# Patient Record
Sex: MALE | Race: WHITE | Employment: OTHER | URBAN - NONMETROPOLITAN AREA
[De-identification: names, ages, dates, MRNs, and addresses within clinical notes are randomized per-mention and may not be internally consistent; named-entity substitution may affect disease eponyms.]

---

## 2017-03-07 ENCOUNTER — HOSPITAL ENCOUNTER (OUTPATIENT)
Dept: GENERAL RADIOLOGY | Age: 62
Discharge: HOME | End: 2017-03-07
Payer: MEDICARE

## 2017-03-07 DIAGNOSIS — S83.207S ACUTE TORN MENISCUS OF KNEE, LEFT, SEQUELA: ICD-10-CM

## 2017-03-07 PROCEDURE — 73562 X-RAY EXAM OF KNEE 3: CPT

## 2017-04-19 DIAGNOSIS — R97.20 ELEVATED PROSTATE SPECIFIC ANTIGEN (PSA): Primary | ICD-10-CM

## 2017-04-19 LAB — PSA SERPL-MCNC: 0.66 NG/ML (ref 0–4)

## 2017-05-01 ENCOUNTER — OFFICE VISIT (OUTPATIENT)
Dept: UROLOGY | Facility: CLINIC | Age: 62
End: 2017-05-01

## 2017-05-01 VITALS
TEMPERATURE: 97.5 F | BODY MASS INDEX: 41.19 KG/M2 | SYSTOLIC BLOOD PRESSURE: 150 MMHG | HEIGHT: 73 IN | DIASTOLIC BLOOD PRESSURE: 100 MMHG | WEIGHT: 310.8 LBS

## 2017-05-01 DIAGNOSIS — N40.1 BPH (BENIGN PROSTATIC HYPERTROPHY) WITH URINARY OBSTRUCTION: ICD-10-CM

## 2017-05-01 DIAGNOSIS — C61 CANCER OF PROSTATE (HCC): Primary | ICD-10-CM

## 2017-05-01 DIAGNOSIS — N13.8 BPH (BENIGN PROSTATIC HYPERTROPHY) WITH URINARY OBSTRUCTION: ICD-10-CM

## 2017-05-01 LAB
BILIRUB BLD-MCNC: NEGATIVE MG/DL
CLARITY, POC: CLEAR
COLOR UR: YELLOW
GLUCOSE UR STRIP-MCNC: NEGATIVE MG/DL
KETONES UR QL: NEGATIVE
LEUKOCYTE EST, POC: NEGATIVE
NITRITE UR-MCNC: NEGATIVE MG/ML
PH UR: 5 [PH] (ref 5–8)
PROT UR STRIP-MCNC: NEGATIVE MG/DL
RBC # UR STRIP: NEGATIVE /UL
SP GR UR: 1.03 (ref 1–1.03)
UROBILINOGEN UR QL: NORMAL

## 2017-05-01 PROCEDURE — 99213 OFFICE O/P EST LOW 20 MIN: CPT | Performed by: UROLOGY

## 2017-05-01 PROCEDURE — 81003 URINALYSIS AUTO W/O SCOPE: CPT | Performed by: UROLOGY

## 2017-05-06 ENCOUNTER — RESULTS ENCOUNTER (OUTPATIENT)
Dept: UROLOGY | Facility: CLINIC | Age: 62
End: 2017-05-06

## 2017-05-06 DIAGNOSIS — C61 CANCER OF PROSTATE (HCC): ICD-10-CM

## 2017-09-06 RX ORDER — UBIDECARENONE 50 MG
2 CAPSULE ORAL 2 TIMES DAILY
Status: ON HOLD | COMMUNITY
End: 2022-10-13 | Stop reason: HOSPADM

## 2017-09-06 RX ORDER — FENOFIBRATE 145 MG/1
145 TABLET, COATED ORAL DAILY
COMMUNITY
End: 2017-11-06 | Stop reason: SDUPTHER

## 2017-09-08 DIAGNOSIS — E78.2 MIXED HYPERLIPIDEMIA: Primary | ICD-10-CM

## 2017-09-08 DIAGNOSIS — Z12.5 PROSTATE CANCER SCREENING: ICD-10-CM

## 2017-09-08 DIAGNOSIS — I10 ESSENTIAL HYPERTENSION: ICD-10-CM

## 2017-09-11 ENCOUNTER — OFFICE VISIT (OUTPATIENT)
Dept: INTERNAL MEDICINE | Age: 62
End: 2017-09-11
Payer: MEDICARE

## 2017-09-11 VITALS
SYSTOLIC BLOOD PRESSURE: 144 MMHG | HEART RATE: 91 BPM | DIASTOLIC BLOOD PRESSURE: 88 MMHG | HEIGHT: 73 IN | BODY MASS INDEX: 41.75 KG/M2 | OXYGEN SATURATION: 96 % | WEIGHT: 315 LBS

## 2017-09-11 DIAGNOSIS — R03.0 ELEVATED BLOOD PRESSURE READING WITHOUT DIAGNOSIS OF HYPERTENSION: ICD-10-CM

## 2017-09-11 DIAGNOSIS — E66.01 MORBID OBESITY DUE TO EXCESS CALORIES (HCC): ICD-10-CM

## 2017-09-11 DIAGNOSIS — E78.2 MIXED HYPERLIPIDEMIA: Primary | ICD-10-CM

## 2017-09-11 DIAGNOSIS — I10 ESSENTIAL HYPERTENSION: ICD-10-CM

## 2017-09-11 DIAGNOSIS — Z12.5 PROSTATE CANCER SCREENING: ICD-10-CM

## 2017-09-11 DIAGNOSIS — Z85.46 HISTORY OF PROSTATE CANCER: ICD-10-CM

## 2017-09-11 DIAGNOSIS — E78.2 MIXED HYPERLIPIDEMIA: ICD-10-CM

## 2017-09-11 LAB
ALBUMIN SERPL-MCNC: 4.1 G/DL (ref 3.5–5.2)
ALP BLD-CCNC: 73 U/L (ref 40–130)
ALT SERPL-CCNC: 42 U/L (ref 5–41)
ANION GAP SERPL CALCULATED.3IONS-SCNC: 14 MMOL/L (ref 7–19)
AST SERPL-CCNC: 25 U/L (ref 5–40)
BILIRUB SERPL-MCNC: 0.5 MG/DL (ref 0.2–1.2)
BUN BLDV-MCNC: 11 MG/DL (ref 8–23)
CALCIUM SERPL-MCNC: 9.1 MG/DL (ref 8.8–10.2)
CHLORIDE BLD-SCNC: 103 MMOL/L (ref 98–111)
CO2: 24 MMOL/L (ref 22–29)
CREAT SERPL-MCNC: 1 MG/DL (ref 0.5–1.2)
GFR NON-AFRICAN AMERICAN: >60
GLUCOSE BLD-MCNC: 94 MG/DL (ref 74–109)
LDL CHOLESTEROL DIRECT: 128 MG/DL
POTASSIUM SERPL-SCNC: 4.2 MMOL/L (ref 3.5–5)
PROSTATE SPECIFIC ANTIGEN: 0.53 NG/ML (ref 0–4)
SODIUM BLD-SCNC: 141 MMOL/L (ref 136–145)
TOTAL PROTEIN: 7.3 G/DL (ref 6.6–8.7)
TRIGL SERPL-MCNC: 243 MG/DL (ref 150–199)

## 2017-09-11 PROCEDURE — G8427 DOCREV CUR MEDS BY ELIG CLIN: HCPCS | Performed by: INTERNAL MEDICINE

## 2017-09-11 PROCEDURE — 3017F COLORECTAL CA SCREEN DOC REV: CPT | Performed by: INTERNAL MEDICINE

## 2017-09-11 PROCEDURE — 1036F TOBACCO NON-USER: CPT | Performed by: INTERNAL MEDICINE

## 2017-09-11 PROCEDURE — G8419 CALC BMI OUT NRM PARAM NOF/U: HCPCS | Performed by: INTERNAL MEDICINE

## 2017-09-11 PROCEDURE — 99214 OFFICE O/P EST MOD 30 MIN: CPT | Performed by: INTERNAL MEDICINE

## 2017-09-11 ASSESSMENT — PATIENT HEALTH QUESTIONNAIRE - PHQ9
SUM OF ALL RESPONSES TO PHQ QUESTIONS 1-9: 0
1. LITTLE INTEREST OR PLEASURE IN DOING THINGS: 0
2. FEELING DOWN, DEPRESSED OR HOPELESS: 0
SUM OF ALL RESPONSES TO PHQ9 QUESTIONS 1 & 2: 0

## 2017-09-11 ASSESSMENT — ENCOUNTER SYMPTOMS
COUGH: 0
SORE THROAT: 0
NAUSEA: 0
RHINORRHEA: 0
TROUBLE SWALLOWING: 0
ABDOMINAL PAIN: 0
SHORTNESS OF BREATH: 0

## 2018-06-12 DIAGNOSIS — C61 CANCER OF PROSTATE (HCC): Primary | ICD-10-CM

## 2018-06-12 DIAGNOSIS — C61 CANCER OF PROSTATE (HCC): ICD-10-CM

## 2018-06-13 LAB — PSA SERPL-MCNC: 0.6 NG/ML (ref 0–4)

## 2018-06-19 ENCOUNTER — OFFICE VISIT (OUTPATIENT)
Dept: UROLOGY | Facility: CLINIC | Age: 63
End: 2018-06-19

## 2018-06-19 VITALS — HEIGHT: 73 IN | BODY MASS INDEX: 41.75 KG/M2 | TEMPERATURE: 98.1 F | WEIGHT: 315 LBS

## 2018-06-19 DIAGNOSIS — C61 CANCER OF PROSTATE (HCC): Primary | ICD-10-CM

## 2018-06-19 LAB
BILIRUB BLD-MCNC: NEGATIVE MG/DL
CLARITY, POC: CLEAR
COLOR UR: YELLOW
GLUCOSE UR STRIP-MCNC: NEGATIVE MG/DL
KETONES UR QL: NEGATIVE
LEUKOCYTE EST, POC: NEGATIVE
NITRITE UR-MCNC: NEGATIVE MG/ML
PH UR: 5 [PH] (ref 5–8)
PROT UR STRIP-MCNC: NEGATIVE MG/DL
RBC # UR STRIP: NEGATIVE /UL
SP GR UR: 1.02 (ref 1–1.03)
UROBILINOGEN UR QL: NORMAL

## 2018-06-19 PROCEDURE — 99213 OFFICE O/P EST LOW 20 MIN: CPT | Performed by: UROLOGY

## 2018-06-19 PROCEDURE — 81001 URINALYSIS AUTO W/SCOPE: CPT | Performed by: UROLOGY

## 2018-06-19 NOTE — PATIENT INSTRUCTIONS

## 2018-06-19 NOTE — PROGRESS NOTES
"Subjective    Mr. Chance is 63 y.o. male    CHIEF COMPLAINT: Prostate cancer    The patient is now 6 years status post radiation for prostate cancer clinically he is doing well as a way score today is 0 he denies any gross hematuria there is no flank pain there is no symptoms metastatic disease such as bone pain back pain weight loss or anorexia.    Most recent PSA is 0.6 and stable.    He denies any new medical problems since we have last seen him      History of Present Illness      The following portions of the patient's history were reviewed and updated as appropriate: allergies, current medications, past family history, past medical history, past social history, past surgical history and problem list.    Review of Systems   Constitutional: Negative.  Negative for chills and fever.   Gastrointestinal: Negative for abdominal distention, abdominal pain, anal bleeding, blood in stool and nausea.   Genitourinary: Negative for difficulty urinating, dysuria, flank pain, frequency, hematuria and urgency.   Psychiatric/Behavioral: Negative.  Negative for agitation and confusion.         Current Outpatient Prescriptions:   •  Multiple Vitamins-Minerals (MULTIVITAMIN ADULT PO), Take  by mouth., Disp: , Rfl:     Past Medical History:   Diagnosis Date   • BPH (benign prostatic hypertrophy)    • Elevated PSA    • Malignant neoplasm of prostate        Past Surgical History:   Procedure Laterality Date   • TONSILLECTOMY AND ADENOIDECTOMY     • VASECTOMY         Social History     Social History   • Marital status: Unknown     Social History Main Topics   • Smoking status: Never Smoker   • Alcohol use No   • Drug use: Unknown     Other Topics Concern   • Not on file       Family History   Problem Relation Age of Onset   • Prostate cancer Brother        Objective    Temp 98.1 °F (36.7 °C)   Ht 185.4 cm (73\")   Wt (!) 143 kg (315 lb 9.6 oz)   BMI 41.64 kg/m²     Physical Exam      Orders Only on 06/12/2018   Component Date " Value Ref Range Status   • PSA 06/12/2018 0.6  0.0 - 4.0 ng/mL Final    Comment: Roche ECLIA methodology.  According to the American U[employer] Association, Serum PSA should  decrease and remain at undetectable levels after radical  prostatectomy. The AUA defines biochemical recurrence as an initial  PSA value 0.2 ng/mL or greater followed by a subsequent confirmatory  PSA value 0.2 ng/mL or greater.  Values obtained with different assay methods or kits cannot be used  interchangeably. Results cannot be interpreted as absolute evidence  of the presence or absence of malignant disease.         Results for orders placed or performed in visit on 06/19/18   POC Urinalysis Dipstick, Multipro   Result Value Ref Range    Color Yellow Yellow, Straw, Dark Yellow, [place]    Clarity, UA Clear Clear    Glucose, UA Negative Negative, 1000 mg/dL (3+) mg/dL    Bilirubin Negative Negative    Ketones, UA Negative Negative    Specific Gravity  1.025 1.005 - 1.030    Blood, UA Negative Negative    pH, Urine 5.0 5.0 - 8.0    Protein, POC Negative Negative mg/dL    Urobilinogen, UA Normal Normal    Nitrite, UA Negative Negative    Leukocytes Negative Negative       Assessment and Plan    Paul was seen today for prostate cancer.    Diagnoses and all orders for this visit:    Cancer of prostate  -     POC Urinalysis Dipstick, Multipro  -     PSA DIAGNOSTIC; Future    Plan--I recommended a follow-up again in 1 year's time with a PSA if he starts having difficulties etc. they will call prior to that

## 2018-06-24 ENCOUNTER — RESULTS ENCOUNTER (OUTPATIENT)
Dept: UROLOGY | Facility: CLINIC | Age: 63
End: 2018-06-24

## 2018-06-24 DIAGNOSIS — C61 CANCER OF PROSTATE (HCC): ICD-10-CM

## 2018-11-06 DIAGNOSIS — C61 CANCER OF PROSTATE (HCC): Primary | ICD-10-CM

## 2019-02-21 ENCOUNTER — NURSE ONLY (OUTPATIENT)
Dept: INTERNAL MEDICINE | Age: 64
End: 2019-02-21

## 2019-06-18 DIAGNOSIS — C61 CANCER OF PROSTATE (HCC): ICD-10-CM

## 2019-06-19 LAB — PSA SERPL-MCNC: 0.52 NG/ML (ref 0–4)

## 2019-07-02 ENCOUNTER — OFFICE VISIT (OUTPATIENT)
Dept: UROLOGY | Facility: CLINIC | Age: 64
End: 2019-07-02

## 2019-07-02 VITALS — WEIGHT: 315 LBS | HEIGHT: 74 IN | TEMPERATURE: 98 F | BODY MASS INDEX: 40.43 KG/M2

## 2019-07-02 DIAGNOSIS — C61 CANCER OF PROSTATE (HCC): Primary | ICD-10-CM

## 2019-07-02 DIAGNOSIS — R31.29 MICROSCOPIC HEMATURIA: ICD-10-CM

## 2019-07-02 LAB
BACTERIA UR QL AUTO: ABNORMAL /HPF
HYALINE CASTS UR QL AUTO: ABNORMAL /LPF
RBC # UR: ABNORMAL /HPF
REF LAB TEST METHOD: ABNORMAL
SQUAMOUS #/AREA URNS HPF: ABNORMAL /HPF
WBC UR QL AUTO: ABNORMAL /HPF

## 2019-07-02 PROCEDURE — 81015 MICROSCOPIC EXAM OF URINE: CPT | Performed by: UROLOGY

## 2019-07-02 PROCEDURE — 99213 OFFICE O/P EST LOW 20 MIN: CPT | Performed by: UROLOGY

## 2019-07-02 NOTE — PROGRESS NOTES
Mr. Chance is 64 y.o. male    Chief Complaint   Patient presents with   • Prostate Cancer       History of Present Illness   He was initially diagnosed with prostate cancer about  7 year(s) ago. This was identified in the context of elevated psa.  Severity of the disease is best described as probable organ confined disease. Previous or current management includes external beam radiotherapy. Associated symptoms include no evidence of irritative or obstructive voiding symptoms, gross hematuria, lower extremity swelling or weight loss. . Currently his PSA is  0.5.     The following portions of the patient's history were reviewed and updated as appropriate: allergies, current medications, past family history, past medical history, past social history, past surgical history and problem list.    Review of Systems   Constitutional: Negative for chills and fever.   Gastrointestinal: Negative for abdominal pain, anal bleeding and blood in stool.   Genitourinary: Negative for decreased urine volume, difficulty urinating, discharge, dysuria, enuresis, flank pain, frequency, genital sores, hematuria, penile pain, penile swelling, scrotal swelling, testicular pain and urgency.       I have reviewed the review of systems      Current Outpatient Medications:   •  Multiple Vitamins-Minerals (MULTIVITAMIN ADULT PO), Take  by mouth., Disp: , Rfl:     Past Medical History:   Diagnosis Date   • BPH (benign prostatic hypertrophy)    • Elevated PSA    • Malignant neoplasm of prostate (CMS/HCC)        Past Surgical History:   Procedure Laterality Date   • TONSILLECTOMY AND ADENOIDECTOMY     • VASECTOMY         Social History     Socioeconomic History   • Marital status: Unknown     Spouse name: Not on file   • Number of children: Not on file   • Years of education: Not on file   • Highest education level: Not on file   Tobacco Use   • Smoking status: Never Smoker   • Smokeless tobacco: Never Used   Substance and Sexual Activity   • Alcohol  "use: No   • Drug use: No   • Sexual activity: Defer       Family History   Problem Relation Age of Onset   • Prostate cancer Brother        Objective    Temp 98 °F (36.7 °C)   Ht 188 cm (74\")   Wt (!) 146 kg (321 lb 9.6 oz)   BMI 41.29 kg/m²     Physical Exam    Appointment on 06/25/2019   Component Date Value Ref Range Status   • Color 07/02/2019 Yellow  Yellow, Straw, Dark Yellow, [place] Final   • Clarity, UA 07/02/2019 Clear  Clear Final   • Glucose, UA 07/02/2019 Negative  Negative, 1000 mg/dL (3+) mg/dL Final   • Bilirubin 07/02/2019 Negative  Negative Final   • Ketones, UA 07/02/2019 Negative  Negative Final   • Specific Gravity  07/02/2019 1.025  1.005 - 1.030 Final   • Blood, UA 07/02/2019 Trace* Negative Final   • pH, Urine 07/02/2019 5.5  5.0 - 8.0 Final   • Protein, POC 07/02/2019 Trace* Negative mg/dL Final   • Urobilinogen, UA 07/02/2019 Normal  Normal Final   • Nitrite, UA 07/02/2019 Negative  Negative Final   • Leukocytes 07/02/2019 Negative  Negative Final       Results for orders placed or performed in visit on 06/25/19   POC Urinalysis Dipstick, Multipro   Result Value Ref Range    Color Yellow Yellow, Straw, Dark Yellow, [place]    Clarity, UA Clear Clear    Glucose, UA Negative Negative, 1000 mg/dL (3+) mg/dL    Bilirubin Negative Negative    Ketones, UA Negative Negative    Specific Gravity  1.025 1.005 - 1.030    Blood, UA Trace (A) Negative    pH, Urine 5.5 5.0 - 8.0    Protein, POC Trace (A) Negative mg/dL    Urobilinogen, UA Normal Normal    Nitrite, UA Negative Negative    Leukocytes Negative Negative     Patient's Body mass index is 41.29 kg/m². BMI is above normal parameters. Recommendations include: educational material.    Assessment and Plan    Paul was seen today for prostate cancer.    Diagnoses and all orders for this visit:    Cancer of prostate (CMS/HCC)  -     Urinalysis, Microscopic Only - Urine, Clean Catch    Microscopic hematuria  -     PSA DIAGNOSTIC; Future    Patient with " a history of prostate cancer previously seen by Dr. Culp.  He had radiation in 2012.  He is unsure exactly what his joey was, but his PSA last year was 0.6.  PSA  this year is 0.5.  This is stable.  No signs of recurrence.  Continue with yearly PSA screenings.    There is trace blood in his urine today.  This is a new finding.  On my examination, I do not see any red blood cells, but I will send this for formal microscopic analysis and call him with results.  If there is any concern for red blood cells in the urine he may need a full hematuria work-up.

## 2019-07-02 NOTE — PATIENT INSTRUCTIONS

## 2019-07-03 DIAGNOSIS — R31.29 MICROSCOPIC HEMATURIA: Primary | ICD-10-CM

## 2019-07-23 ENCOUNTER — PROCEDURE VISIT (OUTPATIENT)
Dept: UROLOGY | Facility: CLINIC | Age: 64
End: 2019-07-23

## 2019-07-23 ENCOUNTER — HOSPITAL ENCOUNTER (OUTPATIENT)
Dept: CT IMAGING | Facility: HOSPITAL | Age: 64
Discharge: HOME | End: 2019-07-23
Admitting: UROLOGY

## 2019-07-23 DIAGNOSIS — C61 CANCER OF PROSTATE (HCC): ICD-10-CM

## 2019-07-23 DIAGNOSIS — K65.4 MESENTERIC PANNICULITIS (HCC): ICD-10-CM

## 2019-07-23 DIAGNOSIS — R31.29 MICROSCOPIC HEMATURIA: ICD-10-CM

## 2019-07-23 DIAGNOSIS — R31.29 MICROSCOPIC HEMATURIA: Primary | ICD-10-CM

## 2019-07-23 LAB — CREAT BLDA-MCNC: 1.1 MG/DL (ref 0.6–1.3)

## 2019-07-23 PROCEDURE — 52000 CYSTOURETHROSCOPY: CPT | Performed by: UROLOGY

## 2019-07-23 PROCEDURE — 82565 ASSAY OF CREATININE: CPT

## 2019-07-23 PROCEDURE — 25010000002 IOPAMIDOL 61 % SOLUTION: Performed by: UROLOGY

## 2019-07-23 PROCEDURE — 74178 CT ABD&PLV WO CNTR FLWD CNTR: CPT

## 2019-07-23 RX ADMIN — IOPAMIDOL 100 ML: 612 INJECTION, SOLUTION INTRAVENOUS at 14:00

## 2019-07-23 NOTE — PROGRESS NOTES
Pre- operative diagnosis:  Hematuria    Post operative diagnosis:  Normal bladder    Procedure:  The patient was prepped and draped in a normal sterile fashion.  The urethra was anesthetized with 2% lidocaine jelly.  A flexible cystoscope was introduced per urethra.  The anterior urethra is normal in its caliber without evidence of a mass.  There appears to be no gross obstruction at the bladder neck.  The prostatic urethra is without any evidence of obstruction.  Neither is there excessive lateral nor median lobe enlargement.  The bladder shows a normal urothelium without evidence of a mass, erythema, nor diverticulum.  There is no evidence of a bladder stone nor foreign body.  The detrusor is minimally trabeculated.  The ureteral orifces are essentially normal in locationn and there is clear efflux of urine.    Patient tolerated the procedure well    Complications: none    Blood loss: minimal    Follow up:    Cystoscopy negative for any abnormal findings.  CT scan within normal limits from a urinary tract standpoint.  He does have a fatty liver as well as mesenteric panniculitis.  I made a GI referral for evaluation of this.  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  Continue to follow with a PSA in 1 year as part of his prostate cancer  follow-up.

## 2019-08-21 DIAGNOSIS — K65.4 MESENTERIC PANNICULITIS (HCC): Primary | ICD-10-CM

## 2019-09-25 ENCOUNTER — OFFICE VISIT (OUTPATIENT)
Dept: INTERNAL MEDICINE | Age: 64
End: 2019-09-25
Payer: MEDICARE

## 2019-09-25 VITALS
BODY MASS INDEX: 41.75 KG/M2 | HEIGHT: 73 IN | DIASTOLIC BLOOD PRESSURE: 102 MMHG | SYSTOLIC BLOOD PRESSURE: 162 MMHG | HEART RATE: 91 BPM | RESPIRATION RATE: 18 BRPM | OXYGEN SATURATION: 95 % | WEIGHT: 315 LBS

## 2019-09-25 DIAGNOSIS — E66.09 OBESITY DUE TO EXCESS CALORIES WITHOUT SERIOUS COMORBIDITY, UNSPECIFIED CLASSIFICATION: ICD-10-CM

## 2019-09-25 DIAGNOSIS — Z23 NEED FOR INFLUENZA VACCINATION: ICD-10-CM

## 2019-09-25 DIAGNOSIS — K76.0 FATTY LIVER: ICD-10-CM

## 2019-09-25 DIAGNOSIS — M89.9 DISORDER OF BONE: ICD-10-CM

## 2019-09-25 DIAGNOSIS — Z12.5 ENCOUNTER FOR SCREENING FOR MALIGNANT NEOPLASM OF PROSTATE: ICD-10-CM

## 2019-09-25 DIAGNOSIS — I10 ESSENTIAL HYPERTENSION: ICD-10-CM

## 2019-09-25 DIAGNOSIS — Z13.1 SCREENING FOR DIABETES MELLITUS (DM): ICD-10-CM

## 2019-09-25 DIAGNOSIS — E78.2 MIXED HYPERLIPIDEMIA: ICD-10-CM

## 2019-09-25 DIAGNOSIS — E66.01 MORBID OBESITY (HCC): ICD-10-CM

## 2019-09-25 DIAGNOSIS — Z00.00 HEALTHCARE MAINTENANCE: ICD-10-CM

## 2019-09-25 DIAGNOSIS — M79.3 PANNICULITIS: Primary | ICD-10-CM

## 2019-09-25 DIAGNOSIS — Z85.46 HISTORY OF PROSTATE CANCER: ICD-10-CM

## 2019-09-25 PROCEDURE — 1036F TOBACCO NON-USER: CPT | Performed by: NURSE PRACTITIONER

## 2019-09-25 PROCEDURE — 90686 IIV4 VACC NO PRSV 0.5 ML IM: CPT | Performed by: NURSE PRACTITIONER

## 2019-09-25 PROCEDURE — 99204 OFFICE O/P NEW MOD 45 MIN: CPT | Performed by: NURSE PRACTITIONER

## 2019-09-25 PROCEDURE — 3017F COLORECTAL CA SCREEN DOC REV: CPT | Performed by: NURSE PRACTITIONER

## 2019-09-25 PROCEDURE — G0008 ADMIN INFLUENZA VIRUS VAC: HCPCS | Performed by: NURSE PRACTITIONER

## 2019-09-25 PROCEDURE — G8417 CALC BMI ABV UP PARAM F/U: HCPCS | Performed by: NURSE PRACTITIONER

## 2019-09-25 PROCEDURE — G8427 DOCREV CUR MEDS BY ELIG CLIN: HCPCS | Performed by: NURSE PRACTITIONER

## 2019-09-25 RX ORDER — LOSARTAN POTASSIUM 100 MG/1
100 TABLET ORAL DAILY
Qty: 90 TABLET | Refills: 1 | Status: SHIPPED | OUTPATIENT
Start: 2019-09-25 | End: 2020-01-14 | Stop reason: SDUPTHER

## 2019-09-25 ASSESSMENT — PATIENT HEALTH QUESTIONNAIRE - PHQ9
SUM OF ALL RESPONSES TO PHQ9 QUESTIONS 1 & 2: 0
SUM OF ALL RESPONSES TO PHQ QUESTIONS 1-9: 0
SUM OF ALL RESPONSES TO PHQ QUESTIONS 1-9: 0
1. LITTLE INTEREST OR PLEASURE IN DOING THINGS: 0
2. FEELING DOWN, DEPRESSED OR HOPELESS: 0

## 2019-09-25 ASSESSMENT — ENCOUNTER SYMPTOMS
BLOOD IN STOOL: 0
SHORTNESS OF BREATH: 0
SORE THROAT: 0
STRIDOR: 0
DIARRHEA: 0
EYE ITCHING: 0
WHEEZING: 0
CONSTIPATION: 0
TROUBLE SWALLOWING: 0
EYE DISCHARGE: 0
ABDOMINAL DISTENTION: 0
VOMITING: 0
COUGH: 0
NAUSEA: 0
CHOKING: 0
COLOR CHANGE: 0
ABDOMINAL PAIN: 0

## 2019-09-25 NOTE — PROGRESS NOTES
cancer screen colonoscopy  05/30/2022    Lipid screen  09/11/2022    Flu vaccine  Completed    Pneumococcal 0-64 years Vaccine  Aged Out       No results found for: LABA1C  Lab Results   Component Value Date    PSA 0.53 09/11/2017     No results found for: TSH]  Lab Results   Component Value Date     09/11/2017    K 4.2 09/11/2017     09/11/2017    CO2 24 09/11/2017    BUN 11 09/11/2017    CREATININE 1.0 09/11/2017    GLUCOSE 94 09/11/2017    CALCIUM 9.1 09/11/2017    PROT 7.3 09/11/2017    LABALBU 4.1 09/11/2017    BILITOT 0.5 09/11/2017    ALKPHOS 73 09/11/2017    AST 25 09/11/2017    ALT 42 (H) 09/11/2017    LABGLOM >60 09/11/2017     No results found for: CHOL  Lab Results   Component Value Date    TRIG 243 (H) 09/11/2017     No results found for: HDL  No results found for: [facility]  Lab Results   Component Value Date     09/11/2017    K 4.2 09/11/2017     09/11/2017    CO2 24 09/11/2017    BUN 11 09/11/2017    CREATININE 1.0 09/11/2017    GLUCOSE 94 09/11/2017    CALCIUM 9.1 09/11/2017      No results found for: WBC, HGB, HCT, MCV, PLT, LABLYMP, MID, GRAN, LYMPHOPCT, MIDPERCENT, GRANULOCYTES, RBC, MCH, MCHC, RDW  No results found for: VITD25    Subjective:      Review of Systems   Constitutional: Negative for fatigue, fever and unexpected weight change. HENT: Negative for ear discharge, ear pain, mouth sores, sore throat and trouble swallowing. Eyes: Negative for discharge, itching and visual disturbance. Respiratory: Negative for cough, choking, shortness of breath, wheezing and stridor. Cardiovascular: Negative for chest pain, palpitations and leg swelling. Gastrointestinal: Negative for abdominal distention, abdominal pain, blood in stool, constipation, diarrhea, nausea and vomiting. Endocrine: Negative for cold intolerance, polydipsia and polyuria. Genitourinary: Negative for difficulty urinating, dysuria, frequency and urgency.    Musculoskeletal: Negative for arthralgias and gait problem. Skin: Negative for color change and rash. KNot behind left ear   Allergic/Immunologic: Negative for food allergies and immunocompromised state. Neurological: Negative for dizziness, tremors, syncope, speech difficulty, weakness and headaches. Hematological: Negative for adenopathy. Does not bruise/bleed easily. Psychiatric/Behavioral: Negative for confusion and hallucinations. Objective:     Physical Exam   Constitutional: He is oriented to person, place, and time. He appears well-developed and well-nourished. No distress. HENT:   Head: Normocephalic and atraumatic. He has what seems to be a fatty tumor behind his left ear at the base of the hairline. Eyes: Pupils are equal, round, and reactive to light. Right eye exhibits no discharge. Left eye exhibits no discharge. No scleral icterus. Neck: Normal range of motion. Neck supple. No JVD present. No thyromegaly present. Cardiovascular: Normal rate, regular rhythm and normal heart sounds. No murmur heard. Pulmonary/Chest: Effort normal and breath sounds normal. No respiratory distress. He has no wheezes. He has no rales. Abdominal: Soft. Bowel sounds are normal. He exhibits no distension and no mass. There is no tenderness. There is no rebound and no guarding. Musculoskeletal: Normal range of motion. He exhibits no edema or tenderness. Neurological: He is alert and oriented to person, place, and time. He has normal reflexes. He displays normal reflexes. No cranial nerve deficit. Coordination normal.   Skin: Skin is warm and dry. No rash noted. No erythema. Psychiatric: His behavior is normal. Judgment and thought content normal. He does not exhibit a depressed mood. He does appear to be a little mentally slow there is no diagnosis in his chart of anything to affect that.   He lives with his mother     BP (!) 162/102   Pulse 91   Resp 18   Ht 6' 1\" (1.854 m)   Wt (!) 332 lb (150.6

## 2019-09-30 DIAGNOSIS — M89.9 DISORDER OF BONE: ICD-10-CM

## 2019-09-30 DIAGNOSIS — Z00.00 HEALTHCARE MAINTENANCE: ICD-10-CM

## 2019-09-30 LAB
ALBUMIN SERPL-MCNC: 4.3 G/DL (ref 3.5–5.2)
ALP BLD-CCNC: 79 U/L (ref 40–130)
ALT SERPL-CCNC: 22 U/L (ref 5–41)
ANION GAP SERPL CALCULATED.3IONS-SCNC: 9 MMOL/L (ref 7–19)
AST SERPL-CCNC: 17 U/L (ref 5–40)
BASOPHILS ABSOLUTE: 0 K/UL (ref 0–0.2)
BASOPHILS RELATIVE PERCENT: 0.7 % (ref 0–1)
BILIRUB SERPL-MCNC: 0.5 MG/DL (ref 0.2–1.2)
BILIRUBIN URINE: NEGATIVE
BLOOD, URINE: NEGATIVE
BUN BLDV-MCNC: 22 MG/DL (ref 8–23)
CALCIUM SERPL-MCNC: 9.2 MG/DL (ref 8.8–10.2)
CHLORIDE BLD-SCNC: 104 MMOL/L (ref 98–111)
CHOLESTEROL, TOTAL: 194 MG/DL (ref 160–199)
CLARITY: ABNORMAL
CO2: 22 MMOL/L (ref 22–29)
COLOR: YELLOW
CREAT SERPL-MCNC: 1.3 MG/DL (ref 0.5–1.2)
EOSINOPHILS ABSOLUTE: 0.2 K/UL (ref 0–0.6)
EOSINOPHILS RELATIVE PERCENT: 2.6 % (ref 0–5)
GFR NON-AFRICAN AMERICAN: 56
GLUCOSE BLD-MCNC: 98 MG/DL (ref 74–109)
GLUCOSE URINE: NEGATIVE MG/DL
HBA1C MFR BLD: 5.6 % (ref 4–6)
HCT VFR BLD CALC: 42.6 % (ref 42–52)
HDLC SERPL-MCNC: 32 MG/DL (ref 55–121)
HEMOGLOBIN: 14.6 G/DL (ref 14–18)
IMMATURE GRANULOCYTES #: 0 K/UL
KETONES, URINE: NEGATIVE MG/DL
LDL CHOLESTEROL CALCULATED: 111 MG/DL
LEUKOCYTE ESTERASE, URINE: NEGATIVE
LYMPHOCYTES ABSOLUTE: 1.9 K/UL (ref 1.1–4.5)
LYMPHOCYTES RELATIVE PERCENT: 32.9 % (ref 20–40)
MCH RBC QN AUTO: 30.4 PG (ref 27–31)
MCHC RBC AUTO-ENTMCNC: 34.3 G/DL (ref 33–37)
MCV RBC AUTO: 88.6 FL (ref 80–94)
MONOCYTES ABSOLUTE: 0.5 K/UL (ref 0–0.9)
MONOCYTES RELATIVE PERCENT: 8.3 % (ref 0–10)
NEUTROPHILS ABSOLUTE: 3.2 K/UL (ref 1.5–7.5)
NEUTROPHILS RELATIVE PERCENT: 55 % (ref 50–65)
NITRITE, URINE: NEGATIVE
PDW BLD-RTO: 12.5 % (ref 11.5–14.5)
PH UA: 5.5 (ref 5–8)
PLATELET # BLD: 278 K/UL (ref 130–400)
PMV BLD AUTO: 10 FL (ref 9.4–12.4)
POTASSIUM SERPL-SCNC: 4.3 MMOL/L (ref 3.5–5)
PROTEIN UA: NEGATIVE MG/DL
RBC # BLD: 4.81 M/UL (ref 4.7–6.1)
SODIUM BLD-SCNC: 135 MMOL/L (ref 136–145)
SPECIFIC GRAVITY UA: 1.02 (ref 1–1.03)
TOTAL PROTEIN: 7.5 G/DL (ref 6.6–8.7)
TRIGL SERPL-MCNC: 257 MG/DL (ref 0–149)
TSH SERPL DL<=0.05 MIU/L-ACNC: 2.47 UIU/ML (ref 0.27–4.2)
URINE REFLEX TO CULTURE: ABNORMAL
UROBILINOGEN, URINE: 0.2 E.U./DL
VITAMIN D 25-HYDROXY: 26.9 NG/ML
WBC # BLD: 5.8 K/UL (ref 4.8–10.8)

## 2019-10-08 ENCOUNTER — APPOINTMENT (OUTPATIENT)
Dept: GENERAL RADIOLOGY | Age: 64
End: 2019-10-08
Payer: MEDICARE

## 2019-10-08 ENCOUNTER — HOSPITAL ENCOUNTER (EMERGENCY)
Age: 64
Discharge: HOME | End: 2019-10-08
Attending: EMERGENCY MEDICINE
Payer: MEDICARE

## 2019-10-08 VITALS
TEMPERATURE: 98.5 F | RESPIRATION RATE: 20 BRPM | OXYGEN SATURATION: 95 % | HEART RATE: 102 BPM | DIASTOLIC BLOOD PRESSURE: 125 MMHG | BODY MASS INDEX: 41.35 KG/M2 | WEIGHT: 312 LBS | SYSTOLIC BLOOD PRESSURE: 167 MMHG | HEIGHT: 73 IN

## 2019-10-08 DIAGNOSIS — S82.002A CLOSED NONDISPLACED FRACTURE OF LEFT PATELLA, UNSPECIFIED FRACTURE MORPHOLOGY, INITIAL ENCOUNTER: Primary | ICD-10-CM

## 2019-10-08 PROCEDURE — 99283 EMERGENCY DEPT VISIT LOW MDM: CPT

## 2019-10-08 PROCEDURE — 73562 X-RAY EXAM OF KNEE 3: CPT

## 2019-10-08 PROCEDURE — 6370000000 HC RX 637 (ALT 250 FOR IP): Performed by: EMERGENCY MEDICINE

## 2019-10-08 RX ORDER — HYDROCODONE BITARTRATE AND ACETAMINOPHEN 5; 325 MG/1; MG/1
1 TABLET ORAL EVERY 6 HOURS PRN
Qty: 12 TABLET | Refills: 0 | Status: SHIPPED | OUTPATIENT
Start: 2019-10-08 | End: 2019-10-11

## 2019-10-08 RX ORDER — HYDROCODONE BITARTRATE AND ACETAMINOPHEN 5; 325 MG/1; MG/1
1 TABLET ORAL ONCE
Status: COMPLETED | OUTPATIENT
Start: 2019-10-08 | End: 2019-10-08

## 2019-10-08 RX ADMIN — HYDROCODONE BITARTRATE AND ACETAMINOPHEN 1 TABLET: 5; 325 TABLET ORAL at 20:19

## 2019-10-08 ASSESSMENT — ENCOUNTER SYMPTOMS
ABDOMINAL PAIN: 0
NAUSEA: 0
DIARRHEA: 0
BACK PAIN: 0
SHORTNESS OF BREATH: 0
SORE THROAT: 0
VOMITING: 0
RHINORRHEA: 0

## 2019-10-08 ASSESSMENT — PAIN SCALES - GENERAL
PAINLEVEL_OUTOF10: 10
PAINLEVEL_OUTOF10: 10

## 2019-11-25 ENCOUNTER — TRANSCRIBE ORDERS (OUTPATIENT)
Dept: ADMINISTRATIVE | Facility: HOSPITAL | Age: 64
End: 2019-11-25

## 2019-11-25 ENCOUNTER — OFFICE VISIT (OUTPATIENT)
Dept: INTERNAL MEDICINE | Age: 64
End: 2019-11-25
Payer: MEDICARE

## 2019-11-25 ENCOUNTER — HOSPITAL ENCOUNTER (OUTPATIENT)
Dept: ULTRASOUND IMAGING | Facility: HOSPITAL | Age: 64
Discharge: HOME | End: 2019-11-25
Admitting: ORTHOPAEDIC SURGERY

## 2019-11-25 ENCOUNTER — TELEPHONE (OUTPATIENT)
Dept: INTERNAL MEDICINE | Age: 64
End: 2019-11-25

## 2019-11-25 VITALS
RESPIRATION RATE: 18 BRPM | OXYGEN SATURATION: 96 % | DIASTOLIC BLOOD PRESSURE: 86 MMHG | SYSTOLIC BLOOD PRESSURE: 138 MMHG | HEART RATE: 100 BPM

## 2019-11-25 DIAGNOSIS — M79.605 LEFT LEG PAIN: ICD-10-CM

## 2019-11-25 DIAGNOSIS — R60.9 EDEMA, UNSPECIFIED TYPE: ICD-10-CM

## 2019-11-25 DIAGNOSIS — I82.452 ACUTE DEEP VEIN THROMBOSIS (DVT) OF LEFT PERONEAL VEIN (HCC): ICD-10-CM

## 2019-11-25 DIAGNOSIS — I80.02 THROMBOPHLEBITIS OF SUPERFICIAL VEINS OF LEFT LOWER EXTREMITY: ICD-10-CM

## 2019-11-25 DIAGNOSIS — M79.605 LEFT LEG PAIN: Primary | ICD-10-CM

## 2019-11-25 PROCEDURE — 93971 EXTREMITY STUDY: CPT

## 2019-11-25 PROCEDURE — 3017F COLORECTAL CA SCREEN DOC REV: CPT | Performed by: NURSE PRACTITIONER

## 2019-11-25 PROCEDURE — 1036F TOBACCO NON-USER: CPT | Performed by: NURSE PRACTITIONER

## 2019-11-25 PROCEDURE — G8417 CALC BMI ABV UP PARAM F/U: HCPCS | Performed by: NURSE PRACTITIONER

## 2019-11-25 PROCEDURE — 99214 OFFICE O/P EST MOD 30 MIN: CPT | Performed by: NURSE PRACTITIONER

## 2019-11-25 PROCEDURE — 99401 PREV MED CNSL INDIV APPRX 15: CPT | Performed by: NURSE PRACTITIONER

## 2019-11-25 PROCEDURE — G8482 FLU IMMUNIZE ORDER/ADMIN: HCPCS | Performed by: NURSE PRACTITIONER

## 2019-11-25 PROCEDURE — G8427 DOCREV CUR MEDS BY ELIG CLIN: HCPCS | Performed by: NURSE PRACTITIONER

## 2019-11-25 RX ORDER — OXYCODONE HYDROCHLORIDE 5 MG/1
TABLET ORAL
Refills: 0 | COMMUNITY
Start: 2019-10-09 | End: 2020-01-02

## 2019-11-25 ASSESSMENT — ENCOUNTER SYMPTOMS
VOMITING: 0
CHOKING: 0
TROUBLE SWALLOWING: 0
EYE DISCHARGE: 0
NAUSEA: 0
ABDOMINAL DISTENTION: 0
SHORTNESS OF BREATH: 0
BLOOD IN STOOL: 0
COLOR CHANGE: 0
DIARRHEA: 0
COUGH: 0
EYE ITCHING: 0
STRIDOR: 0
WHEEZING: 0
SORE THROAT: 0
ABDOMINAL PAIN: 0
CONSTIPATION: 0

## 2019-12-12 ENCOUNTER — HOSPITAL ENCOUNTER (OUTPATIENT)
Dept: NON INVASIVE DIAGNOSTICS | Age: 64
Discharge: HOME | End: 2019-12-12
Payer: MEDICARE

## 2019-12-12 DIAGNOSIS — I82.452 ACUTE DEEP VEIN THROMBOSIS (DVT) OF LEFT PERONEAL VEIN (HCC): ICD-10-CM

## 2019-12-12 DIAGNOSIS — I80.02 THROMBOPHLEBITIS OF SUPERFICIAL VEINS OF LEFT LOWER EXTREMITY: ICD-10-CM

## 2019-12-12 PROCEDURE — 93971 EXTREMITY STUDY: CPT

## 2019-12-16 ENCOUNTER — OFFICE VISIT (OUTPATIENT)
Dept: INTERNAL MEDICINE | Age: 64
End: 2019-12-16
Payer: MEDICARE

## 2019-12-16 VITALS
SYSTOLIC BLOOD PRESSURE: 138 MMHG | OXYGEN SATURATION: 95 % | RESPIRATION RATE: 20 BRPM | DIASTOLIC BLOOD PRESSURE: 86 MMHG | HEIGHT: 73 IN | WEIGHT: 311 LBS | BODY MASS INDEX: 41.22 KG/M2 | HEART RATE: 100 BPM

## 2019-12-16 DIAGNOSIS — I82.452 ACUTE DEEP VEIN THROMBOSIS (DVT) OF LEFT PERONEAL VEIN (HCC): Primary | ICD-10-CM

## 2019-12-16 DIAGNOSIS — I10 ESSENTIAL HYPERTENSION: ICD-10-CM

## 2019-12-16 DIAGNOSIS — E78.2 MIXED HYPERLIPIDEMIA: ICD-10-CM

## 2019-12-16 DIAGNOSIS — Z12.5 ENCOUNTER FOR SCREENING FOR MALIGNANT NEOPLASM OF PROSTATE: ICD-10-CM

## 2019-12-16 DIAGNOSIS — M89.9 DISORDER OF BONE, UNSPECIFIED: ICD-10-CM

## 2019-12-16 DIAGNOSIS — Z85.46 HISTORY OF PROSTATE CANCER: ICD-10-CM

## 2019-12-16 DIAGNOSIS — Z00.00 HEALTHCARE MAINTENANCE: ICD-10-CM

## 2019-12-16 PROCEDURE — G8482 FLU IMMUNIZE ORDER/ADMIN: HCPCS | Performed by: NURSE PRACTITIONER

## 2019-12-16 PROCEDURE — G8417 CALC BMI ABV UP PARAM F/U: HCPCS | Performed by: NURSE PRACTITIONER

## 2019-12-16 PROCEDURE — 99213 OFFICE O/P EST LOW 20 MIN: CPT | Performed by: NURSE PRACTITIONER

## 2019-12-16 PROCEDURE — 3017F COLORECTAL CA SCREEN DOC REV: CPT | Performed by: NURSE PRACTITIONER

## 2019-12-16 PROCEDURE — 1036F TOBACCO NON-USER: CPT | Performed by: NURSE PRACTITIONER

## 2019-12-16 PROCEDURE — G8427 DOCREV CUR MEDS BY ELIG CLIN: HCPCS | Performed by: NURSE PRACTITIONER

## 2019-12-16 ASSESSMENT — ENCOUNTER SYMPTOMS
WHEEZING: 0
COUGH: 0
ABDOMINAL DISTENTION: 0
VOMITING: 0
EYE DISCHARGE: 0
BLOOD IN STOOL: 0
EYE ITCHING: 0
COLOR CHANGE: 0
TROUBLE SWALLOWING: 0
NAUSEA: 0
ABDOMINAL PAIN: 0
SORE THROAT: 0
DIARRHEA: 0
CONSTIPATION: 0
STRIDOR: 0
CHOKING: 0
SHORTNESS OF BREATH: 0

## 2020-01-02 ENCOUNTER — OFFICE VISIT (OUTPATIENT)
Dept: INTERNAL MEDICINE | Age: 65
End: 2020-01-02
Payer: MEDICARE

## 2020-01-02 VITALS
SYSTOLIC BLOOD PRESSURE: 131 MMHG | BODY MASS INDEX: 41.85 KG/M2 | HEIGHT: 72 IN | OXYGEN SATURATION: 96 % | DIASTOLIC BLOOD PRESSURE: 82 MMHG | WEIGHT: 309 LBS | HEART RATE: 90 BPM

## 2020-01-02 DIAGNOSIS — Z12.5 ENCOUNTER FOR SCREENING FOR MALIGNANT NEOPLASM OF PROSTATE: ICD-10-CM

## 2020-01-02 DIAGNOSIS — Z00.00 HEALTHCARE MAINTENANCE: ICD-10-CM

## 2020-01-02 DIAGNOSIS — M89.9 DISORDER OF BONE, UNSPECIFIED: ICD-10-CM

## 2020-01-02 DIAGNOSIS — E78.2 MIXED HYPERLIPIDEMIA: ICD-10-CM

## 2020-01-02 DIAGNOSIS — I10 ESSENTIAL HYPERTENSION: ICD-10-CM

## 2020-01-02 DIAGNOSIS — Z85.46 HISTORY OF PROSTATE CANCER: ICD-10-CM

## 2020-01-02 PROBLEM — S82.025A CLOSED NONDISPLACED LONGITUDINAL FRACTURE OF LEFT PATELLA: Status: ACTIVE | Noted: 2020-01-02

## 2020-01-02 PROBLEM — E55.9 VITAMIN D DEFICIENCY: Status: ACTIVE | Noted: 2020-01-02

## 2020-01-02 PROBLEM — I80.02 THROMBOPHLEBITIS OF SUPERFICIAL VEINS OF LEFT LOWER EXTREMITY: Status: RESOLVED | Noted: 2019-11-25 | Resolved: 2020-01-02

## 2020-01-02 LAB
ALBUMIN SERPL-MCNC: 4.3 G/DL (ref 3.5–5.2)
ALP BLD-CCNC: 80 U/L (ref 40–130)
ALT SERPL-CCNC: 20 U/L (ref 5–41)
ANION GAP SERPL CALCULATED.3IONS-SCNC: 15 MMOL/L (ref 7–19)
AST SERPL-CCNC: 16 U/L (ref 5–40)
BASOPHILS ABSOLUTE: 0 K/UL (ref 0–0.2)
BASOPHILS RELATIVE PERCENT: 0.5 % (ref 0–1)
BILIRUB SERPL-MCNC: 0.5 MG/DL (ref 0.2–1.2)
BILIRUBIN URINE: ABNORMAL
BLOOD, URINE: NEGATIVE
BUN BLDV-MCNC: 17 MG/DL (ref 8–23)
CALCIUM SERPL-MCNC: 9.3 MG/DL (ref 8.8–10.2)
CHLORIDE BLD-SCNC: 99 MMOL/L (ref 98–111)
CHOLESTEROL, TOTAL: 177 MG/DL (ref 160–199)
CLARITY: ABNORMAL
CO2: 22 MMOL/L (ref 22–29)
COLOR: YELLOW
CREAT SERPL-MCNC: 1.1 MG/DL (ref 0.5–1.2)
EOSINOPHILS ABSOLUTE: 0.2 K/UL (ref 0–0.6)
EOSINOPHILS RELATIVE PERCENT: 3 % (ref 0–5)
GFR NON-AFRICAN AMERICAN: >60
GLUCOSE BLD-MCNC: 102 MG/DL (ref 74–109)
GLUCOSE URINE: NEGATIVE MG/DL
HCT VFR BLD CALC: 41.1 % (ref 42–52)
HDLC SERPL-MCNC: 34 MG/DL (ref 55–121)
HEMOGLOBIN: 14.3 G/DL (ref 14–18)
IMMATURE GRANULOCYTES #: 0.1 K/UL
KETONES, URINE: NEGATIVE MG/DL
LDL CHOLESTEROL CALCULATED: 94 MG/DL
LEUKOCYTE ESTERASE, URINE: NEGATIVE
LYMPHOCYTES ABSOLUTE: 1.8 K/UL (ref 1.1–4.5)
LYMPHOCYTES RELATIVE PERCENT: 23.5 % (ref 20–40)
MCH RBC QN AUTO: 30.6 PG (ref 27–31)
MCHC RBC AUTO-ENTMCNC: 34.8 G/DL (ref 33–37)
MCV RBC AUTO: 87.8 FL (ref 80–94)
MONOCYTES ABSOLUTE: 0.5 K/UL (ref 0–0.9)
MONOCYTES RELATIVE PERCENT: 6.8 % (ref 0–10)
NEUTROPHILS ABSOLUTE: 5.1 K/UL (ref 1.5–7.5)
NEUTROPHILS RELATIVE PERCENT: 65.6 % (ref 50–65)
NITRITE, URINE: NEGATIVE
PDW BLD-RTO: 12.7 % (ref 11.5–14.5)
PH UA: 5.5 (ref 5–8)
PLATELET # BLD: 315 K/UL (ref 130–400)
PMV BLD AUTO: 9.7 FL (ref 9.4–12.4)
POTASSIUM SERPL-SCNC: 4 MMOL/L (ref 3.5–5)
PROSTATE SPECIFIC ANTIGEN: 0.52 NG/ML (ref 0–4)
PROTEIN UA: NEGATIVE MG/DL
RBC # BLD: 4.68 M/UL (ref 4.7–6.1)
SODIUM BLD-SCNC: 136 MMOL/L (ref 136–145)
SPECIFIC GRAVITY UA: 1.03 (ref 1–1.03)
TOTAL PROTEIN: 7.8 G/DL (ref 6.6–8.7)
TRIGL SERPL-MCNC: 244 MG/DL (ref 0–149)
TSH SERPL DL<=0.05 MIU/L-ACNC: 1.79 UIU/ML (ref 0.27–4.2)
URINE REFLEX TO CULTURE: ABNORMAL
UROBILINOGEN, URINE: 0.2 E.U./DL
VITAMIN D 25-HYDROXY: 26.1 NG/ML
WBC # BLD: 7.7 K/UL (ref 4.8–10.8)

## 2020-01-02 PROCEDURE — 3017F COLORECTAL CA SCREEN DOC REV: CPT | Performed by: NURSE PRACTITIONER

## 2020-01-02 PROCEDURE — G8482 FLU IMMUNIZE ORDER/ADMIN: HCPCS | Performed by: NURSE PRACTITIONER

## 2020-01-02 PROCEDURE — 1036F TOBACCO NON-USER: CPT | Performed by: NURSE PRACTITIONER

## 2020-01-02 PROCEDURE — 99214 OFFICE O/P EST MOD 30 MIN: CPT | Performed by: NURSE PRACTITIONER

## 2020-01-02 PROCEDURE — G8427 DOCREV CUR MEDS BY ELIG CLIN: HCPCS | Performed by: NURSE PRACTITIONER

## 2020-01-02 PROCEDURE — G8417 CALC BMI ABV UP PARAM F/U: HCPCS | Performed by: NURSE PRACTITIONER

## 2020-01-02 RX ORDER — FENOFIBRATE 160 MG/1
160 TABLET ORAL DAILY
Qty: 90 TABLET | Refills: 1 | Status: SHIPPED | OUTPATIENT
Start: 2020-01-02 | End: 2020-01-14 | Stop reason: SDUPTHER

## 2020-01-02 ASSESSMENT — ENCOUNTER SYMPTOMS
WHEEZING: 0
CHOKING: 0
BLOOD IN STOOL: 0
DIARRHEA: 0
SHORTNESS OF BREATH: 0
SORE THROAT: 0
TROUBLE SWALLOWING: 0
COLOR CHANGE: 0
STRIDOR: 0
EYE DISCHARGE: 0
COUGH: 0
EYE ITCHING: 0
VOMITING: 0
ABDOMINAL DISTENTION: 0
CONSTIPATION: 0
ABDOMINAL PAIN: 0
NAUSEA: 0

## 2020-01-02 ASSESSMENT — PATIENT HEALTH QUESTIONNAIRE - PHQ9
SUM OF ALL RESPONSES TO PHQ QUESTIONS 1-9: 0
1. LITTLE INTEREST OR PLEASURE IN DOING THINGS: 0
SUM OF ALL RESPONSES TO PHQ9 QUESTIONS 1 & 2: 0
2. FEELING DOWN, DEPRESSED OR HOPELESS: 0
SUM OF ALL RESPONSES TO PHQ QUESTIONS 1-9: 0

## 2020-01-02 NOTE — PROGRESS NOTES
[facility] INTERNAL MEDICINE  [address] [address]  [address] [address]  Dept: [phone]  Dept Fax: [phone]  Loc: [phone]    Rachell Booth is a 62 y.o. male who presents today for his medical conditions/complaints as noted below. Rachell Booth is c/bryon Hypertension (Patient is here for routine follow up visit. Patient had labs done prior to appointment today.) and Edema (Patient is having swelling left foot and redness. Patient has history of DVT)        HPI:     HPI   1. HTN:  Stable on current meds; No side effects of the meds; Takes as directed; takes blood pressure 3-4 times a week   2. DVT of the left leg; He is on xarelto   Dx 11/25/19 with Dr Marquis Bob post fx left patella   3. Hyperlipidemia-he has not been taking any cholesterol meds at all. 4.  Vit d def  ; labs are pending he is currently not taking any supplements at all  Chief Complaint   Patient presents with    Hypertension     Patient is here for routine follow up visit. Patient had labs done prior to appointment today.  Edema     Patient is having swelling left foot and redness.  Patient has history of DVT       Past Medical History:   Diagnosis Date    Cellulitis of left leg     Colon polyp     Elevated blood pressure reading without diagnosis of hypertension     Esophageal reflux     History of prostate cancer     Mixed hyperlipidemia     TG>>LDL    Obesity     Prostate cancer [facility])     2010 Dr Michael Alvarez;  implants, XRT      Past Surgical History:   Procedure Laterality Date    COLONOSCOPY  6/2/2009        COLONOSCOPY  2012        UPPER GASTROINTESTINAL ENDOSCOPY  3/22/2000        VASECTOMY         Vitals 1/2/2020 12/16/2019 11/25/2019 10/8/2019 10/8/2019 33/4/2014   SYSTOLIC 541 765 625 - - 521   DIASTOLIC 82 86 86 - - 644   Pulse 90 100 100 - - 102   Temp - - - - - 98.5   Resp - 20 18 - - 20   SpO2 96 95 96 - - 95   Weight 309 lb 311 lb - Final   ]  Lab Results   Component Value Date     01/02/2020    K 4.0 01/02/2020    CL 99 01/02/2020    CO2 22 01/02/2020    BUN 17 01/02/2020    CREATININE 1.1 01/02/2020    GLUCOSE 102 01/02/2020    CALCIUM 9.3 01/02/2020    PROT 7.8 01/02/2020    LABALBU 4.3 01/02/2020    BILITOT 0.5 01/02/2020    ALKPHOS 80 01/02/2020    AST 16 01/02/2020    ALT 20 01/02/2020    LABGLOM >60 01/02/2020     Lab Results   Component Value Date    CHOL 177 01/02/2020    CHOL 194 09/30/2019     Lab Results   Component Value Date    TRIG 244 (H) 01/02/2020    TRIG 257 (H) 09/30/2019    TRIG 243 (H) 09/11/2017     Lab Results   Component Value Date    HDL 34 (L) 01/02/2020    HDL 32 (L) 09/30/2019     Lab Results   Component Value Date    LDLCALC 94 01/02/2020    LDLCALC 111 09/30/2019     Lab Results   Component Value Date     01/02/2020    K 4.0 01/02/2020    CL 99 01/02/2020    CO2 22 01/02/2020    BUN 17 01/02/2020    CREATININE 1.1 01/02/2020    GLUCOSE 102 01/02/2020    CALCIUM 9.3 01/02/2020      Lab Results   Component Value Date    WBC 7.7 01/02/2020    HGB 14.3 01/02/2020    HCT 41.1 (L) 01/02/2020    MCV 87.8 01/02/2020     01/02/2020    LYMPHOPCT 23.5 01/02/2020    RBC 4.68 (L) 01/02/2020    MCH 30.6 01/02/2020    MCHC 34.8 01/02/2020    RDW 12.7 01/02/2020     Lab Results   Component Value Date    VITD25 26.1 (L) 01/02/2020       Subjective:      Review of Systems   Constitutional: Negative for fatigue, fever and unexpected weight change. HENT: Negative for ear discharge, ear pain, mouth sores, sore throat and trouble swallowing. Eyes: Negative for discharge, itching and visual disturbance. Respiratory: Negative for cough, choking, shortness of breath, wheezing and stridor. Cardiovascular: Positive for leg swelling. Negative for chest pain and palpitations. Gastrointestinal: Negative for abdominal distention, abdominal pain, blood in stool, constipation, diarrhea, nausea and vomiting.

## 2020-01-02 NOTE — PATIENT INSTRUCTIONS
1.  htn  Stable on current; The current medical regimen is effective;  continue present plan and medications. 2.  DVT of the left leg; We will do ultrasound in 2 weeks; Someone will call you with the appt time   Continue with the xarelto and elevation of the extremity ; we need to try the best we can to get the swelling out of your leg and make it stay out so that you do not have long-term lifetime edema of your left leg. 3.  Hyperlipidemia;  Start taking 160 mg fenofibrate daily  ( I called new script to [facility] )  ; also take fish oil 1000 mg twice daily and red yeast rice 2 at bedtime;   4. Vit d def;  Labs are pending from today   #5 recent patellar fracture with the resulting then of the DVT of his left leg.

## 2020-01-14 RX ORDER — FENOFIBRATE 160 MG/1
160 TABLET ORAL DAILY
Qty: 90 TABLET | Refills: 1 | Status: SHIPPED | OUTPATIENT
Start: 2020-01-14 | End: 2022-06-29

## 2020-01-14 RX ORDER — LOSARTAN POTASSIUM 100 MG/1
100 TABLET ORAL DAILY
Qty: 90 TABLET | Refills: 1 | Status: SHIPPED | OUTPATIENT
Start: 2020-01-14 | End: 2020-04-07 | Stop reason: SDUPTHER

## 2020-01-17 ENCOUNTER — TELEPHONE (OUTPATIENT)
Dept: INTERNAL MEDICINE | Age: 65
End: 2020-01-17

## 2020-01-17 NOTE — TELEPHONE ENCOUNTER
Spoke with [employer], they were going to schedule an US for pt but the order says upper extremity instead of lower extremity. All of the notes and dx associated are for lower extremity. Can this be changed or reordered?

## 2020-01-24 ENCOUNTER — HOSPITAL ENCOUNTER (OUTPATIENT)
Dept: VASCULAR LAB | Age: 65
Discharge: HOME | End: 2020-01-24
Payer: MEDICARE

## 2020-01-24 PROCEDURE — 93971 EXTREMITY STUDY: CPT

## 2020-02-10 ENCOUNTER — TELEPHONE (OUTPATIENT)
Dept: INTERNAL MEDICINE | Age: 65
End: 2020-02-10

## 2020-02-10 NOTE — TELEPHONE ENCOUNTER
The clot has to be absorbed by his body. There is nothing we can do to speed that process. Is going to remain swollen until that. He does not have to stay in the bedroom he can do really what ever he wants as long as he just elevates his legs a couple times a day.   We can certainly get her an appointment with vascular if she would like to do that

## 2020-02-10 NOTE — TELEPHONE ENCOUNTER
Patient's mom calling about blood clot in patient's left leg. She states it is still swollen. She said he can't continue to just stay in that bedroom, in that house any longer. She said there has got to be something that can be done.       Please advise

## 2020-04-07 ENCOUNTER — VIRTUAL VISIT (OUTPATIENT)
Dept: INTERNAL MEDICINE | Age: 65
End: 2020-04-07
Payer: MEDICARE

## 2020-04-07 PROBLEM — I82.552 CHRONIC DEEP VEIN THROMBOSIS (DVT) OF LEFT PERONEAL VEIN (HCC): Status: ACTIVE | Noted: 2019-11-25

## 2020-04-07 PROCEDURE — 99443 PR PHYS/QHP TELEPHONE EVALUATION 21-30 MIN: CPT | Performed by: NURSE PRACTITIONER

## 2020-04-07 RX ORDER — LOSARTAN POTASSIUM 100 MG/1
100 TABLET ORAL DAILY
Qty: 90 TABLET | Refills: 1 | Status: SHIPPED | OUTPATIENT
Start: 2020-04-07 | End: 2021-04-13 | Stop reason: SDUPTHER

## 2020-04-07 ASSESSMENT — ENCOUNTER SYMPTOMS
VOMITING: 0
STRIDOR: 0
ABDOMINAL DISTENTION: 0
SHORTNESS OF BREATH: 0
BLOOD IN STOOL: 0
TROUBLE SWALLOWING: 0
NAUSEA: 0
COUGH: 0
SORE THROAT: 0
DIARRHEA: 0
EYE DISCHARGE: 0
CHOKING: 0
ABDOMINAL PAIN: 0
CONSTIPATION: 0
EYE ITCHING: 0
WHEEZING: 0
COLOR CHANGE: 0

## 2020-04-07 NOTE — PATIENT INSTRUCTIONS
1. Hypertension The current medical regimen is effective;  continue present plan and medications. 2.  Chronic DVT left;  stabe on xarelto we will set up a future appointment for a venous Doppler of his left leg to evaluate the status of his DVT; someone will call you with this appointment I put the order in today  3. Vit d def;  Stable for now recheck labs in 3 months   4. Hyperlipidemia; The current medical regimen is effective;  continue present plan and medications.

## 2020-04-07 NOTE — PROGRESS NOTES
[facility]  [address] [address] [place] [address]  Dept: [phone]  Dept Fax: [phone]  Loc: [phone]    Justina Arenas is a 62 y.o. male who were are performing tele health communication  for his medical conditions/complaints as noted below. Currently, our state is under umbrella of national state of emergency and we are using alternative methods of taking care of the needs of our patients so they are not exposed in our office; The patient has consented to this form of communication  Justina Arenas is c/bryon   Hyperlipidemia        HPI:     HPI   1. Hyperlipidemia-  Stable on current meds; Takes as directed; No side effects of the meds;  2. [place] HTN:  Stable on current meds; No side effects of the meds; Takes as directed; takes blood pressure 3-4 times a week ;    3.  Morbid obesity ; He is not willing to try weight loss at this point;    4. Vit d def  ;  Stable on current dose;  Weekly; No side effects of the meds   5. Chronic DVT of the left leg; He is on xarelto; Has no side effects of the meds;   This happened after he had a fracture patella      Chief Complaint   Patient presents with    Hyperlipidemia       Past Medical History:   Diagnosis Date    Cellulitis of left leg     Colon polyp     Elevated blood pressure reading without diagnosis of hypertension     Esophageal reflux     History of prostate cancer     Mixed hyperlipidemia     TG>>LDL    Obesity     Prostate cancer [facility])     2010 Dr Tali Benitez;  implants, XRT      Past Surgical History:   Procedure Laterality Date    COLONOSCOPY  6/2/2009        COLONOSCOPY  2012        UPPER GASTROINTESTINAL ENDOSCOPY  3/22/2000        VASECTOMY         Vitals 1/2/2020 12/16/2019 11/25/2019 10/8/2019 10/8/2019 96/5/4356   SYSTOLIC 855 897 743 - - 458   DIASTOLIC 82 86 86 - - 852   Pulse 90 100 100 - - 102   Temp - - - - - 98.5   Resp - 20 18 - Value Date    LABA1C 5.6 09/30/2019     Lab Results   Component Value Date    PSA 0.52 01/02/2020    PSA 0.53 09/11/2017     TSH   Date Value Ref Range Status   01/02/2020 1.790 0.270 - 4.200 uIU/mL Final   ]  Lab Results   Component Value Date     01/02/2020    K 4.0 01/02/2020    CL 99 01/02/2020    CO2 22 01/02/2020    BUN 17 01/02/2020    CREATININE 1.1 01/02/2020    GLUCOSE 102 01/02/2020    CALCIUM 9.3 01/02/2020    PROT 7.8 01/02/2020    LABALBU 4.3 01/02/2020    BILITOT 0.5 01/02/2020    ALKPHOS 80 01/02/2020    AST 16 01/02/2020    ALT 20 01/02/2020    LABGLOM >60 01/02/2020     Lab Results   Component Value Date    CHOL 177 01/02/2020    CHOL 194 09/30/2019     Lab Results   Component Value Date    TRIG 244 (H) 01/02/2020    TRIG 257 (H) 09/30/2019    TRIG 243 (H) 09/11/2017     Lab Results   Component Value Date    HDL 34 (L) 01/02/2020    HDL 32 (L) 09/30/2019     Lab Results   Component Value Date    LDLCALC 94 01/02/2020    LDLCALC 111 09/30/2019     Lab Results   Component Value Date     01/02/2020    K 4.0 01/02/2020    CL 99 01/02/2020    CO2 22 01/02/2020    BUN 17 01/02/2020    CREATININE 1.1 01/02/2020    GLUCOSE 102 01/02/2020    CALCIUM 9.3 01/02/2020      Lab Results   Component Value Date    WBC 7.7 01/02/2020    HGB 14.3 01/02/2020    HCT 41.1 (L) 01/02/2020    MCV 87.8 01/02/2020     01/02/2020    LYMPHOPCT 23.5 01/02/2020    RBC 4.68 (L) 01/02/2020    MCH 30.6 01/02/2020    MCHC 34.8 01/02/2020    RDW 12.7 01/02/2020     Lab Results   Component Value Date    VITD25 26.1 (L) 01/02/2020       Subjective:      Review of Systems   Constitutional: Negative for fatigue, fever and unexpected weight change. HENT: Negative for ear discharge, ear pain, mouth sores, sore throat and trouble swallowing. Eyes: Negative for discharge, itching and visual disturbance. Respiratory: Negative for cough, choking, shortness of breath, wheezing and stridor.     Cardiovascular: Positive for leg swelling. Negative for chest pain and palpitations. Gastrointestinal: Negative for abdominal distention, abdominal pain, blood in stool, constipation, diarrhea, nausea and vomiting. Endocrine: Negative for cold intolerance, polydipsia and polyuria. Genitourinary: Negative for difficulty urinating, dysuria, frequency and urgency. Musculoskeletal: Positive for arthralgias. Negative for gait problem. Skin: Negative for color change and rash. Allergic/Immunologic: Negative for food allergies and immunocompromised state. Neurological: Negative for dizziness, tremors, syncope, speech difficulty, weakness and headaches. Hematological: Negative for adenopathy. Does not bruise/bleed easily. Psychiatric/Behavioral: Negative for confusion and hallucinations. Objective:     Physical Exam   DGEVISITPE      GENERAL:   Afebrile alert no acute distress  Respiratory no use of accessory muscles no acute distress no audible wheezing  Mental status alert oriented adequate thought processes  He reports his left leg is still somewhat swollen;       Vital signs were not taken at this visit as no equipment was available; There were no vitals taken for this visit. Assessment:       Diagnosis Orders   1. Essential hypertension  CBC Auto Differential    Comprehensive Metabolic Panel    Urinalysis Reflex to Culture    TSH without Reflex   2. Mixed hyperlipidemia  Lipid Panel   3. Vitamin D deficiency  Vitamin D 25 Hydroxy   4. Chronic deep vein thrombosis (DVT) of left peroneal vein  VL EXTREMITY VENOUS LEFT   5. Morbid obesity ([address]         Plan:        Patient given educational materials - see patient instructions. Discussed use, benefit, and side effects of prescribed medications. Allpatient questions answered. Pt voiced understanding. Reviewed health maintenance. Instructed to continue current medications, diet and exercise. Patient agreed with treatment plan. Follow up as directed.

## 2020-04-21 ENCOUNTER — HOSPITAL ENCOUNTER (OUTPATIENT)
Dept: VASCULAR LAB | Age: 65
Discharge: HOME | End: 2020-04-21
Payer: MEDICARE

## 2020-04-21 PROCEDURE — 93971 EXTREMITY STUDY: CPT

## 2020-04-27 ENCOUNTER — TELEPHONE (OUTPATIENT)
Dept: FAMILY MEDICINE CLINIC | Age: 65
End: 2020-04-27

## 2020-04-27 NOTE — TELEPHONE ENCOUNTER
Pt mother called leg is still red and swollen, I let her know results as written by Aurelio Barreto told her to have him stay off the leg as much as he can right now for the swelling and to cont. To take the blood thinner if there is any changes she can call us.   She voiced understanding of these instructions

## 2020-06-12 RX ORDER — RIVAROXABAN 20 MG/1
TABLET, FILM COATED ORAL
Qty: 30 TABLET | Refills: 1 | Status: SHIPPED | OUTPATIENT
Start: 2020-06-12 | End: 2020-08-06

## 2020-07-02 ENCOUNTER — RESULTS ENCOUNTER (OUTPATIENT)
Dept: UROLOGY | Facility: CLINIC | Age: 65
End: 2020-07-02

## 2020-07-02 DIAGNOSIS — R31.29 MICROSCOPIC HEMATURIA: ICD-10-CM

## 2020-07-07 DIAGNOSIS — E78.2 MIXED HYPERLIPIDEMIA: ICD-10-CM

## 2020-07-07 DIAGNOSIS — I10 ESSENTIAL HYPERTENSION: ICD-10-CM

## 2020-07-07 DIAGNOSIS — E55.9 VITAMIN D DEFICIENCY: ICD-10-CM

## 2020-07-07 LAB
ALBUMIN SERPL-MCNC: 4.4 G/DL (ref 3.5–5.2)
ALP BLD-CCNC: 73 U/L (ref 40–130)
ALT SERPL-CCNC: 31 U/L (ref 5–41)
ANION GAP SERPL CALCULATED.3IONS-SCNC: 15 MMOL/L (ref 7–19)
AST SERPL-CCNC: 20 U/L (ref 5–40)
BASOPHILS ABSOLUTE: 0.1 K/UL (ref 0–0.2)
BASOPHILS RELATIVE PERCENT: 0.7 % (ref 0–1)
BILIRUB SERPL-MCNC: 0.3 MG/DL (ref 0.2–1.2)
BILIRUBIN URINE: NEGATIVE
BLOOD, URINE: NEGATIVE
BUN BLDV-MCNC: 14 MG/DL (ref 8–23)
CALCIUM SERPL-MCNC: 9.3 MG/DL (ref 8.8–10.2)
CHLORIDE BLD-SCNC: 102 MMOL/L (ref 98–111)
CHOLESTEROL, TOTAL: 197 MG/DL (ref 160–199)
CLARITY: CLEAR
CO2: 22 MMOL/L (ref 22–29)
COLOR: YELLOW
CREAT SERPL-MCNC: 1.1 MG/DL (ref 0.5–1.2)
EOSINOPHILS ABSOLUTE: 0.2 K/UL (ref 0–0.6)
EOSINOPHILS RELATIVE PERCENT: 2.7 % (ref 0–5)
GFR NON-AFRICAN AMERICAN: >60
GLUCOSE BLD-MCNC: 88 MG/DL (ref 74–109)
GLUCOSE URINE: NEGATIVE MG/DL
HCT VFR BLD CALC: 41.8 % (ref 42–52)
HDLC SERPL-MCNC: 32 MG/DL (ref 55–121)
HEMOGLOBIN: 14.2 G/DL (ref 14–18)
IMMATURE GRANULOCYTES #: 0 K/UL
KETONES, URINE: NEGATIVE MG/DL
LDL CHOLESTEROL CALCULATED: 92 MG/DL
LEUKOCYTE ESTERASE, URINE: NEGATIVE
LYMPHOCYTES ABSOLUTE: 2.3 K/UL (ref 1.1–4.5)
LYMPHOCYTES RELATIVE PERCENT: 33.8 % (ref 20–40)
MCH RBC QN AUTO: 30.1 PG (ref 27–31)
MCHC RBC AUTO-ENTMCNC: 34 G/DL (ref 33–37)
MCV RBC AUTO: 88.7 FL (ref 80–94)
MONOCYTES ABSOLUTE: 0.6 K/UL (ref 0–0.9)
MONOCYTES RELATIVE PERCENT: 8.3 % (ref 0–10)
NEUTROPHILS ABSOLUTE: 3.7 K/UL (ref 1.5–7.5)
NEUTROPHILS RELATIVE PERCENT: 54.1 % (ref 50–65)
NITRITE, URINE: NEGATIVE
PDW BLD-RTO: 12.2 % (ref 11.5–14.5)
PH UA: 5.5 (ref 5–8)
PLATELET # BLD: 292 K/UL (ref 130–400)
PMV BLD AUTO: 10 FL (ref 9.4–12.4)
POTASSIUM SERPL-SCNC: 4.1 MMOL/L (ref 3.5–5)
PROTEIN UA: NEGATIVE MG/DL
RBC # BLD: 4.71 M/UL (ref 4.7–6.1)
SODIUM BLD-SCNC: 139 MMOL/L (ref 136–145)
SPECIFIC GRAVITY UA: 1.02 (ref 1–1.03)
TOTAL PROTEIN: 7.1 G/DL (ref 6.6–8.7)
TRIGL SERPL-MCNC: 365 MG/DL (ref 0–149)
TSH SERPL DL<=0.05 MIU/L-ACNC: 2.2 UIU/ML (ref 0.27–4.2)
UROBILINOGEN, URINE: 0.2 E.U./DL
VITAMIN D 25-HYDROXY: 31 NG/ML
WBC # BLD: 6.8 K/UL (ref 4.8–10.8)

## 2020-07-15 RX ORDER — ROSUVASTATIN CALCIUM 10 MG/1
10 TABLET, COATED ORAL NIGHTLY
Qty: 30 TABLET | Refills: 3 | Status: SHIPPED | OUTPATIENT
Start: 2020-07-15 | End: 2022-04-19 | Stop reason: SDUPTHER

## 2020-08-06 RX ORDER — RIVAROXABAN 20 MG/1
TABLET, FILM COATED ORAL
Qty: 30 TABLET | Refills: 0 | Status: SHIPPED | OUTPATIENT
Start: 2020-08-06 | End: 2020-09-08

## 2020-08-10 ENCOUNTER — RESULTS ENCOUNTER (OUTPATIENT)
Dept: UROLOGY | Facility: CLINIC | Age: 65
End: 2020-08-10

## 2020-08-10 DIAGNOSIS — C61 CANCER OF PROSTATE (HCC): ICD-10-CM

## 2020-08-12 ENCOUNTER — TELEPHONE (OUTPATIENT)
Dept: UROLOGY | Facility: CLINIC | Age: 65
End: 2020-08-12

## 2020-08-12 NOTE — TELEPHONE ENCOUNTER
Patient's brother called and wanted to speak to someone about the labs his brother was suppose to have. Please call him back, brothers name is Lucas his number is [phone] thanks

## 2020-08-13 ENCOUNTER — LAB (OUTPATIENT)
Dept: LAB | Facility: HOSPITAL | Age: 65
End: 2020-08-13

## 2020-08-13 PROCEDURE — 84153 ASSAY OF PSA TOTAL: CPT | Performed by: UROLOGY

## 2020-09-08 RX ORDER — RIVAROXABAN 20 MG/1
TABLET, FILM COATED ORAL
Qty: 30 TABLET | Refills: 5 | Status: SHIPPED | OUTPATIENT
Start: 2020-09-08 | End: 2021-03-25 | Stop reason: SDUPTHER

## 2020-09-08 NOTE — TELEPHONE ENCOUNTER
Jason Zuniga called requesting a refill of the below medication which has been pended for you:     Requested Prescriptions     Pending Prescriptions Disp Refills    XARELTO 20 MG TABS tablet [Pharmacy Med Name: Aramis Alfaros 20 MG TAB] 30 tablet 5     Sig: TAKE 1 TABLET BY MOUTH EVERY [place]       Last Appointment Date: 4/7/2020  Next Appointment Date: 9/14/2020    Allergies   Allergen Reactions    Pcn [Penicillins]

## 2020-09-14 ENCOUNTER — OFFICE VISIT (OUTPATIENT)
Dept: INTERNAL MEDICINE | Age: 65
End: 2020-09-14
Payer: MEDICARE

## 2020-09-14 VITALS
BODY MASS INDEX: 42.66 KG/M2 | HEIGHT: 72 IN | WEIGHT: 315 LBS | SYSTOLIC BLOOD PRESSURE: 156 MMHG | HEART RATE: 87 BPM | DIASTOLIC BLOOD PRESSURE: 97 MMHG

## 2020-09-14 PROCEDURE — G8427 DOCREV CUR MEDS BY ELIG CLIN: HCPCS | Performed by: NURSE PRACTITIONER

## 2020-09-14 PROCEDURE — 90686 IIV4 VACC NO PRSV 0.5 ML IM: CPT | Performed by: NURSE PRACTITIONER

## 2020-09-14 PROCEDURE — G8417 CALC BMI ABV UP PARAM F/U: HCPCS | Performed by: NURSE PRACTITIONER

## 2020-09-14 PROCEDURE — 99214 OFFICE O/P EST MOD 30 MIN: CPT | Performed by: NURSE PRACTITIONER

## 2020-09-14 PROCEDURE — G0008 ADMIN INFLUENZA VIRUS VAC: HCPCS | Performed by: NURSE PRACTITIONER

## 2020-09-14 PROCEDURE — 3017F COLORECTAL CA SCREEN DOC REV: CPT | Performed by: NURSE PRACTITIONER

## 2020-09-14 PROCEDURE — G0438 PPPS, INITIAL VISIT: HCPCS | Performed by: NURSE PRACTITIONER

## 2020-09-14 PROCEDURE — 1036F TOBACCO NON-USER: CPT | Performed by: NURSE PRACTITIONER

## 2020-09-14 RX ORDER — AMLODIPINE BESYLATE 5 MG/1
5 TABLET ORAL DAILY
Qty: 30 TABLET | Refills: 5 | Status: SHIPPED | OUTPATIENT
Start: 2020-09-14 | End: 2021-03-17

## 2020-09-14 ASSESSMENT — ENCOUNTER SYMPTOMS
NAUSEA: 0
CONSTIPATION: 0
BLOOD IN STOOL: 0
VOMITING: 0
TROUBLE SWALLOWING: 0
EYE DISCHARGE: 0
ABDOMINAL PAIN: 0
EYE ITCHING: 0
CHOKING: 0
COUGH: 0
STRIDOR: 0
SORE THROAT: 0
DIARRHEA: 0
WHEEZING: 0
ABDOMINAL DISTENTION: 0
SHORTNESS OF BREATH: 0
COLOR CHANGE: 0

## 2020-09-14 ASSESSMENT — LIFESTYLE VARIABLES: HOW OFTEN DO YOU HAVE A DRINK CONTAINING ALCOHOL: 0

## 2020-09-14 NOTE — PATIENT INSTRUCTIONS
1.  Hypertension; Add amlodipine 5 mg daily   2. Chronic DVT of left leg; We will repeat the scan as a fu  3. Mixed hyperlipidemia;  Continue same meds   4. GERD:  Stable no chagnes  5. History of prostate cancer; He had seed implants;    Come one morning this week to get labs fasting;  I will call you with the results    Personalized Preventive Plan for Fatimah Fisher - 9/14/2020  Medicare offers a range of preventive health benefits. Some of the tests and screenings are paid in full while other may be subject to a deductible, co-insurance, and/or copay. Some of these benefits include a comprehensive review of your medical history including lifestyle, illnesses that may run in your family, and various assessments and screenings as appropriate. After reviewing your medical record and screening and assessments performed today your provider may have ordered immunizations, labs, imaging, and/or referrals for you. A list of these orders (if applicable) as well as your Preventive Care list are included within your After Visit Summary for your review. Other Preventive Recommendations:    · A preventive eye exam performed by an eye specialist is recommended every 1-2 years to screen for glaucoma; cataracts, macular degeneration, and other eye disorders. · A preventive dental visit is recommended every 6 months. · Try to get at least 150 minutes of exercise per week or 10,000 steps per day on a pedometer . · Order or download the FREE \"Exercise & Physical Activity: Your Everyday Guide\" from [employer]. Call 9-898.459.8783 or search [employer] online. · You need 3887-8637 mg of calcium and 4934-3046 IU of vitamin D per day.  It is possible to meet your calcium requirement with diet alone, but a vitamin D supplement is usually necessary to meet this goal.  · When exposed to the sun, use a sunscreen that protects against both UVA and UVB radiation with an SPF of 30 or greater. Reapply every 2 to 3 hours or after sweating, drying off with a towel, or swimming. · Always wear a seat belt when traveling in a car. Always wear a helmet when riding a bicycle or motorcycle.

## 2020-09-14 NOTE — PROGRESS NOTES
[address] [facility]  [address] [address] [place] [address]  Dept: [phone]  Dept Fax: [phone]  Loc: [phone]    Bimal Solano is a 60 y.o. male who presents today for his medical conditions/complaints as noted below. Bimal Solano is c/bryon Medicare AWV (Patient is here for Medicare Wellness Visit.); Hypertension (Patient is here fro routine follow up visit and review of labs done 7/7/2020.); and Edema (Patient mother states patient is still having swelling left lower leg same as when they found DVT. Patient denies pain.)        HPI:     HPI   1. Hypertension  Up today  156/97 he is taking losartan 100 mg daily   2.  CHronic DVT left leg he is on xarelto 20 mg daily   3  Mixed hyperlipidemia he is taking crestor 10  Nightly;    4. GERD  Stable on current diet    Chief Complaint   Patient presents with    Medicare AWV     Patient is here for Medicare Wellness Visit.  Hypertension     Patient is here fro routine follow up visit and review of labs done 7/7/2020.  Edema     Patient mother states patient is still having swelling left lower leg same as when they found DVT. Patient denies pain.        Past Medical History:   Diagnosis Date    Cellulitis of left leg     Colon polyp     Elevated blood pressure reading without diagnosis of hypertension     Esophageal reflux     History of prostate cancer     Mixed hyperlipidemia     TG>>LDL    Obesity     Prostate cancer [facility])     2010 Dr Wilfredo Hwang;  implants, XRT      Past Surgical History:   Procedure Laterality Date    COLONOSCOPY  6/2/2009        COLONOSCOPY  2012        UPPER GASTROINTESTINAL ENDOSCOPY  3/22/2000        VASECTOMY         Vitals 9/14/2020 9/14/2020 1/2/2020 12/16/2019 11/25/2019 80/6/1145   SYSTOLIC 545 554 069 768 141 -   DIASTOLIC 97 98 82 86 86 -   Pulse 87 88 90 100 100 -   Temp - - - - - -   Resp - - - 20 18 -   SpO2 - - 96 95 96 - Weight - 331 lb 309 lb 311 lb - -   Height - 6' 0\" 6' 0\" 6' 1\" - -   BMI (wt*703/ht~2) - 44.89 kg/m2 41.9 kg/m2 41.03 kg/m2 - -   Pain Level - - - - - 10       Family History   Problem Relation Age of Onset    Colon Polyps Mother     Colon Polyps Brother     Heart Disease Father     High Blood Pressure Other        Social History     Tobacco Use    Smoking status: Never Smoker    Smokeless tobacco: Never Used   Substance Use Topics    Alcohol use: No      Current Outpatient Medications   Medication Sig Dispense Refill    amLODIPine (NORVASC) 5 MG tablet Take 1 tablet by mouth daily 30 tablet 5    XARELTO 20 MG TABS tablet TAKE 1 TABLET BY MOUTH EVERY DAY WITH BREAKFAST 30 tablet 5    rosuvastatin (CRESTOR) 10 MG tablet Take 1 tablet by mouth nightly 30 tablet 3    losartan (COZAAR) 100 MG tablet Take 1 tablet by mouth daily 90 tablet 1    fenofibrate 160 MG tablet Take 1 tablet by mouth daily 90 tablet 1    vitamin D (CHOLECALCIFEROL) 1000 UNIT TABS tablet Take 1,000 Units by mouth daily      Red Yeast Rice 600 MG TABS Take 2 tablets by mouth 2 times daily      fish oil-omega-3 fatty acids 1000 MG capsule Take 2 g by mouth daily.  Multiple Vitamin (MULTI-VITAMIN PO) Take  by mouth. No current facility-administered medications for this visit.       Allergies   Allergen Reactions    Pcn [Penicillins]        Health Maintenance   Topic Date Due    Hepatitis C screen  1955    HIV screen  06/07/1971    Annual Wellness Visit (AWV)  06/23/2019    DTaP/Tdap/Td vaccine (1 - Tdap) 10/05/2020 (Originally 6/7/1975)    Shingles Vaccine (1 of 2) 01/02/2021 (Originally 6/7/2006)    PSA counseling  01/02/2021    Lipid screen  07/07/2021    Potassium monitoring  07/07/2021    Creatinine monitoring  07/07/2021    Colon cancer screen colonoscopy  05/30/2022    Flu vaccine  Completed    Hepatitis A vaccine  Aged Out    Hepatitis B vaccine  Aged Out    Hib vaccine  Aged C/ Aniket Isela 19 Meningococcal (ACWY) vaccine  Aged Out    Pneumococcal 0-64 years Vaccine  Aged Out       Lab Results   Component Value Date    LABA1C 5.6 09/30/2019     Lab Results   Component Value Date    PSA 0.52 01/02/2020    PSA 0.53 09/11/2017     TSH   Date Value Ref Range Status   07/07/2020 2.200 0.270 - 4.200 uIU/mL Final   ]  Lab Results   Component Value Date     07/07/2020    K 4.1 07/07/2020     07/07/2020    CO2 22 07/07/2020    BUN 14 07/07/2020    CREATININE 1.1 07/07/2020    GLUCOSE 88 07/07/2020    CALCIUM 9.3 07/07/2020    PROT 7.1 07/07/2020    LABALBU 4.4 07/07/2020    BILITOT 0.3 07/07/2020    ALKPHOS 73 07/07/2020    AST 20 07/07/2020    ALT 31 07/07/2020    LABGLOM >60 07/07/2020     Lab Results   Component Value Date    CHOL 197 07/07/2020    CHOL 177 01/02/2020    CHOL 194 09/30/2019     Lab Results   Component Value Date    TRIG 365 (H) 07/07/2020    TRIG 244 (H) 01/02/2020    TRIG 257 (H) 09/30/2019     Lab Results   Component Value Date    HDL 32 (L) 07/07/2020    HDL 34 (L) 01/02/2020    HDL 32 (L) 09/30/2019     Lab Results   Component Value Date    LDLCALC 92 07/07/2020    LDLCALC 94 01/02/2020    LDLCALC 111 09/30/2019     Lab Results   Component Value Date     07/07/2020    K 4.1 07/07/2020     07/07/2020    CO2 22 07/07/2020    BUN 14 07/07/2020    CREATININE 1.1 07/07/2020    GLUCOSE 88 07/07/2020    CALCIUM 9.3 07/07/2020      Lab Results   Component Value Date    WBC 6.8 07/07/2020    HGB 14.2 07/07/2020    HCT 41.8 (L) 07/07/2020    MCV 88.7 07/07/2020     07/07/2020    LYMPHOPCT 33.8 07/07/2020    RBC 4.71 07/07/2020    MCH 30.1 07/07/2020    MCHC 34.0 07/07/2020    RDW 12.2 07/07/2020     Lab Results   Component Value Date    VITD25 31.0 07/07/2020       Subjective:      Review of Systems   Constitutional: Negative for fatigue, fever and unexpected weight change. HENT: Negative for ear discharge, ear pain, mouth sores, sore throat and trouble swallowing. Eyes: Negative for discharge, itching and visual disturbance. Respiratory: Negative for cough, choking, shortness of breath, wheezing and stridor. Cardiovascular: Negative for chest pain, palpitations and leg swelling. Gastrointestinal: Negative for abdominal distention, abdominal pain, blood in stool, constipation, diarrhea, nausea and vomiting. GERD   Endocrine: Negative for cold intolerance, polydipsia and polyuria. Genitourinary: Negative for difficulty urinating, dysuria, frequency and urgency. Musculoskeletal: Negative for arthralgias and gait problem. Skin: Negative for color change and rash. Allergic/Immunologic: Negative for food allergies and immunocompromised state. Neurological: Negative for dizziness, tremors, syncope, speech difficulty, weakness and headaches. Hematological: Negative for adenopathy. Does not bruise/bleed easily. Psychiatric/Behavioral: Negative for confusion and hallucinations. Objective:     Physical Exam  Constitutional:       General: He is not in acute distress. Appearance: He is well-developed. HENT:      Head: Normocephalic and atraumatic. Eyes:      General: No scleral icterus. Right eye: No discharge. Left eye: No discharge. Pupils: Pupils are equal, round, and reactive to light. Neck:      Musculoskeletal: Normal range of motion and neck supple. Thyroid: No thyromegaly. Vascular: No JVD. Cardiovascular:      Rate and Rhythm: Normal rate and regular rhythm. Heart sounds: Normal heart sounds. No murmur. Pulmonary:      Effort: Pulmonary effort is normal. No respiratory distress. Breath sounds: Normal breath sounds. No wheezing or rales. Abdominal:      General: Bowel sounds are normal. There is no distension. Palpations: Abdomen is soft. There is no mass. Tenderness: There is no abdominal tenderness. There is no guarding or rebound. Musculoskeletal: Normal range of motion. General: No tenderness. Left lower leg: Edema present. Skin:     General: Skin is warm and dry. Findings: No erythema or rash. Neurological:      Mental Status: He is alert and oriented to person, place, and time. Cranial Nerves: No cranial nerve deficit. Coordination: Coordination normal.      Deep Tendon Reflexes: Reflexes are normal and symmetric. Reflexes normal.   Psychiatric:         Mood and Affect: Mood is not depressed. Behavior: Behavior normal.         Thought Content: Thought content normal.         Judgment: Judgment normal.       BP (!) 156/97   Pulse 87   Ht 6' (1.829 m)   Wt (!) 331 lb (150.1 kg)   BMI 44.89 kg/m²     Assessment:       Diagnosis Orders   1. Essential hypertension  amLODIPine (NORVASC) 5 MG tablet   2. Need for influenza vaccination  INFLUENZA, QUADV, 3 YRS AND OLDER, IM PF, PREFILL SYR OR SDV, 0.5ML (AFLURIA QUADV, PF)   3. Encounter for prostate cancer screening  Psa screening   4. History of prostate cancer  Psa screening   5. Chronic deep vein thrombosis (DVT) of left peroneal vein  VL EXTREMITY VENOUS LEFT     Labs reviewedfrom July   Plan:        Patient given educational materials - see patient instructions. Discussed use, benefit, and side effects of prescribed medications. Allpatient questions answered. Pt voiced understanding. Reviewed health maintenance. Instructed to continue current medications, diet and exercise. Patient agreed with treatment plan. Follow up as directed. MEDICATIONS:  Orders Placed This Encounter   Medications    amLODIPine (NORVASC) 5 MG tablet     Sig: Take 1 tablet by mouth daily     Dispense:  30 tablet     Refill:  5         ORDERS:  Orders Placed This Encounter   Procedures    VL EXTREMITY VENOUS LEFT    INFLUENZA, QUADV, 3 YRS AND OLDER, IM PF, PREFILL SYR OR SDV, 0.5ML (AFLURIA QUADV, PF)    Psa screening       Follow-up:  Return in about 3 months (around 12/14/2020) for no labs.     PATIENT INSTRUCTIONS:  Patient Instructions   1. Hypertension; Add amlodipine 5 mg daily   2. Chronic DVT of left leg; We will repeat the scan as a fu  3. Mixed hyperlipidemia;  Continue same meds   4. GERD:  Stable no chagnes  5. History of prostate cancer; He had seed implants;    Come one morning this week to get labs fasting;  I will call you with the results      Electronically signed by JODY Thompson on 2020 at 4:47 PM    EMRDragon/transcription disclaimer:  Much of this encounter note is electronic transcription/translation of spoken language to printed texts. The electronic translation of spoken language may be erroneous, or at times,nonsensical words or phrases may be inadvertently transcribed. Although I have reviewed the note for such errors, some may still exist.  Medicare Annual Wellness Visit  Name: Liz Chavira Date: 2020   MRN: [id number] Sex: Male   Age: 60 y.o. Ethnicity: Non-/Non    : 1955 Race: Lazaro Rojas is here for Medicare AWV (Patient is here for Medicare Wellness Visit.); Hypertension (Patient is here fro routine follow up visit and review of labs done 2020.); and Edema (Patient mother states patient is still having swelling left lower leg same as when they found DVT. Patient denies pain.)    Screenings for behavioral, psychosocial and functional/safety risks, and cognitive dysfunction are all negative except as indicated below. These results, as well as other patient data from the [address] form, are documented in Flowsheets linked to this Encounter. Allergies   Allergen Reactions    Pcn [Penicillins]          Prior to Visit Medications    Medication Sig Taking?  Authorizing Provider   amLODIPine (NORVASC) 5 MG tablet Take 1 tablet by mouth daily Yes JODY Thompson   XARELTO 20 MG TABS tablet TAKE 1 TABLET BY MOUTH EVERY DAY WITH BREAKFAST Yes JODY Thompson   rosuvastatin (CRESTOR) 10 MG tablet Take cognition reveals global memory impairment noted. Review of Systems -   General no fever chills no malaise fatigue  Diet no dietary restrictions  Skin no rash eruption pigment changes  HEENT no headache dizziness difficulty swallowing foods or medications   Neck no complaint of masses or pain  Chest  no cough shortness of breath exertional dyspnea  Cardiovascular no chest pain palpitations  Hematology no history of anemia or bruising    no urgency frequency nocturia hematuria  GI no indigestion, constipation or diarrhea    Musculoskeletal no joint pain swelling left leg   Neuro no numbness tingling coordination problems   Mental status no problems with  anxiety depression; mentally challenged     General; alert, oriented, no acute distress. Skin no rashes or worrisome lesions. HEENT head is atraumatic. Pupils equal, reactive light and accommodation. Neck is supple without masses. Chest is clear without rales, rhonchi. Cardiovascular S1, S2 without murmur. Blood vessels no cyanosis, clubbing has trace peripheral edema on the left   Abdomen is soft. Bowel sounds  present   Musculoskeletal adequate tone range of motion and strength   lymph there is no tenderness enlargement   Neuro cranial nerves II through XII are grossly intact with facial reflexes are intact psych   Blood vessels distal cyanosis clubbing or peripheral edema  Psych alert and oriented x3 appropriate mood and affect      Patient's complete Health Risk Assessment and screening values have been reviewed and are found in Flowsheets. The following problems were reviewed today and where indicated follow up appointments were made and/or referrals ordered. Positive Risk Factor Screenings with Interventions:     Fall Risk:  2 or more falls in past year?: (!) yes  Fall with injury in past year?: (!) yes  Fall Risk Interventions:    · Patient declines any further evaluation/treatment for this issue    Cognitive:   Words recalled: 2 Words Fluarix, and 3 yrs and older Afluria) 09/25/2019, 09/14/2020        Health Maintenance   Topic Date Due    Hepatitis C screen  1955    HIV screen  06/07/1971    Annual Wellness Visit (AWV)  06/23/2019    DTaP/Tdap/Td vaccine (1 - Tdap) 10/05/2020 (Originally 6/7/1975)    Shingles Vaccine (1 of 2) 01/02/2021 (Originally 6/7/2006)    PSA counseling  01/02/2021    Lipid screen  07/07/2021    Potassium monitoring  07/07/2021    Creatinine monitoring  07/07/2021    Colon cancer screen colonoscopy  05/30/2022    Flu vaccine  Completed    Hepatitis A vaccine  Aged Out    Hepatitis B vaccine  Aged Out    Hib vaccine  Aged Out    Meningococcal (ACWY) vaccine  Aged Out    Pneumococcal 0-64 years Vaccine  Aged Out     Recommendations for [employer] Due: see orders and patient instructions/AVS.  . Recommended screening schedule for the next 5-10 years is provided to the patient in written form: see Patient Instructions/AVS.    Aidan Montenegro was seen today for medicare awv, hypertension and edema. Diagnoses and all orders for this visit:    Essential hypertension  -     amLODIPine (NORVASC) 5 MG tablet; Take 1 tablet by mouth daily    Need for influenza vaccination  -     INFLUENZA, QUADV, 3 YRS AND OLDER, IM PF, PREFILL SYR OR SDV, 0.5ML (Paul Simon, VIJAY)    Encounter for prostate cancer screening  -     Psa screening; Future    History of prostate cancer  -     Psa screening; Future    Chronic deep vein thrombosis (DVT) of left peroneal vein  -     VL EXTREMITY VENOUS LEFT;  Future

## 2020-09-15 DIAGNOSIS — Z85.46 HISTORY OF PROSTATE CANCER: ICD-10-CM

## 2020-09-15 DIAGNOSIS — E78.2 MIXED HYPERLIPIDEMIA: ICD-10-CM

## 2020-09-15 DIAGNOSIS — M89.9 DISORDER OF BONE, UNSPECIFIED: ICD-10-CM

## 2020-09-15 DIAGNOSIS — I10 ESSENTIAL HYPERTENSION: ICD-10-CM

## 2020-09-15 DIAGNOSIS — Z12.5 ENCOUNTER FOR PROSTATE CANCER SCREENING: ICD-10-CM

## 2020-09-15 LAB
ALBUMIN SERPL-MCNC: 4.2 G/DL (ref 3.5–5.2)
ALP BLD-CCNC: 75 U/L (ref 40–130)
ALT SERPL-CCNC: 32 U/L (ref 5–41)
ANION GAP SERPL CALCULATED.3IONS-SCNC: 16 MMOL/L (ref 7–19)
AST SERPL-CCNC: 22 U/L (ref 5–40)
BASOPHILS ABSOLUTE: 0.1 K/UL (ref 0–0.2)
BASOPHILS RELATIVE PERCENT: 0.7 % (ref 0–1)
BILIRUB SERPL-MCNC: 0.4 MG/DL (ref 0.2–1.2)
BUN BLDV-MCNC: 18 MG/DL (ref 8–23)
CALCIUM SERPL-MCNC: 9.2 MG/DL (ref 8.8–10.2)
CHLORIDE BLD-SCNC: 103 MMOL/L (ref 98–111)
CO2: 22 MMOL/L (ref 22–29)
CREAT SERPL-MCNC: 1.1 MG/DL (ref 0.5–1.2)
CREATININE URINE: 245.2 MG/DL (ref 4.2–622)
EOSINOPHILS ABSOLUTE: 0.2 K/UL (ref 0–0.6)
EOSINOPHILS RELATIVE PERCENT: 2.6 % (ref 0–5)
GFR AFRICAN AMERICAN: >59
GFR NON-AFRICAN AMERICAN: >60
GLUCOSE BLD-MCNC: 93 MG/DL (ref 74–109)
HCT VFR BLD CALC: 41.8 % (ref 42–52)
HEMOGLOBIN: 14.5 G/DL (ref 14–18)
IMMATURE GRANULOCYTES #: 0 K/UL
LYMPHOCYTES ABSOLUTE: 2.1 K/UL (ref 1.1–4.5)
LYMPHOCYTES RELATIVE PERCENT: 29.8 % (ref 20–40)
MCH RBC QN AUTO: 30 PG (ref 27–31)
MCHC RBC AUTO-ENTMCNC: 34.7 G/DL (ref 33–37)
MCV RBC AUTO: 86.4 FL (ref 80–94)
MICROALBUMIN UR-MCNC: <1.2 MG/DL (ref 0–19)
MICROALBUMIN/CREAT UR-RTO: NORMAL MG/G
MONOCYTES ABSOLUTE: 0.5 K/UL (ref 0–0.9)
MONOCYTES RELATIVE PERCENT: 7 % (ref 0–10)
NEUTROPHILS ABSOLUTE: 4.1 K/UL (ref 1.5–7.5)
NEUTROPHILS RELATIVE PERCENT: 59.5 % (ref 50–65)
PDW BLD-RTO: 12.4 % (ref 11.5–14.5)
PLATELET # BLD: 302 K/UL (ref 130–400)
PMV BLD AUTO: 10.4 FL (ref 9.4–12.4)
POTASSIUM SERPL-SCNC: 4 MMOL/L (ref 3.5–5)
PROSTATE SPECIFIC ANTIGEN: 0.79 NG/ML (ref 0–4)
RBC # BLD: 4.84 M/UL (ref 4.7–6.1)
SODIUM BLD-SCNC: 141 MMOL/L (ref 136–145)
TOTAL PROTEIN: 7.4 G/DL (ref 6.6–8.7)
TRIGL SERPL-MCNC: 244 MG/DL (ref 0–149)
TSH SERPL DL<=0.05 MIU/L-ACNC: 1.88 UIU/ML (ref 0.27–4.2)
VITAMIN D 25-HYDROXY: 34.2 NG/ML
WBC # BLD: 6.9 K/UL (ref 4.8–10.8)

## 2020-12-11 DIAGNOSIS — E66.01 MORBID OBESITY (HCC): ICD-10-CM

## 2020-12-11 DIAGNOSIS — I10 ESSENTIAL HYPERTENSION: ICD-10-CM

## 2020-12-11 DIAGNOSIS — E78.2 MIXED HYPERLIPIDEMIA: ICD-10-CM

## 2020-12-11 LAB
ALBUMIN SERPL-MCNC: 4.2 G/DL (ref 3.5–5.2)
ALP BLD-CCNC: 74 U/L (ref 40–130)
ALT SERPL-CCNC: 23 U/L (ref 5–41)
ANION GAP SERPL CALCULATED.3IONS-SCNC: 13 MMOL/L (ref 7–19)
AST SERPL-CCNC: 17 U/L (ref 5–40)
BASOPHILS ABSOLUTE: 0 K/UL (ref 0–0.2)
BASOPHILS RELATIVE PERCENT: 0.6 % (ref 0–1)
BILIRUB SERPL-MCNC: 0.3 MG/DL (ref 0.2–1.2)
BUN BLDV-MCNC: 19 MG/DL (ref 8–23)
CALCIUM SERPL-MCNC: 9.1 MG/DL (ref 8.8–10.2)
CHLORIDE BLD-SCNC: 102 MMOL/L (ref 98–111)
CHOLESTEROL, TOTAL: 175 MG/DL (ref 160–199)
CO2: 23 MMOL/L (ref 22–29)
CREAT SERPL-MCNC: 1.1 MG/DL (ref 0.5–1.2)
EOSINOPHILS ABSOLUTE: 0.2 K/UL (ref 0–0.6)
EOSINOPHILS RELATIVE PERCENT: 3 % (ref 0–5)
GFR AFRICAN AMERICAN: >59
GFR NON-AFRICAN AMERICAN: >60
GLUCOSE BLD-MCNC: 92 MG/DL (ref 74–109)
HCT VFR BLD CALC: 41.7 % (ref 42–52)
HDLC SERPL-MCNC: 33 MG/DL (ref 55–121)
HEMOGLOBIN: 14.1 G/DL (ref 14–18)
IMMATURE GRANULOCYTES #: 0 K/UL
LDL CHOLESTEROL CALCULATED: 102 MG/DL
LYMPHOCYTES ABSOLUTE: 2.3 K/UL (ref 1.1–4.5)
LYMPHOCYTES RELATIVE PERCENT: 35.5 % (ref 20–40)
MCH RBC QN AUTO: 29.8 PG (ref 27–31)
MCHC RBC AUTO-ENTMCNC: 33.8 G/DL (ref 33–37)
MCV RBC AUTO: 88.2 FL (ref 80–94)
MONOCYTES ABSOLUTE: 0.4 K/UL (ref 0–0.9)
MONOCYTES RELATIVE PERCENT: 6.5 % (ref 0–10)
NEUTROPHILS ABSOLUTE: 3.5 K/UL (ref 1.5–7.5)
NEUTROPHILS RELATIVE PERCENT: 53.9 % (ref 50–65)
PDW BLD-RTO: 12.2 % (ref 11.5–14.5)
PLATELET # BLD: 318 K/UL (ref 130–400)
PMV BLD AUTO: 10.2 FL (ref 9.4–12.4)
POTASSIUM SERPL-SCNC: 4.2 MMOL/L (ref 3.5–5)
RBC # BLD: 4.73 M/UL (ref 4.7–6.1)
SODIUM BLD-SCNC: 138 MMOL/L (ref 136–145)
TOTAL PROTEIN: 7.4 G/DL (ref 6.6–8.7)
TRIGL SERPL-MCNC: 199 MG/DL (ref 0–149)
WBC # BLD: 6.4 K/UL (ref 4.8–10.8)

## 2020-12-14 ENCOUNTER — OFFICE VISIT (OUTPATIENT)
Dept: INTERNAL MEDICINE | Age: 65
End: 2020-12-14
Payer: MEDICARE

## 2020-12-14 VITALS
WEIGHT: 315 LBS | BODY MASS INDEX: 42.66 KG/M2 | SYSTOLIC BLOOD PRESSURE: 137 MMHG | DIASTOLIC BLOOD PRESSURE: 86 MMHG | HEART RATE: 100 BPM | HEIGHT: 72 IN

## 2020-12-14 PROCEDURE — G8427 DOCREV CUR MEDS BY ELIG CLIN: HCPCS | Performed by: NURSE PRACTITIONER

## 2020-12-14 PROCEDURE — 3017F COLORECTAL CA SCREEN DOC REV: CPT | Performed by: NURSE PRACTITIONER

## 2020-12-14 PROCEDURE — G8482 FLU IMMUNIZE ORDER/ADMIN: HCPCS | Performed by: NURSE PRACTITIONER

## 2020-12-14 PROCEDURE — 99214 OFFICE O/P EST MOD 30 MIN: CPT | Performed by: NURSE PRACTITIONER

## 2020-12-14 PROCEDURE — G8417 CALC BMI ABV UP PARAM F/U: HCPCS | Performed by: NURSE PRACTITIONER

## 2020-12-14 PROCEDURE — 1036F TOBACCO NON-USER: CPT | Performed by: NURSE PRACTITIONER

## 2020-12-14 ASSESSMENT — ENCOUNTER SYMPTOMS
BLOOD IN STOOL: 0
CHOKING: 0
ABDOMINAL PAIN: 0
ABDOMINAL DISTENTION: 0
EYE ITCHING: 0
STRIDOR: 0
VOMITING: 0
SHORTNESS OF BREATH: 0
TROUBLE SWALLOWING: 0
CONSTIPATION: 0
COUGH: 0
DIARRHEA: 0
WHEEZING: 0
COLOR CHANGE: 0
NAUSEA: 0
SORE THROAT: 0
EYE DISCHARGE: 0

## 2020-12-14 ASSESSMENT — PATIENT HEALTH QUESTIONNAIRE - PHQ9
2. FEELING DOWN, DEPRESSED OR HOPELESS: 0
SUM OF ALL RESPONSES TO PHQ QUESTIONS 1-9: 0
SUM OF ALL RESPONSES TO PHQ9 QUESTIONS 1 & 2: 0
SUM OF ALL RESPONSES TO PHQ QUESTIONS 1-9: 0
SUM OF ALL RESPONSES TO PHQ QUESTIONS 1-9: 0
1. LITTLE INTEREST OR PLEASURE IN DOING THINGS: 0

## 2020-12-14 NOTE — PROGRESS NOTES
kg/m2 41.9 kg/m2 41.03 kg/m2 -   Pain Level - - - - - -       Family History   Problem Relation Age of Onset    Colon Polyps Mother     Colon Polyps Brother     Heart Disease Father     High Blood Pressure Other        Social History     Tobacco Use    Smoking status: Never Smoker    Smokeless tobacco: Never Used   Substance Use Topics    Alcohol use: No      Current Outpatient Medications   Medication Sig Dispense Refill    amLODIPine (NORVASC) 5 MG tablet Take 1 tablet by mouth daily 30 tablet 5    XARELTO 20 MG TABS tablet TAKE 1 TABLET BY MOUTH EVERY DAY WITH BREAKFAST 30 tablet 5    rosuvastatin (CRESTOR) 10 MG tablet Take 1 tablet by mouth nightly 30 tablet 3    losartan (COZAAR) 100 MG tablet Take 1 tablet by mouth daily 90 tablet 1    fenofibrate 160 MG tablet Take 1 tablet by mouth daily 90 tablet 1    vitamin D (CHOLECALCIFEROL) 1000 UNIT TABS tablet Take 1,000 Units by mouth daily      Red Yeast Rice 600 MG TABS Take 2 tablets by mouth 2 times daily      fish oil-omega-3 fatty acids 1000 MG capsule Take 2 g by mouth daily.  Multiple Vitamin (MULTI-VITAMIN PO) Take  by mouth. No current facility-administered medications for this visit.       Allergies   Allergen Reactions    Pcn [Penicillins]        Health Maintenance   Topic Date Due    Hepatitis C screen  1955    HIV screen  06/07/1971    Shingles Vaccine (1 of 2) 01/02/2021 (Originally 6/7/2006)    DTaP/Tdap/Td vaccine (1 - Tdap) 01/04/2021 (Originally 6/7/1975)    Annual Wellness Visit (AWV)  09/15/2021    PSA counseling  09/15/2021    Lipid screen  12/11/2021    Potassium monitoring  12/11/2021    Creatinine monitoring  12/11/2021    Colon cancer screen colonoscopy  05/30/2022    Flu vaccine  Completed    Hepatitis A vaccine  Aged Out    Hepatitis B vaccine  Aged Out    Hib vaccine  Aged Out    Meningococcal (ACWY) vaccine  Aged Out    Pneumococcal 0-64 years Vaccine  Aged [employer] Component Value Date    LABA1C 5.6 09/30/2019     Lab Results   Component Value Date    PSA 0.79 09/15/2020    PSA 0.52 01/02/2020    PSA 0.53 09/11/2017     TSH   Date Value Ref Range Status   09/15/2020 1.880 0.270 - 4.200 uIU/mL Final   ]  Lab Results   Component Value Date     12/11/2020    K 4.2 12/11/2020     12/11/2020    CO2 23 12/11/2020    BUN 19 12/11/2020    CREATININE 1.1 12/11/2020    GLUCOSE 92 12/11/2020    CALCIUM 9.1 12/11/2020    PROT 7.4 12/11/2020    LABALBU 4.2 12/11/2020    BILITOT 0.3 12/11/2020    ALKPHOS 74 12/11/2020    AST 17 12/11/2020    ALT 23 12/11/2020    LABGLOM >60 12/11/2020    GFRAA >59 12/11/2020     Lab Results   Component Value Date    CHOL 175 12/11/2020    CHOL 197 07/07/2020    CHOL 177 01/02/2020     Lab Results   Component Value Date    TRIG 199 (H) 12/11/2020    TRIG 244 (H) 09/15/2020    TRIG 365 (H) 07/07/2020     Lab Results   Component Value Date    HDL 33 (L) 12/11/2020    HDL 32 (L) 07/07/2020    HDL 34 (L) 01/02/2020     Lab Results   Component Value Date    LDLCALC 102 12/11/2020    LDLCALC 92 07/07/2020    LDLCALC 94 01/02/2020     Lab Results   Component Value Date     12/11/2020    K 4.2 12/11/2020     12/11/2020    CO2 23 12/11/2020    BUN 19 12/11/2020    CREATININE 1.1 12/11/2020    GLUCOSE 92 12/11/2020    CALCIUM 9.1 12/11/2020      Lab Results   Component Value Date    WBC 6.4 12/11/2020    HGB 14.1 12/11/2020    HCT 41.7 (L) 12/11/2020    MCV 88.2 12/11/2020     12/11/2020    LYMPHOPCT 35.5 12/11/2020    RBC 4.73 12/11/2020    MCH 29.8 12/11/2020    MCHC 33.8 12/11/2020    RDW 12.2 12/11/2020     Lab Results   Component Value Date    VITD25 34.2 09/15/2020       Subjective:      Review of Systems   Constitutional: Negative for fatigue, fever and unexpected weight change. HENT: Negative for ear discharge, ear pain, mouth sores, sore throat and trouble swallowing.     Eyes: Negative for discharge, itching and visual disturbance. Respiratory: Negative for cough, choking, shortness of breath, wheezing and stridor. Cardiovascular: Negative for chest pain, palpitations and leg swelling. Gastrointestinal: Negative for abdominal distention, abdominal pain, blood in stool, constipation, diarrhea, nausea and vomiting. Endocrine: Negative for cold intolerance, polydipsia and polyuria. Genitourinary: Negative for difficulty urinating, dysuria, frequency, penile swelling and urgency. Musculoskeletal: Negative for arthralgias and gait problem. Skin: Negative for color change and rash. Allergic/Immunologic: Negative for food allergies and immunocompromised state. Neurological: Negative for dizziness, tremors, syncope, speech difficulty, weakness and headaches. Mentally slow   Hematological: Negative for adenopathy. Does not bruise/bleed easily. Psychiatric/Behavioral: Negative for confusion and hallucinations. Objective:     Physical Exam  Constitutional:       General: He is not in acute distress. Appearance: He is well-developed. HENT:      Head: Normocephalic and atraumatic. Eyes:      General: No scleral icterus. Right eye: No discharge. Left eye: No discharge. Pupils: Pupils are equal, round, and reactive to light. Neck:      Musculoskeletal: Normal range of motion and neck supple. Thyroid: No thyromegaly. Vascular: No JVD. Cardiovascular:      Rate and Rhythm: Normal rate and regular rhythm. Heart sounds: Normal heart sounds. No murmur. Pulmonary:      Effort: Pulmonary effort is normal. No respiratory distress. Breath sounds: Normal breath sounds. No wheezing or rales. Abdominal:      General: Bowel sounds are normal. There is no distension. Palpations: Abdomen is soft. There is no mass. Tenderness: There is no abdominal tenderness. There is no guarding or rebound. Musculoskeletal: Normal range of motion.          General: No tenderness. Skin:     General: Skin is warm and dry. Findings: No erythema or rash. Neurological:      Mental Status: He is alert and oriented to person, place, and time. Cranial Nerves: No cranial nerve deficit. Coordination: Coordination normal.      Deep Tendon Reflexes: Reflexes are normal and symmetric. Reflexes normal.   Psychiatric:         Mood and Affect: Mood is not depressed. Behavior: Behavior normal.         Thought Content: Thought content normal.         Judgment: Judgment normal.       /86   Pulse 100   Ht 6' (1.829 m)   Wt (!) 334 lb (151.5 kg)   BMI 45.30 kg/m²     Assessment:       Diagnosis Orders   1. Essential hypertension     2. Chronic deep vein thrombosis (DVT) of left peroneal vein (HCC)     3. Mixed hyperlipidemia     4. Vitamin D deficiency       Labs reviewedfrom 12/11/2020    Plan:        Patient given educational materials - see patient instructions. Discussed use, benefit, and side effects of prescribed medications. Allpatient questions answered. Pt voiced understanding. Reviewed health maintenance. Instructed to continue current medications, diet and exercise. Patient agreed with treatment plan. Follow up as directed. MEDICATIONS:  No orders of the defined types were placed in this encounter. ORDERS:  No orders of the defined types were placed in this encounter. Follow-up:  Return in about 3 months (around 3/14/2021) for have labs done prior to appt. PATIENT INSTRUCTIONS:  Patient Instructions   1. Hypertension; amlodipine 5 daily   2. Chronic DVT xarelto  We will get venous scan and try to get your off the blood thinner;    3. Mixed hyperlipidemia; crestor fenofibrate   4. Vit d def; OTV 1000 daily       Electronically signed by JODY Bailey on 12/14/2020 at 3:35 PM    EMRDragon/transcription disclaimer:  Much of this encounter note is electronic transcription/translation of spoken language to printed texts.   The electronic translation of spoken language may be erroneous, or at times,nonsensical words or phrases may be inadvertently transcribed.   Although I have reviewed the note for such errors, some may still exist.

## 2020-12-14 NOTE — PATIENT INSTRUCTIONS
1.  Hypertension; amlodipine 5 daily   2. Chronic DVT xarelto  We will get venous scan and try to get your off the blood thinner;    3. Mixed hyperlipidemia; crestor fenofibrate   4.   Vit d def; OTV 1000 daily

## 2021-01-04 ENCOUNTER — HOSPITAL ENCOUNTER (OUTPATIENT)
Dept: NON INVASIVE DIAGNOSTICS | Age: 66
Discharge: HOME | End: 2021-01-04
Payer: MEDICARE

## 2021-01-04 DIAGNOSIS — I82.552 CHRONIC DEEP VEIN THROMBOSIS (DVT) OF LEFT PERONEAL VEIN (HCC): ICD-10-CM

## 2021-01-04 PROCEDURE — 93971 EXTREMITY STUDY: CPT

## 2021-03-12 DIAGNOSIS — I10 ESSENTIAL HYPERTENSION: ICD-10-CM

## 2021-03-12 DIAGNOSIS — E78.2 MIXED HYPERLIPIDEMIA: ICD-10-CM

## 2021-03-12 DIAGNOSIS — E78.2 MIXED HYPERLIPIDEMIA: Primary | ICD-10-CM

## 2021-03-12 LAB
ALBUMIN SERPL-MCNC: 4 G/DL (ref 3.5–5.2)
ALP BLD-CCNC: 80 U/L (ref 40–130)
ALT SERPL-CCNC: 25 U/L (ref 5–41)
ANION GAP SERPL CALCULATED.3IONS-SCNC: 10 MMOL/L (ref 7–19)
AST SERPL-CCNC: 17 U/L (ref 5–40)
BASOPHILS ABSOLUTE: 0.1 K/UL (ref 0–0.2)
BASOPHILS RELATIVE PERCENT: 0.8 % (ref 0–1)
BILIRUB SERPL-MCNC: 0.3 MG/DL (ref 0.2–1.2)
BUN BLDV-MCNC: 16 MG/DL (ref 8–23)
CALCIUM SERPL-MCNC: 9.1 MG/DL (ref 8.8–10.2)
CHLORIDE BLD-SCNC: 103 MMOL/L (ref 98–111)
CHOLESTEROL, TOTAL: 188 MG/DL (ref 160–199)
CO2: 25 MMOL/L (ref 22–29)
CREAT SERPL-MCNC: 1.2 MG/DL (ref 0.5–1.2)
EOSINOPHILS ABSOLUTE: 0.2 K/UL (ref 0–0.6)
EOSINOPHILS RELATIVE PERCENT: 2.7 % (ref 0–5)
GFR AFRICAN AMERICAN: >59
GFR NON-AFRICAN AMERICAN: >60
GLUCOSE BLD-MCNC: 93 MG/DL (ref 74–109)
HCT VFR BLD CALC: 44.1 % (ref 42–52)
HDLC SERPL-MCNC: 34 MG/DL (ref 55–121)
HEMOGLOBIN: 14.2 G/DL (ref 14–18)
IMMATURE GRANULOCYTES #: 0 K/UL
LDL CHOLESTEROL CALCULATED: 111 MG/DL
LYMPHOCYTES ABSOLUTE: 2.2 K/UL (ref 1.1–4.5)
LYMPHOCYTES RELATIVE PERCENT: 29.2 % (ref 20–40)
MCH RBC QN AUTO: 29.9 PG (ref 27–31)
MCHC RBC AUTO-ENTMCNC: 32.2 G/DL (ref 33–37)
MCV RBC AUTO: 92.8 FL (ref 80–94)
MONOCYTES ABSOLUTE: 0.5 K/UL (ref 0–0.9)
MONOCYTES RELATIVE PERCENT: 6.7 % (ref 0–10)
NEUTROPHILS ABSOLUTE: 4.5 K/UL (ref 1.5–7.5)
NEUTROPHILS RELATIVE PERCENT: 60.2 % (ref 50–65)
PDW BLD-RTO: 12.3 % (ref 11.5–14.5)
PLATELET # BLD: 308 K/UL (ref 130–400)
PMV BLD AUTO: 10.1 FL (ref 9.4–12.4)
POTASSIUM SERPL-SCNC: 4 MMOL/L (ref 3.5–5)
RBC # BLD: 4.75 M/UL (ref 4.7–6.1)
SODIUM BLD-SCNC: 138 MMOL/L (ref 136–145)
TOTAL PROTEIN: 7 G/DL (ref 6.6–8.7)
TRIGL SERPL-MCNC: 213 MG/DL (ref 0–149)
WBC # BLD: 7.5 K/UL (ref 4.8–10.8)

## 2021-03-15 ENCOUNTER — OFFICE VISIT (OUTPATIENT)
Dept: INTERNAL MEDICINE | Age: 66
End: 2021-03-15
Payer: MEDICARE

## 2021-03-15 VITALS
HEIGHT: 73 IN | DIASTOLIC BLOOD PRESSURE: 76 MMHG | HEART RATE: 96 BPM | SYSTOLIC BLOOD PRESSURE: 139 MMHG | OXYGEN SATURATION: 97 % | WEIGHT: 315 LBS | BODY MASS INDEX: 41.75 KG/M2 | RESPIRATION RATE: 20 BRPM

## 2021-03-15 DIAGNOSIS — E66.01 MORBID OBESITY (HCC): ICD-10-CM

## 2021-03-15 DIAGNOSIS — I82.552 CHRONIC DEEP VEIN THROMBOSIS (DVT) OF LEFT PERONEAL VEIN (HCC): ICD-10-CM

## 2021-03-15 DIAGNOSIS — E55.9 VITAMIN D DEFICIENCY: ICD-10-CM

## 2021-03-15 DIAGNOSIS — C61 CANCER OF PROSTATE (HCC): ICD-10-CM

## 2021-03-15 DIAGNOSIS — E78.2 MIXED HYPERLIPIDEMIA: ICD-10-CM

## 2021-03-15 DIAGNOSIS — I10 ESSENTIAL HYPERTENSION: Primary | ICD-10-CM

## 2021-03-15 DIAGNOSIS — Z12.5 ENCOUNTER FOR SCREENING FOR MALIGNANT NEOPLASM OF PROSTATE: ICD-10-CM

## 2021-03-15 PROCEDURE — G8417 CALC BMI ABV UP PARAM F/U: HCPCS | Performed by: NURSE PRACTITIONER

## 2021-03-15 PROCEDURE — G8427 DOCREV CUR MEDS BY ELIG CLIN: HCPCS | Performed by: NURSE PRACTITIONER

## 2021-03-15 PROCEDURE — 99214 OFFICE O/P EST MOD 30 MIN: CPT | Performed by: NURSE PRACTITIONER

## 2021-03-15 PROCEDURE — 3017F COLORECTAL CA SCREEN DOC REV: CPT | Performed by: NURSE PRACTITIONER

## 2021-03-15 PROCEDURE — 1036F TOBACCO NON-USER: CPT | Performed by: NURSE PRACTITIONER

## 2021-03-15 PROCEDURE — G8482 FLU IMMUNIZE ORDER/ADMIN: HCPCS | Performed by: NURSE PRACTITIONER

## 2021-03-15 ASSESSMENT — ENCOUNTER SYMPTOMS
BLOOD IN STOOL: 0
SHORTNESS OF BREATH: 0
SORE THROAT: 0
CHOKING: 0
WHEEZING: 0
EYE DISCHARGE: 0
ABDOMINAL DISTENTION: 0
VOMITING: 0
COUGH: 0
STRIDOR: 0
ABDOMINAL PAIN: 0
ROS SKIN COMMENTS: SKIN MASS
DIARRHEA: 0
NAUSEA: 0
COLOR CHANGE: 0
TROUBLE SWALLOWING: 0
EYE ITCHING: 0
CONSTIPATION: 0

## 2021-03-15 ASSESSMENT — PATIENT HEALTH QUESTIONNAIRE - PHQ9
SUM OF ALL RESPONSES TO PHQ QUESTIONS 1-9: 0
SUM OF ALL RESPONSES TO PHQ9 QUESTIONS 1 & 2: 0
SUM OF ALL RESPONSES TO PHQ QUESTIONS 1-9: 0
1. LITTLE INTEREST OR PLEASURE IN DOING THINGS: 0

## 2021-03-15 NOTE — PATIENT INSTRUCTIONS
1.  History or prostate cancer;  Yearly PSA  Was normal last September;  Repeat 9/21  2. DVT  Repeat in 6 months ultrasound ; We just did this in Jan   3.   Neck pain he has a lipoma   We dont have to do anything about this unless it is bothering you   #4 hypertension stable no changes are necessary continue same meds
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
## 2021-03-17 DIAGNOSIS — I10 ESSENTIAL HYPERTENSION: ICD-10-CM

## 2021-03-17 RX ORDER — AMLODIPINE BESYLATE 5 MG/1
TABLET ORAL
Qty: 30 TABLET | Refills: 5 | Status: SHIPPED | OUTPATIENT
Start: 2021-03-17 | End: 2021-03-25 | Stop reason: SDUPTHER

## 2021-03-17 NOTE — TELEPHONE ENCOUNTER
Ann Emery called requesting a refill of the below medication which has been pended for you:     Requested Prescriptions     Pending Prescriptions Disp Refills    amLODIPine (NORVASC) 5 MG tablet [Pharmacy Med Name: AMLODIPINE BESYLATE 5 MG *MAKAYLA*] 30 tablet 5     Sig: TAKE 1 TABLET BY MOUTH EVERY DAY       Last Appointment Date: 3/15/2021  Next Appointment Date: Visit date not found    Allergies   Allergen Reactions    Pcn [Penicillins]

## 2021-03-25 DIAGNOSIS — I10 ESSENTIAL HYPERTENSION: ICD-10-CM

## 2021-03-25 RX ORDER — AMLODIPINE BESYLATE 5 MG/1
TABLET ORAL
Qty: 90 TABLET | Refills: 1 | Status: SHIPPED | OUTPATIENT
Start: 2021-03-25 | End: 2021-11-10

## 2021-03-25 NOTE — TELEPHONE ENCOUNTER
Laura Cheney called requesting a refill of the below medication which has been pended for you:     Requested Prescriptions     Pending Prescriptions Disp Refills    rivaroxaban (XARELTO) 20 MG TABS tablet 90 tablet 1     Sig: TAKE 1 TABLET BY MOUTH EVERY DAY WITH BREAKFAST    amLODIPine (NORVASC) 5 MG tablet 90 tablet 1     Sig: TAKE 1 TABLET BY MOUTH EVERY DAY       Last Appointment Date: 3/15/2021  Next Appointment Date: 9/15/2021    Allergies   Allergen Reactions    Pcn [Penicillins]

## 2021-04-13 RX ORDER — LOSARTAN POTASSIUM 100 MG/1
100 TABLET ORAL DAILY
Qty: 90 TABLET | Refills: 1 | Status: SHIPPED | OUTPATIENT
Start: 2021-04-13 | End: 2021-04-21 | Stop reason: SDUPTHER

## 2021-04-13 NOTE — TELEPHONE ENCOUNTER
Cheryl Hernandez called to request a refill on his medication.       Last office visit : 3/15/2021   Next office visit : 9/15/2021     Requested Prescriptions     Pending Prescriptions Disp Refills    losartan (COZAAR) 100 MG tablet 90 tablet 1     Sig: Take 1 tablet by mouth daily            Ivon Hancock MA

## 2021-04-21 RX ORDER — LOSARTAN POTASSIUM 100 MG/1
100 TABLET ORAL DAILY
Qty: 90 TABLET | Refills: 1 | Status: SHIPPED | OUTPATIENT
Start: 2021-04-21 | End: 2021-11-10

## 2021-07-09 NOTE — TELEPHONE ENCOUNTER
Nita Lombardo called requesting a refill of the below medication which has been pended for you:     Requested Prescriptions     Pending Prescriptions Disp Refills    rivaroxaban (XARELTO) 20 MG TABS tablet [Pharmacy Med Name: Peng Avendano 20 MG TAB] 30 tablet 1     Sig: TAKE 1 TABLET BY MOUTH EVERY DAY WITH BREAKFAST       Last Appointment Date: 3/15/2021  Next Appointment Date: 9/15/2021    Allergies   Allergen Reactions    Pcn [Penicillins]

## 2021-09-29 ENCOUNTER — HOSPITAL ENCOUNTER (OUTPATIENT)
Dept: NON INVASIVE DIAGNOSTICS | Age: 66
Discharge: HOME | End: 2021-09-29
Payer: MEDICARE

## 2021-09-29 ENCOUNTER — OFFICE VISIT (OUTPATIENT)
Dept: INTERNAL MEDICINE | Age: 66
End: 2021-09-29
Payer: MEDICARE

## 2021-09-29 VITALS
SYSTOLIC BLOOD PRESSURE: 124 MMHG | WEIGHT: 315 LBS | RESPIRATION RATE: 18 BRPM | OXYGEN SATURATION: 95 % | BODY MASS INDEX: 41.75 KG/M2 | HEIGHT: 73 IN | DIASTOLIC BLOOD PRESSURE: 82 MMHG | HEART RATE: 100 BPM

## 2021-09-29 DIAGNOSIS — I10 ESSENTIAL HYPERTENSION: ICD-10-CM

## 2021-09-29 DIAGNOSIS — R22.42 LOCALIZED SWELLING OF LEFT LOWER LEG: Primary | ICD-10-CM

## 2021-09-29 DIAGNOSIS — R22.42 LOCALIZED SWELLING OF LEFT LOWER LEG: ICD-10-CM

## 2021-09-29 DIAGNOSIS — I82.552 CHRONIC DEEP VEIN THROMBOSIS (DVT) OF LEFT PERONEAL VEIN (HCC): ICD-10-CM

## 2021-09-29 DIAGNOSIS — E78.2 MIXED HYPERLIPIDEMIA: ICD-10-CM

## 2021-09-29 DIAGNOSIS — Z85.46 HISTORY OF PROSTATE CANCER: ICD-10-CM

## 2021-09-29 DIAGNOSIS — C61 CANCER OF PROSTATE (HCC): ICD-10-CM

## 2021-09-29 PROCEDURE — G8427 DOCREV CUR MEDS BY ELIG CLIN: HCPCS | Performed by: NURSE PRACTITIONER

## 2021-09-29 PROCEDURE — 3017F COLORECTAL CA SCREEN DOC REV: CPT | Performed by: NURSE PRACTITIONER

## 2021-09-29 PROCEDURE — 1036F TOBACCO NON-USER: CPT | Performed by: NURSE PRACTITIONER

## 2021-09-29 PROCEDURE — 99214 OFFICE O/P EST MOD 30 MIN: CPT | Performed by: NURSE PRACTITIONER

## 2021-09-29 PROCEDURE — G8417 CALC BMI ABV UP PARAM F/U: HCPCS | Performed by: NURSE PRACTITIONER

## 2021-09-29 PROCEDURE — 1123F ACP DISCUSS/DSCN MKR DOCD: CPT | Performed by: NURSE PRACTITIONER

## 2021-09-29 PROCEDURE — 93971 EXTREMITY STUDY: CPT

## 2021-09-29 PROCEDURE — 4040F PNEUMOC VAC/ADMIN/RCVD: CPT | Performed by: NURSE PRACTITIONER

## 2021-09-29 ASSESSMENT — ENCOUNTER SYMPTOMS
ABDOMINAL DISTENTION: 0
SHORTNESS OF BREATH: 0
STRIDOR: 0
COUGH: 0
CONSTIPATION: 0
EYE DISCHARGE: 0
COLOR CHANGE: 0
DIARRHEA: 0
CHOKING: 0
VOMITING: 0
EYE ITCHING: 0
WHEEZING: 0
BLOOD IN STOOL: 0
ABDOMINAL PAIN: 0
TROUBLE SWALLOWING: 0
NAUSEA: 0
SORE THROAT: 0

## 2021-09-29 ASSESSMENT — PATIENT HEALTH QUESTIONNAIRE - PHQ9
SUM OF ALL RESPONSES TO PHQ QUESTIONS 1-9: 0
SUM OF ALL RESPONSES TO PHQ9 QUESTIONS 1 & 2: 0
2. FEELING DOWN, DEPRESSED OR HOPELESS: 0
SUM OF ALL RESPONSES TO PHQ QUESTIONS 1-9: 0
1. LITTLE INTEREST OR PLEASURE IN DOING THINGS: 0
SUM OF ALL RESPONSES TO PHQ QUESTIONS 1-9: 0

## 2021-09-29 ASSESSMENT — LIFESTYLE VARIABLES: HOW OFTEN DO YOU HAVE A DRINK CONTAINING ALCOHOL: 0

## 2021-09-29 NOTE — PROGRESS NOTES
[address] [facility]  [address] [place] [id number] [address] [address]   Dept: [phone]  Dept Fax: [phone]: [phone]    Cecil Santana (:  1955) is a 73 y.o. male,Established patient, here for evaluation of the following chief complaint(s): Medicare AWV      Cecil Santana is a 73 y.o. male who presents today for his medical conditions/complaints as noted below. Cecil Santana is c/bryon Medicare AWV        HPI:     Chief Complaint   Patient presents with    Medicare AWV     HPI   1. History of DVT of the left leg has been since January for us to scan his leg today he is having swelling is persistent in his foot and leg. He says he has no pain in that area he still is on Xarelto 20 daily  2. History of prostate he had seeds and radiation he does not follow-up with urology  3. Hypertension blood pressure been stable 124/82 he is on losartan 100 mg daily  4.   Hyperlipidemia; he did not get his labs for his office visit he is on Crestor 10 and fenofibrate 160      Past Medical History:   Diagnosis Date    Cellulitis of left leg     Colon polyp     Elevated blood pressure reading without diagnosis of hypertension     Esophageal reflux     History of prostate cancer     Mixed hyperlipidemia     TG>>LDL    Obesity     Prostate cancer [facility])     2010 Dr Marquis Ozuna;  implants, XRT      Past Surgical History:   Procedure Laterality Date    COLONOSCOPY  2009        COLONOSCOPY          UPPER GASTROINTESTINAL ENDOSCOPY  3/22/2000        VASECTOMY         Vitals 2021 3/15/2021 2020 2020 2020 8577   SYSTOLIC [phone]   DIASTOLIC 82 76 86 97 98 82   Pulse 100 96 100 87 88 90   Temp - - - - - -   Resp 18 20 - - - -   SpO2 95 97 - - - 96   Weight 340 lb 12.8 oz 333 lb 9.6 oz 334 lb - 331 lb 309 lb   Height 6' 1\" 6' 1\" 6' 0\" - 6' 0\" 6' 0\"   Body mass index 44.96 kg/m2 44.01 kg/m2 45.29 kg/m2 - 44.89 kg/m2 41.9 kg/m2   Pain Level - - - - - -       Family History   Problem Relation Age of Onset    Colon Polyps Mother     Colon Polyps Brother     Heart Disease Father     High Blood Pressure Other        Social History     Tobacco Use    Smoking status: Never Smoker    Smokeless tobacco: Never Used   Substance Use Topics    Alcohol use: No      Current Outpatient Medications   Medication Sig Dispense Refill    rivaroxaban (XARELTO) 20 MG TABS tablet TAKE 1 TABLET BY MOUTH EVERY DAY WITH BREAKFAST 30 tablet 1    losartan (COZAAR) 100 MG tablet Take 1 tablet by mouth daily 90 tablet 1    amLODIPine (NORVASC) 5 MG tablet TAKE 1 TABLET BY MOUTH EVERY DAY 90 tablet 1    rosuvastatin (CRESTOR) 10 MG tablet Take 1 tablet by mouth nightly 30 tablet 3    fenofibrate 160 MG tablet Take 1 tablet by mouth daily 90 tablet 1    vitamin D (CHOLECALCIFEROL) 1000 UNIT TABS tablet Take 1,000 Units by mouth daily      Red Yeast Rice 600 MG TABS Take 2 tablets by mouth 2 times daily      Multiple Vitamin (MULTI-VITAMIN PO) Take  by mouth.  fish oil-omega-3 fatty acids 1000 MG capsule Take 2 g by mouth daily. (Patient not taking: Reported on 9/29/2021)       No current facility-administered medications for this visit.      Allergies   Allergen Reactions    Pcn [Penicillins]        Health Maintenance   Topic Date Due    Hepatitis C screen  Never done    HIV screen  Never done    Annual Wellness Visit (AWV)  Never done    Pneumococcal 65+ years Vaccine (1 of 1 - PPSV23) Never done    Flu vaccine (1) 09/01/2021    PSA counseling  09/15/2021    DTaP/Tdap/Td vaccine (1 - Tdap) 10/06/2021 (Originally 6/7/1975)    Shingles Vaccine (1 of 2) 03/11/2022 (Originally 6/7/2006)    Lipid screen  03/12/2022    Potassium monitoring  03/12/2022    Creatinine monitoring  03/12/2022    Colon cancer screen colonoscopy  05/30/2022    COVID-19 Vaccine  Completed    Hepatitis A vaccine  Aged Out  Hepatitis B vaccine  Aged Out    Hib vaccine  Aged Out    Meningococcal (ACWY) vaccine  Aged Out       Lab Results   Component Value Date    LABA1C 5.6 09/30/2019     Lab Results   Component Value Date    PSA 0.79 09/15/2020    PSA 0.52 01/02/2020    PSA 0.53 09/11/2017     TSH   Date Value Ref Range Status   09/15/2020 1.880 0.270 - 4.200 uIU/mL Final   ]  Lab Results   Component Value Date     03/12/2021    K 4.0 03/12/2021     03/12/2021    CO2 25 03/12/2021    BUN 16 03/12/2021    CREATININE 1.2 03/12/2021    GLUCOSE 93 03/12/2021    CALCIUM 9.1 03/12/2021    PROT 7.0 03/12/2021    LABALBU 4.0 03/12/2021    BILITOT 0.3 03/12/2021    ALKPHOS 80 03/12/2021    AST 17 03/12/2021    ALT 25 03/12/2021    LABGLOM >60 03/12/2021    GFRAA >59 03/12/2021     Lab Results   Component Value Date    CHOL 188 03/12/2021    CHOL 175 12/11/2020    CHOL 197 07/07/2020     Lab Results   Component Value Date    TRIG 213 (H) 03/12/2021    TRIG 199 (H) 12/11/2020    TRIG 244 (H) 09/15/2020     Lab Results   Component Value Date    HDL 34 (L) 03/12/2021    HDL 33 (L) 12/11/2020    HDL 32 (L) 07/07/2020     Lab Results   Component Value Date    LDLCALC 111 03/12/2021    LDLCALC 102 12/11/2020    LDLCALC 92 07/07/2020     Lab Results   Component Value Date     03/12/2021    K 4.0 03/12/2021     03/12/2021    CO2 25 03/12/2021    BUN 16 03/12/2021    CREATININE 1.2 03/12/2021    GLUCOSE 93 03/12/2021    CALCIUM 9.1 03/12/2021      Lab Results   Component Value Date    WBC 6.4 09/29/2021    HGB 13.9 (L) 09/29/2021    HCT 41.2 (L) 09/29/2021    MCV 88.6 09/29/2021     09/29/2021    LYMPHOPCT 32.8 09/29/2021    RBC 4.65 (L) 09/29/2021    MCH 29.9 09/29/2021    MCHC 33.7 09/29/2021    RDW 12.2 09/29/2021     Lab Results   Component Value Date    VITD25 34.2 09/15/2020     Labs reviewed from today   Diagnostics reviewed venous scan for today   Subjective:      Review of Systems   Constitutional: Negative for fatigue, fever and unexpected weight change. HENT: Negative for ear discharge, ear pain, mouth sores, sore throat and trouble swallowing. Eyes: Negative for discharge, itching and visual disturbance. Respiratory: Negative for cough, choking, shortness of breath, wheezing and stridor. Cardiovascular: Positive for leg swelling. Negative for chest pain and palpitations. Gastrointestinal: Negative for abdominal distention, abdominal pain, blood in stool, constipation, diarrhea, nausea and vomiting. Endocrine: Negative for cold intolerance, polydipsia and polyuria. Genitourinary: Negative for difficulty urinating, dysuria, frequency and urgency. Musculoskeletal: Negative for arthralgias and gait problem. Skin: Negative for color change and rash. Allergic/Immunologic: Negative for food allergies and immunocompromised state. Neurological: Negative for dizziness, tremors, syncope, speech difficulty, weakness and headaches. Hematological: Negative for adenopathy. Does not bruise/bleed easily. Psychiatric/Behavioral: Negative for confusion and hallucinations. Objective:     Physical Exam  Constitutional:       General: He is not in acute distress. Appearance: He is well-developed. HENT:      Head: Normocephalic and atraumatic. Eyes:      General: No scleral icterus. Right eye: No discharge. Left eye: No discharge. Pupils: Pupils are equal, round, and reactive to light. Neck:      Thyroid: No thyromegaly. Vascular: No JVD. Cardiovascular:      Rate and Rhythm: Normal rate and regular rhythm. Heart sounds: Normal heart sounds. No murmur heard. Pulmonary:      Effort: Pulmonary effort is normal. No respiratory distress. Breath sounds: Normal breath sounds. No wheezing or rales. Abdominal:      General: Bowel sounds are normal. There is no distension. Palpations: Abdomen is soft. There is no mass. Tenderness:  There is no abdominal current medications, diet and exercise. Patient agreed with treatment plan. Follow up as directed. MEDICATIONS:  No orders of the defined types were placed in this encounter. ORDERS:  Orders Placed This Encounter   Procedures    VL EXTREMITY VENOUS LEFT       Follow-up:  Return in about 3 months (around 12/29/2021) for have labs done prior to appt. PATIENT INSTRUCTIONS:  Patient Instructions   1. History of prostate cancer stable with checking PSA today  2. Chronic DVT will get venous Doppler continue with Xarelto 20  3. History of hypertension stable with amlodipine and losartan  4. Mixed hyperlipidemia his labs are pending as he did not get them before his office visit today    Electronically signed by JODY Martin on 9/29/2021 at 4:37 PM    @    EMRDrtara/transcription disclaimer:  Much of this encounter note is electronic transcription/translation of spoken language to printed texts. The electronic translation of spoken language may be erroneous, or at times,nonsensical words or phrases may be inadvertently transcribed.   Although I have reviewed the note for such errors, some may still exist.

## 2021-09-29 NOTE — ASSESSMENT & PLAN NOTE
We will get a venous Doppler today to further evaluate since his been 9 months.   He remains on Xarelto 20 daily

## 2021-09-29 NOTE — PATIENT INSTRUCTIONS
1.  History of prostate cancer stable with checking PSA today  2. Chronic DVT will get venous Doppler continue with Xarelto 20  3. History of hypertension stable with amlodipine and losartan  4.   Mixed hyperlipidemia his labs are pending as he did not get them before his office visit today

## 2021-11-10 DIAGNOSIS — I10 ESSENTIAL HYPERTENSION: ICD-10-CM

## 2021-11-10 RX ORDER — AMLODIPINE BESYLATE 5 MG/1
TABLET ORAL
Qty: 90 TABLET | Refills: 1 | Status: SHIPPED | OUTPATIENT
Start: 2021-11-10 | End: 2022-03-29

## 2021-11-10 RX ORDER — LOSARTAN POTASSIUM 100 MG/1
TABLET ORAL
Qty: 90 TABLET | Refills: 1 | Status: SHIPPED | OUTPATIENT
Start: 2021-11-10

## 2021-11-10 NOTE — TELEPHONE ENCOUNTER
Apolinar Anderson called requesting a refill of the below medication which has been pended for you:     Requested Prescriptions     Pending Prescriptions Disp Refills    amLODIPine (NORVASC) 5 MG tablet [Pharmacy Med Name: AMLODIPINE BESYLATE 5 MG Tablet] 90 tablet 1     Sig: TAKE 1 TABLET EVERY DAY    rivaroxaban (XARELTO) 20 MG TABS tablet [Pharmacy Med Name: Mora Calliham 20 MG Tablet] 90 tablet 1     Sig: TAKE 1 TABLET BY MOUTH EVERY DAY WITH BREAKFAST       Last Appointment Date: 9/29/2021  Next Appointment Date: 11/10/2021    Allergies   Allergen Reactions    Pcn [Penicillins]

## 2021-11-10 NOTE — TELEPHONE ENCOUNTER
Elvia Corona called requesting a refill of the below medication which has been pended for you:     Requested Prescriptions     Pending Prescriptions Disp Refills    losartan (COZAAR) 100 MG tablet [Pharmacy Med Name: LOSARTAN POTASSIUM 100 MG Tablet] 90 tablet 1     Sig: TAKE 1 TABLET EVERY DAY       Last Appointment Date: 9/29/2021  Next Appointment Date: 12/29/2021    Allergies   Allergen Reactions    Pcn [Penicillins]

## 2021-12-29 ENCOUNTER — OFFICE VISIT (OUTPATIENT)
Dept: INTERNAL MEDICINE | Age: 66
End: 2021-12-29
Payer: MEDICARE

## 2021-12-29 VITALS
BODY MASS INDEX: 41.75 KG/M2 | HEART RATE: 94 BPM | DIASTOLIC BLOOD PRESSURE: 78 MMHG | SYSTOLIC BLOOD PRESSURE: 138 MMHG | OXYGEN SATURATION: 97 % | WEIGHT: 315 LBS | HEIGHT: 73 IN

## 2021-12-29 DIAGNOSIS — I82.552 CHRONIC DEEP VEIN THROMBOSIS (DVT) OF LEFT PERONEAL VEIN (HCC): Primary | ICD-10-CM

## 2021-12-29 DIAGNOSIS — E78.2 MIXED HYPERLIPIDEMIA: ICD-10-CM

## 2021-12-29 DIAGNOSIS — I10 ESSENTIAL HYPERTENSION: ICD-10-CM

## 2021-12-29 DIAGNOSIS — E55.9 VITAMIN D DEFICIENCY: ICD-10-CM

## 2021-12-29 PROCEDURE — 4040F PNEUMOC VAC/ADMIN/RCVD: CPT | Performed by: NURSE PRACTITIONER

## 2021-12-29 PROCEDURE — G8417 CALC BMI ABV UP PARAM F/U: HCPCS | Performed by: NURSE PRACTITIONER

## 2021-12-29 PROCEDURE — 1123F ACP DISCUSS/DSCN MKR DOCD: CPT | Performed by: NURSE PRACTITIONER

## 2021-12-29 PROCEDURE — G0439 PPPS, SUBSEQ VISIT: HCPCS | Performed by: NURSE PRACTITIONER

## 2021-12-29 PROCEDURE — 3017F COLORECTAL CA SCREEN DOC REV: CPT | Performed by: NURSE PRACTITIONER

## 2021-12-29 PROCEDURE — 99214 OFFICE O/P EST MOD 30 MIN: CPT | Performed by: NURSE PRACTITIONER

## 2021-12-29 PROCEDURE — G8484 FLU IMMUNIZE NO ADMIN: HCPCS | Performed by: NURSE PRACTITIONER

## 2021-12-29 PROCEDURE — 1036F TOBACCO NON-USER: CPT | Performed by: NURSE PRACTITIONER

## 2021-12-29 PROCEDURE — G8427 DOCREV CUR MEDS BY ELIG CLIN: HCPCS | Performed by: NURSE PRACTITIONER

## 2021-12-29 SDOH — ECONOMIC STABILITY: FOOD INSECURITY: WITHIN THE PAST 12 MONTHS, THE FOOD YOU BOUGHT JUST DIDN'T LAST AND YOU DIDN'T HAVE MONEY TO GET MORE.: PATIENT DECLINED

## 2021-12-29 SDOH — ECONOMIC STABILITY: FOOD INSECURITY: WITHIN THE PAST 12 MONTHS, YOU WORRIED THAT YOUR FOOD WOULD RUN OUT BEFORE YOU GOT MONEY TO BUY MORE.: PATIENT DECLINED

## 2021-12-29 ASSESSMENT — ENCOUNTER SYMPTOMS
TROUBLE SWALLOWING: 0
BLOOD IN STOOL: 0
COLOR CHANGE: 0
EYE ITCHING: 0
STRIDOR: 0
EYE DISCHARGE: 0
NAUSEA: 0
CHOKING: 0
WHEEZING: 0
VOMITING: 0
ABDOMINAL DISTENTION: 0
SORE THROAT: 0
SHORTNESS OF BREATH: 0
CONSTIPATION: 0
ABDOMINAL PAIN: 0
DIARRHEA: 0
COUGH: 0

## 2021-12-29 ASSESSMENT — PATIENT HEALTH QUESTIONNAIRE - PHQ9
1. LITTLE INTEREST OR PLEASURE IN DOING THINGS: 0
2. FEELING DOWN, DEPRESSED OR HOPELESS: 0
SUM OF ALL RESPONSES TO PHQ QUESTIONS 1-9: 0
SUM OF ALL RESPONSES TO PHQ9 QUESTIONS 1 & 2: 0

## 2021-12-29 ASSESSMENT — LIFESTYLE VARIABLES: HOW OFTEN DO YOU HAVE A DRINK CONTAINING ALCOHOL: 0

## 2021-12-29 ASSESSMENT — SOCIAL DETERMINANTS OF HEALTH (SDOH): HOW HARD IS IT FOR YOU TO PAY FOR THE VERY BASICS LIKE FOOD, HOUSING, MEDICAL CARE, AND HEATING?: NOT HARD AT ALL

## 2021-12-29 NOTE — PATIENT INSTRUCTIONS
1.  Hypertension  Stable with current meds   2. Chronic DVT  Stable with xarelto   3. Hyperlipidemia  Stable with fenofibrate and crestor   4.   Vitamin D deficiency  Stable or now

## 2021-12-29 NOTE — PROGRESS NOTES
[address] [facility]  [address] [address] [address] [address] [address]  Dept: [phone]  Dept Fax: [phone]: [phone]    Nia Munoz (:  1955) is a 73 y.o. male,Established patient green, here for evaluation of the following chief complaint(s): Medicare Enos Reiter is a 73 y.o. male who presents today for his medical conditions/complaints as noted below. Nia Munoz is c/bryon Medicare AWV        HPI:     Chief Complaint   Patient presents with    Medicare AWV     HPI   1.   Hypertension stable on current dose of amlodipine 5 mg Cozaar 100 mg  Takes as directed no side effects of the meds  #2 chronic DVT of the left leg we recently repeat his scan he still has the chronic area he remains on Xarelto  #3 history of prostate cancer  #4 hyperlipidemia he is on fenofibrate 160 and Crestor ten  #5 vitamin D deficiency stable on current dose of 1000 mg daily takes as directed no side effects of the meds  Past Medical History:   Diagnosis Date    Cellulitis of left leg     Colon polyp     Elevated blood pressure reading without diagnosis of hypertension     Esophageal reflux     History of prostate cancer     Mixed hyperlipidemia     TG>>LDL    Obesity     Prostate cancer [facility])     2010 Dr Chey Gutierres;  implants, XRT      Past Surgical History:   Procedure Laterality Date    COLONOSCOPY  2009        COLONOSCOPY          UPPER GASTROINTESTINAL ENDOSCOPY  3/22/2000        VASECTOMY         Vitals 2021 2021 3/15/2021 2020 2020    SYSTOLIC 460 [phone]   DIASTOLIC 78 82 76 86 97 98   Pulse 94 100 96 100 87 88   Temp - - - - - -   Resp - 18 20 - - -   SpO2 97 95 97 - - -   Weight 336 lb 340 lb 12.8 oz 333 lb 9.6 oz 334 lb - 331 lb   Height 6' 1\" 6' 1\" 6' 1\" 6' 0\" - 6' 0\"   Body mass index 44.33 kg/m2 44.96 kg/m2 44.01 kg/m2 45.29 kg/m2 - 44.89 kg/m2   Pain Level - - - - - -       Family History   Problem Relation Age of Onset    Colon Polyps Mother     Colon Polyps Brother     Heart Disease Father     High Blood Pressure Other        Social History     Tobacco Use    Smoking status: Never Smoker    Smokeless tobacco: Never Used   Substance Use Topics    Alcohol use: No      Current Outpatient Medications   Medication Sig Dispense Refill    amLODIPine (NORVASC) 5 MG tablet TAKE 1 TABLET EVERY DAY 90 tablet 1    rivaroxaban (XARELTO) 20 MG TABS tablet TAKE 1 TABLET BY MOUTH EVERY DAY WITH BREAKFAST 90 tablet 1    losartan (COZAAR) 100 MG tablet TAKE 1 TABLET EVERY DAY 90 tablet 1    rosuvastatin (CRESTOR) 10 MG tablet Take 1 tablet by mouth nightly 30 tablet 3    fenofibrate 160 MG tablet Take 1 tablet by mouth daily 90 tablet 1    vitamin D (CHOLECALCIFEROL) 1000 UNIT TABS tablet Take 1,000 Units by mouth daily      Red Yeast Rice 600 MG TABS Take 2 tablets by mouth 2 times daily      fish oil-omega-3 fatty acids 1000 MG capsule Take 2 g by mouth daily. (Patient not taking: Reported on 9/29/2021)      Multiple Vitamin (MULTI-VITAMIN PO) Take  by mouth. No current facility-administered medications for this visit.      Allergies   Allergen Reactions    Pcn [Penicillins]        Health Maintenance   Topic Date Due    Hepatitis C screen  Never done    HIV screen  Never done    DTaP/Tdap/Td vaccine (1 - Tdap) Never done    Pneumococcal 65+ years Vaccine (1 of 1 - PPSV23) Never done    Flu vaccine (1) 09/01/2021    Annual Wellness Visit (AWV)  09/15/2021    Shingles Vaccine (1 of 2) 03/11/2022 (Originally 6/7/2006)    COVID-19 Vaccine (3 - Booster for Moderna series) 03/26/2022    Colon cancer screen colonoscopy  05/30/2022    Lipid screen  09/29/2022    Potassium monitoring  09/29/2022    Creatinine monitoring  09/29/2022    PSA counseling  09/29/2022    Hepatitis A vaccine  Aged Out    Hepatitis B vaccine  Aged Out    Hib vaccine  Aged Out    Meningococcal (ACWY) vaccine  Aged Out       Lab Results   Component Value Date    LABA1C 5.6 09/30/2019     Lab Results   Component Value Date    PSA 0.51 09/29/2021    PSA 0.79 09/15/2020    PSA 0.52 01/02/2020     TSH   Date Value Ref Range Status   09/29/2021 2.380 0.270 - 4.200 uIU/mL Final   ]  Lab Results   Component Value Date     09/29/2021    K 4.2 09/29/2021     09/29/2021    CO2 24 09/29/2021    BUN 18 09/29/2021    CREATININE 1.1 09/29/2021    GLUCOSE 88 09/29/2021    CALCIUM 9.5 09/29/2021    PROT 7.0 09/29/2021    LABALBU 4.3 09/29/2021    BILITOT 0.3 09/29/2021    ALKPHOS 82 09/29/2021    AST 20 09/29/2021    ALT 28 09/29/2021    LABGLOM >60 09/29/2021    GFRAA >59 09/29/2021     Lab Results   Component Value Date    CHOL 187 09/29/2021    CHOL 188 03/12/2021    CHOL 175 12/11/2020     Lab Results   Component Value Date    TRIG 278 (H) 09/29/2021    TRIG 213 (H) 03/12/2021    TRIG 199 (H) 12/11/2020     Lab Results   Component Value Date    HDL 32 (L) 09/29/2021    HDL 34 (L) 03/12/2021    HDL 33 (L) 12/11/2020     Lab Results   Component Value Date    LDLCALC 99 09/29/2021    LDLCALC 111 03/12/2021    LDLCALC 102 12/11/2020     Lab Results   Component Value Date     09/29/2021    K 4.2 09/29/2021     09/29/2021    CO2 24 09/29/2021    BUN 18 09/29/2021    CREATININE 1.1 09/29/2021    GLUCOSE 88 09/29/2021    CALCIUM 9.5 09/29/2021      Lab Results   Component Value Date    WBC 6.4 09/29/2021    HGB 13.9 (L) 09/29/2021    HCT 41.2 (L) 09/29/2021    MCV 88.6 09/29/2021     09/29/2021    LYMPHOPCT 32.8 09/29/2021    RBC 4.65 (L) 09/29/2021    MCH 29.9 09/29/2021    MCHC 33.7 09/29/2021    RDW 12.2 09/29/2021     Lab Results   Component Value Date    VITD25 34.1 09/29/2021       Subjective:      Review of Systems   Constitutional: Negative for fatigue, fever and unexpected weight change.    HENT: Negative for ear discharge, ear pain, mouth sores, sore throat and trouble swallowing. Eyes: Negative for discharge, itching and visual disturbance. Respiratory: Negative for cough, choking, shortness of breath, wheezing and stridor. Cardiovascular: Negative for chest pain, palpitations and leg swelling. Gastrointestinal: Negative for abdominal distention, abdominal pain, blood in stool, constipation, diarrhea, nausea and vomiting. Endocrine: Negative for cold intolerance, polydipsia and polyuria. Genitourinary: Negative for difficulty urinating, dysuria, frequency and urgency. Musculoskeletal: Positive for arthralgias. Negative for gait problem. Skin: Negative for color change and rash. Allergic/Immunologic: Negative for food allergies and immunocompromised state. Neurological: Negative for dizziness, tremors, syncope, speech difficulty, weakness and headaches. Hematological: Negative for adenopathy. Does not bruise/bleed easily. Psychiatric/Behavioral: Negative for confusion and hallucinations. Objective:     Physical Exam  Constitutional:       General: He is not in acute distress. Appearance: He is well-developed. HENT:      Head: Normocephalic and atraumatic. Eyes:      General: No scleral icterus. Right eye: No discharge. Left eye: No discharge. Pupils: Pupils are equal, round, and reactive to light. Neck:      Thyroid: No thyromegaly. Vascular: No JVD. Cardiovascular:      Rate and Rhythm: Normal rate and regular rhythm. Heart sounds: Normal heart sounds. No murmur heard. Pulmonary:      Effort: Pulmonary effort is normal. No respiratory distress. Breath sounds: Normal breath sounds. No wheezing or rales. Abdominal:      General: Bowel sounds are normal. There is no distension. Palpations: Abdomen is soft. There is no mass. Tenderness: There is no abdominal tenderness. There is no guarding or rebound. Musculoskeletal:         General: No tenderness. Normal range of motion. Cervical back: Normal range of motion and neck supple. Skin:     General: Skin is warm and dry. Findings: No erythema or rash. Neurological:      Mental Status: He is alert and oriented to person, place, and time. Cranial Nerves: No cranial nerve deficit. Coordination: Coordination normal.      Deep Tendon Reflexes: Reflexes are normal and symmetric. Reflexes normal.   Psychiatric:         Mood and Affect: Mood is not depressed. Behavior: Behavior normal.         Thought Content: Thought content normal.         Judgment: Judgment normal.       /78   Pulse 94   Ht 6' 1\" (1.854 m)   Wt (!) 336 lb (152.4 kg)   SpO2 97%   BMI 44.33 kg/m²           Assessment:      Problem List     Chronic deep vein thrombosis (DVT) of left peroneal vein (HCC) - Primary    Essential hypertension     He is stable with amlodipine five Cozaar 100          Relevant Medications    amLODIPine (NORVASC) 5 MG tablet    Other Relevant Orders    CBC Auto Differential    Comprehensive Metabolic Panel    TSH without Reflex    Urinalysis Reflex to Culture    Mixed hyperlipidemia     Crestor 10 mg fenofibrate 160 mg          Relevant Medications    rosuvastatin (CRESTOR) 10 MG tablet    amLODIPine (NORVASC) 5 MG tablet    rivaroxaban (XARELTO) 20 MG TABS tablet    losartan (COZAAR) 100 MG tablet    Other Relevant Orders    Lipid Panel    Vitamin D deficiency     Labs are good today he is on 1000 units daily          Relevant Orders    Vitamin D 25 Hydroxy          Plan:        Patient given educational materials - see patient instructions. Discussed use, benefit, and side effects of prescribed medications. Allpatient questions answered. Pt voiced understanding. Reviewed health maintenance. Instructed to continue current medications, diet and exercise. Patient agreed with treatment plan. Follow up as directed. MEDICATIONS:  No orders of the defined types were placed in this encounter.         ORDERS:  Orders Placed This Encounter   Procedures    CBC Auto Differential    Comprehensive Metabolic Panel    TSH without Reflex    Urinalysis Reflex to Culture    Vitamin D 25 Hydroxy    Lipid Panel       Follow-up:  Return in about 3 months (around 3/29/2022) for have labs done prior to appt. PATIENT INSTRUCTIONS:  Patient Instructions   1. Hypertension  Stable with current meds   2. Chronic DVT  Stable with xarelto   3. Hyperlipidemia  Stable with fenofibrate and crestor   4. Vitamin D deficiency  Stable or now     Electronically signed by JODY Nuñez on 12/29/2021 at 2:13 PM    @On this date 12/29/2021 I have spent 35   minutes reviewing previous notes, test results and face to face with the patient discussing the diagnosis and importance of compliance with the treatment plan as well as documenting on the day of the visit. EMRDragon/transcription disclaimer:  Much of this encounter note is electronic transcription/translation of spoken language to printed texts. The electronic translation of spoken language may be erroneous, or at times,nonsensical words or phrases may be inadvertently transcribed.   Although I have reviewed the note for such errors, some may still exist.

## 2022-01-21 DIAGNOSIS — E55.9 VITAMIN D DEFICIENCY: ICD-10-CM

## 2022-01-21 DIAGNOSIS — I10 ESSENTIAL HYPERTENSION: ICD-10-CM

## 2022-01-21 DIAGNOSIS — E78.2 MIXED HYPERLIPIDEMIA: ICD-10-CM

## 2022-01-21 LAB
ALBUMIN SERPL-MCNC: 4.4 G/DL (ref 3.5–5.2)
ALP BLD-CCNC: 86 U/L (ref 40–130)
ALT SERPL-CCNC: 27 U/L (ref 5–41)
ANION GAP SERPL CALCULATED.3IONS-SCNC: 12 MMOL/L (ref 7–19)
AST SERPL-CCNC: 18 U/L (ref 5–40)
BASOPHILS ABSOLUTE: 0 K/UL (ref 0–0.2)
BASOPHILS RELATIVE PERCENT: 0.6 % (ref 0–1)
BILIRUB SERPL-MCNC: 0.3 MG/DL (ref 0.2–1.2)
BILIRUBIN URINE: NEGATIVE
BLOOD, URINE: NEGATIVE
BUN BLDV-MCNC: 20 MG/DL (ref 8–23)
CALCIUM SERPL-MCNC: 9.6 MG/DL (ref 8.8–10.2)
CHLORIDE BLD-SCNC: 104 MMOL/L (ref 98–111)
CHOLESTEROL, TOTAL: 158 MG/DL (ref 160–199)
CLARITY: ABNORMAL
CO2: 26 MMOL/L (ref 22–29)
COLOR: YELLOW
CREAT SERPL-MCNC: 1.3 MG/DL (ref 0.5–1.2)
EOSINOPHILS ABSOLUTE: 0.2 K/UL (ref 0–0.6)
EOSINOPHILS RELATIVE PERCENT: 3 % (ref 0–5)
GFR AFRICAN AMERICAN: >59
GFR NON-AFRICAN AMERICAN: 55
GLUCOSE BLD-MCNC: 97 MG/DL (ref 74–109)
GLUCOSE URINE: NEGATIVE MG/DL
HCT VFR BLD CALC: 44.5 % (ref 42–52)
HDLC SERPL-MCNC: 29 MG/DL (ref 55–121)
HEMOGLOBIN: 14.5 G/DL (ref 14–18)
IMMATURE GRANULOCYTES #: 0 K/UL
KETONES, URINE: ABNORMAL MG/DL
LDL CHOLESTEROL CALCULATED: 92 MG/DL
LEUKOCYTE ESTERASE, URINE: NEGATIVE
LYMPHOCYTES ABSOLUTE: 1.7 K/UL (ref 1.1–4.5)
LYMPHOCYTES RELATIVE PERCENT: 26.8 % (ref 20–40)
MCH RBC QN AUTO: 29.2 PG (ref 27–31)
MCHC RBC AUTO-ENTMCNC: 32.6 G/DL (ref 33–37)
MCV RBC AUTO: 89.7 FL (ref 80–94)
MONOCYTES ABSOLUTE: 0.4 K/UL (ref 0–0.9)
MONOCYTES RELATIVE PERCENT: 6.9 % (ref 0–10)
NEUTROPHILS ABSOLUTE: 4 K/UL (ref 1.5–7.5)
NEUTROPHILS RELATIVE PERCENT: 62.2 % (ref 50–65)
NITRITE, URINE: NEGATIVE
PDW BLD-RTO: 12.3 % (ref 11.5–14.5)
PH UA: 5 (ref 5–8)
PLATELET # BLD: 295 K/UL (ref 130–400)
PMV BLD AUTO: 9.8 FL (ref 9.4–12.4)
POTASSIUM SERPL-SCNC: 4.3 MMOL/L (ref 3.5–5)
PROTEIN UA: NEGATIVE MG/DL
RBC # BLD: 4.96 M/UL (ref 4.7–6.1)
SODIUM BLD-SCNC: 142 MMOL/L (ref 136–145)
SPECIFIC GRAVITY UA: 1.03 (ref 1–1.03)
TOTAL PROTEIN: 7.3 G/DL (ref 6.6–8.7)
TRIGL SERPL-MCNC: 183 MG/DL (ref 0–149)
TSH SERPL DL<=0.05 MIU/L-ACNC: 2.43 UIU/ML (ref 0.27–4.2)
UROBILINOGEN, URINE: 0.2 E.U./DL
VITAMIN D 25-HYDROXY: 29.1 NG/ML
WBC # BLD: 6.4 K/UL (ref 4.8–10.8)

## 2022-01-24 ENCOUNTER — OFFICE VISIT (OUTPATIENT)
Dept: INTERNAL MEDICINE | Age: 67
End: 2022-01-24
Payer: MEDICARE

## 2022-01-24 VITALS
SYSTOLIC BLOOD PRESSURE: 128 MMHG | WEIGHT: 315 LBS | BODY MASS INDEX: 44.86 KG/M2 | DIASTOLIC BLOOD PRESSURE: 64 MMHG | HEART RATE: 100 BPM | TEMPERATURE: 98.8 F | OXYGEN SATURATION: 96 %

## 2022-01-24 DIAGNOSIS — E78.2 MIXED HYPERLIPIDEMIA: ICD-10-CM

## 2022-01-24 DIAGNOSIS — E66.01 OBESITY, CLASS III, BMI 40-49.9 (MORBID OBESITY) (HCC): ICD-10-CM

## 2022-01-24 DIAGNOSIS — I10 ESSENTIAL HYPERTENSION: ICD-10-CM

## 2022-01-24 DIAGNOSIS — E55.9 VITAMIN D DEFICIENCY: ICD-10-CM

## 2022-01-24 DIAGNOSIS — I82.552 CHRONIC DEEP VEIN THROMBOSIS (DVT) OF LEFT PERONEAL VEIN (HCC): ICD-10-CM

## 2022-01-24 PROCEDURE — 1036F TOBACCO NON-USER: CPT | Performed by: NURSE PRACTITIONER

## 2022-01-24 PROCEDURE — 3017F COLORECTAL CA SCREEN DOC REV: CPT | Performed by: NURSE PRACTITIONER

## 2022-01-24 PROCEDURE — G8417 CALC BMI ABV UP PARAM F/U: HCPCS | Performed by: NURSE PRACTITIONER

## 2022-01-24 PROCEDURE — G8427 DOCREV CUR MEDS BY ELIG CLIN: HCPCS | Performed by: NURSE PRACTITIONER

## 2022-01-24 PROCEDURE — 4040F PNEUMOC VAC/ADMIN/RCVD: CPT | Performed by: NURSE PRACTITIONER

## 2022-01-24 PROCEDURE — 1123F ACP DISCUSS/DSCN MKR DOCD: CPT | Performed by: NURSE PRACTITIONER

## 2022-01-24 PROCEDURE — 99214 OFFICE O/P EST MOD 30 MIN: CPT | Performed by: NURSE PRACTITIONER

## 2022-01-24 PROCEDURE — G8484 FLU IMMUNIZE NO ADMIN: HCPCS | Performed by: NURSE PRACTITIONER

## 2022-01-24 RX ORDER — ERGOCALCIFEROL 1.25 MG/1
50000 CAPSULE ORAL WEEKLY
Qty: 12 CAPSULE | Refills: 1 | Status: SHIPPED | OUTPATIENT
Start: 2022-01-24 | End: 2022-03-01

## 2022-01-24 ASSESSMENT — ENCOUNTER SYMPTOMS
ABDOMINAL DISTENTION: 0
COLOR CHANGE: 0
NAUSEA: 0
TROUBLE SWALLOWING: 0
EYE ITCHING: 0
CHOKING: 0
CONSTIPATION: 0
STRIDOR: 0
SHORTNESS OF BREATH: 0
BLOOD IN STOOL: 0
DIARRHEA: 0
VOMITING: 0
SORE THROAT: 0
WHEEZING: 0
EYE DISCHARGE: 0
ABDOMINAL PAIN: 0
COUGH: 0

## 2022-01-24 NOTE — PROGRESS NOTES
[facility] INTERNAL MEDICINE  [address] [address] [place] [address]  Dept: [phone]  Dept Fax: [phone]: [phone]    Corina Mejía (:  1955) is a 73 y.o. male,Established patient  with green  , here for evaluation of the following chief complaint(s): 3 Month Follow-Up      Corina Mejía is a 73 y.o. male who presents today for his medical conditions/complaints as noted below. Corina Mejía is c/bryon 3 Month Follow-Up        HPI:     Chief Complaint   Patient presents with    3 Month Follow-Up     HPI   1. Chronic DVT he remains on Xarelto no side effects his leg looks fine he does have a trivial amount of swelling  2. Hypertension amlodipine 5 mg daily Cozaar 100 mg daily blood pressure is good today  3. Hyperlipidemia he thinks he still taking the Crestor. Not sure about the fish oil  4.   Vit d def; he is uncertain if he is taking vitamin D    When his brother gets home he is to call psych to ensure his correct med list  Past Medical History:   Diagnosis Date    Cellulitis of left leg     Colon polyp     Elevated blood pressure reading without diagnosis of hypertension     Esophageal reflux     History of prostate cancer     Mixed hyperlipidemia     TG>>LDL    Obesity     Prostate cancer [facility])     2010 Dr Adan Galindo;  implants, XRT      Past Surgical History:   Procedure Laterality Date    COLONOSCOPY  2009        COLONOSCOPY          UPPER GASTROINTESTINAL ENDOSCOPY  3/22/2000        VASECTOMY         Vitals 2022 2021 2021 3/15/2021 2020    SYSTOLIC 614 [phone]   DIASTOLIC 64 78 82 76 86 97   Pulse 100 94 100 96 100 87   Temp 98.8 - - - - -   Resp - - 18 20 - -   SpO2 96 97 95 97 - -   Weight 340 lb 336 lb 340 lb 12.8 oz 333 lb 9.6 oz 334 lb -   Height - 6' 1\" 6' 1\" 6' 1\" 6' 0\" -   Body mass index - 44.33 kg/m2 44.96 kg/m2 44.01 kg/m2 45.29 kg/m2 - Pain Level - - - - - -       Family History   Problem Relation Age of Onset    Colon Polyps Mother     Colon Polyps Brother     Heart Disease Father     High Blood Pressure Other        Social History     Tobacco Use    Smoking status: Never Smoker    Smokeless tobacco: Never Used   Substance Use Topics    Alcohol use: No      Current Outpatient Medications   Medication Sig Dispense Refill    vitamin D (ERGOCALCIFEROL) 1.25 MG (63060 UT) CAPS capsule Take 1 capsule by mouth once a week 12 capsule 1    amLODIPine (NORVASC) 5 MG tablet TAKE 1 TABLET EVERY DAY 90 tablet 1    rivaroxaban (XARELTO) 20 MG TABS tablet TAKE 1 TABLET BY MOUTH EVERY DAY WITH BREAKFAST 90 tablet 1    losartan (COZAAR) 100 MG tablet TAKE 1 TABLET EVERY DAY 90 tablet 1    rosuvastatin (CRESTOR) 10 MG tablet Take 1 tablet by mouth nightly 30 tablet 3    fenofibrate 160 MG tablet Take 1 tablet by mouth daily 90 tablet 1    vitamin D (CHOLECALCIFEROL) 1000 UNIT TABS tablet Take 1,000 Units by mouth daily      Red Yeast Rice 600 MG TABS Take 2 tablets by mouth 2 times daily      fish oil-omega-3 fatty acids 1000 MG capsule Take 2 g by mouth daily. (Patient not taking: Reported on 9/29/2021)      Multiple Vitamin (MULTI-VITAMIN PO) Take  by mouth. No current facility-administered medications for this visit.      Allergies   Allergen Reactions    Pcn [Penicillins]        Health Maintenance   Topic Date Due    Hepatitis C screen  Never done    HIV screen  Never done    DTaP/Tdap/Td vaccine (1 - Tdap) Never done    Pneumococcal 65+ years Vaccine (1 of 1 - PPSV23) Never done    Shingles Vaccine (1 of 2) 03/11/2022 (Originally 6/7/2006)    COVID-19 Vaccine (3 - Booster for Moderna series) 02/26/2022    Colon cancer screen colonoscopy  05/30/2022    PSA counseling  09/29/2022    Depression Screen  12/29/2022    Annual Wellness Visit (AWV)  12/30/2022    Lipid screen  01/21/2023    Potassium monitoring  01/21/2023 1/21/2022    Subjective:      Review of Systems   Constitutional: Negative for fatigue, fever and unexpected weight change. HENT: Negative for ear discharge, ear pain, mouth sores, sore throat and trouble swallowing. Eyes: Negative for discharge, itching and visual disturbance. Respiratory: Negative for cough, choking, shortness of breath, wheezing and stridor. Cardiovascular: Negative for chest pain, palpitations and leg swelling. Gastrointestinal: Negative for abdominal distention, abdominal pain, blood in stool, constipation, diarrhea, nausea and vomiting. Endocrine: Negative for cold intolerance, polydipsia and polyuria. Genitourinary: Negative for difficulty urinating, dysuria, frequency and urgency. Musculoskeletal: Negative for arthralgias and gait problem. Skin: Negative for color change and rash. Allergic/Immunologic: Negative for food allergies and immunocompromised state. Neurological: Negative for dizziness, tremors, syncope, speech difficulty, weakness and headaches. Hematological: Negative for adenopathy. Does not bruise/bleed easily. Psychiatric/Behavioral: Negative for confusion and hallucinations. Objective:     Physical Exam  Constitutional:       General: He is not in acute distress. Appearance: He is well-developed. HENT:      Head: Normocephalic and atraumatic. Eyes:      General: No scleral icterus. Right eye: No discharge. Left eye: No discharge. Pupils: Pupils are equal, round, and reactive to light. Neck:      Thyroid: No thyromegaly. Vascular: No JVD. Cardiovascular:      Rate and Rhythm: Normal rate and regular rhythm. Heart sounds: Normal heart sounds. No murmur heard. Pulmonary:      Effort: Pulmonary effort is normal. No respiratory distress. Breath sounds: Normal breath sounds. No wheezing or rales. Abdominal:      General: Bowel sounds are normal. There is no distension.       Palpations: Abdomen is soft. There is no mass. Tenderness: There is no abdominal tenderness. There is no guarding or rebound. Musculoskeletal:         General: No tenderness. Normal range of motion. Cervical back: Normal range of motion and neck supple. Skin:     General: Skin is warm and dry. Findings: No erythema or rash. Neurological:      Mental Status: He is alert and oriented to person, place, and time. Cranial Nerves: No cranial nerve deficit. Coordination: Coordination normal.      Deep Tendon Reflexes: Reflexes are normal and symmetric. Reflexes normal.   Psychiatric:         Mood and Affect: Mood is not depressed. Behavior: Behavior normal.         Thought Content: Thought content normal.         Judgment: Judgment normal.       /64   Pulse 100   Temp 98.8 °F (37.1 °C)   Wt (!) 340 lb (154.2 kg)   SpO2 96%   BMI 44.86 kg/m²           Assessment:      Problem List     Chronic deep vein thrombosis (DVT) of left peroneal vein (HCC)     Stable undetermined chronic no further treatment necessary          Essential hypertension     Stable on current dose of amlodipine 5 and Cozaar 100          Relevant Medications    amLODIPine (NORVASC) 5 MG tablet    Other Relevant Orders    CBC Auto Differential    Comprehensive Metabolic Panel    Urinalysis Reflex to Culture    TSH without Reflex    Mixed hyperlipidemia     Stable with Crestor he is going to call us back about the fish oil          Relevant Medications    rosuvastatin (CRESTOR) 10 MG tablet    amLODIPine (NORVASC) 5 MG tablet    rivaroxaban (XARELTO) 20 MG TABS tablet    losartan (COZAAR) 100 MG tablet    Other Relevant Orders    Lipid Panel    Vitamin D deficiency     Level is low today at 29 no go to call us back and let us know if they have vitamin D at home          Relevant Orders    Vitamin D 25 Hydroxy          Plan:        Patient given educational materials - see patient instructions.   Discussed use, benefit, and side effects of prescribed medications. Allpatient questions answered. Pt voiced understanding. Reviewed health maintenance. Instructed to continue current medications, diet and exercise. Patient agreed with treatment plan. Follow up as directed. MEDICATIONS:  Orders Placed This Encounter   Medications    vitamin D (ERGOCALCIFEROL) 1.25 MG (74812 UT) CAPS capsule     Sig: Take 1 capsule by mouth once a week     Dispense:  12 capsule     Refill:  1         ORDERS:  Orders Placed This Encounter   Procedures    CBC Auto Differential    Comprehensive Metabolic Panel    Lipid Panel    Vitamin D 25 Hydroxy    Urinalysis Reflex to Culture    TSH without Reflex       Follow-up:  Return in about 6 months (around 7/24/2022) for have labs done prior to appt. PATIENT INSTRUCTIONS:  Patient Instructions   1  CHronic DVT; Stable   2,  Hypertension The current medical regimen is effective;  continue present plan and medications. 3.  Hyperlipidemia  The current medical regimen is effective;  continue present plan and medications. 4.  Vit d def;  Call me with your dose     Electronically signed by JODY Bernard on 1/24/2022 at 5:46 PM    @On this date 1/24/2022 I have spent 25  minutes reviewing previous notes, test results and face to face with the patient discussing the diagnosis and importance of compliance with the treatment plan as well as documenting on the day of the visit. EMRDragon/transcription disclaimer:  Much of this encounter note is electronic transcription/translation of spoken language to printed texts. The electronic translation of spoken language may be erroneous, or at times,nonsensical words or phrases may be inadvertently transcribed.   Although I have reviewed the note for such errors, some may still exist.

## 2022-01-24 NOTE — PATIENT INSTRUCTIONS
1  CHronic DVT; Stable   2,  Hypertension The current medical regimen is effective;  continue present plan and medications. 3.  Hyperlipidemia  The current medical regimen is effective;  continue present plan and medications.   4.  Vit d def;  Call me with your dose

## 2022-03-01 RX ORDER — ACETAMINOPHEN 160 MG
2000 TABLET,DISINTEGRATING ORAL DAILY
Qty: 90 CAPSULE | Refills: 1 | Status: SHIPPED | OUTPATIENT
Start: 2022-03-01 | End: 2022-03-03 | Stop reason: SDUPTHER

## 2022-03-01 NOTE — TELEPHONE ENCOUNTER
Heraclio Cho called requesting a refill of the below medication which has been pended for you:     Requested Prescriptions     Pending Prescriptions Disp Refills    Cholecalciferol (VITAMIN D3) 50 MCG (2000 UT) CAPS 90 capsule 1     Sig: Take 1 capsule by mouth daily       Last Appointment Date: 1/24/2022  Next Appointment Date: 3/29/2022    Allergies   Allergen Reactions    Pcn [Penicillins]

## 2022-03-03 DIAGNOSIS — E55.9 VITAMIN D DEFICIENCY: Primary | ICD-10-CM

## 2022-03-03 RX ORDER — ACETAMINOPHEN 160 MG
2000 TABLET,DISINTEGRATING ORAL DAILY
Qty: 90 CAPSULE | Refills: 1 | Status: SHIPPED | OUTPATIENT
Start: 2022-03-03 | End: 2022-07-18

## 2022-03-28 DIAGNOSIS — E78.2 MIXED HYPERLIPIDEMIA: ICD-10-CM

## 2022-03-28 DIAGNOSIS — I10 ESSENTIAL HYPERTENSION: ICD-10-CM

## 2022-03-28 DIAGNOSIS — E55.9 VITAMIN D DEFICIENCY: ICD-10-CM

## 2022-03-28 LAB
ALBUMIN SERPL-MCNC: 4.5 G/DL (ref 3.5–5.2)
ALP BLD-CCNC: 78 U/L (ref 40–130)
ALT SERPL-CCNC: 21 U/L (ref 5–41)
ANION GAP SERPL CALCULATED.3IONS-SCNC: 14 MMOL/L (ref 7–19)
AST SERPL-CCNC: 17 U/L (ref 5–40)
BASOPHILS ABSOLUTE: 0 K/UL (ref 0–0.2)
BASOPHILS RELATIVE PERCENT: 0.5 % (ref 0–1)
BILIRUB SERPL-MCNC: 0.4 MG/DL (ref 0.2–1.2)
BILIRUBIN URINE: NEGATIVE
BLOOD, URINE: NEGATIVE
BUN BLDV-MCNC: 19 MG/DL (ref 8–23)
CALCIUM SERPL-MCNC: 9.6 MG/DL (ref 8.8–10.2)
CHLORIDE BLD-SCNC: 101 MMOL/L (ref 98–111)
CHOLESTEROL, TOTAL: 191 MG/DL (ref 160–199)
CLARITY: CLEAR
CO2: 24 MMOL/L (ref 22–29)
COLOR: YELLOW
CREAT SERPL-MCNC: 1.6 MG/DL (ref 0.5–1.2)
EOSINOPHILS ABSOLUTE: 0.2 K/UL (ref 0–0.6)
EOSINOPHILS RELATIVE PERCENT: 3.6 % (ref 0–5)
GFR AFRICAN AMERICAN: 53
GFR NON-AFRICAN AMERICAN: 43
GLUCOSE BLD-MCNC: 99 MG/DL (ref 74–109)
GLUCOSE URINE: NEGATIVE MG/DL
HCT VFR BLD CALC: 40.5 % (ref 42–52)
HDLC SERPL-MCNC: 33 MG/DL (ref 55–121)
HEMOGLOBIN: 13.4 G/DL (ref 14–18)
IMMATURE GRANULOCYTES #: 0 K/UL
KETONES, URINE: NEGATIVE MG/DL
LDL CHOLESTEROL CALCULATED: 111 MG/DL
LEUKOCYTE ESTERASE, URINE: NEGATIVE
LYMPHOCYTES ABSOLUTE: 1.5 K/UL (ref 1.1–4.5)
LYMPHOCYTES RELATIVE PERCENT: 26 % (ref 20–40)
MCH RBC QN AUTO: 29.2 PG (ref 27–31)
MCHC RBC AUTO-ENTMCNC: 33.1 G/DL (ref 33–37)
MCV RBC AUTO: 88.2 FL (ref 80–94)
MONOCYTES ABSOLUTE: 0.5 K/UL (ref 0–0.9)
MONOCYTES RELATIVE PERCENT: 9.3 % (ref 0–10)
NEUTROPHILS ABSOLUTE: 3.4 K/UL (ref 1.5–7.5)
NEUTROPHILS RELATIVE PERCENT: 60.2 % (ref 50–65)
NITRITE, URINE: NEGATIVE
PDW BLD-RTO: 11.9 % (ref 11.5–14.5)
PH UA: 5 (ref 5–8)
PLATELET # BLD: 277 K/UL (ref 130–400)
PMV BLD AUTO: 9.7 FL (ref 9.4–12.4)
POTASSIUM SERPL-SCNC: 4.7 MMOL/L (ref 3.5–5)
PROTEIN UA: NEGATIVE MG/DL
RBC # BLD: 4.59 M/UL (ref 4.7–6.1)
SODIUM BLD-SCNC: 139 MMOL/L (ref 136–145)
SPECIFIC GRAVITY UA: 1.01 (ref 1–1.03)
TOTAL PROTEIN: 6.9 G/DL (ref 6.6–8.7)
TRIGL SERPL-MCNC: 236 MG/DL (ref 0–149)
TSH SERPL DL<=0.05 MIU/L-ACNC: 2.53 UIU/ML (ref 0.27–4.2)
UROBILINOGEN, URINE: 0.2 E.U./DL
VITAMIN D 25-HYDROXY: 41.9 NG/ML
WBC # BLD: 5.6 K/UL (ref 4.8–10.8)

## 2022-03-29 ENCOUNTER — OFFICE VISIT (OUTPATIENT)
Dept: INTERNAL MEDICINE | Age: 67
End: 2022-03-29
Payer: MEDICARE

## 2022-03-29 VITALS
HEIGHT: 73 IN | SYSTOLIC BLOOD PRESSURE: 118 MMHG | WEIGHT: 315 LBS | OXYGEN SATURATION: 98 % | BODY MASS INDEX: 41.75 KG/M2 | HEART RATE: 91 BPM | DIASTOLIC BLOOD PRESSURE: 78 MMHG

## 2022-03-29 DIAGNOSIS — E55.9 VITAMIN D DEFICIENCY: ICD-10-CM

## 2022-03-29 DIAGNOSIS — C61 CANCER OF PROSTATE (HCC): ICD-10-CM

## 2022-03-29 DIAGNOSIS — E78.2 MIXED HYPERLIPIDEMIA: Primary | ICD-10-CM

## 2022-03-29 DIAGNOSIS — Z00.00 WELL ADULT EXAM: ICD-10-CM

## 2022-03-29 DIAGNOSIS — K21.9 GASTROESOPHAGEAL REFLUX DISEASE WITHOUT ESOPHAGITIS: ICD-10-CM

## 2022-03-29 DIAGNOSIS — I10 ESSENTIAL HYPERTENSION: ICD-10-CM

## 2022-03-29 PROCEDURE — G8427 DOCREV CUR MEDS BY ELIG CLIN: HCPCS | Performed by: NURSE PRACTITIONER

## 2022-03-29 PROCEDURE — G8417 CALC BMI ABV UP PARAM F/U: HCPCS | Performed by: NURSE PRACTITIONER

## 2022-03-29 PROCEDURE — 4040F PNEUMOC VAC/ADMIN/RCVD: CPT | Performed by: NURSE PRACTITIONER

## 2022-03-29 PROCEDURE — 1123F ACP DISCUSS/DSCN MKR DOCD: CPT | Performed by: NURSE PRACTITIONER

## 2022-03-29 PROCEDURE — G8484 FLU IMMUNIZE NO ADMIN: HCPCS | Performed by: NURSE PRACTITIONER

## 2022-03-29 PROCEDURE — 3017F COLORECTAL CA SCREEN DOC REV: CPT | Performed by: NURSE PRACTITIONER

## 2022-03-29 PROCEDURE — 99214 OFFICE O/P EST MOD 30 MIN: CPT | Performed by: NURSE PRACTITIONER

## 2022-03-29 PROCEDURE — 1036F TOBACCO NON-USER: CPT | Performed by: NURSE PRACTITIONER

## 2022-03-29 RX ORDER — OMEGA-3/DHA/EPA/FISH OIL 300-1000MG
2 CAPSULE ORAL NIGHTLY
Qty: 90 CAPSULE | Refills: 1 | Status: ON HOLD | OUTPATIENT
Start: 2022-03-29 | End: 2022-10-13 | Stop reason: HOSPADM

## 2022-03-29 ASSESSMENT — ENCOUNTER SYMPTOMS
SORE THROAT: 0
TROUBLE SWALLOWING: 0
NAUSEA: 0
WHEEZING: 0
CONSTIPATION: 0
COUGH: 0
DIARRHEA: 0
CHOKING: 0
ABDOMINAL PAIN: 0
COLOR CHANGE: 0
BLOOD IN STOOL: 0
ABDOMINAL DISTENTION: 0
SHORTNESS OF BREATH: 0
EYE DISCHARGE: 0
VOMITING: 0
STRIDOR: 0
EYE ITCHING: 0

## 2022-03-29 NOTE — PATIENT INSTRUCTIONS
Edema; Lets stop the amlodipine for now   Continue losartan 100 mg daily   2. Hyperlipideima; Stable with crestor add fish oil  Take both at bedtime   3. GERd; stable with OTC meds  4. Hypertension   Continue losartan   5. Chronic DVt; Stay on xarelto   6. Vit d ef; The current medical regimen is effective;  continue present plan and medications.     BRING ALL YOUR MED BOTTLES WITH YOU     Our number  [phone]

## 2022-03-29 NOTE — PROGRESS NOTES
[address] INTERNAL MEDICINE  [address] [facility] [address] [place] [address]  Dept: [phone]  Dept Fax: [phone]: [phone]    Lornea Foley (:  1955) is a 73 y.o. male,Established patient  with green, here for evaluation of the following chief complaint(s): 3 Month Follow-Up and Edema (both feet )      Lorena Foley is a 73 y.o. male who presents today for his medical conditions/complaints as noted below. Lorena Foley is c/bryon 3 Month Follow-Up and Edema (both feet )        HPI:     Chief Complaint   Patient presents with    3 Month Follow-Up    Edema     both feet      HPI   1. Edema; Of left foot; This is the leg of the chronic DVT; Is on xarelto 20 mg daily we discussed his every time that he still have swelling in that leg and compression stocking will be the best thing for use but is not able to  2. Hyperlipidemia stable with crestor 10mg   3. GERD  Stable with OTC    4. Hypertension b/p is good; Has stopped the   5.   Vit d def;  Stable on weekly dosing    #4 history of cancer prostate is not follow-up with a neurologist to PSAs on him  Past Medical History:   Diagnosis Date    Cellulitis of left leg     Colon polyp     Elevated blood pressure reading without diagnosis of hypertension     Esophageal reflux     History of prostate cancer     Mixed hyperlipidemia     TG>>LDL    Obesity     Prostate cancer [facility])      Dr Asher Cross;  implants, XRT      Past Surgical History:   Procedure Laterality Date    COLONOSCOPY  2009        COLONOSCOPY          UPPER GASTROINTESTINAL ENDOSCOPY  3/22/2000        VASECTOMY         Vitals 3/29/2022 2022 2021 2021 3/15/2021    SYSTOLIC 527 061 [phone]   DIASTOLIC 78 64 78 82 76 86   Pulse 91 100 94 100 96 100   Temp - 98.8 - - - -   Resp - - - 18 20 -   SpO2 98 96 97 95 97 -   Weight 336 lb 3.2 oz 340 lb 336 lb 340 lb 12.8 oz 333 lb 9.6 oz 334 lb   Height 6' 1\" - 6' 1\" 6' 1\" 6' 1\" 6' 0\"   Body mass index 44.35 kg/m2 - 44.33 kg/m2 44.96 kg/m2 44.01 kg/m2 45.29 kg/m2   Pain Level - - - - - -       Family History   Problem Relation Age of Onset    Colon Polyps Mother     Colon Polyps Brother     Heart Disease Father     High Blood Pressure Other        Social History     Tobacco Use    Smoking status: Never Smoker    Smokeless tobacco: Never Used   Substance Use Topics    Alcohol use: No      Current Outpatient Medications   Medication Sig Dispense Refill    fish oil-omega-3 fatty acids 1000 MG capsule Take 2 capsules by mouth nightly 90 capsule 1    Cholecalciferol (VITAMIN D3) 50 MCG (2000 UT) CAPS Take 1 capsule by mouth daily 90 capsule 1    rivaroxaban (XARELTO) 20 MG TABS tablet TAKE 1 TABLET BY MOUTH EVERY DAY WITH BREAKFAST 90 tablet 1    losartan (COZAAR) 100 MG tablet TAKE 1 TABLET EVERY DAY 90 tablet 1    rosuvastatin (CRESTOR) 10 MG tablet Take 1 tablet by mouth nightly 30 tablet 3    fenofibrate 160 MG tablet Take 1 tablet by mouth daily 90 tablet 1    vitamin D (CHOLECALCIFEROL) 1000 UNIT TABS tablet Take 1,000 Units by mouth daily       Red Yeast Rice 600 MG TABS Take 2 tablets by mouth 2 times daily      Multiple Vitamin (MULTI-VITAMIN PO) Take  by mouth. No current facility-administered medications for this visit.      Allergies   Allergen Reactions    Pcn [Penicillins]        Health Maintenance   Topic Date Due    Hepatitis C screen  Never done    HIV screen  Never done    COVID-19 Vaccine (3 - Booster for Moderna series) 02/26/2022    DTaP/Tdap/Td vaccine (1 - Tdap) 04/15/2022 (Originally 6/7/1975)    Shingles Vaccine (1 of 2) 03/29/2023 (Originally 6/7/2006)    Pneumococcal 65+ years Vaccine (1 of 1 - PPSV23) 08/21/2023 (Originally 6/7/2021)    Colorectal Cancer Screen  05/30/2022    PSA counseling  09/29/2022    Depression Screen  12/29/2022    Annual Wellness Visit (AWV)  12/30/2022  Lipid screen  03/28/2023    Potassium monitoring  03/28/2023    Creatinine monitoring  03/28/2023    Flu vaccine  Completed    Hepatitis A vaccine  Aged Out    Hepatitis B vaccine  Aged Out    Hib vaccine  Aged Out    Meningococcal (ACWY) vaccine  Aged Out       Lab Results   Component Value Date    LABA1C 5.6 09/30/2019     Lab Results   Component Value Date    PSA 0.51 09/29/2021    PSA 0.79 09/15/2020    PSA 0.52 01/02/2020     TSH   Date Value Ref Range Status   03/28/2022 2.530 0.270 - 4.200 uIU/mL Final   ]  Lab Results   Component Value Date     03/28/2022    K 4.7 03/28/2022     03/28/2022    CO2 24 03/28/2022    BUN 19 03/28/2022    CREATININE 1.6 (H) 03/28/2022    GLUCOSE 99 03/28/2022    CALCIUM 9.6 03/28/2022    PROT 6.9 03/28/2022    LABALBU 4.5 03/28/2022    BILITOT 0.4 03/28/2022    ALKPHOS 78 03/28/2022    AST 17 03/28/2022    ALT 21 03/28/2022    LABGLOM 43 (A) 03/28/2022    GFRAA 53 (L) 03/28/2022     Lab Results   Component Value Date    CHOL 191 03/28/2022    CHOL 158 (L) 01/21/2022    CHOL 187 09/29/2021     Lab Results   Component Value Date    TRIG 236 (H) 03/28/2022    TRIG 183 (H) 01/21/2022    TRIG 278 (H) 09/29/2021     Lab Results   Component Value Date    HDL 33 (L) 03/28/2022    HDL 29 (L) 01/21/2022    HDL 32 (L) 09/29/2021     Lab Results   Component Value Date    LDLCALC 111 03/28/2022    LDLCALC 92 01/21/2022    LDLCALC 99 09/29/2021     Lab Results   Component Value Date     03/28/2022    K 4.7 03/28/2022     03/28/2022    CO2 24 03/28/2022    BUN 19 03/28/2022    CREATININE 1.6 03/28/2022    GLUCOSE 99 03/28/2022    CALCIUM 9.6 03/28/2022      Lab Results   Component Value Date    WBC 5.6 03/28/2022    HGB 13.4 (L) 03/28/2022    HCT 40.5 (L) 03/28/2022    MCV 88.2 03/28/2022     03/28/2022    LYMPHOPCT 26.0 03/28/2022    RBC 4.59 (L) 03/28/2022    MCH 29.2 03/28/2022    MCHC 33.1 03/28/2022    RDW 11.9 03/28/2022     Lab Results Component Value Date    VITD25 41.9 03/28/2022     Labs reviewed from 3/28/2022    Subjective:      Review of Systems   Constitutional: Negative for fatigue, fever and unexpected weight change. HENT: Negative for ear discharge, ear pain, mouth sores, sore throat and trouble swallowing. Eyes: Negative for discharge, itching and visual disturbance. Respiratory: Negative for cough, choking, shortness of breath, wheezing and stridor. Cardiovascular: Positive for leg swelling. Negative for chest pain and palpitations. Gastrointestinal: Negative for abdominal distention, abdominal pain, blood in stool, constipation, diarrhea, nausea and vomiting. Gerd   Endocrine: Negative for cold intolerance, polydipsia and polyuria. Genitourinary: Negative for difficulty urinating, dysuria, frequency and urgency. Musculoskeletal: Negative for arthralgias and gait problem. Skin: Negative for color change and rash. Allergic/Immunologic: Negative for food allergies and immunocompromised state. Neurological: Negative for dizziness, tremors, syncope, speech difficulty, weakness and headaches. Hematological: Negative for adenopathy. Does not bruise/bleed easily. Psychiatric/Behavioral: Negative for confusion and hallucinations. Objective:     Physical Exam  Constitutional:       General: He is not in acute distress. Appearance: He is well-developed. HENT:      Head: Normocephalic and atraumatic. Eyes:      General: No scleral icterus. Right eye: No discharge. Left eye: No discharge. Pupils: Pupils are equal, round, and reactive to light. Neck:      Thyroid: No thyromegaly. Vascular: No JVD. Cardiovascular:      Rate and Rhythm: Normal rate and regular rhythm. Heart sounds: Normal heart sounds. No murmur heard. Pulmonary:      Effort: Pulmonary effort is normal. No respiratory distress. Breath sounds: Normal breath sounds. No wheezing or rales. Abdominal:      General: Bowel sounds are normal. There is no distension. Palpations: Abdomen is soft. There is no mass. Tenderness: There is no abdominal tenderness. There is no guarding or rebound. Musculoskeletal:         General: No tenderness. Normal range of motion. Cervical back: Normal range of motion and neck supple. Skin:     General: Skin is warm and dry. Findings: No erythema or rash. Neurological:      Mental Status: He is alert and oriented to person, place, and time. Cranial Nerves: No cranial nerve deficit. Coordination: Coordination normal.      Deep Tendon Reflexes: Reflexes are normal and symmetric. Reflexes normal.   Psychiatric:         Mood and Affect: Mood is not depressed. Behavior: Behavior normal.         Thought Content: Thought content normal.         Judgment: Judgment normal.       /78   Pulse 91   Ht 6' 1\" (1.854 m)   Wt (!) 336 lb 3.2 oz (152.5 kg)   SpO2 98%   BMI 44.36 kg/m²           Assessment:      Problem List     Cancer of prostate (HCC)    Essential hypertension    Relevant Orders    CBC with Auto Differential    Comprehensive Metabolic Panel    Gastroesophageal reflux disease without esophagitis               Mixed hyperlipidemia - Primary    Relevant Medications    fenofibrate 160 MG tablet    rosuvastatin (CRESTOR) 10 MG tablet    rivaroxaban (XARELTO) 20 MG TABS tablet    losartan (COZAAR) 100 MG tablet    Other Relevant Orders    Triglyceride    Vitamin D deficiency    Relevant Medications    Cholecalciferol (VITAMIN D3) 50 MCG (2000 UT) CAPS    Other Relevant Orders    Vitamin D 25 Hydroxy          Plan:        Patient given educational materials - see patient instructions. Discussed use, benefit, and side effects of prescribed medications. Allpatient questions answered. Pt voiced understanding. Reviewed health maintenance. Instructed to continue current medications, diet and exercise.   Patient agreed with

## 2022-04-19 ENCOUNTER — TELEPHONE (OUTPATIENT)
Dept: INTERNAL MEDICINE | Age: 67
End: 2022-04-19

## 2022-04-19 RX ORDER — ROSUVASTATIN CALCIUM 10 MG/1
10 TABLET, COATED ORAL NIGHTLY
Qty: 30 TABLET | Refills: 3 | Status: SHIPPED | OUTPATIENT
Start: 2022-04-19 | End: 2022-07-18 | Stop reason: SDUPTHER

## 2022-04-19 NOTE — TELEPHONE ENCOUNTER
Requested Prescriptions     Pending Prescriptions Disp Refills    rosuvastatin (CRESTOR) 10 MG tablet 30 tablet 3     Sig: Take 1 tablet by mouth nightly

## 2022-04-20 RX ORDER — OMEGA-3-ACID ETHYL ESTERS 1 G/1
1 CAPSULE, LIQUID FILLED ORAL DAILY
COMMUNITY
Start: 2022-04-01

## 2022-05-05 DIAGNOSIS — I10 ESSENTIAL HYPERTENSION: ICD-10-CM

## 2022-05-06 NOTE — TELEPHONE ENCOUNTER
So Petersen called requesting a refill of the below medication which has been pended for you:     Requested Prescriptions     Pending Prescriptions Disp Refills    rivaroxaban (XARELTO) 20 MG TABS tablet [Pharmacy Med Name: XARELTO 20 MG Tablet] 90 tablet 1     Sig: TAKE 1 TABLET EVERY DAY WITH BREAKFAST    amLODIPine (NORVASC) 5 MG tablet [Pharmacy Med Name: AMLODIPINE BESYLATE 5 MG Tablet] 90 tablet 1     Sig: TAKE 1 TABLET EVERY DAY       Last Appointment Date: 3/29/2022  Next Appointment Date: 6/29/2022    Allergies   Allergen Reactions    Pcn [Penicillins]

## 2022-05-09 RX ORDER — AMLODIPINE BESYLATE 5 MG/1
TABLET ORAL
Qty: 90 TABLET | Refills: 1 | Status: SHIPPED | OUTPATIENT
Start: 2022-05-09 | End: 2022-06-29 | Stop reason: ALTCHOICE

## 2022-06-29 ENCOUNTER — APPOINTMENT (OUTPATIENT)
Dept: GENERAL RADIOLOGY | Age: 67
End: 2022-06-29
Payer: MEDICARE

## 2022-06-29 ENCOUNTER — HOSPITAL ENCOUNTER (OUTPATIENT)
Age: 67
Setting detail: OBSERVATION
Discharge: HOME | End: 2022-07-01
Attending: EMERGENCY MEDICINE | Admitting: INTERNAL MEDICINE
Payer: MEDICARE

## 2022-06-29 ENCOUNTER — OFFICE VISIT (OUTPATIENT)
Dept: INTERNAL MEDICINE | Age: 67
End: 2022-06-29
Payer: MEDICARE

## 2022-06-29 VITALS
DIASTOLIC BLOOD PRESSURE: 70 MMHG | HEART RATE: 149 BPM | SYSTOLIC BLOOD PRESSURE: 108 MMHG | BODY MASS INDEX: 41.75 KG/M2 | WEIGHT: 315 LBS | HEIGHT: 73 IN | OXYGEN SATURATION: 97 %

## 2022-06-29 DIAGNOSIS — E78.2 MIXED HYPERLIPIDEMIA: ICD-10-CM

## 2022-06-29 DIAGNOSIS — I48.91 ATRIAL FIBRILLATION, UNSPECIFIED TYPE (HCC): Primary | ICD-10-CM

## 2022-06-29 DIAGNOSIS — I49.9 IRREGULAR HEART BEAT: ICD-10-CM

## 2022-06-29 DIAGNOSIS — Z12.11 COLON CANCER SCREENING: Primary | ICD-10-CM

## 2022-06-29 DIAGNOSIS — I10 ESSENTIAL HYPERTENSION: ICD-10-CM

## 2022-06-29 DIAGNOSIS — E55.9 VITAMIN D DEFICIENCY: ICD-10-CM

## 2022-06-29 DIAGNOSIS — Z00.00 WELL ADULT EXAM: ICD-10-CM

## 2022-06-29 LAB
ALBUMIN SERPL-MCNC: 4.2 G/DL (ref 3.5–5.2)
ALBUMIN SERPL-MCNC: 4.4 G/DL (ref 3.5–5.2)
ALP BLD-CCNC: 72 U/L (ref 40–130)
ALP BLD-CCNC: 72 U/L (ref 40–130)
ALT SERPL-CCNC: 21 U/L (ref 5–41)
ALT SERPL-CCNC: 22 U/L (ref 5–41)
ANION GAP SERPL CALCULATED.3IONS-SCNC: 10 MMOL/L (ref 7–19)
ANION GAP SERPL CALCULATED.3IONS-SCNC: 11 MMOL/L (ref 7–19)
APTT: 41.2 SEC (ref 26–36.2)
AST SERPL-CCNC: 19 U/L (ref 5–40)
AST SERPL-CCNC: 28 U/L (ref 5–40)
BASOPHILS ABSOLUTE: 0 K/UL (ref 0–0.2)
BASOPHILS ABSOLUTE: 0 K/UL (ref 0–0.2)
BASOPHILS RELATIVE PERCENT: 0.4 % (ref 0–1)
BASOPHILS RELATIVE PERCENT: 0.4 % (ref 0–1)
BILIRUB SERPL-MCNC: 0.4 MG/DL (ref 0.2–1.2)
BILIRUB SERPL-MCNC: 0.4 MG/DL (ref 0.2–1.2)
BUN BLDV-MCNC: 17 MG/DL (ref 8–23)
BUN BLDV-MCNC: 17 MG/DL (ref 8–23)
CALCIUM SERPL-MCNC: 9.4 MG/DL (ref 8.8–10.2)
CALCIUM SERPL-MCNC: 9.4 MG/DL (ref 8.8–10.2)
CHLORIDE BLD-SCNC: 104 MMOL/L (ref 98–111)
CHLORIDE BLD-SCNC: 104 MMOL/L (ref 98–111)
CO2: 24 MMOL/L (ref 22–29)
CO2: 26 MMOL/L (ref 22–29)
CREAT SERPL-MCNC: 1.3 MG/DL (ref 0.5–1.2)
CREAT SERPL-MCNC: 1.4 MG/DL (ref 0.5–1.2)
EOSINOPHILS ABSOLUTE: 0.1 K/UL (ref 0–0.6)
EOSINOPHILS ABSOLUTE: 0.2 K/UL (ref 0–0.6)
EOSINOPHILS RELATIVE PERCENT: 1.7 % (ref 0–5)
EOSINOPHILS RELATIVE PERCENT: 2.4 % (ref 0–5)
GFR AFRICAN AMERICAN: >59
GFR AFRICAN AMERICAN: >59
GFR NON-AFRICAN AMERICAN: 51
GFR NON-AFRICAN AMERICAN: 55
GLUCOSE BLD-MCNC: 100 MG/DL (ref 74–109)
GLUCOSE BLD-MCNC: 100 MG/DL (ref 74–109)
HCT VFR BLD CALC: 40.2 % (ref 42–52)
HCT VFR BLD CALC: 41.3 % (ref 42–52)
HEMOGLOBIN: 13.5 G/DL (ref 14–18)
HEMOGLOBIN: 13.6 G/DL (ref 14–18)
HEPATITIS C ANTIBODY INTERPRETATION: NORMAL
HIV-1 P24 AG: NORMAL
IMMATURE GRANULOCYTES #: 0 K/UL
IMMATURE GRANULOCYTES #: 0 K/UL
INR BLD: 1.6 (ref 0.88–1.18)
LYMPHOCYTES ABSOLUTE: 1.4 K/UL (ref 1.1–4.5)
LYMPHOCYTES ABSOLUTE: 1.5 K/UL (ref 1.1–4.5)
LYMPHOCYTES RELATIVE PERCENT: 18.5 % (ref 20–40)
LYMPHOCYTES RELATIVE PERCENT: 18.8 % (ref 20–40)
MAGNESIUM: 2.1 MG/DL (ref 1.6–2.4)
MCH RBC QN AUTO: 29.4 PG (ref 27–31)
MCH RBC QN AUTO: 29.7 PG (ref 27–31)
MCHC RBC AUTO-ENTMCNC: 32.9 G/DL (ref 33–37)
MCHC RBC AUTO-ENTMCNC: 33.6 G/DL (ref 33–37)
MCV RBC AUTO: 88.5 FL (ref 80–94)
MCV RBC AUTO: 89.2 FL (ref 80–94)
MONOCYTES ABSOLUTE: 0.6 K/UL (ref 0–0.9)
MONOCYTES ABSOLUTE: 0.7 K/UL (ref 0–0.9)
MONOCYTES RELATIVE PERCENT: 7.6 % (ref 0–10)
MONOCYTES RELATIVE PERCENT: 9.3 % (ref 0–10)
NEUTROPHILS ABSOLUTE: 5.2 K/UL (ref 1.5–7.5)
NEUTROPHILS ABSOLUTE: 5.6 K/UL (ref 1.5–7.5)
NEUTROPHILS RELATIVE PERCENT: 69.5 % (ref 50–65)
NEUTROPHILS RELATIVE PERCENT: 70.8 % (ref 50–65)
PDW BLD-RTO: 12.6 % (ref 11.5–14.5)
PDW BLD-RTO: 12.7 % (ref 11.5–14.5)
PLATELET # BLD: 265 K/UL (ref 130–400)
PLATELET # BLD: 287 K/UL (ref 130–400)
PMV BLD AUTO: 10.1 FL (ref 9.4–12.4)
PMV BLD AUTO: 9.5 FL (ref 9.4–12.4)
POTASSIUM SERPL-SCNC: 4.2 MMOL/L (ref 3.5–5)
POTASSIUM SERPL-SCNC: 4.9 MMOL/L (ref 3.5–5)
PRO-BNP: 141 PG/ML (ref 0–900)
PROTHROMBIN TIME: 19.3 SEC (ref 12–14.6)
RAPID HIV 1&2: NORMAL
RBC # BLD: 4.54 M/UL (ref 4.7–6.1)
RBC # BLD: 4.63 M/UL (ref 4.7–6.1)
SARS-COV-2, NAAT: NOT DETECTED
SODIUM BLD-SCNC: 139 MMOL/L (ref 136–145)
SODIUM BLD-SCNC: 140 MMOL/L (ref 136–145)
TOTAL PROTEIN: 7.3 G/DL (ref 6.6–8.7)
TOTAL PROTEIN: 7.4 G/DL (ref 6.6–8.7)
TRIGL SERPL-MCNC: 191 MG/DL (ref 0–149)
TROPONIN: <0.01 NG/ML (ref 0–0.03)
TROPONIN: <0.01 NG/ML (ref 0–0.03)
VITAMIN D 25-HYDROXY: 52 NG/ML
WBC # BLD: 7.5 K/UL (ref 4.8–10.8)
WBC # BLD: 7.9 K/UL (ref 4.8–10.8)

## 2022-06-29 PROCEDURE — 93000 ELECTROCARDIOGRAM COMPLETE: CPT | Performed by: NURSE PRACTITIONER

## 2022-06-29 PROCEDURE — 1036F TOBACCO NON-USER: CPT | Performed by: NURSE PRACTITIONER

## 2022-06-29 PROCEDURE — 87635 SARS-COV-2 COVID-19 AMP PRB: CPT

## 2022-06-29 PROCEDURE — 96376 TX/PRO/DX INJ SAME DRUG ADON: CPT

## 2022-06-29 PROCEDURE — 3017F COLORECTAL CA SCREEN DOC REV: CPT | Performed by: NURSE PRACTITIONER

## 2022-06-29 PROCEDURE — 96374 THER/PROPH/DIAG INJ IV PUSH: CPT

## 2022-06-29 PROCEDURE — 83735 ASSAY OF MAGNESIUM: CPT

## 2022-06-29 PROCEDURE — 93005 ELECTROCARDIOGRAM TRACING: CPT | Performed by: EMERGENCY MEDICINE

## 2022-06-29 PROCEDURE — G8417 CALC BMI ABV UP PARAM F/U: HCPCS | Performed by: NURSE PRACTITIONER

## 2022-06-29 PROCEDURE — 99285 EMERGENCY DEPT VISIT HI MDM: CPT

## 2022-06-29 PROCEDURE — 6370000000 HC RX 637 (ALT 250 FOR IP): Performed by: INTERNAL MEDICINE

## 2022-06-29 PROCEDURE — 36415 COLL VENOUS BLD VENIPUNCTURE: CPT

## 2022-06-29 PROCEDURE — 83880 ASSAY OF NATRIURETIC PEPTIDE: CPT

## 2022-06-29 PROCEDURE — 2580000003 HC RX 258: Performed by: INTERNAL MEDICINE

## 2022-06-29 PROCEDURE — G8427 DOCREV CUR MEDS BY ELIG CLIN: HCPCS | Performed by: NURSE PRACTITIONER

## 2022-06-29 PROCEDURE — 1123F ACP DISCUSS/DSCN MKR DOCD: CPT | Performed by: NURSE PRACTITIONER

## 2022-06-29 PROCEDURE — 2500000003 HC RX 250 WO HCPCS: Performed by: EMERGENCY MEDICINE

## 2022-06-29 PROCEDURE — 71045 X-RAY EXAM CHEST 1 VIEW: CPT

## 2022-06-29 PROCEDURE — 71045 X-RAY EXAM CHEST 1 VIEW: CPT | Performed by: RADIOLOGY

## 2022-06-29 PROCEDURE — 85730 THROMBOPLASTIN TIME PARTIAL: CPT

## 2022-06-29 PROCEDURE — 99214 OFFICE O/P EST MOD 30 MIN: CPT | Performed by: NURSE PRACTITIONER

## 2022-06-29 PROCEDURE — 84484 ASSAY OF TROPONIN QUANT: CPT

## 2022-06-29 PROCEDURE — 85025 COMPLETE CBC W/AUTO DIFF WBC: CPT

## 2022-06-29 PROCEDURE — 2140000000 HC CCU INTERMEDIATE R&B

## 2022-06-29 PROCEDURE — 80053 COMPREHEN METABOLIC PANEL: CPT

## 2022-06-29 PROCEDURE — 85610 PROTHROMBIN TIME: CPT

## 2022-06-29 RX ORDER — DILTIAZEM HYDROCHLORIDE 5 MG/ML
10 INJECTION INTRAVENOUS ONCE
Status: COMPLETED | OUTPATIENT
Start: 2022-06-29 | End: 2022-06-29

## 2022-06-29 RX ORDER — NITROGLYCERIN 0.4 MG/1
0.4 TABLET SUBLINGUAL EVERY 5 MIN PRN
Status: DISCONTINUED | OUTPATIENT
Start: 2022-06-29 | End: 2022-07-01 | Stop reason: HOSPADM

## 2022-06-29 RX ORDER — ROSUVASTATIN CALCIUM 10 MG/1
10 TABLET, COATED ORAL NIGHTLY
Status: DISCONTINUED | OUTPATIENT
Start: 2022-06-29 | End: 2022-07-01 | Stop reason: HOSPADM

## 2022-06-29 RX ORDER — ACETAMINOPHEN 325 MG/1
650 TABLET ORAL EVERY 6 HOURS PRN
Status: DISCONTINUED | OUTPATIENT
Start: 2022-06-29 | End: 2022-07-01 | Stop reason: HOSPADM

## 2022-06-29 RX ORDER — SODIUM CHLORIDE 0.9 % (FLUSH) 0.9 %
5-40 SYRINGE (ML) INJECTION EVERY 12 HOURS SCHEDULED
Status: DISCONTINUED | OUTPATIENT
Start: 2022-06-29 | End: 2022-07-01 | Stop reason: HOSPADM

## 2022-06-29 RX ORDER — FENOFIBRATE 160 MG/1
160 TABLET ORAL DAILY
Status: DISCONTINUED | OUTPATIENT
Start: 2022-06-30 | End: 2022-07-01 | Stop reason: HOSPADM

## 2022-06-29 RX ORDER — ASPIRIN 81 MG/1
81 TABLET, CHEWABLE ORAL DAILY
Status: DISCONTINUED | OUTPATIENT
Start: 2022-06-30 | End: 2022-07-01 | Stop reason: HOSPADM

## 2022-06-29 RX ORDER — ONDANSETRON 2 MG/ML
4 INJECTION INTRAMUSCULAR; INTRAVENOUS EVERY 6 HOURS PRN
Status: DISCONTINUED | OUTPATIENT
Start: 2022-06-29 | End: 2022-07-01 | Stop reason: HOSPADM

## 2022-06-29 RX ORDER — ACETAMINOPHEN 650 MG/1
650 SUPPOSITORY RECTAL EVERY 6 HOURS PRN
Status: DISCONTINUED | OUTPATIENT
Start: 2022-06-29 | End: 2022-07-01 | Stop reason: HOSPADM

## 2022-06-29 RX ORDER — POLYETHYLENE GLYCOL 3350 17 G/17G
17 POWDER, FOR SOLUTION ORAL DAILY PRN
Status: DISCONTINUED | OUTPATIENT
Start: 2022-06-29 | End: 2022-07-01 | Stop reason: HOSPADM

## 2022-06-29 RX ORDER — LOSARTAN POTASSIUM 100 MG/1
100 TABLET ORAL DAILY
Status: DISCONTINUED | OUTPATIENT
Start: 2022-06-30 | End: 2022-07-01 | Stop reason: HOSPADM

## 2022-06-29 RX ORDER — SODIUM CHLORIDE 0.9 % (FLUSH) 0.9 %
10 SYRINGE (ML) INJECTION PRN
Status: DISCONTINUED | OUTPATIENT
Start: 2022-06-29 | End: 2022-07-01 | Stop reason: HOSPADM

## 2022-06-29 RX ORDER — DILTIAZEM HCL IN NACL,ISO-OSM 125 MG/125
2.5-15 PLASTIC BAG, INJECTION (ML) INTRAVENOUS CONTINUOUS
Status: DISCONTINUED | OUTPATIENT
Start: 2022-06-29 | End: 2022-06-30

## 2022-06-29 RX ORDER — ONDANSETRON 4 MG/1
4 TABLET, ORALLY DISINTEGRATING ORAL EVERY 8 HOURS PRN
Status: DISCONTINUED | OUTPATIENT
Start: 2022-06-29 | End: 2022-07-01 | Stop reason: HOSPADM

## 2022-06-29 RX ORDER — SODIUM CHLORIDE 9 MG/ML
INJECTION, SOLUTION INTRAVENOUS PRN
Status: DISCONTINUED | OUTPATIENT
Start: 2022-06-29 | End: 2022-07-01 | Stop reason: HOSPADM

## 2022-06-29 RX ADMIN — RIVAROXABAN 20 MG: 20 TABLET, FILM COATED ORAL at 22:16

## 2022-06-29 RX ADMIN — SODIUM CHLORIDE, PRESERVATIVE FREE 10 ML: 5 INJECTION INTRAVENOUS at 22:17

## 2022-06-29 RX ADMIN — ROSUVASTATIN CALCIUM 10 MG: 10 TABLET, FILM COATED ORAL at 22:16

## 2022-06-29 RX ADMIN — DILTIAZEM HYDROCHLORIDE 10 MG: 5 INJECTION INTRAVENOUS at 17:10

## 2022-06-29 RX ADMIN — DILTIAZEM HYDROCHLORIDE 10 MG: 5 INJECTION INTRAVENOUS at 18:15

## 2022-06-29 ASSESSMENT — PATIENT HEALTH QUESTIONNAIRE - PHQ9
SUM OF ALL RESPONSES TO PHQ QUESTIONS 1-9: 0
SUM OF ALL RESPONSES TO PHQ QUESTIONS 1-9: 0
2. FEELING DOWN, DEPRESSED OR HOPELESS: 0
SUM OF ALL RESPONSES TO PHQ QUESTIONS 1-9: 0
SUM OF ALL RESPONSES TO PHQ9 QUESTIONS 1 & 2: 0
SUM OF ALL RESPONSES TO PHQ QUESTIONS 1-9: 0
1. LITTLE INTEREST OR PLEASURE IN DOING THINGS: 0

## 2022-06-29 ASSESSMENT — ENCOUNTER SYMPTOMS
COUGH: 0
SORE THROAT: 0
BLOOD IN STOOL: 0
SORE THROAT: 0
RHINORRHEA: 0
CONSTIPATION: 0
WHEEZING: 0
EYE DISCHARGE: 0
ABDOMINAL PAIN: 0
COLOR CHANGE: 0
DIARRHEA: 0
SHORTNESS OF BREATH: 0
VOMITING: 0
ABDOMINAL DISTENTION: 0
TROUBLE SWALLOWING: 0
NAUSEA: 0
COUGH: 0
CHOKING: 0
SHORTNESS OF BREATH: 0
STRIDOR: 0
BACK PAIN: 0
VOMITING: 0
ABDOMINAL PAIN: 0
NAUSEA: 0
DIARRHEA: 0
EYE ITCHING: 0

## 2022-06-29 ASSESSMENT — PAIN - FUNCTIONAL ASSESSMENT: PAIN_FUNCTIONAL_ASSESSMENT: NONE - DENIES PAIN

## 2022-06-29 NOTE — ED PROVIDER NOTES
[facility]  eMERGENCY dEPARTMENT eNCOUnter      Pt Name: Alecia Deleon  MRN: [id number]  [place] 1955  Date of evaluation: 6/29/2022  Provider: Jose Angel Torres MD    [address] [place] [address]       Chief Complaint   Patient presents with    Tachycardia         HISTORY OF PRESENT ILLNESS   (Location/Symptom, Timing/Onset,Context/Setting, Quality, Duration, Modifying Factors, Severity)  Note limiting factors. Alecia Deleon is a 78 y.o. male who presents to the emergency department for tachycardia. The patient gone to his primary care Itz Vilchis and when they are taking vitals they noted him to be tachycardic. He denies any chest pain palpitations or shortness of breath. No prior history of cardiac issues or arrhythmia. He is anticoagulated on Xarelto due to a prior history of DVT in his left lower extremity. HPI    NursingNotes were reviewed. REVIEW OF SYSTEMS    (2-9 systems for level 4, 10 or more for level 5)     Review of Systems   Constitutional: Negative for chills and fever. HENT: Negative for rhinorrhea and sore throat. Respiratory: Negative for cough and shortness of breath. Cardiovascular: Negative for chest pain and leg swelling. Gastrointestinal: Negative for abdominal pain, diarrhea, nausea and vomiting. Genitourinary: Negative for dysuria and frequency. Musculoskeletal: Negative for back pain and neck pain. Neurological: Negative for dizziness and headaches. All other systems reviewed and are negative.            PAST MEDICALHISTORY     Past Medical History:   Diagnosis Date    Cellulitis of left leg     Colon polyp     Elevated blood pressure reading without diagnosis of hypertension     Esophageal reflux     History of prostate cancer     Mixed hyperlipidemia     TG>>LDL    Obesity     Prostate [facility])     2010 Dr Manuela Castellano;  implants, XRT         SURGICAL HISTORY       Past Surgical History:   Procedure Laterality Date    COLONOSCOPY  6/2/2009     COLONOSCOPY  2012        UPPER GASTROINTESTINAL ENDOSCOPY  3/22/2000        VASECTOMY           CURRENT MEDICATIONS     Previous Medications    CHOLECALCIFEROL (VITAMIN D3) 50 MCG (2000 UT) CAPS    Take 1 capsule by mouth daily    FENOFIBRATE 160 MG TABLET    Take 1 tablet by mouth daily    FISH OIL-OMEGA-3 FATTY ACIDS 1000 MG CAPSULE    Take 2 capsules by mouth nightly    LOSARTAN (COZAAR) 100 MG TABLET    TAKE 1 TABLET EVERY DAY    MULTIPLE VITAMIN (MULTI-VITAMIN PO)    Take  by mouth. OMEGA-3 ACID ETHYL ESTERS (LOVAZA) 1 G CAPSULE        RED YEAST RICE 600 MG TABS    Take 2 tablets by mouth 2 times daily    RIVAROXABAN (XARELTO) 20 MG TABS TABLET    TAKE 1 TABLET EVERY DAY WITH BREAKFAST    ROSUVASTATIN (CRESTOR) 10 MG TABLET    Take 1 tablet by mouth nightly       ALLERGIES     Pcn [penicillins]    FAMILY HISTORY       Family History   Problem Relation Age of Onset    Colon Polyps Mother     Colon Polyps Brother     Heart Disease Father     High Blood Pressure Other           SOCIAL HISTORY       Social History     Socioeconomic History    Marital status: Single     Spouse name: None    Number of children: None    Years of education: None    Highest education level: None   Occupational History    None   Tobacco Use    Smoking status: Never Smoker    Smokeless tobacco: Never Used   Substance and Sexual Activity    Alcohol use: No    Drug use: No    Sexual activity: None   Other Topics Concern    None   Social History Narrative    None     Social Determinants of Health     Financial Resource Strain: Low Risk     Difficulty of Paying Living Expenses: Not hard at all   Food Insecurity: Unknown    Worried About Running Out of Food in the Last Year: Patient refused    Ran Out of Food in the Last Year: Patient refused   Transportation Needs:     Lack of Transportation (Medical): Not on file    Lack of Transportation (Non-Medical):  Not on file   Physical Activity:     Days of Exercise per Week: Not on file    Minutes of Exercise per Session: Not on file   Stress:     Feeling of Stress : Not on file   Social Connections:     Frequency of Communication with Friends and Family: Not on file    Frequency of Social Gatherings with Friends and Family: Not on file    Attends Buddhism Services: Not on file    Active Member of [address] [place] [address] or Organizations: Not on file    Attends Club or Organization Meetings: Not on file    Marital Status: Not on file   Intimate Partner Violence:     Fear of Current or Ex-Partner: Not on file    Emotionally Abused: Not on file    Physically Abused: Not on file    Sexually Abused: Not on file   Housing Stability:     Unable to Pay for Housing in the Last Year: Not on file    Number of [place] in the Last Year: Not on file    Unstable Housing in the Last Year: Not on file       SCREENINGS    Saul Coma Scale  Eye Opening: Spontaneous  Best Verbal Response: Oriented  Best Motor Response: Obeys commands  Saul Coma Scale Score: 15        PHYSICAL EXAM    (up to 7 for level 4, 8 or more for level 5)     ED Triage Vitals [06/29/22 1621]   BP Temp Temp src Heart Rate Resp SpO2 Height Weight   (!) 148/93 98.4 °F (36.9 °C) -- (!) 150 18 95 % -- --       Physical Exam  Vitals and nursing note reviewed. Constitutional:       Appearance: He is well-developed. He is obese. He is not ill-appearing or diaphoretic. HENT:      Head: Normocephalic and atraumatic. Eyes:      Conjunctiva/sclera: Conjunctivae normal.   Neck:      Trachea: No tracheal deviation. Cardiovascular:      Rate and Rhythm: Tachycardia present. Rhythm irregular. Heart sounds: Normal heart sounds. No murmur heard. Pulmonary:      Breath sounds: Normal breath sounds. No wheezing or rales. Abdominal:      Palpations: Abdomen is soft. There is no mass. Tenderness: There is no abdominal tenderness.    Musculoskeletal:         General: Normal range of motion. Cervical back: Normal range of motion and neck supple. Right lower leg: No edema. Left lower leg: No edema. Skin:     General: Skin is warm and dry. Neurological:      Mental Status: He is alert and oriented to person, place, and time. DIAGNOSTIC RESULTS     EKG: All EKG's areinterpreted by the Emergency Department Physician who either signs or Co-signs this chart in the absence of a cardiologist.    150 atrial fibrillation no obvious ST changes nondiagnostic ekg    85 NSR no st changes non diagnostic ekg    RADIOLOGY:  Non-plain film images such as CT, Ultrasound and MRI are read by the radiologist. Plain radiographic images are visualized and preliminarily interpreted bythe emergency physician with the below findings:        XR CHEST PORTABLE   Final Result   No focal pneumonic consolidation, no pleural effusion or mechelle CHF. Mild widening of the superior mediastinum probably due to vascular ectasia. Recommendation: Follow up as clinically indicated.     Electronically Signed by Latasha Palomino MD at 29-Jun-2022 06:10:20 PM                       LABS:  Labs Reviewed   CBC WITH AUTO DIFFERENTIAL - Abnormal; Notable for the following components:       Result Value    RBC 4.54 (*)     Hemoglobin 13.5 (*)     Hematocrit 40.2 (*)     Neutrophils % 69.5 (*)     Lymphocytes % 18.8 (*)     All other components within normal limits   COMPREHENSIVE METABOLIC PANEL - Abnormal; Notable for the following components:    CREATININE 1.3 (*)     GFR Non- 55 (*)     All other components within normal limits   PROTIME-INR - Abnormal; Notable for the following components:    Protime 19.3 (*)     INR 1.60 (*)     All other components within normal limits   APTT - Abnormal; Notable for the following components:    aPTT 41.2 (*)     All other components within normal limits   COVID-19, RAPID   BRAIN NATRIURETIC PEPTIDE   MAGNESIUM   TROPONIN       All other labs were within normal

## 2022-06-29 NOTE — PATIENT INSTRUCTIONS
We are committed to providing you with the best care possible. In order to help us achieve these goals please remember to bring all medications, herbal products, and over the counter supplements with you to each visit. If your provider has ordered testing for you, please be sure to follow up with our office if you have not received results within 7 days after the testing took place. *If you receive a survey after visiting one of our offices, please take time to share your experience concerning your physician office visit. These surveys are confidential and no health information about you is shared. We are eager to improve for you and we are counting on your feedback to help make that happen. #1 new onset atrial fibs he is already on Xarelto but his heart rate is 150 he is stable we have sent him to the ER for the rate of 150.

## 2022-06-29 NOTE — H&P
[facility]      History & Physical    03/03  PCP: JODY Edwards    Date of Admission:6/29/2022    Patient:  Fatmata Gambino  MRN: [id number]    Date of Service: Pt seen/examined on 6/29/2022 and Admitted to Inpatient with expected LOS greater than two midnights due to medical therapy. CHIEF COMPLAINT:     Chief Complaint   Patient presents with    Tachycardia       History Obtained From:  patient, family member - , electronic medical record  Primary Care Physician: JODY Edwards    HISTORY OF PRESENT ILLNESS:    Mr. Laura Sandoval, a 78 y.o. male with a history of HTN, HLD, DVT (on rivaroxaban), presenting to [facility] ED (06/29/2022), on account of tachycardia. Patient was noted to have elevated HR at a PCP visit and was subsequently sent to [facility] ED for further work-up and management. No associated symptoms reported. No chest pain, no shortness of breath or palpitations. Patient was noted to be in A. fib with RVR with HR up to 150s, requiring diltiazem bolus and initiation of diltiazem drip. Patient admitted to [facility] (06/29/2022), further work-up and management. PAST MEDICAL & SURGICAL HISTORY    Past Medical History:      Diagnosis Date    Cellulitis of left leg     Colon polyp     Elevated blood pressure reading without diagnosis of hypertension     Esophageal reflux     History of prostate cancer     Mixed hyperlipidemia     TG>>LDL    Obesity     [facility]state [facility])     2010 Dr Kristie Sears;  implants, XRT         Past Surgical History:      Procedure Laterality Date    COLONOSCOPY  6/2/2009        COLONOSCOPY  2012        UPPER GASTROINTESTINAL ENDOSCOPY  3/22/2000        VASECTOMY            SOCIAL & FAMILY HISTORY:    Social History:   TOBACCO:   reports that he has never smoked. He has never used smokeless tobacco.  ETOH:   reports no history of alcohol use.     Family History:       Problem Relation Age of Onset    Colon Polyps Mother  Colon Polyps Brother     Heart Disease Father     High Blood Pressure Other         MEDICATIONS:    Medications Prior to Admission:    Prior to Admission medications    Medication Sig Start Date End Date Taking? Authorizing Provider   rivaroxaban (XARELTO) 20 MG TABS tablet TAKE 1 TABLET EVERY DAY WITH BREAKFAST 5/9/22   JODY Davila   omega-3 acid ethyl esters (LOVAZA) 1 g capsule  4/1/22   Historical Provider, MD   rosuvastatin (CRESTOR) 10 MG tablet Take 1 tablet by mouth nightly 4/19/22   JODY Davila   fish oil-omega-3 fatty acids 1000 MG capsule Take 2 capsules by mouth nightly 3/29/22   JODY Davila   Cholecalciferol (VITAMIN D3) 50 MCG (2000 UT) CAPS Take 1 capsule by mouth daily 3/3/22   JODY Davila   losartan (COZAAR) 100 MG tablet TAKE 1 TABLET EVERY DAY 11/10/21   JODY Davila   fenofibrate 160 MG tablet Take 1 tablet by mouth daily 1/14/20 6/29/22  JODY Davila   Red Yeast Rice 600 MG TABS Take 2 tablets by mouth 2 times daily    Historical Provider, MD   Multiple Vitamin (MULTI-VITAMIN PO) Take  by mouth. Historical Provider, MD        ALLERGIES:    Allergies:  Pcn [penicillins]       REVIEW OF SYSTEMS :    Review of Systems  14 point review of systems is negative except as specifically addressed above. PHYSICAL EXAM:    Physical Exam:    Vitals:   BP (!) 148/93   Pulse (!) 150   Temp 98.4 °F (36.9 °C)   Resp 18   SpO2 95%     Physical Exam  Vitals and nursing note reviewed. Constitutional:       General: He is not in acute distress. Appearance: Normal appearance. He is obese. He is not ill-appearing, toxic-appearing or diaphoretic. HENT:      Head: Normocephalic and atraumatic. Right Ear: External ear normal.      Left Ear: External ear normal.      Nose: Nose normal. No congestion or rhinorrhea. Mouth/Throat:      Mouth: Mucous membranes are moist.      Pharynx: Oropharynx is clear.  No oropharyngeal exudate or posterior oropharyngeal erythema. Eyes:      General: No scleral icterus. Right eye: No discharge. Left eye: No discharge. Extraocular Movements: Extraocular movements intact. Conjunctiva/sclera: Conjunctivae normal.      Pupils: Pupils are equal, round, and reactive to light. Cardiovascular:      Rate and Rhythm: Normal rate. Rhythm irregular. Pulses: Normal pulses. Heart sounds: Normal heart sounds. No murmur heard. No friction rub. No gallop. Pulmonary:      Effort: Pulmonary effort is normal. No respiratory distress. Breath sounds: Normal breath sounds. No stridor. No wheezing, rhonchi or rales. Chest:      Chest wall: No tenderness. Abdominal:      General: Bowel sounds are normal. There is no distension. Palpations: Abdomen is soft. Tenderness: There is no abdominal tenderness. There is no guarding or rebound. Musculoskeletal:         General: No swelling, tenderness, deformity or signs of injury. Normal range of motion. Cervical back: Normal range of motion and neck supple. No rigidity. No muscular tenderness. Right lower leg: No edema. Left lower leg: No edema. Skin:     General: Skin is warm and dry. Capillary Refill: Capillary refill takes less than 2 seconds. Coloration: Skin is not jaundiced or pale. Findings: No bruising, erythema, lesion or rash. Neurological:      General: No focal deficit present. Mental Status: He is alert and oriented to person, place, and time. Mental status is at baseline. Cranial Nerves: No cranial nerve deficit. Sensory: No sensory deficit. Motor: No weakness. Coordination: Coordination normal.   Psychiatric:         Mood and Affect: Mood normal.         Behavior: Behavior normal.                 DIAGNOSTIC STUDIES:    I have reviewedLaboratory and Imaging data report today.     Recent Labs     06/29/22  1304 06/29/22  1644   WBC 7.9 7.5   HGB 13.6* 13.5*  265     Recent Labs     06/29/22  1304 06/29/22  1644    139   K 4.2 4.9    104   CO2 26 24   BUN 17 17   CREATININE 1.4* 1.3*   GLUCOSE 100 100   AST 19 28   ALT 21 22   BILITOT 0.4 0.4   ALKPHOS 72 72     Troponin T:   Recent Labs     06/29/22  1644   TROPONINI <0.01     Pro-BNP: No results found for: BNP  Lactate: No results found for: LACTA      ABGs: No results found for: PHART, PO2ART, [id number]  INR:   Recent Labs     06/29/22  1644   INR 1.60*     TSH:   Lab Results   Component Value Date/Time    TSH 2.530 03/28/2022 09:14 AM     URINALYSIS:No results for input(s): NITRITE, COLORU, PHUR, LABCAST, WBCUA, RBCUA, MUCUS, TRICHOMONAS, YEAST, BACTERIA, CLARITYU, SPECGRAV, LEUKOCYTESUR, UROBILINOGEN, BILIRUBINUR, BLOODU, GLUCOSEU, AMORPHOUS in the last 72 hours. Invalid input(s): Conner Few / IMAGING REPORTS:     XR CHEST PORTABLE    Result Date: 6/29/2022  NO PRIOR REPORT AVAILABLE Exam: X-RAY OF [facility] Clinical data: Tachycardia. Technique:Single view of the chest. Prior studies: No prior studies submitted. Findings: The lungs are grossly clear; noevidence of acute infiltrate or pleural effusion. Mild widening of the superior mediastinum probably due to vascular ectasia. Cardiac silhouette is within normal limits. No acute osseous abnormality is detected. No focal pneumonic consolidation, no pleural effusion or mechelle CHF. Mild widening of the superior mediastinum probably due to vascular ectasia. Recommendation: Follow up as clinically indicated.  Electronically Signed by Bari Nation MD at 29-Jun-2022 06:10:20 PM                   ECHOCARDIOGRAM:    Pending        PATIENT SUMMARY      ASSESSMENT / IMPRESSION & PLAN:          Hospital Problems           Last Modified POA    * (Principal) Atrial fibrillation with RVR ([address] 6/29/2022 Yes    Irregular heart beat 6/29/2022 Yes    Mixed hyperlipidemia 6/29/2022 Yes    Essential hypertension 6/29/2022 Yes    Morbid obesity ([facility] 6/29/2022 Yes    Chronic deep vein thrombosis (DVT) of left peroneal vein ([facility] 6/29/2022 Yes    Gastroesophageal reflux disease without esophagitis 6/29/2022 Yes          Principal Problem:    Atrial fibrillation with RVR (HCC)  Active Problems:    Irregular heart beat    Mixed hyperlipidemia    Essential hypertension    Morbid obesity ([facility]    Chronic deep vein thrombosis (DVT) of left peroneal vein (HCC)    Gastroesophageal reflux disease without esophagitis  Resolved Problems:    * No resolved hospital problems. *      New onset A. fib with RVR  · HR up to 150s  · Diltiazem gtt. keep HR  < 110  · Rivaroxaban  · Cardiology consult      Chronic DVT of left peroneal vein  · Rivaroxaban 20 mg p.o. daily    HTN  · Continue home regimen  · Losartan 100 mg p.o. daily    HLD-mixed  · Rosuvastatin 10 mg p.o. nightly  · Fenofibrate 160 mg p.o. daily      Continue management of other chronic medical conditions - Please see orders above         CONSULTS:    IP CONSULT TO CARDIOLOGY        INPATIENT CHECKLIST:      Nutrition: No diet orders on file    Prophylaxis Orders:   VTE - Rivaroxaban     CODE STATUS: Full Code      DISCHARGE PLAN: tbd     Total face-to-face time spent with this patient, time spent reviewing medical records, and in coordination of care with the emergency department physician, nursing staff, in the examination, evaluation/assessment, counseling, review of medications and plan, was  70 mins . Electronically signed by   Americo Singh MD, MPH, MD  Internal Medicine Hospitalist   6/29/2022 6:38 PM      EMR Dragon/Transcription disclaimer:   Much of this encounter note is an electronic transcription/translation of spoken language to printed text.  The electronic translation of spoken language may permit erroneous, or at times, nonsensical words or phrases to be inadvertently transcribed; although attempts have made to review the note for such errors, some may still exist.

## 2022-06-29 NOTE — PROGRESS NOTES
[address] [facility]  [address] [address] [address] [place] [address]  Dept: [phone]  Dept Fax: [phone] 33: [phone]    Julianna Thakur (:  1955) is a 78 y.o. male,Established patient  with green, here for evaluation of the following chief complaint(s): 3 Month [address] is a 78 y.o. male who presents today for his medical conditions/complaints as noted below. Julianna Thakur is c/bryon 3 Month Follow-Up        HPI:     Chief Complaint   Patient presents with    3 Month Follow-Up     HPI   1. New onset afib; Rate is 150;   He has some weakness and SOB:    He is on xarelto for history of DVT:      We sent him urgently to ER  For evaluation        Past Medical History:   Diagnosis Date    Cellulitis of left leg     Colon polyp     Elevated blood pressure reading without diagnosis of hypertension     Esophageal reflux     History of prostate cancer     Mixed hyperlipidemia     TG>>LDL    Obesity     Prostate cancer [facility])     2010 Dr Lenin Beltran;  implants, XRT      Past Surgical History:   Procedure Laterality Date    COLONOSCOPY  2009        COLONOSCOPY          UPPER GASTROINTESTINAL ENDOSCOPY  3/22/2000        VASECTOMY         Vitals 2022 3/29/2022 2022 2021 2021    SYSTOLIC [phone]   DIASTOLIC 70 78 64 78 82 76   Pulse 149 91 100 94 100 96   Temp - - 98.8 - - -   Resp - - - - 18 20   SpO2 97 98 96 97 95 97   Weight 339 lb 6.4 oz 336 lb 3.2 oz 340 lb 336 lb 340 lb 12.8 oz 333 lb 9.6 oz   Height 6' 1\" 6' 1\" - 6' 1\" 6' 1\" 6' 1\"   Body mass index 44.77 kg/m2 44.35 kg/m2 - 44.33 kg/m2 44.96 kg/m2 44.01 kg/m2   Pain Level - - - - - -       Family History   Problem Relation Age of Onset    Colon Polyps Mother     Colon Polyps Brother     Heart Disease Father     High Blood Pressure Other        Social History     Tobacco Use    Smoking status: Never Smoker    Smokeless tobacco: Never Used   Substance Use Topics    Alcohol use: No      Current Outpatient Medications   Medication Sig Dispense Refill    rivaroxaban (XARELTO) 20 MG TABS tablet TAKE 1 TABLET EVERY DAY WITH BREAKFAST 90 tablet 1    omega-3 acid ethyl esters (LOVAZA) 1 g capsule       rosuvastatin (CRESTOR) 10 MG tablet Take 1 tablet by mouth nightly 30 tablet 3    fish oil-omega-3 fatty acids 1000 MG capsule Take 2 capsules by mouth nightly 90 capsule 1    Cholecalciferol (VITAMIN D3) 50 MCG (2000 UT) CAPS Take 1 capsule by mouth daily 90 capsule 1    losartan (COZAAR) 100 MG tablet TAKE 1 TABLET EVERY DAY 90 tablet 1    fenofibrate 160 MG tablet Take 1 tablet by mouth daily 90 tablet 1    Red Yeast Rice 600 MG TABS Take 2 tablets by mouth 2 times daily      Multiple Vitamin (MULTI-VITAMIN PO) Take  by mouth. No current facility-administered medications for this visit.      Allergies   Allergen Reactions    Pcn [Penicillins]        Health Maintenance   Topic Date Due    DTaP/Tdap/Td vaccine (1 - Tdap) Never done    COVID-19 Vaccine (3 - Booster for Moderna series) 02/26/2022    Colorectal Cancer Screen  05/30/2022    Shingles vaccine (1 of 2) 03/29/2023 (Originally 6/7/2006)    Pneumococcal 65+ years Vaccine (1 - PCV) 08/21/2023 (Originally 6/7/2021)    Prostate Specific Antigen (PSA) Screening or Monitoring  09/29/2022    Depression Screen  12/29/2022    Annual Wellness Visit (AWV)  12/30/2022    Lipids  03/28/2023    Flu vaccine  Completed    Hepatitis C screen  Completed    Hepatitis A vaccine  Aged Out    Hepatitis B vaccine  Aged Out    Hib vaccine  Aged Out    Meningococcal (ACWY) vaccine  Aged Out       Lab Results   Component Value Date    LABA1C 5.6 09/30/2019     Lab Results   Component Value Date    PSA 0.51 09/29/2021    PSA 0.79 09/15/2020    PSA 0.52 01/02/2020     TSH   Date Value Ref Range Status   03/28/2022 2.530 0.270 - 4.200 uIU/mL Respiratory: Negative for cough, choking, shortness of breath, wheezing and stridor. Cardiovascular: Negative for chest pain, palpitations and leg swelling. Gastrointestinal: Negative for abdominal distention, abdominal pain, blood in stool, constipation, diarrhea, nausea and vomiting. Endocrine: Negative for cold intolerance, polydipsia and polyuria. Genitourinary: Negative for difficulty urinating, dysuria, frequency and urgency. Musculoskeletal: Negative for arthralgias and gait problem. Skin: Negative for color change and rash. Allergic/Immunologic: Negative for food allergies and immunocompromised state. Neurological: Negative for dizziness, tremors, syncope, speech difficulty, weakness and headaches. Hematological: Negative for adenopathy. Does not bruise/bleed easily. Psychiatric/Behavioral: Negative for confusion and hallucinations. Objective:     Physical Exam  Constitutional:       General: He is not in acute distress. Appearance: He is well-developed. HENT:      Head: Normocephalic and atraumatic. Eyes:      General: No scleral icterus. Right eye: No discharge. Left eye: No discharge. Pupils: Pupils are equal, round, and reactive to light. Neck:      Thyroid: No thyromegaly. Vascular: No JVD. Cardiovascular:      Rate and Rhythm: Normal rate and regular rhythm. Heart sounds: Normal heart sounds. No murmur heard. Pulmonary:      Effort: Pulmonary effort is normal. No respiratory distress. Breath sounds: Normal breath sounds. No wheezing or rales. Abdominal:      General: Bowel sounds are normal. There is no distension. Palpations: Abdomen is soft. There is no mass. Tenderness: There is no abdominal tenderness. There is no guarding or rebound. Musculoskeletal:         General: No tenderness. Normal range of motion. Cervical back: Normal range of motion and neck supple.    Skin:     General: Skin is warm and dry.      Findings: No erythema or rash. Neurological:      Mental Status: He is alert and oriented to person, place, and time. Cranial Nerves: No cranial nerve deficit. Coordination: Coordination normal.      Deep Tendon Reflexes: Reflexes are normal and symmetric. Reflexes normal.   Psychiatric:         Mood and Affect: Mood is not depressed. Behavior: Behavior normal.         Thought Content: Thought content normal.         Judgment: Judgment normal.       /70   Pulse (!) 149   Ht 6' 1\" (1.854 m)   Wt (!) 339 lb 6.4 oz (154 kg)   SpO2 97%   BMI 44.78 kg/m²           Assessment:      Problem List     Colon cancer screening - Primary    Relevant Orders    Judi Lara MD, Gastroenterology, [place]    Irregular heart beat    Relevant Orders    EKG 12 lead (Completed)          Plan:        Patient given educational materials - see patient instructions. Discussed use, benefit, and side effects of prescribed medications. Allpatient questions answered. Pt voiced understanding. Reviewed health maintenance. Instructed to continue current medications, diet and exercise. Patient agreed with treatment plan. Follow up as directed. MEDICATIONS:  No orders of the defined types were placed in this encounter. ORDERS:  Orders Placed This Encounter   Procedures   Kimo Wilburn MD, Gastroenterology, [facility] EKG 12 lead       Follow-up:  No follow-ups on file. PATIENT INSTRUCTIONS:  Patient Instructions   We are committed to providing you with the best care possible. In order to help us achieve these goals please remember to bring all medications, herbal products, and over the counter supplements with you to each visit. If your provider has ordered testing for you, please be sure to follow up with our office if you have not received results within 7 days after the testing took place.      *If you receive a survey after visiting one of our offices, please take time to share your experience concerning your physician office visit. These surveys are confidential and no health information about you is shared. We are eager to improve for you and we are counting on your feedback to help make that happen. #1 new onset atrial fibs he is already on Xarelto but his heart rate is 150 he is stable we have sent him to the ER for the rate of 150. Electronically signed by JODY Galicia on 6/29/2022 at 4:16 PM        EMRDragon/transcription disclaimer:  Much of this encounter note is electronic transcription/translation of spoken language to printed texts. The electronic translation of spoken language may be erroneous, or at times,nonsensical words or phrases may be inadvertently transcribed.   Although I have reviewed the note for such errors, some may still exist.

## 2022-06-30 LAB
ANION GAP SERPL CALCULATED.3IONS-SCNC: 13 MMOL/L (ref 7–19)
BASOPHILS ABSOLUTE: 0 K/UL (ref 0–0.2)
BASOPHILS RELATIVE PERCENT: 0.5 % (ref 0–1)
BUN BLDV-MCNC: 18 MG/DL (ref 8–23)
CALCIUM SERPL-MCNC: 8.9 MG/DL (ref 8.8–10.2)
CHLORIDE BLD-SCNC: 104 MMOL/L (ref 98–111)
CHOLESTEROL, TOTAL: 164 MG/DL (ref 160–199)
CO2: 24 MMOL/L (ref 22–29)
CREAT SERPL-MCNC: 1.3 MG/DL (ref 0.5–1.2)
EKG P AXIS: -14 DEGREES
EKG P AXIS: 6 DEGREES
EKG P AXIS: NORMAL DEGREES
EKG P-R INTERVAL: 170 MS
EKG P-R INTERVAL: 172 MS
EKG P-R INTERVAL: NORMAL MS
EKG Q-T INTERVAL: 316 MS
EKG Q-T INTERVAL: 356 MS
EKG Q-T INTERVAL: 372 MS
EKG QRS DURATION: 78 MS
EKG QRS DURATION: 80 MS
EKG QRS DURATION: 82 MS
EKG QTC CALCULATION (BAZETT): 399 MS
EKG QTC CALCULATION (BAZETT): 417 MS
EKG QTC CALCULATION (BAZETT): 473 MS
EKG T AXIS: 69 DEGREES
EKG T AXIS: 75 DEGREES
EKG T AXIS: 95 DEGREES
EOSINOPHILS ABSOLUTE: 0.2 K/UL (ref 0–0.6)
EOSINOPHILS RELATIVE PERCENT: 2.2 % (ref 0–5)
GFR AFRICAN AMERICAN: >59
GFR NON-AFRICAN AMERICAN: 55
GLUCOSE BLD-MCNC: 88 MG/DL (ref 74–109)
HCT VFR BLD CALC: 38.7 % (ref 42–52)
HDLC SERPL-MCNC: 29 MG/DL (ref 55–121)
HEMOGLOBIN: 12.8 G/DL (ref 14–18)
IMMATURE GRANULOCYTES #: 0 K/UL
LDL CHOLESTEROL CALCULATED: 100 MG/DL
LV EF: 53 %
LVEF MODALITY: NORMAL
LYMPHOCYTES ABSOLUTE: 1.6 K/UL (ref 1.1–4.5)
LYMPHOCYTES RELATIVE PERCENT: 22 % (ref 20–40)
MCH RBC QN AUTO: 29.4 PG (ref 27–31)
MCHC RBC AUTO-ENTMCNC: 33.1 G/DL (ref 33–37)
MCV RBC AUTO: 89 FL (ref 80–94)
MONOCYTES ABSOLUTE: 0.7 K/UL (ref 0–0.9)
MONOCYTES RELATIVE PERCENT: 9.2 % (ref 0–10)
NEUTROPHILS ABSOLUTE: 4.8 K/UL (ref 1.5–7.5)
NEUTROPHILS RELATIVE PERCENT: 65.8 % (ref 50–65)
PDW BLD-RTO: 12.5 % (ref 11.5–14.5)
PLATELET # BLD: 266 K/UL (ref 130–400)
PMV BLD AUTO: 9.8 FL (ref 9.4–12.4)
POTASSIUM REFLEX MAGNESIUM: 4.4 MMOL/L (ref 3.5–5)
RBC # BLD: 4.35 M/UL (ref 4.7–6.1)
SODIUM BLD-SCNC: 141 MMOL/L (ref 136–145)
TRIGL SERPL-MCNC: 175 MG/DL (ref 0–149)
TROPONIN: <0.01 NG/ML (ref 0–0.03)
WBC # BLD: 7.3 K/UL (ref 4.8–10.8)

## 2022-06-30 PROCEDURE — 6370000000 HC RX 637 (ALT 250 FOR IP): Performed by: INTERNAL MEDICINE

## 2022-06-30 PROCEDURE — C8929 TTE W OR WO FOL WCON,DOPPLER: HCPCS

## 2022-06-30 PROCEDURE — 85025 COMPLETE CBC W/AUTO DIFF WBC: CPT

## 2022-06-30 PROCEDURE — 80048 BASIC METABOLIC PNL TOTAL CA: CPT

## 2022-06-30 PROCEDURE — 80061 LIPID PANEL: CPT

## 2022-06-30 PROCEDURE — 99221 1ST HOSP IP/OBS SF/LOW 40: CPT | Performed by: INTERNAL MEDICINE

## 2022-06-30 PROCEDURE — 6360000004 HC RX CONTRAST MEDICATION: Performed by: INTERNAL MEDICINE

## 2022-06-30 PROCEDURE — 36415 COLL VENOUS BLD VENIPUNCTURE: CPT

## 2022-06-30 PROCEDURE — 2140000000 HC CCU INTERMEDIATE R&B

## 2022-06-30 PROCEDURE — 84484 ASSAY OF TROPONIN QUANT: CPT

## 2022-06-30 PROCEDURE — 93005 ELECTROCARDIOGRAM TRACING: CPT | Performed by: INTERNAL MEDICINE

## 2022-06-30 PROCEDURE — 2580000003 HC RX 258: Performed by: INTERNAL MEDICINE

## 2022-06-30 RX ORDER — METOPROLOL SUCCINATE 50 MG/1
100 TABLET, EXTENDED RELEASE ORAL DAILY
Status: DISCONTINUED | OUTPATIENT
Start: 2022-06-30 | End: 2022-07-01 | Stop reason: HOSPADM

## 2022-06-30 RX ADMIN — ASPIRIN 81 MG 81 MG: 81 TABLET ORAL at 08:18

## 2022-06-30 RX ADMIN — SODIUM CHLORIDE, PRESERVATIVE FREE 10 ML: 5 INJECTION INTRAVENOUS at 08:18

## 2022-06-30 RX ADMIN — METOPROLOL SUCCINATE 100 MG: 50 TABLET, EXTENDED RELEASE ORAL at 11:08

## 2022-06-30 RX ADMIN — ROSUVASTATIN CALCIUM 10 MG: 10 TABLET, FILM COATED ORAL at 21:21

## 2022-06-30 RX ADMIN — LOSARTAN POTASSIUM 100 MG: 100 TABLET, FILM COATED ORAL at 08:17

## 2022-06-30 RX ADMIN — PERFLUTREN 1.5 ML: 6.52 INJECTION, SUSPENSION INTRAVENOUS at 07:53

## 2022-06-30 RX ADMIN — RIVAROXABAN 20 MG: 20 TABLET, FILM COATED ORAL at 17:52

## 2022-06-30 RX ADMIN — FENOFIBRATE 160 MG: 160 TABLET ORAL at 08:18

## 2022-06-30 NOTE — PLAN OF CARE
Problem: Discharge Planning  Goal: Discharge to home or other facility with appropriate resources  Outcome: Progressing  Flowsheets (Taken 6/29/2022 2232 by Eddye Hatchet, RN)  Discharge to home or other facility with appropriate resources:   Identify barriers to discharge with patient and caregiver   Identify discharge learning needs (meds, wound care, etc)   Arrange for needed discharge resources and transportation as appropriate   Refer to discharge planning if patient needs post-hospital services based on physician order or complex needs related to functional status, cognitive ability or social support system     Problem: Safety - Adult  Goal: Free from fall injury  Outcome: Progressing     Problem: ABCDS Injury Assessment  Goal: Absence of physical injury  Outcome: Progressing

## 2022-06-30 NOTE — PROGRESS NOTES
Telemetry called to report what appeared to be atrial flutter or afib and a HR in the 130's. EKG ordered to determine rhythm. However, patient converted back to sinus rhythm with a HR in the 80's before an EKG was obtained. Dr. Tesfaye Singer and Dr. Cole Hammond notified. Will continue to monitor patient. Zio patch at discharge ordered.

## 2022-06-30 NOTE — PLAN OF CARE
Problem: Discharge Planning  Goal: Discharge to home or other facility with appropriate resources  6/30/2022 1204 by Mukund Pride RN  Outcome: Progressing  6/29/2022 2320 by Solange Castillo RN  Outcome: Progressing  Flowsheets (Taken 6/29/2022 2232 by Deandre Ryan RN)  Discharge to home or other facility with appropriate resources:   Identify barriers to discharge with patient and caregiver   Identify discharge learning needs (meds, wound care, etc)   Arrange for needed discharge resources and transportation as appropriate   Refer to discharge planning if patient needs post-hospital services based on physician order or complex needs related to functional status, cognitive ability or social support system     Problem: Safety - Adult  Goal: Free from fall injury  6/30/2022 1204 by Mukund Pride RN  Outcome: Progressing  6/29/2022 2320 by Solange Castillo RN  Outcome: Progressing     Problem: ABCDS Injury Assessment  Goal: Absence of physical injury  6/30/2022 1204 by Mukund Pride RN  Outcome: Progressing  6/29/2022 2320 by Solange Castillo RN  Outcome: Progressing

## 2022-06-30 NOTE — PROGRESS NOTES
4 Eyes Skin Assessment     Clara Pu is being assessed upon: Admission    I agree that I, Annabelle Mensah, RN, along with Jacqueline Berkowitz RN (either 2 RN's or 1 LPN and 1 RN) have performed a thorough Head to Toe Skin Assessment on the patient. ALL assessment sites listed below have been assessed. Areas assessed by both nurses:     [x]   Head, Face, and Ears   [x]   Shoulders, Back, and Chest  [x]   Arms, Elbows, and Hands   [x]   Coccyx, Sacrum, and Ischium  [x]   Legs, Feet, and Heels    Does the Patient have Skin Breakdown?  No    Kali Prevention initiated: No  Wound Care Orders initiated: No    WOC nurse consulted for Pressure Injury (Stage 3,4, Unstageable, DTI, NWPT, and Complex wounds) and New or Established Ostomies: No        Primary Nurse eSignature: Annabelle Mensah RN on 6/29/2022 at 11:34 PM      Co-Signer eSignature: Electronically signed by Jacqueline Berkowitz RN on 6/29/22 at 11:37 PM CDT

## 2022-06-30 NOTE — ED NOTES
Called report to [address] [place] [address] 7th floor.      Christiano Dumont RN  06/29/22 2033

## 2022-06-30 NOTE — PROGRESS NOTES
[facility]    Patient:  Alfreda Murphy  YOB: 1955  Date of Service: 6/30/2022  MRN: [id number]   Acct: [de-identified]   Primary Care Physician: JODY Valera  Advance Directive: Full Code  Admit Date: 6/29/2022       Hospital Day: 1        CHIEF COMPLAINT:     Chief Complaint   Patient presents with    Tachycardia       6/30/2022 7:35 AM  Subjective / Interval History:   06/30/2022  Patient seen and examined this AM.  Doing well. No new complaints. No acute changes or acute overnight event reported. Denies any acute complaints or distress at this time. No chest pain, no shortness of breath, dizziness, lightheadedness or palpitations. Family at bedside. Review of Systems:   Review of Systems  ROS: 14 point review of systems is negative except as specifically addressed above.     Diet NPO    Intake/Output Summary (Last 24 hours) at 6/30/2022 0735  Last data filed at 6/30/2022 6688  Gross per 24 hour   Intake 10 ml   Output 1150 ml   Net -1140 ml       Medications:   dilTIAZem      sodium chloride       Current Facility-Administered Medications   Medication Dose Route Frequency Provider Last Rate Last Admin    dilTIAZem HCl in sodium chloride 125 mg / 125 mL infusion  2.5-15 mg/hr IntraVENous Continuous Lexi Gillespie MD        fenofibrate (TRIGLIDE) tablet 160 mg  160 mg Oral Daily Joan Napoles MD        losartan (COZAAR) tablet 100 mg  100 mg Oral Daily Joan Napoles MD        rivaroxaban (XARELTO) tablet 20 mg  20 mg Oral Daily Joan Napoles MD   20 mg at 06/29/22 2216    rosuvastatin (CRESTOR) tablet 10 mg  10 mg Oral Nightly Joan Napoles MD   10 mg at 06/29/22 2216    sodium chloride flush 0.9 % injection 5-40 mL  5-40 mL IntraVENous 2 times per day Joan Napoles MD   10 mL at 06/29/22 2217    sodium chloride flush 0.9 % injection 10 mL  10 mL IntraVENous PRN Joan Napoles MD        0.9 % sodium chloride infusion IntraVENous PRN Danii Bazzi MD        ondansetron (ZOFRAN-ODT) disintegrating tablet 4 mg  4 mg Oral Q8H PRN Danii Bazzi MD        Or    ondansetron [facility]) injection 4 mg  4 mg IntraVENous Q6H PRN Danii Bazzi MD        acetaminophen (TYLENOL) tablet 650 mg  650 mg Oral Q6H PRN Danii Bazzi MD        Or    acetaminophen (TYLENOL) suppository 650 mg  650 mg Rectal Q6H PRN Danii Bazzi MD        polyethylene glycol (GLYCOLAX) packet 17 g  17 g Oral Daily PRN Danii Bazzi MD        aspirin chewable tablet 81 mg  81 mg Oral Daily Danii Bazzi MD        nitroGLYCERIN (NITROSTAT) SL tablet 0.4 mg  0.4 mg SubLINGual Q5 Min PRN Danii Bazzi MD             dilTIAZem      sodium chloride        fenofibrate  160 mg Oral Daily    losartan  100 mg Oral Daily    rivaroxaban  20 mg Oral Daily    rosuvastatin  10 mg Oral Nightly    sodium chloride flush  5-40 mL IntraVENous 2 times per day    aspirin  81 mg Oral Daily     sodium chloride flush, sodium chloride, ondansetron **OR** ondansetron, acetaminophen **OR** acetaminophen, polyethylene glycol, nitroGLYCERIN  Diet NPO       Labs:   CBC with DIFF:  Recent Labs     06/29/22  1304 06/29/22  1644 06/30/22  0109   WBC 7.9 7.5 7.3   RBC 4.63* 4.54* 4.35*   HGB 13.6* 13.5* 12.8*   HCT 41.3* 40.2* 38.7*   MCV 89.2 88.5 89.0   MCH 29.4 29.7 29.4   MCHC 32.9* 33.6 33.1   RDW 12.7 12.6 12.5    265 266   MPV 10.1 9.5 9.8   NEUTOPHILPCT 70.8* 69.5* 65.8*   LYMPHOPCT 18.5* 18.8* 22.0   MONOPCT 7.6 9.3 9.2   EOSRELPCT 2.4 1.7 2.2   BASOPCT 0.4 0.4 0.5   NEUTROABS 5.6 5.2 4.8   LYMPHSABS 1.5 1.4 1.6   MONOSABS 0.60 0.70 0.70   EOSABS 0.20 0.10 0.20   BASOSABS 0.00 0.00 0.00       CMP/BMP:  Recent Labs     06/29/22  1304 06/29/22  1644 06/30/22  0109    139 141   K 4.2 4.9 4.4    104 104   CO2 26 24 24   ANIONGAP 10 11 13   GLUCOSE 100 100 88   BUN 17 17 18   CREATININE 1.4* 1.3* 1.3*   LABGLOM 51* 55* 55*   CALCIUM 9.4 9.4 8.9   PROT 7.4 7.3  --    LABALBU 4.4 4.2  --    BILITOT 0.4 0.4  --    ALKPHOS 72 72  --    ALT 21 22  --    AST 19 28  --          CRP:  No results for input(s): CRP in the last 72 hours. Sed Rate:  No results for input(s): SEDRATE in the last 72 hours. HgBA1c:  No components found for: HGBA1C  FLP:    Lab Results   Component Value Date/Time    TRIG 175 06/30/2022 01:09 AM    HDL 29 06/30/2022 01:09 AM    LDLCALC 100 06/30/2022 01:09 AM    LDLDIRECT 128 09/11/2017 10:07 AM     TSH:    Lab Results   Component Value Date/Time    TSH 2.530 03/28/2022 09:14 AM     Troponin T:   Recent Labs     06/29/22  1644 06/29/22  2123 06/30/22  0109   TROPONINI <0.01 <0.01 <0.01     Pro-BNP: No results for input(s): BNP in the last 72 hours. INR:   Recent Labs     06/29/22  1644   INR 1.60*     ABGs: No results found for: PHART, PO2ART, XCZ3VYO  UA:No results for input(s): NITRITE, COLORU, PHUR, LABCAST, WBCUA, RBCUA, MUCUS, TRICHOMONAS, YEAST, BACTERIA, CLARITYU, SPECGRAV, LEUKOCYTESUR, UROBILINOGEN, BILIRUBINUR, BLOODU, GLUCOSEU, AMORPHOUS in the last 72 hours. Invalid input(s): Elease Schalyssar      Culture Results:    No results for input(s): CXSURG in the last 720 hours. Blood Culture Recent: No results for input(s): BC in the last 720 hours. No results for input(s): BC, BLOODCULT2, ORG in the last 72 hours. Cultures:   No results for input(s): CULTURE in the last 72 hours. No results for input(s): BC, Lance Grates in the last 72 hours. No results for input(s): CXSURG in the last 72 hours. Recent Labs     06/29/22  1644   MG 2.1     Recent Labs     06/29/22  1304 06/29/22  1644   AST 19 28   ALT 21 22   BILITOT 0.4 0.4   ALKPHOS 72 72         RAD:   XR CHEST PORTABLE    Result Date: 6/29/2022  NO PRIOR REPORT AVAILABLE [facility] Clinical data: Tachycardia. Technique:Single view of the chest. Prior studies: No prior studies submitted. Findings: The lungs are grossly clear; noevidence of acute infiltrate or pleural effusion. Mild widening of the superior mediastinum probably due to vascular ectasia. Cardiac silhouette is within normal limits. No acute osseous abnormality is detected. No focal pneumonic consolidation, no pleural effusion or mechelle CHF. Mild widening of the superior mediastinum probably due to vascular ectasia. Recommendation: Follow up as clinically indicated.  Electronically Signed by Vidya Chauhan MD at 29-Jun-2022 06:10:20 PM               Echo: 06/29/2022  Summary   Technically difficult study with most images being uninterpretable   Normal left ventricular size with low normal systolic function estimated   ejection fraction 50 to 55%   Left ventricular wall thickness appears upper normal   Indeterminate diastolic function   Poor visualization of the aortic valve   Poor visualization of the mitral valve   Poor visualization of the pulmonic valve   Poor visualization cessation of the right-sided chambers with apparent   right ventricular enlargement   Dilatation of the ascending aorta measuring 3.5 cm   Poor visualization of the aortic arch   Definity contrast utilized to better define endocardial borders      Signature      ----------------------------------------------------------------   Electronically signed by Marianna Sifuentes MD(Interpreting physician)   on 06/30/2022 10:02 AM   ----------------------------------------------------------------        Objective:   Vitals:   BP (!) 168/93   Pulse 87   Temp 97.8 °F (36.6 °C) (Temporal)   Resp 20   Ht 6' 1\" (1.854 m)   Wt (!) 338 lb 9.6 oz (153.6 kg)   SpO2 96%   BMI 44.67 kg/m²       Patient Vitals for the past 24 hrs:   BP Temp Temp src Pulse Resp SpO2 Height Weight   06/30/22 0626 (!) 168/93 97.8 °F (36.6 °C) Temporal 87 20 96 % 6' 1\" (1.854 m) (!) 338 lb 9.6 oz (153.6 kg)   06/30/22 0217 (!) 160/66 97.4 °F (36.3 °C) Temporal 91 20 95 % -- --   06/29/22 2054 -- -- -- 92 -- -- -- --   06/29/22 2045 Srinivasan Gains 148/76 97.2 °F (36.2 °C) Temporal 93 -- 96 % 6' 1\" (1.854 m) (!) 338 lb 9.6 oz (153.6 kg)   06/29/22 2000 (!) 162/88 -- -- 89 20 94 % -- --   06/29/22 1945 -- -- -- 87 -- 93 % -- --   06/29/22 1930 (!) 152/91 -- -- 88 20 94 % -- --   06/29/22 1915 -- -- -- 92 -- 93 % -- --   06/29/22 1900 (!) 156/100 -- -- 88 20 94 % -- --   06/29/22 1621 (!) 148/93 98.4 °F (36.9 °C) -- (!) 150 18 95 % -- --       24HR INTAKE/OUTPUT:      Intake/Output Summary (Last 24 hours) at 6/30/2022 0735  Last data filed at 6/30/2022 0347  Gross per 24 hour   Intake 10 ml   Output 1150 ml   Net -1140 ml       Physical Exam  Vitals and nursing note reviewed. Constitutional:       General: He is not in acute distress. Appearance: Normal appearance. He is obese. He is not ill-appearing, toxic-appearing or diaphoretic. HENT:      Head: Normocephalic and atraumatic. Right Ear: External ear normal.      Left Ear: External ear normal.      Nose: Nose normal. No congestion or rhinorrhea. Mouth/Throat:      Mouth: Mucous membranes are moist.      Pharynx: Oropharynx is clear. No oropharyngeal exudate or posterior oropharyngeal erythema. Eyes:      General: No scleral icterus. Right eye: No discharge. Left eye: No discharge. Extraocular Movements: Extraocular movements intact. Conjunctiva/sclera: Conjunctivae normal.      Pupils: Pupils are equal, round, and reactive to light. Cardiovascular:      Rate and Rhythm: Normal rate and regular rhythm. Pulses: Normal pulses. Heart sounds: Normal heart sounds. No murmur heard. No friction rub. No gallop. Pulmonary:      Effort: Pulmonary effort is normal. No respiratory distress. Breath sounds: Normal breath sounds. No stridor. No wheezing, rhonchi or rales. Chest:      Chest wall: No tenderness. Abdominal:      General: Bowel sounds are normal. There is no distension. Palpations: Abdomen is soft. Tenderness:  There is no abdominal tenderness. There is no guarding or rebound. Musculoskeletal:         General: No swelling, tenderness, deformity or signs of injury. Normal range of motion. Cervical back: Normal range of motion and neck supple. No rigidity. No muscular tenderness. Right lower leg: No edema. Left lower leg: No edema. Skin:     General: Skin is warm and dry. Capillary Refill: Capillary refill takes less than 2 seconds. Coloration: Skin is not jaundiced or pale. Findings: No bruising, erythema, lesion or rash. Neurological:      General: No focal deficit present. Mental Status: He is alert. Mental status is at baseline. Cranial Nerves: No cranial nerve deficit. Sensory: No sensory deficit. Motor: No weakness. Coordination: Coordination normal.   Psychiatric:         Mood and Affect: Mood normal.         Behavior: Behavior normal.         Thought Content: Thought content normal.         Judgment: Judgment normal.           Assessment/plan:         Hospital Problems           Last Modified POA    * (Principal) Atrial fibrillation with RVR ([address] 6/29/2022 Yes    Irregular heart beat 6/29/2022 Yes    Mixed hyperlipidemia 6/29/2022 Yes    Essential hypertension 6/29/2022 Yes    Morbid obesity ([address] 6/29/2022 Yes    Chronic deep vein thrombosis (DVT) of left peroneal vein ([address] 6/29/2022 Yes    Gastroesophageal reflux disease without esophagitis 6/29/2022 Yes          Principal Problem:    Atrial fibrillation with RVR (HCC)  Active Problems:    Irregular heart beat    Mixed hyperlipidemia    Essential hypertension    Morbid obesity (HCC)    Chronic deep vein thrombosis (DVT) of left peroneal vein (HCC)    Gastroesophageal reflux disease without esophagitis  Resolved Problems:    * No resolved hospital problems.  *        Brief Summary  Mr. Isauro Mary, a 78 y.o. male with a history of HTN, HLD, DVT (on rivaroxaban), presenting to [facility] ED (06/29/2022), on account of tachycardia.     Patient was noted to have elevated HR at a PCP visit and was subsequently sent to [facility] ED for further work-up and management.     No associated symptoms reported. No chest pain, no shortness of breath or palpitations.     Patient was noted to be in A. fib with RVR with HR up to 150s, requiring diltiazem bolus and initiation of diltiazem drip.     Patient admitted to [facility] (06/29/2022), further work-up and management. New onset A. fib with RVR  · HR up to 150s on presentation  · Diltiazem gtt. keep HR  < 110  · Rivaroxaban 20 mg p.o. daily  · 2D echocardiogram (06/29/2022): Normal LV size with normal LV function, with estimated EF of 50-55%. Indeterminate diastolic dysfunction. Dilatation of the ascending aorta measuring 3.5 cm: Summary report as noted above. · Cardiology on board-appreciate recommendations  · Further work-up and medication adjustment as per cardiology.     Chronic DVT of left peroneal vein  · Rivaroxaban 20 mg p.o. daily     HTN  · Continue home regimen  · Losartan 100 mg p.o. daily     HLD-mixed  · Rosuvastatin 10 mg p.o. nightly  · Fenofibrate 160 mg p.o. daily           Continue management of other chronic medical conditions - see above and orders. Advance Directive: Full Code    Diet NPO         Consults Made:   IP CONSULT TO CARDIOLOGY    DVT prophylaxis: Rivaroxaban      Discharge planning: tbd    Time Spent is 25 mins in the examination, evaluation, counseling and review of medications, assessment and plan.      Electronically signed by   Azael Montero MD, MPH, MD,   Internal Medicine Hospitalist   6/30/2022 7:35 AM

## 2022-06-30 NOTE — CONSULTS
[facility]) Cardiology   Consult Note       Requesting MD:  Kennedy Browning MD   Admit Status:         Patient:    Dariana Bridges  [id number]  1955         Chief Complaint: Asymptomatic paroxysmal supraventricular tachycardia  HPI: Patient is a 78 y.o. male was seen by the family physician yesterday and found to have heart rate of 150. He was admitted for observation. EKG show atrial flutter. It converted to normal sinus rhythm on its own. Patient is known to have hypertension and hyperlipidemia with obesity. In the good day, patient can do anything he wants to do with no difficulty despite its morbid obesity. He has been on NOAC 2 years after left knee surgery complicated by DVT. He is a non-smoker and nondrinker. There has been no history of CVA or chest pain.     Review of Systems:  HENNT: normal  PULMONARY: normal   CVS:see history of present illness  ABD: denies any abdominal pain  PERIPHERL: normal  MSK: no swelling of the lower extremities  CNS: Denies any dizziness, syncopal episode or history of stroke  Renal: Denies any history of dysuria or frequency    Cardiac Specific Data:  Specialty Problems        Cardiology Problems    * (Principal) Atrial fibrillation with RVR (HCC)        Essential hypertension        Mixed hyperlipidemia        Chronic deep vein thrombosis (DVT) of left peroneal vein (HCC)              Past Medical History:  Past Medical History:   Diagnosis Date    Cellulitis of left leg     Colon polyp     Elevated blood pressure reading without diagnosis of hypertension     Esophageal reflux     History of prostate cancer     Mixed hyperlipidemia     TG>>LDL    Obesity     Prostate cancer [facility])     2010 Dr Dani Canseco;  implants, XRT        Past Surgical History:  Past Surgical History:   Procedure Laterality Date    COLONOSCOPY  6/2/2009        COLONOSCOPY  2012        UPPER GASTROINTESTINAL ENDOSCOPY  3/22/2000        VASECTOMY Past Family History:  Family History   Problem Relation Age of Onset    Colon Polyps Mother     Colon Polyps Brother     Heart Disease Father     High Blood Pressure Other        Past Social History:  Social History     Socioeconomic History    Marital status: Single     Spouse name: Not on file    Number of children: Not on file    Years of education: Not on file    Highest education level: Not on file   Occupational History    Not on file   Tobacco Use    Smoking status: Never Smoker    Smokeless tobacco: Never Used   Substance and Sexual Activity    Alcohol use: No    Drug use: No    Sexual activity: Not on file   Other Topics Concern    Not on file   Social History Narrative    Not on file     Social Determinants of Health     Financial Resource Strain: Low Risk     Difficulty of Paying Living Expenses: Not hard at all   Food Insecurity: Unknown    Worried About [address] in the Last Year: Patient refused    Ran Out of Food in the Last Year: Patient refused   Transportation Needs:     Lack of Transportation (Medical): Not on file    Lack of Transportation (Non-Medical):  Not on file   Physical Activity:     Days of Exercise per Week: Not on file    Minutes of Exercise per Session: Not on file   Stress:     Feeling of Stress : Not on file   Social Connections:     Frequency of Communication with Friends and Family: Not on file    Frequency of Social Gatherings with Friends and Family: Not on file    Attends Spiritism Services: Not on file    Active Member of Clubs or Organizations: Not on file    Attends Club or Organization Meetings: Not on file    Marital Status: Not on file   Intimate Partner Violence:     Fear of Current or Ex-Partner: Not on file    Emotionally Abused: Not on file    Physically Abused: Not on file    Sexually Abused: Not on file   Housing Stability:     Unable to Pay for Housing in the Last Year: Not on file    Number of [place] in the Last Year: Not on file    Unstable Housing in the Last Year: Not on file       Allergies: Allergies   Allergen Reactions    Pcn [Penicillins]        Prior to Admission medications    Medication Sig Start Date End Date Taking? Authorizing Provider   apixaban (ELIQUIS) 5 MG TABS tablet Take 5 mg by mouth 2 times daily   Yes Historical Provider, MD   rivaroxaban (XARELTO) 20 MG TABS tablet TAKE 1 TABLET EVERY DAY WITH BREAKFAST 5/9/22   JODY Hidalgo   omega-3 acid ethyl esters (LOVAZA) 1 g capsule  4/1/22   Historical Provider, MD   rosuvastatin (CRESTOR) 10 MG tablet Take 1 tablet by mouth nightly 4/19/22   JODY Hidalgo   fish oil-omega-3 fatty acids 1000 MG capsule Take 2 capsules by mouth nightly 3/29/22   JODY Hidalgo   Cholecalciferol (VITAMIN D3) 50 MCG (2000 UT) CAPS Take 1 capsule by mouth daily 3/3/22   JODY Hidalgo   losartan (COZAAR) 100 MG tablet TAKE 1 TABLET EVERY DAY 11/10/21   JODY Hidalgo   fenofibrate 160 MG tablet Take 1 tablet by mouth daily 1/14/20 6/29/22  JODY Hidalgo   Red Yeast Rice 600 MG TABS Take 2 tablets by mouth 2 times daily    Historical Provider, MD   Multiple Vitamin (MULTI-VITAMIN PO) Take  by mouth.       Historical Provider, MD        Current Facility-Administered Medications   Medication Dose Route Frequency Provider Last Rate Last Admin    perflutren lipid microspheres (DEFINITY) injection 1.65 mg  1.5 mL IntraVENous ONCE PRN Segundo Barrios MD   1.5 mL at 06/30/22 0753    dilTIAZem HCl in sodium chloride 125 mg / 125 mL infusion  2.5-15 mg/hr IntraVENous Continuous Erik Benitez MD        fenofibrate (TRIGLIDE) tablet 160 mg  160 mg Oral Daily Segundo Barrios MD   160 mg at 06/30/22 0818    losartan (COZAAR) tablet 100 mg  100 mg Oral Daily Segundo Barrios MD   100 mg at 06/30/22 9772    rivaroxaban (XARELTO) tablet 20 mg  20 mg Oral Daily Segundo Barrios MD   20 mg at 06/29/22 8678    rosuvastatin (Alma Abler) tablet 10 mg  10 mg Oral Nightly Misty Durnad MD   10 mg at 06/29/22 2216    sodium chloride flush 0.9 % injection 5-40 mL  5-40 mL IntraVENous 2 times per day Misty Durand MD   10 mL at 06/30/22 0818    sodium chloride flush 0.9 % injection 10 mL  10 mL IntraVENous PRN Misty Durand MD        0.9 % sodium chloride infusion   IntraVENous PRN Misty Durand MD        ondansetron (ZOFRAN-ODT) disintegrating tablet 4 mg  4 mg Oral Q8H PRN Misty Durand MD        Or    ondansetron [facility]) injection 4 mg  4 mg IntraVENous Q6H PRN Misty Durand MD        acetaminophen (TYLENOL) tablet 650 mg  650 mg Oral Q6H PRN Misty Durand MD        Or    acetaminophen (TYLENOL) suppository 650 mg  650 mg Rectal Q6H PRN Misty Durand MD        polyethylene glycol (GLYCOLAX) packet 17 g  17 g Oral Daily PRN Misty Durand MD        aspirin chewable tablet 81 mg  81 mg Oral Daily Misty Durand MD   81 mg at 06/30/22 0818    nitroGLYCERIN (NITROSTAT) SL tablet 0.4 mg  0.4 mg SubLINGual Q5 Min PRN Misty Durand MD            Current Infused Meds:   dilTIAZem      sodium chloride         Physical Exam:  Vitals:    06/30/22 0820   BP:    Pulse: 86   Resp:    Temp:    SpO2:        Intake/Output Summary (Last 24 hours) at 6/30/2022 0943  Last data filed at 6/30/2022 9035  Gross per 24 hour   Intake 10 ml   Output 1150 ml   Net -1140 ml     Estimated body mass index is 44.67 kg/m² as calculated from the following:    Height as of this encounter: 6' 1\" (1.854 m). Weight as of this encounter: 338 lb 9.6 oz (153.6 kg).     PHYSICAL EXAM:  HENNT: normal  PULMONARY: normal to inspection, palpation, percussion and ascultation  CVS:Normal neck vein, heart sounds distant, S1 and S2 normal, no murmur  ABD: liver and spleen not palpable, nontender, bowel sound normal, obese  PERIPHERL: all pulses are normal with no bruit  MSK: 1+ swelling of the lower extremities  CNS: Normal CN 2,3,4,6,5,7,9,10,11,and 12. Oriented to time, place and person    Labs:  Recent Labs     06/29/22  1304 06/29/22  1644 06/30/22  0109   WBC 7.9 7.5 7.3   HGB 13.6* 13.5* 12.8*    265 266       Recent Labs     06/29/22  1304 06/29/22  1644 06/30/22  0109    139 141   K 4.2 4.9 4.4    104 104   CO2 26 24 24   BUN 17 17 18   CREATININE 1.4* 1.3* 1.3*   CALCIUM 9.4 9.4 8.9       CK, CKMB, Troponin:   Recent Labs     06/29/22  1644 06/29/22  2123 06/30/22  0109   TROPONINI <0.01 <0.01 <0.01       Last 3 BNP:  Recent Labs     06/29/22  1644   PROBNP 141             XR CHEST PORTABLE    Result Date: 6/29/2022  No focal pneumonic consolidation, no pleural effusion or mechelle CHF. Mild widening of the superior mediastinum probably due to vascular ectasia. Recommendation: Follow up as clinically indicated. Electronically Signed by Shabbir Castellanos MD at 29-Jun-2022 06:10:20 PM                Assessment and Plan:  Paroxysmal supraventricular tachycardia probably atrial flutter with 2to1 AV block, asymptomatic  Hypertension hyperlipidemia and obesity  Traumatic brain injury at age 9  DVT of the left leg 2 years ago after knee surgery, has been on NOAC  Stage III renal insufficiency    Plan: We will reviewed the echocardiogram.  Patient is already on NOAC for DVT happened 2 years ago after the left knee surgery. We should get him on some medication to reduce the recurrence of atrial flutter. He is anxious to lose weight. He should stay on NOAC. Echocardiogram result is pending    I have spent 60 minutes reviewing the chart, taking history, examining the patient, discussion with the patient and the family concerning the finding, diagnoses and treatment plan. This dictation was performed using [address]. Mistake and misspelling may have been created without  realizing them. Please notify me for clarification.   Thank you    Slime Fuentes MD   6/30/2022, 9:43 AM

## 2022-07-01 VITALS
DIASTOLIC BLOOD PRESSURE: 79 MMHG | OXYGEN SATURATION: 99 % | BODY MASS INDEX: 41.75 KG/M2 | WEIGHT: 315 LBS | RESPIRATION RATE: 20 BRPM | HEIGHT: 73 IN | TEMPERATURE: 97.2 F | HEART RATE: 66 BPM | SYSTOLIC BLOOD PRESSURE: 132 MMHG

## 2022-07-01 PROBLEM — I48.91 ATRIAL FIBRILLATION (HCC): Status: ACTIVE | Noted: 2022-01-01

## 2022-07-01 LAB
ANION GAP SERPL CALCULATED.3IONS-SCNC: 11 MMOL/L (ref 7–19)
BASOPHILS ABSOLUTE: 0 K/UL (ref 0–0.2)
BASOPHILS RELATIVE PERCENT: 0.5 % (ref 0–1)
BUN BLDV-MCNC: 23 MG/DL (ref 8–23)
CALCIUM SERPL-MCNC: 8.7 MG/DL (ref 8.8–10.2)
CHLORIDE BLD-SCNC: 102 MMOL/L (ref 98–111)
CO2: 26 MMOL/L (ref 22–29)
CREAT SERPL-MCNC: 1.5 MG/DL (ref 0.5–1.2)
EOSINOPHILS ABSOLUTE: 0.2 K/UL (ref 0–0.6)
EOSINOPHILS RELATIVE PERCENT: 2.8 % (ref 0–5)
GFR AFRICAN AMERICAN: 57
GFR NON-AFRICAN AMERICAN: 47
GLUCOSE BLD-MCNC: 96 MG/DL (ref 74–109)
HCT VFR BLD CALC: 36.6 % (ref 42–52)
HEMOGLOBIN: 12.4 G/DL (ref 14–18)
IMMATURE GRANULOCYTES #: 0 K/UL
LYMPHOCYTES ABSOLUTE: 1.5 K/UL (ref 1.1–4.5)
LYMPHOCYTES RELATIVE PERCENT: 19.8 % (ref 20–40)
MCH RBC QN AUTO: 30 PG (ref 27–31)
MCHC RBC AUTO-ENTMCNC: 33.9 G/DL (ref 33–37)
MCV RBC AUTO: 88.6 FL (ref 80–94)
MONOCYTES ABSOLUTE: 0.7 K/UL (ref 0–0.9)
MONOCYTES RELATIVE PERCENT: 8.8 % (ref 0–10)
NEUTROPHILS ABSOLUTE: 5.1 K/UL (ref 1.5–7.5)
NEUTROPHILS RELATIVE PERCENT: 67.7 % (ref 50–65)
PDW BLD-RTO: 12.4 % (ref 11.5–14.5)
PLATELET # BLD: 254 K/UL (ref 130–400)
PMV BLD AUTO: 9.9 FL (ref 9.4–12.4)
POTASSIUM REFLEX MAGNESIUM: 4.2 MMOL/L (ref 3.5–5)
RBC # BLD: 4.13 M/UL (ref 4.7–6.1)
SODIUM BLD-SCNC: 139 MMOL/L (ref 136–145)
WBC # BLD: 7.5 K/UL (ref 4.8–10.8)

## 2022-07-01 PROCEDURE — 6370000000 HC RX 637 (ALT 250 FOR IP): Performed by: INTERNAL MEDICINE

## 2022-07-01 PROCEDURE — 93246 EXT ECG>7D<15D RECORDING: CPT

## 2022-07-01 PROCEDURE — 2580000003 HC RX 258: Performed by: INTERNAL MEDICINE

## 2022-07-01 PROCEDURE — 80048 BASIC METABOLIC PNL TOTAL CA: CPT

## 2022-07-01 PROCEDURE — 36415 COLL VENOUS BLD VENIPUNCTURE: CPT

## 2022-07-01 PROCEDURE — G0378 HOSPITAL OBSERVATION PER HR: HCPCS

## 2022-07-01 PROCEDURE — 85025 COMPLETE CBC W/AUTO DIFF WBC: CPT

## 2022-07-01 PROCEDURE — 93242 EXT ECG>48HR<7D RECORDING: CPT | Performed by: INTERNAL MEDICINE

## 2022-07-01 RX ORDER — METOPROLOL SUCCINATE 100 MG/1
100 TABLET, EXTENDED RELEASE ORAL DAILY
Qty: 30 TABLET | Refills: 1 | Status: SHIPPED | OUTPATIENT
Start: 2022-07-02 | End: 2022-07-18 | Stop reason: SDUPTHER

## 2022-07-01 RX ADMIN — SODIUM CHLORIDE, PRESERVATIVE FREE 10 ML: 5 INJECTION INTRAVENOUS at 08:18

## 2022-07-01 RX ADMIN — LOSARTAN POTASSIUM 100 MG: 100 TABLET, FILM COATED ORAL at 08:18

## 2022-07-01 RX ADMIN — ASPIRIN 81 MG 81 MG: 81 TABLET ORAL at 08:17

## 2022-07-01 RX ADMIN — FENOFIBRATE 160 MG: 160 TABLET ORAL at 08:17

## 2022-07-01 RX ADMIN — METOPROLOL SUCCINATE 100 MG: 50 TABLET, EXTENDED RELEASE ORAL at 08:18

## 2022-07-01 NOTE — PROGRESS NOTES
Physician Progress Note      PATIENT:               Jing Arredondo  CSN #:                  [id number]  :                       1955  ADMIT DATE:       2022 4:31 PM  [address] [place] DATE:  RESPONDING  PROVIDER #:        Apolonia Gupta MD          QUERY TEXT:    Patient admitted with tachycardia, noted to have atrial fibrillation and is   maintained on Eliquis. If possible, please document in progress notes and   discharge summary if you are evaluating and/or treating any of the following: The medical record reflects the following:  Risk Factors: afib,  Clinical Indicators: CHA2DS VASc score >2  Treatment: Eliquis    Thank you,  Alis Lindsey, [facility]  [phone]  Options provided:  -- Secondary hypercoagulable state in a patient with atrial fibrillation  -- Afib only  -- Other - I will add my own diagnosis  -- Disagree - Not applicable / Not valid  -- Disagree - Clinically unable to determine / Unknown  -- Refer to Clinical Documentation Reviewer    PROVIDER RESPONSE TEXT:    This patient has secondary hypercoagulable state related to atrial   fibrillation.     Query created by: Aracely Ashton on 2022 12:38 PM      Electronically signed by:  Apolonia Gupta MD 2022 3:08 PM

## 2022-07-01 NOTE — DISCHARGE SUMMARY
[place], [place], [address]  DEPARTMENT OF HOSPITALIST MEDICINE    DISCHARGE SUMMARY:        Fela Dickerson  :  1955  MRN:  [id number]    Admit date:  2022  Discharge date: 2022      Admitting Physician:  Светлана Olivarez MD    Advance Directive: Full Code    Consults Made:   IP CONSULT TO CARDIOLOGY      Primary Care Physician:  JODY Cuba    Discharge Diagnoses:  Principal Problem:    Atrial fibrillation with RVR ([facility] [address]  Active Problems:    Irregular heart beat    Atrial fibrillation ([address]    Mixed hyperlipidemia    Essential hypertension    Morbid obesity ([facility] [address]    Chronic deep vein thrombosis (DVT) of left peroneal vein (HCC)    Gastroesophageal reflux disease without esophagitis  Resolved Problems:    * No resolved hospital problems.  *          Pertinent Labs:  CBC with DIFF:  Recent Labs     22  1644 22  0109 22  013   WBC 7.5 7.3 7.5   RBC 4.54* 4.35* 4.13*   HGB 13.5* 12.8* 12.4*   HCT 40.2* 38.7* 36.6*   MCV 88.5 89.0 88.6   MCH 29.7 29.4 30.0   MCHC 33.6 33.1 33.9   RDW 12.6 12.5 12.4    266 254   MPV 9.5 9.8 9.9   NEUTOPHILPCT 69.5* 65.8* 67.7*   LYMPHOPCT 18.8* 22.0 19.8*   MONOPCT 9.3 9.2 8.8   EOSRELPCT 1.7 2.2 2.8   BASOPCT 0.4 0.5 0.5   NEUTROABS 5.2 4.8 5.1   LYMPHSABS 1.4 1.6 1.5   MONOSABS 0.70 0.70 0.70   EOSABS 0.10 0.20 0.20   BASOSABS 0.00 0.00 0.00       CMP/BMP:  Recent Labs     22  1304 22  1304 22  1644 22  0109 22  0137      < > 139 141 139   K 4.2   < > 4.9 4.4 4.2      < > 104 104 102   CO2 26   < > 24 24 26   ANIONGAP 10   < > 11 13 11   GLUCOSE 100   < > 100 88 96   BUN 17   < > 17 18 23   CREATININE 1.4*   < > 1.3* 1.3* 1.5*   LABGLOM 51*   < > 55* 55* 47*   CALCIUM 9.4   < > 9.4 8.9 8.7*   PROT 7.4  --  7.3  --   --    LABALBU 4.4  --  4.2  --   --    BILITOT 0.4  --  0.4  --   --    ALKPHOS 72  --  72  --   --    ALT 21  --  22  --   --    AST 19  --  28  --   --     < > = values in this interval not displayed. CRP:  No results for input(s): CRP in the last 72 hours. Sed Rate:  No results for input(s): SEDRATE in the last 72 hours. HgBA1c:  No components found for: HGBA1C  FLP:    Lab Results   Component Value Date/Time    TRIG 175 06/30/2022 01:09 AM    HDL 29 06/30/2022 01:09 AM    LDLCALC 100 06/30/2022 01:09 AM    LDLDIRECT 128 09/11/2017 10:07 AM     TSH:    Lab Results   Component Value Date/Time    TSH 2.530 03/28/2022 09:14 AM     Troponin T:   Recent Labs     06/29/22  1644 06/29/22  2123 06/30/22  0109   TROPONINI <0.01 <0.01 <0.01     Pro-BNP: No results for input(s): BNP in the last 72 hours. INR:   Recent Labs     06/29/22  1644   INR 1.60*     ABGs: No results found for: PHART, PO2ART, WSJ4TGS  UA:No results for input(s): NITRITE, COLORU, PHUR, LABCAST, WBCUA, RBCUA, MUCUS, TRICHOMONAS, YEAST, BACTERIA, CLARITYU, SPECGRAV, LEUKOCYTESUR, UROBILINOGEN, BILIRUBINUR, BLOODU, GLUCOSEU, AMORPHOUS in the last 72 hours. Invalid input(s): Ledy Reamer      Culture Results:    No results for input(s): CXSURG in the last 720 hours. Blood Culture Recent: No results for input(s): BC in the last 720 hours. Cultures:   No results for input(s): CULTURE in the last 72 hours. No results for input(s): BC, Volanda Gin in the last 72 hours. No results for input(s): CXSURG in the last 72 hours. Recent Labs     06/29/22  1644   MG 2.1     Recent Labs     06/29/22  1304 06/29/22  1644   AST 19 28   ALT 21 22   BILITOT 0.4 0.4   ALKPHOS 72 72           Significant Diagnostic Studies:   XR CHEST PORTABLE    Result Date: 6/29/2022  NO PRIOR REPORT AVAILABLE Exam: X-RAY OF THECHEST Clinical data: Tachycardia. Technique:Single view of the chest. Prior studies: No prior studies submitted. Findings: The lungs are grossly clear; noevidence of acute infiltrate or pleural effusion. Mild widening of the superior mediastinum probably due to vascular ectasia.  Cardiac silhouette is within normal limits. No acute osseous abnormality is detected. No focal pneumonic consolidation, no pleural effusion or mechelle CHF. Mild widening of the superior mediastinum probably due to vascular ectasia. Recommendation: Follow up as clinically indicated. Electronically Signed by Rosaura Benitez MD at 29-Jun-2022 06:10:20 PM             ECHO 2D WO COLOR DOPPLER COMPLETE    Result Date: 6/30/2022  Transthoracic Echocardiography Report (TTE)  Demographics   Patient Name  Davey Hernandez Date of Study          06/30/2022   MRN           [id number]        Gender                 Male   Date of Birth 1955    Room Number            VHD-9728   Age           78 year(s)   Height:       73 inches     Referring Physician    Lizz Quiroz MD   Weight:       338.01 pounds Sonographer            Stella Suazo RDCS   BSA:          2.69 m^2      Interpreting Physician Maria E Lyons MD   BMI:          44.59 kg/m^2  Procedure Type of Study   TTE procedure:ECHO 2D W/DOPPLER/COLOR/CONTRAST. Study Location: Echo Lab Technical Quality: Limited visualization due to body habitus. Patient Status: Inpatient Contrast Medium: Definity. Amount - 4 ml HR: 83 bpm BP: 168/93 mmHg Indications:Atrial fibrillation.   Conclusions   Summary  Technically difficult study with most images being uninterpretable  Normal left ventricular size with low normal systolic function estimated  ejection fraction 50 to 55%  Left ventricular wall thickness appears upper normal  Indeterminate diastolic function  Poor visualization of the aortic valve  Poor visualization of the mitral valve  Poor visualization of the pulmonic valve  Poor visualization cessation of the right-sided chambers with apparent  right ventricular enlargement  Dilatation of the ascending aorta measuring 3.5 cm  Poor visualization of the aortic arch  Definity contrast utilized to better define endocardial borders   Signature   ---------------------------------------------------------------- Electronically signed by Ezekiel Espana MD(Interpreting physician)  on 06/30/2022 10:02 AM  ----------------------------------------------------------------  2D Measurements and Calculations(cm)   LVIDd: 5.1 cm                       LVIDs: 4.37 cm  IVSd: 1.02 cm  LVPWd: 1 cm                         AO Root Dimension: 2.1 cm  Rt. Vent. Dimension: 3.32 cm        LA Dimension: 3.9 cm  % Ejection Fraction: 55 %           LV Systolic dimension: 0.60JG                                      LV PW diastolic: 1cm  LV dimension: 5.1 cm                LVOT diameter: 2.1 cm                                      RV Diastolic dimension: 3.54GS  LVEDV: 77.1 ml                      LVEDVI: 29 ml/m^2  LVESV:35 ml                         LVESVI: 13 ml/m^2  Cardic Output (CO): 4.25l/min  Ascending Aorta: 3.5 cm  Doppler Measurements and Calculations                                           MV Peak E-Wave: 80.6 cm/s                                          MV Peak A-Wave: 41.1 cm/s                                          MV E/A Ratio: 1.96 %                                          MV Mean velocity: 51.3cm/s                                          MV Peak Gradient: 2.6 mmHg                                          MV Mean gradient: 1 mmHg  MV E' septal velocity: 5.87cm/s  MV E' lateral velocity:6.42 cm/s            Hospital Course:   Mr. Sweet Nose 78 y. o. male with a history of HTN, HLD, DVT (on rivaroxaban), presenting to [facility] ED (06/29/2022), on account of tachycardia.     Patient was noted to have elevated HR at a PCP visit and was subsequently sent to [facility] ED for further work-up and management.     No associated symptoms reported.  No chest pain, no shortness of breath or palpitations.     Patient was noted to be in A. fib with RVR with HR up to 150s, requiring diltiazem bolus and initiation of diltiazem drip.     Patient admitted to [facility] (06/29/2022), further work-up and management.        New onset A. fib with RVR  · HR up to 150s on presentation  · Diltiazem gtt. keep HR  < 110  · Metoprolol Succinate 100 mg p.o. daily  · Rivaroxaban 20 mg p.o. daily  · 2D echocardiogram (06/29/2022): Normal LV size with normal LV function, with estimated EF of 50-55%. Indeterminate diastolic dysfunction. Dilatation of the ascending aorta measuring 3.5 cm: Summary report as noted above. · Followed by Cardiology -appreciate recommendations  · Further work-up and medication adjustment as per cardiology. · Zio patch upon discharge  · Okay for discharge from cardiology standpoint      Chronic DVT of left peroneal vein  · Rivaroxaban 20 mg p.o. daily     HTN  · Continued home regimen  · Losartan 100 mg p.o. daily     HLD-mixed  · Rosuvastatin 10 mg p.o. nightly  · Fenofibrate 160 mg p.o. daily              Continued management of other chronic medical conditions         Physical Exam:  Vital Signs: /79   Pulse 66   Temp 97.2 °F (36.2 °C) (Temporal)   Resp 20   Ht 6' 1\" (1.854 m)   Wt (!) 338 lb 9.6 oz (153.6 kg)   SpO2 99%   BMI 44.67 kg/m²   Physical Exam  Vitals and nursing note reviewed. Constitutional:       General: He is not in acute distress. Appearance: Normal appearance. He is obese. He is not ill-appearing, toxic-appearing or diaphoretic. HENT:      Head: Normocephalic and atraumatic. Right Ear: External ear normal.      Left Ear: External ear normal.      Nose: Nose normal. No congestion or rhinorrhea. Mouth/Throat:      Mouth: Mucous membranes are moist.      Pharynx: Oropharynx is clear. No oropharyngeal exudate or posterior oropharyngeal erythema. Eyes:      General: No scleral icterus. Right eye: No discharge. Left eye: No discharge. Extraocular Movements: Extraocular movements intact. Conjunctiva/sclera: Conjunctivae normal.      Pupils: Pupils are equal, round, and reactive to light. Cardiovascular:      Rate and Rhythm: Normal rate and regular rhythm. Pulses: Normal pulses. Heart sounds: Normal heart sounds. No murmur heard. No friction rub. No gallop. Pulmonary:      Effort: Pulmonary effort is normal. No respiratory distress. Breath sounds: Normal breath sounds. No stridor. No wheezing, rhonchi or rales. Chest:      Chest wall: No tenderness. Abdominal:      General: Bowel sounds are normal. There is no distension. Palpations: Abdomen is soft. Tenderness: There is no abdominal tenderness. There is no guarding or rebound. Musculoskeletal:         General: No swelling, tenderness, deformity or signs of injury. Normal range of motion. Cervical back: Normal range of motion and neck supple. No rigidity. No muscular tenderness. Right lower leg: No edema. Left lower leg: No edema. Skin:     General: Skin is warm and dry. Capillary Refill: Capillary refill takes less than 2 seconds. Coloration: Skin is not jaundiced or pale. Findings: No bruising, erythema, lesion or rash. Neurological:      General: No focal deficit present. Mental Status: He is alert. Mental status is at baseline. Cranial Nerves: No cranial nerve deficit. Sensory: No sensory deficit. Motor: No weakness. Coordination: Coordination normal.   Psychiatric:         Mood and Affect: Mood normal.         Behavior: Behavior normal.         Thought Content:  Thought content normal.         Judgment: Judgment normal.         Discharge Medications:        Medication List      START taking these medications    metoprolol succinate 100 MG extended release tablet  Commonly known as: TOPROL XL  Take 1 tablet by mouth daily  Start taking on: July 2, 2022        CONTINUE taking these medications    fenofibrate 160 MG tablet  Commonly known as: TRIGLIDE  Take 1 tablet by mouth daily     fish oil-omega-3 fatty acids 1000 MG capsule  Take 2 capsules by mouth nightly     losartan 100 MG tablet  Commonly known as: COZAAR  TAKE 1 TABLET EVERY DAY     MULTI-VITAMIN PO     omega-3 acid ethyl esters 1 g capsule  Commonly known as: LOVAZA     Red Yeast Rice 600 MG Tabs     rivaroxaban 20 MG Tabs tablet  Commonly known as: Xarelto  TAKE 1 TABLET EVERY DAY WITH BREAKFAST     rosuvastatin 10 MG tablet  Commonly known as: Crestor  Take 1 tablet by mouth nightly     Vitamin D3 50 MCG (2000 UT) Caps  Take 1 capsule by mouth daily        STOP taking these medications    Eliquis 5 MG Tabs tablet  Generic drug: apixaban           Where to Get Your Medications      These medications were sent to [address] - P [phone] Edna Medinakarissa [phone]  [address] [place] [address]    Phone: [phone]   · metoprolol succinate 100 MG extended release tablet           Discharge Instructions: Follow up with JODY Ramirez in 7 days. Take medications as directed. Resume activity as tolerated. Recommended Follow Up:  Cari Fuller MD  [address] [facility] [address] [place] [address] [phone]    On 8/1/2022  8:15 am     Annette Ramirez [address]  [address] [phone]    On 7/7/2022  Hospital Follow-up appointment @ 11:30. Followup Appointments Scheduled at Time of Discharge:  Future Appointments   Date Time Provider Sam Valenzuela   7/7/2022 11:30 AM JODY Ramirez [place]   7/26/2022  2:00 PM JODY [place]   8/1/2022  8:15 AM Cari Fuller MD [facility]          Diet: ADULT DIET; Regular;  No Caffeine        DISCHARGE STATUS:    Condition on Discharge: stable  Disposition: Patient is medically stable and will be discharged Home      Extended Emergency Contact Information  Primary Emergency Contact: Rocio Mcneil  Address: [address] [place] [address] Neha Saavedra [address], [address] Phone: [phone]  Relation: Parent  Secondary Emergency Contact: Dhiraj [facility] Phone: [phone]  Relation: Brother/Sister Time Spent on discharge is  34 mins in the examination, evaluation, counseling and review of medications and discharge plan. Electronically signed by   Nima Pettit MD, MPH, MD,   Internal Medicine Hospitalist   7/1/2022 2:10 PM      Thank you JODY Edwards for the opportunity to be involved in this patient's care. If you have any questions or concerns please feel free to contact me at [phone]        EMR Dragon/Transcription disclaimer:   Much of this encounter note is an electronic transcription/translation of spoken language to printed text.  The electronic translation of spoken language may permit erroneous, or at times, nonsensical words or phrases to be inadvertently transcribed; although attempts have made to review the note for such errors, some may still exist.

## 2022-07-01 NOTE — PROGRESS NOTES
Patient received discharge instructions with mother at bedside. Patient verbalized understanding of the teaching and discharged with zio patch in place.

## 2022-07-05 ENCOUNTER — TELEPHONE (OUTPATIENT)
Dept: INTERNAL MEDICINE | Age: 67
End: 2022-07-05

## 2022-07-05 LAB
EKG P AXIS: -17 DEGREES
EKG P-R INTERVAL: 164 MS
EKG Q-T INTERVAL: 382 MS
EKG QRS DURATION: 86 MS
EKG QTC CALCULATION (BAZETT): 427 MS
EKG T AXIS: 89 DEGREES

## 2022-07-05 NOTE — TELEPHONE ENCOUNTER
[facility] Transitions Initial Follow Up Call    Outreach made within 2 business days of discharge: Yes    Patient: Arnulfo Thomas   Patient : 1955 MRN: [id number]    Reason for Admission: Afib    Discharge Date: 22      Discharge Diagnoses:  Principal Problem:    Atrial fibrillation with RVR ([facility] [address]  Active Problems:    Irregular heart beat    Atrial fibrillation ([facility] [address]    Mixed hyperlipidemia    Essential hypertension    Morbid obesity ([facility] [address]    Chronic deep vein thrombosis (DVT) of left peroneal vein (HCC)    Gastroesophageal reflux disease without esophagitis  Resolved Problems:    * No resolved hospital problems     Spoke with: Attempted to make contact with patient/caregiver for an initial transitions of care follow up call post discharge without success. I will reach out again at a later time. Any previously scheduled hospital follow up appointments noted below.          Discharge department/facility: [place] [address]    Scheduled appointment with PCP within 7-14 days    Follow Up  Future Appointments   Date Time Provider Sam Valenzuela   2022 11:30 AM JODY Cortés [facility]   2022  2:00 PM JODY Cortés [place]   2022  8:15 AM MD KAI Bey [facility]io [facility]       Geovanna shir[place], [place]

## 2022-07-06 ENCOUNTER — TELEPHONE (OUTPATIENT)
Dept: INTERNAL MEDICINE | Age: 67
End: 2022-07-06

## 2022-07-06 NOTE — TELEPHONE ENCOUNTER
[facility] [facility] Transitions Initial Follow Up Call    Outreach made within 2 business days of discharge: Yes    Patient: Shyla Chinchilla   Patient : 1955 MRN: [id number]    Reason for Admission: AFib    Discharge Date: 22       Discharge Diagnoses:  Principal Problem:    Atrial fibrillation with RVR ([facility] [address]  Active Problems:    Irregular heart beat    Atrial fibrillation ([address]    Mixed hyperlipidemia    Essential hypertension    Morbid obesity ([facility] [address]    Chronic deep vein thrombosis (DVT) of left peroneal vein (HCC)    Gastroesophageal reflux disease without esophagitis  Resolved Problems:    * No resolved hospital problems              Spoke with: Attempted 2nd HFU call the first number is not a working number and the second goes to a full VM. Pt does already have a HFU apt made.     Discharge department/facility: [facility]      Scheduled appointment with PCP within 7-14 days    Follow Up  Future Appointments   Date Time Provider Sam Valenzuela   2022 11:30 AM JODY Guthrie [facility]   2022  2:00 PM JODY Guthrie [place]   2022  8:15 AM MD KAI Tate [facility]       [place], [place]

## 2022-07-06 NOTE — TELEPHONE ENCOUNTER
Does this knock him out of TCM visit if you didn't speak with him ?:     His family should have phone numbers, Patricia Pinto or Reena Jama

## 2022-07-13 PROCEDURE — 93244 EXT ECG>48HR<7D REV&INTERPJ: CPT | Performed by: INTERNAL MEDICINE

## 2022-07-13 NOTE — PROCEDURES
Cardiac event monitor report    Dates of recording 7/1/2022 through 7/4/2022    Summary impressions:    1. Sinus rhythm minimal heart rate 61 average 80 maximal 112    2. 8 episode supraventricular tachycardia fastest interval 5 beats maximum rate 167 longest 33 minutes 48 seconds average rate 130    3. No episodes of ventricular tachycardia were recorded    4. No pauses greater than 3.0 seconds were recorded    5. No episodes of complete or Mobitz 2 atrioventricular block were recorded    6. No episodes of atrial fibrillation were recorded    7. 0 triggered events 0 diary events were recorded    8.  Occasional PVCs 1.3% otherwise rare ectopy noted

## 2022-07-18 DIAGNOSIS — E55.9 VITAMIN D DEFICIENCY: ICD-10-CM

## 2022-07-18 RX ORDER — METOPROLOL SUCCINATE 100 MG/1
100 TABLET, EXTENDED RELEASE ORAL DAILY
Qty: 90 TABLET | Refills: 1 | Status: SHIPPED | OUTPATIENT
Start: 2022-07-18

## 2022-07-18 RX ORDER — ROSUVASTATIN CALCIUM 10 MG/1
10 TABLET, COATED ORAL NIGHTLY
Qty: 90 TABLET | Refills: 1 | Status: SHIPPED | OUTPATIENT
Start: 2022-07-18 | End: 2022-10-16

## 2022-07-18 RX ORDER — ACETAMINOPHEN 160 MG
TABLET,DISINTEGRATING ORAL
Qty: 90 CAPSULE | Refills: 1 | Status: SHIPPED | OUTPATIENT
Start: 2022-07-18

## 2022-07-18 NOTE — TELEPHONE ENCOUNTER
Beau Arias called requesting a refill of the below medication which has been pended for you:     Requested Prescriptions     Pending Prescriptions Disp Refills    rosuvastatin (CRESTOR) 10 MG tablet 90 tablet 1     Sig: Take 1 tablet by mouth nightly    metoprolol succinate (TOPROL XL) 100 MG extended release tablet 90 tablet 1     Sig: Take 1 tablet by mouth in the morning.        Last Appointment Date: 6/29/2022  Next Appointment Date: 7/26/2022    Allergies   Allergen Reactions    Pcn [Penicillins]

## 2022-07-19 ENCOUNTER — TELEPHONE (OUTPATIENT)
Dept: CARDIOLOGY CLINIC | Age: 67
End: 2022-07-19

## 2022-07-19 NOTE — TELEPHONE ENCOUNTER
Per Dr. Sg Stahl, miguel showed 8 episodes of SVT. Will address at [facility] in office. Left vm for patient's mother to call back.

## 2022-07-26 ENCOUNTER — HOSPITAL ENCOUNTER (EMERGENCY)
Age: 67
Discharge: HOME | End: 2022-07-26
Attending: EMERGENCY MEDICINE
Payer: MEDICARE

## 2022-07-26 ENCOUNTER — OFFICE VISIT (OUTPATIENT)
Dept: INTERNAL MEDICINE | Age: 67
End: 2022-07-26
Payer: MEDICARE

## 2022-07-26 ENCOUNTER — APPOINTMENT (OUTPATIENT)
Dept: GENERAL RADIOLOGY | Age: 67
End: 2022-07-26
Payer: MEDICARE

## 2022-07-26 VITALS
BODY MASS INDEX: 41.75 KG/M2 | SYSTOLIC BLOOD PRESSURE: 124 MMHG | OXYGEN SATURATION: 97 % | DIASTOLIC BLOOD PRESSURE: 80 MMHG | HEART RATE: 148 BPM | HEIGHT: 73 IN | WEIGHT: 315 LBS

## 2022-07-26 VITALS
OXYGEN SATURATION: 95 % | BODY MASS INDEX: 41.75 KG/M2 | HEIGHT: 73 IN | HEART RATE: 81 BPM | DIASTOLIC BLOOD PRESSURE: 66 MMHG | RESPIRATION RATE: 18 BRPM | WEIGHT: 315 LBS | SYSTOLIC BLOOD PRESSURE: 126 MMHG | TEMPERATURE: 98.5 F

## 2022-07-26 DIAGNOSIS — I48.0 PAROXYSMAL ATRIAL FIBRILLATION (HCC): Primary | ICD-10-CM

## 2022-07-26 DIAGNOSIS — I48.91 ATRIAL FIBRILLATION, UNSPECIFIED TYPE (HCC): Primary | ICD-10-CM

## 2022-07-26 LAB
ALBUMIN SERPL-MCNC: 4.4 G/DL (ref 3.5–5.2)
ALP BLD-CCNC: 79 U/L (ref 40–130)
ALT SERPL-CCNC: 24 U/L (ref 5–41)
ANION GAP SERPL CALCULATED.3IONS-SCNC: 10 MMOL/L (ref 7–19)
AST SERPL-CCNC: 25 U/L (ref 5–40)
BASOPHILS ABSOLUTE: 0 K/UL (ref 0–0.2)
BASOPHILS RELATIVE PERCENT: 0.5 % (ref 0–1)
BILIRUB SERPL-MCNC: 0.3 MG/DL (ref 0.2–1.2)
BUN BLDV-MCNC: 14 MG/DL (ref 8–23)
CALCIUM SERPL-MCNC: 9.5 MG/DL (ref 8.8–10.2)
CHLORIDE BLD-SCNC: 104 MMOL/L (ref 98–111)
CO2: 24 MMOL/L (ref 22–29)
CREAT SERPL-MCNC: 1.2 MG/DL (ref 0.5–1.2)
EOSINOPHILS ABSOLUTE: 0.2 K/UL (ref 0–0.6)
EOSINOPHILS RELATIVE PERCENT: 3.7 % (ref 0–5)
GFR AFRICAN AMERICAN: >59
GFR NON-AFRICAN AMERICAN: >60
GLUCOSE BLD-MCNC: 132 MG/DL (ref 74–109)
HCT VFR BLD CALC: 43 % (ref 42–52)
HEMOGLOBIN: 14.2 G/DL (ref 14–18)
IMMATURE GRANULOCYTES #: 0 K/UL
LYMPHOCYTES ABSOLUTE: 1.2 K/UL (ref 1.1–4.5)
LYMPHOCYTES RELATIVE PERCENT: 19.7 % (ref 20–40)
MCH RBC QN AUTO: 29 PG (ref 27–31)
MCHC RBC AUTO-ENTMCNC: 33 G/DL (ref 33–37)
MCV RBC AUTO: 87.9 FL (ref 80–94)
MONOCYTES ABSOLUTE: 0.4 K/UL (ref 0–0.9)
MONOCYTES RELATIVE PERCENT: 7.2 % (ref 0–10)
NEUTROPHILS ABSOLUTE: 4.1 K/UL (ref 1.5–7.5)
NEUTROPHILS RELATIVE PERCENT: 68.7 % (ref 50–65)
PDW BLD-RTO: 12.6 % (ref 11.5–14.5)
PLATELET # BLD: 271 K/UL (ref 130–400)
PMV BLD AUTO: 10.4 FL (ref 9.4–12.4)
POTASSIUM SERPL-SCNC: 4.8 MMOL/L (ref 3.5–5)
PRO-BNP: 99 PG/ML (ref 0–900)
RBC # BLD: 4.89 M/UL (ref 4.7–6.1)
SARS-COV-2, NAAT: NOT DETECTED
SODIUM BLD-SCNC: 138 MMOL/L (ref 136–145)
TOTAL PROTEIN: 6.9 G/DL (ref 6.6–8.7)
TROPONIN: <0.01 NG/ML (ref 0–0.03)
WBC # BLD: 5.9 K/UL (ref 4.8–10.8)

## 2022-07-26 PROCEDURE — 2500000003 HC RX 250 WO HCPCS: Performed by: EMERGENCY MEDICINE

## 2022-07-26 PROCEDURE — 83880 ASSAY OF NATRIURETIC PEPTIDE: CPT

## 2022-07-26 PROCEDURE — 3017F COLORECTAL CA SCREEN DOC REV: CPT | Performed by: NURSE PRACTITIONER

## 2022-07-26 PROCEDURE — 84484 ASSAY OF TROPONIN QUANT: CPT

## 2022-07-26 PROCEDURE — 87635 SARS-COV-2 COVID-19 AMP PRB: CPT

## 2022-07-26 PROCEDURE — 71045 X-RAY EXAM CHEST 1 VIEW: CPT | Performed by: RADIOLOGY

## 2022-07-26 PROCEDURE — 1123F ACP DISCUSS/DSCN MKR DOCD: CPT | Performed by: NURSE PRACTITIONER

## 2022-07-26 PROCEDURE — 93000 ELECTROCARDIOGRAM COMPLETE: CPT | Performed by: NURSE PRACTITIONER

## 2022-07-26 PROCEDURE — G8427 DOCREV CUR MEDS BY ELIG CLIN: HCPCS | Performed by: NURSE PRACTITIONER

## 2022-07-26 PROCEDURE — 99285 EMERGENCY DEPT VISIT HI MDM: CPT

## 2022-07-26 PROCEDURE — G8417 CALC BMI ABV UP PARAM F/U: HCPCS | Performed by: NURSE PRACTITIONER

## 2022-07-26 PROCEDURE — 80053 COMPREHEN METABOLIC PANEL: CPT

## 2022-07-26 PROCEDURE — 85025 COMPLETE CBC W/AUTO DIFF WBC: CPT

## 2022-07-26 PROCEDURE — 93005 ELECTROCARDIOGRAM TRACING: CPT | Performed by: EMERGENCY MEDICINE

## 2022-07-26 PROCEDURE — 99215 OFFICE O/P EST HI 40 MIN: CPT | Performed by: NURSE PRACTITIONER

## 2022-07-26 PROCEDURE — 71045 X-RAY EXAM CHEST 1 VIEW: CPT

## 2022-07-26 PROCEDURE — 96374 THER/PROPH/DIAG INJ IV PUSH: CPT

## 2022-07-26 PROCEDURE — 36415 COLL VENOUS BLD VENIPUNCTURE: CPT

## 2022-07-26 PROCEDURE — 1036F TOBACCO NON-USER: CPT | Performed by: NURSE PRACTITIONER

## 2022-07-26 RX ORDER — DILTIAZEM HCL IN NACL,ISO-OSM 125 MG/125
10 PLASTIC BAG, INJECTION (ML) INTRAVENOUS CONTINUOUS
Status: DISCONTINUED | OUTPATIENT
Start: 2022-07-26 | End: 2022-07-26 | Stop reason: HOSPADM

## 2022-07-26 RX ORDER — DILTIAZEM HYDROCHLORIDE 5 MG/ML
10 INJECTION INTRAVENOUS ONCE
Status: COMPLETED | OUTPATIENT
Start: 2022-07-26 | End: 2022-07-26

## 2022-07-26 RX ADMIN — DILTIAZEM HYDROCHLORIDE 10 MG: 5 INJECTION INTRAVENOUS at 16:11

## 2022-07-26 ASSESSMENT — ENCOUNTER SYMPTOMS
SHORTNESS OF BREATH: 0
BLOOD IN STOOL: 0
VOMITING: 0
NAUSEA: 0
STRIDOR: 0
ABDOMINAL PAIN: 0
EYE ITCHING: 0
NAUSEA: 0
CONSTIPATION: 0
CHOKING: 0
COUGH: 0
DIARRHEA: 0
SHORTNESS OF BREATH: 0
COLOR CHANGE: 0
WHEEZING: 0
ABDOMINAL PAIN: 0
COUGH: 0
SORE THROAT: 0
EYE DISCHARGE: 0
DIARRHEA: 0
VOMITING: 0
ABDOMINAL DISTENTION: 0
TROUBLE SWALLOWING: 0

## 2022-07-26 ASSESSMENT — PAIN - FUNCTIONAL ASSESSMENT: PAIN_FUNCTIONAL_ASSESSMENT: NONE - DENIES PAIN

## 2022-07-26 NOTE — ED PROVIDER NOTES
[facility]  eMERGENCY dEPARTMENT eNCOUnter      Pt Name: Alecia Deleon  MRN: [id number]  [place] 1955  Date of evaluation: 7/26/2022  Provider: Kanika Bundy MD    CHIEF COMPLAINT       Chief Complaint   Patient presents with    Tachycardia     PCP sent for SVT         HISTORY OF PRESENT ILLNESS   (Location/Symptom, Timing/Onset,Context/Setting, Quality, Duration, Modifying Factors, Severity)  Note limiting factors. Alecia Deleon is a 78 y.o. male who presents to the emergency department for evaluation regarding rapid heart rate. Patient was seen his primary care provider as an outpatient and a routine follow-up. He does have a recent diagnosis of atrial fibrillation and was recently discharged home from the hospital.  He is currently maintained on medications for rate control along with Xarelto therapy. Patient states he is essentially asymptomatic. He is not experiencing any chest pain, shortness of breath, dizziness or syncopal events. He is not had any recent illnesses, fever or chills. Tells me has been taking his medication as directed. Unclear how long that he may have been in a tachycardic rhythm. There have been no relieving factors for the symptoms. HPI    NursingNotes were reviewed. REVIEW OF SYSTEMS    (2-9 systems for level 4, 10 or more for level 5)     Review of Systems   Constitutional:  Negative for appetite change, fatigue and fever. HENT:  Negative for congestion. Respiratory:  Negative for cough and shortness of breath. Cardiovascular:  Negative for chest pain and palpitations. Gastrointestinal:  Negative for abdominal pain, diarrhea, nausea and vomiting. Neurological:  Negative for syncope. All other systems reviewed and are negative.          PAST MEDICALHISTORY     Past Medical History:   Diagnosis Date    Atrial fibrillation ([address]     Cellulitis of left leg     Colon polyp     Elevated blood pressure reading without diagnosis of hypertension Esophageal reflux     History of prostate cancer     Mixed hyperlipidemia     TG>>LDL    Obesity     Prostate cancer [facility])     2010 Dr Lo Weller;  implants, XRT         SURGICAL HISTORY       Past Surgical History:   Procedure Laterality Date    COLONOSCOPY  6/2/2009        COLONOSCOPY  2012        UPPER GASTROINTESTINAL ENDOSCOPY  3/22/2000        VASECTOMY           CURRENT MEDICATIONS     Previous Medications    CHOLECALCIFEROL (VITAMIN D3) 50 MCG (2000 UT) CAPS    TAKE 1 CAPSULE EVERY DAY    FENOFIBRATE 160 MG TABLET    Take 1 tablet by mouth daily    FISH OIL-OMEGA-3 FATTY ACIDS 1000 MG CAPSULE    Take 2 capsules by mouth nightly    LOSARTAN (COZAAR) 100 MG TABLET    TAKE 1 TABLET EVERY DAY    METOPROLOL SUCCINATE (TOPROL XL) 100 MG EXTENDED RELEASE TABLET    Take 1 tablet by mouth in the morning. MULTIPLE VITAMIN (MULTI-VITAMIN PO)    Take  by mouth.       OMEGA-3 ACID ETHYL ESTERS (LOVAZA) 1 G CAPSULE        RED YEAST RICE 600 MG TABS    Take 2 tablets by mouth 2 times daily    RIVAROXABAN (XARELTO) 20 MG TABS TABLET    TAKE 1 TABLET EVERY DAY WITH BREAKFAST    ROSUVASTATIN (CRESTOR) 10 MG TABLET    Take 1 tablet by mouth nightly       ALLERGIES     Pcn [penicillins]    FAMILY HISTORY       Family History   Problem Relation Age of Onset    Colon Polyps Mother     Colon Polyps Brother     Heart Disease Father     High Blood Pressure Other           SOCIAL HISTORY       Social History     Socioeconomic History    Marital status: Single     Spouse name: None    Number of children: None    Years of education: None    Highest education level: None   Tobacco Use    Smoking status: Never    Smokeless tobacco: Never   Substance and Sexual Activity    Alcohol use: No    Drug use: No     Social Determinants of Health     Financial Resource Strain: Low Risk     Difficulty of Paying Living Expenses: Not hard at all   Food Insecurity: Unknown    Worried About Running Out of Food in the Last Year: Patient refused    [address] in the Last Year: Patient refused       SCREENINGS    Weare Coma Scale  Eye Opening: Spontaneous  Best Verbal Response: Oriented  Best Motor Response: Obeys commands  Weare Coma Scale Score: 15        PHYSICAL EXAM    (up to 7 for level 4, 8 or more for level 5)     ED Triage Vitals   BP Temp Temp Source Heart Rate Resp SpO2 Height Weight   07/26/22 1434 07/26/22 1436 07/26/22 1436 07/26/22 1430 07/26/22 1430 07/26/22 1434 07/26/22 1430 07/26/22 1430   136/79 98.5 °F (36.9 °C) Oral (!) 142 20 94 % 6' 1\" (1.854 m) (!) 364 lb (165.1 kg)       Physical Exam  Vitals and nursing note reviewed. Constitutional:       Appearance: He is obese. HENT:      Head: Atraumatic. Mouth/Throat:      Mouth: Mucous membranes are moist. Mucous membranes are not dry. Eyes:      General: No scleral icterus. Pupils: Pupils are equal, round, and reactive to light. Neck:      Trachea: No tracheal deviation. Cardiovascular:      Rate and Rhythm: Tachycardia present. Rhythm irregular. Pulses: Normal pulses. Heart sounds: Normal heart sounds. No murmur heard. Pulmonary:      Effort: Pulmonary effort is normal. No respiratory distress. Breath sounds: Normal breath sounds. No stridor. Abdominal:      General: There is no distension. Palpations: Abdomen is soft. Tenderness: There is no abdominal tenderness. There is no guarding. Musculoskeletal:      Right lower leg: No edema. Left lower leg: No edema. Skin:     Capillary Refill: Capillary refill takes less than 2 seconds. Coloration: Skin is not pale. Findings: No rash. Neurological:      General: No focal deficit present. Mental Status: He is alert and oriented to person, place, and time. Psychiatric:         Behavior: Behavior is cooperative.        DIAGNOSTIC RESULTS     EKG: All EKG's areinterpreted by the Emergency Department Physician who either signs or Co-signs this chart in the absence of a cardiologist.    1430: Tachycardic, regular appearing rhythm at 138 without evidence of P waves noted. No evidence of acute ST elevation identified. QTc: 452 MS.    1626: Normal sinus rhythm at a rate of 76, no evidence of acute ST elevation. QTc: 419 MS. RADIOLOGY:  Non-plain film images such as CT, Ultrasound and MRI are read by the radiologist. Plain radiographic images are visualized and preliminarily interpreted bythe emergency physician with the below findings:        XR CHEST PORTABLE   Final Result   Unremarkable chest   Recommendation: Follow up as clinically indicated. Electronically Signed by Pau Waddell MD at 26-Jul-2022 05:37:45 PM                       LABS:  Labs Reviewed   COMPREHENSIVE METABOLIC PANEL - Abnormal; Notable for the following components:       Result Value    Glucose 132 (*)     All other components within normal limits   CBC WITH AUTO DIFFERENTIAL - Abnormal; Notable for the following components:    Neutrophils % 68.7 (*)     Lymphocytes % 19.7 (*)     All other components within normal limits   COVID-19, RAPID   TROPONIN   BRAIN NATRIURETIC PEPTIDE       All other labs were within normal range or not returned as of this dictation. EMERGENCY DEPARTMENT COURSE and DIFFERENTIAL DIAGNOSIS/MDM:   Vitals:    Vitals:    07/26/22 1634 07/26/22 1732 07/26/22 1746 07/26/22 1816   BP:  116/84 106/81 135/74   Pulse: 74 78 77 84   Resp:    13   Temp:       TempSrc:       SpO2: 92% 92% 92% 94%   Weight:       Height:           MDM      Reassessment    Patient's initial EKG revealed evidence of atrial fibrillation with a rate in the 130s. Decision was made to proceed with rate control medications. He was given a Cardizem 10 mg bolus with a Cardizem infusion to follow however after administration of the IV bolus patient with subsequent cardioversion to normal sinus rhythm. Repeat EKG performed at 1626 revealed normal sinus rhythm at a rate of 76. Patient was observed in the ED for a couple of additional hours on telemetry monitoring. He did not develop additional tachyarrhythmia and is essentially asymptomatic at this time. We will plan to discharge him home with plans for outpatient cardiology follow-up. PROCEDURES:  Unless otherwise noted below, none     Procedures    FINAL IMPRESSION      1.  Paroxysmal atrial fibrillation [facility])          DISPOSITION/PLAN   DISPOSITION Decision To Discharge 07/26/2022 06:38:26 PM      [facility]  [facility] [phone]          DISCHARGE MEDICATIONS:  New Prescriptions    No medications on file          (Please note that portions of this note were completed with a voice recognition program.  Efforts were made to edit thedictations but occasionally words are mis-transcribed.)    Beverly Epley, MD (electronically signed)  Attending Emergency Physician          Beverly Epley, MD  07/26/22 9350

## 2022-07-26 NOTE — PROGRESS NOTES
[address] INTERNAL MEDICINE  [address] [address] [place] [address]  Dept: [phone]  Dept Fax: [phone]: [phone]    Joceline Mix (:  1955) is a 78 y.o. male,Established patient  with green, here for evaluation of the following chief complaint(s): 6 Month Follow-Up      Joceline Mix is a 78 y.o. male who presents today for his medical conditions/complaints as noted below. Joceline Mix is c/bryon 6 Month Follow-Up        HPI:     Chief Complaint   Patient presents with    6 Month Follow-Up     HPI   Rapid heart rate;  this is just a routine visit; he says he feels ok;  cant tell heart is racing;  doesn't feel SOB;  he was in quite impressive argument with his mother last night per . I am also not convinced that he is taking him meds correctly;  his family was to have been helping him but I dont think that is happening either;  he has slow menation; and cant read or write;         Past Medical History:   Diagnosis Date    Cellulitis of left leg     Colon polyp     Elevated blood pressure reading without diagnosis of hypertension     Esophageal reflux     History of prostate cancer     Mixed hyperlipidemia     TG>>LDL    Obesity     Prostate cancer [facility])     2010 Dr Nisha Wheeler;  implants, XRT      Past Surgical History:   Procedure Laterality Date    COLONOSCOPY  2009        COLONOSCOPY          UPPER GASTROINTESTINAL ENDOSCOPY  3/22/2000        VASECTOMY         Vitals 2022   SYSTOLIC 451 918 - 110 249 762   DIASTOLIC 80 79 - 93 72 66   Pulse 148 66 86 79 86 79   Temp - 97.2 - 97.8 97.5 97.8   Resp - 20 - 18 18 20   SpO2 97 99 - 96 97 97   Weight 335 lb - - - - -   Height 6' 1\" - - - - -   Body mass index 44.19 kg/m2 - - - - -   Some recent data might be hidden       Family History   Problem Relation Age of Onset    Colon Polyps Mother     Colon Polyps Brother Heart Disease Father     High Blood Pressure Other        Social History     Tobacco Use    Smoking status: Never    Smokeless tobacco: Never   Substance Use Topics    Alcohol use: No      Current Outpatient Medications   Medication Sig Dispense Refill    rosuvastatin (CRESTOR) 10 MG tablet Take 1 tablet by mouth nightly 90 tablet 1    metoprolol succinate (TOPROL XL) 100 MG extended release tablet Take 1 tablet by mouth in the morning. 90 tablet 1    Cholecalciferol (VITAMIN D3) 50 MCG (2000 UT) CAPS TAKE 1 CAPSULE EVERY DAY 90 capsule 1    rivaroxaban (XARELTO) 20 MG TABS tablet TAKE 1 TABLET EVERY DAY WITH BREAKFAST 90 tablet 1    omega-3 acid ethyl esters (LOVAZA) 1 g capsule       fish oil-omega-3 fatty acids 1000 MG capsule Take 2 capsules by mouth nightly 90 capsule 1    losartan (COZAAR) 100 MG tablet TAKE 1 TABLET EVERY DAY 90 tablet 1    fenofibrate 160 MG tablet Take 1 tablet by mouth daily 90 tablet 1    Red Yeast Rice 600 MG TABS Take 2 tablets by mouth 2 times daily      Multiple Vitamin (MULTI-VITAMIN PO) Take  by mouth. No current facility-administered medications for this visit.      Allergies   Allergen Reactions    Pcn [Penicillins]        Health Maintenance   Topic Date Due    DTaP/Tdap/Td vaccine (1 - Tdap) Never done    COVID-19 Vaccine (3 - Booster for Moderna series) 02/26/2022    Colorectal Cancer Screen  05/30/2022    Shingles vaccine (1 of 2) 03/29/2023 (Originally 6/7/2006)    Pneumococcal 65+ years Vaccine (1 - PCV) 08/21/2023 (Originally 6/7/2021)    Flu vaccine (1) 09/01/2022    Prostate Specific Antigen (PSA) Screening or Monitoring  09/29/2022    Annual Wellness Visit (AWV)  12/30/2022    Depression Screen  06/29/2023    Lipids  06/30/2023    Hepatitis C screen  Completed    Hepatitis A vaccine  Aged Out    Hepatitis B vaccine  Aged Out    Hib vaccine  Aged Out    Meningococcal (ACWY) vaccine  Aged Out       Lab Results   Component Value Date    LABA1C 5.6 09/30/2019 Lab Results   Component Value Date    PSA 0.51 09/29/2021    PSA 0.79 09/15/2020    PSA 0.52 01/02/2020     TSH   Date Value Ref Range Status   03/28/2022 2.530 0.270 - 4.200 uIU/mL Final   ]  Lab Results   Component Value Date     07/01/2022    K 4.2 07/01/2022     07/01/2022    CO2 26 07/01/2022    BUN 23 07/01/2022    CREATININE 1.5 (H) 07/01/2022    GLUCOSE 96 07/01/2022    CALCIUM 8.7 (L) 07/01/2022    PROT 7.3 06/29/2022    LABALBU 4.2 06/29/2022    BILITOT 0.4 06/29/2022    ALKPHOS 72 06/29/2022    AST 28 06/29/2022    ALT 22 06/29/2022    LABGLOM 47 (A) 07/01/2022    GFRAA 57 (L) 07/01/2022     Lab Results   Component Value Date    CHOL 164 06/30/2022    CHOL 191 03/28/2022    CHOL 158 (L) 01/21/2022     Lab Results   Component Value Date    TRIG 175 (H) 06/30/2022    TRIG 191 (H) 06/29/2022    TRIG 236 (H) 03/28/2022     Lab Results   Component Value Date    HDL 29 (L) 06/30/2022    HDL 33 (L) 03/28/2022    HDL 29 (L) 01/21/2022     Lab Results   Component Value Date    LDLCALC 100 06/30/2022    LDLCALC 111 03/28/2022    LDLCALC 92 01/21/2022     Lab Results   Component Value Date/Time     07/01/2022 01:37 AM    K 4.2 07/01/2022 01:37 AM     07/01/2022 01:37 AM    CO2 26 07/01/2022 01:37 AM    BUN 23 07/01/2022 01:37 AM    CREATININE 1.5 07/01/2022 01:37 AM    GLUCOSE 96 07/01/2022 01:37 AM    CALCIUM 8.7 07/01/2022 01:37 AM      Lab Results   Component Value Date    WBC 7.5 07/01/2022    HGB 12.4 (L) 07/01/2022    HCT 36.6 (L) 07/01/2022    MCV 88.6 07/01/2022     07/01/2022    LYMPHOPCT 19.8 (L) 07/01/2022    RBC 4.13 (L) 07/01/2022    MCH 30.0 07/01/2022    MCHC 33.9 07/01/2022    RDW 12.4 07/01/2022     Lab Results   Component Value Date    VITD25 52.0 06/29/2022     Labs reviewed from   Diagnostics reviewed from EKG;   RHYTHM: SVT  RATE: tachy  150  MI INTERVAL:  ECTOPY:  ISCHEMIA:      Subjective:      Review of Systems   Constitutional:  Positive for fatigue. Negative for fever and unexpected weight change. HENT:  Negative for ear discharge, ear pain, mouth sores, sore throat and trouble swallowing. Eyes:  Negative for discharge, itching and visual disturbance. Respiratory:  Negative for cough, choking, shortness of breath, wheezing and stridor. Cardiovascular:  Negative for chest pain, palpitations and leg swelling. Gastrointestinal:  Negative for abdominal distention, abdominal pain, blood in stool, constipation, diarrhea, nausea and vomiting. Endocrine: Negative for cold intolerance, polydipsia and polyuria. Genitourinary:  Negative for difficulty urinating, dysuria, frequency and urgency. Musculoskeletal:  Negative for arthralgias and gait problem. Skin:  Negative for color change and rash. Allergic/Immunologic: Negative for food allergies and immunocompromised state. Neurological:  Negative for dizziness, tremors, syncope, speech difficulty, weakness and headaches. Hematological:  Negative for adenopathy. Does not bruise/bleed easily. Psychiatric/Behavioral:  Negative for confusion and hallucinations. Objective:     Physical Exam  Constitutional:       General: He is not in acute distress. Appearance: He is well-developed. HENT:      Head: Normocephalic and atraumatic. Eyes:      General: No scleral icterus. Right eye: No discharge. Left eye: No discharge. Pupils: Pupils are equal, round, and reactive to light. Neck:      Thyroid: No thyromegaly. Vascular: No JVD. Cardiovascular:      Rate and Rhythm: Regular rhythm. Tachycardia present. Heart sounds: Normal heart sounds. No murmur heard. Pulmonary:      Effort: Pulmonary effort is normal. No respiratory distress. Breath sounds: Normal breath sounds. No wheezing or rales. Abdominal:      General: Bowel sounds are normal. There is no distension. Palpations: Abdomen is soft. There is no mass. Tenderness:  There is no abdominal tenderness. There is no guarding or rebound. Musculoskeletal:         General: No tenderness. Normal range of motion. Cervical back: Normal range of motion and neck supple. Skin:     General: Skin is warm and dry. Coloration: Skin is pale. Findings: No erythema or rash. Comments: clammy   Neurological:      Mental Status: He is alert and oriented to person, place, and time. Cranial Nerves: No cranial nerve deficit. Coordination: Coordination normal.      Deep Tendon Reflexes: Reflexes are normal and symmetric. Reflexes normal.   Psychiatric:         Mood and Affect: Mood is not depressed. Behavior: Behavior normal.         Thought Content: Thought content normal.         Judgment: Judgment normal.   .  /80   Pulse (!) 148   Ht 6' 1\" (1.854 m)   Wt (!) 335 lb (152 kg)   SpO2 97%   BMI 44.20 kg/m²           Assessment:      Problem List       Atrial fibrillation (HCC) - Primary    Relevant Medications    losartan (COZAAR) 100 MG tablet    omega-3 acid ethyl esters (LOVAZA) 1 g capsule    rivaroxaban (XARELTO) 20 MG TABS tablet    rosuvastatin (CRESTOR) 10 MG tablet    metoprolol succinate (TOPROL XL) 100 MG extended release tablet    Other Relevant Orders    EKG 12 Lead - Clinic Performed (Completed)       Plan:        Patient given educational materials - see patient instructions. Discussed use, benefit, and side effects of prescribed medications. Allpatient questions answered. Pt voiced understanding. Reviewed health maintenance. Instructed to continue current medications, diet and exercise. Patient agreed with treatment plan. Follow up as directed. MEDICATIONS:  No orders of the defined types were placed in this encounter. ORDERS:  Orders Placed This Encounter   Procedures    EKG 12 Lead - Clinic Performed       Follow-up:  No follow-ups on file.     PATIENT INSTRUCTIONS:  Patient Instructions    SVT   weak and clammy;   Sent urgently to the ER: Electronically signed by JODY Sanders on 7/26/2022 at 2:19 PM    @    Sandra/transcription disclaimer:  Much of this encounter note is electronic transcription/translation of spoken language to printed texts. The electronic translation of spoken language may be erroneous, or at times,nonsensical words or phrases may be inadvertently transcribed.   Although I have reviewed the note for such errors, some may still exist.

## 2022-07-27 LAB
EKG P AXIS: -22 DEGREES
EKG P-R INTERVAL: 176 MS
EKG Q-T INTERVAL: 370 MS
EKG QRS DURATION: 80 MS
EKG QTC CALCULATION (BAZETT): 396 MS
EKG T AXIS: 53 DEGREES

## 2022-07-27 PROCEDURE — 93010 ELECTROCARDIOGRAM REPORT: CPT | Performed by: INTERNAL MEDICINE

## 2022-07-29 PROBLEM — Z12.11 COLON CANCER SCREENING: Status: RESOLVED | Noted: 2022-06-29 | Resolved: 2022-07-29

## 2022-08-04 ENCOUNTER — OFFICE VISIT (OUTPATIENT)
Dept: CARDIOLOGY CLINIC | Age: 67
End: 2022-08-04
Payer: MEDICARE

## 2022-08-04 VITALS
BODY MASS INDEX: 41.75 KG/M2 | HEART RATE: 75 BPM | WEIGHT: 315 LBS | DIASTOLIC BLOOD PRESSURE: 88 MMHG | SYSTOLIC BLOOD PRESSURE: 132 MMHG | HEIGHT: 73 IN

## 2022-08-04 DIAGNOSIS — I48.91 ATRIAL FIBRILLATION WITH RVR (HCC): Primary | ICD-10-CM

## 2022-08-04 DIAGNOSIS — I49.9 IRREGULAR HEART BEAT: ICD-10-CM

## 2022-08-04 PROCEDURE — 93000 ELECTROCARDIOGRAM COMPLETE: CPT | Performed by: INTERNAL MEDICINE

## 2022-08-04 PROCEDURE — G8427 DOCREV CUR MEDS BY ELIG CLIN: HCPCS | Performed by: INTERNAL MEDICINE

## 2022-08-04 PROCEDURE — 3017F COLORECTAL CA SCREEN DOC REV: CPT | Performed by: INTERNAL MEDICINE

## 2022-08-04 PROCEDURE — G8417 CALC BMI ABV UP PARAM F/U: HCPCS | Performed by: INTERNAL MEDICINE

## 2022-08-04 PROCEDURE — 99213 OFFICE O/P EST LOW 20 MIN: CPT | Performed by: INTERNAL MEDICINE

## 2022-08-04 PROCEDURE — 1123F ACP DISCUSS/DSCN MKR DOCD: CPT | Performed by: INTERNAL MEDICINE

## 2022-08-04 PROCEDURE — 1036F TOBACCO NON-USER: CPT | Performed by: INTERNAL MEDICINE

## 2022-08-04 NOTE — PROGRESS NOTES
Cardiology Progress Note   Deandre Montes MD      Patient:    Caroline Shelton  [id number]  1955    Patient Active Problem List    Diagnosis Date Noted    Atrial fibrillation ([facility] 07/01/2022     Priority: Medium    Irregular heart beat 06/29/2022     Priority: Medium    Atrial fibrillation with RVR ([facility] 06/29/2022     Priority: Medium    Gastroesophageal reflux disease without esophagitis 03/29/2022     Priority: Low    Cancer of prostate ([facility] 03/15/2021     Priority: Low    Vitamin D deficiency 01/02/2020     Priority: Low    Closed nondisplaced longitudinal fracture of left patella 01/02/2020     Priority: Low    Chronic deep vein thrombosis (DVT) of left peroneal vein ([facility] 11/25/2019     Priority: Low    Morbid obesity ([facility] 09/25/2019     Priority: Low    History of prostate cancer      Priority: Low    Obesity      Priority: Low    Mixed hyperlipidemia      Priority: Low    Essential hypertension      Priority: Low    Adenomatous colon polyp 06/20/2012     Priority: Low    S/P colonoscopic polypectomy 06/20/2012     Priority: Low    History of colonic polyps 06/20/2012     Priority: Low       Admit Date:  (Not on file)    Admission Problem List: [unfilled]     Cardiac Specific Data:  Specialty Problems          Cardiology Problems    Atrial fibrillation with RVR ([facility]        Atrial fibrillation ([facility]        Essential hypertension        Mixed hyperlipidemia        Chronic deep vein thrombosis (DVT) of left peroneal vein (HCC)           Subjective:  Patient is a 71-year-old male was recently seen in the hospital in June for asymptomatic paroxysmal supraventricular tachycardia. EKG showed atrial flutter. He converted to normal sinus rhythm on its own. Patient is known to have hypertension, hyperlipidemia and obesity. Since discharge, patient has been asymptomatic. He has been on oral anticoagulation for 2 years since left knee surgery complicated by DVT. He is a non-smoker and nondrinker.   There has been no history of CVA or chest pain. .  Since discharge, there has been no recurrence of palpitation    Objective:   /88   Pulse 75   Ht 6' 1\" (1.854 m)   Wt (!) 340 lb (154.2 kg)   BMI 44.86 kg/m²     No intake or output data in the 24 hours ending 08/04/22 1445    Current Outpatient Medications   Medication Sig Dispense Refill    rosuvastatin (CRESTOR) 10 MG tablet Take 1 tablet by mouth nightly 90 tablet 1    metoprolol succinate (TOPROL XL) 100 MG extended release tablet Take 1 tablet by mouth in the morning. 90 tablet 1    Cholecalciferol (VITAMIN D3) 50 MCG (2000 UT) CAPS TAKE 1 CAPSULE EVERY DAY 90 capsule 1    rivaroxaban (XARELTO) 20 MG TABS tablet TAKE 1 TABLET EVERY DAY WITH BREAKFAST 90 tablet 1    omega-3 acid ethyl esters (LOVAZA) 1 g capsule       fish oil-omega-3 fatty acids 1000 MG capsule Take 2 capsules by mouth nightly 90 capsule 1    losartan (COZAAR) 100 MG tablet TAKE 1 TABLET EVERY DAY 90 tablet 1    Red Yeast Rice 600 MG TABS Take 2 tablets by mouth 2 times daily      Multiple Vitamin (MULTI-VITAMIN PO) Take  by mouth. fenofibrate 160 MG tablet Take 1 tablet by mouth daily 90 tablet 1     No current facility-administered medications for this visit. Physical Exam:  General: NAD, alert  HEENT: normal  CHEST: clear to ascultation  CVS: S1 and S2 normal, no murmur, no JVD  ABD; nontender, bowel sounds normal, liver and spleen not palpable. Obese abdomen  MSK: normal  CNS: oriented X3, normal affect, normal CN  PVD:  all peripheral pulses normal, no swelling of the ankles      RECENT LABS:    Lab Data:  CBC: No results for input(s): WBC, HGB, HCT, MCV, PLT in the last 72 hours. BMP: No results for input(s): NA, K, CL, CO2, PHOS, BUN, CREATININE, CA in the last 72 hours. LIVER PROFILE: No results for input(s): AST, ALT, LIPASE, BILIDIR, BILITOT, ALKPHOS in the last 72 hours. Invalid input(s):   AMYLASE,  ALB  PT/INR: No results for input(s): PROTIME, INR in the last 72 hours.  APTT: No results for input(s): APTT in the last 72 hours. CK, CKMB, Troponin: No results for input(s): CKTOTAL, CKMB, TROPONINI in the last 72 hours. Last 3 BNP:  No results for input(s): PROBNP in the last 72 hours. IMAGING:  No results found. Assessment and Plan:    Paroxysmal supraventricular tachycardia probably atrial flutter with 2 to1 AV block, asymptomatic  Hypertension hyperlipidemia and morbid obesity  Traumatic brain injury at age 9  DVT of the left leg 2 years ago after knee surgery, has been on NOAC  Stage III renal insufficiency    Plan: No change in medication. He is on a program to lose weight. I will see him in 6-month    I have spent 30 minutes reviewing the chart, taking history, examining the patient, discussion with the patient and the family concerning the finding, diagnoses and treatment plan. This dictation was performed using [address] [place]. Mistake and misspelling may have been created without  realizing them. Please notify me for clarification.   Thank you    Theo Jones MD  8/4/2022 2:45 PM

## 2022-10-04 ENCOUNTER — TELEPHONE (OUTPATIENT)
Dept: INTERNAL MEDICINE | Age: 67
End: 2022-10-04

## 2022-10-04 NOTE — TELEPHONE ENCOUNTER
Sw pt's mother she states pt forgot to take his meds the past two nights and his b/p has been elevated along with his pulse . His pulse has been about 130 would like to know what you suggest please advise .  She states after pt took his meds pulse was normal .

## 2022-10-06 ENCOUNTER — APPOINTMENT (OUTPATIENT)
Dept: GENERAL RADIOLOGY | Age: 67
DRG: 309 | End: 2022-10-06
Payer: MEDICARE

## 2022-10-06 ENCOUNTER — HOSPITAL ENCOUNTER (INPATIENT)
Age: 67
LOS: 7 days | Discharge: HOME | DRG: 309 | End: 2022-10-13
Attending: PEDIATRICS | Admitting: HOSPITALIST
Payer: MEDICARE

## 2022-10-06 DIAGNOSIS — I48.0 PAROXYSMAL ATRIAL FIBRILLATION WITH RVR (HCC): Primary | ICD-10-CM

## 2022-10-06 PROBLEM — I48.91 ATRIAL FIBRILLATION WITH RAPID VENTRICULAR RESPONSE (HCC): Status: ACTIVE | Noted: 2022-10-06

## 2022-10-06 LAB
ALBUMIN SERPL-MCNC: 4.3 G/DL (ref 3.5–5.2)
ALP BLD-CCNC: 78 U/L (ref 40–130)
ALT SERPL-CCNC: 24 U/L (ref 5–41)
ANION GAP SERPL CALCULATED.3IONS-SCNC: 10 MMOL/L (ref 7–19)
AST SERPL-CCNC: 23 U/L (ref 5–40)
BASOPHILS ABSOLUTE: 0.1 K/UL (ref 0–0.2)
BASOPHILS RELATIVE PERCENT: 0.8 % (ref 0–1)
BILIRUB SERPL-MCNC: 0.3 MG/DL (ref 0.2–1.2)
BUN BLDV-MCNC: 18 MG/DL (ref 8–23)
CALCIUM SERPL-MCNC: 9.2 MG/DL (ref 8.8–10.2)
CHLORIDE BLD-SCNC: 104 MMOL/L (ref 98–111)
CHOLESTEROL, TOTAL: 125 MG/DL (ref 160–199)
CO2: 23 MMOL/L (ref 22–29)
CREAT SERPL-MCNC: 1.2 MG/DL (ref 0.5–1.2)
EOSINOPHILS ABSOLUTE: 0.2 K/UL (ref 0–0.6)
EOSINOPHILS RELATIVE PERCENT: 2.7 % (ref 0–5)
GFR AFRICAN AMERICAN: >59
GFR NON-AFRICAN AMERICAN: >60
GLUCOSE BLD-MCNC: 113 MG/DL (ref 74–109)
HBA1C MFR BLD: 5.4 % (ref 4–6)
HCT VFR BLD CALC: 42.2 % (ref 42–52)
HDLC SERPL-MCNC: 33 MG/DL (ref 55–121)
HEMOGLOBIN: 14.5 G/DL (ref 14–18)
IMMATURE GRANULOCYTES #: 0 K/UL
LDL CHOLESTEROL CALCULATED: 56 MG/DL
LYMPHOCYTES ABSOLUTE: 1.3 K/UL (ref 1.1–4.5)
LYMPHOCYTES RELATIVE PERCENT: 21.3 % (ref 20–40)
MCH RBC QN AUTO: 29.5 PG (ref 27–31)
MCHC RBC AUTO-ENTMCNC: 34.4 G/DL (ref 33–37)
MCV RBC AUTO: 85.8 FL (ref 80–94)
MONOCYTES ABSOLUTE: 0.6 K/UL (ref 0–0.9)
MONOCYTES RELATIVE PERCENT: 9.3 % (ref 0–10)
NEUTROPHILS ABSOLUTE: 4.1 K/UL (ref 1.5–7.5)
NEUTROPHILS RELATIVE PERCENT: 65.6 % (ref 50–65)
PDW BLD-RTO: 12.8 % (ref 11.5–14.5)
PLATELET # BLD: 238 K/UL (ref 130–400)
PMV BLD AUTO: 9.8 FL (ref 9.4–12.4)
POTASSIUM SERPL-SCNC: 4.7 MMOL/L (ref 3.5–5)
RBC # BLD: 4.92 M/UL (ref 4.7–6.1)
SODIUM BLD-SCNC: 137 MMOL/L (ref 136–145)
TOTAL PROTEIN: 6.8 G/DL (ref 6.6–8.7)
TRIGL SERPL-MCNC: 178 MG/DL (ref 0–149)
TROPONIN: <0.01 NG/ML (ref 0–0.03)
TROPONIN: <0.01 NG/ML (ref 0–0.03)
TSH REFLEX FT4: 1.36 UIU/ML (ref 0.35–5.5)
WBC # BLD: 6.3 K/UL (ref 4.8–10.8)

## 2022-10-06 PROCEDURE — 6370000000 HC RX 637 (ALT 250 FOR IP)

## 2022-10-06 PROCEDURE — 80053 COMPREHEN METABOLIC PANEL: CPT

## 2022-10-06 PROCEDURE — 93005 ELECTROCARDIOGRAM TRACING: CPT | Performed by: PEDIATRICS

## 2022-10-06 PROCEDURE — 96366 THER/PROPH/DIAG IV INF ADDON: CPT

## 2022-10-06 PROCEDURE — 99285 EMERGENCY DEPT VISIT HI MDM: CPT

## 2022-10-06 PROCEDURE — 96365 THER/PROPH/DIAG IV INF INIT: CPT

## 2022-10-06 PROCEDURE — 2500000003 HC RX 250 WO HCPCS: Performed by: PEDIATRICS

## 2022-10-06 PROCEDURE — 71045 X-RAY EXAM CHEST 1 VIEW: CPT

## 2022-10-06 PROCEDURE — 84443 ASSAY THYROID STIM HORMONE: CPT

## 2022-10-06 PROCEDURE — 36415 COLL VENOUS BLD VENIPUNCTURE: CPT

## 2022-10-06 PROCEDURE — 71045 X-RAY EXAM CHEST 1 VIEW: CPT | Performed by: RADIOLOGY

## 2022-10-06 PROCEDURE — 84484 ASSAY OF TROPONIN QUANT: CPT

## 2022-10-06 PROCEDURE — 83036 HEMOGLOBIN GLYCOSYLATED A1C: CPT

## 2022-10-06 PROCEDURE — 80061 LIPID PANEL: CPT

## 2022-10-06 PROCEDURE — 2140000000 HC CCU INTERMEDIATE R&B

## 2022-10-06 PROCEDURE — 85025 COMPLETE CBC W/AUTO DIFF WBC: CPT

## 2022-10-06 RX ORDER — ONDANSETRON 4 MG/1
4 TABLET, ORALLY DISINTEGRATING ORAL EVERY 8 HOURS PRN
Status: DISCONTINUED | OUTPATIENT
Start: 2022-10-06 | End: 2022-10-13 | Stop reason: HOSPADM

## 2022-10-06 RX ORDER — SODIUM CHLORIDE 9 MG/ML
INJECTION, SOLUTION INTRAVENOUS PRN
Status: DISCONTINUED | OUTPATIENT
Start: 2022-10-06 | End: 2022-10-13 | Stop reason: HOSPADM

## 2022-10-06 RX ORDER — POLYETHYLENE GLYCOL 3350 17 G/17G
17 POWDER, FOR SOLUTION ORAL DAILY PRN
Status: DISCONTINUED | OUTPATIENT
Start: 2022-10-06 | End: 2022-10-13 | Stop reason: HOSPADM

## 2022-10-06 RX ORDER — OMEGA-3/DHA/EPA/FISH OIL 300-1000MG
2 CAPSULE ORAL NIGHTLY
Status: DISCONTINUED | OUTPATIENT
Start: 2022-10-06 | End: 2022-10-06 | Stop reason: CLARIF

## 2022-10-06 RX ORDER — SODIUM CHLORIDE 0.9 % (FLUSH) 0.9 %
5-40 SYRINGE (ML) INJECTION EVERY 12 HOURS SCHEDULED
Status: DISCONTINUED | OUTPATIENT
Start: 2022-10-06 | End: 2022-10-13 | Stop reason: HOSPADM

## 2022-10-06 RX ORDER — DILTIAZEM HCL IN NACL,ISO-OSM 125 MG/125
2.5-15 PLASTIC BAG, INJECTION (ML) INTRAVENOUS CONTINUOUS
Status: DISCONTINUED | OUTPATIENT
Start: 2022-10-06 | End: 2022-10-13 | Stop reason: HOSPADM

## 2022-10-06 RX ORDER — DILTIAZEM HYDROCHLORIDE 5 MG/ML
10 INJECTION INTRAVENOUS ONCE
Status: COMPLETED | OUTPATIENT
Start: 2022-10-06 | End: 2022-10-06

## 2022-10-06 RX ORDER — ACETAMINOPHEN 650 MG/1
650 SUPPOSITORY RECTAL EVERY 6 HOURS PRN
Status: DISCONTINUED | OUTPATIENT
Start: 2022-10-06 | End: 2022-10-13 | Stop reason: HOSPADM

## 2022-10-06 RX ORDER — SODIUM CHLORIDE 0.9 % (FLUSH) 0.9 %
5-40 SYRINGE (ML) INJECTION PRN
Status: DISCONTINUED | OUTPATIENT
Start: 2022-10-06 | End: 2022-10-13 | Stop reason: HOSPADM

## 2022-10-06 RX ORDER — LOSARTAN POTASSIUM 100 MG/1
100 TABLET ORAL DAILY
Status: DISCONTINUED | OUTPATIENT
Start: 2022-10-06 | End: 2022-10-13 | Stop reason: HOSPADM

## 2022-10-06 RX ORDER — FENOFIBRATE 160 MG/1
160 TABLET ORAL DAILY
Refills: 1 | Status: DISCONTINUED | OUTPATIENT
Start: 2022-10-06 | End: 2022-10-13 | Stop reason: HOSPADM

## 2022-10-06 RX ORDER — METOPROLOL SUCCINATE 50 MG/1
100 TABLET, EXTENDED RELEASE ORAL DAILY
Status: DISCONTINUED | OUTPATIENT
Start: 2022-10-06 | End: 2022-10-13 | Stop reason: HOSPADM

## 2022-10-06 RX ORDER — ONDANSETRON 2 MG/ML
4 INJECTION INTRAMUSCULAR; INTRAVENOUS EVERY 6 HOURS PRN
Status: DISCONTINUED | OUTPATIENT
Start: 2022-10-06 | End: 2022-10-13 | Stop reason: HOSPADM

## 2022-10-06 RX ORDER — ACETAMINOPHEN 325 MG/1
650 TABLET ORAL EVERY 6 HOURS PRN
Status: DISCONTINUED | OUTPATIENT
Start: 2022-10-06 | End: 2022-10-13 | Stop reason: HOSPADM

## 2022-10-06 RX ORDER — ROSUVASTATIN CALCIUM 10 MG/1
10 TABLET, COATED ORAL NIGHTLY
Status: DISCONTINUED | OUTPATIENT
Start: 2022-10-06 | End: 2022-10-13 | Stop reason: HOSPADM

## 2022-10-06 RX ORDER — M-VIT,TX,IRON,MINS/CALC/FOLIC 27MG-0.4MG
1 TABLET ORAL DAILY
Status: DISCONTINUED | OUTPATIENT
Start: 2022-10-06 | End: 2022-10-13 | Stop reason: HOSPADM

## 2022-10-06 RX ADMIN — Medication 5 MG/HR: at 14:29

## 2022-10-06 RX ADMIN — DILTIAZEM HYDROCHLORIDE 10 MG: 5 INJECTION INTRAVENOUS at 14:27

## 2022-10-06 RX ADMIN — ROSUVASTATIN CALCIUM 10 MG: 10 TABLET, FILM COATED ORAL at 21:45

## 2022-10-06 ASSESSMENT — ENCOUNTER SYMPTOMS
COLOR CHANGE: 0
CONSTIPATION: 0
VOMITING: 0
ABDOMINAL DISTENTION: 0
ABDOMINAL PAIN: 0
NAUSEA: 0
SHORTNESS OF BREATH: 0
COUGH: 0
WHEEZING: 0
CHEST TIGHTNESS: 0

## 2022-10-06 ASSESSMENT — LIFESTYLE VARIABLES: HOW OFTEN DO YOU HAVE A DRINK CONTAINING ALCOHOL: NEVER

## 2022-10-06 NOTE — ED NOTES
Pt HR elevated to 130. Dr. Alan Cedeno notified.  Diltiazem drip resumed at 2.5mg     Jose Elias Mcgill RN  10/06/22 0240

## 2022-10-06 NOTE — ED NOTES
Dr. Margaux Flores at bedside. Diltiazem drip stopped per Dr. Margaux Flores.       Carl Austin, RN  10/06/22 8295

## 2022-10-06 NOTE — PROGRESS NOTES
PHARMACY NOTE  Keith Busby was ordered fish oil. Per the [address], this medication is non-formulary and not stocked by pharmacy. It has been discontinued. The medication can be reordered at discharge.      Electronically signed by Ai Smith [facility] on 10/6/2022 at 5:32 PM

## 2022-10-06 NOTE — ED PROVIDER NOTES
[address] [facility] EMERGENCY DEPT  eMERGENCY dEPARTMENT eNCOUnter      Pt Name: Stephanie Weaver  MRN: [id number]  [place] 1955  Date of evaluation: 10/6/2022  Provider: Warden Adela MD    CHIEF COMPLAINT       Chief Complaint   Patient presents with    Chest Pain         HISTORY OF PRESENT ILLNESS   (Location/Symptom, Timing/Onset,Context/Setting, Quality, Duration, Modifying Factors, Severity)  Note limiting factors. Stephanie Weaver is a 78 y.o. male with history of Afib and TBI who presents to the emergency department with tachycardia. Patient and mother give history. Mother states that patient had cramping in the side of his face yesterday. She took his vital signs with her phone yesterday and noted patient's heart rate to be 139. Tachycardia lasted all day. Mother called the office of JODY Dozier. When office returned call, patient's HR was down to 79 and he had gone to bed. They were advised that if he was tachycardic when he woke up they should go to the emergency department. Mother states the patient was \"clammy\" all day. When patient awoke, his heart rate was 144. Patient was brought to the emergency department. Patient denies chest pain, shortness of breath, palpitations, cough, fever, black or bloody stools, lower extremity swelling or pain. Patient states he had no discomfort in his chest whatsoever. Patient is currently taking Xarelto. HPI    NursingNotes were reviewed. REVIEW OF SYSTEMS    (2-9 systems for level 4, 10 or more for level 5)     Review of Systems   Constitutional:  Negative for chills and fever. HENT:  Negative for congestion and rhinorrhea. Eyes:  Negative for discharge. Respiratory:  Negative for cough and shortness of breath. Cardiovascular:  Negative for chest pain, palpitations and leg swelling. Gastrointestinal:  Negative for abdominal pain, nausea and vomiting. Genitourinary:  Negative for difficulty urinating and dysuria.    Musculoskeletal:  Negative for back pain and neck pain. Skin:  Negative for color change and pallor. Neurological:  Negative for syncope, facial asymmetry, speech difficulty, weakness, light-headedness and numbness. Psychiatric/Behavioral:  Negative for agitation and confusion. All other systems reviewed and are negative. PAST MEDICALHISTORY     Past Medical History:   Diagnosis Date    Atrial fibrillation ([facility] [address]     Cellulitis of left leg     Colon polyp     Elevated blood pressure reading without diagnosis of hypertension     Esophageal reflux     History of prostate cancer     Mixed hyperlipidemia     TG>>LDL    Obesity     Prostate cancer [facility])     2010 Dr Mendez Bending;  implants, XRT         SURGICAL HISTORY       Past Surgical History:   Procedure Laterality Date    COLONOSCOPY  6/2/2009        COLONOSCOPY  2012        UPPER GASTROINTESTINAL ENDOSCOPY  3/22/2000        VASECTOMY           CURRENT MEDICATIONS     Previous Medications    CHOLECALCIFEROL (VITAMIN D3) 50 MCG (2000 UT) CAPS    TAKE 1 CAPSULE EVERY DAY    FENOFIBRATE 160 MG TABLET    Take 1 tablet by mouth daily    FISH OIL-OMEGA-3 FATTY ACIDS 1000 MG CAPSULE    Take 2 capsules by mouth nightly    LOSARTAN (COZAAR) 100 MG TABLET    TAKE 1 TABLET EVERY DAY    METOPROLOL SUCCINATE (TOPROL XL) 100 MG EXTENDED RELEASE TABLET    Take 1 tablet by mouth in the morning. MULTIPLE VITAMIN (MULTI-VITAMIN PO)    Take  by mouth.       OMEGA-3 ACID ETHYL ESTERS (LOVAZA) 1 G CAPSULE        RED YEAST RICE 600 MG TABS    Take 2 tablets by mouth 2 times daily    RIVAROXABAN (XARELTO) 20 MG TABS TABLET    TAKE 1 TABLET EVERY DAY WITH BREAKFAST    ROSUVASTATIN (CRESTOR) 10 MG TABLET    Take 1 tablet by mouth nightly       ALLERGIES     Pcn [penicillins]    FAMILY HISTORY       Family History   Problem Relation Age of Onset    Colon Polyps Mother     Colon Polyps Brother     Heart Disease Father     High Blood Pressure Other           SOCIAL HISTORY Social History     Socioeconomic History    Marital status: Single   Tobacco Use    Smoking status: Never    Smokeless tobacco: Never   Substance and Sexual Activity    Alcohol use: No    Drug use: No     Social Determinants of Health     Financial Resource Strain: Low Risk     Difficulty of Paying Living Expenses: Not hard at all   Food Insecurity: Unknown    Worried About [address] in the Last Year: Patient refused    [address] in the Last Year: Patient refused       SCREENINGS    Saul Coma Scale  Eye Opening: Spontaneous  Best Verbal Response: Oriented  Best Motor Response: Obeys commands  Saul Coma Scale Score: 15        PHYSICAL EXAM    (up to 7 for level 4, 8 or more for level 5)     ED Triage Vitals   BP Temp Temp src Heart Rate Resp SpO2 Height Weight   10/06/22 1325 10/06/22 1324 -- 10/06/22 1324 10/06/22 1324 10/06/22 1324 -- --   123/89 98.2 °F (36.8 °C)  (!) 148 16 94 %         Physical Exam  Vitals and nursing note reviewed. Constitutional:       General: He is not in acute distress. Appearance: Normal appearance. He is obese. He is not diaphoretic. HENT:      Head: Normocephalic and atraumatic. Right Ear: External ear normal.      Left Ear: External ear normal.      Nose: Nose normal.      Mouth/Throat:      Mouth: Mucous membranes are moist.      Pharynx: Oropharynx is clear. No oropharyngeal exudate. Eyes:      General: No scleral icterus. Conjunctiva/sclera: Conjunctivae normal.      Pupils: Pupils are equal, round, and reactive to light. Cardiovascular:      Rate and Rhythm: Tachycardia present. Rhythm irregular. Pulses: Normal pulses. Heart sounds: Normal heart sounds. Pulmonary:      Effort: Pulmonary effort is normal. No respiratory distress. Breath sounds: Normal breath sounds. No wheezing, rhonchi or rales. Abdominal:      General: Bowel sounds are normal. There is no distension. Palpations: Abdomen is soft. Tenderness: There is no abdominal tenderness. There is no guarding or rebound. Musculoskeletal:         General: No tenderness or deformity. Cervical back: Neck supple. No rigidity. Skin:     General: Skin is warm and dry. Capillary Refill: Capillary refill takes less than 2 seconds. Coloration: Skin is not jaundiced. Neurological:      General: No focal deficit present. Mental Status: He is alert and oriented to person, place, and time. Mental status is at baseline. Cranial Nerves: No cranial nerve deficit. Sensory: No sensory deficit. Motor: No weakness. Coordination: Coordination normal.   Psychiatric:         Mood and Affect: Mood normal.         Behavior: Behavior normal.       DIAGNOSTIC RESULTS     EKG: All EKG's areinterpreted by the Emergency Department Physician who either signs or Co-signs this chart in the absence of a cardiologist.    EKG dated 10/6/2022 at 1320 9 PM: Atrial fibrillation with rapid ventricular response. Rate 142. QRS 87, QTc 424. RADIOLOGY:  Non-plain film images such as CT, Ultrasound and MRI are read by the radiologist. Plain radiographic images are visualized and preliminarily interpreted bythe emergency physician with the below findings:      XR CHEST PORTABLE   Final Result   Prominent  upper cardiomediastinal silhouette. Recommend CT to assess further   Minimal bibasilar fibrosis. Recommendation: Follow up as clinically indicated.     Electronically Signed by Juwan Palacio MD at 06-Oct-2022 02:51:17 PM                       LABS:  Labs Reviewed   CBC WITH AUTO DIFFERENTIAL - Abnormal; Notable for the following components:       Result Value    Neutrophils % 65.6 (*)     All other components within normal limits   COMPREHENSIVE METABOLIC PANEL - Abnormal; Notable for the following components:    Glucose 113 (*)     All other components within normal limits   TROPONIN   TROPONIN       All other labs were within normal range or not returned as of this dictation. EMERGENCY DEPARTMENT COURSE and DIFFERENTIAL DIAGNOSIS/MDM:   Vitals:    Vitals:    10/06/22 1436 10/06/22 1438 10/06/22 1445 10/06/22 1500   BP:   119/80 122/79   Pulse: (!) 121 (!) 123 79 83   Resp: 22 22 19 19   Temp:       SpO2: 95% 94% 94% 93%       MDM     Amount and/or Complexity of Data Reviewed  Decide to obtain previous medical records or to obtain history from someone other than the patient: yes    71-year-old male with history of TBI and atrial fibrillation presents with atrial fibrillation with rapid ventricular response. Lab, EKG, and radiology results reviewed. Patient is completely asymptomatic at this time and denies palpitations. Patient started on diltiazem 10 mg IV with diltiazem drip. Patient converted to normal sinus rhythm with a rate of 89 but has been. Patient resumed atrial fibrillation with heart rate of 139. Discussed with JODY Shah, hospitalist, who will admit patient for further evaluation and treatment. CONSULTS:  None    PROCEDURES:  Unless otherwise noted below, none     Procedures    FINAL IMPRESSION      1.  Paroxysmal atrial fibrillation with RVR [facility])          DISPOSITION/PLAN   DISPOSITION Decision To Admit 10/06/2022 03:50:05 PM           (Please note that portions of this note were completed with a voice recognition program.  Efforts were made to edit thedictations but occasionally words are mis-transcribed.)    Karen Giron MD (electronically signed)  Attending Emergency Physician          Karen Giron MD  10/07/22 [phone]

## 2022-10-06 NOTE — H&P
[facility] - History & Physical      PCP: JODY Cain    Date of Admission: 10/6/2022    Date of Service: 10/6/2022    Chief Complaint:  Elevated heart rate     History Of Present Illness: The patient is a 78 y.o. male who presented to [facility] ER with PMH AF, Brain injury, DVT, hyperlipidemia, prostate CA,  complaining of elevated heart rate. Mother states that she noticed patient clammy, and the side of his face cramping last night. She took his heart rate and found to be elevated 140 bpm. He took his medications and went to bed heart rate declined 80 bpm. When he awoke this morning heart rate remained elevated 144 bpm thus presenting to [facility] ER for further evaluation. Denies recent illness, fever, chills, nausea, vomiting, abdominal pain, shortness of breath, or chest pain. Denies syncope. Denies urinary symptoms. Non-smoker, Non-drinker. Work up in [address] no acute cardiopulmonary process, CMP WNL, and troponin negative. Received Dilitiazem 10 mg IV and initiated on diltiazem drip while in ER. Patient is to be admitted to hospitalist service with consultation to cardiology due to atrial fibrillation with rvr. Past Medical History:        Diagnosis Date    Atrial fibrillation ([address]     Cellulitis of left leg     Colon polyp     Elevated blood pressure reading without diagnosis of hypertension     Esophageal reflux     History of prostate cancer     Mixed hyperlipidemia     TG>>LDL    Obesity     Prostate cancer [facility])     2010 Dr Carlyn Stark;  implants, XRT       Past Surgical History:        Procedure Laterality Date    COLONOSCOPY  6/2/2009        COLONOSCOPY  2012        UPPER GASTROINTESTINAL ENDOSCOPY  3/22/2000        VASECTOMY         Home Medications:  Prior to Admission medications    Medication Sig Start Date End Date Taking?  Authorizing Provider   rosuvastatin (CRESTOR) 10 MG tablet Take 1 tablet by mouth nightly 7/18/22 10/16/22  Aylin David JODY Trent   metoprolol succinate (TOPROL XL) 100 MG extended release tablet Take 1 tablet by mouth in the morning. 7/18/22   JODY Campuzano   Cholecalciferol (VITAMIN D3) 50 MCG (2000 UT) CAPS TAKE 1 CAPSULE EVERY DAY 7/18/22   JODY Campuzano   rivaroxaban (XARELTO) 20 MG TABS tablet TAKE 1 TABLET EVERY DAY WITH BREAKFAST 5/9/22   JODY Campuzano   omega-3 acid ethyl esters (LOVAZA) 1 g capsule  4/1/22   Historical Provider, MD   fish oil-omega-3 fatty acids 1000 MG capsule Take 2 capsules by mouth nightly 3/29/22   JODY Campuzano   losartan (COZAAR) 100 MG tablet TAKE 1 TABLET EVERY DAY 11/10/21   JODY Campuzano   fenofibrate 160 MG tablet Take 1 tablet by mouth daily 1/14/20 6/29/22  JODY Campuzano   Red Yeast Rice 600 MG TABS Take 2 tablets by mouth 2 times daily    Historical Provider, MD   Multiple Vitamin (MULTI-VITAMIN PO) Take  by mouth. Historical Provider, MD       Allergies:    Pcn [penicillins]    Social History:    The patient currently lives home  Tobacco:   reports that he has never smoked. He has never used smokeless tobacco.  Alcohol:   reports no history of alcohol use. Illicit Drugs: denies    Family History:      Problem Relation Age of Onset    Colon Polyps Mother     Colon Polyps Brother     Heart Disease Father     High Blood Pressure Other        Review of Systems:   Review of Systems   Constitutional:  Negative for chills, diaphoresis, fatigue and fever. Respiratory:  Negative for cough, chest tightness, shortness of breath and wheezing. Cardiovascular:  Negative for chest pain and palpitations. Gastrointestinal:  Negative for abdominal distention, abdominal pain, constipation, nausea and vomiting. Skin:  Negative for color change, pallor and rash. Neurological:  Negative for tremors, syncope, weakness, light-headedness, numbness and headaches.         Facial muscle spasms   Psychiatric/Behavioral:  Negative for agitation, behavioral problems and confusion. 14 point review of systems is negative except as specifically addressed above. Physical Examination:  /89   Pulse (!) 129   Temp 98.2 °F (36.8 °C)   Resp 19   SpO2 94%   Physical Exam  Vitals and nursing note reviewed. Constitutional:       Appearance: Normal appearance. HENT:      Mouth/Throat:      Mouth: Mucous membranes are moist.      Pharynx: Oropharynx is clear. Eyes:      Extraocular Movements: Extraocular movements intact. Conjunctiva/sclera: Conjunctivae normal.      Pupils: Pupils are equal, round, and reactive to light. Cardiovascular:      Rate and Rhythm: Tachycardia present. Rhythm irregular. Pulses: Normal pulses. Heart sounds: Normal heart sounds. No murmur heard. Pulmonary:      Effort: Pulmonary effort is normal. No respiratory distress. Breath sounds: Normal breath sounds. Abdominal:      General: Bowel sounds are normal. There is no distension. Palpations: Abdomen is soft. Tenderness: There is no abdominal tenderness. There is no guarding or rebound. Musculoskeletal:         General: No swelling. Normal range of motion. Cervical back: Normal range of motion and neck supple. No rigidity or tenderness. Right lower leg: No edema. Left lower leg: No edema. Skin:     General: Skin is warm and dry. Capillary Refill: Capillary refill takes less than 2 seconds. Neurological:      General: No focal deficit present. Mental Status: He is alert and oriented to person, place, and time. Cranial Nerves: No cranial nerve deficit.    Psychiatric:         Mood and Affect: Mood normal.         Behavior: Behavior normal.        Diagnostic Data:  CBC:  Recent Labs     10/06/22  1335   WBC 6.3   HGB 14.5   HCT 42.2        BMP:  Recent Labs     10/06/22  1335      K 4.7      CO2 23   BUN 18   CREATININE 1.2   CALCIUM 9.2     Recent Labs     10/06/22  1335   AST 23   ALT 24   BILITOT 0. 3   ALKPHOS 78     Coag Panel: No results for input(s): INR, PROTIME, APTT in the last 72 hours. Cardiac Enzymes:   Recent Labs     10/06/22  1335   TROPONINI <0.01     ABGs:No results found for: PHART, PO2ART, SIF0RRZ  Urinalysis:  Lab Results   Component Value Date/Time    NITRU Negative 03/28/2022 09:14 AM    BLOODU Negative 03/28/2022 09:14 AM    SPECGRAV 1.015 03/28/2022 09:14 AM    GLUCOSEU Negative 03/28/2022 09:14 AM         XR CHEST PORTABLE    Result Date: 10/6/2022  NO PRIOR REPORT AVAILABLE Exam: X-RAY OF [facility] Clinical data:Chest pain. Technique:Single view of the chest. Prior studies: Radiograph of the chest dated 7/26/2022. Findings: The lungs are grossly clear; noevidence of acute infiltrate or pleural effusion. Cardiac silhouette is prominent superiorly with widening of cardiomediastinal silhouette. This is likely WNL however CT recommended to assess further. Minimal fibrosis noted in the bases. No acute osseous abnormality is detected. Prominent  upper cardiomediastinal silhouette. Recommend CT to assess further Minimal bibasilar fibrosis. Recommendation: Follow up as clinically indicated.  Electronically Signed by Inez Yap MD at 06-Oct-2022 02:51:17 PM               Assessment/Plan:  Principal Problem:    Atrial fibrillation with rapid ventricular response - Cardiology consultation and recommendations appreciated  -Diltiazem drip   - Resume home medication: Toprol XL    - Trend troponin, remain negative, no complaints of chest pain   - Serial and PRN EKGs  - Monitor on telemetry      DVT prophylactic: Xarelto     Signed:  JODY Blank - CNP, 10/6/2022 4:10 PM

## 2022-10-07 ENCOUNTER — APPOINTMENT (OUTPATIENT)
Dept: CT IMAGING | Age: 67
DRG: 309 | End: 2022-10-07
Payer: MEDICARE

## 2022-10-07 LAB
ANION GAP SERPL CALCULATED.3IONS-SCNC: 15 MMOL/L (ref 7–19)
BUN BLDV-MCNC: 18 MG/DL (ref 8–23)
CALCIUM SERPL-MCNC: 9.4 MG/DL (ref 8.8–10.2)
CHLORIDE BLD-SCNC: 102 MMOL/L (ref 98–111)
CO2: 22 MMOL/L (ref 22–29)
CREAT SERPL-MCNC: 1.4 MG/DL (ref 0.5–1.2)
EKG P AXIS: 10 DEGREES
EKG P AXIS: NORMAL DEGREES
EKG P-R INTERVAL: 156 MS
EKG P-R INTERVAL: 80 MS
EKG Q-T INTERVAL: 276 MS
EKG Q-T INTERVAL: 398 MS
EKG QRS DURATION: 84 MS
EKG QRS DURATION: 87 MS
EKG QTC CALCULATION (BAZETT): 425 MS
EKG QTC CALCULATION (BAZETT): 433 MS
EKG T AXIS: 40 DEGREES
EKG T AXIS: 80 DEGREES
GFR AFRICAN AMERICAN: >59
GFR NON-AFRICAN AMERICAN: 51
GLUCOSE BLD-MCNC: 94 MG/DL (ref 74–109)
HCT VFR BLD CALC: 41.6 % (ref 42–52)
HEMOGLOBIN: 13.7 G/DL (ref 14–18)
LV EF: 40 %
LVEF MODALITY: NORMAL
MCH RBC QN AUTO: 28.5 PG (ref 27–31)
MCHC RBC AUTO-ENTMCNC: 32.9 G/DL (ref 33–37)
MCV RBC AUTO: 86.7 FL (ref 80–94)
PDW BLD-RTO: 12.9 % (ref 11.5–14.5)
PLATELET # BLD: 250 K/UL (ref 130–400)
PMV BLD AUTO: 9.5 FL (ref 9.4–12.4)
POTASSIUM REFLEX MAGNESIUM: 4.5 MMOL/L (ref 3.5–5)
PRO-BNP: 88 PG/ML (ref 0–900)
RBC # BLD: 4.8 M/UL (ref 4.7–6.1)
SODIUM BLD-SCNC: 139 MMOL/L (ref 136–145)
T4 FREE: 0.97 NG/DL (ref 0.93–1.7)
TSH SERPL DL<=0.05 MIU/L-ACNC: 2.38 UIU/ML (ref 0.27–4.2)
WBC # BLD: 6.5 K/UL (ref 4.8–10.8)

## 2022-10-07 PROCEDURE — 99223 1ST HOSP IP/OBS HIGH 75: CPT | Performed by: INTERNAL MEDICINE

## 2022-10-07 PROCEDURE — C8929 TTE W OR WO FOL WCON,DOPPLER: HCPCS

## 2022-10-07 PROCEDURE — 85027 COMPLETE CBC AUTOMATED: CPT

## 2022-10-07 PROCEDURE — 84443 ASSAY THYROID STIM HORMONE: CPT

## 2022-10-07 PROCEDURE — 6360000004 HC RX CONTRAST MEDICATION: Performed by: INTERNAL MEDICINE

## 2022-10-07 PROCEDURE — 36415 COLL VENOUS BLD VENIPUNCTURE: CPT

## 2022-10-07 PROCEDURE — 6370000000 HC RX 637 (ALT 250 FOR IP): Performed by: INTERNAL MEDICINE

## 2022-10-07 PROCEDURE — 80048 BASIC METABOLIC PNL TOTAL CA: CPT

## 2022-10-07 PROCEDURE — 84439 ASSAY OF FREE THYROXINE: CPT

## 2022-10-07 PROCEDURE — 93010 ELECTROCARDIOGRAM REPORT: CPT | Performed by: INTERNAL MEDICINE

## 2022-10-07 PROCEDURE — 2580000003 HC RX 258

## 2022-10-07 PROCEDURE — 71250 CT THORAX DX C-: CPT

## 2022-10-07 PROCEDURE — 83880 ASSAY OF NATRIURETIC PEPTIDE: CPT

## 2022-10-07 PROCEDURE — 2140000000 HC CCU INTERMEDIATE R&B

## 2022-10-07 PROCEDURE — 2500000003 HC RX 250 WO HCPCS: Performed by: PEDIATRICS

## 2022-10-07 PROCEDURE — 6370000000 HC RX 637 (ALT 250 FOR IP)

## 2022-10-07 PROCEDURE — 93005 ELECTROCARDIOGRAM TRACING: CPT

## 2022-10-07 RX ORDER — FLECAINIDE ACETATE 50 MG/1
50 TABLET ORAL 2 TIMES DAILY
Status: DISCONTINUED | OUTPATIENT
Start: 2022-10-07 | End: 2022-10-13 | Stop reason: HOSPADM

## 2022-10-07 RX ORDER — AMIODARONE HYDROCHLORIDE 200 MG/1
200 TABLET ORAL 2 TIMES DAILY
Status: DISCONTINUED | OUTPATIENT
Start: 2022-10-07 | End: 2022-10-07

## 2022-10-07 RX ORDER — DILTIAZEM HYDROCHLORIDE 120 MG/1
120 CAPSULE, COATED, EXTENDED RELEASE ORAL 2 TIMES DAILY
Status: DISCONTINUED | OUTPATIENT
Start: 2022-10-07 | End: 2022-10-13 | Stop reason: HOSPADM

## 2022-10-07 RX ADMIN — PERFLUTREN 1.5 ML: 6.52 INJECTION, SUSPENSION INTRAVENOUS at 11:48

## 2022-10-07 RX ADMIN — FLECAINIDE ACETATE 50 MG: 50 TABLET ORAL at 21:27

## 2022-10-07 RX ADMIN — DILTIAZEM HYDROCHLORIDE 120 MG: 120 CAPSULE, COATED, EXTENDED RELEASE ORAL at 21:27

## 2022-10-07 RX ADMIN — RIVAROXABAN 20 MG: 20 TABLET, FILM COATED ORAL at 10:05

## 2022-10-07 RX ADMIN — LOSARTAN POTASSIUM 100 MG: 100 TABLET, FILM COATED ORAL at 10:05

## 2022-10-07 RX ADMIN — Medication 10 MG/HR: at 16:17

## 2022-10-07 RX ADMIN — SODIUM CHLORIDE, PRESERVATIVE FREE 10 ML: 5 INJECTION INTRAVENOUS at 21:27

## 2022-10-07 RX ADMIN — FENOFIBRATE 160 MG: 160 TABLET ORAL at 10:05

## 2022-10-07 RX ADMIN — METOPROLOL SUCCINATE 100 MG: 50 TABLET, FILM COATED, EXTENDED RELEASE ORAL at 10:05

## 2022-10-07 RX ADMIN — DILTIAZEM HYDROCHLORIDE 120 MG: 120 CAPSULE, COATED, EXTENDED RELEASE ORAL at 10:04

## 2022-10-07 RX ADMIN — FLECAINIDE ACETATE 50 MG: 50 TABLET ORAL at 10:05

## 2022-10-07 RX ADMIN — ROSUVASTATIN CALCIUM 10 MG: 10 TABLET, FILM COATED ORAL at 21:27

## 2022-10-07 RX ADMIN — Medication 10 MG/HR: at 02:12

## 2022-10-07 RX ADMIN — MULTIPLE VITAMINS W/ MINERALS TAB 1 TABLET: TAB at 10:04

## 2022-10-07 RX ADMIN — SODIUM CHLORIDE: 9 INJECTION, SOLUTION INTRAVENOUS at 21:29

## 2022-10-07 ASSESSMENT — ENCOUNTER SYMPTOMS
EYE DISCHARGE: 0
BACK PAIN: 0
GASTROINTESTINAL NEGATIVE: 1
COUGH: 0
COLOR CHANGE: 0
RESPIRATORY NEGATIVE: 1
DIARRHEA: 0
EYES NEGATIVE: 1
RHINORRHEA: 0
ABDOMINAL PAIN: 0
VOMITING: 0
SHORTNESS OF BREATH: 0
NAUSEA: 0
CONSTIPATION: 0
CHEST TIGHTNESS: 0

## 2022-10-07 ASSESSMENT — PAIN SCALES - GENERAL
PAINLEVEL_OUTOF10: 0
PAINLEVEL_OUTOF10: 0

## 2022-10-07 NOTE — CONSULTS
Department of Cardiothoracic Surgery    CHIEF COMPLAINT:    Chief Complaint   Patient presents with    Chest Pain          HISTORY OF PRESENT ILLNESS:      78 y.o. male with a history of paroxysmal atrial fibrillation on anticoagulation for chronic DVT now admitted with complaint of palpitations. Cardiology consulted and started diltiazem drip. The rate is controlled at 70 and he is hemodynamically stable. There was an echo obtained today which showed a moderate pericardial effusion and reduced LVEF compared to past echo obtained 4 months ago. There is no echo signs of tamponade.     Past Medical History:   Diagnosis Date    Atrial fibrillation (HCC)     Cellulitis of left leg     Colon polyp     Elevated blood pressure reading without diagnosis of hypertension     Esophageal reflux     History of prostate cancer     Mixed hyperlipidemia     TG>>LDL    Obesity     Prostate cancer [facility])     2010 Dr Lena Mohamud;  implants, XRT       Social History     Socioeconomic History    Marital status: Single     Spouse name: Not on file    Number of children: Not on file    Years of education: Not on file    Highest education level: Not on file   Occupational History    Not on file   Tobacco Use    Smoking status: Never    Smokeless tobacco: Never   Substance and Sexual Activity    Alcohol use: No    Drug use: No    Sexual activity: Not on file   Other Topics Concern    Not on file   Social History Narrative    Not on file     Social Determinants of Health     Financial Resource Strain: Low Risk     Difficulty of Paying Living Expenses: Not hard at all   Food Insecurity: Unknown    Worried About [address] in the Last Year: Patient refused    Ran Out of Food in the Last Year: Patient refused   Transportation Needs: Not on file   Physical Activity: Not on file   Stress: Not on file   Social Connections: Not on file   Intimate Partner Violence: Not on file   Housing Stability: Not on file       Past Surgical History: Procedure Laterality Date    COLONOSCOPY  6/2/2009        COLONOSCOPY  2012        UPPER GASTROINTESTINAL ENDOSCOPY  3/22/2000        VASECTOMY         Allergies   Allergen Reactions    Pcn [Penicillins] Rash         Current Facility-Administered Medications:     dilTIAZem (CARDIZEM CD) extended release capsule 120 mg, 120 mg, Oral, BID, Damián Rojas MD, 120 mg at 10/07/22 1004    flecainide (TAMBOCOR) tablet 50 mg, 50 mg, Oral, BID, Damián Rojas MD, 50 mg at 10/07/22 1005    perflutren lipid microspheres (DEFINITY) injection 1.65 mg, 1.5 mL, IntraVENous, ONCE PRN, Damián Rojas MD, 1.5 mL at 10/07/22 1148    dilTIAZem HCl in sodium chloride 125 mg / 125 mL infusion, 2.5-15 mg/hr, IntraVENous, Continuous, Megha Flores MD, Last Rate: 10 mL/hr at 10/07/22 1617, 10 mg/hr at 10/07/22 1617    sodium chloride flush 0.9 % injection 5-40 mL, 5-40 mL, IntraVENous, 2 times per day, Eulogio Cui APRN - CNP    sodium chloride flush 0.9 % injection 5-40 mL, 5-40 mL, IntraVENous, PRN, Eulogio Cui, APRN - CNP    0.9 % sodium chloride infusion, , IntraVENous, PRN, Eulogio Mckeony, APRN - CNP    ondansetron (ZOFRAN-ODT) disintegrating tablet 4 mg, 4 mg, Oral, Q8H PRN **OR** ondansetron (ZOFRAN) injection 4 mg, 4 mg, IntraVENous, Q6H PRN, Eulogio Cui, APRN - CNP    polyethylene glycol (GLYCOLAX) packet 17 g, 17 g, Oral, Daily PRN, Eulogio Cui APRN - CNP    acetaminophen (TYLENOL) tablet 650 mg, 650 mg, Oral, Q6H PRN **OR** acetaminophen (TYLENOL) suppository 650 mg, 650 mg, Rectal, Q6H PRN, Eulogio Cui APRN - CNP    fenofibrate (TRIGLIDE) tablet 160 mg, 160 mg, Oral, Daily, JODY Joshua - CNP, 160 mg at 10/07/22 1005    losartan (COZAAR) tablet 100 mg, 100 mg, Oral, Daily, JODY Joshua CNP, 100 mg at 10/07/22 1005    metoprolol succinate (TOPROL XL) extended release tablet 100 mg, 100 mg, Oral, Daily, JODY Joshua CNP, 100 mg at 10/07/22 1005    therapeutic multivitamin-minerals 1 tablet, 1 tablet, Oral, Daily, JODY Chau CNP, 1 tablet at 10/07/22 1004    rivaroxaban (XARELTO) tablet 20 mg, 20 mg, Oral, Daily with breakfast, JODY Chau - CNP, 20 mg at 10/07/22 1005    rosuvastatin (CRESTOR) tablet 10 mg, 10 mg, Oral, Nightly, JODY Chau - CNP, 10 mg at 10/06/22 2145    Family History   Problem Relation Age of Onset    Colon Polyps Mother     Colon Polyps Brother     Heart Disease Father     High Blood Pressure Other           Current Facility-Administered Medications:     dilTIAZem (CARDIZEM CD) extended release capsule 120 mg, 120 mg, Oral, BID, Kai Denise MD, 120 mg at 10/07/22 1004    flecainide (TAMBOCOR) tablet 50 mg, 50 mg, Oral, BID, Kai Denise MD, 50 mg at 10/07/22 1005    perflutren lipid microspheres (DEFINITY) injection 1.65 mg, 1.5 mL, IntraVENous, ONCE PRN, Kai Denise MD, 1.5 mL at 10/07/22 1148    dilTIAZem HCl in sodium chloride 125 mg / 125 mL infusion, 2.5-15 mg/hr, IntraVENous, Continuous, Melina Oliveros MD, Last Rate: 10 mL/hr at 10/07/22 1617, 10 mg/hr at 10/07/22 1617    sodium chloride flush 0.9 % injection 5-40 mL, 5-40 mL, IntraVENous, 2 times per day, JODY Chau CNP    sodium chloride flush 0.9 % injection 5-40 mL, 5-40 mL, IntraVENous, PRN, JODY Chau - CNP    0.9 % sodium chloride infusion, , IntraVENous, PRN, JODY Chau - CNP    ondansetron (ZOFRAN-ODT) disintegrating tablet 4 mg, 4 mg, Oral, Q8H PRN **OR** ondansetron (ZOFRAN) injection 4 mg, 4 mg, IntraVENous, Q6H PRN, JODY Chau CNP    polyethylene glycol (GLYCOLAX) packet 17 g, 17 g, Oral, Daily PRN, JODY Chau CNP    acetaminophen (TYLENOL) tablet 650 mg, 650 mg, Oral, Q6H PRN **OR** acetaminophen (TYLENOL) suppository 650 mg, 650 mg, Rectal, Q6H PRN, JODY Chau CNP    fenofibrate (TRIGLIDE) tablet 160 mg, 160 mg, Oral, Daily, Sandra Hillock, APRN - CNP, 160 mg at 10/07/22 1005    losartan (COZAAR) tablet 100 mg, 100 mg, Oral, Daily, Sandra Hillock, APRN - CNP, 100 mg at 10/07/22 1005    metoprolol succinate (TOPROL XL) extended release tablet 100 mg, 100 mg, Oral, Daily, [place] Hillock, APRN - CNP, 100 mg at 10/07/22 1005    therapeutic multivitamin-minerals 1 tablet, 1 tablet, Oral, Daily, Sandra Hillock, APRN - CNP, 1 tablet at 10/07/22 1004    rivaroxaban (XARELTO) tablet 20 mg, 20 mg, Oral, Daily with breakfast, Sandra Hillock, APRN - CNP, 20 mg at 10/07/22 1005    rosuvastatin (CRESTOR) tablet 10 mg, 10 mg, Oral, Nightly, Sandra Hillock, APRN - CNP, 10 mg at 10/06/22 2145     REVIEW OF SYSTEMS:  Review of Systems   Constitutional:  Negative for activity change, appetite change, chills, diaphoresis, fatigue, fever and unexpected weight change. HENT:  Negative for dental problem. Eyes:  Negative for visual disturbance. Respiratory:  Negative for cough, chest tightness and shortness of breath. No hoarseness. No hemoptysis. Cardiovascular:  Negative for chest pain, palpitations and leg swelling. No orthopnea or PND. Gastrointestinal:  Negative for abdominal pain, constipation, diarrhea, nausea and vomiting. Genitourinary:  Negative for dysuria, frequency, hematuria and urgency. Musculoskeletal:  Negative for back pain and joint swelling. Skin: Negative. Neurological:  Negative for dizziness, seizures, syncope, light-headedness and headaches. Psychiatric/Behavioral:  Negative for confusion and hallucinations. The patient is not nervous/anxious. PHYSICAL EXAM:    Physical Exam  Vitals and nursing note reviewed. Constitutional:       General: He is not in acute distress. Appearance: He is well-developed. HENT:      Head: Normocephalic. Eyes:      Conjunctiva/sclera: Conjunctivae normal.      Pupils: Pupils are equal, round, and reactive to light.    Neck:      Thyroid: No thyromegaly. Vascular: No JVD. Trachea: No tracheal deviation. Cardiovascular:      Rate and Rhythm: Regular rhythm. Heart sounds: Normal heart sounds. No murmur heard. No friction rub. No gallop. Pulmonary:      Effort: Pulmonary effort is normal. No respiratory distress. Breath sounds: Normal breath sounds. No wheezing or rales. Chest:      Chest wall: No tenderness. Abdominal:      General: Bowel sounds are normal. There is no distension. Palpations: Abdomen is soft. There is no mass. Tenderness: There is no abdominal tenderness. Musculoskeletal:         General: No tenderness. Normal range of motion. Cervical back: Normal range of motion and neck supple. Lymphadenopathy:      Cervical: No cervical adenopathy. Skin:     General: Skin is warm and dry. Neurological:      Mental Status: He is alert and oriented to person, place, and time. Cranial Nerves: No cranial nerve deficit. Deep Tendon Reflexes: Reflexes are normal and symmetric. Psychiatric:         Behavior: Behavior normal.         Thought Content: Thought content normal.         Judgment: Judgment normal.        DATA:  Wt Readings from Last 3 Encounters:   10/06/22 (!) 341 lb 1 oz (154.7 kg)   08/04/22 (!) 340 lb (154.2 kg)   07/26/22 (!) 364 lb (165.1 kg)     Temp Readings from Last 3 Encounters:   10/07/22 97.7 °F (36.5 °C) (Oral)   07/26/22 98.5 °F (36.9 °C) (Oral)   07/01/22 97.2 °F (36.2 °C) (Temporal)     BP Readings from Last 3 Encounters:   10/07/22 (!) 101/54   08/04/22 132/88   07/26/22 126/66     Pulse Readings from Last 3 Encounters:   10/07/22 70   08/04/22 75   07/26/22 81      Echo reviewed by me. Moderate pericardial effusion. No diastolic collapse of the RA/RV. ASSESSMENT AND PLAN:      77 yo man with palpitations who has an incidental finding of pericardial effusion, moderate in amount, that was not present 4 months ago.   He has no clinical or echocardiographic signs of tamponade. There is good systemic perfusion as evidenced by stable creat, normal LFTs, no acidosis, no tachycardia or hypotension. Given the association of pericardial effusion with thoracic pathology, it is reasonable to assess a chest CT scan at this point. Otherwise, the pericardial effusion should be followed up on serial echo exams. No indication for intervention at this point.       Migel Courtney MD

## 2022-10-07 NOTE — PROGRESS NOTES
Physician Progress Note      PATIENT:               Winter Stacy  CSN #:                  [id number]  :                       1955  ADMIT DATE:       10/6/2022 1:17 PM  [address] DATE:  RESPONDING  PROVIDER #:        Cindy OG          QUERY TEXT:    Patient admitted with BMI 45. If possible, please document in progress notes   and discharge summary if you are evaluating and /or treating any of the   following: The medical record reflects the following:  Risk Factors: hx of obesity  Clinical Indicators: BMI 45  Treatment: medicine treatment adjust based on weight    Thank you,  Tammy Gallardo    ? Specificity of obesity and morbid obesity should be reported based on   physician documentation, as there are several published classifications and   definitions?  MS-DRG Training Guide. CDC:   https://cook-rincon.info/. WHO:   http://carolee.akhil/. NIH:   LargeFood.be  Options provided:  -- Obesity  -- Morbid obesity  -- Severe obesity  -- Overweight  -- BMI not clinically significant  -- Other - I will add my own diagnosis  -- Disagree - Not applicable / Not valid  -- Disagree - Clinically unable to determine / Unknown  -- Refer to Clinical Documentation Reviewer    PROVIDER RESPONSE TEXT:    This patient has severe obesity.     Query created by: Ofelia Suárez on 10/7/2022 9:59 AM      Electronically signed by:  Jessica Rocha 10/7/2022 2:37 PM

## 2022-10-07 NOTE — CONSULTS
[facility]  Cardiology Consult      Requesting MD:  Petey Cole MD   Admit Status:         History obtained from:   [] Patient  [] Other (specify):     Patient:  Stephanie Weaver  [id number]     Chief Complaint:   Chief Complaint   Patient presents with    Chest Pain         HPI: Mr. Doyle Isabel is a 78 y.o. male with a history of paroxysmal atrial fibrillation on anticoagulation for chronic DVT now admitted on 6/20/2022 elevated heart rate complains of palpitations denies chest pain denies shortness of breath no other complaints or issues reported. Lives with his mother who is also a patient of mine. Started on IV diltiazem. Presently on Xarelto but no antiarrhythmics. Troponins are negative. proBNP was 99 on 7/26/2022. Denies anginal chest pain. Review of Systems:  Review of Systems   Constitutional: Negative. Negative for chills, fever and unexpected weight change. HENT: Negative. Eyes: Negative. Respiratory: Negative. Negative for shortness of breath. Cardiovascular: Negative. Negative for chest pain. Gastrointestinal: Negative. Negative for diarrhea, nausea and vomiting. Endocrine: Negative. Genitourinary: Negative. Musculoskeletal: Negative. Skin: Negative. Neurological: Negative. All other systems reviewed and are negative.     Cardiac Specific Data:  Specialty Problems          Cardiology Problems    Atrial fibrillation with RVR (HCC)        Atrial fibrillation (HCC)        * (Principal) Atrial fibrillation with rapid ventricular response (HCC)        Essential hypertension        Mixed hyperlipidemia        Chronic deep vein thrombosis (DVT) of left peroneal vein (HCC)           Past Medical History:  Past Medical History:   Diagnosis Date    Atrial fibrillation (HCC)     Cellulitis of left leg     Colon polyp     Elevated blood pressure reading without diagnosis of hypertension     Esophageal reflux     History of prostate cancer     Mixed hyperlipidemia     TG>>LDL    Obesity     Prostate cancer [facility])     2010 Dr Franklin Banks;  implants, XRT        Past Surgical History:  Past Surgical History:   Procedure Laterality Date    COLONOSCOPY  6/2/2009        COLONOSCOPY  2012        UPPER GASTROINTESTINAL ENDOSCOPY  3/22/2000        VASECTOMY         Past Family History:  Family History   Problem Relation Age of Onset    Colon Polyps Mother     Colon Polyps Brother     Heart Disease Father     High Blood Pressure Other        Past Social History:  Social History     Socioeconomic History    Marital status: Single     Spouse name: Not on file    Number of children: Not on file    Years of education: Not on file    Highest education level: Not on file   Occupational History    Not on file   Tobacco Use    Smoking status: Never    Smokeless tobacco: Never   Substance and Sexual Activity    Alcohol use: No    Drug use: No    Sexual activity: Not on file   Other Topics Concern    Not on file   Social History Narrative    Not on file     Social Determinants of Health     Financial Resource Strain: Low Risk     Difficulty of Paying Living Expenses: Not hard at all   Food Insecurity: Unknown    Worried About Running Out of Food in the Last Year: Patient refused    Ran Out of Food in the Last Year: Patient refused   Transportation Needs: Not on file   Physical Activity: Not on file   Stress: Not on file   Social Connections: Not on file   Intimate Partner Violence: Not on file   Housing Stability: Not on file       Allergies: Allergies   Allergen Reactions    Pcn [Penicillins] Rash       Home Meds:  Prior to Admission medications    Medication Sig Start Date End Date Taking? Authorizing Provider   rosuvastatin (CRESTOR) 10 MG tablet Take 1 tablet by mouth nightly 7/18/22 10/16/22  JODY Huber   metoprolol succinate (TOPROL XL) 100 MG extended release tablet Take 1 tablet by mouth in the morning.  7/18/22   JODY Huber Cholecalciferol (VITAMIN D3) 50 MCG (2000 UT) CAPS TAKE 1 CAPSULE EVERY DAY 7/18/22   JODY Molina   rivaroxaban (XARELTO) 20 MG TABS tablet TAKE 1 TABLET EVERY DAY WITH BREAKFAST 5/9/22   JODY Molina   omega-3 acid ethyl esters (LOVAZA) 1 g capsule Take 1 g by mouth daily 4/1/22   Historical Provider, MD   fish oil-omega-3 fatty acids 1000 MG capsule Take 2 capsules by mouth nightly  Patient not taking: Reported on 10/6/2022 3/29/22   JODY Molina   losartan (COZAAR) 100 MG tablet TAKE 1 TABLET EVERY DAY 11/10/21   JODY Molina   fenofibrate 160 MG tablet Take 1 tablet by mouth daily 1/14/20 6/29/22  JODY Molina   Red Yeast Rice 600 MG TABS Take 2 tablets by mouth 2 times daily  Patient not taking: Reported on 10/6/2022    Historical Provider, MD   Multiple Vitamin (MULTI-VITAMIN PO) Take  by mouth. Historical Provider, MD       Current Meds:   amiodarone  200 mg Oral BID    sodium chloride flush  5-40 mL IntraVENous 2 times per day    fenofibrate  160 mg Oral Daily    losartan  100 mg Oral Daily    metoprolol succinate  100 mg Oral Daily    therapeutic multivitamin-minerals  1 tablet Oral Daily    rivaroxaban  20 mg Oral Daily with breakfast    rosuvastatin  10 mg Oral Nightly       Current Infused Meds:   dilTIAZem 10 mg/hr (10/07/22 0212)    sodium chloride         Physical Exam:  Vitals:    10/07/22 0600   BP: 109/61   Pulse: 79   Resp: 16   Temp: 97.2 °F (36.2 °C)   SpO2: 96%       Intake/Output Summary (Last 24 hours) at 10/7/2022 2480  Last data filed at 10/7/2022 2836  Gross per 24 hour   Intake 480 ml   Output 1000 ml   Net -520 ml     Estimated body mass index is 45 kg/m² as calculated from the following:    Height as of this encounter: 6' 1\" (1.854 m). Weight as of this encounter: 341 lb 1 oz (154.7 kg). Physical Exam  Constitutional:       General: He is not in acute distress. Appearance: He is well-developed. He is obese.  He is not ill-appearing, toxic-appearing or diaphoretic. HENT:      Head: Normocephalic and atraumatic. Nose: Nose normal.      Mouth/Throat:      Mouth: Mucous membranes are moist.      Pharynx: Oropharynx is clear. Eyes:      General: No scleral icterus. Extraocular Movements: Extraocular movements intact. Pupils: Pupils are equal, round, and reactive to light. Neck:      Vascular: No carotid bruit or JVD. Cardiovascular:      Rate and Rhythm: Normal rate and regular rhythm. Heart sounds: Normal heart sounds. No murmur heard. No friction rub. No gallop. Pulmonary:      Effort: Pulmonary effort is normal. No respiratory distress. Breath sounds: Normal breath sounds. No stridor. No wheezing, rhonchi or rales. Chest:      Chest wall: No tenderness. Abdominal:      General: Abdomen is flat. Bowel sounds are normal. There is no distension. Palpations: Abdomen is soft. There is no mass. Tenderness: There is no abdominal tenderness. There is no right CVA tenderness, left CVA tenderness, guarding or rebound. Hernia: No hernia is present. Musculoskeletal:         General: No swelling, tenderness, deformity or signs of injury. Cervical back: Normal range of motion and neck supple. No rigidity or tenderness. Right lower leg: No edema. Left lower leg: No edema. Lymphadenopathy:      Cervical: No cervical adenopathy. Skin:     General: Skin is warm and dry. Neurological:      General: No focal deficit present. Mental Status: He is alert and oriented to person, place, and time. Mental status is at baseline. Cranial Nerves: No cranial nerve deficit. Sensory: No sensory deficit. Motor: No weakness.       Coordination: Coordination normal.   Psychiatric:         Mood and Affect: Mood normal.         Judgment: Judgment normal.       Labs:  Recent Labs     10/06/22  1335 10/07/22  0318   WBC 6.3 6.5   HGB 14.5 13.7*    250       Recent Labs     10/06/22  1335 10/07/22  0318    139   K 4.7 4.5    102   CO2 23 22   BUN 18 18   CREATININE 1.2 1.4*   LABGLOM >60 51*   CALCIUM 9.2 9.4       CK, CKMB, Troponin: @LABRCNT (CKTOTAL:3, CKMB:3, TROPONINI:3)@    Last 3 BNP:  No results for input(s): BNP in the last 72 hours. IMAGING:  XR CHEST PORTABLE    Result Date: 10/6/2022  NO PRIOR REPORT AVAILABLE Exam: X-RAY OF [facility] Clinical data:Chest pain. Technique:Single view of the chest. Prior studies: Radiograph of the chest dated 7/26/2022. Findings: The lungs are grossly clear; noevidence of acute infiltrate or pleural effusion. Cardiac silhouette is prominent superiorly with widening of cardiomediastinal silhouette. This is likely WNL however CT recommended to assess further. Minimal fibrosis noted in the bases. No acute osseous abnormality is detected. Prominent  upper cardiomediastinal silhouette. Recommend CT to assess further Minimal bibasilar fibrosis. Recommendation: Follow up as clinically indicated.  Electronically Signed by Margaret Orozco MD at 06-Oct-2022 02:51:17 PM               Assessment:  Paroxysmal atrial fibrillation recurrent  Colon polyps  Obesity  Hyperlipidemia  Hypertension  History of prostate cancer  Chronic anticoagulation with Xarelto  Chronic DVT left peroneal vein  Gastroesophageal reflux disease  Vitamin D deficiency  Echocardiogram 6/29/2022 ejection fraction 50 to 55% dilated ascending aorta 3.5 cm      Recommendations:  Continue anticoagulation  Transition IV to oral Cardizem for rate control  Suggest flecainide 50 mg p.o. twice daily  N.p.o. after midnight on Sunday for possible cardioversion Monday if necessary

## 2022-10-07 NOTE — PROGRESS NOTES
Progress Note  Date:10/7/2022       Room:0724/724-02  Patient Name:Figueroa Aguero     YOB: 1955     Age:67 y.o. Subjective    Subjective: 61-year-old male with a history of brain injury, paroxysmal defibrillation, hyperlipidemia, prostate cancer, DVT, presented to the hospital 10/6 with concerns of tachycardia. Was noted to have heart rate in the 140s at home, in the emergency room troponin negative, EKG with atrial fibrillation with RVR and was initiated on Cardizem drip. Patient was admitted in-house for further work-up. Chest x-ray on admission showed widened mediastinum, echocardiogram was obtained which showed moderate pericardial effusion, cardiothoracic surgery team consulted to evaluate the patient will benefit from pericardiocentesis. Patient was evaluated by cardiology, plans to wean off Cardizem drip and transition to oral Cardizem with addition of flecainide. If remains in atrial fibrillation over the weekend possible cardioversion on Monday. Patient was seen in house this morning family member present. Denied any acute overnight event, denied any chest pain or worsening shortness of breath. No nausea or vomiting no acute issues at this time. Review of Systems: 12 point system review negative except as above. Objective         Vitals Last 24 Hours:  TEMPERATURE:  Temp  Av.7 °F (36.5 °C)  Min: 97.2 °F (36.2 °C)  Max: 98.1 °F (36.7 °C)  RESPIRATIONS RANGE: Resp  Av.5  Min: 16  Max: 18  PULSE OXIMETRY RANGE: SpO2  Av.2 %  Min: 94 %  Max: 96 %  PULSE RANGE: Pulse  Av.5  Min: 72  Max: 133  BLOOD PRESSURE RANGE: Systolic (52WNX), VJY:151 , Min:81 , URIEL:776   ; Diastolic (87ZKJ), PGF:10, Min:51, Max:122    I/O (24Hr):     Intake/Output Summary (Last 24 hours) at 10/7/2022 1550  Last data filed at 10/7/2022 1432  Gross per 24 hour   Intake 720 ml   Output 1100 ml   Net -380 ml     Objective:  Vital signs: (most recent): Blood pressure (!) 81/51, pulse 72, temperature 97.7 °F (36.5 °C), temperature source Oral, resp. rate 18, height 6' 1\" (1.854 m), weight (!) 341 lb 1 oz (154.7 kg), SpO2 95 %. Physical Examination:  General: morbid obese, Well-developed, no acute distress lying comfortably in bed. HEENT: Atraumatic normocephalic, range of motion normal   Cardiac: Normal S1-S2 with irregular rhythm  Respiratory: clear To auscultation bilaterally, no rhonchi or rales, no wheezing  Abdomen: Truncal obesity, with some distention, nontender, no rebound noted. Extremities: no tenderness, no edema, moves all extremities  Psych: Affect normal and good eye contact, behavioral normal.        Labs/Imaging/Diagnostics    Labs:  CBC:  Recent Labs     10/06/22  1335 10/07/22  0318   WBC 6.3 6.5   RBC 4.92 4.80   HGB 14.5 13.7*   HCT 42.2 41.6*   MCV 85.8 86.7   RDW 12.8 12.9    250     CHEMISTRIES:  Recent Labs     10/06/22  1335 10/07/22  0318    139   K 4.7 4.5    102   CO2 23 22   BUN 18 18   CREATININE 1.2 1.4*   GLUCOSE 113* 94     PT/INR:No results for input(s): PROTIME, INR in the last 72 hours. APTT:No results for input(s): APTT in the last 72 hours. LIVER PROFILE:  Recent Labs     10/06/22  1335   AST 23   ALT 24   BILITOT 0.3   ALKPHOS 78       Imaging Last 24 Hours:  ECHO Complete 2D W Doppler W Color    Result Date: 10/7/2022  Transthoracic Echocardiography Report (TTE)  Demographics   Patient Name  Corbin Del Valle Date of Study          10/07/2022   MRN           [id number]        Gender                 Male   Date of Birth 1955    Room Number            [facility]-0724   Age           78 year(s)   Height:       73 inches     Referring Physician    Paulette Torres MD   Weight:       341.01 pounds Sonographer            Mercedleeanna Shetty   BSA:          2.7 m^2       Interpreting Physician Christiano Best MD   BMI:          44.99 kg/m^2  Procedure Type of Study   TTE procedure:ECHO 2D W/DOPPLER/COLOR/CONTRAST.   Study Location: Echo Lab Technical Quality: Poor visualization due to poor acoustical window. Patient Status: Inpatient Contrast Medium: Definity. HR: 72 bpm BP: 109/61 mmHg Indications:Atrial fibrillation. Conclusions   Summary  Technically difficult echo. EF 40 %  Contrast used. No significant valvular abnormalities. mo pericardial effusion   Signature   ----------------------------------------------------------------  Electronically signed by Cally Hanks MD(Interpreting  physician) on 10/07/2022 01:00 PM  ----------------------------------------------------------------   Findings   Mitral Valve  Structurally normal mitral valve with normal leaflet mobility. No evidence  of mitral valve stenosis. Trivial mitral regurgitation. Aortic Valve  Aortic valve appears to be tricuspid. Structurally normal aortic valve. No significant aortic regurgitation or stenosis is noted. Tricuspid Valve  Tricuspid valve is structurally normal.  No evidence of tricuspid regurgitation. Pulmonic Valve  The pulmonic valve was not well visualized. Left Atrium  Normal size left atrium. Left Ventricle  Normal left ventricle size. Left ventricular ejection fraction is visually estimated at 40%. No evidence of left ventricular mass or thrombus noted. Right Atrium  Normal right atrial dimension with no evidence of thrombus or mass noted. Right Ventricle  Normal right ventricular size with preserved RV function. Pericardial Effusion  No evidence of significant pericardial effusion is noted. Pleural Effusion  No evidence of pleural effusion. Miscellaneous  Aortic root is within normal limits. The IVC is dilated . Definity contrast was utilized to enhance the endocardial borders. 2D Measurements and Calculations(cm)   LVIDd: 5.25 cm                      LVIDs: 4.2 cm  IVSd: 1.25 cm  LVPWd: 0.99 cm                      AO Root Dimension: 3 cm  Rt. Vent.  Dimension: 2.56 cm        LA Dimension: 3.4 cm  % Ejection Fraction: 40 %           LA Area: 18.5 cm^2  LA Volume: 61.4 ml                  LV Systolic dimension: 0.5NH  LA Volume Index: 23 ml/m^2          LV PW diastolic: 4.87YE  LV dimension: 5.25 cm               LVOT diameter: 2.1 cm                                      RA Systolic pressure: 8mmHg  LVEDV: 63.3 ml                      RV Diastolic dimension: 3.02OK  LVESV:37.5 ml                       LVEDVI: 23 ml/m^2  Cardic Output (CO): 3.79l/min       LVESVI: 14 ml/m^2  Ascending Aorta: 2.9 cm  Doppler Measurements and Calculations   AV Peak Velocity:133 cm/s              MV Peak E-Wave: 90.4 cm/s  AV Mean Velocity:102 cm/s              MV Peak A-Wave: 58.3 cm/s  AV Peak Gradient: 7.08 mmHg            MV E/A Ratio: 1.55 %  AV Mean Gradient: 5 mmHg               MV Mean velocity: 49.5cm/s  AV Area (Continuity):2.02 cm^2         MV Peak Gradient: 3.27 mmHg  AV VTI:26 cm/s                         MV Mean gradient: 1 mmHg                                         MV P1/2t: 98 msec  Estimated RAP:8 mmHg                   MVA by PHT2.24 cm^2   MV E' septal velocity: 8.49cm/s  MV E' lateral velocity:7.62 cm/s      XR CHEST PORTABLE    Result Date: 10/6/2022  NO PRIOR REPORT AVAILABLE Exam: X-RAY OF [facility] Clinical data:Chest pain. Technique:Single view of the chest. Prior studies: Radiograph of the chest dated 7/26/2022. Findings: The lungs are grossly clear; noevidence of acute infiltrate or pleural effusion. Cardiac silhouette is prominent superiorly with widening of cardiomediastinal silhouette. This is likely WNL however CT recommended to assess further. Minimal fibrosis noted in the bases. No acute osseous abnormality is detected. Prominent  upper cardiomediastinal silhouette. Recommend CT to assess further Minimal bibasilar fibrosis. Recommendation: Follow up as clinically indicated.  Electronically Signed by Song Mcpherson MD at 06-Oct-2022 02:51:17 PM             Assessment//Plan           Hospital Problems             Last Modified POA    * (Principal) Atrial fibrillation with rapid ventricular response ([address] 10/6/2022 Yes     Assessment & Plan    Paroxysmal A. fib with RVR  Initiated on Cardizem drip admission, evaluated by cardiology recommending transitioning to oral Cardizem  Cardiology suggested flecainide 50 twice daily, to monitor in-house through the weekend and possible cardioversion if necessary on Monday. Echocardiogram reviewed  Continue to coagulation as ordered. Moderate pericardial effusion on echocardiogram  Cardiothoracic surgery team consulted to evaluate if patient will benefit from pericardiocentesis. Chronic left lower extremity DVT  Continue the coagulation    Hypertension: Continue losartan    Hyperlipidemia: Continue statin    Morbid obesity    History of brain injury. Code: Full  Diet: Cardiac  Disposition: Pending completion of above work-up.       Electronically signed by Patti Murray MD on 10/7/22 at 3:50 PM CDT

## 2022-10-08 LAB
ANION GAP SERPL CALCULATED.3IONS-SCNC: 12 MMOL/L (ref 7–19)
BUN BLDV-MCNC: 23 MG/DL (ref 8–23)
CALCIUM SERPL-MCNC: 8.8 MG/DL (ref 8.8–10.2)
CHLORIDE BLD-SCNC: 103 MMOL/L (ref 98–111)
CO2: 25 MMOL/L (ref 22–29)
CREAT SERPL-MCNC: 1.4 MG/DL (ref 0.5–1.2)
GFR AFRICAN AMERICAN: >59
GFR NON-AFRICAN AMERICAN: 51
GLUCOSE BLD-MCNC: 93 MG/DL (ref 74–109)
GLUCOSE BLD-MCNC: 94 MG/DL (ref 70–99)
HCT VFR BLD CALC: 39.5 % (ref 42–52)
HEMOGLOBIN: 13 G/DL (ref 14–18)
MCH RBC QN AUTO: 28.8 PG (ref 27–31)
MCHC RBC AUTO-ENTMCNC: 32.9 G/DL (ref 33–37)
MCV RBC AUTO: 87.6 FL (ref 80–94)
PDW BLD-RTO: 13.1 % (ref 11.5–14.5)
PERFORMED ON: NORMAL
PLATELET # BLD: 248 K/UL (ref 130–400)
PMV BLD AUTO: 10 FL (ref 9.4–12.4)
POTASSIUM REFLEX MAGNESIUM: 4.3 MMOL/L (ref 3.5–5)
RBC # BLD: 4.51 M/UL (ref 4.7–6.1)
SODIUM BLD-SCNC: 140 MMOL/L (ref 136–145)
WBC # BLD: 6.9 K/UL (ref 4.8–10.8)

## 2022-10-08 PROCEDURE — 2500000003 HC RX 250 WO HCPCS: Performed by: PEDIATRICS

## 2022-10-08 PROCEDURE — 2140000000 HC CCU INTERMEDIATE R&B

## 2022-10-08 PROCEDURE — 36415 COLL VENOUS BLD VENIPUNCTURE: CPT

## 2022-10-08 PROCEDURE — 6370000000 HC RX 637 (ALT 250 FOR IP)

## 2022-10-08 PROCEDURE — 85027 COMPLETE CBC AUTOMATED: CPT

## 2022-10-08 PROCEDURE — 2580000003 HC RX 258

## 2022-10-08 PROCEDURE — 6370000000 HC RX 637 (ALT 250 FOR IP): Performed by: INTERNAL MEDICINE

## 2022-10-08 PROCEDURE — 82947 ASSAY GLUCOSE BLOOD QUANT: CPT

## 2022-10-08 PROCEDURE — 93005 ELECTROCARDIOGRAM TRACING: CPT | Performed by: INTERNAL MEDICINE

## 2022-10-08 PROCEDURE — 99231 SBSQ HOSP IP/OBS SF/LOW 25: CPT | Performed by: SURGERY

## 2022-10-08 PROCEDURE — 80048 BASIC METABOLIC PNL TOTAL CA: CPT

## 2022-10-08 RX ADMIN — RIVAROXABAN 20 MG: 20 TABLET, FILM COATED ORAL at 08:49

## 2022-10-08 RX ADMIN — DILTIAZEM HYDROCHLORIDE 120 MG: 120 CAPSULE, COATED, EXTENDED RELEASE ORAL at 08:49

## 2022-10-08 RX ADMIN — ROSUVASTATIN CALCIUM 10 MG: 10 TABLET, FILM COATED ORAL at 19:56

## 2022-10-08 RX ADMIN — SODIUM CHLORIDE, PRESERVATIVE FREE 10 ML: 5 INJECTION INTRAVENOUS at 08:54

## 2022-10-08 RX ADMIN — MULTIPLE VITAMINS W/ MINERALS TAB 1 TABLET: TAB at 08:49

## 2022-10-08 RX ADMIN — FLECAINIDE ACETATE 50 MG: 50 TABLET ORAL at 08:49

## 2022-10-08 RX ADMIN — METOPROLOL SUCCINATE 100 MG: 50 TABLET, FILM COATED, EXTENDED RELEASE ORAL at 08:49

## 2022-10-08 RX ADMIN — LOSARTAN POTASSIUM 100 MG: 100 TABLET, FILM COATED ORAL at 08:49

## 2022-10-08 RX ADMIN — SODIUM CHLORIDE, PRESERVATIVE FREE 10 ML: 5 INJECTION INTRAVENOUS at 19:56

## 2022-10-08 RX ADMIN — Medication 5 MG/HR: at 05:30

## 2022-10-08 RX ADMIN — DILTIAZEM HYDROCHLORIDE 120 MG: 120 CAPSULE, COATED, EXTENDED RELEASE ORAL at 19:56

## 2022-10-08 RX ADMIN — FLECAINIDE ACETATE 50 MG: 50 TABLET ORAL at 19:56

## 2022-10-08 RX ADMIN — FENOFIBRATE 160 MG: 160 TABLET ORAL at 08:49

## 2022-10-08 NOTE — PROGRESS NOTES
Progress Note    10/8/2022 8:48 AM              Subjective:  Mr. Greta Pleitez remains stable without signs of distress  PULSE OXIMETRY RANGE: SpO2  Av.2 %  Min: 95 %  Max: 97 %  SUPPLEMENTAL O2:     Vital Signs: BP (!) 106/58   Pulse 75   Temp 97.6 °F (36.4 °C) (Temporal)   Resp 19   Ht 6' 1\" (1.854 m)   Wt (!) 337 lb 6.4 oz (153 kg)   SpO2 96%   BMI 44.51 kg/m²    Temperature Range:   Temp: 97.6 °F (36.4 °C)   Temp  Av.7 °F (36.5 °C)  Min: 97.6 °F (36.4 °C)  Max: 97.8 °F (36.6 °C)     Rhythm: normal sinus rhythm, HR 70's. Labs:   ABG:  No results found for: PHART, PO2ART, CKT4YHJ, D3JKTFVH, TLL3PVX, THGBART, OVK2HRZ, BEART  CBC:   Recent Labs     10/06/22  1335 10/07/22  0318   WBC 6.3 6.5   HGB 14.5 13.7*   HCT 42.2 41.6*   MCV 85.8 86.7    250     BMP:   Recent Labs     10/06/22  1335 10/07/22  0318    139   K 4.7 4.5    102   CO2 23 22   BUN 18 18   CREATININE 1.2 1.4*     PT/INR: No results for input(s): PROTIME, INR in the last 72 hours. APTT: No results for input(s): APTT in the last 72 hours. Chest CT:  trace pericardial effusion, no significant thoracic pathology   CT:  I/O last 3 completed shifts: In: 1405.4 [P.O.:975; I.V.:430.4]  Out: 1350 [Urine:1350]      I/O last 3 completed shifts:   In: 1405.4 [P.O.:975; I.V.:430.4]  Out: 1350 [Urine:1350]  Scheduled Meds:    dilTIAZem  120 mg Oral BID    flecainide  50 mg Oral BID    sodium chloride flush  5-40 mL IntraVENous 2 times per day    fenofibrate  160 mg Oral Daily    losartan  100 mg Oral Daily    metoprolol succinate  100 mg Oral Daily    therapeutic multivitamin-minerals  1 tablet Oral Daily    rivaroxaban  20 mg Oral Daily with breakfast    rosuvastatin  10 mg Oral Nightly     Continuous Infusions:    dilTIAZem 5 mg/hr (10/08/22 0531)    sodium chloride 15 mL/hr at 10/07/22 2129     Exam:   General appearance: NAD  Neck: no bruits, no JVD, No lymph nodes   Lungs: no rhonchi, no wheezes, no rales   Heart: S1 and S2 no murmer, + rub  Abdomen: positive bowel sounds, no bruits, no masses  Extremities: warm and dry, no cyanosis, no clubbing    Assessment  Small pericardial effusion with no diagnostic or therapeutic implications (i.e. nothing further to do about it). Recommend f/u with cardiology to obtain repeat echo at a time interval that they suggest.  He may require biopsy of mediastinal Lns noted on chest CT. I defer this judgment to the primary care team.  Will sign off.       Patient Active Problem List   Diagnosis    Adenomatous colon polyp    S/P colonoscopic polypectomy    History of colonic polyps    Obesity    Mixed hyperlipidemia    Essential hypertension    History of prostate cancer    Morbid obesity ([facility] [address]    Chronic deep vein thrombosis (DVT) of left peroneal vein (HCC)    Vitamin D deficiency    Closed nondisplaced longitudinal fracture of left patella    Cancer of prostate (HCC)    Gastroesophageal reflux disease without esophagitis    Irregular heart beat    Atrial fibrillation with RVR (HCC)    Atrial fibrillation (HCC)    Atrial fibrillation with rapid ventricular response (HCC)         MD Dione Breen MD

## 2022-10-08 NOTE — PROGRESS NOTES
Progress Note  Date:10/8/2022       Room:0724/724-02  Patient Name:Figueroa Aguero     YOB: 1955     Age:67 y.o. Subjective    Subjective: 62-year-old male with a history of brain injury, paroxysmal defibrillation, hyperlipidemia, prostate cancer, DVT, presented to the hospital 10/6 with concerns of tachycardia. Was noted to have heart rate in the 140s at home, in the emergency room troponin negative, EKG with atrial fibrillation with RVR and was initiated on Cardizem drip. Patient was admitted in-house for further work-up. Chest x-ray on admission showed widened mediastinum, echocardiogram was obtained which showed moderate pericardial effusion, cardiothoracic surgery team consulted and no need for pericardiocentesis as no tamponande physiology, to follow up with serial ECHO outpt. Patient was evaluated by cardiology, weaned off Cardizem drip and transition to oral Cardizem with addition of flecainide. If remains in atrial fibrillation over the weekend possible cardioversion on Monday. CT chest noted multiple LN enlargement in the mediastinum, CT guided biopsy ordered. Patient was seen in house this morning with family member present. Denied any acute overnight event, denied any chest pain or worsening shortness of breath. No nausea or vomiting no acute issues at this time. Review of Systems: 12 point system review negative except as above. Objective         Vitals Last 24 Hours:  TEMPERATURE:  Temp  Av.7 °F (36.5 °C)  Min: 97.6 °F (36.4 °C)  Max: 97.8 °F (36.6 °C)  RESPIRATIONS RANGE: Resp  Av.1  Min: 18  Max: 19  PULSE OXIMETRY RANGE: SpO2  Av.1 %  Min: 95 %  Max: 97 %  PULSE RANGE: Pulse  Av.4  Min: 70  Max: 75  BLOOD PRESSURE RANGE: Systolic (05WMR), FPL:554 , Min:81 , BSY:953   ; Diastolic (91HRF), LPL:37, Min:50, Max:81    I/O (24Hr):     Intake/Output Summary (Last 24 hours) at 10/8/2022 1149  Last data filed at 10/8/2022 1021  Gross per 24 hour Intake 1645.36 ml   Output 650 ml   Net 995.36 ml       Objective:  Vital signs: (most recent): Blood pressure 112/60, pulse 74, temperature 97.6 °F (36.4 °C), temperature source Temporal, resp. rate 18, height 6' 1\" (1.854 m), weight (!) 337 lb 6.4 oz (153 kg), SpO2 96 %. Physical Examination:  General: morbid obese, Well-developed, no acute distress lying comfortably in bed. HEENT: Atraumatic normocephalic, range of motion normal   Cardiac: Normal S1-S2   Respiratory: clear To auscultation bilaterally, no rhonchi or rales, no wheezing  Abdomen: Truncal obesity, with some distention, nontender, no rebound noted. Extremities: no tenderness, no edema, moves all extremities  Psych: Affect normal and good eye contact, behavioral normal.        Labs/Imaging/Diagnostics    Labs:  CBC:  Recent Labs     10/06/22  1335 10/07/22  0318 10/08/22  0743   WBC 6.3 6.5 6.9   RBC 4.92 4.80 4.51*   HGB 14.5 13.7* 13.0*   HCT 42.2 41.6* 39.5*   MCV 85.8 86.7 87.6   RDW 12.8 12.9 13.1    250 248       CHEMISTRIES:  Recent Labs     10/06/22  1335 10/07/22  0318 10/08/22  0743    139 140   K 4.7 4.5 4.3    102 103   CO2 23 22 25   BUN 18 18 23   CREATININE 1.2 1.4* 1.4*   GLUCOSE 113* 94 93       PT/INR:No results for input(s): PROTIME, INR in the last 72 hours. APTT:No results for input(s): APTT in the last 72 hours.   LIVER PROFILE:  Recent Labs     10/06/22  1335   AST 23   ALT 24   BILITOT 0.3   ALKPHOS 78         Imaging Last 24 Hours:  ECHO Complete 2D W Doppler W Color    Result Date: 10/7/2022  Transthoracic Echocardiography Report (TTE)  Demographics   Patient Name  Soy De Jesus Date of Study          10/07/2022   MRN           [id number]        Gender                 Male   Date of Birth 1955    Room Number            [facility]0724   Age           78 year(s)   Height:       73 inches     Referring Physician    Blaine Nina MD   Weight:       341.01 pounds Sonographer            Merced Shetty   BSA: 2.7 m^2       Interpreting Physician Khurram Acharya MD   BMI:          44.99 kg/m^2  Procedure Type of Study   TTE procedure:ECHO 2D W/DOPPLER/COLOR/CONTRAST. Study Location: Echo Lab Technical Quality: Poor visualization due to poor acoustical window. Patient Status: Inpatient Contrast Medium: Definity. HR: 72 bpm BP: 109/61 mmHg Indications:Atrial fibrillation. Conclusions   Summary  Technically difficult echo. EF 40 %  Contrast used. No significant valvular abnormalities. mo pericardial effusion   Signature   ----------------------------------------------------------------  Electronically signed by Khurram Acharya MD(Interpreting  physician) on 10/07/2022 01:00 PM  ----------------------------------------------------------------   Findings   Mitral Valve  Structurally normal mitral valve with normal leaflet mobility. No evidence  of mitral valve stenosis. Trivial mitral regurgitation. Aortic Valve  Aortic valve appears to be tricuspid. Structurally normal aortic valve. No significant aortic regurgitation or stenosis is noted. Tricuspid Valve  Tricuspid valve is structurally normal.  No evidence of tricuspid regurgitation. Pulmonic Valve  The pulmonic valve was not well visualized. Left Atrium  Normal size left atrium. Left Ventricle  Normal left ventricle size. Left ventricular ejection fraction is visually estimated at 40%. No evidence of left ventricular mass or thrombus noted. Right Atrium  Normal right atrial dimension with no evidence of thrombus or mass noted. Right Ventricle  Normal right ventricular size with preserved RV function. Pericardial Effusion  No evidence of significant pericardial effusion is noted. Pleural Effusion  No evidence of pleural effusion. Miscellaneous  Aortic root is within normal limits. The IVC is dilated . Definity contrast was utilized to enhance the endocardial borders.   2D Measurements and Calculations(cm)   LVIDd: 5.25 cm LVIDs: 4.2 cm  IVSd: 1.25 cm  LVPWd: 0.99 cm                      AO Root Dimension: 3 cm  Rt. Vent. Dimension: 2.56 cm        LA Dimension: 3.4 cm  % Ejection Fraction: 40 %           LA Area: 18.5 cm^2  LA Volume: 61.4 ml                  LV Systolic dimension: 9.9TO  LA Volume Index: 23 ml/m^2          LV PW diastolic: 3.96RT  LV dimension: 5.25 cm               LVOT diameter: 2.1 cm                                      RA Systolic pressure: 8mmHg  LVEDV: 63.3 ml                      RV Diastolic dimension: 8.47ZO  LVESV:37.5 ml                       LVEDVI: 23 ml/m^2  Cardic Output (CO): 3.79l/min       LVESVI: 14 ml/m^2  Ascending Aorta: 2.9 cm  Doppler Measurements and Calculations   AV Peak Velocity:133 cm/s              MV Peak E-Wave: 90.4 cm/s  AV Mean Velocity:102 cm/s              MV Peak A-Wave: 58.3 cm/s  AV Peak Gradient: 7.08 mmHg            MV E/A Ratio: 1.55 %  AV Mean Gradient: 5 mmHg               MV Mean velocity: 49.5cm/s  AV Area (Continuity):2.02 cm^2         MV Peak Gradient: 3.27 mmHg  AV VTI:26 cm/s                         MV Mean gradient: 1 mmHg                                         MV P1/2t: 98 msec  Estimated RAP:8 mmHg                   MVA by PHT2.24 cm^2   MV E' septal velocity: 8.49cm/s  MV E' lateral velocity:7.62 cm/s      XR CHEST PORTABLE    Result Date: 10/6/2022  NO PRIOR REPORT AVAILABLE [facility] Clinical data:Chest pain. Technique:Single view of the chest. Prior studies: Radiograph of the chest dated 7/26/2022. Findings: The lungs are grossly clear; noevidence of acute infiltrate or pleural effusion. Cardiac silhouette is prominent superiorly with widening of cardiomediastinal silhouette. This is likely WNL however CT recommended to assess further. Minimal fibrosis noted in the bases. No acute osseous abnormality is detected. Prominent  upper cardiomediastinal silhouette. Recommend CT to assess further Minimal bibasilar fibrosis. Recommendation: Follow up as clinically indicated. Electronically Signed by Zay Temple MD at 06-Oct-2022 02:51:17 PM             Assessment//Plan           Hospital Problems             Last Modified POA    * (Principal) Atrial fibrillation with rapid ventricular response ([address] 10/6/2022 Yes   Assessment & Plan    Paroxysmal A. fib with RVR  Weaned off Cardizem drip, on oral Cardizem  Cardiology suggested flecainide 50 twice daily, to monitor in-house through the weekend and possible cardioversion if necessary on Monday. Echocardiogram reviewed  Continue to coagulation as ordered. Moderate pericardial effusion on echocardiogram  Cardiothoracic surgery team evaluated and no plans for  pericardiocentesis as no tamponade physiology. Multiple Enlarged mediastinum LN  CT guided biopsy ordered    Chronic left lower extremity DVT  Continue the coagulation    Hypertension: Continue losartan    Hyperlipidemia: Continue statin    Morbid obesity    History of brain injury. Code: Full  Diet: Cardiac  Disposition: Pending completion of above work-up. Electronically signed by   Petey Cole MD   Internal Medicine Hospitalist  On 10/8/2022  At 11:53 AM    EMR Dragon/Transcription disclaimer:   Much of this encounter note is an electronic transcription/translation of spoken language to printed text.  The electronic translation of spoken language may permit erroneous, or at times, nonsensical words or phrases to be inadvertently transcribed; although attempts have made to review the note for such errors, some may still exist.

## 2022-10-09 LAB
ANION GAP SERPL CALCULATED.3IONS-SCNC: 10 MMOL/L (ref 7–19)
BUN BLDV-MCNC: 27 MG/DL (ref 8–23)
CALCIUM SERPL-MCNC: 9 MG/DL (ref 8.8–10.2)
CHLORIDE BLD-SCNC: 101 MMOL/L (ref 98–111)
CO2: 28 MMOL/L (ref 22–29)
CREAT SERPL-MCNC: 1.4 MG/DL (ref 0.5–1.2)
GFR AFRICAN AMERICAN: >59
GFR NON-AFRICAN AMERICAN: 51
GLUCOSE BLD-MCNC: 92 MG/DL (ref 74–109)
HCT VFR BLD CALC: 40.1 % (ref 42–52)
HEMOGLOBIN: 13.4 G/DL (ref 14–18)
MCH RBC QN AUTO: 29.2 PG (ref 27–31)
MCHC RBC AUTO-ENTMCNC: 33.4 G/DL (ref 33–37)
MCV RBC AUTO: 87.4 FL (ref 80–94)
PDW BLD-RTO: 13 % (ref 11.5–14.5)
PLATELET # BLD: 231 K/UL (ref 130–400)
PMV BLD AUTO: 9.7 FL (ref 9.4–12.4)
POTASSIUM REFLEX MAGNESIUM: 4.2 MMOL/L (ref 3.5–5)
RBC # BLD: 4.59 M/UL (ref 4.7–6.1)
SODIUM BLD-SCNC: 139 MMOL/L (ref 136–145)
WBC # BLD: 7.2 K/UL (ref 4.8–10.8)

## 2022-10-09 PROCEDURE — 80048 BASIC METABOLIC PNL TOTAL CA: CPT

## 2022-10-09 PROCEDURE — 6370000000 HC RX 637 (ALT 250 FOR IP)

## 2022-10-09 PROCEDURE — 85027 COMPLETE CBC AUTOMATED: CPT

## 2022-10-09 PROCEDURE — 6370000000 HC RX 637 (ALT 250 FOR IP): Performed by: INTERNAL MEDICINE

## 2022-10-09 PROCEDURE — 36415 COLL VENOUS BLD VENIPUNCTURE: CPT

## 2022-10-09 PROCEDURE — 2580000003 HC RX 258

## 2022-10-09 PROCEDURE — 2140000000 HC CCU INTERMEDIATE R&B

## 2022-10-09 RX ADMIN — LOSARTAN POTASSIUM 100 MG: 100 TABLET, FILM COATED ORAL at 08:30

## 2022-10-09 RX ADMIN — FLECAINIDE ACETATE 50 MG: 50 TABLET ORAL at 20:51

## 2022-10-09 RX ADMIN — DILTIAZEM HYDROCHLORIDE 120 MG: 120 CAPSULE, COATED, EXTENDED RELEASE ORAL at 20:51

## 2022-10-09 RX ADMIN — ROSUVASTATIN CALCIUM 10 MG: 10 TABLET, FILM COATED ORAL at 20:51

## 2022-10-09 RX ADMIN — METOPROLOL SUCCINATE 100 MG: 50 TABLET, FILM COATED, EXTENDED RELEASE ORAL at 08:30

## 2022-10-09 RX ADMIN — SODIUM CHLORIDE, PRESERVATIVE FREE 10 ML: 5 INJECTION INTRAVENOUS at 20:51

## 2022-10-09 RX ADMIN — MULTIPLE VITAMINS W/ MINERALS TAB 1 TABLET: TAB at 08:30

## 2022-10-09 RX ADMIN — FLECAINIDE ACETATE 50 MG: 50 TABLET ORAL at 08:30

## 2022-10-09 RX ADMIN — SODIUM CHLORIDE, PRESERVATIVE FREE 10 ML: 5 INJECTION INTRAVENOUS at 08:30

## 2022-10-09 RX ADMIN — FENOFIBRATE 160 MG: 160 TABLET ORAL at 08:30

## 2022-10-09 RX ADMIN — DILTIAZEM HYDROCHLORIDE 120 MG: 120 CAPSULE, COATED, EXTENDED RELEASE ORAL at 08:30

## 2022-10-09 RX ADMIN — RIVAROXABAN 20 MG: 20 TABLET, FILM COATED ORAL at 08:30

## 2022-10-09 ASSESSMENT — PAIN SCALES - GENERAL: PAINLEVEL_OUTOF10: 0

## 2022-10-09 NOTE — PROGRESS NOTES
Progress Note  Date:10/9/2022       Room:0724/724-02  Patient Name:Figueroa Aguero     YOB: 1955     Age:67 y.o. Subjective    Subjective: 43-year-old male with a history of brain injury, paroxysmal defibrillation, hyperlipidemia, prostate cancer, DVT, presented to the hospital 10/6 with concerns of tachycardia. Was noted to have heart rate in the 140s at home, in the emergency room troponin negative, EKG with atrial fibrillation with RVR and was initiated on Cardizem drip. Patient was admitted in-house for further work-up. Chest x-ray on admission showed widened mediastinum, echocardiogram was obtained which showed moderate pericardial effusion, cardiothoracic surgery team consulted and no need for pericardiocentesis as no tamponande physiology, to follow up with serial ECHO outpt. Patient was evaluated by cardiology, weaned off Cardizem drip and transition to oral Cardizem with addition of flecainide. CT chest noted multiple LN enlargement in the mediastinum, CT guided biopsy ordered,however IR not available till Wednesday    Patient was seen in house this morning with family member present. Denied any acute overnight event, denied any chest pain or worsening shortness of breath. No nausea or vomiting no acute issues at this time. Review of Systems: 12 point system review negative except as above. Objective         Vitals Last 24 Hours:  TEMPERATURE:  Temp  Av.4 °F (36.3 °C)  Min: 96.6 °F (35.9 °C)  Max: 98.2 °F (36.8 °C)  RESPIRATIONS RANGE: Resp  Av  Min: 18  Max: 18  PULSE OXIMETRY RANGE: SpO2  Av.2 %  Min: 92 %  Max: 97 %  PULSE RANGE: Pulse  Av.1  Min: 65  Max: 80  BLOOD PRESSURE RANGE: Systolic (33LNA), OME:990 , Min:93 , PHP:836   ; Diastolic (48ZAR), WCU:77, Min:43, Max:70    I/O (24Hr):     Intake/Output Summary (Last 24 hours) at 10/9/2022 7973  Last data filed at 10/9/2022 1355  Gross per 24 hour   Intake 1210 ml   Output 725 ml   Net 485 ml Objective:  Vital signs: (most recent): Blood pressure (!) 94/43, pulse 65, temperature 96.8 °F (36 °C), temperature source Temporal, resp. rate 18, height 6' 1\" (1.854 m), weight (!) 332 lb 9.6 oz (150.9 kg), SpO2 95 %. Physical Examination:  General: morbid obese, Well-developed, no acute distress lying comfortably in bed. HEENT: Atraumatic normocephalic, range of motion normal   Cardiac: Normal S1-S2   Respiratory: clear To auscultation bilaterally, no rhonchi or rales, no wheezing  Abdomen: Truncal obesity, with some distention, nontender, no rebound noted. Extremities: no tenderness, no edema, moves all extremities  Psych: Affect normal and good eye contact, behavioral normal.        Labs/Imaging/Diagnostics    Labs:  CBC:  Recent Labs     10/07/22  0318 10/08/22  0743 10/09/22  0754   WBC 6.5 6.9 7.2   RBC 4.80 4.51* 4.59*   HGB 13.7* 13.0* 13.4*   HCT 41.6* 39.5* 40.1*   MCV 86.7 87.6 87.4   RDW 12.9 13.1 13.0    248 231       CHEMISTRIES:  Recent Labs     10/07/22  0318 10/08/22  0743 10/09/22  0754    140 139   K 4.5 4.3 4.2    103 101   CO2 22 25 28   BUN 18 23 27*   CREATININE 1.4* 1.4* 1.4*   GLUCOSE 94 93 92       PT/INR:No results for input(s): PROTIME, INR in the last 72 hours. APTT:No results for input(s): APTT in the last 72 hours. LIVER PROFILE:  No results for input(s): AST, ALT, BILIDIR, BILITOT, ALKPHOS in the last 72 hours.       Imaging Last 24 Hours:  ECHO Complete 2D W Doppler W Color    Result Date: 10/7/2022  Transthoracic Echocardiography Report (TTE)  Demographics   Patient Name  Rosa Zendejas Date of Study          10/07/2022   MRN           [id number]        Gender                 Male   Date of Birth 1955    Room Number            [facility]-0724   Age           78 year(s)   Height:       73 inches     Referring Physician    Vadim Verma MD   Weight:       341.01 pounds Sonographer            Merced Shetty   BSA:          2.7 m^2       Interpreting Physician Radha Buenrostro MD   BMI:          44.99 kg/m^2  Procedure Type of Study   TTE procedure:ECHO 2D W/DOPPLER/COLOR/CONTRAST. Study Location: Echo Lab Technical Quality: Poor visualization due to poor acoustical window. Patient Status: Inpatient Contrast Medium: Definity. HR: 72 bpm BP: 109/61 mmHg Indications:Atrial fibrillation. Conclusions   Summary  Technically difficult echo. EF 40 %  Contrast used. No significant valvular abnormalities. mo pericardial effusion   Signature   ----------------------------------------------------------------  Electronically signed by Radha Buenrostro MD(Interpreting  physician) on 10/07/2022 01:00 PM  ----------------------------------------------------------------   Findings   Mitral Valve  Structurally normal mitral valve with normal leaflet mobility. No evidence  of mitral valve stenosis. Trivial mitral regurgitation. Aortic Valve  Aortic valve appears to be tricuspid. Structurally normal aortic valve. No significant aortic regurgitation or stenosis is noted. Tricuspid Valve  Tricuspid valve is structurally normal.  No evidence of tricuspid regurgitation. Pulmonic Valve  The pulmonic valve was not well visualized. Left Atrium  Normal size left atrium. Left Ventricle  Normal left ventricle size. Left ventricular ejection fraction is visually estimated at 40%. No evidence of left ventricular mass or thrombus noted. Right Atrium  Normal right atrial dimension with no evidence of thrombus or mass noted. Right Ventricle  Normal right ventricular size with preserved RV function. Pericardial Effusion  No evidence of significant pericardial effusion is noted. Pleural Effusion  No evidence of pleural effusion. Miscellaneous  Aortic root is within normal limits. The IVC is dilated . Definity contrast was utilized to enhance the endocardial borders.   2D Measurements and Calculations(cm)   LVIDd: 5.25 cm                      LVIDs: 4.2 cm  IVSd: 1.25 cm  LVPWd: 0.99 cm                      AO Root Dimension: 3 cm  Rt. Vent. Dimension: 2.56 cm        LA Dimension: 3.4 cm  % Ejection Fraction: 40 %           LA Area: 18.5 cm^2  LA Volume: 61.4 ml                  LV Systolic dimension: 7.3FW  LA Volume Index: 23 ml/m^2          LV PW diastolic: 8.29XT  LV dimension: 5.25 cm               LVOT diameter: 2.1 cm                                      RA Systolic pressure: 8mmHg  LVEDV: 63.3 ml                      RV Diastolic dimension: 3.95KO  LVESV:37.5 ml                       LVEDVI: 23 ml/m^2  Cardic Output (CO): 3.79l/min       LVESVI: 14 ml/m^2  Ascending Aorta: 2.9 cm  Doppler Measurements and Calculations   AV Peak Velocity:133 cm/s              MV Peak E-Wave: 90.4 cm/s  AV Mean Velocity:102 cm/s              MV Peak A-Wave: 58.3 cm/s  AV Peak Gradient: 7.08 mmHg            MV E/A Ratio: 1.55 %  AV Mean Gradient: 5 mmHg               MV Mean velocity: 49.5cm/s  AV Area (Continuity):2.02 cm^2         MV Peak Gradient: 3.27 mmHg  AV VTI:26 cm/s                         MV Mean gradient: 1 mmHg                                         MV P1/2t: 98 msec  Estimated RAP:8 mmHg                   MVA by PHT2.24 cm^2   MV E' septal velocity: 8.49cm/s  MV E' lateral velocity:7.62 cm/s      XR CHEST PORTABLE    Result Date: 10/6/2022  NO PRIOR REPORT AVAILABLE Exam: X-RAY OF [facility] Clinical data:Chest pain. Technique:Single view of the chest. Prior studies: Radiograph of the chest dated 7/26/2022. Findings: The lungs are grossly clear; noevidence of acute infiltrate or pleural effusion. Cardiac silhouette is prominent superiorly with widening of cardiomediastinal silhouette. This is likely WNL however CT recommended to assess further. Minimal fibrosis noted in the bases. No acute osseous abnormality is detected. Prominent  upper cardiomediastinal silhouette. Recommend CT to assess further Minimal bibasilar fibrosis.  Recommendation: Follow up as clinically indicated. Electronically Signed by Michel Reagan MD at 06-Oct-2022 02:51:17 PM             Assessment//Plan           Hospital Problems             Last Modified POA    * (Principal) Atrial fibrillation with rapid ventricular response ([address] 10/6/2022 Yes       Assessment & Plan    Paroxysmal A. fib with RVR  Weaned off Cardizem drip, on oral Cardizem  Cardiology suggested flecainide 50 twice daily, to monitor in-house through the weekend and possible cardioversion if necessary on Monday. Echocardiogram reviewed  Continue to coagulation as ordered. Moderate pericardial effusion on echocardiogram  Cardiothoracic surgery team evaluated and no plans for  pericardiocentesis as no tamponade physiology. Multiple Enlarged mediastinum LN  CT guided biopsy ordered,however radiology not available till Wednesday    Chronic left lower extremity DVT  Continue the coagulation    Hypertension: Continue losartan    Hyperlipidemia: Continue statin    Morbid obesity    History of brain injury. Code: Full  Diet: Cardiac  Disposition: Pending completion of above work-up. Electronically signed by   Javad Waldron MD   Internal Medicine Hospitalist  On 10/9/2022  At 2:53 PM    EMR Dragon/Transcription disclaimer:   Much of this encounter note is an electronic transcription/translation of spoken language to printed text.  The electronic translation of spoken language may permit erroneous, or at times, nonsensical words or phrases to be inadvertently transcribed; although attempts have made to review the note for such errors, some may still exist.

## 2022-10-10 LAB
EKG P AXIS: -11 DEGREES
EKG P AXIS: 3 DEGREES
EKG P-R INTERVAL: 198 MS
EKG P-R INTERVAL: 198 MS
EKG Q-T INTERVAL: 416 MS
EKG Q-T INTERVAL: 422 MS
EKG QRS DURATION: 86 MS
EKG QRS DURATION: 90 MS
EKG QTC CALCULATION (BAZETT): 438 MS
EKG QTC CALCULATION (BAZETT): 441 MS
EKG T AXIS: 71 DEGREES
EKG T AXIS: 80 DEGREES

## 2022-10-10 PROCEDURE — 6370000000 HC RX 637 (ALT 250 FOR IP)

## 2022-10-10 PROCEDURE — 2140000000 HC CCU INTERMEDIATE R&B

## 2022-10-10 PROCEDURE — 2580000003 HC RX 258

## 2022-10-10 PROCEDURE — 93010 ELECTROCARDIOGRAM REPORT: CPT | Performed by: INTERNAL MEDICINE

## 2022-10-10 PROCEDURE — 93005 ELECTROCARDIOGRAM TRACING: CPT | Performed by: INTERNAL MEDICINE

## 2022-10-10 PROCEDURE — 99232 SBSQ HOSP IP/OBS MODERATE 35: CPT | Performed by: INTERNAL MEDICINE

## 2022-10-10 PROCEDURE — 6370000000 HC RX 637 (ALT 250 FOR IP): Performed by: INTERNAL MEDICINE

## 2022-10-10 RX ADMIN — SODIUM CHLORIDE, PRESERVATIVE FREE 10 ML: 5 INJECTION INTRAVENOUS at 21:03

## 2022-10-10 RX ADMIN — SODIUM CHLORIDE, PRESERVATIVE FREE 10 ML: 5 INJECTION INTRAVENOUS at 08:36

## 2022-10-10 RX ADMIN — FLECAINIDE ACETATE 50 MG: 50 TABLET ORAL at 21:04

## 2022-10-10 RX ADMIN — METOPROLOL SUCCINATE 100 MG: 50 TABLET, FILM COATED, EXTENDED RELEASE ORAL at 08:36

## 2022-10-10 RX ADMIN — LOSARTAN POTASSIUM 100 MG: 100 TABLET, FILM COATED ORAL at 08:36

## 2022-10-10 RX ADMIN — DILTIAZEM HYDROCHLORIDE 120 MG: 120 CAPSULE, COATED, EXTENDED RELEASE ORAL at 08:36

## 2022-10-10 RX ADMIN — FLECAINIDE ACETATE 50 MG: 50 TABLET ORAL at 08:36

## 2022-10-10 RX ADMIN — DILTIAZEM HYDROCHLORIDE 120 MG: 120 CAPSULE, COATED, EXTENDED RELEASE ORAL at 21:04

## 2022-10-10 RX ADMIN — MULTIPLE VITAMINS W/ MINERALS TAB 1 TABLET: TAB at 08:36

## 2022-10-10 RX ADMIN — ROSUVASTATIN CALCIUM 10 MG: 10 TABLET, FILM COATED ORAL at 21:04

## 2022-10-10 RX ADMIN — FENOFIBRATE 160 MG: 160 TABLET ORAL at 08:36

## 2022-10-10 ASSESSMENT — PAIN SCALES - GENERAL: PAINLEVEL_OUTOF10: 0

## 2022-10-10 NOTE — PROGRESS NOTES
Cardiology Daily Note Adeline Mehta MD      Patient:  Keith Busby  [id number]    Patient Active Problem List    Diagnosis Date Noted    Atrial fibrillation with rapid ventricular response ([address] 10/06/2022    Atrial fibrillation ([address] 07/01/2022    Irregular heart beat 06/29/2022    Atrial fibrillation with RVR ([address] 06/29/2022    Gastroesophageal reflux disease without esophagitis 03/29/2022    Cancer of prostate ([address] 03/15/2021    Vitamin D deficiency 01/02/2020    Closed nondisplaced longitudinal fracture of left patella 01/02/2020    Chronic deep vein thrombosis (DVT) of left peroneal vein ([address] 11/25/2019    Morbid obesity ([address] 09/25/2019    History of prostate cancer     Obesity     Mixed hyperlipidemia     Essential hypertension     Adenomatous colon polyp 06/20/2012    S/P colonoscopic polypectomy 06/20/2012    History of colonic polyps 06/20/2012       Admit Date:  10/6/2022    Admission Problem List: Present on Admission:   Atrial fibrillation with rapid ventricular response [facility])      Cardiac Specific Data:  Specialty Problems          Cardiology Problems    Atrial fibrillation with RVR (HCC)        Atrial fibrillation (HCC)        * (Principal) Atrial fibrillation with rapid ventricular response (HCC)        Essential hypertension        Mixed hyperlipidemia        Chronic deep vein thrombosis (DVT) of left peroneal vein (HCC)           Subjective:  Mr. Nessa Jenkins seen today now and sinus rhythm. CT chest noted CT-guided needle biopsy scheduled for later this week. On ambulating the alvarez without difficulty denies chest pain or dyspnea. On metoprolol diltiazem and flecainide. Anticoagulation on hold I believe.     Objective:   BP (!) 104/56   Pulse 76   Temp 96.8 °F (36 °C) (Temporal)   Resp 18   Ht 6' 1\" (1.854 m)   Wt (!) 334 lb (151.5 kg)   SpO2 92%   BMI 44.07 kg/m²       Intake/Output Summary (Last 24 hours) at 10/10/2022 0940  Last data filed at 10/10/2022 0644  Gross per 24 hour   Intake 1190 ml   Output 350 ml   Net 840 ml       Prior to Admission medications    Medication Sig Start Date End Date Taking? Authorizing Provider   rosuvastatin (CRESTOR) 10 MG tablet Take 1 tablet by mouth nightly 7/18/22 10/16/22  JODY Dougherty   metoprolol succinate (TOPROL XL) 100 MG extended release tablet Take 1 tablet by mouth in the morning. 7/18/22   JODY Dougherty   Cholecalciferol (VITAMIN D3) 50 MCG (2000 UT) CAPS TAKE 1 CAPSULE EVERY DAY 7/18/22   JODY Dougherty   rivaroxaban (XARELTO) 20 MG TABS tablet TAKE 1 TABLET EVERY DAY WITH BREAKFAST 5/9/22   JODY Dougherty   omega-3 acid ethyl esters (LOVAZA) 1 g capsule Take 1 g by mouth daily 4/1/22   Historical Provider, MD   fish oil-omega-3 fatty acids 1000 MG capsule Take 2 capsules by mouth nightly  Patient not taking: Reported on 10/6/2022 3/29/22   JODY Dougherty   losartan (COZAAR) 100 MG tablet TAKE 1 TABLET EVERY DAY 11/10/21   JODY Dougherty   fenofibrate 160 MG tablet Take 1 tablet by mouth daily 1/14/20 6/29/22  JODY Dougherty   Red Yeast Rice 600 MG TABS Take 2 tablets by mouth 2 times daily  Patient not taking: Reported on 10/6/2022    Historical Provider, MD   Multiple Vitamin (MULTI-VITAMIN PO) Take  by mouth.       Historical Provider, MD        dilTIAZem  120 mg Oral BID    flecainide  50 mg Oral BID    sodium chloride flush  5-40 mL IntraVENous 2 times per day    fenofibrate  160 mg Oral Daily    losartan  100 mg Oral Daily    metoprolol succinate  100 mg Oral Daily    therapeutic multivitamin-minerals  1 tablet Oral Daily    [Held by provider] rivaroxaban  20 mg Oral Daily with breakfast    rosuvastatin  10 mg Oral Nightly       TELEMETRY: Sinus     Physical Exam:      Physical Exam      General:  Awake, alert, NAD  Skin:  Warm and dry  Neck:  no jvd , no carotid bruits  Chest:  Clear to auscultation, no wheezing or rales  Cardiovascular:  RRR L3P9 no murmurs, clicks, gallups, or rubs  Abdomen:  Soft nontender, nondistended, bowel sounds present  Extremities:  Edema: none      Lab Data:  CBC:   Recent Labs     10/08/22  0743 10/09/22  0754   WBC 6.9 7.2   HGB 13.0* 13.4*   HCT 39.5* 40.1*   MCV 87.6 87.4    231     BMP:   Recent Labs     10/08/22  0743 10/09/22  0754    139   K 4.3 4.2    101   CO2 25 28   BUN 23 27*   CREATININE 1.4* 1.4*     LIVER PROFILE: No results for input(s): AST, ALT, LIPASE, BILIDIR, BILITOT, ALKPHOS in the last 72 hours. Invalid input(s): AMYLASE,  ALB  PT/INR: No results for input(s): PROTIME, INR in the last 72 hours. APTT: No results for input(s): APTT in the last 72 hours. BNP:  No results for input(s): BNP in the last 72 hours. CK, CKMB, Troponin: @LABRCNT (CKTOTAL:3, CKMB:3, TROPONINI:3)@    IMAGING:  ECHO Complete 2D W Doppler W Color    Result Date: 10/7/2022  Transthoracic Echocardiography Report (TTE)  Demographics   Patient Name  Mariely Peterson Date of Study          10/07/2022   MRN           [id number]        Gender                 Male   Date of Birth 1955    Room Number            [id number]   Age           78 year(s)   Height:       73 inches     Referring Physician    Yarely Waldron MD   Weight:       341.01 pounds Sonographer            Merced Shetty   BSA:          2.7 m^2       Interpreting Physician Foster Brock MD   BMI:          44.99 kg/m^2  Procedure Type of Study   TTE procedure:ECHO 2D W/DOPPLER/COLOR/CONTRAST. Study Location: Echo Lab Technical Quality: Poor visualization due to poor acoustical window. Patient Status: Inpatient Contrast Medium: Definity. HR: 72 bpm BP: 109/61 mmHg Indications:Atrial fibrillation. Conclusions   Summary  Technically difficult echo. EF 40 %  Contrast used. No significant valvular abnormalities.   mo pericardial effusion   Signature   ----------------------------------------------------------------  Electronically signed by Foster Brock MD(Interpreting  physician) on 10/07/2022 01:00 PM ----------------------------------------------------------------   Findings   Mitral Valve  Structurally normal mitral valve with normal leaflet mobility. No evidence  of mitral valve stenosis. Trivial mitral regurgitation. Aortic Valve  Aortic valve appears to be tricuspid. Structurally normal aortic valve. No significant aortic regurgitation or stenosis is noted. Tricuspid Valve  Tricuspid valve is structurally normal.  No evidence of tricuspid regurgitation. Pulmonic Valve  The pulmonic valve was not well visualized. Left Atrium  Normal size left atrium. Left Ventricle  Normal left ventricle size. Left ventricular ejection fraction is visually estimated at 40%. No evidence of left ventricular mass or thrombus noted. Right Atrium  Normal right atrial dimension with no evidence of thrombus or mass noted. Right Ventricle  Normal right ventricular size with preserved RV function. Pericardial Effusion  No evidence of significant pericardial effusion is noted. Pleural Effusion  No evidence of pleural effusion. Miscellaneous  Aortic root is within normal limits. The IVC is dilated . Definity contrast was utilized to enhance the endocardial borders. 2D Measurements and Calculations(cm)   LVIDd: 5.25 cm                      LVIDs: 4.2 cm  IVSd: 1.25 cm  LVPWd: 0.99 cm                      AO Root Dimension: 3 cm  Rt. Vent.  Dimension: 2.56 cm        LA Dimension: 3.4 cm  % Ejection Fraction: 40 %           LA Area: 18.5 cm^2  LA Volume: 61.4 ml                  LV Systolic dimension: 6.9EP  LA Volume Index: 23 ml/m^2          LV PW diastolic: 8.38KK  LV dimension: 5.25 cm               LVOT diameter: 2.1 cm                                      RA Systolic pressure: 8mmHg  LVEDV: 63.3 ml                      RV Diastolic dimension: 3.63TX  LVESV:37.5 ml                       LVEDVI: 23 ml/m^2  Cardic Output (CO): 3.79l/min       LVESVI: 14 ml/m^2  Ascending Aorta: 2.9 cm  Doppler Measurements and Calculations   AV Peak Velocity:133 cm/s              MV Peak E-Wave: 90.4 cm/s  AV Mean Velocity:102 cm/s              MV Peak A-Wave: 58.3 cm/s  AV Peak Gradient: 7.08 mmHg            MV E/A Ratio: 1.55 %  AV Mean Gradient: 5 mmHg               MV Mean velocity: 49.5cm/s  AV Area (Continuity):2.02 cm^2         MV Peak Gradient: 3.27 mmHg  AV VTI:26 cm/s                         MV Mean gradient: 1 mmHg                                         MV P1/2t: 98 msec  Estimated RAP:8 mmHg                   MVA by PHT2.24 cm^2   MV E' septal velocity: 8.49cm/s  MV E' lateral velocity:7.62 cm/s      CT CHEST WO CONTRAST    Result Date: 10/8/2022  Clinical history: Pericardial effusion Technique: Contiguous transaxial cuts were obtained from the thoracic inlet to the base of the diaphragm without contrast enhancement. Multiplanar reformats were obtained. Radiation Output: CTDIvol 24.13 (mGy);  (mGy-cm) This CT exam was performed using one or more of the following dose reduction techniques: automated exposure control, adjustment of the mA and/or kV according to patient size, and/or use of iterative reconstruction technique. Finding: The study reveals no evidence of parenchymal mass or infiltrate. There is no pleural effusion. The cardiovascular structures demonstrate heart and pericardium appear to be normal..There is lipomatosis involving the superior mediastinum. There are multiple lymph nodes of varying sizes from the base of neck extending to the superior mediastinum and prevascular space. .The largest of this lymph node measures 21.5 cm. The chest wall is normal.Axillary lymphadenopathy is not identified. The visualized portion of the abdomen demonstrate the gallbladder is contracted with multiple calculi. The liver and spleen appears to be normal.There are multiple periaortic lymph nodes. The pancreas appeared normal.    Multiple. Lymph nodes of varying size is seen at the base of the neck extending the superior mediastinum, mediastinum and prevascular space. There is also periaortic lymph nodes seen in the abdomen. I would recommend CT-guided biopsy for histological sampling. There is suggestion of a lipomatosis in the mediastinum No evidence of pericardial effusion    XR CHEST PORTABLE    Result Date: 10/6/2022  NO PRIOR REPORT AVAILABLE Exam: X-RAY OF [facility] Clinical data:Chest pain. Technique:Single view of the chest. Prior studies: Radiograph of the chest dated 7/26/2022. Findings: The lungs are grossly clear; noevidence of acute infiltrate or pleural effusion. Cardiac silhouette is prominent superiorly with widening of cardiomediastinal silhouette. This is likely WNL however CT recommended to assess further. Minimal fibrosis noted in the bases. No acute osseous abnormality is detected. Prominent  upper cardiomediastinal silhouette. Recommend CT to assess further Minimal bibasilar fibrosis. Recommendation: Follow up as clinically indicated.  Electronically Signed by Bertrand Armendariz MD at 06-Oct-2022 02:51:17 PM                 Assessment:  Paroxysmal atrial fibrillation recurrent  Colon polyps  Obesity  Hyperlipidemia  Hypertension  History of prostate cancer  Chronic anticoagulation with Xarelto  Chronic DVT left peroneal vein  Gastroesophageal reflux disease  Vitamin D deficiency  Echocardiogram 6/29/2022 ejection fraction 50 to 55% dilated ascending aorta 3.5 cm no significant pericardial effusion identified  CT of the chest 10/7/2022 multiple lymph nodes in the base of the neck extending into the superior mediastinum mediastinum and prevascular space also periodic lymph nodes CT-guided biopsy scheduled for this week    Plan:  Continue current medical management cardiovascular status stable    Venkat Chandra MD, MD 10/10/2022 9:40 AM

## 2022-10-10 NOTE — PROGRESS NOTES
Progress Note  Date:10/10/2022       Room:0724/724-02  Patient Name:Figueroa Aguero     YOB: 1955     Age:67 y.o. Subjective    Subjective: 76-year-old male with a history of brain injury, paroxysmal defibrillation, hyperlipidemia, prostate cancer, DVT, presented to the hospital 10/6 with concerns of tachycardia. Was noted to have heart rate in the 140s at home, in the emergency room troponin negative, EKG with atrial fibrillation with RVR and was initiated on Cardizem drip. Patient was admitted in-house for further work-up. Chest x-ray on admission showed widened mediastinum, echocardiogram was obtained which showed moderate pericardial effusion, cardiothoracic surgery team consulted and no need for pericardiocentesis as no tamponande physiology, to follow up with serial ECHO outpt. Patient was evaluated by cardiology, weaned off Cardizem drip and transition to oral Cardizem with addition of flecainide. CT chest noted multiple LN enlargement in the mediastinum, CT guided biopsy ordered,however IR not available till Wednesday. Xarelto on hold for anticipated procedure. Patient was seen in house this morning with family member present. Denied any acute overnight event, denied any chest pain or worsening shortness of breath. No nausea or vomiting no acute issues at this time. Review of Systems: 12 point system review negative except as above. Objective         Vitals Last 24 Hours:  TEMPERATURE:  Temp  Av.1 °F (36.2 °C)  Min: 96.8 °F (36 °C)  Max: 97.6 °F (36.4 °C)  RESPIRATIONS RANGE: Resp  Av.3  Min: 16  Max: 20  PULSE OXIMETRY RANGE: SpO2  Av.2 %  Min: 92 %  Max: 97 %  PULSE RANGE: Pulse  Av  Min: 46  Max: 76  BLOOD PRESSURE RANGE: Systolic (82XNT), UKB:714 , Min:94 , AIP:591   ; Diastolic (12IBN), JXZ:10, Min:43, Max:64    I/O (24Hr):     Intake/Output Summary (Last 24 hours) at 10/10/2022 1340  Last data filed at 10/10/2022 0949  Gross per 24 hour   Intake 1430 ml   Output 350 ml   Net 1080 ml       Objective:  Vital signs: (most recent): Blood pressure (!) 124/54, pulse (!) 46, temperature 97.3 °F (36.3 °C), temperature source Temporal, resp. rate 16, height 6' 1\" (1.854 m), weight (!) 334 lb (151.5 kg), SpO2 96 %. Physical Examination:  General: morbid obese, Well-developed, no acute distress lying comfortably in bed. HEENT: Atraumatic normocephalic, range of motion normal   Cardiac: Normal S1-S2   Respiratory: clear To auscultation bilaterally, no rhonchi or rales, no wheezing  Abdomen: Truncal obesity, with some distention, nontender, no rebound noted. Extremities: no tenderness, no edema, moves all extremities  Psych: Affect normal and good eye contact, behavioral normal.        Labs/Imaging/Diagnostics    Labs:  CBC:  Recent Labs     10/08/22  0743 10/09/22  0754   WBC 6.9 7.2   RBC 4.51* 4.59*   HGB 13.0* 13.4*   HCT 39.5* 40.1*   MCV 87.6 87.4   RDW 13.1 13.0    231       CHEMISTRIES:  Recent Labs     10/08/22  0743 10/09/22  0754    139   K 4.3 4.2    101   CO2 25 28   BUN 23 27*   CREATININE 1.4* 1.4*   GLUCOSE 93 92       PT/INR:No results for input(s): PROTIME, INR in the last 72 hours. APTT:No results for input(s): APTT in the last 72 hours. LIVER PROFILE:  No results for input(s): AST, ALT, BILIDIR, BILITOT, ALKPHOS in the last 72 hours.       Imaging Last 24 Hours:  ECHO Complete 2D W Doppler W Color    Result Date: 10/7/2022  Transthoracic Echocardiography Report (TTE)  Demographics   Patient Name  Светлана [facility] Date of Study          10/07/2022   MRN           [id number]        Gender                 Male   Date of Birth 1955    Room Number            [id number]   Age           78 year(s)   Height:       73 inches     Referring Physician    Manisha Xie MD   Weight:       341.01 pounds Sonographer            Merced Shetty   BSA:          2.7 m^2       Interpreting Physician Marco A Gaming MD   BMI:          44.99 kg/m^2 Procedure Type of Study   TTE procedure:ECHO 2D W/DOPPLER/COLOR/CONTRAST. Study Location: Echo Lab Technical Quality: Poor visualization due to poor acoustical window. Patient Status: Inpatient Contrast Medium: Definity. HR: 72 bpm BP: 109/61 mmHg Indications:Atrial fibrillation. Conclusions   Summary  Technically difficult echo. EF 40 %  Contrast used. No significant valvular abnormalities. mo pericardial effusion   Signature   ----------------------------------------------------------------  Electronically signed by Aydee Dong MD(Interpreting  physician) on 10/07/2022 01:00 PM  ----------------------------------------------------------------   Findings   Mitral Valve  Structurally normal mitral valve with normal leaflet mobility. No evidence  of mitral valve stenosis. Trivial mitral regurgitation. Aortic Valve  Aortic valve appears to be tricuspid. Structurally normal aortic valve. No significant aortic regurgitation or stenosis is noted. Tricuspid Valve  Tricuspid valve is structurally normal.  No evidence of tricuspid regurgitation. Pulmonic Valve  The pulmonic valve was not well visualized. Left Atrium  Normal size left atrium. Left Ventricle  Normal left ventricle size. Left ventricular ejection fraction is visually estimated at 40%. No evidence of left ventricular mass or thrombus noted. Right Atrium  Normal right atrial dimension with no evidence of thrombus or mass noted. Right Ventricle  Normal right ventricular size with preserved RV function. Pericardial Effusion  No evidence of significant pericardial effusion is noted. Pleural Effusion  No evidence of pleural effusion. Miscellaneous  Aortic root is within normal limits. The IVC is dilated . Definity contrast was utilized to enhance the endocardial borders.   2D Measurements and Calculations(cm)   LVIDd: 5.25 cm                      LVIDs: 4.2 cm  IVSd: 1.25 cm  LVPWd: 0.99 cm                      AO Root Dimension: 3 cm  Rt. Vent. Dimension: 2.56 cm        LA Dimension: 3.4 cm  % Ejection Fraction: 40 %           LA Area: 18.5 cm^2  LA Volume: 61.4 ml                  LV Systolic dimension: 4.0AA  LA Volume Index: 23 ml/m^2          LV PW diastolic: 1.29AY  LV dimension: 5.25 cm               LVOT diameter: 2.1 cm                                      RA Systolic pressure: 8mmHg  LVEDV: 63.3 ml                      RV Diastolic dimension: 6.33FB  LVESV:37.5 ml                       LVEDVI: 23 ml/m^2  Cardic Output (CO): 3.79l/min       LVESVI: 14 ml/m^2  Ascending Aorta: 2.9 cm  Doppler Measurements and Calculations   AV Peak Velocity:133 cm/s              MV Peak E-Wave: 90.4 cm/s  AV Mean Velocity:102 cm/s              MV Peak A-Wave: 58.3 cm/s  AV Peak Gradient: 7.08 mmHg            MV E/A Ratio: 1.55 %  AV Mean Gradient: 5 mmHg               MV Mean velocity: 49.5cm/s  AV Area (Continuity):2.02 cm^2         MV Peak Gradient: 3.27 mmHg  AV VTI:26 cm/s                         MV Mean gradient: 1 mmHg                                         MV P1/2t: 98 msec  Estimated RAP:8 mmHg                   MVA by PHT2.24 cm^2   MV E' septal velocity: 8.49cm/s  MV E' lateral velocity:7.62 cm/s      XR CHEST PORTABLE    Result Date: 10/6/2022  NO PRIOR REPORT AVAILABLE Exam: X-RAY OF [facility] Clinical data:Chest pain. Technique:Single view of the chest. Prior studies: Radiograph of the chest dated 7/26/2022. Findings: The lungs are grossly clear; noevidence of acute infiltrate or pleural effusion. Cardiac silhouette is prominent superiorly with widening of cardiomediastinal silhouette. This is likely WNL however CT recommended to assess further. Minimal fibrosis noted in the bases. No acute osseous abnormality is detected. Prominent  upper cardiomediastinal silhouette. Recommend CT to assess further Minimal bibasilar fibrosis. Recommendation: Follow up as clinically indicated.  Electronically Signed by Nathalie Elizabeth MD at 06-Oct-2022 02:51:17 PM             Assessment//Plan           Hospital Problems             Last Modified POA    * (Principal) Atrial fibrillation with rapid ventricular response ([facility] [address] 10/6/2022 Yes     Assessment & Plan    Paroxysmal A. fib with RVR  Weaned off Cardizem drip, on oral Cardizem  Cardiology suggested flecainide 50 twice daily, to monitor in-house through the weekend and possible cardioversion if necessary on Monday. Echocardiogram reviewed  Continue to coagulation as ordered. Moderate pericardial effusion on echocardiogram  Cardiothoracic surgery team evaluated and no plans for  pericardiocentesis as no tamponade physiology. Multiple Enlarged mediastinum LN  CT guided biopsy ordered,however radiology not available till Wednesday    Chronic left lower extremity DVT  Anticoagulation currently on hold due to tentative LN biopsy on Wednesday    Hypertension: Continue losartan    Hyperlipidemia: Continue statin    Morbid obesity    History of brain injury. Code: Full  Diet: Cardiac  Disposition: Pending completion of above work-up. Electronically signed by   Cliff Barton MD   Internal Medicine Hospitalist  On 10/10/2022  At 1:40 PM    EMR Dragon/Transcription disclaimer:   Much of this encounter note is an electronic transcription/translation of spoken language to printed text.  The electronic translation of spoken language may permit erroneous, or at times, nonsensical words or phrases to be inadvertently transcribed; although attempts have made to review the note for such errors, some may still exist.

## 2022-10-11 LAB
EKG P AXIS: -24 DEGREES
EKG P-R INTERVAL: 194 MS
EKG Q-T INTERVAL: 414 MS
EKG QRS DURATION: 88 MS
EKG QTC CALCULATION (BAZETT): 435 MS
EKG T AXIS: 75 DEGREES
INR BLD: 1.16 (ref 0.88–1.18)
PROTHROMBIN TIME: 14.8 SEC (ref 12–14.6)

## 2022-10-11 PROCEDURE — 85610 PROTHROMBIN TIME: CPT

## 2022-10-11 PROCEDURE — 6370000000 HC RX 637 (ALT 250 FOR IP)

## 2022-10-11 PROCEDURE — 36415 COLL VENOUS BLD VENIPUNCTURE: CPT

## 2022-10-11 PROCEDURE — 6370000000 HC RX 637 (ALT 250 FOR IP): Performed by: INTERNAL MEDICINE

## 2022-10-11 PROCEDURE — 2580000003 HC RX 258

## 2022-10-11 PROCEDURE — 93005 ELECTROCARDIOGRAM TRACING: CPT | Performed by: INTERNAL MEDICINE

## 2022-10-11 PROCEDURE — 2140000000 HC CCU INTERMEDIATE R&B

## 2022-10-11 PROCEDURE — 93010 ELECTROCARDIOGRAM REPORT: CPT | Performed by: INTERNAL MEDICINE

## 2022-10-11 RX ADMIN — FLECAINIDE ACETATE 50 MG: 50 TABLET ORAL at 09:32

## 2022-10-11 RX ADMIN — SODIUM CHLORIDE, PRESERVATIVE FREE 10 ML: 5 INJECTION INTRAVENOUS at 19:51

## 2022-10-11 RX ADMIN — ROSUVASTATIN CALCIUM 10 MG: 10 TABLET, FILM COATED ORAL at 19:51

## 2022-10-11 RX ADMIN — DILTIAZEM HYDROCHLORIDE 120 MG: 120 CAPSULE, COATED, EXTENDED RELEASE ORAL at 19:51

## 2022-10-11 RX ADMIN — FLECAINIDE ACETATE 50 MG: 50 TABLET ORAL at 19:51

## 2022-10-11 RX ADMIN — METOPROLOL SUCCINATE 100 MG: 50 TABLET, FILM COATED, EXTENDED RELEASE ORAL at 09:33

## 2022-10-11 RX ADMIN — FENOFIBRATE 160 MG: 160 TABLET ORAL at 09:33

## 2022-10-11 RX ADMIN — LOSARTAN POTASSIUM 100 MG: 100 TABLET, FILM COATED ORAL at 09:32

## 2022-10-11 RX ADMIN — DILTIAZEM HYDROCHLORIDE 120 MG: 120 CAPSULE, COATED, EXTENDED RELEASE ORAL at 09:32

## 2022-10-11 RX ADMIN — MULTIPLE VITAMINS W/ MINERALS TAB 1 TABLET: TAB at 09:33

## 2022-10-11 RX ADMIN — SODIUM CHLORIDE, PRESERVATIVE FREE 10 ML: 5 INJECTION INTRAVENOUS at 09:33

## 2022-10-11 ASSESSMENT — PAIN SCALES - GENERAL: PAINLEVEL_OUTOF10: 0

## 2022-10-11 NOTE — PROGRESS NOTES
Progress Note  Date:10/11/2022       Room:0724/724-02  Patient Name:Figueroa Aguero     YOB: 1955     Age:67 y.o. Subjective    Subjective: 60-year-old male with a history of brain injury, paroxysmal defibrillation, hyperlipidemia, prostate cancer, DVT, presented to the hospital 10/6 with concerns of tachycardia. Was noted to have heart rate in the 140s at home, in the emergency room troponin negative, EKG with atrial fibrillation with RVR and was initiated on Cardizem drip. Patient was admitted in-house for further work-up. Chest x-ray on admission showed widened mediastinum, echocardiogram was obtained which showed moderate pericardial effusion, cardiothoracic surgery team consulted and no need for pericardiocentesis as no tamponande physiology, to follow up with serial ECHO outpt. Patient was evaluated by cardiology, weaned off Cardizem drip and transition to oral Cardizem with addition of flecainide. CT chest noted multiple LN enlargement in the mediastinum, CT guided biopsy ordered,however IR not available till Wednesday. Xarelto on hold for anticipated procedure. Patient was seen in house this morning with family member present. Denied any acute overnight event, denied any chest pain or worsening shortness of breath. No nausea or vomiting no acute issues at this time. Review of Systems: 12 point system review negative except as above. Objective         Vitals Last 24 Hours:  TEMPERATURE:  Temp  Av.2 °F (36.2 °C)  Min: 96.8 °F (36 °C)  Max: 97.9 °F (36.6 °C)  RESPIRATIONS RANGE: Resp  Av.8  Min: 16  Max: 18  PULSE OXIMETRY RANGE: SpO2  Av %  Min: 94 %  Max: 96 %  PULSE RANGE: Pulse  Av.2  Min: 71  Max: 76  BLOOD PRESSURE RANGE: Systolic (19TPS), MEB:910 , Min:92 , VRA:806   ; Diastolic (68BDG), JXM:57, Min:49, Max:63    I/O (24Hr):     Intake/Output Summary (Last 24 hours) at 10/11/2022 1431  Last data filed at 10/11/2022 0940  Gross per 24 hour   Intake 1230 ml   Output 925 ml   Net 305 ml       Objective:  Vital signs: (most recent): Blood pressure (!) 113/54, pulse 71, temperature 97 °F (36.1 °C), temperature source Temporal, resp. rate 16, height 6' 1\" (1.854 m), weight (!) 337 lb 6 oz (153 kg), SpO2 94 %. Physical Examination:  General: morbid obese, Well-developed, no acute distress lying comfortably in bed. HEENT: Atraumatic normocephalic, range of motion normal   Cardiac: Normal S1-S2   Respiratory: clear To auscultation bilaterally, no rhonchi or rales, no wheezing  Abdomen: Truncal obesity, with some distention, nontender, no rebound noted. Extremities: no tenderness, no edema, moves all extremities  Psych: Affect normal and good eye contact, behavioral normal.        Labs/Imaging/Diagnostics    Labs:  CBC:  Recent Labs     10/09/22  0754   WBC 7.2   RBC 4.59*   HGB 13.4*   HCT 40.1*   MCV 87.4   RDW 13.0          CHEMISTRIES:  Recent Labs     10/09/22  0754      K 4.2      CO2 28   BUN 27*   CREATININE 1.4*   GLUCOSE 92       PT/INR:  Recent Labs     10/11/22  0933   PROTIME 14.8*   INR 1.16     APTT:No results for input(s): APTT in the last 72 hours. LIVER PROFILE:  No results for input(s): AST, ALT, BILIDIR, BILITOT, ALKPHOS in the last 72 hours. Imaging Last 24 Hours:  ECHO Complete 2D W Doppler W Color    Result Date: 10/7/2022  Transthoracic Echocardiography Report (TTE)  Demographics   Patient Name  Rosario Ramirez Date of Study          10/07/2022   MRN           [id number]        Gender                 Male   Date of Birth 1955    Room Number            [id number]   Age           78 year(s)   Height:       73 inches     Referring Physician    Janie Avalos MD   Weight:       341.01 pounds Sonographer            Merced Shetty   BSA:          2.7 m^2       Interpreting Physician Donavan Flores MD   BMI:          44.99 kg/m^2  Procedure Type of Study   TTE procedure:ECHO 2D W/DOPPLER/COLOR/CONTRAST.   Study Location: Echo Lab Technical Quality: Poor visualization due to poor acoustical window. Patient Status: Inpatient Contrast Medium: Definity. HR: 72 bpm BP: 109/61 mmHg Indications:Atrial fibrillation. Conclusions   Summary  Technically difficult echo. EF 40 %  Contrast used. No significant valvular abnormalities. mo pericardial effusion   Signature   ----------------------------------------------------------------  Electronically signed by Margarita Sargent MD(Interpreting  physician) on 10/07/2022 01:00 PM  ----------------------------------------------------------------   Findings   Mitral Valve  Structurally normal mitral valve with normal leaflet mobility. No evidence  of mitral valve stenosis. Trivial mitral regurgitation. Aortic Valve  Aortic valve appears to be tricuspid. Structurally normal aortic valve. No significant aortic regurgitation or stenosis is noted. Tricuspid Valve  Tricuspid valve is structurally normal.  No evidence of tricuspid regurgitation. Pulmonic Valve  The pulmonic valve was not well visualized. Left Atrium  Normal size left atrium. Left Ventricle  Normal left ventricle size. Left ventricular ejection fraction is visually estimated at 40%. No evidence of left ventricular mass or thrombus noted. Right Atrium  Normal right atrial dimension with no evidence of thrombus or mass noted. Right Ventricle  Normal right ventricular size with preserved RV function. Pericardial Effusion  No evidence of significant pericardial effusion is noted. Pleural Effusion  No evidence of pleural effusion. Miscellaneous  Aortic root is within normal limits. The IVC is dilated . Definity contrast was utilized to enhance the endocardial borders. 2D Measurements and Calculations(cm)   LVIDd: 5.25 cm                      LVIDs: 4.2 cm  IVSd: 1.25 cm  LVPWd: 0.99 cm                      AO Root Dimension: 3 cm  Rt. Vent.  Dimension: 2.56 cm        LA Dimension: 3.4 cm  % Ejection Fraction: 40 % LA Area: 18.5 cm^2  LA Volume: 61.4 ml                  LV Systolic dimension: 6.6HI  LA Volume Index: 23 ml/m^2          LV PW diastolic: 9.53SD  LV dimension: 5.25 cm               LVOT diameter: 2.1 cm                                      RA Systolic pressure: 8mmHg  LVEDV: 63.3 ml                      RV Diastolic dimension: 9.90EY  LVESV:37.5 ml                       LVEDVI: 23 ml/m^2  Cardic Output (CO): 3.79l/min       LVESVI: 14 ml/m^2  Ascending Aorta: 2.9 cm  Doppler Measurements and Calculations   AV Peak Velocity:133 cm/s              MV Peak E-Wave: 90.4 cm/s  AV Mean Velocity:102 cm/s              MV Peak A-Wave: 58.3 cm/s  AV Peak Gradient: 7.08 mmHg            MV E/A Ratio: 1.55 %  AV Mean Gradient: 5 mmHg               MV Mean velocity: 49.5cm/s  AV Area (Continuity):2.02 cm^2         MV Peak Gradient: 3.27 mmHg  AV VTI:26 cm/s                         MV Mean gradient: 1 mmHg                                         MV P1/2t: 98 msec  Estimated RAP:8 mmHg                   MVA by PHT2.24 cm^2   MV E' septal velocity: 8.49cm/s  MV E' lateral velocity:7.62 cm/s      XR CHEST PORTABLE    Result Date: 10/6/2022  NO PRIOR REPORT AVAILABLE Exam: X-RAY OF [facility] Clinical data:Chest pain. Technique:Single view of the chest. Prior studies: Radiograph of the chest dated 7/26/2022. Findings: The lungs are grossly clear; noevidence of acute infiltrate or pleural effusion. Cardiac silhouette is prominent superiorly with widening of cardiomediastinal silhouette. This is likely WNL however CT recommended to assess further. Minimal fibrosis noted in the bases. No acute osseous abnormality is detected. Prominent  upper cardiomediastinal silhouette. Recommend CT to assess further Minimal bibasilar fibrosis. Recommendation: Follow up as clinically indicated.  Electronically Signed by Margaret Orozco MD at 06-Oct-2022 02:51:17 PM             Assessment//Plan           Hospital Problems             Last Modified POA    * (Principal) Atrial fibrillation with rapid ventricular response ([facility]) 10/6/2022 Yes   Assessment & Plan    Paroxysmal A. fib with RVR  Continue on oral Cardizem  Flecainide 50 twice daily  Echocardiogram reviewed  Continue to hold coagulation as ordered for tentative biopsy tomorrow    Moderate pericardial effusion on echocardiogram  Cardiothoracic surgery team evaluated and no plans for  pericardiocentesis as no tamponade physiology. Multiple Enlarged mediastinum LN  CT guided biopsy ordered, however radiology not available till Wednesday    Chronic left lower extremity DVT  Anticoagulation currently on hold due to tentative LN biopsy on Wednesday    Hypertension: Continue losartan    Hyperlipidemia: Continue statin    Morbid obesity    History of brain injury. Code: Full  Diet: Cardiac  Disposition: Pending completion of above work-up. Electronically signed by   Sal Venegas MD   Internal Medicine Hospitalist  On 10/11/2022  At 2:31 PM    EMR Dragon/Transcription disclaimer:   Much of this encounter note is an electronic transcription/translation of spoken language to printed text.  The electronic translation of spoken language may permit erroneous, or at times, nonsensical words or phrases to be inadvertently transcribed; although attempts have made to review the note for such errors, some may still exist.

## 2022-10-12 LAB
EKG P AXIS: -24 DEGREES
EKG P-R INTERVAL: 208 MS
EKG Q-T INTERVAL: 410 MS
EKG QRS DURATION: 90 MS
EKG QTC CALCULATION (BAZETT): 431 MS
EKG T AXIS: 67 DEGREES

## 2022-10-12 PROCEDURE — 93010 ELECTROCARDIOGRAM REPORT: CPT | Performed by: INTERNAL MEDICINE

## 2022-10-12 PROCEDURE — 6370000000 HC RX 637 (ALT 250 FOR IP)

## 2022-10-12 PROCEDURE — 6370000000 HC RX 637 (ALT 250 FOR IP): Performed by: INTERNAL MEDICINE

## 2022-10-12 PROCEDURE — 93005 ELECTROCARDIOGRAM TRACING: CPT | Performed by: INTERNAL MEDICINE

## 2022-10-12 PROCEDURE — 2580000003 HC RX 258

## 2022-10-12 PROCEDURE — 2140000000 HC CCU INTERMEDIATE R&B

## 2022-10-12 RX ADMIN — DILTIAZEM HYDROCHLORIDE 120 MG: 120 CAPSULE, COATED, EXTENDED RELEASE ORAL at 08:53

## 2022-10-12 RX ADMIN — LOSARTAN POTASSIUM 100 MG: 100 TABLET, FILM COATED ORAL at 08:53

## 2022-10-12 RX ADMIN — FENOFIBRATE 160 MG: 160 TABLET ORAL at 08:53

## 2022-10-12 RX ADMIN — ROSUVASTATIN CALCIUM 10 MG: 10 TABLET, FILM COATED ORAL at 20:17

## 2022-10-12 RX ADMIN — METOPROLOL SUCCINATE 100 MG: 50 TABLET, FILM COATED, EXTENDED RELEASE ORAL at 08:53

## 2022-10-12 RX ADMIN — DILTIAZEM HYDROCHLORIDE 120 MG: 120 CAPSULE, COATED, EXTENDED RELEASE ORAL at 20:18

## 2022-10-12 RX ADMIN — SODIUM CHLORIDE, PRESERVATIVE FREE 10 ML: 5 INJECTION INTRAVENOUS at 08:53

## 2022-10-12 RX ADMIN — FLECAINIDE ACETATE 50 MG: 50 TABLET ORAL at 20:17

## 2022-10-12 RX ADMIN — MULTIPLE VITAMINS W/ MINERALS TAB 1 TABLET: TAB at 08:53

## 2022-10-12 RX ADMIN — FLECAINIDE ACETATE 50 MG: 50 TABLET ORAL at 08:53

## 2022-10-12 RX ADMIN — SODIUM CHLORIDE, PRESERVATIVE FREE 10 ML: 5 INJECTION INTRAVENOUS at 20:18

## 2022-10-12 NOTE — PROGRESS NOTES
Progress Note  Date:10/12/2022       Room:0724/724-02  Patient Name:Figueroa Aguero     YOB: 1955     Age:67 y.o. Subjective    Subjective: 27-year-old male with a history of brain injury, paroxysmal defibrillation, hyperlipidemia, prostate cancer, DVT, presented to the hospital 10/6 with concerns of tachycardia. Was noted to have heart rate in the 140s at home, in the emergency room troponin negative, EKG with atrial fibrillation with RVR and was initiated on Cardizem drip. Patient was admitted in-house for further work-up. Chest x-ray on admission showed widened mediastinum, echocardiogram was obtained which showed moderate pericardial effusion, cardiothoracic surgery team consulted and no need for pericardiocentesis as no tamponande physiology, to follow up with serial ECHO outpt. Patient was evaluated by cardiology, weaned off Cardizem drip and transition to oral Cardizem with addition of flecainide. CT chest noted multiple LN enlargement in the mediastinum, CT guided biopsy ordered,however IR not available till Wednesday. Xarelto on hold for anticipated procedure. Today, radiology stated CT scan down and procedure has been moved to tomorrow. Patient was seen in house this morning with family member present. Denied any acute overnight event, denied any chest pain or worsening shortness of breath. No nausea or vomiting no acute issues at this time. Review of Systems: 12 point system review negative except as above. Objective         Vitals Last 24 Hours:  TEMPERATURE:  Temp  Av.1 °F (36.2 °C)  Min: 96.1 °F (35.6 °C)  Max: 97.9 °F (36.6 °C)  RESPIRATIONS RANGE: Resp  Av.6  Min: 16  Max: 20  PULSE OXIMETRY RANGE: SpO2  Av %  Min: 93 %  Max: 96 %  PULSE RANGE: Pulse  Av.8  Min: 68  Max: 76  BLOOD PRESSURE RANGE: Systolic (39LEV), CZM:839 , Min:94 , YVN:148   ; Diastolic (41ZKT), EY, Min:63, Max:92    I/O (24Hr):     Intake/Output Summary (Last 24 hours) at 10/12/2022 1505  Last data filed at 10/12/2022 1352  Gross per 24 hour   Intake 640 ml   Output 450 ml   Net 190 ml       Objective:  Vital signs: (most recent): Blood pressure 94/63, pulse 68, temperature 97 °F (36.1 °C), temperature source Temporal, resp. rate 18, height 6' 1\" (1.854 m), weight (!) 333 lb 8 oz (151.3 kg), SpO2 93 %. Physical Examination:  General: morbid obese, Well-developed, no acute distress lying comfortably in bed. HEENT: Atraumatic normocephalic, range of motion normal   Cardiac: Normal S1-S2   Respiratory: clear To auscultation bilaterally, no rhonchi or rales, no wheezing  Abdomen: Truncal obesity, with some distention, nontender, no rebound noted. Extremities: no tenderness, no edema, moves all extremities  Psych: Affect normal and good eye contact, behavioral normal.        Labs/Imaging/Diagnostics    Labs:  CBC:  No results for input(s): WBC, RBC, HGB, HCT, MCV, RDW, PLT in the last 72 hours. CHEMISTRIES:  No results for input(s): NA, K, CL, CO2, BUN, CREATININE, GLUCOSE, CA, PHOS, MG in the last 72 hours. PT/INR:  Recent Labs     10/11/22  0933   PROTIME 14.8*   INR 1.16       APTT:No results for input(s): APTT in the last 72 hours. LIVER PROFILE:  No results for input(s): AST, ALT, BILIDIR, BILITOT, ALKPHOS in the last 72 hours.       Imaging Last 24 Hours:  ECHO Complete 2D W Doppler W Color    Result Date: 10/7/2022  Transthoracic Echocardiography Report (TTE)  Demographics   Patient Name  Troy Mendoza Date of Study          10/07/2022   MRN           [id number]        Gender                 Male   Date of Birth 1955    Room Number            MHL-0724   Age           78 year(s)   Height:       73 inches     Referring Physician    Jovany Pan MD   Weight:       341.01 pounds Sonographer            Merced Shetty   BSA:          2.7 m^2       Interpreting Physician Miriam Case MD   BMI:          44.99 kg/m^2  Procedure Type of Study   TTE procedure:ECHO 2D W/DOPPLER/COLOR/CONTRAST. Study Location: Echo Lab Technical Quality: Poor visualization due to poor acoustical window. Patient Status: Inpatient Contrast Medium: Definity. HR: 72 bpm BP: 109/61 mmHg Indications:Atrial fibrillation. Conclusions   Summary  Technically difficult echo. EF 40 %  Contrast used. No significant valvular abnormalities. mo pericardial effusion   Signature   ----------------------------------------------------------------  Electronically signed by Lorene Rios MD(Interpreting  physician) on 10/07/2022 01:00 PM  ----------------------------------------------------------------   Findings   Mitral Valve  Structurally normal mitral valve with normal leaflet mobility. No evidence  of mitral valve stenosis. Trivial mitral regurgitation. Aortic Valve  Aortic valve appears to be tricuspid. Structurally normal aortic valve. No significant aortic regurgitation or stenosis is noted. Tricuspid Valve  Tricuspid valve is structurally normal.  No evidence of tricuspid regurgitation. Pulmonic Valve  The pulmonic valve was not well visualized. Left Atrium  Normal size left atrium. Left Ventricle  Normal left ventricle size. Left ventricular ejection fraction is visually estimated at 40%. No evidence of left ventricular mass or thrombus noted. Right Atrium  Normal right atrial dimension with no evidence of thrombus or mass noted. Right Ventricle  Normal right ventricular size with preserved RV function. Pericardial Effusion  No evidence of significant pericardial effusion is noted. Pleural Effusion  No evidence of pleural effusion. Miscellaneous  Aortic root is within normal limits. The IVC is dilated . Definity contrast was utilized to enhance the endocardial borders. 2D Measurements and Calculations(cm)   LVIDd: 5.25 cm                      LVIDs: 4.2 cm  IVSd: 1.25 cm  LVPWd: 0.99 cm                      AO Root Dimension: 3 cm  Rt. Vent.  Dimension: 2.56 cm        LA Dimension: 3.4 cm  % Ejection Fraction: 40 %           LA Area: 18.5 cm^2  LA Volume: 61.4 ml                  LV Systolic dimension: 2.1TC  LA Volume Index: 23 ml/m^2          LV PW diastolic: 0.18XP  LV dimension: 5.25 cm               LVOT diameter: 2.1 cm                                      RA Systolic pressure: 8mmHg  LVEDV: 63.3 ml                      RV Diastolic dimension: 5.86ZF  LVESV:37.5 ml                       LVEDVI: 23 ml/m^2  Cardic Output (CO): 3.79l/min       LVESVI: 14 ml/m^2  Ascending Aorta: 2.9 cm  Doppler Measurements and Calculations   AV Peak Velocity:133 cm/s              MV Peak E-Wave: 90.4 cm/s  AV Mean Velocity:102 cm/s              MV Peak A-Wave: 58.3 cm/s  AV Peak Gradient: 7.08 mmHg            MV E/A Ratio: 1.55 %  AV Mean Gradient: 5 mmHg               MV Mean velocity: 49.5cm/s  AV Area (Continuity):2.02 cm^2         MV Peak Gradient: 3.27 mmHg  AV VTI:26 cm/s                         MV Mean gradient: 1 mmHg                                         MV P1/2t: 98 msec  Estimated RAP:8 mmHg                   MVA by PHT2.24 cm^2   MV E' septal velocity: 8.49cm/s  MV E' lateral velocity:7.62 cm/s      XR CHEST PORTABLE    Result Date: 10/6/2022  NO PRIOR REPORT AVAILABLE Exam: X-RAY OF [facility] Clinical data:Chest pain. Technique:Single view of the chest. Prior studies: Radiograph of the chest dated 7/26/2022. Findings: The lungs are grossly clear; noevidence of acute infiltrate or pleural effusion. Cardiac silhouette is prominent superiorly with widening of cardiomediastinal silhouette. This is likely WNL however CT recommended to assess further. Minimal fibrosis noted in the bases. No acute osseous abnormality is detected. Prominent  upper cardiomediastinal silhouette. Recommend CT to assess further Minimal bibasilar fibrosis. Recommendation: Follow up as clinically indicated.  Electronically Signed by Michel Reagan MD at 06-Oct-2022 02:51:17 PM Assessment//Plan           Hospital Problems             Last Modified POA    * (Principal) Atrial fibrillation with rapid ventricular response (HCC) 10/6/2022 Yes   Assessment & Plan    Paroxysmal A. fib with RVR  Continue on oral Cardizem  Flecainide 50 twice daily  Echocardiogram reviewed  Continue to hold coagulation as ordered for tentative biopsy tomorrow    Moderate pericardial effusion on echocardiogram  Cardiothoracic surgery team evaluated and no plans for  pericardiocentesis as no tamponade physiology. Multiple Enlarged mediastinum LN  CT guided biopsy ordered, however radiology not available till Wednesday; and today CT scan down. Procedure moved to tomorrow    Chronic left lower extremity DVT  Anticoagulation currently on hold due to tentative LN biopsy     Hypertension: Continue losartan    Hyperlipidemia: Continue statin    Morbid obesity    History of brain injury. Code: Full  Diet: Cardiac  Disposition: Pending completion of above work-up. Electronically signed by   Susanne Brown MD   Internal Medicine Hospitalist  On 10/12/2022  At 3:05 PM    EMR Dragon/Transcription disclaimer:   Much of this encounter note is an electronic transcription/translation of spoken language to printed text.  The electronic translation of spoken language may permit erroneous, or at times, nonsensical words or phrases to be inadvertently transcribed; although attempts have made to review the note for such errors, some may still exist.

## 2022-10-12 NOTE — PROGRESS NOTES
Comprehensive Nutrition Assessment    Type and Reason for Visit:  Initial, RD Nutrition Re-Screen/LOS    Nutrition Recommendations/Plan:   Follow for diet advancement, po intake and weight     Malnutrition Assessment:  Malnutrition Status:  No malnutrition (10/12/22 1037)    Context:  Acute Illness     Findings of the 6 clinical characteristics of malnutrition:  Energy Intake:  No significant decrease in energy intake  Weight Loss:  Greater than 7.5% over 3 months     Body Fat Loss:  No significant body fat loss     Muscle Mass Loss:  No significant muscle mass loss    Fluid Accumulation:  Mild Generalized   Strength:  Not Performed    Nutrition Assessment:    Following patient for LOS x 6 days. At present time pt is NPO for CT guided biopsy. PT's pointake has been good with intake ranging %. Pt is well nourished AEB fat and muscle mass    Nutrition Related Findings:    nonpitting generalized edema Wound Type: None       Current Nutrition Intake & Therapies:    Average Meal Intake: %  Average Supplements Intake: None Ordered  Diet NPO    Anthropometric Measures:  Height: 6' 1\" (185.4 cm)  Ideal Body Weight (IBW): 184 lbs (84 kg)    Admission Body Weight: 341 lb 1 oz (154.7 kg)  Current Body Weight: 333 lb 8 oz (151.3 kg), 181.3 % IBW.  Weight Source: Standing Scale  Current BMI (kg/m2): 44  Usual Body Weight: 364 lb (165.1 kg)  % Weight Change (Calculated): -8.4     BMI Categories: Obese Class 3 (BMI 40.0 or greater)    Nutrition Diagnosis:   Unintended weight loss related to cardiac dysfunction as evidenced by localized or generalized fluid accumulation, weight loss 7.5% in 3 months    Nutrition Interventions:   Food and/or Nutrient Delivery: Continue NPO  Nutrition Education/Counseling: No recommendation at this time  Coordination of Nutrition Care: Continue to monitor while inpatient       Goals:     Goals: Meet at least 75% of estimated needs, PO intake 75% or greater       Nutrition Monitoring and Evaluation:   Behavioral-Environmental Outcomes: None Identified  Food/Nutrient Intake Outcomes: Diet Advancement/Tolerance, Food and Nutrient Intake  Physical Signs/Symptoms Outcomes: Biochemical Data, Fluid Status or Edema, Weight, Skin    Discharge Planning:     Too soon to determine     Cordsweetie Rolon MS, RD, LD  Contact: [phone]

## 2022-10-13 ENCOUNTER — APPOINTMENT (OUTPATIENT)
Dept: CT IMAGING | Age: 67
DRG: 309 | End: 2022-10-13
Payer: MEDICARE

## 2022-10-13 VITALS
HEART RATE: 67 BPM | WEIGHT: 315 LBS | HEIGHT: 73 IN | RESPIRATION RATE: 18 BRPM | TEMPERATURE: 97.2 F | DIASTOLIC BLOOD PRESSURE: 68 MMHG | SYSTOLIC BLOOD PRESSURE: 125 MMHG | BODY MASS INDEX: 41.75 KG/M2 | OXYGEN SATURATION: 94 %

## 2022-10-13 LAB
EKG P AXIS: -14 DEGREES
EKG P-R INTERVAL: 204 MS
EKG Q-T INTERVAL: 424 MS
EKG QRS DURATION: 88 MS
EKG QTC CALCULATION (BAZETT): 439 MS
EKG T AXIS: 76 DEGREES

## 2022-10-13 PROCEDURE — 6370000000 HC RX 637 (ALT 250 FOR IP)

## 2022-10-13 PROCEDURE — 2580000003 HC RX 258

## 2022-10-13 PROCEDURE — 93005 ELECTROCARDIOGRAM TRACING: CPT | Performed by: INTERNAL MEDICINE

## 2022-10-13 PROCEDURE — 6370000000 HC RX 637 (ALT 250 FOR IP): Performed by: INTERNAL MEDICINE

## 2022-10-13 RX ORDER — FLECAINIDE ACETATE 50 MG/1
50 TABLET ORAL 2 TIMES DAILY
Qty: 60 TABLET | Refills: 3 | Status: SHIPPED | OUTPATIENT
Start: 2022-10-13

## 2022-10-13 RX ORDER — MIDAZOLAM HYDROCHLORIDE 1 MG/ML
INJECTION INTRAMUSCULAR; INTRAVENOUS
Status: DISCONTINUED
Start: 2022-10-13 | End: 2022-10-13 | Stop reason: HOSPADM

## 2022-10-13 RX ORDER — DILTIAZEM HYDROCHLORIDE 120 MG/1
120 CAPSULE, COATED, EXTENDED RELEASE ORAL 2 TIMES DAILY
Qty: 60 CAPSULE | Refills: 3 | Status: SHIPPED | OUTPATIENT
Start: 2022-10-13

## 2022-10-13 RX ORDER — FENTANYL CITRATE 50 UG/ML
INJECTION, SOLUTION INTRAMUSCULAR; INTRAVENOUS
Status: DISCONTINUED
Start: 2022-10-13 | End: 2022-10-13 | Stop reason: HOSPADM

## 2022-10-13 RX ADMIN — FENOFIBRATE 160 MG: 160 TABLET ORAL at 08:23

## 2022-10-13 RX ADMIN — MULTIPLE VITAMINS W/ MINERALS TAB 1 TABLET: TAB at 08:23

## 2022-10-13 RX ADMIN — METOPROLOL SUCCINATE 100 MG: 50 TABLET, FILM COATED, EXTENDED RELEASE ORAL at 08:23

## 2022-10-13 RX ADMIN — DILTIAZEM HYDROCHLORIDE 120 MG: 120 CAPSULE, COATED, EXTENDED RELEASE ORAL at 08:23

## 2022-10-13 RX ADMIN — SODIUM CHLORIDE, PRESERVATIVE FREE 10 ML: 5 INJECTION INTRAVENOUS at 08:22

## 2022-10-13 RX ADMIN — LOSARTAN POTASSIUM 100 MG: 100 TABLET, FILM COATED ORAL at 08:23

## 2022-10-13 RX ADMIN — FLECAINIDE ACETATE 50 MG: 50 TABLET ORAL at 08:23

## 2022-10-13 ASSESSMENT — PAIN SCALES - GENERAL: PAINLEVEL_OUTOF10: 0

## 2022-10-13 NOTE — PROGRESS NOTES
Face-to-face report given to Mr. Zaira Silva. Explained that while pt was on the CT table but before the invasive portion of the procedure was started, pt decided to refuse the biopsy. Both Jess zendejas and this RN explained the whole procedure to the pt again and explained that he would be given medication for relaxation and pain. This was information that had already been explained to him by Dr. Abel Teague and this RN during the consent process. Mr. Olimpia Garcia continued to refuse the biopsy; therefore, he was sent back to him room.

## 2022-10-13 NOTE — OR NURSING
While getting pt ready for his biopsy he decided that he did not want to follow through with the biopsy. Dr. Amparo Stevenson was notified. Pt will be returning to his room.

## 2022-10-13 NOTE — DISCHARGE SUMMARY
Discharge Summary    Date:10/13/2022        Patient Name:Figueroa Aguero     YOB: 1955     Age:67 y.o. Admit Date:10/6/2022   Admission Condition:fair   Discharged Condition:fair  Discharge Date: 10/13/22       Discharge Diagnoses   Principal Problem:    Atrial fibrillation with rapid ventricular response ([address]  Resolved Problems:    * No resolved hospital problems. [facility] Stay   Narrative of Hospital Course:     81-year-old male with a history of brain injury, paroxysmal defibrillation, hyperlipidemia, prostate cancer, DVT, presented to the hospital 10/6 with concerns of tachycardia. Was noted to have heart rate in the 140s at home, in the emergency room troponin negative, EKG with atrial fibrillation with RVR and was initiated on Cardizem drip. Patient was admitted in-house for further work-up. Chest x-ray on admission showed widened mediastinum, echocardiogram was obtained which showed moderate pericardial effusion, cardiothoracic surgery team consulted and no need for pericardiocentesis as no tamponande physiology, to follow up with serial ECHO outpt. Patient was evaluated by cardiology, weaned off Cardizem drip and transition to oral Cardizem with addition of flecainide. CT chest noted multiple LN enlargement in the mediastinum, CT guided biopsy ordered and patient was in house for 6 extra days for this procedure to be completed. On day of biopsy, while on the table patient decided he no longer wants to proceed with the procedure. Radiology team tried explaining to patient but unfortunately was unsuccessful. Patient was sent back to his room. Discussed with patient mother, and continued discussion needs to be held with PCP and if patient decides to pursue further will need to be arranged outpt. Physical Examination:  General: morbid obese, Well-developed, no acute distress lying comfortably in bed.   HEENT: Atraumatic normocephalic, range of motion normal   Cardiac: Normal S1-S2   Respiratory: clear To auscultation bilaterally, no rhonchi or rales, no wheezing  Abdomen: Truncal obesity, with some distention, nontender, no rebound noted. Extremities: no tenderness, no edema, moves all extremities  Psych: Affect normal and good eye contact, behavioral normal.        Consultants:   IP CONSULT TO CARDIOLOGY  IP CONSULT TO CARDIOTHORACIC SURGERY    Time Spent on Discharge:  35 minutes were spent in patient examination, evaluation, counseling as well as medication reconciliation, prescriptions for required medications, discharge plan and follow up. Surgeries/Procedures Performed:  NONE     Significant Diagnostic Studies:   Recent Labs:  CBC:   Lab Results   Component Value Date/Time    WBC 7.2 10/09/2022 07:54 AM    RBC 4.59 10/09/2022 07:54 AM    HGB 13.4 10/09/2022 07:54 AM    HCT 40.1 10/09/2022 07:54 AM    MCV 87.4 10/09/2022 07:54 AM    MCH 29.2 10/09/2022 07:54 AM    MCHC 33.4 10/09/2022 07:54 AM    RDW 13.0 10/09/2022 07:54 AM     10/09/2022 07:54 AM     BMP:    Lab Results   Component Value Date/Time    GLUCOSE 92 10/09/2022 07:54 AM     10/09/2022 07:54 AM    K 4.2 10/09/2022 07:54 AM     10/09/2022 07:54 AM    CO2 28 10/09/2022 07:54 AM    ANIONGAP 10 10/09/2022 07:54 AM    BUN 27 10/09/2022 07:54 AM    CREATININE 1.4 10/09/2022 07:54 AM    CALCIUM 9.0 10/09/2022 07:54 AM    LABGLOM 51 10/09/2022 07:54 AM    GFRAA >59 10/09/2022 07:54 AM       Radiology Last 7 Days:  CT CHEST WO CONTRAST    Result Date: 10/8/2022  Multiple. Lymph nodes of varying size is seen at the base of the neck extending the superior mediastinum, mediastinum and prevascular space. There is also periaortic lymph nodes seen in the abdomen. I would recommend CT-guided biopsy for histological sampling. There is suggestion of a lipomatosis in the mediastinum No evidence of pericardial effusion    XR CHEST PORTABLE    Result Date: 10/6/2022  Prominent  upper cardiomediastinal silhouette. Recommend CT to assess further Minimal bibasilar fibrosis. Recommendation: Follow up as clinically indicated. Electronically Signed by Aly Pan MD at 06-Oct-2022 02:51:17 PM               Discharge Plan   Disposition: Home    Provider Follow-Up:   Huber Jaeger, [address] [facility] [address]  [phone]    Follow up on 10/25/2022  9:00am    Lj Ayalaalek, [address] [facility]  [phone]    Follow up on 10/18/2022  8:00am hospital follow-up       Patient Instructions   Diet: cardiac diet    Activity: activity as tolerated      Discharge Medications         Medication List        START taking these medications      dilTIAZem 120 MG extended release capsule  Commonly known as: CARDIZEM CD  Take 1 capsule by mouth in the morning and at bedtime     flecainide 50 MG tablet  Commonly known as: TAMBOCOR  Take 1 tablet by mouth 2 times daily            CONTINUE taking these medications      fenofibrate 160 MG tablet  Commonly known as: TRIGLIDE  Take 1 tablet by mouth daily     losartan 100 MG tablet  Commonly known as: COZAAR  TAKE 1 TABLET EVERY DAY     metoprolol succinate 100 MG extended release tablet  Commonly known as: TOPROL XL  Take 1 tablet by mouth in the morning.      MULTI-VITAMIN PO     omega-3 acid ethyl esters 1 g capsule  Commonly known as: LOVAZA     rivaroxaban 20 MG Tabs tablet  Commonly known as: Xarelto  TAKE 1 TABLET EVERY DAY WITH BREAKFAST     rosuvastatin 10 MG tablet  Commonly known as: Crestor  Take 1 tablet by mouth nightly     Vitamin D3 50 MCG (2000 UT) Caps  TAKE 1 CAPSULE EVERY DAY            STOP taking these medications      fish oil-omega-3 fatty acids 1000 MG capsule     Red Yeast Rice 600 MG Tabs               Where to Get Your Medications        These medications were sent to [facility], [address]  [address], [facility] [place] [address]      Phone: [phone] dilTIAZem 120 MG extended release capsule  flecainide 50 MG tablet         Electronically signed by Salina Humphries MD on 10/13/22 at 12:24 PM CDT

## 2022-10-13 NOTE — PROGRESS NOTES
Patient educated on discharge instructions including medications and follow-up appointments. Patient and mother verbalized understanding of the teaching. Patient discharged to home with mom.

## 2022-10-14 ENCOUNTER — TELEPHONE (OUTPATIENT)
Dept: INTERNAL MEDICINE | Age: 67
End: 2022-10-14

## 2022-10-14 NOTE — TELEPHONE ENCOUNTER
Alex[facility] Transitions Initial Follow Up Call    Outreach made within 2 business days of discharge: Yes    Patient: Landry Galindo   Patient : 1955 MRN: [id number]    Reason for Admission: Tachycardia    Discharge Date: 10/13/22      Discharge Diagnoses   Principal Problem:    Atrial fibrillation with rapid ventricular response ([facility] [address]  Resolved Problems:    * No resolved hospital problems     Spoke with: Attempted to make contact with patient/caregiver for an initial transitions of care follow up call post discharge without success. I will reach out again at a later time. Any previously scheduled hospital follow up appointments noted below.         Discharge department/facility: [place] [address]      Scheduled appointment with PCP within 7-14 days    Follow Up  Future Appointments   Date Time Provider Sam Valenzuela   10/18/2022  8:00 AM JODY Poon [facility]   10/25/2022  9:00 AM JODY [facility]   2023  2:00 PM JODY [facility]       Maritza [place], [place]

## 2022-10-17 ENCOUNTER — TELEPHONE (OUTPATIENT)
Dept: INTERNAL MEDICINE | Age: 67
End: 2022-10-17

## 2022-10-17 NOTE — CARE COORDINATION
10/13/22 1330   IMM Letter   IMM Letter given to Patient/Family/Significant other/Guardian/POA/by: given to pt rex Scott RNCM   IMM Letter date given: 10/13/22   IMM Letter time given: 0930   Second IMM given to patient and explained with patient verbalizing understanding.   All questions and concerns addressed

## 2022-10-17 NOTE — TELEPHONE ENCOUNTER
Warren Park from [facility] office she states that pt came in requesting to be seen there but they have No orders , or anything on him . She stated that it needs to be written for Afib, CHF , and enlarged glands do you know anything about this?  please advise

## 2022-10-17 NOTE — TELEPHONE ENCOUNTER
Alex[facility] Transitions Initial Follow Up Call    Outreach made within 2 business days of discharge: Yes    Patient: Enriqueta Sutton   Patient : 1955 MRN: [id number]    Reason for Admission: Tachycardia    Discharge Date: 10/13/22      Discharge Diagnoses   Principal Problem:    Atrial fibrillation with rapid ventricular response ([facility] [address]  Resolved Problems:    * No resolved hospital problems              Spoke with:  Left 2nd message for pt to call us back to do the TCM encounter.      Discharge department/facility: [place] [address]    Scheduled appointment with PCP within 7-14 days    Follow Up  Future Appointments   Date Time Provider Sam Valenzuela   10/18/2022  3:15 PM Magdaline Meigs, APRN [facility]   10/25/2022  9:00 AM JODY [facility]   2023  2:00 PM JODY [facility]       [place], [place]

## 2022-10-18 ENCOUNTER — HOSPITAL ENCOUNTER (EMERGENCY)
Age: 67
Discharge: HOME | End: 2022-10-18
Attending: EMERGENCY MEDICINE
Payer: MEDICARE

## 2022-10-18 VITALS
SYSTOLIC BLOOD PRESSURE: 144 MMHG | DIASTOLIC BLOOD PRESSURE: 98 MMHG | TEMPERATURE: 98.1 F | HEART RATE: 98 BPM | RESPIRATION RATE: 17 BRPM | OXYGEN SATURATION: 97 %

## 2022-10-18 DIAGNOSIS — I48.0 PAROXYSMAL ATRIAL FIBRILLATION (HCC): ICD-10-CM

## 2022-10-18 DIAGNOSIS — R04.0 EPISTAXIS: Primary | ICD-10-CM

## 2022-10-18 LAB
ALBUMIN SERPL-MCNC: 4.8 G/DL (ref 3.5–5.2)
ALP BLD-CCNC: 70 U/L (ref 40–130)
ALT SERPL-CCNC: 26 U/L (ref 5–41)
ANION GAP SERPL CALCULATED.3IONS-SCNC: 7 MMOL/L (ref 7–19)
APTT: 36.6 SEC (ref 26–36.2)
AST SERPL-CCNC: 20 U/L (ref 5–40)
BASOPHILS ABSOLUTE: 0 K/UL (ref 0–0.2)
BASOPHILS RELATIVE PERCENT: 0.4 % (ref 0–1)
BILIRUB SERPL-MCNC: 0.3 MG/DL (ref 0.2–1.2)
BUN BLDV-MCNC: 31 MG/DL (ref 8–23)
CALCIUM SERPL-MCNC: 10 MG/DL (ref 8.8–10.2)
CHLORIDE BLD-SCNC: 106 MMOL/L (ref 98–111)
CO2: 28 MMOL/L (ref 22–29)
CREAT SERPL-MCNC: 1.3 MG/DL (ref 0.5–1.2)
EOSINOPHILS ABSOLUTE: 0.1 K/UL (ref 0–0.6)
EOSINOPHILS RELATIVE PERCENT: 1 % (ref 0–5)
GFR SERPL CREATININE-BSD FRML MDRD: >60 ML/MIN/{1.73_M2}
GLUCOSE BLD-MCNC: 118 MG/DL (ref 74–109)
HCT VFR BLD CALC: 39.9 % (ref 42–52)
HEMOGLOBIN: 13.8 G/DL (ref 14–18)
IMMATURE GRANULOCYTES #: 0 K/UL
INR BLD: 1.94 (ref 0.88–1.18)
LYMPHOCYTES ABSOLUTE: 1.3 K/UL (ref 1.1–4.5)
LYMPHOCYTES RELATIVE PERCENT: 15.4 % (ref 20–40)
MCH RBC QN AUTO: 29.6 PG (ref 27–31)
MCHC RBC AUTO-ENTMCNC: 34.6 G/DL (ref 33–37)
MCV RBC AUTO: 85.6 FL (ref 80–94)
MONOCYTES ABSOLUTE: 0.5 K/UL (ref 0–0.9)
MONOCYTES RELATIVE PERCENT: 6 % (ref 0–10)
NEUTROPHILS ABSOLUTE: 6.5 K/UL (ref 1.5–7.5)
NEUTROPHILS RELATIVE PERCENT: 76.8 % (ref 50–65)
PDW BLD-RTO: 12.7 % (ref 11.5–14.5)
PLATELET # BLD: 299 K/UL (ref 130–400)
PMV BLD AUTO: 9.2 FL (ref 9.4–12.4)
POTASSIUM SERPL-SCNC: 4.9 MMOL/L (ref 3.5–5)
PROTHROMBIN TIME: 22.6 SEC (ref 12–14.6)
RBC # BLD: 4.66 M/UL (ref 4.7–6.1)
SODIUM BLD-SCNC: 141 MMOL/L (ref 136–145)
TOTAL PROTEIN: 7.4 G/DL (ref 6.6–8.7)
WBC # BLD: 8.4 K/UL (ref 4.8–10.8)

## 2022-10-18 PROCEDURE — 80053 COMPREHEN METABOLIC PANEL: CPT

## 2022-10-18 PROCEDURE — 85730 THROMBOPLASTIN TIME PARTIAL: CPT

## 2022-10-18 PROCEDURE — 30901 CONTROL OF NOSEBLEED: CPT

## 2022-10-18 PROCEDURE — 36415 COLL VENOUS BLD VENIPUNCTURE: CPT

## 2022-10-18 PROCEDURE — 85025 COMPLETE CBC W/AUTO DIFF WBC: CPT

## 2022-10-18 PROCEDURE — 93005 ELECTROCARDIOGRAM TRACING: CPT | Performed by: EMERGENCY MEDICINE

## 2022-10-18 PROCEDURE — 99284 EMERGENCY DEPT VISIT MOD MDM: CPT

## 2022-10-18 PROCEDURE — 85610 PROTHROMBIN TIME: CPT

## 2022-10-18 PROCEDURE — 6370000000 HC RX 637 (ALT 250 FOR IP): Performed by: EMERGENCY MEDICINE

## 2022-10-18 RX ORDER — OXYMETAZOLINE HYDROCHLORIDE 0.05 G/100ML
2 SPRAY NASAL ONCE
Status: COMPLETED | OUTPATIENT
Start: 2022-10-18 | End: 2022-10-18

## 2022-10-18 RX ORDER — LIDOCAINE HYDROCHLORIDE 20 MG/ML
15 SOLUTION OROPHARYNGEAL ONCE
Status: COMPLETED | OUTPATIENT
Start: 2022-10-18 | End: 2022-10-18

## 2022-10-18 RX ADMIN — Medication 2 SPRAY: at 14:30

## 2022-10-18 RX ADMIN — LIDOCAINE HYDROCHLORIDE 15 ML: 20 SOLUTION ORAL; TOPICAL at 14:30

## 2022-10-18 NOTE — ED PROVIDER NOTES
[facility] EMERGENCY DEPT  eMERGENCY dEPARTMENT eNCOUnter      Pt Name: Nasima Baron  MRN: [id number]  [place] 1955  Date of evaluation: 10/18/2022  Provider: Cande Olivarez MD    CHIEF COMPLAINT       Chief Complaint   Patient presents with    Epistaxis     Since 0130, on xarelto         HISTORY OF PRESENT ILLNESS   (Location/Symptom, Timing/Onset,Context/Setting, Quality, Duration, Modifying Factors, Severity)  Note limiting factors. Nasima Baron is a 78 y.o. male who presents to the emergency department for evaluation regarding nosebleed. Patient states that he has been having ongoing bleeding from the right side of his nose since around 130 this morning. He has a prior history of atrial fibrillation and is maintained on Xarelto therapy. States that he is not any specific trauma or injury to the nose. No prior history of frequent nosebleeds. He has not had any lightheadedness or dizziness. States there have really been no relieving factors for the symptoms. HPI    NursingNotes were reviewed. REVIEW OF SYSTEMS    (2-9 systems for level 4, 10 or more for level 5)     Review of Systems   Constitutional:  Negative for chills and fever. HENT:  Positive for nosebleeds. Respiratory:  Negative for shortness of breath. Cardiovascular:  Negative for chest pain. Gastrointestinal:  Negative for abdominal pain. All other systems reviewed and are negative.          PAST MEDICALHISTORY     Past Medical History:   Diagnosis Date    Atrial fibrillation ([address]     Cellulitis of left leg     Colon polyp     Elevated blood pressure reading without diagnosis of hypertension     Esophageal reflux     History of prostate cancer     Mixed hyperlipidemia     TG>>LDL    Obesity     Prostate cancer [facility])     2010 Dr Thad Vargas;  implants, XRT         SURGICAL HISTORY       Past Surgical History:   Procedure Laterality Date    COLONOSCOPY  6/2/2009        COLONOSCOPY  2012        UPPER GASTROINTESTINAL ENDOSCOPY  3/22/2000        VASECTOMY           CURRENT MEDICATIONS     Previous Medications    CHOLECALCIFEROL (VITAMIN D3) 50 MCG (2000 UT) CAPS    TAKE 1 CAPSULE EVERY DAY    DILTIAZEM (CARDIZEM CD) 120 MG EXTENDED RELEASE CAPSULE    Take 1 capsule by mouth in the morning and at bedtime    FENOFIBRATE 160 MG TABLET    Take 1 tablet by mouth daily    FLECAINIDE (TAMBOCOR) 50 MG TABLET    Take 1 tablet by mouth 2 times daily    LOSARTAN (COZAAR) 100 MG TABLET    TAKE 1 TABLET EVERY DAY    METOPROLOL SUCCINATE (TOPROL XL) 100 MG EXTENDED RELEASE TABLET    Take 1 tablet by mouth in the morning. MULTIPLE VITAMIN (MULTI-VITAMIN PO)    Take  by mouth.       OMEGA-3 ACID ETHYL ESTERS (LOVAZA) 1 G CAPSULE    Take 1 g by mouth daily    RIVAROXABAN (XARELTO) 20 MG TABS TABLET    TAKE 1 TABLET EVERY DAY WITH BREAKFAST    ROSUVASTATIN (CRESTOR) 10 MG TABLET    Take 1 tablet by mouth nightly       ALLERGIES     Pcn [penicillins]    FAMILY HISTORY       Family History   Problem Relation Age of Onset    Colon Polyps Mother     Colon Polyps Brother     Heart Disease Father     High Blood Pressure Other           SOCIAL HISTORY       Social History     Socioeconomic History    Marital status: Single     Spouse name: None    Number of children: None    Years of education: None    Highest education level: None   Tobacco Use    Smoking status: Never    Smokeless tobacco: Never   Substance and Sexual Activity    Alcohol use: No    Drug use: No     Social Determinants of Health     Financial Resource Strain: Low Risk     Difficulty of Paying Living Expenses: Not hard at all   Food Insecurity: Unknown    Worried About Running Out of Food in the Last Year: Patient refused    Ran Out of Food in the Last Year: Patient refused       SCREENINGS    Saul Coma Scale  Eye Opening: Spontaneous  Best Verbal Response: Oriented  Best Motor Response: Obeys commands  Saul Coma Scale Score: 15        PHYSICAL EXAM (up to 7 for level 4, 8 or more for level 5)     ED Triage Vitals [10/18/22 1217]   BP Temp Temp src Heart Rate Resp SpO2 Height Weight   (!) 149/94 98.1 °F (36.7 °C) -- (!) 142 17 95 % -- --       Physical Exam  Vitals and nursing note reviewed. HENT:      Head: Atraumatic. Nose:      Right Nostril: Epistaxis present. Left Nostril: No epistaxis. Mouth/Throat:      Mouth: Mucous membranes are moist. Mucous membranes are not dry. Pharynx: No pharyngeal swelling or posterior oropharyngeal erythema. Eyes:      Pupils: Pupils are equal, round, and reactive to light. Neck:      Trachea: No tracheal deviation. Cardiovascular:      Rate and Rhythm: Normal rate. Rhythm irregular. Heart sounds: Normal heart sounds. No murmur heard. Pulmonary:      Effort: Pulmonary effort is normal. No respiratory distress. Breath sounds: Normal breath sounds. No stridor. Abdominal:      General: There is no distension. Palpations: Abdomen is soft. Tenderness: There is no abdominal tenderness. There is no guarding. Musculoskeletal:      Right lower leg: No edema. Left lower leg: No edema. Skin:     Coloration: Skin is not pale. Findings: No rash. Neurological:      Mental Status: He is alert and oriented to person, place, and time. Psychiatric:         Behavior: Behavior is cooperative. DIAGNOSTIC RESULTS     EKG: All EKG's areinterpreted by the Emergency Department Physician who either signs or Co-signs this chart in the absence of a cardiologist.    [phone]: Irregular rhythm at a rate of 137 which appears consistent with atrial fibrillation with RVR. QTc: 466 MS. No evidence of acute ST elevation identified.             LABS:  Labs Reviewed   APTT - Abnormal; Notable for the following components:       Result Value    aPTT 36.6 (*)     All other components within normal limits   CBC WITH AUTO DIFFERENTIAL - Abnormal; Notable for the following components:    RBC 4.66 (*)     Hemoglobin 13.8 (*)     Hematocrit 39.9 (*)     MPV 9.2 (*)     Neutrophils % 76.8 (*)     Lymphocytes % 15.4 (*)     All other components within normal limits   COMPREHENSIVE METABOLIC PANEL - Abnormal; Notable for the following components:    Glucose 118 (*)     BUN 31 (*)     Creatinine 1.3 (*)     All other components within normal limits   PROTIME-INR - Abnormal; Notable for the following components:    Protime 22.6 (*)     INR 1.94 (*)     All other components within normal limits       All other labs were within normal range or not returned as of this dictation. EMERGENCY DEPARTMENT COURSE and DIFFERENTIAL DIAGNOSIS/MDM:   Vitals:    Vitals:    10/18/22 1217   BP: (!) 149/94   Pulse: (!) 142   Resp: 17   Temp: 98.1 °F (36.7 °C)   SpO2: 95%       MDM    Patient with evidence of right-sided epistaxis. We will plan to place a nasal pack. Patient also in atrial fibrillation with RVR. While discussing options for management patient with conversion back to a sinus rhythm. PROCEDURES:  Unless otherwise noted below, none     Epistaxis Mgmt    Date/Time: 10/18/2022 2:00 PM  Performed by: Almedia Gowers, MD  Authorized by: Almedia Gowers, MD     Consent:     Consent obtained:  Verbal    Consent given by:  Patient    Risks, benefits, and alternatives were discussed: yes      Risks discussed:  Nasal injury and pain    Alternatives discussed:  No treatment  Universal protocol:     Patient identity confirmed:  Verbally with patient  Anesthesia:     Anesthesia method:  Topical application    Topical anesthetic:  Lidocaine gel  Procedure details:     Treatment site:  R anterior    Treatment method:  Anterior pack    Treatment complexity:  Limited    Treatment episode: initial    Post-procedure details:     Assessment:  Bleeding stopped    Procedure completion:  Tolerated    FINAL IMPRESSION      1. Epistaxis    2.  Paroxysmal atrial fibrillation (HCC)          DISPOSITION/PLAN   DISPOSITION Decision To Discharge 10/18/2022 04:12:28 PM      PATIENT REFERRED TO:  Lexus Stokes, JODY  [address] [facility] EMERGENCY DEPT  [place]  [phone]  In 3 days      DISCHARGE MEDICATIONS:  New Prescriptions    No medications on file          (Please note that portions of this note were completed with a voice recognition program.  Efforts were made to edit thedictations but occasionally words are mis-transcribed.)    John Paul Crockett MD (electronically signed)  Attending Emergency Physician          John Paul Crockett MD  10/27/22 9343

## 2022-10-19 LAB
EKG P AXIS: 148 DEGREES
EKG P AXIS: 168 DEGREES
EKG P-R INTERVAL: 148 MS
EKG P-R INTERVAL: 158 MS
EKG Q-T INTERVAL: 324 MS
EKG Q-T INTERVAL: 332 MS
EKG QRS DURATION: 80 MS
EKG QRS DURATION: 80 MS
EKG QTC CALCULATION (BAZETT): 457 MS
EKG QTC CALCULATION (BAZETT): 466 MS
EKG T AXIS: 103 DEGREES
EKG T AXIS: 78 DEGREES

## 2022-10-19 PROCEDURE — 93010 ELECTROCARDIOGRAM REPORT: CPT | Performed by: INTERNAL MEDICINE

## 2022-10-21 ENCOUNTER — HOSPITAL ENCOUNTER (EMERGENCY)
Age: 67
Discharge: HOME | End: 2022-10-21
Payer: MEDICARE

## 2022-10-21 VITALS
SYSTOLIC BLOOD PRESSURE: 131 MMHG | HEART RATE: 84 BPM | OXYGEN SATURATION: 95 % | BODY MASS INDEX: 41.75 KG/M2 | WEIGHT: 315 LBS | RESPIRATION RATE: 18 BRPM | HEIGHT: 73 IN | DIASTOLIC BLOOD PRESSURE: 88 MMHG | TEMPERATURE: 97.7 F

## 2022-10-21 DIAGNOSIS — Z48.00 ENCOUNTER FOR REMOVAL OF NASAL PACKING: Primary | ICD-10-CM

## 2022-10-21 PROCEDURE — 99283 EMERGENCY DEPT VISIT LOW MDM: CPT

## 2022-10-21 PROCEDURE — 6370000000 HC RX 637 (ALT 250 FOR IP): Performed by: PHYSICIAN ASSISTANT

## 2022-10-21 RX ORDER — OXYMETAZOLINE HYDROCHLORIDE 0.05 G/100ML
2 SPRAY NASAL ONCE
Status: COMPLETED | OUTPATIENT
Start: 2022-10-21 | End: 2022-10-21

## 2022-10-21 RX ADMIN — Medication 2 SPRAY: at 16:40

## 2022-10-21 ASSESSMENT — ENCOUNTER SYMPTOMS
PHOTOPHOBIA: 0
COUGH: 0
APNEA: 0
COLOR CHANGE: 0
EYE DISCHARGE: 0
BACK PAIN: 0
EYE ITCHING: 0
SHORTNESS OF BREATH: 0

## 2022-10-21 ASSESSMENT — PAIN - FUNCTIONAL ASSESSMENT: PAIN_FUNCTIONAL_ASSESSMENT: NONE - DENIES PAIN

## 2022-10-21 NOTE — ED NOTES
Pt able to ambulate without any occurrence of nose bleeding at this time.       Geraldine Macias RN  10/21/22 1952

## 2022-10-21 NOTE — ED PROVIDER NOTES
[facility] EMERGENCY DEPT  eMERGENCYdEPARTMENT eNCOUnter      Pt Name: Dilip Hathaway  MRN: [id number]  [place] 1955  Date of evaluation: 10/21/2022  Provider:CARL Villalobos    CHIEF COMPLAINT       Chief Complaint   Patient presents with    Other     Rhino rocket removal         HISTORY OF PRESENT ILLNESS  (Location/Symptom, Timing/Onset, Context/Setting, Quality, Duration, Modifying Factors, Severity.)   Dilip Hathaway is a 78 y.o. male who presents to the emergency department with complaints of rhino rocket needing removal. Patient has no active bleeding or complaints. He was told to come here for removal. I spoke with Florentino Portillo. Tells me he is in appropriate window for removal sounds like they are trying to ween him off plavix. I see Trevor taylor. Have pulled and observe for bleeding here. HPI    Nursing Notes were reviewed and I agree. REVIEW OF SYSTEMS    (2-9 systems for level 4, 10 or more for level 5)     Review of Systems   Constitutional:  Negative for activity change, appetite change, chills and fever. HENT:  Positive for nosebleeds. Negative for congestion and dental problem. Eyes:  Negative for photophobia, discharge and itching. Respiratory:  Negative for apnea, cough and shortness of breath. Cardiovascular:  Negative for chest pain. Musculoskeletal:  Negative for arthralgias, back pain, gait problem, myalgias and neck pain. Skin:  Negative for color change, pallor and rash. Neurological:  Negative for dizziness, seizures and syncope. Psychiatric/Behavioral:  Negative for agitation. The patient is not nervous/anxious. Except as noted above the remainder of the review of systems was reviewed and negative.        PAST MEDICAL HISTORY     Past Medical History:   Diagnosis Date    Atrial fibrillation ([address]     Cellulitis of left leg     Colon polyp     Elevated blood pressure reading without diagnosis of hypertension     Esophageal reflux     History of prostate cancer Mixed hyperlipidemia     TG>>LDL    Obesity     Prostate cancer [facility])     2010 Dr Carlyn Stark;  implants, XRT         SURGICAL HISTORY       Past Surgical History:   Procedure Laterality Date    COLONOSCOPY  6/2/2009        COLONOSCOPY  2012    [address]    UPPER GASTROINTESTINAL ENDOSCOPY  3/22/2000        VASECTOMY           CURRENT MEDICATIONS       Discharge Medication List as of 10/21/2022  4:35 PM        CONTINUE these medications which have NOT CHANGED    Details   flecainide (TAMBOCOR) 50 MG tablet Take 1 tablet by mouth 2 times daily, Disp-60 tablet, R-3Normal      dilTIAZem (CARDIZEM CD) 120 MG extended release capsule Take 1 capsule by mouth in the morning and at bedtime, Disp-60 capsule, R-3Normal      rosuvastatin (CRESTOR) 10 MG tablet Take 1 tablet by mouth nightly, Disp-90 tablet, R-1Normal      metoprolol succinate (TOPROL XL) 100 MG extended release tablet Take 1 tablet by mouth in the morning., Disp-90 tablet, R-1Normal      Cholecalciferol (VITAMIN D3) 50 MCG (2000 UT) CAPS TAKE 1 CAPSULE EVERY DAY, Disp-90 capsule, R-1Normal      rivaroxaban (XARELTO) 20 MG TABS tablet TAKE 1 TABLET EVERY DAY WITH BREAKFAST, Disp-90 tablet, R-1Normal      omega-3 acid ethyl esters (LOVAZA) 1 g capsule Take 1 g by mouth dailyHistorical Med      losartan (COZAAR) 100 MG tablet TAKE 1 TABLET EVERY DAY, Disp-90 tablet, R-1Normal      fenofibrate 160 MG tablet Take 1 tablet by mouth daily, Disp-90 tablet, R-1Normal      Multiple Vitamin (MULTI-VITAMIN PO) Take  by mouth.                ALLERGIES     Pcn [penicillins]    FAMILY HISTORY       Family History   Problem Relation Age of Onset    Colon Polyps Mother     Colon Polyps Brother     Heart Disease Father     High Blood Pressure Other           SOCIAL HISTORY       Social History     Socioeconomic History    Marital status: Single     Spouse name: None    Number of children: None    Years of education: None    Highest education level: None Tobacco Use    Smoking status: Never    Smokeless tobacco: Never   Substance and Sexual Activity    Alcohol use: No    Drug use: No     Social Determinants of Health     Financial Resource Strain: Low Risk     Difficulty of Paying Living Expenses: Not hard at all   Food Insecurity: Unknown    Worried About [address] in the Last Year: Patient refused    [address] in the Last Year: Patient refused       SCREENINGS    [place] Coma Scale  Eye Opening: Spontaneous  Best Verbal Response: Oriented  Best Motor Response: Obeys commands  [place]ary Coma Scale Score: 15      PHYSICAL EXAM    (up to 7 forlevel 4, 8 or more for level 5)     ED Triage Vitals [10/21/22 1505]   BP Temp Temp src Heart Rate Resp SpO2 Height Weight   131/88 97.7 °F (36.5 °C) -- 84 18 95 % 6' 1\" (1.854 m) (!) 330 lb (149.7 kg)       Physical Exam  Vitals reviewed. Constitutional:       General: He is not in acute distress. Appearance: Normal appearance. He is well-developed. He is not diaphoretic. HENT:      Head: Normocephalic and atraumatic. Right Ear: Tympanic membrane, ear canal and external ear normal.      Left Ear: Tympanic membrane, ear canal and external ear normal.      Nose: Nose normal.      Mouth/Throat:      Mouth: Mucous membranes are moist.   Eyes:      Pupils: Pupils are equal, round, and reactive to light. Neck:      Trachea: No tracheal deviation. Cardiovascular:      Rate and Rhythm: Normal rate and regular rhythm. Heart sounds: Normal heart sounds. No murmur heard. Pulmonary:      Effort: Pulmonary effort is normal.      Breath sounds: Normal breath sounds. No stridor. No wheezing. Chest:      Chest wall: No tenderness. Abdominal:      General: Bowel sounds are normal. There is no distension. Palpations: Abdomen is soft. Tenderness: There is no abdominal tenderness. Musculoskeletal:         General: Normal range of motion.       Cervical back: Normal range of motion and neck supple. Skin:     General: Skin is warm and dry. Capillary Refill: Capillary refill takes less than 2 seconds. Neurological:      General: No focal deficit present. Mental Status: He is alert and oriented to person, place, and time. Mental status is at baseline. Psychiatric:         Mood and Affect: Mood normal.         Behavior: Behavior normal.         Thought Content: Thought content normal.         Judgment: Judgment normal.         DIAGNOSTIC RESULTS     RADIOLOGY:   Non-plain film images such as CT, Ultrasound and MRI are read by the radiologist. Plain radiographic images are visualized and preliminarilyinterpreted by No att. providers found with the below findings:      Interpretation per the Radiologist below, if available at the time of this note:    No orders to display       LABS:  Labs Reviewed - No data to display    All other labs were within normal range or notreturned as of this dictation. RE-ASSESSMENT        EMERGENCY DEPARTMENT COURSE and DIFFERENTIAL DIAGNOSIS/MDM:   Vitals:    Vitals:    10/21/22 1505   BP: 131/88   Pulse: 84   Resp: 18   Temp: 97.7 °F (36.5 °C)   SpO2: 95%   Weight: (!) 330 lb (149.7 kg)   Height: 6' 1\" (1.854 m)         MDM  I spoke with Damari Randall PA for the ENT group prior to removing this gentleman's with packing and he requested that we do so here as its been over 72 hours placed in. I remove the packing and no active bleeding we have observed him for 20 minutes and had him walk around here in the ER he continues to remain stable with no complaints I am giving him Afrin to go home with per Mohinder's request as needed he still wants to see him on Monday there is potential to cauterize at that time. The patient understands return precautions. ENT request that we relay to patient to hold thinner for 48 hrs. PROCEDURES:    Procedures      FINAL IMPRESSION      1.  Encounter for removal of nasal packing          DISPOSITION/PLAN   DISPOSITION Decision To Discharge 10/21/2022 04:31:54 PM      PATIENT REFERRED TO:  [facility] EMERGENCY DEPT  [place]  [phone]    If symptoms worsen    [place] [address]  [phone]    Call   to see on Monday    DISCHARGE MEDICATIONS:  Discharge Medication List as of 10/21/2022  4:35 PM          (Please note that portions of this note were completed with a voice recognition program.  Efforts were made to edit the dictations but occasionallywords are mis-transcribed.)    Silvia De La Torre, [address] [place] [address]  10/22/22 3113

## 2022-10-24 ENCOUNTER — OFFICE VISIT (OUTPATIENT)
Dept: ENT CLINIC | Age: 67
End: 2022-10-24
Payer: MEDICARE

## 2022-10-24 VITALS
HEIGHT: 73 IN | BODY MASS INDEX: 41.75 KG/M2 | SYSTOLIC BLOOD PRESSURE: 132 MMHG | WEIGHT: 315 LBS | DIASTOLIC BLOOD PRESSURE: 66 MMHG

## 2022-10-24 DIAGNOSIS — R04.0 EPISTAXIS: Primary | ICD-10-CM

## 2022-10-24 DIAGNOSIS — J34.0 NASAL SEPTUM ULCERATION: ICD-10-CM

## 2022-10-24 PROCEDURE — 1036F TOBACCO NON-USER: CPT | Performed by: PHYSICIAN ASSISTANT

## 2022-10-24 PROCEDURE — G8484 FLU IMMUNIZE NO ADMIN: HCPCS | Performed by: PHYSICIAN ASSISTANT

## 2022-10-24 PROCEDURE — 3017F COLORECTAL CA SCREEN DOC REV: CPT | Performed by: PHYSICIAN ASSISTANT

## 2022-10-24 PROCEDURE — 1123F ACP DISCUSS/DSCN MKR DOCD: CPT | Performed by: PHYSICIAN ASSISTANT

## 2022-10-24 PROCEDURE — 30901 CONTROL OF NOSEBLEED: CPT | Performed by: PHYSICIAN ASSISTANT

## 2022-10-24 PROCEDURE — 1111F DSCHRG MED/CURRENT MED MERGE: CPT | Performed by: PHYSICIAN ASSISTANT

## 2022-10-24 PROCEDURE — 99203 OFFICE O/P NEW LOW 30 MIN: CPT | Performed by: PHYSICIAN ASSISTANT

## 2022-10-24 PROCEDURE — G8417 CALC BMI ABV UP PARAM F/U: HCPCS | Performed by: PHYSICIAN ASSISTANT

## 2022-10-24 PROCEDURE — G8427 DOCREV CUR MEDS BY ELIG CLIN: HCPCS | Performed by: PHYSICIAN ASSISTANT

## 2022-10-24 RX ORDER — SODIUM CHLORIDE/ALOE VERA
GEL (GRAM) NASAL
Qty: 14 G | Refills: 3 | Status: SHIPPED | OUTPATIENT
Start: 2022-10-24

## 2022-10-24 ASSESSMENT — ENCOUNTER SYMPTOMS
SORE THROAT: 0
EYE PAIN: 0
RHINORRHEA: 0
SINUS PRESSURE: 0
TROUBLE SWALLOWING: 0
PHOTOPHOBIA: 0
FACIAL SWELLING: 0
VOICE CHANGE: 0
SINUS PAIN: 0

## 2022-10-24 NOTE — ASSESSMENT & PLAN NOTE
Right sided anterior nasal septal ulceration with epistaxis  Plan: After the ulceration was cauterized under local anesthesia, wound care instructions were discussed with the patient. Patient is to follow-up in 2 weeks for reevaluation of the wound site.

## 2022-10-24 NOTE — LETTER
[address] [place] [address]  Phone: [phone]  Fax: [address], [place]      2022     Patient: Sukhi Cole   MR Number: [id number]   YOB: 1955   Date of Visit: 10/24/2022       Dear Delano Martinez,    Thank you for referring Brayden  to me for evaluation/treatment. Below are the relevant portions of my assessment and plan of care. If you have questions, please do not hesitate to call me. I look forward to following [place] Mata along with you.     Sincerely,    Colleen Blair PA-C    CC providers:  Aden Adler, [address] [facility]  [address] [place]

## 2022-10-24 NOTE — PROGRESS NOTES
[facility] OTOLARYNGOLOGY/ENT  Mr. Bipin Coat is a pleasant 60-year-old  male that was referred by Gracia Elizabeth due to problems with epistaxis. Patient reported this started about a week ago. He presented to the emergency room where a nasal rocket was placed to the right nare without any complications. This was removed on Friday where he was found to have no further problems with bleeding. Patient admits to being on Xarelto for atrial fib. Since the nasal rocket has been removed, he has had no further issues. Allergies: Pcn [penicillins]      Current Outpatient Medications   Medication Sig Dispense Refill    saline nasal gel (AYR) GEL Apply gently to the nostrils twice a day x10 days 14 g 3    flecainide (TAMBOCOR) 50 MG tablet Take 1 tablet by mouth 2 times daily 60 tablet 3    dilTIAZem (CARDIZEM CD) 120 MG extended release capsule Take 1 capsule by mouth in the morning and at bedtime 60 capsule 3    rosuvastatin (CRESTOR) 10 MG tablet Take 1 tablet by mouth nightly 90 tablet 1    metoprolol succinate (TOPROL XL) 100 MG extended release tablet Take 1 tablet by mouth in the morning. 90 tablet 1    Cholecalciferol (VITAMIN D3) 50 MCG (2000 UT) CAPS TAKE 1 CAPSULE EVERY DAY 90 capsule 1    rivaroxaban (XARELTO) 20 MG TABS tablet TAKE 1 TABLET EVERY DAY WITH BREAKFAST 90 tablet 1    omega-3 acid ethyl esters (LOVAZA) 1 g capsule Take 1 g by mouth daily      losartan (COZAAR) 100 MG tablet TAKE 1 TABLET EVERY DAY 90 tablet 1    fenofibrate 160 MG tablet Take 1 tablet by mouth daily 90 tablet 1    Multiple Vitamin (MULTI-VITAMIN PO) Take  by mouth. No current facility-administered medications for this visit.        Past Surgical History:   Procedure Laterality Date    COLONOSCOPY  6/2/2009        COLONOSCOPY  2012        UPPER GASTROINTESTINAL ENDOSCOPY  3/22/2000        VASECTOMY         Past Medical History:   Diagnosis Date    Atrial fibrillation ([address] Cellulitis of left leg     Colon polyp     Elevated blood pressure reading without diagnosis of hypertension     Esophageal reflux     History of prostate cancer     Mixed hyperlipidemia     TG>>LDL    Obesity     Prostate cancer [facility])     2010 Dr Stratton Gaby;  implants, XRT       Family History   Problem Relation Age of Onset    Colon Polyps Mother     Colon Polyps Brother     Heart Disease Father     High Blood Pressure Other        Social History     Tobacco Use    Smoking status: Never    Smokeless tobacco: Never   Substance Use Topics    Alcohol use: No           REVIEW OF SYSTEMS:  all other systems reviewed and are negative  Review of Systems   Constitutional:  Negative for chills and fever. HENT:  Negative for congestion, dental problem, ear discharge, ear pain, facial swelling, hearing loss, postnasal drip, rhinorrhea, sinus pressure, sinus pain, sore throat, tinnitus, trouble swallowing and voice change. Eyes:  Negative for photophobia and pain. Neurological:  Negative for dizziness and headaches. Comments:     PHYSICAL EXAM:    /66   Ht 6' 1\" (1.854 m)   Wt (!) 334 lb (151.5 kg)   BMI 44.07 kg/m²   Body mass index is 44.07 kg/m². General Appearance: well developed  and well nourished  Head/ Face: normocephalic and atraumatic  Vocal Quality: good/ normal  Ears: Right Ear: External: external ears normal Otoscopy Ear Canal: canal clear Otoscopy TM: TM's normal and TM's mobile Left Ear: External: external ears normal Otoscopy Ear Canal: canal clear Otoscopy TM: TM's normal and TM's mobile  Hearing: grossly intact  Nose: + for right anterior septal ulceration with no evidence of active bleeding noted. Neck: supple and adenopathy none palpable  Thyroid: normal and nodules No  Exam demonstrated the tongue to be midline with no evidence of bleeding to the posterior pharynx or any masses.     Assessment & Plan:    Problem List Items Addressed This Visit       Epistaxis - Primary    Relevant Orders    LA CTRL NOSEBLEED,ANTER,SIMPLE (Completed)    Nasal septum ulceration     Right sided anterior nasal septal ulceration with epistaxis  Plan: After the ulceration was cauterized under local anesthesia, wound care instructions were discussed with the patient. Patient is to follow-up in 2 weeks for reevaluation of the wound site. Orders Placed This Encounter   Procedures    LA CTRL NOSEBLEED,ANTER,SIMPLE     The right nare was anesthetized with 4% lidocaine on a cottonball x2 minutes. The anterior nasal septal ulceration was cauterized with 2 sticks of silver nitrate without any complications. Patient was monitored for 5 minutes post cautery was noted to have no further bleeding. Overall the patient tolerated the procedure nicely with no complications. Orders Placed This Encounter   Medications    saline nasal gel (AYR) GEL     Sig: Apply gently to the nostrils twice a day x10 days     Dispense:  14 g     Refill:  3         Electronically signed by Jimy Graf PA-C on 10/24/22 at 2:52 PM CDT        Please note that this chart was generated using dragon dictation software. Although every effort was made to ensure the accuracy of this automated transcription, some errors in transcription may have occurred.

## 2022-10-26 ENCOUNTER — OFFICE VISIT (OUTPATIENT)
Dept: INTERNAL MEDICINE | Age: 67
End: 2022-10-26
Payer: MEDICARE

## 2022-10-26 VITALS
BODY MASS INDEX: 41.75 KG/M2 | WEIGHT: 315 LBS | SYSTOLIC BLOOD PRESSURE: 128 MMHG | DIASTOLIC BLOOD PRESSURE: 64 MMHG | HEART RATE: 90 BPM | OXYGEN SATURATION: 94 % | HEIGHT: 73 IN

## 2022-10-26 DIAGNOSIS — Z87.828 OLD HEAD INJURY: ICD-10-CM

## 2022-10-26 DIAGNOSIS — Z12.11 ENCOUNTER FOR SCREENING FOR MALIGNANT NEOPLASM OF COLON: Primary | ICD-10-CM

## 2022-10-26 DIAGNOSIS — R59.1 GENERALIZED ENLARGED LYMPH NODES: ICD-10-CM

## 2022-10-26 PROCEDURE — 1123F ACP DISCUSS/DSCN MKR DOCD: CPT | Performed by: NURSE PRACTITIONER

## 2022-10-26 PROCEDURE — 99213 OFFICE O/P EST LOW 20 MIN: CPT | Performed by: NURSE PRACTITIONER

## 2022-10-26 PROCEDURE — 3017F COLORECTAL CA SCREEN DOC REV: CPT | Performed by: NURSE PRACTITIONER

## 2022-10-26 PROCEDURE — G8484 FLU IMMUNIZE NO ADMIN: HCPCS | Performed by: NURSE PRACTITIONER

## 2022-10-26 PROCEDURE — 3074F SYST BP LT 130 MM HG: CPT | Performed by: NURSE PRACTITIONER

## 2022-10-26 PROCEDURE — G0008 ADMIN INFLUENZA VIRUS VAC: HCPCS | Performed by: NURSE PRACTITIONER

## 2022-10-26 PROCEDURE — 90694 VACC AIIV4 NO PRSRV 0.5ML IM: CPT | Performed by: NURSE PRACTITIONER

## 2022-10-26 PROCEDURE — G8417 CALC BMI ABV UP PARAM F/U: HCPCS | Performed by: NURSE PRACTITIONER

## 2022-10-26 PROCEDURE — G8427 DOCREV CUR MEDS BY ELIG CLIN: HCPCS | Performed by: NURSE PRACTITIONER

## 2022-10-26 PROCEDURE — 1111F DSCHRG MED/CURRENT MED MERGE: CPT | Performed by: NURSE PRACTITIONER

## 2022-10-26 PROCEDURE — 1036F TOBACCO NON-USER: CPT | Performed by: NURSE PRACTITIONER

## 2022-10-26 PROCEDURE — 3078F DIAST BP <80 MM HG: CPT | Performed by: NURSE PRACTITIONER

## 2022-10-26 ASSESSMENT — ENCOUNTER SYMPTOMS
CONSTIPATION: 0
ABDOMINAL PAIN: 0
STRIDOR: 0
COLOR CHANGE: 0
BLOOD IN STOOL: 0
DIARRHEA: 0
SHORTNESS OF BREATH: 0
CHOKING: 0
EYE ITCHING: 0
VOMITING: 0
COUGH: 0
ABDOMINAL DISTENTION: 0
TROUBLE SWALLOWING: 0
NAUSEA: 0
SORE THROAT: 0
WHEEZING: 0
EYE DISCHARGE: 0
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
## 2022-10-26 NOTE — PATIENT INSTRUCTIONS
Go ahead with the surgery appointment that you have next week. Let me know if I can help you in any way  2.   Someone from the GI office at [facility] will call you to set up the colonoscopy

## 2022-10-26 NOTE — PROGRESS NOTES
After obtaining consent, and per orders of Dr. Enrique Hernandez , influenza vaccine given in Right deltoid by Solo Olivo Patient instructed to remain in clinic for 20 minutes afterwards, and to report any adverse reaction to me immediately. Patient given influenza education materials. Patient did not have any questions for the Medical Assistant at this time.

## 2022-10-26 NOTE — ASSESSMENT & PLAN NOTE
1. Multiple. Lymph nodes of varying size is seen at the base of the neck extending   2. the superior mediastinum, mediastinum and prevascular space. There is also   3. periaortic lymph nodes seen in the abdomen. I would recommend CT-guided biopsy   4. for histological sampling. 5. There is suggestion of a lipomatosis in the mediastinum   6.  No evidence of pericardial effusion   He has a CT appointment at [facility] with there is cardiothoracic surgeon next week

## 2022-10-27 ASSESSMENT — ENCOUNTER SYMPTOMS
ABDOMINAL PAIN: 0
SHORTNESS OF BREATH: 0

## 2022-10-28 ENCOUNTER — OFFICE VISIT (OUTPATIENT)
Dept: CARDIOLOGY | Facility: CLINIC | Age: 67
End: 2022-10-28

## 2022-10-28 ENCOUNTER — LAB (OUTPATIENT)
Dept: LAB | Facility: HOSPITAL | Age: 67
End: 2022-10-28

## 2022-10-28 VITALS
SYSTOLIC BLOOD PRESSURE: 146 MMHG | OXYGEN SATURATION: 98 % | DIASTOLIC BLOOD PRESSURE: 94 MMHG | BODY MASS INDEX: 41.75 KG/M2 | HEIGHT: 73 IN | HEART RATE: 99 BPM | RESPIRATION RATE: 18 BRPM | WEIGHT: 315 LBS

## 2022-10-28 DIAGNOSIS — Z79.899 ON AMIODARONE THERAPY: ICD-10-CM

## 2022-10-28 DIAGNOSIS — I48.0 PAROXYSMAL ATRIAL FIBRILLATION: Primary | ICD-10-CM

## 2022-10-28 LAB
ALBUMIN SERPL-MCNC: 4.5 G/DL (ref 3.5–5.2)
ALBUMIN/GLOB SERPL: 1.6 G/DL
ALP SERPL-CCNC: 71 U/L (ref 39–117)
ALT SERPL W P-5'-P-CCNC: 35 U/L (ref 1–41)
ANION GAP SERPL CALCULATED.3IONS-SCNC: 11.6 MMOL/L (ref 5–15)
AST SERPL-CCNC: 26 U/L (ref 1–40)
BILIRUB SERPL-MCNC: 0.3 MG/DL (ref 0–1.2)
BUN SERPL-MCNC: 18 MG/DL (ref 8–23)
BUN/CREAT SERPL: 12.1 (ref 7–25)
CALCIUM SPEC-SCNC: 9.3 MG/DL (ref 8.6–10.5)
CHLORIDE SERPL-SCNC: 102 MMOL/L (ref 98–107)
CO2 SERPL-SCNC: 25.4 MMOL/L (ref 22–29)
CREAT SERPL-MCNC: 1.49 MG/DL (ref 0.76–1.27)
EGFRCR SERPLBLD CKD-EPI 2021: 51.4 ML/MIN/1.73
GLOBULIN UR ELPH-MCNC: 2.9 GM/DL
GLUCOSE SERPL-MCNC: 92 MG/DL (ref 65–99)
POTASSIUM SERPL-SCNC: 4.3 MMOL/L (ref 3.5–5.2)
PROT SERPL-MCNC: 7.4 G/DL (ref 6–8.5)
SODIUM SERPL-SCNC: 139 MMOL/L (ref 136–145)
T-UPTAKE NFR SERPL: 1 TBI (ref 0.8–1.3)
T4 SERPL-MCNC: 6.97 MCG/DL (ref 4.5–11.7)
TSH SERPL DL<=0.05 MIU/L-ACNC: 1.81 UIU/ML (ref 0.27–4.2)

## 2022-10-28 PROCEDURE — 84436 ASSAY OF TOTAL THYROXINE: CPT

## 2022-10-28 PROCEDURE — 99204 OFFICE O/P NEW MOD 45 MIN: CPT | Performed by: STUDENT IN AN ORGANIZED HEALTH CARE EDUCATION/TRAINING PROGRAM

## 2022-10-28 PROCEDURE — 84443 ASSAY THYROID STIM HORMONE: CPT

## 2022-10-28 PROCEDURE — 80053 COMPREHEN METABOLIC PANEL: CPT

## 2022-10-28 PROCEDURE — 93000 ELECTROCARDIOGRAM COMPLETE: CPT | Performed by: STUDENT IN AN ORGANIZED HEALTH CARE EDUCATION/TRAINING PROGRAM

## 2022-10-28 PROCEDURE — 36415 COLL VENOUS BLD VENIPUNCTURE: CPT

## 2022-10-28 PROCEDURE — 84479 ASSAY OF THYROID (T3 OR T4): CPT

## 2022-10-28 RX ORDER — SODIUM CHLORIDE/ALOE VERA
SPRAY, NON-AEROSOL (ML) NASAL
COMMUNITY
Start: 2022-10-26 | End: 2023-03-24 | Stop reason: SDDI

## 2022-10-28 RX ORDER — AMIODARONE HYDROCHLORIDE 400 MG/1
400 TABLET ORAL 2 TIMES DAILY
Qty: 20 TABLET | Refills: 0 | Status: SHIPPED | OUTPATIENT
Start: 2022-10-28 | End: 2022-11-02

## 2022-10-28 RX ORDER — AMLODIPINE BESYLATE 5 MG/1
TABLET ORAL
COMMUNITY
Start: 2022-07-26 | End: 2023-03-24 | Stop reason: SDDI

## 2022-10-28 RX ORDER — METOPROLOL SUCCINATE 100 MG/1
1 TABLET, EXTENDED RELEASE ORAL EVERY MORNING
COMMUNITY
Start: 2022-07-18

## 2022-10-28 RX ORDER — ROSUVASTATIN CALCIUM 10 MG/1
1 TABLET, COATED ORAL NIGHTLY
COMMUNITY
Start: 2022-07-18

## 2022-10-28 RX ORDER — AMIODARONE HYDROCHLORIDE 200 MG/1
200 TABLET ORAL DAILY
Qty: 90 TABLET | Refills: 1 | Status: SHIPPED | OUTPATIENT
Start: 2022-11-07

## 2022-10-28 NOTE — PATIENT INSTRUCTIONS
Stop flecainide  Start amiodarone   400mg twice daily for 10 days  THEN 200mg daily therafter  3.  Work on weight loss  4.  Dietician consult to talk about calorie counting  5.  The long term plan will be to perform an ablation to help with the atrial fibrillation in combination with weight loss  6.  Labwork today  7.  If you need to stop your blood thinner for a procedure, you can stop it 48 hours before and resume it when the surgeon says its ok (typically 24h after)  8.  See me back in clinic in 3 months

## 2022-10-28 NOTE — PROGRESS NOTES
"Chief Complaint  Atrial Fibrillation (NP - wants to discuss biopsy ) and Congestive Heart Failure    Subjective        History of Present Illness    EP History:  1.  Paroxysmal atrial fibrillation  2.  Atrial flutter    Cardiology history:  1.  Chronic systolic heart failure  -10/2022: EF 40%  2.  Hypertension  3.  DVT  4.  Moderate pericardial effusion    Medical History:  1.  Mediastinal and periaortic adenopathy  2.  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  Morbid obesity (BMI 44)  4.  Epistaxis  5.  Prostate cancer  6.  CESAR Chance is a 67 y.o. male with past medical history as above who presents to the clinic for evaluation of atrial fibrillation.  He was first diagnosed with atrial flutter when he presented to the hospital in June.  He had spontaneous return of normal sinus rhythm at that time and was seen by Henry Lopez in cardiology.  He was readmitted to the hospital on 10/6/2022 with heart rates of 140s at home.  He was for started on a diltiazem drip and ultimately transition to oral diltiazem with the addition of flecainide.  During the hospitalization, a CT of the chest was notable for mediastinal widening with enlarged mediastinal lymph nodes as well as periaortic lymph node adenopathy.  He was advised to have a CT guided biopsy however became anxious after discussing the procedure with the providers and decided not to have it done.    Objective   Vital Signs:  /94 (BP Location: Right arm, Patient Position: Sitting)   Pulse 99   Resp 18   Ht 185.4 cm (73\")   Wt (!) 151 kg (333 lb)   SpO2 98%   BMI 43.93 kg/m²   Estimated body mass index is 43.93 kg/m² as calculated from the following:    Height as of this encounter: 185.4 cm (73\").    Weight as of this encounter: 151 kg (333 lb).      Physical Exam  Vitals reviewed.   Constitutional:       Appearance: He is obese.      Comments: Chronically ill-appearing   HENT:      Head: Normocephalic and atraumatic.   Eyes:    "   Extraocular Movements: Extraocular movements intact.      Conjunctiva/sclera: Conjunctivae normal.   Cardiovascular:      Rate and Rhythm: Normal rate and regular rhythm.      Pulses: Normal pulses.      Heart sounds: Normal heart sounds.   Pulmonary:      Effort: Pulmonary effort is normal.      Breath sounds: Normal breath sounds.   Musculoskeletal:         General: No swelling.   Neurological:      General: No focal deficit present.      Mental Status: He is alert.      Comments: Cognitive slowing   Psychiatric:         Mood and Affect: Mood normal.        Result Review :  The following data was reviewed by: Micky Hunter MD on 10/28/2022:  CMP    CMP 6/29/22 6/29/22 7/26/22 10/6/22    1404 1744     Glucose 100 100 132 (A) 113 (A)   BUN 17 17 14 18   Creatinine 1.4 (A) 1.3 (A) 1.2 1.2   eGFR Non African Am 51 (A) 55 (A) >60 >60   eGFR  Am >59 >59 >59 >59   Sodium 140 139 138 137   Potassium 4.2 4.9 4.8 4.7   Chloride 104 104 104 104   Calcium 9.4 9.4 9.5 9.2   Albumin 4.4 4.2 4.4 4.3   Total Bilirubin 0.4 0.4 0.3 0.3   Alkaline Phosphatase 72 72 79 78   AST (SGOT) 19 28 25 23   ALT (SGPT) 21 22 24 24   (A) Abnormal value       Comments are available for some flowsheets but are not being displayed.           CBC    CBC 10/8/22 10/9/22 10/18/22   WBC 6.9 7.2 8.4   RBC 4.51 (A) 4.59 (A) 4.66 (A)   Hemoglobin 13.0 (A) 13.4 (A) 13.8 (A)   Hematocrit 39.5 (A) 40.1 (A) 39.9 (A)   MCV 87.6 87.4 85.6   MCH 28.8 29.2 29.6   MCHC 32.9 (A) 33.4 34.6   RDW 13.1 13.0 12.7   Platelets 248 231 299   (A) Abnormal value            TSH    TSH 1/21/22 3/28/22 10/7/22   TSH 2.430 2.530 2.380             ECG 12 Lead    Date/Time: 10/28/2022 5:03 PM  Performed by: Micky Hunter MD  Authorized by: Micky Hunter MD   Comparison: not compared with previous ECG   Previous ECG: no previous ECG available  Rhythm: sinus rhythm  Rate: normal  Conduction: conduction normal  Other findings: non-specific ST-T wave changes    Clinical  impression: non-specific ECG                Assessment and Plan   Diagnoses and all orders for this visit:    1. Paroxysmal atrial fibrillation (HCC) (Primary)    2. On amiodarone therapy  -     Thyroid Panel With TSH; Future  -     Comprehensive Metabolic Panel; Future    Other orders  -     amiodarone (PACERONE) 400 MG tablet; Take 1 tablet by mouth 2 (Two) Times a Day for 10 doses. Start at 400mg (2 tab) twice daily for 10 days.  Then 200mg daily thereafter.  Dispense: 20 tablet; Refill: 0  -     amiodarone (PACERONE) 200 MG tablet; Take 1 tablet by mouth Daily.  Dispense: 90 tablet; Refill: 1  -     ECG 12 Lead        Paul Chance is a 67 y.o. male with past medical history as above who presents to the clinic for evaluation of paroxysmal atrial fibrillation.  He has findings consistent with symptomatic paroxysmal atrial fibrillation.  He was started on flecainide to try to control his rhythm, however given his reduced ejection fraction, flecainide is technically contraindicated.  I think that for the short-term amiodarone is a better option.  Long-term, given his reduced EF and poor medical options, ablation may be appropriate.  This would not be appropriate to do yet however given his ongoing work-up for malignancy and possible need to stop his oral anticoagulation for biopsies or treatment.    I discussed with him that it is very important to have his evaluation completed.  There was some confusion about the purpose of his visit today as he and his family thought that today's visit was to speak with a cardiothoracic surgeon about biopsy rather than to see me about his atrial fibrillation.  While I am happy to help arrange for him to see a cardiothoracic surgeon here, he does seem that a less invasive IR biopsy would be a more appropriate intervention.  I will try to reach out to his primary care physician to further discuss the case.  Given his reduced EF, he would benefit from goal-directed medical therapy  which she is not currently on.  He follows with Dr. Henry Lopez and I will see if his primary care physician can help get him back in that clinic for further optimization.    Plan:  - Stop flecainide and start amiodarone  - TSH/CMP ordered  - May benefit from ablation in the long term to help prevent additional hospitalizations, however this should be done once he has completed any need for OAC interruption for his malignancy evaluation  - Will reach out to his PCP to further discuss the case given the confusion with the family  - Continue rivaroxaban for anticoagulation           Follow Up   Return in about 3 months (around 1/28/2023).  Patient was given instructions and counseling regarding his condition or for health maintenance advice. Please see specific information pulled into the AVS if appropriate.     Part of this note may be an electronic transcription/translation of spoken language to printed text using the Dragon Dictation System.

## 2022-11-02 ENCOUNTER — TELEPHONE (OUTPATIENT)
Dept: INTERNAL MEDICINE | Age: 67
End: 2022-11-02

## 2022-11-02 NOTE — TELEPHONE ENCOUNTER
Would like a call this afternoon after you are finished with seeing pts. Did call and talk to [address] [place] [address] Kevin and Gonsalo Acevedo. They want to get something set up at [facility] for biopsy of these nodules. They thought the appointment that they had this week with cardiology at [facility] was to follow-up with that.   I will work on this today

## 2022-11-03 ENCOUNTER — TRANSCRIBE ORDERS (OUTPATIENT)
Dept: ADMINISTRATIVE | Facility: HOSPITAL | Age: 67
End: 2022-11-03

## 2022-11-03 DIAGNOSIS — R59.0 ENLARGED LYMPH NODE IN NECK: Primary | ICD-10-CM

## 2022-11-03 DIAGNOSIS — R59.0 ENLARGED SUBMENTAL LYMPH NODE: Primary | ICD-10-CM

## 2022-11-03 DIAGNOSIS — R59.9 LYMPH NODE ENLARGEMENT: Primary | ICD-10-CM

## 2022-11-03 DIAGNOSIS — F41.9 ANXIETY: Primary | ICD-10-CM

## 2022-11-03 RX ORDER — DIAZEPAM 5 MG/1
TABLET ORAL
Qty: 2 TABLET | Refills: 0 | Status: SHIPPED | OUTPATIENT
Start: 2022-11-03 | End: 2022-11-03

## 2022-11-03 RX ORDER — ENOXAPARIN SODIUM 150 MG/ML
1 INJECTION SUBCUTANEOUS EVERY 12 HOURS
Qty: 9 ML | Refills: 0 | Status: SHIPPED | OUTPATIENT
Start: 2022-11-03

## 2022-11-03 NOTE — PROGRESS NOTES
I spoke with radiology CT at [facility] ;  they report it should be an U/s;  so we will set that up for him to get started with the lymph node biopsy;  the us tech was kind enough to look up the films to be sure what we needed to do.   She looked at them and cant be sure what she is seeing ;  she is going to go to the interventional radiologist and determine the best way of getting a biopsy     She called me back and felt we could do a U/s     The patient wants to go to [facility] ;

## 2022-11-03 NOTE — PROGRESS NOTES
Last dose of Xarelto 11 9   The Lovenox will be after that and you will take it twice a day on the 10th the 11th the 12th and the 13th, on the 14th you only take the morning dose.   Your procedure is on the 15th that evening you will restart your Xarelto

## 2022-11-03 NOTE — PROGRESS NOTES
I have just spoken with Dr. Niles Clarke the radiologist that read his CT of his chest while he was in the hospital.  There is no reference on the CT in the chart exactly where the lymph nodes are if it was on the neck etc. so I called him to verify and he said that there are lymph nodes everywhere all in his neck and his chest.  The family wants to have the procedure done at [facility] so we are scheduling an ultrasound of the neck which is what the techs had decided he needed an ultrasound rather than a CT. We will have the family come by and get that CT on a disc in case they needed at [facility].  Hopefully we will get that set up next week,  He is on Xarelto he cannot hold that for more than 2 days because he is a new cardioverted atrial fib. If he is we will have to start him on Lovenox.

## 2022-11-07 RX ORDER — AMIODARONE HYDROCHLORIDE 200 MG/1
TABLET ORAL
COMMUNITY
Start: 2022-10-28 | End: 2022-11-07 | Stop reason: SDUPTHER

## 2022-11-07 RX ORDER — AMIODARONE HYDROCHLORIDE 200 MG/1
200 TABLET ORAL DAILY
Qty: 30 TABLET | Refills: 0 | Status: SHIPPED | OUTPATIENT
Start: 2022-11-07

## 2022-11-07 NOTE — TELEPHONE ENCOUNTER
Cherri Emery called requesting a refill of the below medication which has been pended for you:     Requested Prescriptions     Pending Prescriptions Disp Refills    amiodarone (CORDARONE) 200 MG tablet 30 tablet 0     Sig: Take 1 tablet by mouth daily       Last Appointment Date: 10/26/2022  Next Appointment Date: 1/31/2023    Allergies   Allergen Reactions    Pcn [Penicillins] Rash

## 2022-11-15 ENCOUNTER — HOSPITAL ENCOUNTER (OUTPATIENT)
Dept: ULTRASOUND IMAGING | Facility: HOSPITAL | Age: 67
Discharge: HOME | End: 2022-11-15

## 2022-11-15 DIAGNOSIS — R59.0 ENLARGED SUBMENTAL LYMPH NODE: ICD-10-CM

## 2022-11-15 DIAGNOSIS — R59.9 LYMPH NODE ENLARGEMENT: ICD-10-CM

## 2022-11-15 PROCEDURE — 76942 ECHO GUIDE FOR BIOPSY: CPT

## 2022-11-15 PROCEDURE — 76536 US EXAM OF HEAD AND NECK: CPT

## 2022-11-15 PROCEDURE — 88172 CYTP DX EVAL FNA 1ST EA SITE: CPT | Performed by: NURSE PRACTITIONER

## 2022-11-15 PROCEDURE — 88360 TUMOR IMMUNOHISTOCHEM/MANUAL: CPT | Performed by: NURSE PRACTITIONER

## 2022-11-15 PROCEDURE — 88342 IMHCHEM/IMCYTCHM 1ST ANTB: CPT | Performed by: NURSE PRACTITIONER

## 2022-11-15 PROCEDURE — 88305 TISSUE EXAM BY PATHOLOGIST: CPT | Performed by: NURSE PRACTITIONER

## 2022-11-15 PROCEDURE — 88341 IMHCHEM/IMCYTCHM EA ADD ANTB: CPT | Performed by: NURSE PRACTITIONER

## 2022-11-15 RX ORDER — LOSARTAN POTASSIUM 100 MG/1
TABLET ORAL
Qty: 90 TABLET | Refills: 1 | Status: SHIPPED | OUTPATIENT
Start: 2022-11-15

## 2022-11-15 RX ORDER — LIDOCAINE HYDROCHLORIDE 10 MG/ML
10 INJECTION, SOLUTION INFILTRATION; PERINEURAL ONCE
Status: DISPENSED | OUTPATIENT
Start: 2022-11-15

## 2022-11-15 NOTE — TELEPHONE ENCOUNTER
Hamlet Mata called requesting a refill of the below medication which has been pended for you:     Requested Prescriptions     Pending Prescriptions Disp Refills    losartan (COZAAR) 100 MG tablet [Pharmacy Med Name: LOSARTAN POTASSIUM 100 MG Tablet] 90 tablet 1     Sig: TAKE 1 TABLET EVERY DAY       Last Appointment Date: 10/26/2022  Next Appointment Date: 1/31/2023    Allergies   Allergen Reactions    Pcn [Penicillins] Rash

## 2022-11-21 DIAGNOSIS — Z01.818 PRE-PROCEDURAL EXAMINATION: Primary | ICD-10-CM

## 2022-11-21 DIAGNOSIS — F41.9 ANXIETY: ICD-10-CM

## 2022-11-21 DIAGNOSIS — C83.31 DIFFUSE LARGE B-CELL LYMPHOMA OF LYMPH NODES OF NECK (HCC): Primary | ICD-10-CM

## 2022-11-21 RX ORDER — DIAZEPAM 5 MG/1
TABLET ORAL
Qty: 4 TABLET | Refills: 0 | Status: SHIPPED | OUTPATIENT
Start: 2022-11-21 | End: 2022-11-30

## 2022-11-21 NOTE — PROGRESS NOTES
I called and talked with his mom, him and his brother;  they understand instructions for the PET and referral to Dr Pushpa Mills

## 2022-11-23 ENCOUNTER — TELEPHONE (OUTPATIENT)
Dept: INTERNAL MEDICINE | Age: 67
End: 2022-11-23

## 2022-11-23 NOTE — TELEPHONE ENCOUNTER
Balbina Watt called to let us know Danilo Brown had an appt with Dr Pushpa Mills but the office called and changed that appt with Dr Palak Reyna. Balbina Watt was calling to see if that  would be ok. They wanted to get your opinion.

## 2022-11-25 PROBLEM — Z12.11 ENCOUNTER FOR SCREENING FOR MALIGNANT NEOPLASM OF COLON: Status: RESOLVED | Noted: 2022-06-29 | Resolved: 2022-11-25

## 2022-11-29 LAB
BEAKER LAB AP INTRAOPERATIVE CONSULTATION: NORMAL
CYTO UR: NORMAL
LAB AP CASE REPORT: NORMAL
LAB AP CLINICAL INFORMATION: NORMAL
Lab: NORMAL
PATH REPORT.FINAL DX SPEC: NORMAL
PATH REPORT.GROSS SPEC: NORMAL

## 2022-11-30 ENCOUNTER — HOSPITAL ENCOUNTER (OUTPATIENT)
Dept: NUCLEAR MEDICINE | Age: 67
Discharge: HOME | End: 2022-12-02
Payer: MEDICARE

## 2022-11-30 DIAGNOSIS — C83.31 DIFFUSE LARGE B-CELL LYMPHOMA OF LYMPH NODES OF NECK (HCC): ICD-10-CM

## 2022-11-30 LAB
GLUCOSE BLD-MCNC: 89 MG/DL (ref 70–99)
PERFORMED ON: NORMAL

## 2022-11-30 PROCEDURE — 78815 PET IMAGE W/CT SKULL-THIGH: CPT

## 2022-11-30 PROCEDURE — 82947 ASSAY GLUCOSE BLOOD QUANT: CPT

## 2022-11-30 PROCEDURE — A9552 F18 FDG: HCPCS | Performed by: NURSE PRACTITIONER

## 2022-11-30 PROCEDURE — 3430000000 HC RX DIAGNOSTIC RADIOPHARMACEUTICAL: Performed by: NURSE PRACTITIONER

## 2022-11-30 RX ORDER — FLUDEOXYGLUCOSE F 18 200 MCI/ML
10 INJECTION, SOLUTION INTRAVENOUS
Status: COMPLETED | OUTPATIENT
Start: 2022-11-30 | End: 2022-11-30

## 2022-11-30 RX ADMIN — FLUDEOXYGLUCOSE F 18 10 MILLICURIE: 200 INJECTION, SOLUTION INTRAVENOUS at 16:05
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
## 2022-12-01 ENCOUNTER — HOSPITAL ENCOUNTER (OUTPATIENT)
Dept: INFUSION THERAPY | Age: 67
Discharge: HOME | End: 2022-12-01
Payer: MEDICARE

## 2022-12-01 ENCOUNTER — CLINICAL DOCUMENTATION (OUTPATIENT)
Dept: HEMATOLOGY | Age: 67
End: 2022-12-01

## 2022-12-01 ENCOUNTER — OFFICE VISIT (OUTPATIENT)
Dept: HEMATOLOGY | Age: 67
End: 2022-12-01
Payer: MEDICARE

## 2022-12-01 VITALS
WEIGHT: 315 LBS | SYSTOLIC BLOOD PRESSURE: 134 MMHG | DIASTOLIC BLOOD PRESSURE: 74 MMHG | OXYGEN SATURATION: 98 % | HEIGHT: 73 IN | HEART RATE: 89 BPM | BODY MASS INDEX: 41.75 KG/M2

## 2022-12-01 DIAGNOSIS — C83.31 RETICULOSARCOMA OF LYMPH NODES OF HEAD, FACE, AND NECK (HCC): Primary | ICD-10-CM

## 2022-12-01 DIAGNOSIS — C85.10 LARGE B-CELL LYMPHOMA (HCC): Primary | ICD-10-CM

## 2022-12-01 DIAGNOSIS — C85.10 LARGE B-CELL LYMPHOMA (HCC): ICD-10-CM

## 2022-12-01 DIAGNOSIS — C83.31 RETICULOSARCOMA OF LYMPH NODES OF HEAD, FACE, AND NECK (HCC): ICD-10-CM

## 2022-12-01 LAB
BASOPHILS ABSOLUTE: 0.06 K/UL (ref 0.01–0.08)
BASOPHILS RELATIVE PERCENT: 1 % (ref 0.1–1.2)
EOSINOPHILS ABSOLUTE: 0.14 K/UL (ref 0.04–0.54)
EOSINOPHILS RELATIVE PERCENT: 2.3 % (ref 0.7–7)
HCT VFR BLD CALC: 39.6 % (ref 40.1–51)
HEMOGLOBIN: 12.9 G/DL (ref 13.7–17.5)
LYMPHOCYTES ABSOLUTE: 1.57 K/UL (ref 1.18–3.74)
LYMPHOCYTES RELATIVE PERCENT: 26.1 % (ref 19.3–53.1)
MCH RBC QN AUTO: 28.8 PG (ref 25.7–32.2)
MCHC RBC AUTO-ENTMCNC: 32.6 G/DL (ref 32.3–36.5)
MCV RBC AUTO: 88.4 FL (ref 79–92.2)
MONOCYTES ABSOLUTE: 0.56 K/UL (ref 0.24–0.82)
MONOCYTES RELATIVE PERCENT: 9.3 % (ref 4.7–12.5)
NEUTROPHILS ABSOLUTE: 3.67 K/UL (ref 1.56–6.13)
NEUTROPHILS RELATIVE PERCENT: 61 % (ref 34–71.1)
PDW BLD-RTO: 12.5 % (ref 11.6–14.4)
PLATELET # BLD: 300 K/UL (ref 163–337)
PMV BLD AUTO: 9.7 FL (ref 7.4–10.4)
RBC # BLD: 4.48 M/UL (ref 4.63–6.08)
WBC # BLD: 6.02 K/UL (ref 4.23–9.07)

## 2022-12-01 PROCEDURE — 1036F TOBACCO NON-USER: CPT | Performed by: INTERNAL MEDICINE

## 2022-12-01 PROCEDURE — 3017F COLORECTAL CA SCREEN DOC REV: CPT | Performed by: INTERNAL MEDICINE

## 2022-12-01 PROCEDURE — 3074F SYST BP LT 130 MM HG: CPT | Performed by: INTERNAL MEDICINE

## 2022-12-01 PROCEDURE — 85025 COMPLETE CBC W/AUTO DIFF WBC: CPT

## 2022-12-01 PROCEDURE — 99213 OFFICE O/P EST LOW 20 MIN: CPT

## 2022-12-01 PROCEDURE — G8417 CALC BMI ABV UP PARAM F/U: HCPCS | Performed by: INTERNAL MEDICINE

## 2022-12-01 PROCEDURE — 99205 OFFICE O/P NEW HI 60 MIN: CPT | Performed by: INTERNAL MEDICINE

## 2022-12-01 PROCEDURE — 3078F DIAST BP <80 MM HG: CPT | Performed by: INTERNAL MEDICINE

## 2022-12-01 PROCEDURE — 36415 COLL VENOUS BLD VENIPUNCTURE: CPT

## 2022-12-01 PROCEDURE — G8427 DOCREV CUR MEDS BY ELIG CLIN: HCPCS | Performed by: INTERNAL MEDICINE

## 2022-12-01 PROCEDURE — 1123F ACP DISCUSS/DSCN MKR DOCD: CPT | Performed by: INTERNAL MEDICINE

## 2022-12-01 PROCEDURE — G8484 FLU IMMUNIZE NO ADMIN: HCPCS | Performed by: INTERNAL MEDICINE

## 2022-12-01 NOTE — PROGRESS NOTES
Call made to [facility] to request addendum to CT Chest 10/13/22 report. Dr Carin Olszewski requests specific clarification of reported 21.5 cm lymph node, and measurements of additional mentioned lymph nodes, with specific attention to left axillary area. Spoke with Junior Shin, who reports that addendum request will be processed today.

## 2022-12-04 LAB — BETA-2 MICROGLOBULIN: 4.1 MG/L

## 2022-12-06 DIAGNOSIS — F41.9 ANXIETY: Primary | ICD-10-CM

## 2022-12-06 RX ORDER — DIAZEPAM 5 MG/1
TABLET ORAL
Qty: 4 TABLET | Refills: 0 | Status: SHIPPED | OUTPATIENT
Start: 2022-12-06 | End: 2022-12-08

## 2022-12-08 ENCOUNTER — TELEPHONE (OUTPATIENT)
Dept: HEMATOLOGY | Age: 67
End: 2022-12-08

## 2022-12-08 ENCOUNTER — OFFICE VISIT (OUTPATIENT)
Dept: SURGERY | Age: 67
End: 2022-12-08
Payer: MEDICARE

## 2022-12-08 VITALS
DIASTOLIC BLOOD PRESSURE: 70 MMHG | BODY MASS INDEX: 41.75 KG/M2 | WEIGHT: 315 LBS | SYSTOLIC BLOOD PRESSURE: 124 MMHG | HEIGHT: 73 IN | TEMPERATURE: 97.2 F

## 2022-12-08 DIAGNOSIS — C85.90 LYMPHOMA, UNSPECIFIED BODY REGION, UNSPECIFIED LYMPHOMA TYPE (HCC): Primary | ICD-10-CM

## 2022-12-08 PROCEDURE — G8484 FLU IMMUNIZE NO ADMIN: HCPCS | Performed by: SURGERY

## 2022-12-08 PROCEDURE — 99202 OFFICE O/P NEW SF 15 MIN: CPT | Performed by: SURGERY

## 2022-12-08 PROCEDURE — 1036F TOBACCO NON-USER: CPT | Performed by: SURGERY

## 2022-12-08 PROCEDURE — 1123F ACP DISCUSS/DSCN MKR DOCD: CPT | Performed by: SURGERY

## 2022-12-08 PROCEDURE — 3078F DIAST BP <80 MM HG: CPT | Performed by: SURGERY

## 2022-12-08 PROCEDURE — 3017F COLORECTAL CA SCREEN DOC REV: CPT | Performed by: SURGERY

## 2022-12-08 PROCEDURE — G8417 CALC BMI ABV UP PARAM F/U: HCPCS | Performed by: SURGERY

## 2022-12-08 PROCEDURE — 3074F SYST BP LT 130 MM HG: CPT | Performed by: SURGERY

## 2022-12-08 PROCEDURE — G8427 DOCREV CUR MEDS BY ELIG CLIN: HCPCS | Performed by: SURGERY

## 2022-12-08 NOTE — PROGRESS NOTES
DAY 90 capsule 1    rivaroxaban (XARELTO) 20 MG TABS tablet TAKE 1 TABLET EVERY DAY WITH BREAKFAST 90 tablet 1    omega-3 acid ethyl esters (LOVAZA) 1 g capsule Take 1 g by mouth daily      fenofibrate 160 MG tablet Take 1 tablet by mouth daily 90 tablet 1    Multiple Vitamin (MULTI-VITAMIN PO) Take  by mouth. No current facility-administered medications for this visit. Allergies: Pcn [penicillins]    Family History   Problem Relation Age of Onset    Colon Polyps Mother     Heart Disease Father     Cancer Brother         prostate    Colon Polyps Brother     High Blood Pressure Other        Social History     Tobacco Use    Smoking status: Never    Smokeless tobacco: Never   Substance Use Topics    Alcohol use: No       Review of Systems   Constitutional:  Negative for chills and fever. HENT:  Positive for nosebleeds. Negative for congestion. Eyes:  Negative for pain and redness. Respiratory:  Positive for shortness of breath (with exertion). Negative for cough. Cardiovascular:  Positive for leg swelling. Negative for chest pain. Gastrointestinal:  Negative for abdominal distention and abdominal pain. Endocrine: Negative for polydipsia and polyphagia. Genitourinary:  Negative for dysuria and hematuria. Musculoskeletal:  Negative for arthralgias and back pain. Neurological:  Negative for dizziness and headaches. Hematological:  Positive for adenopathy. Psychiatric/Behavioral:  Negative for confusion and dysphoric mood. Physical Exam  Vitals reviewed. Constitutional:       General: He is not in acute distress. Appearance: He is obese. HENT:      Head: Normocephalic and atraumatic. Nose: Nose normal.   Eyes:      General: No scleral icterus. Pupils: Pupils are equal, round, and reactive to light. Cardiovascular:      Rate and Rhythm: Normal rate and regular rhythm. Pulmonary:      Effort: Pulmonary effort is normal. No respiratory distress.    Abdominal: General: There is no distension. Palpations: Abdomen is soft. Musculoskeletal:         General: Swelling present. No deformity. Cervical back: Neck supple. Lymphadenopathy:      Cervical: Cervical adenopathy present. Skin:     General: Skin is warm and dry. Neurological:      General: No focal deficit present. Mental Status: He is alert. Mental status is at baseline. Psychiatric:         Mood and Affect: Mood normal.         Behavior: Behavior normal.       Assessment and plan:  78year old male with lymphoma  The risks and benefits of port placement with ultrasound and fluoro were discussed with the patient, including but not limited to, bleeding, infection, and pneumothorax. The patient expressed understanding and would like to proceed with surgery. Will have him hold his xarelto for 24 hours preop.     Maryse Wallace MD  12/8/2022  2:55 PM

## 2022-12-08 NOTE — LETTER
Preop Orders:     Patient: Renu Jean  : 1955    Hospital: [facility]    Admitting Physician:  Dr. Vika Barajas    Diagnosis: lymphoma    Procedure: single lumen port placement with ultrasound and fluoro    Time: 1 hour    Anesthesia: General    Admission: Outpatient     Date: to be scheduled--?     Labs: per anesthesia     Special Instructions:  none    Cardiac Clearance:  none    Special Equipment:  none     Pre-Op Meds: william    Latex Allergy: no    Beta Blocker: yes - metoprolol    Medication Instructions: hold xarelto for 24 hours    Post op visit: PRN      Electronically signed by Holly Guevara MD   On 22   @ 2:55 PM

## 2022-12-08 NOTE — H&P (VIEW-ONLY)
Mr. Cora Patterson is a 78year old male with a diagnosis of large b-cell lymphoma. He presents at this time with a complaint of need for port placement. The patient denies having had a port in the past. He does take a blood thinner, xarelto. His next appointment at oncology is 12/22. Past Medical History:   Diagnosis Date    Atrial fibrillation ([address]     Cellulitis of left leg     Colon polyp     Elevated blood pressure reading without diagnosis of hypertension     Esophageal reflux     History of prostate cancer     Mixed hyperlipidemia     TG>>LDL    Obesity     Prostate cancer [facility])     2010 Dr Christophe Lozada;  implants, XRT     Past Surgical History:   Procedure Laterality Date    COLONOSCOPY  6/2/2009        COLONOSCOPY  2012        UPPER GASTROINTESTINAL ENDOSCOPY  3/22/2000        VASECTOMY       Current Outpatient Medications   Medication Sig Dispense Refill    diazePAM (VALIUM) 5 MG tablet Take one pill when you get up the day of procedure. Then another when you arrive at the office; 4 tablet 0    losartan (COZAAR) 100 MG tablet TAKE 1 TABLET EVERY DAY 90 tablet 1    amiodarone (CORDARONE) 200 MG tablet Take 1 tablet by mouth daily 30 tablet 0    enoxaparin (LOVENOX) 120 MG/0.8ML injection Inject 1 mL into the skin in the morning and 1 mL in the evening. 11/10 bid, 11/11 BID, 11/12 BID, 11/13 BID, 11/14 am only. 9 mL 0    saline nasal gel (AYR) GEL Apply gently to the nostrils twice a day x10 days 14 g 3    flecainide (TAMBOCOR) 50 MG tablet Take 1 tablet by mouth 2 times daily 60 tablet 3    dilTIAZem (CARDIZEM CD) 120 MG extended release capsule Take 1 capsule by mouth in the morning and at bedtime 60 capsule 3    rosuvastatin (CRESTOR) 10 MG tablet Take 1 tablet by mouth nightly 90 tablet 1    metoprolol succinate (TOPROL XL) 100 MG extended release tablet Take 1 tablet by mouth in the morning.  90 tablet 1    Cholecalciferol (VITAMIN D3) 50 MCG (2000 UT) CAPS TAKE 1 CAPSULE EVERY DAY 90 capsule 1    rivaroxaban (XARELTO) 20 MG TABS tablet TAKE 1 TABLET EVERY DAY WITH BREAKFAST 90 tablet 1    omega-3 acid ethyl esters (LOVAZA) 1 g capsule Take 1 g by mouth daily      fenofibrate 160 MG tablet Take 1 tablet by mouth daily 90 tablet 1    Multiple Vitamin (MULTI-VITAMIN PO) Take  by mouth. No current facility-administered medications for this visit. Allergies: Pcn [penicillins]    Family History   Problem Relation Age of Onset    Colon Polyps Mother     Heart Disease Father     Cancer Brother         prostate    Colon Polyps Brother     High Blood Pressure Other        Social History     Tobacco Use    Smoking status: Never    Smokeless tobacco: Never   Substance Use Topics    Alcohol use: No       Review of Systems   Constitutional:  Negative for chills and fever. HENT:  Positive for nosebleeds. Negative for congestion. Eyes:  Negative for pain and redness. Respiratory:  Positive for shortness of breath (with exertion). Negative for cough. Cardiovascular:  Positive for leg swelling. Negative for chest pain. Gastrointestinal:  Negative for abdominal distention and abdominal pain. Endocrine: Negative for polydipsia and polyphagia. Genitourinary:  Negative for dysuria and hematuria. Musculoskeletal:  Negative for arthralgias and back pain. Neurological:  Negative for dizziness and headaches. Hematological:  Positive for adenopathy. Psychiatric/Behavioral:  Negative for confusion and dysphoric mood. Physical Exam  Vitals reviewed. Constitutional:       General: He is not in acute distress. Appearance: He is obese. HENT:      Head: Normocephalic and atraumatic. Nose: Nose normal.   Eyes:      General: No scleral icterus. Pupils: Pupils are equal, round, and reactive to light. Cardiovascular:      Rate and Rhythm: Normal rate and regular rhythm. Pulmonary:      Effort: Pulmonary effort is normal. No respiratory distress.    Abdominal: General: There is no distension. Palpations: Abdomen is soft. Musculoskeletal:         General: Swelling present. No deformity. Cervical back: Neck supple. Lymphadenopathy:      Cervical: Cervical adenopathy present. Skin:     General: Skin is warm and dry. Neurological:      General: No focal deficit present. Mental Status: He is alert. Mental status is at baseline. Psychiatric:         Mood and Affect: Mood normal.         Behavior: Behavior normal.       Assessment and plan:  78year old male with lymphoma  The risks and benefits of port placement with ultrasound and fluoro were discussed with the patient, including but not limited to, bleeding, infection, and pneumothorax. The patient expressed understanding and would like to proceed with surgery. Will have him hold his xarelto for 24 hours preop.     Em Luis MD  12/8/2022  2:55 PM

## 2022-12-08 NOTE — TELEPHONE ENCOUNTER
Spoke with Pt mother to provide appointment for bone marrow appointment. Appointment given for December 9, 2022 check in at 12:30pm for 1:30pm appointment with freddy nascimento in [address] [place] [address]. Pt mother aware of prep instructions NPO after midnight and will need  after procedure.

## 2022-12-09 ENCOUNTER — ANESTHESIA EVENT (OUTPATIENT)
Dept: OPERATING ROOM | Age: 67
End: 2022-12-09

## 2022-12-09 ENCOUNTER — HOSPITAL ENCOUNTER (OUTPATIENT)
Age: 67
Setting detail: OUTPATIENT SURGERY
Discharge: HOME | End: 2022-12-09
Attending: INTERNAL MEDICINE | Admitting: INTERNAL MEDICINE

## 2022-12-09 ENCOUNTER — HOSPITAL ENCOUNTER (OUTPATIENT)
Dept: CT IMAGING | Age: 67
Discharge: HOME | End: 2022-12-09
Payer: MEDICARE

## 2022-12-09 ENCOUNTER — ANESTHESIA (OUTPATIENT)
Dept: OPERATING ROOM | Age: 67
End: 2022-12-09

## 2022-12-09 VITALS
TEMPERATURE: 98 F | OXYGEN SATURATION: 94 % | RESPIRATION RATE: 18 BRPM | SYSTOLIC BLOOD PRESSURE: 119 MMHG | WEIGHT: 315 LBS | HEART RATE: 85 BPM | DIASTOLIC BLOOD PRESSURE: 74 MMHG | HEIGHT: 73 IN | BODY MASS INDEX: 41.75 KG/M2

## 2022-12-09 DIAGNOSIS — C85.10 LARGE B-CELL LYMPHOMA (HCC): Primary | ICD-10-CM

## 2022-12-09 DIAGNOSIS — C85.10 LARGE B-CELL LYMPHOMA (HCC): ICD-10-CM

## 2022-12-09 PROCEDURE — 70490 CT SOFT TISSUE NECK W/O DYE: CPT

## 2022-12-09 PROCEDURE — G8918 PT W/O PREOP ORDER IV AB PRO: HCPCS

## 2022-12-09 PROCEDURE — 38222 DX BONE MARROW BX & ASPIR: CPT

## 2022-12-09 PROCEDURE — 74176 CT ABD & PELVIS W/O CONTRAST: CPT

## 2022-12-09 PROCEDURE — G8907 PT DOC NO EVENTS ON DISCHARG: HCPCS

## 2022-12-09 RX ORDER — LIDOCAINE HYDROCHLORIDE 10 MG/ML
INJECTION, SOLUTION EPIDURAL; INFILTRATION; INTRACAUDAL; PERINEURAL PRN
Status: DISCONTINUED | OUTPATIENT
Start: 2022-12-09 | End: 2022-12-09 | Stop reason: ALTCHOICE

## 2022-12-09 RX ORDER — SODIUM CHLORIDE, SODIUM LACTATE, POTASSIUM CHLORIDE, CALCIUM CHLORIDE 600; 310; 30; 20 MG/100ML; MG/100ML; MG/100ML; MG/100ML
INJECTION, SOLUTION INTRAVENOUS CONTINUOUS
Status: DISCONTINUED | OUTPATIENT
Start: 2022-12-09 | End: 2022-12-09 | Stop reason: HOSPADM

## 2022-12-09 RX ORDER — LIDOCAINE HYDROCHLORIDE 10 MG/ML
INJECTION, SOLUTION EPIDURAL; INFILTRATION; INTRACAUDAL; PERINEURAL PRN
Status: DISCONTINUED | OUTPATIENT
Start: 2022-12-09 | End: 2022-12-09 | Stop reason: SDUPTHER

## 2022-12-09 RX ORDER — PROPOFOL 10 MG/ML
INJECTION, EMULSION INTRAVENOUS PRN
Status: DISCONTINUED | OUTPATIENT
Start: 2022-12-09 | End: 2022-12-09 | Stop reason: SDUPTHER

## 2022-12-09 RX ADMIN — PROPOFOL 140 MG: 10 INJECTION, EMULSION INTRAVENOUS at 13:15

## 2022-12-09 RX ADMIN — LIDOCAINE HYDROCHLORIDE 30 MG: 10 INJECTION, SOLUTION EPIDURAL; INFILTRATION; INTRACAUDAL; PERINEURAL at 13:15

## 2022-12-09 RX ADMIN — SODIUM CHLORIDE, SODIUM LACTATE, POTASSIUM CHLORIDE, CALCIUM CHLORIDE: 600; 310; 30; 20 INJECTION, SOLUTION INTRAVENOUS at 11:53

## 2022-12-09 NOTE — ANESTHESIA POSTPROCEDURE EVALUATION
Department of Anesthesiology  Postprocedure Note    Patient: Argelia Saldivar  MRN: [id number]  YOB: 1955  Date of evaluation: 12/9/2022      Procedure Summary     Date: 12/09/22 Room / Location: [facility] ENDO 02 / [address]    Anesthesia Start: 1310 Anesthesia Stop: 8461    Procedure: BONE MARROW ASPIRATION BIOPSY (Pelvis) Diagnosis:       Diffuse large B-cell lymphoma, unspecified body region ([address]      (Diffuse large B-cell lymphoma, unspecified body region ([address] [C83.30])    Surgeons: Osmar Renner PA-C Responsible Provider: Lavella Halsted, APRN - CRNA    Anesthesia Type: MAC ASA Status: 3          Anesthesia Type: No value filed.     Paul Phase I:      Paul Phase II:        Anesthesia Post Evaluation    Patient location during evaluation: bedside  Patient participation: complete - patient participated  Level of consciousness: awake  Pain score: 0  Airway patency: patent  Nausea & Vomiting: no nausea and no vomiting  Complications: no  Cardiovascular status: hemodynamically stable  Respiratory status: acceptable, room air and spontaneous ventilation  Hydration status: euvolemic
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
## 2022-12-09 NOTE — DISCHARGE SUMMARY
[address] [place] [address]   1955    Admitting Provider: Verner Mosses, PA-C/Jan Mendoza MD    Date of Admission: 12/9/2022  Date of Discharge:  12/9/2022    Admitting Diagnosis:   1. Large B-cell lymphoma ([facility]       Discharge Diagnosis:   1. Large B-cell lymphoma ([facility]         History:  Jenae Ribeiro is a 71-year-old gentleman with a recent diagnosis of large B-cell lymphoma. Bone marrow aspiration and biopsy is being performed as staging. Hospital Course/Procedures Performed: Kenney Huston underwent bone marrow aspirate and biopsy without complication. Condition on Discharge: Stable    Vital Signs  BP (!) 116/56   Pulse 88   Temp 98.1 °F (36.7 °C)   Resp 16   Ht 6' 1\" (1.854 m)   Wt (!) 327 lb (148.3 kg)   SpO2 94%   BMI 43.14 kg/m²     Discharge Disposition: Home    Discharge Medications     Medication List        ASK your doctor about these medications      amiodarone 200 MG tablet  Commonly known as: CORDARONE  Take 1 tablet by mouth daily     dilTIAZem 120 MG extended release capsule  Commonly known as: CARDIZEM CD  Take 1 capsule by mouth in the morning and at bedtime     enoxaparin 120 MG/0.8ML injection  Commonly known as: Lovenox  Inject 1 mL into the skin in the morning and 1 mL in the evening. 11/10 bid, 11/11 BID, 11/12 BID, 11/13 BID, 11/14 am only. fenofibrate 160 MG tablet  Commonly known as: TRIGLIDE  Take 1 tablet by mouth daily     flecainide 50 MG tablet  Commonly known as: TAMBOCOR  Take 1 tablet by mouth 2 times daily     losartan 100 MG tablet  Commonly known as: COZAAR  TAKE 1 TABLET EVERY DAY     metoprolol succinate 100 MG extended release tablet  Commonly known as: TOPROL XL  Take 1 tablet by mouth in the morning.      MULTI-VITAMIN PO     omega-3 acid ethyl esters 1 g capsule  Commonly known as: LOVAZA     rivaroxaban 20 MG Tabs tablet  Commonly known as: Xarelto  TAKE 1 TABLET EVERY DAY WITH BREAKFAST     rosuvastatin 10 MG tablet  Commonly known as: Crestor  Take 1 tablet by mouth nightly     saline nasal gel Gel  Apply gently to the nostrils twice a day x10 days     Vitamin D3 50 MCG (2000 UT) Caps  TAKE 1 CAPSULE EVERY DAY              Discharge Diet: Regular    Activity at Discharge: As tolerated with postanesthesia restrictions (i.e. do not drive for 24 hours, do not operate heavy machinery for 24 hours)    Follow-up Appointments: Keep scheduled follow-up in clinic in 2 weeks to discuss results    Test Results Pending at Discharge: Bone marrow aspirate and biopsy    Trey Bradford PA-C    12/09/22  1:25 PM

## 2022-12-09 NOTE — ANESTHESIA PRE PROCEDURE
Department of Anesthesiology  Preprocedure Note       Name:  Landry Galindo   Age:  78 y.o.  :  1955                                          MRN:  [id number]         Date:  2022      Surgeon: Chi Sullivan):  Chen Qureshi PA-C    Procedure: Procedure(s):  BONE MARROW ASPIRATION BIOPSY    Medications prior to admission:   Prior to Admission medications    Medication Sig Start Date End Date Taking? Authorizing Provider   losartan (COZAAR) 100 MG tablet TAKE 1 TABLET EVERY DAY 11/15/22   JODY Poon   amiodarone (CORDARONE) 200 MG tablet Take 1 tablet by mouth daily 22   JODY Poon   enoxaparin (LOVENOX) 120 MG/0.8ML injection Inject 1 mL into the skin in the morning and 1 mL in the evening. 11/10 bid,  BID,  BID,  BID,  am only. 11/3/22   JODY Poon   saline nasal gel (AYR) GEL Apply gently to the nostrils twice a day x10 days 10/24/22   Sadaf Brice PA-C   flecainide (TAMBOCOR) 50 MG tablet Take 1 tablet by mouth 2 times daily 10/13/22   Sal Venegas MD   dilTIAZem (CARDIZEM CD) 120 MG extended release capsule Take 1 capsule by mouth in the morning and at bedtime 10/13/22   Sal Venegas MD   rosuvastatin (CRESTOR) 10 MG tablet Take 1 tablet by mouth nightly 22  JODY Poon   metoprolol succinate (TOPROL XL) 100 MG extended release tablet Take 1 tablet by mouth in the morning. 22   JODY Poon   Cholecalciferol (VITAMIN D3) 50 MCG ( UT) CAPS TAKE 1 CAPSULE EVERY DAY 22   JODY Poon   rivaroxaban (XARELTO) 20 MG TABS tablet TAKE 1 TABLET EVERY DAY WITH BREAKFAST 22   JODY Poon   omega-3 acid ethyl esters (LOVAZA) 1 g capsule Take 1 g by mouth daily 22   Historical Provider, MD   fenofibrate 160 MG tablet Take 1 tablet by mouth daily 20  JODY Poon   Multiple Vitamin (MULTI-VITAMIN PO) Take  by mouth.       Historical Provider, MD       Current medications:    Current Facility-Administered Medications   Medication Dose Route Frequency Provider Last Rate Last Admin    lactated ringers infusion   IntraVENous Continuous Curvin Hymen, APRN - CRNA 100 mL/hr at 12/09/22 1153 New Bag at 12/09/22 1153       Allergies:     Allergies   Allergen Reactions    Pcn [Penicillins] Rash       Problem List:    Patient Active Problem List   Diagnosis Code    Adenomatous colon polyp D12.6    S/P colonoscopic polypectomy Z98.890    History of colonic polyps Z86.010    Obesity E66.9    Mixed hyperlipidemia E78.2    Essential hypertension I10    History of prostate cancer Z85.46    Morbid obesity ([facility] E66.01    Chronic deep vein thrombosis (DVT) of left peroneal vein (HCC) I82.552    Vitamin D deficiency E55.9    Closed nondisplaced longitudinal fracture of left patella S82.025A    Cancer of prostate ([facility] C61    Gastroesophageal reflux disease without esophagitis K21.9    Irregular heart beat I49.9    Atrial fibrillation with RVR (HCC) I48.91    Atrial fibrillation (HCC) I48.91    Atrial fibrillation with rapid ventricular response (HCC) I48.91    Nasal septum ulceration J34.0    Epistaxis R04.0    Old head injury Z87.828    Generalized enlarged lymph nodes R59.1       Past Medical History:        Diagnosis Date    Atrial fibrillation (HCC)     Cellulitis of left leg     Colon polyp     Elevated blood pressure reading without diagnosis of hypertension     Esophageal reflux     History of prostate cancer     Hypertension     Mixed hyperlipidemia     TG>>LDL    Obesity     Prostate cancer [facility])     2010 Dr Ravi Moreira;  implants, XRT       Past Surgical History:        Procedure Laterality Date    COLONOSCOPY  6/2/2009        COLONOSCOPY  2012        UPPER GASTROINTESTINAL ENDOSCOPY  3/22/2000        VASECTOMY         Social History:    Social History     Tobacco Use    Smoking status: Never    Smokeless tobacco: Never   Substance Use Topics    Alcohol use: No                                Counseling given: Not Answered      Vital Signs (Current):   Vitals:    12/09/22 1143 12/09/22 1249   BP: (!) 116/56    Pulse: 88    Resp: 16    Temp:  98.1 °F (36.7 °C)   SpO2: 94%    Weight: (!) 327 lb (148.3 kg)    Height: 6' 1\" (1.854 m)                                               BP Readings from Last 3 Encounters:   12/09/22 (!) 116/56   12/08/22 124/70   12/01/22 134/74       NPO Status: Time of last liquid consumption: 2300                        Time of last solid consumption: 2300                        Date of last liquid consumption: 12/08/22                        Date of last solid food consumption: 12/08/22    BMI:   Wt Readings from Last 3 Encounters:   12/09/22 (!) 327 lb (148.3 kg)   12/08/22 (!) 333 lb 9.6 oz (151.3 kg)   12/01/22 (!) 332 lb (150.6 kg)     Body mass index is 43.14 kg/m². CBC:   Lab Results   Component Value Date/Time    WBC 6.02 12/01/2022 12:22 PM    RBC 4.48 12/01/2022 12:22 PM    HGB 12.9 12/01/2022 12:22 PM    HCT 39.6 12/01/2022 12:22 PM    MCV 88.4 12/01/2022 12:22 PM    RDW 12.5 12/01/2022 12:22 PM     12/01/2022 12:22 PM       CMP:   Lab Results   Component Value Date/Time     10/18/2022 01:07 PM    K 4.9 10/18/2022 01:07 PM    K 4.2 10/09/2022 07:54 AM     10/18/2022 01:07 PM    CO2 28 10/18/2022 01:07 PM    BUN 31 10/18/2022 01:07 PM    CREATININE 1.3 10/18/2022 01:07 PM    GFRAA >59 10/09/2022 07:54 AM    LABGLOM >60 10/18/2022 01:07 PM    GLUCOSE 118 10/18/2022 01:07 PM    PROT 7.4 10/18/2022 01:07 PM    CALCIUM 10.0 10/18/2022 01:07 PM    BILITOT 0.3 10/18/2022 01:07 PM    ALKPHOS 70 10/18/2022 01:07 PM    AST 20 10/18/2022 01:07 PM    ALT 26 10/18/2022 01:07 PM       POC Tests: No results for input(s): POCGLU, POCNA, POCK, POCCL, POCBUN, POCHEMO, POCHCT in the last 72 hours.     Coags:   Lab Results   Component Value Date/Time    PROTIME 22.6 10/18/2022 01:07 PM    INR 1.94 10/18/2022 01:07 PM    APTT 36.6 10/18/2022 01:07 PM       HCG (If Applicable): No results found for: PREGTESTUR, PREGSERUM, HCG, HCGQUANT     ABGs: No results found for: PHART, PO2ART, FPG8DRD, IAO0HCD, BEART, H5WJGTQI     Type & Screen (If Applicable):  No results found for: LABABO, LABRH    Drug/Infectious Status (If Applicable):  No results found for: HIV, HEPCAB    COVID-19 Screening (If Applicable):   Lab Results   Component Value Date/Time    COVID19 Not Detected 07/26/2022 04:17 PM           Anesthesia Evaluation  Patient summary reviewed and Nursing notes reviewed  Airway: Mallampati: II  TM distance: >3 FB   Neck ROM: full  Mouth opening: > = 3 FB   Dental: normal exam   (+) upper dentures      Pulmonary:Negative Pulmonary ROS and normal exam  breath sounds clear to auscultation                             Cardiovascular:Negative CV ROS    (+) hypertension:, dysrhythmias: atrial fibrillation, hyperlipidemia      ECG reviewed  Rhythm: regular  Rate: normal           Beta Blocker:  Not on Beta Blocker         Neuro/Psych:   Negative Neuro/Psych ROS              GI/Hepatic/Renal: Neg GI/Hepatic/Renal ROS  (+) GERD:, morbid obesity          Endo/Other: Negative Endo/Other ROS   (+) blood dyscrasia: anticoagulation therapy:., .                  ROS comment: Vitamin D deficiencyh Abdominal:   (+) obese,     Abdomen: soft. Vascular: negative vascular ROS.  + DVT, . Other Findings:           Anesthesia Plan      MAC     ASA 3       Induction: intravenous. Anesthetic plan and risks discussed with patient.                         JODY King CRNA   12/9/2022

## 2022-12-09 NOTE — OP NOTE
Patient name: Dannis Riedel  Patient : 1955      Procedure Note: Bone Marrow Aspirate and Biopsy    Indication: Large B-cell lymphoma    Informed consent was signed by Dannis Riedel. he received MAC in [address] [place] [address]. He was placed in the left lateral decubitus position. The patient was prepped and draped in sterile fashion. Lidocaine 1% was used for local anesthetic of the skin and periosteum. A bone marrow aspirate and biopsy was obtained from the [facility] and sent for surgical pathology review,  flow cytometry, and chromosome analysis. The total blood loss was less than 3 mL. The skin was cleaned and a sterile bandage was placed on the entrance site. Dariela Aguero tolerated the procedure without complication.      Hyacinth Blue PA-C    22  1:22 PM

## 2022-12-12 ENCOUNTER — TELEPHONE (OUTPATIENT)
Dept: HEMATOLOGY | Age: 67
End: 2022-12-12

## 2022-12-12 RX ORDER — METOPROLOL SUCCINATE 100 MG/1
100 TABLET, EXTENDED RELEASE ORAL DAILY
Qty: 90 TABLET | Refills: 1 | Status: SHIPPED | OUTPATIENT
Start: 2022-12-12

## 2022-12-12 RX ORDER — ROSUVASTATIN CALCIUM 10 MG/1
TABLET, COATED ORAL
Qty: 90 TABLET | Refills: 1 | Status: SHIPPED | OUTPATIENT
Start: 2022-12-12

## 2022-12-12 NOTE — TELEPHONE ENCOUNTER
Dilip Hathaway called to request a refill on his medication.       Last office visit : 10/26/2022   Next office visit : 1/31/2023     Requested Prescriptions     Pending Prescriptions Disp Refills    amiodarone (CORDARONE) 200 MG tablet [Pharmacy Med Name: AMIODARONE  MG TABLET] 30 tablet 1     Sig: TAKE ONE TABLET BY MOUTH EVERY DAY            Yessica Skinner MA

## 2022-12-12 NOTE — TELEPHONE ENCOUNTER
Jeff Dill called requesting a refill of the below medication which has been pended for you:     Requested Prescriptions     Pending Prescriptions Disp Refills    rosuvastatin (CRESTOR) 10 MG tablet [Pharmacy Med Name: ROSUVASTATIN CALCIUM 10 MG Tablet] 90 tablet 1     Sig: TAKE 1 TABLET EVERY NIGHT    metoprolol succinate (TOPROL XL) 100 MG extended release tablet [Pharmacy Med Name: METOPROLOL SUCCINATE  MG Tablet Extended Release 24 Hour] 90 tablet 1     Sig: TAKE 1 TABLET BY MOUTH IN THE MORNING.        Last Appointment Date: 10/26/2022  Next Appointment Date: 1/31/2023    Allergies   Allergen Reactions    Pcn [Penicillins] Rash

## 2022-12-12 NOTE — TELEPHONE ENCOUNTER
DAVID Mejia called patient to assess needs,offer support, and complete [facility] Screening. Patients brother answered the phone and stated it would be best to complete the screening in person at the patients next appointment.

## 2022-12-13 RX ORDER — AMIODARONE HYDROCHLORIDE 200 MG/1
TABLET ORAL
Qty: 30 TABLET | Refills: 1 | Status: SHIPPED | OUTPATIENT
Start: 2022-12-13

## 2022-12-14 ENCOUNTER — CLINICAL DOCUMENTATION (OUTPATIENT)
Dept: HEMATOLOGY | Age: 67
End: 2022-12-14

## 2022-12-14 NOTE — TELEPHONE ENCOUNTER
Dariela Sutton called requesting a refill of the below medication which has been pended for you:     Requested Prescriptions     Pending Prescriptions Disp Refills    rivaroxaban (XARELTO) 20 MG TABS tablet [Pharmacy Med Name: Rahul Gallego 20 MG Tablet] 90 tablet 1     Sig: TAKE 1 [address]        Last Appointment Date: 10/26/2022  Next Appointment Date: 1/31/2023    Allergies   Allergen Reactions    Pcn [Penicillins] Rash

## 2022-12-14 NOTE — PROGRESS NOTES
CT OF THE NECK WITHOUT CONTRAST on 12/9/2022 at [facility] reported:  Multiple enlarged lymph nodes are noted throughout the neck. This includes the right level 2, bilateral level 3, bilateral level 5 and bilateral level 1 lymph nodes. An index left level 3 lymph node measures up to 1.8 cm x  1.5 cm. An index left submandibular level 1B lymph node measures up to 2.8 x 2.0 cm. A right submandibular lymph node measures up to 3.1 x 1.5 cm. A right level 5 lymph node measures up to 1.6 x 1.1 cm. In addition, there are enlarged mediastinal lymph nodes as well. \\  A right periparotid lymph node measures up to 1.8 x 1.2 cm.       CT ABDOMEN & PELVIS WO CONTRAT on 12/9/2022 at [facility] reported:  (Report pending)

## 2022-12-15 ENCOUNTER — HOSPITAL ENCOUNTER (OUTPATIENT)
Dept: PREADMISSION TESTING | Age: 67
End: 2022-12-15
Payer: MEDICARE

## 2022-12-15 ASSESSMENT — ENCOUNTER SYMPTOMS
BACK PAIN: 0
EYE REDNESS: 0
COUGH: 0
SHORTNESS OF BREATH: 1
ABDOMINAL PAIN: 0
EYE PAIN: 0
ABDOMINAL DISTENTION: 0

## 2022-12-15 NOTE — DISCHARGE INSTRUCTIONS
The day before surgery you will receive a phone call from the surgery nurse to let you know what time to arrive on the day of surgery. This call will usually be between 2-4 PM. If you do not receive a phone call by 4 PM the day before your surgery please call [phone] and let them know you have not received an arrival time. If your surgery is on Monday, your call will be on the Friday before your Monday surgery. The morning of surgery, you may take all your prescribed medications with a sip of water. Any exceptions to this would be listed below:    Do not take Losartan    Take Metoprolol    Chlorhexidine Gluconate 4% Solution    Patient should shower with this soap  the night before surgery and the morning of surgery) washing from the neck down (avoiding contact with genitalia). DO NOT [address] YOUR HAIR OR FACE WITH THIS SOAP. When washing with this soap, apply enough to suds up the body thoroughly, turn the water away from your body and allow the soap suds to remain on the body for 2 full minutes, then rinse body completely. After using this soap on the body, please do not apply powders or lotions to your body. PREOPERATIVE GUIDELINES WHEN RECEIVING ANESTHESIA    Do not eat or drink anything after midnight, the night before your surgery. This is extremely important for your safety. Take a bath (or shower) the night before your surgery and you may brush your teeth the morning of your surgery. You will be scheduled to arrive at the hospital 2 hours before your surgery, or follow your surgeon's instructions. Dress comfortably. Wear loose clothing that will be easy to remove and comfortable for your trip home. You may wear eyeglasses or contacts but bring your cases with you as they must be remove before your surgery. Hearing aids and dentures will need to be removed before your surgery. Do not wear any jewelry, including body jewelry.   All jewelry will need to be removed prior to your surgery. Do not wear fingernail polish or make-up. It is best not to bring any valuables with you. If you are to stay in the hospital overnight, bring your robe, slippers and personal toiletries that you may need. POSTOPERATIVE GUIDELINES AFTER RECEIVING ANESTHESIA    If you are to go home after your surgery, you will need a responsible adult to drive you home. You will not be able to take public transportation after your discharge from the Operative Care Unit unless you are accompanied by a        responsible adult. On returning home, be sure to follow your physician's orders regarding diet, activity and medications. Remember, surgery with general anesthesia or sedation may leave you sleepy, very tired and with a decreased appetite for 12 to 24 hours. If you develop any post-surgical complications or problems, call your surgeon or [facility] ([phone]). [address] [place] [address] for Surgery Patients-Revised 6-    Visitors for surgery patients are essential for the patient's emotional well-being and care       post operatively. 2.   Visitor Expectations and Limitations        VISITORS MUST WEAR MASKS AT ALL TIMES. NO Cloth masks allowed. 3.  One visitor allowed with patients in the preop/postop rooms. 4.  A second visitor may sit in the waiting area. 5.  No children under 13 allowed in the pre-post op areas unless they are the patient. 6.  Two people may be with an underage surgical/procedural patient in preop/postop        room. 7.  If you are admitted to the hospital post operatively, there are NO RESTRICTIONS on       the floor at this time. 8.  If you are admitted to ICU postoperatively, you may have one visitor in the room from        7A-7P. A second visitor may sit in the ICU waiting room. No overnight visitors in         ICU waiting room.

## 2022-12-16 ENCOUNTER — APPOINTMENT (OUTPATIENT)
Dept: GENERAL RADIOLOGY | Age: 67
End: 2022-12-16
Attending: SURGERY
Payer: MEDICARE

## 2022-12-16 ENCOUNTER — HOSPITAL ENCOUNTER (OUTPATIENT)
Age: 67
Setting detail: OUTPATIENT SURGERY
Discharge: HOME | End: 2022-12-16
Attending: SURGERY | Admitting: SURGERY
Payer: MEDICARE

## 2022-12-16 ENCOUNTER — ANESTHESIA (OUTPATIENT)
Dept: OPERATING ROOM | Age: 67
End: 2022-12-16
Payer: MEDICARE

## 2022-12-16 ENCOUNTER — ANESTHESIA EVENT (OUTPATIENT)
Dept: OPERATING ROOM | Age: 67
End: 2022-12-16
Payer: MEDICARE

## 2022-12-16 VITALS
TEMPERATURE: 97.2 F | HEART RATE: 71 BPM | OXYGEN SATURATION: 96 % | RESPIRATION RATE: 16 BRPM | SYSTOLIC BLOOD PRESSURE: 136 MMHG | HEIGHT: 73 IN | BODY MASS INDEX: 41.75 KG/M2 | DIASTOLIC BLOOD PRESSURE: 90 MMHG | WEIGHT: 315 LBS

## 2022-12-16 DIAGNOSIS — C85.90 LYMPHOMA, UNSPECIFIED BODY REGION, UNSPECIFIED LYMPHOMA TYPE (HCC): Primary | ICD-10-CM

## 2022-12-16 PROCEDURE — 7100000000 HC PACU RECOVERY - FIRST 15 MIN: Performed by: SURGERY

## 2022-12-16 PROCEDURE — 2709999900 HC NON-CHARGEABLE SUPPLY: Performed by: SURGERY

## 2022-12-16 PROCEDURE — 71045 X-RAY EXAM CHEST 1 VIEW: CPT | Performed by: RADIOLOGY

## 2022-12-16 PROCEDURE — 76937 US GUIDE VASCULAR ACCESS: CPT | Performed by: SURGERY

## 2022-12-16 PROCEDURE — 7100000011 HC PHASE II RECOVERY - ADDTL 15 MIN: Performed by: SURGERY

## 2022-12-16 PROCEDURE — 77001 FLUOROGUIDE FOR VEIN DEVICE: CPT | Performed by: SURGERY

## 2022-12-16 PROCEDURE — 6360000002 HC RX W HCPCS: Performed by: SURGERY

## 2022-12-16 PROCEDURE — 3600000013 HC SURGERY LEVEL 3 ADDTL 15MIN: Performed by: SURGERY

## 2022-12-16 PROCEDURE — 3600000003 HC SURGERY LEVEL 3 BASE: Performed by: SURGERY

## 2022-12-16 PROCEDURE — 3700000000 HC ANESTHESIA ATTENDED CARE: Performed by: SURGERY

## 2022-12-16 PROCEDURE — C1788 PORT, INDWELLING, IMP: HCPCS | Performed by: SURGERY

## 2022-12-16 PROCEDURE — 7100000010 HC PHASE II RECOVERY - FIRST 15 MIN: Performed by: SURGERY

## 2022-12-16 PROCEDURE — 3700000001 HC ADD 15 MINUTES (ANESTHESIA): Performed by: SURGERY

## 2022-12-16 PROCEDURE — 2580000003 HC RX 258

## 2022-12-16 PROCEDURE — 2500000003 HC RX 250 WO HCPCS: Performed by: SURGERY

## 2022-12-16 PROCEDURE — 6360000002 HC RX W HCPCS

## 2022-12-16 PROCEDURE — 3209999900 FLUORO FOR SURGICAL PROCEDURES

## 2022-12-16 PROCEDURE — 36561 INSERT TUNNELED CV CATH: CPT | Performed by: SURGERY

## 2022-12-16 PROCEDURE — 2500000003 HC RX 250 WO HCPCS

## 2022-12-16 PROCEDURE — 71045 X-RAY EXAM CHEST 1 VIEW: CPT

## 2022-12-16 PROCEDURE — 7100000001 HC PACU RECOVERY - ADDTL 15 MIN: Performed by: SURGERY

## 2022-12-16 PROCEDURE — 93005 ELECTROCARDIOGRAM TRACING: CPT

## 2022-12-16 DEVICE — PORT INFUS PLAS SGL LUMN W/ 9.6FR SIL CATH AIRGUARD VLV: Type: IMPLANTABLE DEVICE | Site: CHEST | Status: FUNCTIONAL

## 2022-12-16 RX ORDER — HYDROCODONE BITARTRATE AND ACETAMINOPHEN 5; 325 MG/1; MG/1
1 TABLET ORAL EVERY 8 HOURS PRN
Qty: 5 TABLET | Refills: 0 | Status: SHIPPED | OUTPATIENT
Start: 2022-12-16 | End: 2022-12-19

## 2022-12-16 RX ORDER — HEPARIN SODIUM,PORCINE 10 UNIT/ML
VIAL (ML) INTRAVENOUS PRN
Status: DISCONTINUED | OUTPATIENT
Start: 2022-12-16 | End: 2022-12-16 | Stop reason: ALTCHOICE

## 2022-12-16 RX ORDER — SODIUM CHLORIDE 0.9 % (FLUSH) 0.9 %
5-40 SYRINGE (ML) INJECTION EVERY 12 HOURS SCHEDULED
Status: DISCONTINUED | OUTPATIENT
Start: 2022-12-16 | End: 2022-12-16 | Stop reason: HOSPADM

## 2022-12-16 RX ORDER — HYDROMORPHONE HYDROCHLORIDE 1 MG/ML
0.5 INJECTION, SOLUTION INTRAMUSCULAR; INTRAVENOUS; SUBCUTANEOUS EVERY 5 MIN PRN
Status: DISCONTINUED | OUTPATIENT
Start: 2022-12-16 | End: 2022-12-16 | Stop reason: HOSPADM

## 2022-12-16 RX ORDER — DOCUSATE SODIUM 100 MG/1
100 CAPSULE, LIQUID FILLED ORAL 2 TIMES DAILY
Qty: 60 CAPSULE | Refills: 0 | Status: SHIPPED | OUTPATIENT
Start: 2022-12-16 | End: 2023-01-15

## 2022-12-16 RX ORDER — MIDAZOLAM HYDROCHLORIDE 1 MG/ML
INJECTION INTRAMUSCULAR; INTRAVENOUS PRN
Status: DISCONTINUED | OUTPATIENT
Start: 2022-12-16 | End: 2022-12-16 | Stop reason: SDUPTHER

## 2022-12-16 RX ORDER — BUPIVACAINE HYDROCHLORIDE AND EPINEPHRINE 2.5; 5 MG/ML; UG/ML
INJECTION, SOLUTION EPIDURAL; INFILTRATION; INTRACAUDAL; PERINEURAL PRN
Status: DISCONTINUED | OUTPATIENT
Start: 2022-12-16 | End: 2022-12-16 | Stop reason: ALTCHOICE

## 2022-12-16 RX ORDER — SODIUM CHLORIDE 0.9 % (FLUSH) 0.9 %
5-40 SYRINGE (ML) INJECTION PRN
Status: DISCONTINUED | OUTPATIENT
Start: 2022-12-16 | End: 2022-12-16 | Stop reason: HOSPADM

## 2022-12-16 RX ORDER — MORPHINE SULFATE 4 MG/ML
4 INJECTION, SOLUTION INTRAMUSCULAR; INTRAVENOUS
Status: DISCONTINUED | OUTPATIENT
Start: 2022-12-16 | End: 2022-12-16 | Stop reason: HOSPADM

## 2022-12-16 RX ORDER — FENTANYL CITRATE 50 UG/ML
INJECTION, SOLUTION INTRAMUSCULAR; INTRAVENOUS PRN
Status: DISCONTINUED | OUTPATIENT
Start: 2022-12-16 | End: 2022-12-16 | Stop reason: SDUPTHER

## 2022-12-16 RX ORDER — SODIUM CHLORIDE, SODIUM LACTATE, POTASSIUM CHLORIDE, CALCIUM CHLORIDE 600; 310; 30; 20 MG/100ML; MG/100ML; MG/100ML; MG/100ML
INJECTION, SOLUTION INTRAVENOUS CONTINUOUS PRN
Status: DISCONTINUED | OUTPATIENT
Start: 2022-12-16 | End: 2022-12-16 | Stop reason: SDUPTHER

## 2022-12-16 RX ORDER — DIPHENHYDRAMINE HYDROCHLORIDE 50 MG/ML
12.5 INJECTION INTRAMUSCULAR; INTRAVENOUS
Status: DISCONTINUED | OUTPATIENT
Start: 2022-12-16 | End: 2022-12-16 | Stop reason: HOSPADM

## 2022-12-16 RX ORDER — SODIUM CHLORIDE 9 MG/ML
INJECTION, SOLUTION INTRAVENOUS PRN
Status: DISCONTINUED | OUTPATIENT
Start: 2022-12-16 | End: 2022-12-16 | Stop reason: HOSPADM

## 2022-12-16 RX ORDER — HYDROCODONE BITARTRATE AND ACETAMINOPHEN 5; 325 MG/1; MG/1
2 TABLET ORAL EVERY 4 HOURS PRN
Status: DISCONTINUED | OUTPATIENT
Start: 2022-12-16 | End: 2022-12-16 | Stop reason: HOSPADM

## 2022-12-16 RX ORDER — HYDROMORPHONE HYDROCHLORIDE 1 MG/ML
0.25 INJECTION, SOLUTION INTRAMUSCULAR; INTRAVENOUS; SUBCUTANEOUS EVERY 5 MIN PRN
Status: DISCONTINUED | OUTPATIENT
Start: 2022-12-16 | End: 2022-12-16 | Stop reason: HOSPADM

## 2022-12-16 RX ORDER — HYDROCODONE BITARTRATE AND ACETAMINOPHEN 5; 325 MG/1; MG/1
1 TABLET ORAL EVERY 4 HOURS PRN
Status: DISCONTINUED | OUTPATIENT
Start: 2022-12-16 | End: 2022-12-16 | Stop reason: HOSPADM

## 2022-12-16 RX ORDER — MORPHINE SULFATE 4 MG/ML
2 INJECTION, SOLUTION INTRAMUSCULAR; INTRAVENOUS
Status: DISCONTINUED | OUTPATIENT
Start: 2022-12-16 | End: 2022-12-16 | Stop reason: HOSPADM

## 2022-12-16 RX ORDER — ONDANSETRON 4 MG/1
4 TABLET, ORALLY DISINTEGRATING ORAL EVERY 8 HOURS PRN
Status: DISCONTINUED | OUTPATIENT
Start: 2022-12-16 | End: 2022-12-16 | Stop reason: HOSPADM

## 2022-12-16 RX ORDER — PROPOFOL 10 MG/ML
INJECTION, EMULSION INTRAVENOUS CONTINUOUS PRN
Status: DISCONTINUED | OUTPATIENT
Start: 2022-12-16 | End: 2022-12-16 | Stop reason: SDUPTHER

## 2022-12-16 RX ORDER — LIDOCAINE HYDROCHLORIDE 10 MG/ML
INJECTION, SOLUTION EPIDURAL; INFILTRATION; INTRACAUDAL; PERINEURAL PRN
Status: DISCONTINUED | OUTPATIENT
Start: 2022-12-16 | End: 2022-12-16 | Stop reason: SDUPTHER

## 2022-12-16 RX ORDER — ONDANSETRON 4 MG/1
4 TABLET, ORALLY DISINTEGRATING ORAL EVERY 8 HOURS PRN
Qty: 10 TABLET | Refills: 2 | Status: SHIPPED | OUTPATIENT
Start: 2022-12-16

## 2022-12-16 RX ORDER — ONDANSETRON 2 MG/ML
4 INJECTION INTRAMUSCULAR; INTRAVENOUS EVERY 6 HOURS PRN
Status: DISCONTINUED | OUTPATIENT
Start: 2022-12-16 | End: 2022-12-16 | Stop reason: HOSPADM

## 2022-12-16 RX ORDER — CLINDAMYCIN PHOSPHATE 900 MG/50ML
900 INJECTION INTRAVENOUS ONCE
Status: DISCONTINUED | OUTPATIENT
Start: 2022-12-16 | End: 2022-12-16 | Stop reason: ALTCHOICE

## 2022-12-16 RX ORDER — METOCLOPRAMIDE HYDROCHLORIDE 5 MG/ML
10 INJECTION INTRAMUSCULAR; INTRAVENOUS
Status: DISCONTINUED | OUTPATIENT
Start: 2022-12-16 | End: 2022-12-16 | Stop reason: HOSPADM

## 2022-12-16 RX ADMIN — MIDAZOLAM 2 MG: 1 INJECTION INTRAMUSCULAR; INTRAVENOUS at 14:55

## 2022-12-16 RX ADMIN — FENTANYL CITRATE 25 MCG: 50 INJECTION, SOLUTION INTRAMUSCULAR; INTRAVENOUS at 15:03

## 2022-12-16 RX ADMIN — CLINDAMYCIN PHOSPHATE 900 MG: 18 INJECTION, SOLUTION INTRAMUSCULAR; INTRAVENOUS at 15:05

## 2022-12-16 RX ADMIN — PROPOFOL 180 MCG/KG/MIN: 10 INJECTION, EMULSION INTRAVENOUS at 15:02

## 2022-12-16 RX ADMIN — LIDOCAINE HYDROCHLORIDE 50 MG: 10 INJECTION, SOLUTION EPIDURAL; INFILTRATION; INTRACAUDAL; PERINEURAL at 15:00

## 2022-12-16 RX ADMIN — SODIUM CHLORIDE, SODIUM LACTATE, POTASSIUM CHLORIDE, AND CALCIUM CHLORIDE: 600; 310; 30; 20 INJECTION, SOLUTION INTRAVENOUS at 14:56

## 2022-12-16 ASSESSMENT — PAIN - FUNCTIONAL ASSESSMENT: PAIN_FUNCTIONAL_ASSESSMENT: NONE - DENIES PAIN

## 2022-12-16 ASSESSMENT — LIFESTYLE VARIABLES: SMOKING_STATUS: 0

## 2022-12-16 NOTE — DISCHARGE INSTR - DIET
Good nutrition is important when healing from an illness, injury, or surgery. Follow any nutrition recommendations given to you during your hospital stay. If you were given an oral nutrition supplement while in the hospital, continue to take this supplement at home. You can take it with meals, in-between meals, and/or before bedtime. These supplements can be purchased at most local grocery stores, pharmacies, and chain [employer]-stores. If you have any questions about your diet or nutrition, call the hospital and ask for the dietitian.     Your usual diet as tolerated

## 2022-12-16 NOTE — ANESTHESIA PRE PROCEDURE
Department of Anesthesiology  Preprocedure Note       Name:  Juanito Saravia   Age:  78 y.o.  :  1955                                          MRN:  [id number]         Date:  2022      Surgeon: Ev Smith):  Thelma Alonzo MD    Procedure: Procedure(s):  single lumen port placement with ultrasound and fluoroscopy    Medications prior to admission:   Prior to Admission medications    Medication Sig Start Date End Date Taking? Authorizing Provider   rivaroxaban (XARELTO) 20 MG TABS tablet TAKE 1 TABLET EVERY DAY WITH BREAKFAST  Patient taking differently: 20 mg daily (with breakfast) TAKE 1 TABLET EVERY DAY WITH BREAKFAST 22   JODY Ayala   amiodarone (CORDARONE) 200 MG tablet TAKE ONE TABLET BY MOUTH EVERY DAY  Patient taking differently: Take 200 mg by mouth daily 22   JODY Ayala   rosuvastatin (CRESTOR) 10 MG tablet TAKE 1 TABLET EVERY NIGHT  Patient taking differently: Take 10 mg by mouth daily 22   JODY Ayala   metoprolol succinate (TOPROL XL) 100 MG extended release tablet TAKE 1 TABLET BY MOUTH IN THE MORNING. 22   JODY Ayala   losartan (COZAAR) 100 MG tablet TAKE 1 TABLET EVERY DAY  Patient taking differently: Take 100 mg by mouth daily 11/15/22   JODY Ayala   enoxaparin (LOVENOX) 120 MG/0.8ML injection Inject 1 mL into the skin in the morning and 1 mL in the evening. 11/10 bid,  BID,  BID,  BID,  am only.   Patient not taking: Reported on 2022 11/3/22   JODY Ayala   flecainide [facility]) 50 MG tablet Take 1 tablet by mouth 2 times daily 10/13/22   Sherrell Prater MD   dilTIAZem (CARDIZEM CD) 120 MG extended release capsule Take 1 capsule by mouth in the morning and at bedtime 10/13/22   Sherrell Prater MD   Cholecalciferol (VITAMIN D3) 50 MCG ( [place]) [address]  Patient taking differently: Take 2,000 Units by mouth daily 22   JODY Ayala   fenofibrate 160 MG tablet Take 1 tablet by mouth daily 1/14/20 12/9/22  JODY Angulo   Multiple Vitamin (MULTI-VITAMIN PO) Take 1 tablet by mouth daily    Historical Provider, MD       Current medications:    No current facility-administered medications for this visit. No current outpatient medications on file. Facility-Administered Medications Ordered in Other Visits   Medication Dose Route Frequency Provider Last Rate Last Admin    clindamycin (CLEOCIN) 900 mg in dextrose 5 % 50 mL IVPB  900 mg IntraVENous Once Alba Cowden, MD           Allergies:     Allergies   Allergen Reactions    Pcn [Penicillins] Rash       Problem List:    Patient Active Problem List   Diagnosis Code    Adenomatous colon polyp D12.6    S/P colonoscopic polypectomy Z98.890    History of colonic polyps Z86.010    Obesity E66.9    Mixed hyperlipidemia E78.2    Essential hypertension I10    History of prostate cancer Z85.46    Morbid obesity ([facility] [address] E66.01    Chronic deep vein thrombosis (DVT) of left peroneal vein (HCC) I82.552    Vitamin D deficiency E55.9    Closed nondisplaced longitudinal fracture of left patella S82.025A    Cancer of prostate ([facility] [address] C61    Gastroesophageal reflux disease without esophagitis K21.9    Irregular heart beat I49.9    Atrial fibrillation with RVR (HCC) I48.91    Atrial fibrillation (HCC) I48.91    Atrial fibrillation with rapid ventricular response (HCC) I48.91    Nasal septum ulceration J34.0    Epistaxis R04.0    Old head injury Z87.828    Generalized enlarged lymph nodes R59.1       Past Medical History:        Diagnosis Date    Atrial fibrillation (HCC)     Cellulitis of left leg     Colon polyp     Elevated blood pressure reading without diagnosis of hypertension     Esophageal reflux     History of prostate cancer     Hx of blood clots     Hypertension     Mixed hyperlipidemia     TG>>LDL    Obesity     Pericardial effusion     Prostate cancer [facility])     2010 Dr Nicole Plata; results for input(s): POCGLU, POCNA, POCK, POCCL, POCBUN, POCHEMO, POCHCT in the last 72 hours. Coags:   Lab Results   Component Value Date/Time    PROTIME 22.6 10/18/2022 01:07 PM    INR 1.94 10/18/2022 01:07 PM    APTT 36.6 10/18/2022 01:07 PM       HCG (If Applicable): No results found for: PREGTESTUR, PREGSERUM, HCG, HCGQUANT     ABGs: No results found for: PHART, PO2ART, SXT4DZJ, ZGG6YNN, BEART, V1WDVAEK     Type & Screen (If Applicable):  No results found for: LABABO, LABRH    Drug/Infectious Status (If Applicable):  No results found for: HIV, HEPCAB    COVID-19 Screening (If Applicable):   Lab Results   Component Value Date/Time    COVID19 Not Detected 07/26/2022 04:17 PM           Anesthesia Evaluation  Patient summary reviewed and Nursing notes reviewed no history of anesthetic complications:   Airway: Mallampati: II  TM distance: >3 FB   Neck ROM: full  Mouth opening: > = 3 FB   Dental: normal exam   (+) upper dentures      Pulmonary:Negative Pulmonary ROS and normal exam  breath sounds clear to auscultation      (-) COPD, asthma and not a current smoker                           Cardiovascular:Negative CV ROS    (+) hypertension:, dysrhythmias: atrial fibrillation, hyperlipidemia      ECG reviewed  Rhythm: regular  Rate: normal           Beta Blocker:  Not on Beta Blocker         Neuro/Psych:   Negative Neuro/Psych ROS              GI/Hepatic/Renal: Neg GI/Hepatic/Renal ROS  (+) GERD:, morbid obesity          Endo/Other: Negative Endo/Other ROS   (+) blood dyscrasia: anticoagulation therapy:., .                  ROS comment: Vitamin D deficiencyh Abdominal:   (+) obese,     Abdomen: soft. Vascular: negative vascular ROS.  + DVT, . Other Findings:             Anesthesia Plan      MAC     ASA 3       Induction: intravenous. Anesthetic plan and risks discussed with patient.                         Debria Curling, MD   12/16/2022

## 2022-12-16 NOTE — DISCHARGE INSTR - ACTIVITY
Activity as tolerated except no driving if taking pain medication. Okay to shower over incisions but do not submerge under water like a tub or pool for at least 10 days. Watch for redness, drainage, or fever, and call for any questions or concerns.

## 2022-12-16 NOTE — ANESTHESIA POSTPROCEDURE EVALUATION
Department of Anesthesiology  Postprocedure Note    Patient: Tiffany Rosales  MRN: [id number]  YOB: 1955  Date of evaluation: 12/16/2022      Procedure Summary     Date: 12/16/22 Room / Location: [address]    Anesthesia Start: 8060 Anesthesia Stop: 0946    Procedure: single lumen port placement with ultrasound and fluoroscopy (Chest) Diagnosis:       Lymphoma, unspecified body region, unspecified lymphoma type ([address]      (Lymphoma, unspecified body region, unspecified lymphoma type ([address] [C85.90])    Surgeons: Nicole Watson MD Responsible Provider: JODY Salinas CRNA    Anesthesia Type: MAC ASA Status: 3          Anesthesia Type: No value filed.     Paul Phase I: Paul Score: 10    Paul Phase II:        Anesthesia Post Evaluation    Patient location during evaluation: PACU  Patient participation: waiting for patient participation  Level of consciousness: sleepy but conscious  Pain score: 0  Airway patency: patent  Nausea & Vomiting: no nausea and no vomiting  Complications: no  Cardiovascular status: hemodynamically stable and blood pressure returned to baseline  Respiratory status: acceptable and face mask  Hydration status: stable

## 2022-12-18 LAB
EKG P AXIS: -10 DEGREES
EKG P-R INTERVAL: 182 MS
EKG Q-T INTERVAL: 390 MS
EKG QRS DURATION: 86 MS
EKG QTC CALCULATION (BAZETT): 423 MS
EKG T AXIS: 59 DEGREES

## 2022-12-22 ENCOUNTER — OFFICE VISIT (OUTPATIENT)
Dept: ENT CLINIC | Age: 67
End: 2022-12-22
Payer: MEDICARE

## 2022-12-22 ENCOUNTER — HOSPITAL ENCOUNTER (OUTPATIENT)
Dept: INFUSION THERAPY | Age: 67
Discharge: HOME | End: 2022-12-22
Payer: MEDICARE

## 2022-12-22 ENCOUNTER — OFFICE VISIT (OUTPATIENT)
Dept: HEMATOLOGY | Age: 67
End: 2022-12-22
Payer: MEDICARE

## 2022-12-22 ENCOUNTER — ANESTHESIA EVENT (OUTPATIENT)
Dept: OPERATING ROOM | Age: 67
End: 2022-12-22
Payer: MEDICARE

## 2022-12-22 VITALS
HEART RATE: 75 BPM | WEIGHT: 315 LBS | OXYGEN SATURATION: 98 % | DIASTOLIC BLOOD PRESSURE: 80 MMHG | SYSTOLIC BLOOD PRESSURE: 140 MMHG | BODY MASS INDEX: 44.34 KG/M2

## 2022-12-22 VITALS — DIASTOLIC BLOOD PRESSURE: 84 MMHG | SYSTOLIC BLOOD PRESSURE: 130 MMHG

## 2022-12-22 DIAGNOSIS — C83.31 RETICULOSARCOMA OF LYMPH NODES OF HEAD, FACE, AND NECK (HCC): ICD-10-CM

## 2022-12-22 DIAGNOSIS — C85.10 LARGE B-CELL LYMPHOMA (HCC): ICD-10-CM

## 2022-12-22 DIAGNOSIS — Z01.818 PREOP EXAMINATION: ICD-10-CM

## 2022-12-22 DIAGNOSIS — C85.91 LYMPHOMA OF LYMPH NODES OF NECK, UNSPECIFIED LYMPHOMA TYPE (HCC): Primary | ICD-10-CM

## 2022-12-22 DIAGNOSIS — C85.10 LARGE B-CELL LYMPHOMA (HCC): Primary | ICD-10-CM

## 2022-12-22 LAB
ALBUMIN SERPL-MCNC: 4.5 G/DL (ref 3.5–5.2)
ALP BLD-CCNC: 63 U/L (ref 40–130)
ALT SERPL-CCNC: 24 U/L (ref 21–72)
ANION GAP SERPL CALCULATED.3IONS-SCNC: 13 MMOL/L (ref 7–19)
AST SERPL-CCNC: 24 U/L (ref 17–59)
BASOPHILS ABSOLUTE: 0.04 K/UL (ref 0.01–0.08)
BASOPHILS RELATIVE PERCENT: 0.7 % (ref 0.1–1.2)
BILIRUB SERPL-MCNC: 0.4 MG/DL (ref 0.2–1.3)
BUN BLDV-MCNC: 23 MG/DL (ref 9–20)
CALCIUM SERPL-MCNC: 9.5 MG/DL (ref 8.4–10.2)
CHLORIDE BLD-SCNC: 104 MMOL/L (ref 98–111)
CO2: 25 MMOL/L (ref 22–29)
CREAT SERPL-MCNC: 1.5 MG/DL (ref 0.6–1.2)
EOSINOPHILS ABSOLUTE: 0.17 K/UL (ref 0.04–0.54)
EOSINOPHILS RELATIVE PERCENT: 3 % (ref 0.7–7)
GFR SERPL CREATININE-BSD FRML MDRD: 51 ML/MIN/{1.73_M2}
GLOBULIN: 2.6 G/DL
GLUCOSE BLD-MCNC: 112 MG/DL (ref 74–106)
HCT VFR BLD CALC: 39.7 % (ref 40.1–51)
HEMOGLOBIN: 13.4 G/DL (ref 13.7–17.5)
LACTATE DEHYDROGENASE: 178 U/L (ref 120–246)
LYMPHOCYTES ABSOLUTE: 1.81 K/UL (ref 1.18–3.74)
LYMPHOCYTES RELATIVE PERCENT: 31.5 % (ref 19.3–53.1)
MCH RBC QN AUTO: 29.1 PG (ref 25.7–32.2)
MCHC RBC AUTO-ENTMCNC: 33.8 G/DL (ref 32.3–36.5)
MCV RBC AUTO: 86.1 FL (ref 79–92.2)
MONOCYTES ABSOLUTE: 0.55 K/UL (ref 0.24–0.82)
MONOCYTES RELATIVE PERCENT: 9.6 % (ref 4.7–12.5)
NEUTROPHILS ABSOLUTE: 3.16 K/UL (ref 1.56–6.13)
NEUTROPHILS RELATIVE PERCENT: 54.9 % (ref 34–71.1)
PDW BLD-RTO: 13.1 % (ref 11.6–14.4)
PLATELET # BLD: 218 K/UL (ref 163–337)
PMV BLD AUTO: 9.9 FL (ref 7.4–10.4)
POTASSIUM SERPL-SCNC: 4.4 MMOL/L (ref 3.5–5.1)
RBC # BLD: 4.61 M/UL (ref 4.63–6.08)
SODIUM BLD-SCNC: 142 MMOL/L (ref 137–145)
TOTAL PROTEIN: 7.1 G/DL (ref 6.3–8.2)
URIC ACID, SERUM: 6.9 MG/DL (ref 3.5–8.5)
WBC # BLD: 5.75 K/UL (ref 4.23–9.07)

## 2022-12-22 PROCEDURE — G8417 CALC BMI ABV UP PARAM F/U: HCPCS | Performed by: OTOLARYNGOLOGY

## 2022-12-22 PROCEDURE — 83615 LACTATE (LD) (LDH) ENZYME: CPT

## 2022-12-22 PROCEDURE — 3074F SYST BP LT 130 MM HG: CPT | Performed by: INTERNAL MEDICINE

## 2022-12-22 PROCEDURE — G8427 DOCREV CUR MEDS BY ELIG CLIN: HCPCS | Performed by: OTOLARYNGOLOGY

## 2022-12-22 PROCEDURE — 1036F TOBACCO NON-USER: CPT | Performed by: OTOLARYNGOLOGY

## 2022-12-22 PROCEDURE — 1123F ACP DISCUSS/DSCN MKR DOCD: CPT | Performed by: INTERNAL MEDICINE

## 2022-12-22 PROCEDURE — 1123F ACP DISCUSS/DSCN MKR DOCD: CPT | Performed by: OTOLARYNGOLOGY

## 2022-12-22 PROCEDURE — 84550 ASSAY OF BLOOD/URIC ACID: CPT

## 2022-12-22 PROCEDURE — 1036F TOBACCO NON-USER: CPT | Performed by: INTERNAL MEDICINE

## 2022-12-22 PROCEDURE — 3078F DIAST BP <80 MM HG: CPT | Performed by: OTOLARYNGOLOGY

## 2022-12-22 PROCEDURE — 99212 OFFICE O/P EST SF 10 MIN: CPT

## 2022-12-22 PROCEDURE — 96367 TX/PROPH/DG ADDL SEQ IV INF: CPT

## 2022-12-22 PROCEDURE — 85025 COMPLETE CBC W/AUTO DIFF WBC: CPT

## 2022-12-22 PROCEDURE — G8417 CALC BMI ABV UP PARAM F/U: HCPCS | Performed by: INTERNAL MEDICINE

## 2022-12-22 PROCEDURE — G8484 FLU IMMUNIZE NO ADMIN: HCPCS | Performed by: OTOLARYNGOLOGY

## 2022-12-22 PROCEDURE — 80053 COMPREHEN METABOLIC PANEL: CPT

## 2022-12-22 PROCEDURE — 99204 OFFICE O/P NEW MOD 45 MIN: CPT | Performed by: OTOLARYNGOLOGY

## 2022-12-22 PROCEDURE — 36415 COLL VENOUS BLD VENIPUNCTURE: CPT

## 2022-12-22 PROCEDURE — 99215 OFFICE O/P EST HI 40 MIN: CPT | Performed by: INTERNAL MEDICINE

## 2022-12-22 PROCEDURE — 3017F COLORECTAL CA SCREEN DOC REV: CPT | Performed by: INTERNAL MEDICINE

## 2022-12-22 PROCEDURE — G8484 FLU IMMUNIZE NO ADMIN: HCPCS | Performed by: INTERNAL MEDICINE

## 2022-12-22 PROCEDURE — 3017F COLORECTAL CA SCREEN DOC REV: CPT | Performed by: OTOLARYNGOLOGY

## 2022-12-22 PROCEDURE — 3078F DIAST BP <80 MM HG: CPT | Performed by: INTERNAL MEDICINE

## 2022-12-22 PROCEDURE — G8427 DOCREV CUR MEDS BY ELIG CLIN: HCPCS | Performed by: INTERNAL MEDICINE

## 2022-12-22 PROCEDURE — 3074F SYST BP LT 130 MM HG: CPT | Performed by: OTOLARYNGOLOGY

## 2022-12-22 RX ORDER — OXYCODONE HYDROCHLORIDE AND ACETAMINOPHEN 5; 325 MG/1; MG/1
1 TABLET ORAL EVERY 6 HOURS PRN
Qty: 28 TABLET | Refills: 0 | Status: SHIPPED | OUTPATIENT
Start: 2022-12-22 | End: 2022-12-29

## 2022-12-22 RX ORDER — CLINDAMYCIN HYDROCHLORIDE 300 MG/1
300 CAPSULE ORAL 3 TIMES DAILY
Qty: 30 CAPSULE | Refills: 0 | Status: SHIPPED | OUTPATIENT
Start: 2022-12-22 | End: 2023-01-01

## 2022-12-22 ASSESSMENT — ENCOUNTER SYMPTOMS
GASTROINTESTINAL NEGATIVE: 1
ALLERGIC/IMMUNOLOGIC NEGATIVE: 1
EYES NEGATIVE: 1
EYES NEGATIVE: 1
RESPIRATORY NEGATIVE: 1
ALLERGIC/IMMUNOLOGIC NEGATIVE: 1
RESPIRATORY NEGATIVE: 1
GASTROINTESTINAL NEGATIVE: 1
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
## 2022-12-22 NOTE — H&P (VIEW-ONLY)
2022    Maryann Avila (:  1955) is a 78 y.o. male, Established patient, here for evaluation of the following chief complaint(s):  New Patient (Large B-cell Lymphoma )      Vitals:    22 1108   BP: 130/84       Wt Readings from Last 3 Encounters:   22 (!) 336 lb 1.6 oz (152.5 kg)   22 (!) 337 lb (152.9 kg)   22 (!) 327 lb (148.3 kg)       BP Readings from Last 3 Encounters:   22 130/84   22 (!) 140/80   22 (!) 136/90         SUBJECTIVE/OBJECTIVE:    New patient seen today for his lymph nodes. The family has been through quite a bit try to find out what is going on. He was seen in the ER for something unrelated and they did a CAT scan which showed significant mediastinal lymphadenopathy. He eventually had a biopsy which showed lymphoma. He has been seeing oncology but they are not sure what type of lymphoma this is. He does have a port. Review of Systems   Constitutional: Negative. HENT: Negative. Eyes: Negative. Respiratory: Negative. Cardiovascular: Negative. Gastrointestinal: Negative. Endocrine: Negative. Musculoskeletal: Negative. Skin: Negative. Allergic/Immunologic: Negative. Neurological: Negative. Hematological:  Positive for adenopathy. Psychiatric/Behavioral: Negative. Physical Exam  Vitals reviewed. Constitutional:       Appearance: Normal appearance. He is normal weight. HENT:      Head: Normocephalic and atraumatic. Right Ear: Tympanic membrane, ear canal and external ear normal.      Left Ear: Tympanic membrane, ear canal and external ear normal.      Nose: Nose normal.      Mouth/Throat:      Mouth: Mucous membranes are moist.      Pharynx: Oropharynx is clear. Eyes:      Extraocular Movements: Extraocular movements intact. Pupils: Pupils are equal, round, and reactive to light. Cardiovascular:      Rate and Rhythm: Normal rate and regular rhythm.    Pulmonary: Effort: Pulmonary effort is normal.      Breath sounds: Normal breath sounds. Musculoskeletal:      Cervical back: Normal range of motion. Lymphadenopathy:      Cervical: Cervical adenopathy present. Skin:     General: Skin is warm and dry. Neurological:      General: No focal deficit present. Mental Status: He is alert and oriented to person, place, and time. Psychiatric:         Mood and Affect: Mood normal.         Behavior: Behavior normal.            ASSESSMENT/PLAN:    1. Lymphoma of lymph nodes of neck, unspecified lymphoma type ([facility]  -     CT BX/REMV,LYMPH NODE,DEEP CERV; Future  Excisional biopsy tomorrow. Risk and benefits discussed he would like to proceed. Return in about 12 days (around 1/3/2023) for postop. An electronic signature was used to authenticate this note. Teetee Wallace MD       Please note that this chart was generated using dragon dictation software. Although every effort was made to ensure the accuracy of this automated transcription, some errors in transcription may have occurred.

## 2022-12-22 NOTE — RESULT ENCOUNTER NOTE
Spoke with patient's brother, with instructions for patient to hydrate (elevated Creatinine). He verbalized understanding to enforce PO fluids to maintain hydration.

## 2022-12-22 NOTE — PROGRESS NOTES
2022    Rachael Adams (:  1955) is a 78 y.o. male, Established patient, here for evaluation of the following chief complaint(s):  New Patient (Large B-cell Lymphoma )      Vitals:    22 1108   BP: 130/84       Wt Readings from Last 3 Encounters:   22 (!) 336 lb 1.6 oz (152.5 kg)   22 (!) 337 lb (152.9 kg)   22 (!) 327 lb (148.3 kg)       BP Readings from Last 3 Encounters:   22 130/84   22 (!) 140/80   22 (!) 136/90         SUBJECTIVE/OBJECTIVE:    New patient seen today for his lymph nodes. The family has been through quite a bit try to find out what is going on. He was seen in the ER for something unrelated and they did a CAT scan which showed significant mediastinal lymphadenopathy. He eventually had a biopsy which showed lymphoma. He has been seeing oncology but they are not sure what type of lymphoma this is. He does have a port. Review of Systems   Constitutional: Negative. HENT: Negative. Eyes: Negative. Respiratory: Negative. Cardiovascular: Negative. Gastrointestinal: Negative. Endocrine: Negative. Musculoskeletal: Negative. Skin: Negative. Allergic/Immunologic: Negative. Neurological: Negative. Hematological:  Positive for adenopathy. Psychiatric/Behavioral: Negative. Physical Exam  Vitals reviewed. Constitutional:       Appearance: Normal appearance. He is normal weight. HENT:      Head: Normocephalic and atraumatic. Right Ear: Tympanic membrane, ear canal and external ear normal.      Left Ear: Tympanic membrane, ear canal and external ear normal.      Nose: Nose normal.      Mouth/Throat:      Mouth: Mucous membranes are moist.      Pharynx: Oropharynx is clear. Eyes:      Extraocular Movements: Extraocular movements intact. Pupils: Pupils are equal, round, and reactive to light. Cardiovascular:      Rate and Rhythm: Normal rate and regular rhythm.    Pulmonary: Effort: Pulmonary effort is normal.      Breath sounds: Normal breath sounds. Musculoskeletal:      Cervical back: Normal range of motion. Lymphadenopathy:      Cervical: Cervical adenopathy present. Skin:     General: Skin is warm and dry. Neurological:      General: No focal deficit present. Mental Status: He is alert and oriented to person, place, and time. Psychiatric:         Mood and Affect: Mood normal.         Behavior: Behavior normal.            ASSESSMENT/PLAN:    1. Lymphoma of lymph nodes of neck, unspecified lymphoma type ([facility] [address]  -     MA BX/REMV,LYMPH NODE,DEEP CERV; Future  Excisional biopsy tomorrow. Risk and benefits discussed he would like to proceed. Return in about 12 days (around 1/3/2023) for postop. An electronic signature was used to authenticate this note. Stefanie Howell MD       Please note that this chart was generated using dragon dictation software. Although every effort was made to ensure the accuracy of this automated transcription, some errors in transcription may have occurred.

## 2022-12-22 NOTE — H&P
2022    Dannis Riedel (:  1955) is a 78 y.o. male, Established patient, here for evaluation of the following chief complaint(s):  New Patient (Large B-cell Lymphoma )      Vitals:    22 1108   BP: 130/84       Wt Readings from Last 3 Encounters:   22 (!) 336 lb 1.6 oz (152.5 kg)   22 (!) 337 lb (152.9 kg)   22 (!) 327 lb (148.3 kg)       BP Readings from Last 3 Encounters:   22 130/84   22 (!) 140/80   22 (!) 136/90         SUBJECTIVE/OBJECTIVE:    New patient seen today for his lymph nodes. The family has been through quite a bit try to find out what is going on. He was seen in the ER for something unrelated and they did a CAT scan which showed significant mediastinal lymphadenopathy. He eventually had a biopsy which showed lymphoma. He has been seeing oncology but they are not sure what type of lymphoma this is. He does have a port. Review of Systems   Constitutional: Negative. HENT: Negative. Eyes: Negative. Respiratory: Negative. Cardiovascular: Negative. Gastrointestinal: Negative. Endocrine: Negative. Musculoskeletal: Negative. Skin: Negative. Allergic/Immunologic: Negative. Neurological: Negative. Hematological:  Positive for adenopathy. Psychiatric/Behavioral: Negative. Physical Exam  Vitals reviewed. Constitutional:       Appearance: Normal appearance. He is normal weight. HENT:      Head: Normocephalic and atraumatic. Right Ear: Tympanic membrane, ear canal and external ear normal.      Left Ear: Tympanic membrane, ear canal and external ear normal.      Nose: Nose normal.      Mouth/Throat:      Mouth: Mucous membranes are moist.      Pharynx: Oropharynx is clear. Eyes:      Extraocular Movements: Extraocular movements intact. Pupils: Pupils are equal, round, and reactive to light. Cardiovascular:      Rate and Rhythm: Normal rate and regular rhythm.    Pulmonary: Effort: Pulmonary effort is normal.      Breath sounds: Normal breath sounds. Musculoskeletal:      Cervical back: Normal range of motion. Lymphadenopathy:      Cervical: Cervical adenopathy present. Skin:     General: Skin is warm and dry. Neurological:      General: No focal deficit present. Mental Status: He is alert and oriented to person, place, and time. Psychiatric:         Mood and Affect: Mood normal.         Behavior: Behavior normal.            ASSESSMENT/PLAN:    1. Lymphoma of lymph nodes of neck, unspecified lymphoma type ([facility] [address]  -     RI BX/REMV,LYMPH NODE,DEEP CERV; Future  Excisional biopsy tomorrow. Risk and benefits discussed he would like to proceed. Return in about 12 days (around 1/3/2023) for postop. An electronic signature was used to authenticate this note. Keri Pinto MD       Please note that this chart was generated using dragon dictation software. Although every effort was made to ensure the accuracy of this automated transcription, some errors in transcription may have occurred.

## 2022-12-23 ENCOUNTER — ANESTHESIA (OUTPATIENT)
Dept: OPERATING ROOM | Age: 67
End: 2022-12-23
Payer: MEDICARE

## 2022-12-23 ENCOUNTER — HOSPITAL ENCOUNTER (OUTPATIENT)
Age: 67
Setting detail: OUTPATIENT SURGERY
Discharge: HOME | End: 2022-12-23
Attending: OTOLARYNGOLOGY | Admitting: OTOLARYNGOLOGY
Payer: MEDICARE

## 2022-12-23 VITALS
BODY MASS INDEX: 41.75 KG/M2 | DIASTOLIC BLOOD PRESSURE: 88 MMHG | RESPIRATION RATE: 18 BRPM | WEIGHT: 315 LBS | OXYGEN SATURATION: 98 % | SYSTOLIC BLOOD PRESSURE: 147 MMHG | TEMPERATURE: 97.3 F | HEART RATE: 72 BPM | HEIGHT: 73 IN

## 2022-12-23 PROCEDURE — 2580000003 HC RX 258: Performed by: ANESTHESIOLOGY

## 2022-12-23 PROCEDURE — 6370000000 HC RX 637 (ALT 250 FOR IP): Performed by: ANESTHESIOLOGY

## 2022-12-23 RX ORDER — DIPHENHYDRAMINE HYDROCHLORIDE 50 MG/ML
12.5 INJECTION INTRAMUSCULAR; INTRAVENOUS
Status: DISCONTINUED | OUTPATIENT
Start: 2022-12-23 | End: 2022-12-23 | Stop reason: HOSPADM

## 2022-12-23 RX ORDER — MIDAZOLAM HYDROCHLORIDE 2 MG/2ML
2 INJECTION, SOLUTION INTRAMUSCULAR; INTRAVENOUS
Status: DISCONTINUED | OUTPATIENT
Start: 2022-12-23 | End: 2022-12-23 | Stop reason: HOSPADM

## 2022-12-23 RX ORDER — SODIUM CHLORIDE 0.9 % (FLUSH) 0.9 %
5-40 SYRINGE (ML) INJECTION PRN
Status: DISCONTINUED | OUTPATIENT
Start: 2022-12-23 | End: 2022-12-23 | Stop reason: HOSPADM

## 2022-12-23 RX ORDER — FAMOTIDINE 20 MG/1
20 TABLET, FILM COATED ORAL ONCE
Status: COMPLETED | OUTPATIENT
Start: 2022-12-23 | End: 2022-12-23

## 2022-12-23 RX ORDER — SCOLOPAMINE TRANSDERMAL SYSTEM 1 MG/1
1 PATCH, EXTENDED RELEASE TRANSDERMAL
Status: DISCONTINUED | OUTPATIENT
Start: 2022-12-23 | End: 2022-12-23 | Stop reason: HOSPADM

## 2022-12-23 RX ORDER — LIDOCAINE HYDROCHLORIDE 10 MG/ML
1 INJECTION, SOLUTION EPIDURAL; INFILTRATION; INTRACAUDAL; PERINEURAL
Status: DISCONTINUED | OUTPATIENT
Start: 2022-12-23 | End: 2022-12-23 | Stop reason: HOSPADM

## 2022-12-23 RX ORDER — SODIUM CHLORIDE 9 MG/ML
INJECTION, SOLUTION INTRAVENOUS PRN
Status: DISCONTINUED | OUTPATIENT
Start: 2022-12-23 | End: 2022-12-23 | Stop reason: HOSPADM

## 2022-12-23 RX ORDER — MORPHINE SULFATE 2 MG/ML
2 INJECTION, SOLUTION INTRAMUSCULAR; INTRAVENOUS EVERY 5 MIN PRN
Status: DISCONTINUED | OUTPATIENT
Start: 2022-12-23 | End: 2022-12-23 | Stop reason: HOSPADM

## 2022-12-23 RX ORDER — SODIUM CHLORIDE 0.9 % (FLUSH) 0.9 %
5-40 SYRINGE (ML) INJECTION EVERY 12 HOURS SCHEDULED
Status: DISCONTINUED | OUTPATIENT
Start: 2022-12-23 | End: 2022-12-23 | Stop reason: HOSPADM

## 2022-12-23 RX ORDER — MEPERIDINE HYDROCHLORIDE 25 MG/ML
12.5 INJECTION INTRAMUSCULAR; INTRAVENOUS; SUBCUTANEOUS EVERY 5 MIN PRN
Status: DISCONTINUED | OUTPATIENT
Start: 2022-12-23 | End: 2022-12-23 | Stop reason: HOSPADM

## 2022-12-23 RX ORDER — METOCLOPRAMIDE HYDROCHLORIDE 5 MG/ML
10 INJECTION INTRAMUSCULAR; INTRAVENOUS
Status: DISCONTINUED | OUTPATIENT
Start: 2022-12-23 | End: 2022-12-23 | Stop reason: HOSPADM

## 2022-12-23 RX ORDER — SODIUM CHLORIDE, SODIUM LACTATE, POTASSIUM CHLORIDE, CALCIUM CHLORIDE 600; 310; 30; 20 MG/100ML; MG/100ML; MG/100ML; MG/100ML
INJECTION, SOLUTION INTRAVENOUS CONTINUOUS
Status: DISCONTINUED | OUTPATIENT
Start: 2022-12-23 | End: 2022-12-23 | Stop reason: HOSPADM

## 2022-12-23 RX ORDER — MORPHINE SULFATE 4 MG/ML
4 INJECTION, SOLUTION INTRAMUSCULAR; INTRAVENOUS EVERY 5 MIN PRN
Status: DISCONTINUED | OUTPATIENT
Start: 2022-12-23 | End: 2022-12-23 | Stop reason: HOSPADM

## 2022-12-23 RX ADMIN — FAMOTIDINE 20 MG: 20 TABLET ORAL at 07:00

## 2022-12-23 RX ADMIN — SODIUM CHLORIDE, SODIUM LACTATE, POTASSIUM CHLORIDE, AND CALCIUM CHLORIDE: 600; 310; 30; 20 INJECTION, SOLUTION INTRAVENOUS at 06:56

## 2022-12-23 ASSESSMENT — ENCOUNTER SYMPTOMS: SHORTNESS OF BREATH: 0

## 2022-12-23 ASSESSMENT — PAIN - FUNCTIONAL ASSESSMENT: PAIN_FUNCTIONAL_ASSESSMENT: NONE - DENIES PAIN

## 2022-12-23 ASSESSMENT — LIFESTYLE VARIABLES: SMOKING_STATUS: 0

## 2022-12-23 NOTE — ANESTHESIA PRE PROCEDURE
Department of Anesthesiology  Preprocedure Note       Name:  Nanci Rinaldi   Age:  78 y.o.  :  1955                                          MRN:  [id number]         Date:  2022      Surgeon: Benson Hoyt):  Connie Gustafson MD    Procedure: Procedure(s):  EXCISION OF CERVICLE LYMPH NODY-BIOPSY    Medications prior to admission:   Prior to Admission medications    Medication Sig Start Date End Date Taking? Authorizing Provider   clindamycin (CLEOCIN) 300 MG capsule Take 1 capsule by mouth 3 times daily for 10 days 22  Connie Gustafson MD   oxyCODONE-acetaminophen (ENDOCET) 5-325 MG per tablet Take 1 tablet by mouth every 6 hours as needed for Pain for up to 7 days. Intended supply: 7 days.  Take lowest dose possible to manage pain Max Daily Amount: 4 tablets 22  Connie Gustafson MD   ondansetron (ZOFRAN-ODT) 4 MG disintegrating tablet Take 1 tablet by mouth every 8 hours as needed for Nausea or Vomiting 22   Kiersten Mckeon MD   docusate sodium (COLACE) 100 MG capsule Take 1 capsule by mouth 2 times daily 12/16/22 1/15/23  Kiersten Mckeon MD   rivaroxaban (XARELTO) 20 MG TABS tablet TAKE 1 TABLET EVERY DAY WITH BREAKFAST  Patient taking differently: 20 mg daily (with breakfast) TAKE 1 TABLET EVERY DAY WITH BREAKFAST 22   JODY Montero   amiodarone (CORDARONE) 200 MG tablet TAKE ONE TABLET BY MOUTH EVERY DAY  Patient taking differently: Take 200 mg by mouth daily 22   JODY Montero   rosuvastatin (CRESTOR) 10 MG tablet TAKE 1 TABLET EVERY NIGHT  Patient taking differently: Take 10 mg by mouth daily 22   JODY Montero   metoprolol succinate (TOPROL XL) 100 MG extended release tablet TAKE 1 TABLET BY MOUTH IN THE MORNING. 22   JODY Montero   losartan (COZAAR) 100 MG tablet TAKE 1 TABLET EVERY DAY  Patient taking differently: Take 100 mg by mouth daily 11/15/22   JODY Montero   flecainide (TAMBOCOR) 50 MG tablet Take 1 tablet by mouth 2 times daily 10/13/22   Jing Bowman MD   dilTIAZem (CARDIZEM CD) 120 MG extended release capsule Take 1 capsule by mouth in the morning and at bedtime 10/13/22   Jing Bowman MD   Cholecalciferol (VITAMIN D3) 50 MCG (2000 UT) Lilidoraqueltr. 31 DAY  Patient taking differently: Take 2,000 Units by mouth daily 7/18/22   JODY Romano   fenofibrate 160 MG tablet Take 1 tablet by mouth daily 1/14/20 12/23/22  JODY Romano   Multiple Vitamin (MULTI-VITAMIN PO) Take 1 tablet by mouth daily    Historical Provider, MD       Current medications:    Current Facility-Administered Medications   Medication Dose Route Frequency Provider Last Rate Last Admin    lactated ringers infusion   IntraVENous Continuous Sam Mehta MD           Allergies:     Allergies   Allergen Reactions    Pcn [Penicillins] Rash       Problem List:    Patient Active Problem List   Diagnosis Code    Adenomatous colon polyp D12.6    S/P colonoscopic polypectomy Z98.890    History of colonic polyps Z86.010    Obesity E66.9    Mixed hyperlipidemia E78.2    Essential hypertension I10    History of prostate cancer Z85.46    Morbid obesity ([facility] [address] E66.01    Chronic deep vein thrombosis (DVT) of left peroneal vein (HCC) I82.552    Vitamin D deficiency E55.9    Closed nondisplaced longitudinal fracture of left patella S82.025A    Cancer of prostate ([facility] [address] C61    Gastroesophageal reflux disease without esophagitis K21.9    Irregular heart beat I49.9    Atrial fibrillation with RVR (HCC) I48.91    Atrial fibrillation (HCC) I48.91    Atrial fibrillation with rapid ventricular response (HCC) I48.91    Nasal septum ulceration J34.0    Epistaxis R04.0    Old head injury Z87.828    Generalized enlarged lymph nodes R59.1    Lymphoma (HCC) C85.90       Past Medical History:        Diagnosis Date    Atrial fibrillation (HCC)     Cellulitis of left leg     Colon polyp     Elevated blood pressure reading without diagnosis of hypertension     Esophageal reflux     History of prostate cancer     Hx of blood clots     Hypertension     Mixed hyperlipidemia     TG>>LDL    Obesity     Pericardial effusion     Prostate cancer [facility])     2010 Dr Olimpia Javed;  implants, XRT       Past Surgical History:        Procedure Laterality Date    BONE MARROW BIOPSY N/A 12/9/2022    BONE MARROW ASPIRATION BIOPSY performed by Wilmer Carr PA-C at [facility] COLONOSCOPY  6/2/2009        COLONOSCOPY  2012        PORT SURGERY N/A 12/16/2022    single lumen port placement with ultrasound and fluoroscopy performed by Jonny Sandhoff, MD at [address] [facility]  3/22/2000        VASECTOMY         Social History:    Social History     Tobacco Use    Smoking status: Never    Smokeless tobacco: Never   Substance Use Topics    Alcohol use: No                                Counseling given: Not Answered      Vital Signs (Current):   Vitals:    12/23/22 0611 12/23/22 0623   BP: (!) 147/88    Pulse: 72 72   Resp: 18    Temp: 97.3 °F (36.3 °C)    TempSrc: Tympanic    SpO2: 98%    Weight: (!) 336 lb (152.4 kg)    Height: 6' 1\" (1.854 m)                                               BP Readings from Last 3 Encounters:   12/23/22 (!) 147/88   12/22/22 130/84   12/22/22 (!) 140/80       NPO Status: Time of last liquid consumption: 1800                        Time of last solid consumption: 1300                        Date of last liquid consumption: 12/22/22                        Date of last solid food consumption: 12/22/22    BMI:   Wt Readings from Last 3 Encounters:   12/23/22 (!) 336 lb (152.4 kg)   12/22/22 (!) 336 lb 1.6 oz (152.5 kg)   12/16/22 (!) 337 lb (152.9 kg)     Body mass index is 44.33 kg/m².     CBC:   Lab Results   Component Value Date/Time    WBC 5.75 12/22/2022 10:05 AM    RBC 4.61 12/22/2022 10:05 AM    HGB 13.4 12/22/2022 10:05 AM    HCT 39.7 12/22/2022 10:05 AM    MCV 86.1 12/22/2022 10:05 AM    RDW 13.1 12/22/2022 10:05 AM     12/22/2022 10:05 AM       CMP:   Lab Results   Component Value Date/Time     12/22/2022 12:24 PM    K 4.4 12/22/2022 12:24 PM    K 4.2 10/09/2022 07:54 AM     12/22/2022 12:24 PM    CO2 25 12/22/2022 12:24 PM    BUN 23 12/22/2022 12:24 PM    CREATININE 1.5 12/22/2022 12:24 PM    GFRAA >59 10/09/2022 07:54 AM    LABGLOM 51 12/22/2022 12:24 PM    GLUCOSE 112 12/22/2022 12:24 PM    PROT 7.1 12/22/2022 12:24 PM    CALCIUM 9.5 12/22/2022 12:24 PM    BILITOT 0.4 12/22/2022 12:24 PM    ALKPHOS 63 12/22/2022 12:24 PM    AST 24 12/22/2022 12:24 PM    ALT 24 12/22/2022 12:24 PM       POC Tests: No results for input(s): POCGLU, POCNA, POCK, POCCL, POCBUN, POCHEMO, POCHCT in the last 72 hours. Coags:   Lab Results   Component Value Date/Time    PROTIME 22.6 10/18/2022 01:07 PM    INR 1.94 10/18/2022 01:07 PM    APTT 36.6 10/18/2022 01:07 PM       HCG (If Applicable): No results found for: PREGTESTUR, PREGSERUM, HCG, HCGQUANT     ABGs: No results found for: PHART, PO2ART, QLZ7AOI, OJY5FIG, BEART, F0GQBZTC     Type & Screen (If Applicable):  No results found for: LABABO, LABRH    Drug/Infectious Status (If Applicable):  No results found for: HIV, HEPCAB    COVID-19 Screening (If Applicable):   Lab Results   Component Value Date/Time    COVID19 Not Detected 07/26/2022 04:17 PM           Anesthesia Evaluation  Patient summary reviewed and Nursing notes reviewed no history of anesthetic complications:   Airway: Mallampati: IV  TM distance: >3 FB   Neck ROM: full  Mouth opening: < 3 FB   Dental:    (+) upper dentures      Pulmonary:Negative Pulmonary ROS and normal exam  breath sounds clear to auscultation  (+) sleep apnea:      (-) shortness of breath and not a current smoker          Patient did not smoke on day of surgery.                  Cardiovascular:    (+) hypertension:, dysrhythmias: atrial fibrillation, hyperlipidemia    (-) CAD,  angina and  CHF    NYHA Classification: I  ECG reviewed  Rhythm: regular  Rate: normal           Beta Blocker:  Not on Beta Blocker         Neuro/Psych:   Negative Neuro/Psych ROS     (-) seizures, CVA and depression/anxiety            GI/Hepatic/Renal: Neg GI/Hepatic/Renal ROS  (+) GERD:, morbid obesity     (-) hiatal hernia       Endo/Other: Negative Endo/Other ROS   (+) blood dyscrasia: anticoagulation therapy:., malignancy/cancer. Pt had PAT visit. Abdominal:   (+) obese,     Abdomen: soft. Vascular:   + DVT, . Other Findings:           Anesthesia Plan      general and MAC     ASA 4     (Iv zofran within 30 min of closing   Likely difficult airway? Had port uneventfully will discuss with surgeon need for geta   Surgeon needs geta, will do LNA, no paralysis )  Induction: intravenous. BIS  MIPS: Postoperative opioids intended and Prophylactic antiemetics administered. Anesthetic plan and risks discussed with patient. Use of blood products discussed with patient whom. Plan discussed with CRNA.     Attending anesthesiologist reviewed and agrees with Pre Eval content                Marbella Covarrubias MD   12/23/2022

## 2022-12-26 ENCOUNTER — TELEPHONE (OUTPATIENT)
Dept: INTERNAL MEDICINE | Age: 67
End: 2022-12-26

## 2022-12-26 NOTE — TELEPHONE ENCOUNTER
The office is closed today for the holiday. Mother called. Missed a call from [facility]. Looks like he has lymphoma and was supposed to have a lymph node excision. Advised to call provider who was going to take lymph node out. Procedure cancelled for some reason.

## 2022-12-28 LAB
+IMM: ABNORMAL
ALBUMIN SERPL-MCNC: 3.98 G/DL (ref 3.75–5.01)
ALPHA-1-GLOBULIN: 0.29 G/DL (ref 0.19–0.46)
ALPHA-2-GLOBULIN: 0.84 G/DL (ref 0.48–1.05)
BETA GLOBULIN: 0.78 G/DL (ref 0.48–1.1)
GAMMA GLOBULIN: 0.91 G/DL (ref 0.62–1.51)
IGA: 170 MG/DL (ref 68–408)
IGG: 1031 MG/DL (ref 768–1632)
IGM: 46 MG/DL (ref 35–263)
KAPPA FREE LIGHT CHAINS QNT: 31.6 MG/L (ref 3.3–19.4)
KAPPA/LAMBDA FREE LIGHT CHAIN RATIO: 1.66 (ref 0.26–1.65)
LAMBDA FREE LIGHT CHAINS QNT: 19.07 MG/L (ref 5.71–26.3)
SPE/IFE INTERPRETATION: ABNORMAL
TOTAL PROTEIN: 6.8 G/DL (ref 6.3–8.2)

## 2022-12-29 ENCOUNTER — ANESTHESIA EVENT (OUTPATIENT)
Dept: OPERATING ROOM | Age: 67
End: 2022-12-29
Payer: MEDICARE

## 2022-12-30 ENCOUNTER — ANESTHESIA (OUTPATIENT)
Dept: OPERATING ROOM | Age: 67
End: 2022-12-30
Payer: MEDICARE

## 2022-12-30 ENCOUNTER — TELEPHONE (OUTPATIENT)
Dept: HEMATOLOGY | Age: 67
End: 2022-12-30

## 2023-01-01 ENCOUNTER — APPOINTMENT (OUTPATIENT)
Dept: CARDIAC CATH/INVASIVE PROCEDURES | Age: 68
DRG: 871 | End: 2023-01-01
Payer: MEDICARE

## 2023-01-01 ENCOUNTER — APPOINTMENT (OUTPATIENT)
Dept: CT IMAGING | Age: 68
DRG: 871 | End: 2023-01-01
Payer: MEDICARE

## 2023-01-01 ENCOUNTER — APPOINTMENT (OUTPATIENT)
Dept: ULTRASOUND IMAGING | Age: 68
DRG: 871 | End: 2023-01-01
Payer: MEDICARE

## 2023-01-01 ENCOUNTER — HOSPITAL ENCOUNTER (INPATIENT)
Age: 68
LOS: 22 days | Discharge: HOSPICE | DRG: 871 | End: 2023-06-01
Attending: EMERGENCY MEDICINE | Admitting: HOSPITALIST
Payer: MEDICARE

## 2023-01-01 ENCOUNTER — APPOINTMENT (OUTPATIENT)
Dept: GENERAL RADIOLOGY | Age: 68
DRG: 871 | End: 2023-01-01
Payer: MEDICARE

## 2023-01-01 ENCOUNTER — APPOINTMENT (OUTPATIENT)
Dept: INTERVENTIONAL RADIOLOGY/VASCULAR | Age: 68
DRG: 871 | End: 2023-01-01
Payer: MEDICARE

## 2023-01-01 ENCOUNTER — TELEPHONE (OUTPATIENT)
Dept: INTERNAL MEDICINE CLINIC | Age: 68
End: 2023-01-01

## 2023-01-01 VITALS
TEMPERATURE: 101.4 F | BODY MASS INDEX: 41.11 KG/M2 | HEIGHT: 73 IN | HEART RATE: 123 BPM | SYSTOLIC BLOOD PRESSURE: 73 MMHG | DIASTOLIC BLOOD PRESSURE: 48 MMHG | OXYGEN SATURATION: 88 % | RESPIRATION RATE: 28 BRPM | WEIGHT: 310.19 LBS

## 2023-01-01 DIAGNOSIS — J96.01 ACUTE RESPIRATORY FAILURE WITH HYPOXIA (HCC): ICD-10-CM

## 2023-01-01 DIAGNOSIS — J18.9 PNEUMONIA OF BOTH LUNGS DUE TO INFECTIOUS ORGANISM, UNSPECIFIED PART OF LUNG: Primary | ICD-10-CM

## 2023-01-01 DIAGNOSIS — I48.20 CHRONIC ATRIAL FIBRILLATION (HCC): ICD-10-CM

## 2023-01-01 LAB
25(OH)D3 SERPL-MCNC: 25.4 NG/ML
A BAUMANNII DNA BLD POS QL NAA+NON-PROBE: NOT DETECTED
ABO + RH BLD: NORMAL
ADV 40+41 DNA STL QL NAA+NON-PROBE: NOT DETECTED
ALBUMIN SERPL-MCNC: 2.4 G/DL (ref 3.5–5.2)
ALBUMIN SERPL-MCNC: 2.6 G/DL (ref 3.5–5.2)
ALBUMIN SERPL-MCNC: 2.6 G/DL (ref 3.5–5.2)
ALBUMIN SERPL-MCNC: 2.7 G/DL (ref 3.5–5.2)
ALBUMIN SERPL-MCNC: 2.8 G/DL (ref 3.5–5.2)
ALBUMIN SERPL-MCNC: 2.9 G/DL (ref 3.5–5.2)
ALLENS TEST: ABNORMAL
ALP SERPL-CCNC: 103 U/L (ref 40–130)
ALP SERPL-CCNC: 104 U/L (ref 40–130)
ALP SERPL-CCNC: 105 U/L (ref 40–130)
ALP SERPL-CCNC: 105 U/L (ref 40–130)
ALP SERPL-CCNC: 71 U/L (ref 40–130)
ALP SERPL-CCNC: 89 U/L (ref 40–130)
ALP SERPL-CCNC: 96 U/L (ref 40–130)
ALP SERPL-CCNC: 97 U/L (ref 40–130)
ALP SERPL-CCNC: 98 U/L (ref 40–130)
ALT SERPL-CCNC: 16 U/L (ref 5–41)
ALT SERPL-CCNC: 17 U/L (ref 5–41)
ALT SERPL-CCNC: 22 U/L (ref 5–41)
ALT SERPL-CCNC: 40 U/L (ref 5–41)
ALT SERPL-CCNC: 42 U/L (ref 5–41)
ALT SERPL-CCNC: 42 U/L (ref 5–41)
ANION GAP SERPL CALCULATED.3IONS-SCNC: 10 MMOL/L (ref 7–19)
ANION GAP SERPL CALCULATED.3IONS-SCNC: 11 MMOL/L (ref 7–19)
ANION GAP SERPL CALCULATED.3IONS-SCNC: 12 MMOL/L (ref 7–19)
ANION GAP SERPL CALCULATED.3IONS-SCNC: 12 MMOL/L (ref 7–19)
ANION GAP SERPL CALCULATED.3IONS-SCNC: 13 MMOL/L (ref 7–19)
ANION GAP SERPL CALCULATED.3IONS-SCNC: 13 MMOL/L (ref 7–19)
ANION GAP SERPL CALCULATED.3IONS-SCNC: 14 MMOL/L (ref 7–19)
ANION GAP SERPL CALCULATED.3IONS-SCNC: 15 MMOL/L (ref 7–19)
ANION GAP SERPL CALCULATED.3IONS-SCNC: 15 MMOL/L (ref 7–19)
ANION GAP SERPL CALCULATED.3IONS-SCNC: 16 MMOL/L (ref 7–19)
ANION GAP SERPL CALCULATED.3IONS-SCNC: 17 MMOL/L (ref 7–19)
ANION GAP SERPL CALCULATED.3IONS-SCNC: 18 MMOL/L (ref 7–19)
ANION GAP SERPL CALCULATED.3IONS-SCNC: 19 MMOL/L (ref 7–19)
ANION GAP SERPL CALCULATED.3IONS-SCNC: 19 MMOL/L (ref 7–19)
ANION GAP SERPL CALCULATED.3IONS-SCNC: 20 MMOL/L (ref 7–19)
ANION GAP SERPL CALCULATED.3IONS-SCNC: 20 MMOL/L (ref 7–19)
ANION GAP SERPL CALCULATED.3IONS-SCNC: 21 MMOL/L (ref 7–19)
ANISOCYTOSIS BLD QL SMEAR: ABNORMAL
APTT PPP: 104 SEC (ref 26–36.2)
APTT PPP: 110.2 SEC (ref 26–36.2)
APTT PPP: 111.2 SEC (ref 26–36.2)
APTT PPP: 119.3 SEC (ref 26–36.2)
APTT PPP: 134.1 SEC (ref 26–36.2)
APTT PPP: 32.6 SEC (ref 26–36.2)
APTT PPP: 53 SEC (ref 26–36.2)
APTT PPP: 54.7 SEC (ref 26–36.2)
APTT PPP: 54.7 SEC (ref 26–36.2)
APTT PPP: 75.1 SEC (ref 26–36.2)
APTT PPP: 85.1 SEC (ref 26–36.2)
APTT PPP: 87.3 SEC (ref 26–36.2)
APTT PPP: 96.2 SEC (ref 26–36.2)
AST SERPL-CCNC: 29 U/L (ref 5–40)
AST SERPL-CCNC: 41 U/L (ref 5–40)
AST SERPL-CCNC: 45 U/L (ref 5–40)
AST SERPL-CCNC: 49 U/L (ref 5–40)
AST SERPL-CCNC: 52 U/L (ref 5–40)
AST SERPL-CCNC: 55 U/L (ref 5–40)
AST SERPL-CCNC: 56 U/L (ref 5–40)
AST SERPL-CCNC: 61 U/L (ref 5–40)
AST SERPL-CCNC: 63 U/L (ref 5–40)
B PARAP IS1001 DNA NPH QL NAA+NON-PROBE: NOT DETECTED
B PERT.PT PRMT NPH QL NAA+NON-PROBE: NOT DETECTED
BACTERIA #/AREA URNS HPF: ABNORMAL /HPF
BACTERIA BLD CULT ORG #2: NORMAL
BACTERIA BLD CULT ORG #2: NORMAL
BACTERIA BLD CULT: ABNORMAL
BACTERIA BLD CULT: ABNORMAL
BACTERIA BLD CULT: NORMAL
BACTERIA SPEC RESP CULT: ABNORMAL
BASE EXCESS ARTERIAL: 6.5 MMOL/L (ref -2–2)
BASE EXCESS ARTERIAL: 8 MMOL/L (ref -2–2)
BASE EXCESS ARTERIAL: 9.3 MMOL/L (ref -2–2)
BASOPHILS # BLD: 0 K/UL (ref 0–0.2)
BASOPHILS # BLD: 0.1 K/UL (ref 0–0.2)
BASOPHILS # BLD: 0.1 K/UL (ref 0–0.2)
BASOPHILS NFR BLD: 0 % (ref 0–1)
BASOPHILS NFR BLD: 0.2 % (ref 0–1)
BASOPHILS NFR BLD: 0.2 % (ref 0–1)
BASOPHILS NFR BLD: 0.3 % (ref 0–1)
BASOPHILS NFR BLD: 0.5 % (ref 0–1)
BASOPHILS NFR BLD: 1 % (ref 0–1)
BASOPHILS NFR BLD: 1.1 % (ref 0–1)
BILIRUB DIRECT SERPL-MCNC: 0.2 MG/DL (ref 0–0.3)
BILIRUB DIRECT SERPL-MCNC: 0.3 MG/DL (ref 0–0.3)
BILIRUB INDIRECT SERPL-MCNC: 0.2 MG/DL (ref 0.1–1)
BILIRUB INDIRECT SERPL-MCNC: 0.3 MG/DL (ref 0.1–1)
BILIRUB SERPL-MCNC: 0.4 MG/DL (ref 0.2–1.2)
BILIRUB SERPL-MCNC: 0.5 MG/DL (ref 0.2–1.2)
BILIRUB UR QL STRIP: NEGATIVE
BLD GP AB SCN SERPL QL: NORMAL
BNP BLD-MCNC: 1182 PG/ML (ref 0–124)
BNP BLD-MCNC: 1735 PG/ML (ref 0–124)
BUN SERPL-MCNC: 100 MG/DL (ref 8–23)
BUN SERPL-MCNC: 101 MG/DL (ref 8–23)
BUN SERPL-MCNC: 102 MG/DL (ref 8–23)
BUN SERPL-MCNC: 110 MG/DL (ref 8–23)
BUN SERPL-MCNC: 28 MG/DL (ref 8–23)
BUN SERPL-MCNC: 29 MG/DL (ref 8–23)
BUN SERPL-MCNC: 30 MG/DL (ref 8–23)
BUN SERPL-MCNC: 30 MG/DL (ref 8–23)
BUN SERPL-MCNC: 34 MG/DL (ref 8–23)
BUN SERPL-MCNC: 36 MG/DL (ref 8–23)
BUN SERPL-MCNC: 38 MG/DL (ref 8–23)
BUN SERPL-MCNC: 43 MG/DL (ref 8–23)
BUN SERPL-MCNC: 47 MG/DL (ref 8–23)
BUN SERPL-MCNC: 52 MG/DL (ref 8–23)
BUN SERPL-MCNC: 54 MG/DL (ref 8–23)
BUN SERPL-MCNC: 57 MG/DL (ref 8–23)
BUN SERPL-MCNC: 68 MG/DL (ref 8–23)
BUN SERPL-MCNC: 69 MG/DL (ref 8–23)
BUN SERPL-MCNC: 70 MG/DL (ref 8–23)
BUN SERPL-MCNC: 80 MG/DL (ref 8–23)
BUN SERPL-MCNC: 86 MG/DL (ref 8–23)
BUN SERPL-MCNC: 90 MG/DL (ref 8–23)
BUN SERPL-MCNC: 92 MG/DL (ref 8–23)
BUN SERPL-MCNC: 93 MG/DL (ref 8–23)
BUN SERPL-MCNC: 96 MG/DL (ref 8–23)
C ALBICANS DNA BLD POS QL NAA+NON-PROBE: NOT DETECTED
C AURIS DNA BLD POS QL NAA+PROBE: NOT DETECTED
C CAYETANENSIS DNA STL QL NAA+NON-PROBE: NOT DETECTED
C COLI+JEJ+UPSA DNA STL QL NAA+NON-PROBE: NOT DETECTED
C DIF TOX TCDA+TCDB STL QL NAA+NON-PROBE: DETECTED
C GLABRATA DNA BLD POS QL NAA+NON-PROBE: NOT DETECTED
C KRUSEI DNA BLD POS QL NAA+NON-PROBE: NOT DETECTED
C PARAP DNA BLD POS QL NAA+NON-PROBE: NOT DETECTED
C PNEUM DNA NPH QL NAA+NON-PROBE: NOT DETECTED
C TROPICLS DNA BLD POS QL NAA+NON-PROBE: NOT DETECTED
CA-I BLD-SCNC: 0.95 MMOL/L (ref 1.12–1.32)
CALCIUM SERPL-MCNC: 6.1 MG/DL (ref 8.8–10.2)
CALCIUM SERPL-MCNC: 7.9 MG/DL (ref 8.8–10.2)
CALCIUM SERPL-MCNC: 7.9 MG/DL (ref 8.8–10.2)
CALCIUM SERPL-MCNC: 8 MG/DL (ref 8.8–10.2)
CALCIUM SERPL-MCNC: 8.1 MG/DL (ref 8.8–10.2)
CALCIUM SERPL-MCNC: 8.1 MG/DL (ref 8.8–10.2)
CALCIUM SERPL-MCNC: 8.2 MG/DL (ref 8.8–10.2)
CALCIUM SERPL-MCNC: 8.3 MG/DL (ref 8.8–10.2)
CALCIUM SERPL-MCNC: 8.4 MG/DL (ref 8.8–10.2)
CALCIUM SERPL-MCNC: 8.5 MG/DL (ref 8.8–10.2)
CALCIUM SERPL-MCNC: 8.6 MG/DL (ref 8.8–10.2)
CALCIUM SERPL-MCNC: 8.7 MG/DL (ref 8.8–10.2)
CARBOXYHEMOGLOBIN ARTERIAL: 1.8 % (ref 0–5)
CARBOXYHEMOGLOBIN ARTERIAL: 2.1 % (ref 0–5)
CARBOXYHEMOGLOBIN ARTERIAL: 2.4 % (ref 0–5)
CHLORIDE SERPL-SCNC: 100 MMOL/L (ref 98–111)
CHLORIDE SERPL-SCNC: 101 MMOL/L (ref 98–111)
CHLORIDE SERPL-SCNC: 102 MMOL/L (ref 98–111)
CHLORIDE SERPL-SCNC: 108 MMOL/L (ref 98–111)
CHLORIDE SERPL-SCNC: 94 MMOL/L (ref 98–111)
CHLORIDE SERPL-SCNC: 94 MMOL/L (ref 98–111)
CHLORIDE SERPL-SCNC: 96 MMOL/L (ref 98–111)
CHLORIDE SERPL-SCNC: 97 MMOL/L (ref 98–111)
CHLORIDE SERPL-SCNC: 98 MMOL/L (ref 98–111)
CHLORIDE SERPL-SCNC: 99 MMOL/L (ref 98–111)
CLARITY UR: ABNORMAL
CO2 SERPL-SCNC: 15 MMOL/L (ref 22–29)
CO2 SERPL-SCNC: 20 MMOL/L (ref 22–29)
CO2 SERPL-SCNC: 20 MMOL/L (ref 22–29)
CO2 SERPL-SCNC: 21 MMOL/L (ref 22–29)
CO2 SERPL-SCNC: 22 MMOL/L (ref 22–29)
CO2 SERPL-SCNC: 23 MMOL/L (ref 22–29)
CO2 SERPL-SCNC: 24 MMOL/L (ref 22–29)
CO2 SERPL-SCNC: 24 MMOL/L (ref 22–29)
CO2 SERPL-SCNC: 25 MMOL/L (ref 22–29)
CO2 SERPL-SCNC: 26 MMOL/L (ref 22–29)
CO2 SERPL-SCNC: 27 MMOL/L (ref 22–29)
CO2 SERPL-SCNC: 28 MMOL/L (ref 22–29)
COLOR UR: YELLOW
CREAT SERPL-MCNC: 1.5 MG/DL (ref 0.5–1.2)
CREAT SERPL-MCNC: 1.5 MG/DL (ref 0.5–1.2)
CREAT SERPL-MCNC: 1.6 MG/DL (ref 0.5–1.2)
CREAT SERPL-MCNC: 1.8 MG/DL (ref 0.5–1.2)
CREAT SERPL-MCNC: 1.9 MG/DL (ref 0.5–1.2)
CREAT SERPL-MCNC: 2.3 MG/DL (ref 0.5–1.2)
CREAT SERPL-MCNC: 3 MG/DL (ref 0.5–1.2)
CREAT SERPL-MCNC: 3.3 MG/DL (ref 0.5–1.2)
CREAT SERPL-MCNC: 3.3 MG/DL (ref 0.5–1.2)
CREAT SERPL-MCNC: 3.4 MG/DL (ref 0.5–1.2)
CREAT SERPL-MCNC: 3.6 MG/DL (ref 0.5–1.2)
CREAT SERPL-MCNC: 3.6 MG/DL (ref 0.5–1.2)
CREAT SERPL-MCNC: 3.8 MG/DL (ref 0.5–1.2)
CREAT SERPL-MCNC: 3.8 MG/DL (ref 0.5–1.2)
CREAT SERPL-MCNC: 3.9 MG/DL (ref 0.5–1.2)
CREAT SERPL-MCNC: 4 MG/DL (ref 0.5–1.2)
CREAT SERPL-MCNC: 4.2 MG/DL (ref 0.5–1.2)
CREAT SERPL-MCNC: 4.3 MG/DL (ref 0.5–1.2)
CREAT SERPL-MCNC: 5.1 MG/DL (ref 0.5–1.2)
CREAT SERPL-MCNC: 5.4 MG/DL (ref 0.5–1.2)
CREAT SERPL-MCNC: 5.5 MG/DL (ref 0.5–1.2)
CREAT SERPL-MCNC: 5.8 MG/DL (ref 0.5–1.2)
CREAT SERPL-MCNC: 6.6 MG/DL (ref 0.5–1.2)
CREAT SERPL-MCNC: 7.5 MG/DL (ref 0.5–1.2)
CREAT SERPL-MCNC: 7.5 MG/DL (ref 0.5–1.2)
CREAT UR-MCNC: 36 MG/DL (ref 4.2–622)
CRYPTOCOCCUS NEOFORMANS/GATTII BY PCR: NOT DETECTED
CRYPTOSP DNA STL QL NAA+NON-PROBE: NOT DETECTED
D DIMER PPP FEU-MCNC: 0.51 UG/ML FEU (ref 0–0.48)
DOHLE BOD BLD QL SMEAR: ABNORMAL
DOHLE BOD BLD QL SMEAR: ABNORMAL
E CLOAC COMP DNA BLD POS NAA+NON-PROBE: NOT DETECTED
E COLI DNA BLD POS QL NAA+NON-PROBE: NOT DETECTED
E FAECALIS DNA BLD POS QL NAA+PROBE: NOT DETECTED
E FAECIUM DNA BLD POS QL NAA+PROBE: NOT DETECTED
E HISTOLYT DNA STL QL NAA+NON-PROBE: NOT DETECTED
EAEC PAA PLAS AGGR+AATA ST NAA+NON-PRB: NOT DETECTED
EC STX1+STX2 GENES STL QL NAA+NON-PROBE: NOT DETECTED
EKG P AXIS: NORMAL DEGREES
EKG P AXIS: NORMAL DEGREES
EKG P-R INTERVAL: NORMAL MS
EKG P-R INTERVAL: NORMAL MS
EKG Q-T INTERVAL: 346 MS
EKG Q-T INTERVAL: 368 MS
EKG QRS DURATION: 92 MS
EKG QRS DURATION: 92 MS
EKG QTC CALCULATION (BAZETT): 437 MS
EKG QTC CALCULATION (BAZETT): 453 MS
EKG T AXIS: -19 DEGREES
EKG T AXIS: 45 DEGREES
ENTEROBACT DNA BLD POS QL NAA+NON-PROBE: NOT DETECTED
ENTEROCOC DNA BLD POS QL NAA+NON-PROBE: NOT DETECTED
EOSINOPHIL # BLD: 0 K/UL (ref 0–0.6)
EOSINOPHIL # BLD: 0.06 K/UL (ref 0–0.6)
EOSINOPHIL # BLD: 0.07 K/UL (ref 0–0.6)
EOSINOPHIL NFR BLD: 0 % (ref 0–5)
EOSINOPHIL NFR BLD: 0.1 % (ref 0–5)
EOSINOPHIL NFR BLD: 0.2 % (ref 0–5)
EOSINOPHIL NFR BLD: 1 % (ref 0–5)
EOSINOPHIL NFR BLD: 1 % (ref 0–5)
EPEC EAE GENE STL QL NAA+NON-PROBE: NOT DETECTED
ERYTHROCYTE [DISTWIDTH] IN BLOOD BY AUTOMATED COUNT: 16.8 % (ref 11.5–14.5)
ERYTHROCYTE [DISTWIDTH] IN BLOOD BY AUTOMATED COUNT: 16.9 % (ref 11.5–14.5)
ERYTHROCYTE [DISTWIDTH] IN BLOOD BY AUTOMATED COUNT: 17 % (ref 11.5–14.5)
ERYTHROCYTE [DISTWIDTH] IN BLOOD BY AUTOMATED COUNT: 17.1 % (ref 11.5–14.5)
ERYTHROCYTE [DISTWIDTH] IN BLOOD BY AUTOMATED COUNT: 17.2 % (ref 11.5–14.5)
ERYTHROCYTE [DISTWIDTH] IN BLOOD BY AUTOMATED COUNT: 17.2 % (ref 11.5–14.5)
ERYTHROCYTE [DISTWIDTH] IN BLOOD BY AUTOMATED COUNT: 17.3 % (ref 11.5–14.5)
ERYTHROCYTE [DISTWIDTH] IN BLOOD BY AUTOMATED COUNT: 17.4 % (ref 11.5–14.5)
ERYTHROCYTE [DISTWIDTH] IN BLOOD BY AUTOMATED COUNT: 17.4 % (ref 11.5–14.5)
ERYTHROCYTE [DISTWIDTH] IN BLOOD BY AUTOMATED COUNT: 17.6 % (ref 11.5–14.5)
ERYTHROCYTE [DISTWIDTH] IN BLOOD BY AUTOMATED COUNT: 17.7 % (ref 11.5–14.5)
ERYTHROCYTE [DISTWIDTH] IN BLOOD BY AUTOMATED COUNT: 17.7 % (ref 11.5–14.5)
ERYTHROCYTE [DISTWIDTH] IN BLOOD BY AUTOMATED COUNT: 18 % (ref 11.5–14.5)
ERYTHROCYTE [DISTWIDTH] IN BLOOD BY AUTOMATED COUNT: 18.2 % (ref 11.5–14.5)
ERYTHROCYTE [DISTWIDTH] IN BLOOD BY AUTOMATED COUNT: 18.3 % (ref 11.5–14.5)
ETEC LTA+ST1A+ST1B TOX ST NAA+NON-PROBE: NOT DETECTED
FINE GRAN CASTS #/AREA URNS HPF: ABNORMAL /LPF (ref 0–2)
FIO2: 100 %
FLUAV RNA NPH QL NAA+NON-PROBE: NOT DETECTED
FLUBV RNA NPH QL NAA+NON-PROBE: NOT DETECTED
G LAMBLIA DNA STL QL NAA+NON-PROBE: NOT DETECTED
GASTROCULT GAST QL: NORMAL
GI PATH DNA+RNA PNL STL NAA+NON-PROBE: NOT DETECTED
GLUCOSE SERPL-MCNC: 100 MG/DL (ref 74–109)
GLUCOSE SERPL-MCNC: 101 MG/DL (ref 74–109)
GLUCOSE SERPL-MCNC: 103 MG/DL (ref 74–109)
GLUCOSE SERPL-MCNC: 112 MG/DL (ref 74–109)
GLUCOSE SERPL-MCNC: 120 MG/DL (ref 74–109)
GLUCOSE SERPL-MCNC: 130 MG/DL (ref 74–109)
GLUCOSE SERPL-MCNC: 130 MG/DL (ref 74–109)
GLUCOSE SERPL-MCNC: 150 MG/DL (ref 74–109)
GLUCOSE SERPL-MCNC: 163 MG/DL (ref 74–109)
GLUCOSE SERPL-MCNC: 163 MG/DL (ref 74–109)
GLUCOSE SERPL-MCNC: 169 MG/DL (ref 74–109)
GLUCOSE SERPL-MCNC: 174 MG/DL (ref 74–109)
GLUCOSE SERPL-MCNC: 67 MG/DL (ref 74–109)
GLUCOSE SERPL-MCNC: 70 MG/DL (ref 74–109)
GLUCOSE SERPL-MCNC: 74 MG/DL (ref 74–109)
GLUCOSE SERPL-MCNC: 77 MG/DL (ref 74–109)
GLUCOSE SERPL-MCNC: 77 MG/DL (ref 74–109)
GLUCOSE SERPL-MCNC: 81 MG/DL (ref 74–109)
GLUCOSE SERPL-MCNC: 82 MG/DL (ref 74–109)
GLUCOSE SERPL-MCNC: 82 MG/DL (ref 74–109)
GLUCOSE SERPL-MCNC: 86 MG/DL (ref 74–109)
GLUCOSE SERPL-MCNC: 87 MG/DL (ref 74–109)
GLUCOSE SERPL-MCNC: 87 MG/DL (ref 74–109)
GLUCOSE SERPL-MCNC: 88 MG/DL (ref 74–109)
GLUCOSE SERPL-MCNC: 98 MG/DL (ref 74–109)
GLUCOSE UR STRIP.AUTO-MCNC: NEGATIVE MG/DL
GN BLD CULTURE PNL BLD POS NAA+PROBE: NOT DETECTED
GP B STREP DNA BLD POS QL NAA+NON-PROBE: NOT DETECTED
GRAM STN SPEC: ABNORMAL
HADV DNA NPH QL NAA+NON-PROBE: NOT DETECTED
HAV IGM SERPL QL IA: NORMAL
HBV CORE IGM SERPL QL IA: NORMAL
HBV SURFACE AB SERPL IA-ACNC: NORMAL M[IU]/ML
HBV SURFACE AG SERPL QL IA: NORMAL
HCO3 ARTERIAL: 29.1 MMOL/L (ref 22–26)
HCO3 ARTERIAL: 30.2 MMOL/L (ref 22–26)
HCO3 ARTERIAL: 32.5 MMOL/L (ref 22–26)
HCOV 229E RNA NPH QL NAA+NON-PROBE: NOT DETECTED
HCOV HKU1 RNA NPH QL NAA+NON-PROBE: NOT DETECTED
HCOV NL63 RNA NPH QL NAA+NON-PROBE: NOT DETECTED
HCOV OC43 RNA NPH QL NAA+NON-PROBE: NOT DETECTED
HCT VFR BLD AUTO: 25 % (ref 42–52)
HCT VFR BLD AUTO: 25.3 % (ref 42–52)
HCT VFR BLD AUTO: 25.9 % (ref 42–52)
HCT VFR BLD AUTO: 26.1 % (ref 42–52)
HCT VFR BLD AUTO: 27.1 % (ref 42–52)
HCT VFR BLD AUTO: 27.2 % (ref 42–52)
HCT VFR BLD AUTO: 27.2 % (ref 42–52)
HCT VFR BLD AUTO: 29.1 % (ref 42–52)
HCT VFR BLD AUTO: 29.8 % (ref 42–52)
HCT VFR BLD AUTO: 29.9 % (ref 42–52)
HCT VFR BLD AUTO: 30.1 % (ref 42–52)
HCT VFR BLD AUTO: 30.3 % (ref 42–52)
HCT VFR BLD AUTO: 31.4 % (ref 42–52)
HCT VFR BLD AUTO: 31.6 % (ref 42–52)
HCT VFR BLD AUTO: 32.2 % (ref 42–52)
HCT VFR BLD AUTO: 32.2 % (ref 42–52)
HCT VFR BLD AUTO: 32.3 % (ref 42–52)
HCT VFR BLD AUTO: 33 % (ref 42–52)
HCT VFR BLD AUTO: 34.9 % (ref 42–52)
HCT VFR BLD AUTO: 35.5 % (ref 42–52)
HCV AB SERPL QL IA: NORMAL
HEMOGLOBIN, ART, EXTENDED: 10.4 G/DL (ref 14–18)
HEMOGLOBIN, ART, EXTENDED: 11.3 G/DL (ref 14–18)
HEMOGLOBIN, ART, EXTENDED: 11.8 G/DL (ref 14–18)
HGB BLD-MCNC: 10 G/DL (ref 14–18)
HGB BLD-MCNC: 10.3 G/DL (ref 14–18)
HGB BLD-MCNC: 10.5 G/DL (ref 14–18)
HGB BLD-MCNC: 11 G/DL (ref 14–18)
HGB BLD-MCNC: 11.2 G/DL (ref 14–18)
HGB BLD-MCNC: 7.5 G/DL (ref 14–18)
HGB BLD-MCNC: 7.8 G/DL (ref 14–18)
HGB BLD-MCNC: 8 G/DL (ref 14–18)
HGB BLD-MCNC: 8.1 G/DL (ref 14–18)
HGB BLD-MCNC: 8.1 G/DL (ref 14–18)
HGB BLD-MCNC: 8.3 G/DL (ref 14–18)
HGB BLD-MCNC: 8.6 G/DL (ref 14–18)
HGB BLD-MCNC: 9.3 G/DL (ref 14–18)
HGB BLD-MCNC: 9.4 G/DL (ref 14–18)
HGB BLD-MCNC: 9.6 G/DL (ref 14–18)
HGB BLD-MCNC: 9.7 G/DL (ref 14–18)
HGB BLD-MCNC: 9.9 G/DL (ref 14–18)
HGB BLD-MCNC: 9.9 G/DL (ref 14–18)
HGB UR STRIP.AUTO-MCNC: ABNORMAL MG/L
HMPV RNA NPH QL NAA+NON-PROBE: NOT DETECTED
HPIV1 RNA NPH QL NAA+NON-PROBE: NOT DETECTED
HPIV2 RNA NPH QL NAA+NON-PROBE: NOT DETECTED
HPIV3 RNA NPH QL NAA+NON-PROBE: NOT DETECTED
HPIV4 RNA NPH QL NAA+NON-PROBE: NOT DETECTED
HYALINE CASTS #/AREA URNS LPF: ABNORMAL /LPF (ref 0–5)
HYPOCHROMIA BLD QL SMEAR: ABNORMAL
IMM GRANULOCYTES # BLD: 0.3 K/UL
IMM GRANULOCYTES # BLD: 0.3 K/UL
IMM GRANULOCYTES # BLD: 0.4 K/UL
IMM GRANULOCYTES # BLD: 0.6 K/UL
IMM GRANULOCYTES # BLD: 0.7 K/UL
IMM GRANULOCYTES # BLD: 0.7 K/UL
IMM GRANULOCYTES # BLD: 0.8 K/UL
IMM GRANULOCYTES # BLD: 0.8 K/UL
IMM GRANULOCYTES # BLD: 0.9 K/UL
IMM GRANULOCYTES # BLD: 1 K/UL
IMM GRANULOCYTES # BLD: 1.2 K/UL
IRON SATN MFR SERPL: 8 % (ref 14–50)
IRON SERPL-MCNC: 17 UG/DL (ref 59–158)
K OXYTOCA DNA BLD POS QL NAA+NON-PROBE: NOT DETECTED
K PNEUMON DNA SPEC QL NAA+PROBE: NOT DETECTED
K. AEROGENES DNA SPEC QL NAA+PROBE: NOT DETECTED
KETONES UR STRIP.AUTO-MCNC: NEGATIVE MG/DL
L MONOCYTOG DNA BLD POS QL NAA+NON-PROBE: NOT DETECTED
LACTATE BLDV-SCNC: 1.6 MG/DL (ref 0.5–1.9)
LACTATE BLDV-SCNC: 1.9 MG/DL (ref 0.5–1.9)
LDH SERPL-CCNC: 518 U/L (ref 91–215)
LEGIONELLA AG UR QL: NORMAL
LEUKOCYTE ESTERASE UR QL STRIP.AUTO: NEGATIVE
LV EF: 53 %
LV EF: 58 %
LVEF MODALITY: NORMAL
LVEF MODALITY: NORMAL
LYMPHOCYTES # BLD: 0.4 K/UL (ref 1.1–4.5)
LYMPHOCYTES # BLD: 0.6 K/UL (ref 1.1–4.5)
LYMPHOCYTES # BLD: 0.7 K/UL (ref 1.1–4.5)
LYMPHOCYTES # BLD: 0.9 K/UL (ref 1.1–4.5)
LYMPHOCYTES # BLD: 1.2 K/UL (ref 1.1–4.5)
LYMPHOCYTES # BLD: 1.3 K/UL (ref 1.1–4.5)
LYMPHOCYTES # BLD: 1.3 K/UL (ref 1.1–4.5)
LYMPHOCYTES # BLD: 1.5 K/UL (ref 1.1–4.5)
LYMPHOCYTES # BLD: 1.7 K/UL (ref 1.1–4.5)
LYMPHOCYTES # BLD: 1.7 K/UL (ref 1.1–4.5)
LYMPHOCYTES # BLD: 2.5 K/UL (ref 1.1–4.5)
LYMPHOCYTES NFR BLD: 12.5 % (ref 20–40)
LYMPHOCYTES NFR BLD: 14.5 % (ref 20–40)
LYMPHOCYTES NFR BLD: 16 % (ref 20–40)
LYMPHOCYTES NFR BLD: 19.3 % (ref 20–40)
LYMPHOCYTES NFR BLD: 21 % (ref 20–40)
LYMPHOCYTES NFR BLD: 26 % (ref 20–40)
LYMPHOCYTES NFR BLD: 30 % (ref 20–40)
LYMPHOCYTES NFR BLD: 33.3 % (ref 20–40)
LYMPHOCYTES NFR BLD: 39 % (ref 20–40)
LYMPHOCYTES NFR BLD: 4 % (ref 20–40)
LYMPHOCYTES NFR BLD: 40 % (ref 20–40)
LYMPHOCYTES NFR BLD: 45 % (ref 20–40)
LYMPHOCYTES NFR BLD: 8 % (ref 20–40)
M PNEUMO DNA NPH QL NAA+NON-PROBE: NOT DETECTED
MACROCYTES BLD QL SMEAR: ABNORMAL
MACROCYTES BLD QL SMEAR: ABNORMAL
MAGNESIUM SERPL-MCNC: 1.9 MG/DL (ref 1.6–2.4)
MAGNESIUM SERPL-MCNC: 2 MG/DL (ref 1.6–2.4)
MAGNESIUM SERPL-MCNC: 2.1 MG/DL (ref 1.6–2.4)
MAGNESIUM SERPL-MCNC: 2.1 MG/DL (ref 1.6–2.4)
MAGNESIUM SERPL-MCNC: 2.2 MG/DL (ref 1.6–2.4)
MAGNESIUM SERPL-MCNC: 2.3 MG/DL (ref 1.6–2.4)
MAGNESIUM SERPL-MCNC: 2.4 MG/DL (ref 1.6–2.4)
MAGNESIUM SERPL-MCNC: 2.5 MG/DL (ref 1.6–2.4)
MCH RBC QN AUTO: 28.8 PG (ref 27–31)
MCH RBC QN AUTO: 28.8 PG (ref 27–31)
MCH RBC QN AUTO: 28.9 PG (ref 27–31)
MCH RBC QN AUTO: 28.9 PG (ref 27–31)
MCH RBC QN AUTO: 29.1 PG (ref 27–31)
MCH RBC QN AUTO: 29.2 PG (ref 27–31)
MCH RBC QN AUTO: 29.2 PG (ref 27–31)
MCH RBC QN AUTO: 29.3 PG (ref 27–31)
MCH RBC QN AUTO: 29.4 PG (ref 27–31)
MCH RBC QN AUTO: 29.5 PG (ref 27–31)
MCH RBC QN AUTO: 29.6 PG (ref 27–31)
MCH RBC QN AUTO: 29.7 PG (ref 27–31)
MCHC RBC AUTO-ENTMCNC: 29.9 G/DL (ref 33–37)
MCHC RBC AUTO-ENTMCNC: 30 G/DL (ref 33–37)
MCHC RBC AUTO-ENTMCNC: 30.5 G/DL (ref 33–37)
MCHC RBC AUTO-ENTMCNC: 30.7 G/DL (ref 33–37)
MCHC RBC AUTO-ENTMCNC: 30.8 G/DL (ref 33–37)
MCHC RBC AUTO-ENTMCNC: 31.2 G/DL (ref 33–37)
MCHC RBC AUTO-ENTMCNC: 31.3 G/DL (ref 33–37)
MCHC RBC AUTO-ENTMCNC: 31.5 G/DL (ref 33–37)
MCHC RBC AUTO-ENTMCNC: 31.6 G/DL (ref 33–37)
MCHC RBC AUTO-ENTMCNC: 31.6 G/DL (ref 33–37)
MCHC RBC AUTO-ENTMCNC: 31.8 G/DL (ref 33–37)
MCHC RBC AUTO-ENTMCNC: 31.9 G/DL (ref 33–37)
MCHC RBC AUTO-ENTMCNC: 32 G/DL (ref 33–37)
MCHC RBC AUTO-ENTMCNC: 32 G/DL (ref 33–37)
MCHC RBC AUTO-ENTMCNC: 32.1 G/DL (ref 33–37)
MCHC RBC AUTO-ENTMCNC: 32.2 G/DL (ref 33–37)
MCHC RBC AUTO-ENTMCNC: 32.3 G/DL (ref 33–37)
MCHC RBC AUTO-ENTMCNC: 32.7 G/DL (ref 33–37)
MCV RBC AUTO: 90.2 FL (ref 80–94)
MCV RBC AUTO: 90.7 FL (ref 80–94)
MCV RBC AUTO: 91.2 FL (ref 80–94)
MCV RBC AUTO: 91.2 FL (ref 80–94)
MCV RBC AUTO: 91.5 FL (ref 80–94)
MCV RBC AUTO: 91.7 FL (ref 80–94)
MCV RBC AUTO: 91.8 FL (ref 80–94)
MCV RBC AUTO: 91.8 FL (ref 80–94)
MCV RBC AUTO: 92.3 FL (ref 80–94)
MCV RBC AUTO: 92.3 FL (ref 80–94)
MCV RBC AUTO: 92.7 FL (ref 80–94)
MCV RBC AUTO: 92.8 FL (ref 80–94)
MCV RBC AUTO: 92.9 FL (ref 80–94)
MCV RBC AUTO: 93.5 FL (ref 80–94)
MCV RBC AUTO: 93.7 FL (ref 80–94)
MCV RBC AUTO: 94.9 FL (ref 80–94)
MCV RBC AUTO: 96.1 FL (ref 80–94)
MCV RBC AUTO: 96.7 FL (ref 80–94)
MCV RBC AUTO: 96.9 FL (ref 80–94)
MCV RBC AUTO: 98.2 FL (ref 80–94)
METAMYELOCYTES NFR BLD MANUAL: 1 %
METAMYELOCYTES NFR BLD MANUAL: 1 %
METHEMOGLOBIN ARTERIAL: 0.9 %
METHEMOGLOBIN ARTERIAL: 1 %
METHEMOGLOBIN ARTERIAL: 1.3 %
MICROALBUMIN UR-MCNC: 8.7 MG/DL (ref 0–19)
MICROALBUMIN/CREAT UR-RTO: 241.7 MG/G
MONOCYTES # BLD: 0.1 K/UL (ref 0–0.9)
MONOCYTES # BLD: 0.2 K/UL (ref 0–0.9)
MONOCYTES # BLD: 0.3 K/UL (ref 0–0.9)
MONOCYTES # BLD: 0.4 K/UL (ref 0–0.9)
MONOCYTES # BLD: 0.5 K/UL (ref 0–0.9)
MONOCYTES NFR BLD: 11 % (ref 0–10)
MONOCYTES NFR BLD: 11 % (ref 0–10)
MONOCYTES NFR BLD: 2 % (ref 0–10)
MONOCYTES NFR BLD: 3.4 % (ref 0–10)
MONOCYTES NFR BLD: 3.6 % (ref 0–10)
MONOCYTES NFR BLD: 4 % (ref 0–10)
MONOCYTES NFR BLD: 4 % (ref 0–10)
MONOCYTES NFR BLD: 4.4 % (ref 0–10)
MONOCYTES NFR BLD: 6 % (ref 0–10)
MONOCYTES NFR BLD: 7.4 % (ref 0–10)
MONOCYTES NFR BLD: 9 % (ref 0–10)
MONONUC CELLS NFR BLD MANUAL: 1 %
MRSA DNA SPEC QL NAA+PROBE: NOT DETECTED
MYELOCYTES NFR BLD MANUAL: 1 %
N MEN DNA BLD POS QL NAA+NON-PROBE: NOT DETECTED
NEUTROPHILS # BLD: 1.3 K/UL (ref 1.5–7.5)
NEUTROPHILS # BLD: 1.8 K/UL (ref 1.5–7.5)
NEUTROPHILS # BLD: 1.9 K/UL (ref 1.5–7.5)
NEUTROPHILS # BLD: 2.3 K/UL (ref 1.5–7.5)
NEUTROPHILS # BLD: 2.5 K/UL (ref 1.5–7.5)
NEUTROPHILS # BLD: 3.7 K/UL (ref 1.5–7.5)
NEUTROPHILS # BLD: 4 K/UL (ref 1.5–7.5)
NEUTROPHILS # BLD: 4.2 K/UL (ref 1.5–7.5)
NEUTROPHILS # BLD: 5.1 K/UL (ref 1.5–7.5)
NEUTROPHILS # BLD: 5.5 K/UL (ref 1.5–7.5)
NEUTROPHILS # BLD: 6.1 K/UL (ref 1.5–7.5)
NEUTS BAND NFR BLD MANUAL: 1 % (ref 0–5)
NEUTS BAND NFR BLD MANUAL: 12 % (ref 0–5)
NEUTS BAND NFR BLD MANUAL: 2 % (ref 0–5)
NEUTS BAND NFR BLD MANUAL: 3 % (ref 0–5)
NEUTS BAND NFR BLD MANUAL: 5 % (ref 0–5)
NEUTS BAND NFR BLD MANUAL: 5 % (ref 0–5)
NEUTS BAND NFR BLD MANUAL: 6 % (ref 0–5)
NEUTS BAND NFR BLD MANUAL: 7 % (ref 0–5)
NEUTS SEG NFR BLD: 40 % (ref 50–65)
NEUTS SEG NFR BLD: 42.8 % (ref 50–65)
NEUTS SEG NFR BLD: 48 % (ref 50–65)
NEUTS SEG NFR BLD: 49 % (ref 50–65)
NEUTS SEG NFR BLD: 49.4 % (ref 50–65)
NEUTS SEG NFR BLD: 58 % (ref 50–65)
NEUTS SEG NFR BLD: 64.8 % (ref 50–65)
NEUTS SEG NFR BLD: 65 % (ref 50–65)
NEUTS SEG NFR BLD: 69 % (ref 50–65)
NEUTS SEG NFR BLD: 72.7 % (ref 50–65)
NEUTS SEG NFR BLD: 76 % (ref 50–65)
NEUTS SEG NFR BLD: 79 % (ref 50–65)
NEUTS SEG NFR BLD: 80 % (ref 50–65)
NEUTS VAC BLD QL SMEAR: ABNORMAL
NITRITE UR QL STRIP.AUTO: NEGATIVE
NOROVIRUS GI+II RNA STL QL NAA+NON-PROBE: NOT DETECTED
O2 CONTENT ARTERIAL: 13.6 ML/DL
O2 CONTENT ARTERIAL: 14.1 ML/DL
O2 CONTENT ARTERIAL: 15.3 ML/DL
O2 DELIVERY DEVICE: ABNORMAL
O2 SAT, ARTERIAL: 88.5 %
O2 SAT, ARTERIAL: 92.3 %
O2 SAT, ARTERIAL: 92.9 %
O2 THERAPY: ABNORMAL
ORGANISM: ABNORMAL
OVALOCYTES BLD QL SMEAR: ABNORMAL
OXYGEN FLOW: 13
OXYGEN FLOW: 2
OXYGEN FLOW: 50
P AERUGINOSA DNA BLD POS NAA+NON-PROBE: NOT DETECTED
P SHIGELLOIDES DNA STL QL NAA+NON-PROBE: NOT DETECTED
PCO2 ARTERIAL: 33 MMHG (ref 35–45)
PCO2 ARTERIAL: 34 MMHG (ref 35–45)
PCO2 ARTERIAL: 38 MMHG (ref 35–45)
PH ARTERIAL: 7.54 (ref 7.35–7.45)
PH ARTERIAL: 7.54 (ref 7.35–7.45)
PH ARTERIAL: 7.57 (ref 7.35–7.45)
PH GAST: NORMAL [PH]
PH UR STRIP.AUTO: 5 [PH] (ref 5–8)
PHOSPHATE SERPL-MCNC: 3.8 MG/DL (ref 2.5–4.5)
PHOSPHATE SERPL-MCNC: 4.5 MG/DL (ref 2.5–4.5)
PHOSPHATE SERPL-MCNC: 4.7 MG/DL (ref 2.5–4.5)
PHOSPHATE SERPL-MCNC: 5 MG/DL (ref 2.5–4.5)
PHOSPHATE SERPL-MCNC: 5.2 MG/DL (ref 2.5–4.5)
PHOSPHATE SERPL-MCNC: 5.2 MG/DL (ref 2.5–4.5)
PHOSPHATE SERPL-MCNC: 5.7 MG/DL (ref 2.5–4.5)
PLATELET # BLD AUTO: 121 K/UL (ref 130–400)
PLATELET # BLD AUTO: 142 K/UL (ref 130–400)
PLATELET # BLD AUTO: 150 K/UL (ref 130–400)
PLATELET # BLD AUTO: 161 K/UL (ref 130–400)
PLATELET # BLD AUTO: 224 K/UL (ref 130–400)
PLATELET # BLD AUTO: 227 K/UL (ref 130–400)
PLATELET # BLD AUTO: 230 K/UL (ref 130–400)
PLATELET # BLD AUTO: 232 K/UL (ref 130–400)
PLATELET # BLD AUTO: 238 K/UL (ref 130–400)
PLATELET # BLD AUTO: 240 K/UL (ref 130–400)
PLATELET # BLD AUTO: 254 K/UL (ref 130–400)
PLATELET # BLD AUTO: 257 K/UL (ref 130–400)
PLATELET # BLD AUTO: 261 K/UL (ref 130–400)
PLATELET # BLD AUTO: 265 K/UL (ref 130–400)
PLATELET # BLD AUTO: 269 K/UL (ref 130–400)
PLATELET # BLD AUTO: 276 K/UL (ref 130–400)
PLATELET # BLD AUTO: 279 K/UL (ref 130–400)
PLATELET # BLD AUTO: 280 K/UL (ref 130–400)
PLATELET # BLD AUTO: 288 K/UL (ref 130–400)
PLATELET # BLD AUTO: 297 K/UL (ref 130–400)
PLATELET SLIDE REVIEW: ADEQUATE
PMV BLD AUTO: 10 FL (ref 9.4–12.4)
PMV BLD AUTO: 10.5 FL (ref 9.4–12.4)
PMV BLD AUTO: 10.5 FL (ref 9.4–12.4)
PMV BLD AUTO: 10.7 FL (ref 9.4–12.4)
PMV BLD AUTO: 10.7 FL (ref 9.4–12.4)
PMV BLD AUTO: 10.8 FL (ref 9.4–12.4)
PMV BLD AUTO: 10.9 FL (ref 9.4–12.4)
PMV BLD AUTO: 10.9 FL (ref 9.4–12.4)
PMV BLD AUTO: 11.1 FL (ref 9.4–12.4)
PMV BLD AUTO: 11.1 FL (ref 9.4–12.4)
PMV BLD AUTO: 11.2 FL (ref 9.4–12.4)
PMV BLD AUTO: 11.3 FL (ref 9.4–12.4)
PMV BLD AUTO: 11.6 FL (ref 9.4–12.4)
PMV BLD AUTO: 11.7 FL (ref 9.4–12.4)
PMV BLD AUTO: 11.8 FL (ref 9.4–12.4)
PMV BLD AUTO: 9.8 FL (ref 9.4–12.4)
PO2 ARTERIAL: 56 MMHG (ref 80–100)
PO2 ARTERIAL: 64 MMHG (ref 80–100)
PO2 ARTERIAL: 67 MMHG (ref 80–100)
POIKILOCYTOSIS BLD QL SMEAR: ABNORMAL
POIKILOCYTOSIS BLD QL SMEAR: ABNORMAL
POLYCHROMASIA BLD QL SMEAR: ABNORMAL
POTASSIUM BLD-SCNC: 2.8 MMOL/L
POTASSIUM BLD-SCNC: 2.9 MMOL/L
POTASSIUM BLD-SCNC: 3 MMOL/L
POTASSIUM SERPL-SCNC: 3.1 MMOL/L (ref 3.5–5)
POTASSIUM SERPL-SCNC: 3.1 MMOL/L (ref 3.5–5)
POTASSIUM SERPL-SCNC: 3.2 MMOL/L (ref 3.5–5)
POTASSIUM SERPL-SCNC: 3.3 MMOL/L (ref 3.5–5)
POTASSIUM SERPL-SCNC: 3.3 MMOL/L (ref 3.5–5)
POTASSIUM SERPL-SCNC: 3.4 MMOL/L (ref 3.5–5)
POTASSIUM SERPL-SCNC: 3.5 MMOL/L (ref 3.5–5)
POTASSIUM SERPL-SCNC: 3.6 MMOL/L (ref 3.5–5)
POTASSIUM SERPL-SCNC: 3.7 MMOL/L (ref 3.5–5)
POTASSIUM SERPL-SCNC: 3.8 MMOL/L (ref 3.5–5)
POTASSIUM SERPL-SCNC: 4 MMOL/L (ref 3.5–5)
POTASSIUM SERPL-SCNC: 4.1 MMOL/L (ref 3.5–5)
POTASSIUM SERPL-SCNC: 4.1 MMOL/L (ref 3.5–5)
POTASSIUM SERPL-SCNC: 4.5 MMOL/L (ref 3.5–5)
POTASSIUM SERPL-SCNC: 4.6 MMOL/L (ref 3.5–5)
POTASSIUM SERPL-SCNC: 4.9 MMOL/L (ref 3.5–5)
POTASSIUM SERPL-SCNC: 5.3 MMOL/L (ref 3.5–5)
POTASSIUM SERPL-SCNC: 5.7 MMOL/L (ref 3.5–5)
POTASSIUM SERPL-SCNC: 5.8 MMOL/L (ref 3.5–5)
PROCALCITONIN: 0.32 NG/ML (ref 0–0.09)
PROT SERPL-MCNC: 4.3 G/DL (ref 6.6–8.7)
PROT SERPL-MCNC: 4.5 G/DL (ref 6.6–8.7)
PROT SERPL-MCNC: 4.5 G/DL (ref 6.6–8.7)
PROT SERPL-MCNC: 4.6 G/DL (ref 6.6–8.7)
PROT SERPL-MCNC: 4.7 G/DL (ref 6.6–8.7)
PROT SERPL-MCNC: 4.8 G/DL (ref 6.6–8.7)
PROT SERPL-MCNC: 4.8 G/DL (ref 6.6–8.7)
PROT SERPL-MCNC: 5 G/DL (ref 6.6–8.7)
PROT SERPL-MCNC: 5.2 G/DL (ref 6.6–8.7)
PROT UR STRIP.AUTO-MCNC: 30 MG/DL
PROT UR-MCNC: 41 MG/DL (ref 15–45)
PROTEUS SP DNA BLD POS QL NAA+NON-PROBE: NOT DETECTED
PTH-INTACT SERPL-MCNC: 71 PG/ML (ref 15–65)
RBC # BLD AUTO: 2.58 M/UL (ref 4.7–6.1)
RBC # BLD AUTO: 2.7 M/UL (ref 4.7–6.1)
RBC # BLD AUTO: 2.7 M/UL (ref 4.7–6.1)
RBC # BLD AUTO: 2.73 M/UL (ref 4.7–6.1)
RBC # BLD AUTO: 2.76 M/UL (ref 4.7–6.1)
RBC # BLD AUTO: 2.83 M/UL (ref 4.7–6.1)
RBC # BLD AUTO: 2.91 M/UL (ref 4.7–6.1)
RBC # BLD AUTO: 3.19 M/UL (ref 4.7–6.1)
RBC # BLD AUTO: 3.23 M/UL (ref 4.7–6.1)
RBC # BLD AUTO: 3.24 M/UL (ref 4.7–6.1)
RBC # BLD AUTO: 3.29 M/UL (ref 4.7–6.1)
RBC # BLD AUTO: 3.36 M/UL (ref 4.7–6.1)
RBC # BLD AUTO: 3.4 M/UL (ref 4.7–6.1)
RBC # BLD AUTO: 3.42 M/UL (ref 4.7–6.1)
RBC # BLD AUTO: 3.51 M/UL (ref 4.7–6.1)
RBC # BLD AUTO: 3.52 M/UL (ref 4.7–6.1)
RBC # BLD AUTO: 3.53 M/UL (ref 4.7–6.1)
RBC # BLD AUTO: 3.64 M/UL (ref 4.7–6.1)
RBC # BLD AUTO: 3.76 M/UL (ref 4.7–6.1)
RBC # BLD AUTO: 3.83 M/UL (ref 4.7–6.1)
RBC #/AREA URNS HPF: ABNORMAL /HPF (ref 0–2)
REASON FOR REJECTION: NORMAL
REJECTED TEST: NORMAL
RSV RNA NPH QL NAA+NON-PROBE: NOT DETECTED
RV+EV RNA NPH QL NAA+NON-PROBE: NOT DETECTED
RVA RNA STL QL NAA+NON-PROBE: NOT DETECTED
S AUREUS DNA BLD POS QL NAA+NON-PROBE: NOT DETECTED
S AUREUS+CONS DNA BLD POS NAA+NON-PROBE: DETECTED
S ENT+BONG DNA STL QL NAA+NON-PROBE: NOT DETECTED
S EPIDERMIDIS DNA BLD POS QL NAA+PROBE: NOT DETECTED
S LUGDUNENSIS DNA BLD POS QL NAA+PROBE: NOT DETECTED
S MALTOPH DNA BLD POS QL NAA+PROBE: NOT DETECTED
S MARCESCENS DNA BLD POS NAA+NON-PROBE: NOT DETECTED
S PNEUM DNA BLD POS QL NAA+NON-PROBE: NOT DETECTED
S PYO DNA BLD POS QL NAA+NON-PROBE: NOT DETECTED
SALMONELLA DNA BLD POS QL NAA+PROBE: NOT DETECTED
SAMPLE SOURCE: ABNORMAL
SAPO I+II+IV+V RNA STL QL NAA+NON-PROBE: NOT DETECTED
SARS-COV-2 RNA NPH QL NAA+NON-PROBE: NOT DETECTED
SHIGELLA SP+EIEC IPAH ST NAA+NON-PROBE: NOT DETECTED
SMUDGE CELLS BLD QL SMEAR: ABNORMAL
SODIUM SERPL-SCNC: 133 MMOL/L (ref 136–145)
SODIUM SERPL-SCNC: 133 MMOL/L (ref 136–145)
SODIUM SERPL-SCNC: 134 MMOL/L (ref 136–145)
SODIUM SERPL-SCNC: 135 MMOL/L (ref 136–145)
SODIUM SERPL-SCNC: 136 MMOL/L (ref 136–145)
SODIUM SERPL-SCNC: 136 MMOL/L (ref 136–145)
SODIUM SERPL-SCNC: 137 MMOL/L (ref 136–145)
SODIUM SERPL-SCNC: 138 MMOL/L (ref 136–145)
SODIUM SERPL-SCNC: 139 MMOL/L (ref 136–145)
SODIUM SERPL-SCNC: 140 MMOL/L (ref 136–145)
SODIUM SERPL-SCNC: 141 MMOL/L (ref 136–145)
SODIUM UR-SCNC: 41 MMOL/L
SP GR UR STRIP.AUTO: 1.01 (ref 1–1.03)
SQUAMOUS #/AREA URNS HPF: ABNORMAL /HPF
STREPTOCOCCUS DNA BLD POS NAA+NON-PROBE: NOT DETECTED
TIBC SERPL-MCNC: 211 UG/DL (ref 250–400)
TOXIC GRANULATION: ABNORMAL
TROPONIN T SERPL-MCNC: <0.01 NG/ML (ref 0–0.03)
URATE SERPL-MCNC: 4.5 MG/DL (ref 3.4–7)
UROBILINOGEN UR STRIP.AUTO-MCNC: 0.2 E.U./DL
V CHOL+PARA+VUL DNA STL QL NAA+NON-PROBE: NOT DETECTED
V CHOLERAE DNA STL QL NAA+NON-PROBE: NOT DETECTED
VANCOMYCIN SERPL-MCNC: 10.5 UG/ML
VARIANT LYMPHS NFR BLD: 2 % (ref 0–8)
VARIANT LYMPHS NFR BLD: 2 % (ref 0–8)
VARIANT LYMPHS NFR BLD: 3 % (ref 0–8)
VARIANT LYMPHS NFR BLD: 4 % (ref 0–8)
VARIANT LYMPHS NFR BLD: 4 % (ref 0–8)
VARIANT LYMPHS NFR BLD: 6 % (ref 0–8)
WBC # BLD AUTO: 2 K/UL (ref 4.8–10.8)
WBC # BLD AUTO: 3.1 K/UL (ref 4.8–10.8)
WBC # BLD AUTO: 3.4 K/UL (ref 4.8–10.8)
WBC # BLD AUTO: 3.4 K/UL (ref 4.8–10.8)
WBC # BLD AUTO: 4.2 K/UL (ref 4.8–10.8)
WBC # BLD AUTO: 4.2 K/UL (ref 4.8–10.8)
WBC # BLD AUTO: 4.4 K/UL (ref 4.8–10.8)
WBC # BLD AUTO: 5.7 K/UL (ref 4.8–10.8)
WBC # BLD AUTO: 5.8 K/UL (ref 4.8–10.8)
WBC # BLD AUTO: 6 K/UL (ref 4.8–10.8)
WBC # BLD AUTO: 6.1 K/UL (ref 4.8–10.8)
WBC # BLD AUTO: 6.4 K/UL (ref 4.8–10.8)
WBC # BLD AUTO: 6.5 K/UL (ref 4.8–10.8)
WBC # BLD AUTO: 6.6 K/UL (ref 4.8–10.8)
WBC # BLD AUTO: 6.9 K/UL (ref 4.8–10.8)
WBC # BLD AUTO: 7.2 K/UL (ref 4.8–10.8)
WBC # BLD AUTO: 7.3 K/UL (ref 4.8–10.8)
WBC # BLD AUTO: 7.4 K/UL (ref 4.8–10.8)
WBC # BLD AUTO: 7.4 K/UL (ref 4.8–10.8)
WBC # BLD AUTO: 7.5 K/UL (ref 4.8–10.8)
WBC #/AREA URNS HPF: ABNORMAL /HPF (ref 0–5)
Y ENTEROCOL DNA STL QL NAA+NON-PROBE: NOT DETECTED

## 2023-01-01 PROCEDURE — 99231 SBSQ HOSP IP/OBS SF/LOW 25: CPT | Performed by: INTERNAL MEDICINE

## 2023-01-01 PROCEDURE — 6360000002 HC RX W HCPCS: Performed by: SURGERY

## 2023-01-01 PROCEDURE — 2580000003 HC RX 258: Performed by: NURSE PRACTITIONER

## 2023-01-01 PROCEDURE — 6360000002 HC RX W HCPCS: Performed by: NURSE PRACTITIONER

## 2023-01-01 PROCEDURE — 84484 ASSAY OF TROPONIN QUANT: CPT

## 2023-01-01 PROCEDURE — 85025 COMPLETE CBC W/AUTO DIFF WBC: CPT

## 2023-01-01 PROCEDURE — 83970 ASSAY OF PARATHORMONE: CPT

## 2023-01-01 PROCEDURE — 2709999900 HC NON-CHARGEABLE SUPPLY: Performed by: SURGERY

## 2023-01-01 PROCEDURE — 6370000000 HC RX 637 (ALT 250 FOR IP): Performed by: INTERNAL MEDICINE

## 2023-01-01 PROCEDURE — 70450 CT HEAD/BRAIN W/O DYE: CPT | Performed by: RADIOLOGY

## 2023-01-01 PROCEDURE — 97110 THERAPEUTIC EXERCISES: CPT

## 2023-01-01 PROCEDURE — 83605 ASSAY OF LACTIC ACID: CPT

## 2023-01-01 PROCEDURE — 86901 BLOOD TYPING SEROLOGIC RH(D): CPT

## 2023-01-01 PROCEDURE — 6370000000 HC RX 637 (ALT 250 FOR IP): Performed by: SURGERY

## 2023-01-01 PROCEDURE — 6370000000 HC RX 637 (ALT 250 FOR IP): Performed by: NURSE PRACTITIONER

## 2023-01-01 PROCEDURE — 2100000000 HC CCU R&B

## 2023-01-01 PROCEDURE — 1210000000 HC MED SURG R&B

## 2023-01-01 PROCEDURE — 94761 N-INVAS EAR/PLS OXIMETRY MLT: CPT

## 2023-01-01 PROCEDURE — 2580000003 HC RX 258: Performed by: SURGERY

## 2023-01-01 PROCEDURE — C9113 INJ PANTOPRAZOLE SODIUM, VIA: HCPCS | Performed by: HOSPITALIST

## 2023-01-01 PROCEDURE — 2580000003 HC RX 258: Performed by: INTERNAL MEDICINE

## 2023-01-01 PROCEDURE — 83735 ASSAY OF MAGNESIUM: CPT

## 2023-01-01 PROCEDURE — 94760 N-INVAS EAR/PLS OXIMETRY 1: CPT

## 2023-01-01 PROCEDURE — 99232 SBSQ HOSP IP/OBS MODERATE 35: CPT | Performed by: INTERNAL MEDICINE

## 2023-01-01 PROCEDURE — 2580000003 HC RX 258: Performed by: HOSPITALIST

## 2023-01-01 PROCEDURE — 2140000000 HC CCU INTERMEDIATE R&B

## 2023-01-01 PROCEDURE — 2700000000 HC OXYGEN THERAPY PER DAY

## 2023-01-01 PROCEDURE — 36558 INSERT TUNNELED CV CATH: CPT | Performed by: SURGERY

## 2023-01-01 PROCEDURE — 71045 X-RAY EXAM CHEST 1 VIEW: CPT

## 2023-01-01 PROCEDURE — C9113 INJ PANTOPRAZOLE SODIUM, VIA: HCPCS | Performed by: SURGERY

## 2023-01-01 PROCEDURE — 80048 BASIC METABOLIC PNL TOTAL CA: CPT

## 2023-01-01 PROCEDURE — 82803 BLOOD GASES ANY COMBINATION: CPT

## 2023-01-01 PROCEDURE — 84156 ASSAY OF PROTEIN URINE: CPT

## 2023-01-01 PROCEDURE — 97165 OT EVAL LOW COMPLEX 30 MIN: CPT

## 2023-01-01 PROCEDURE — C1769 GUIDE WIRE: HCPCS | Performed by: SURGERY

## 2023-01-01 PROCEDURE — 36415 COLL VENOUS BLD VENIPUNCTURE: CPT

## 2023-01-01 PROCEDURE — 84145 PROCALCITONIN (PCT): CPT

## 2023-01-01 PROCEDURE — 82570 ASSAY OF URINE CREATININE: CPT

## 2023-01-01 PROCEDURE — 80053 COMPREHEN METABOLIC PANEL: CPT

## 2023-01-01 PROCEDURE — 6360000002 HC RX W HCPCS: Performed by: INTERNAL MEDICINE

## 2023-01-01 PROCEDURE — 84132 ASSAY OF SERUM POTASSIUM: CPT

## 2023-01-01 PROCEDURE — 82330 ASSAY OF CALCIUM: CPT

## 2023-01-01 PROCEDURE — 6370000000 HC RX 637 (ALT 250 FOR IP): Performed by: HOSPITALIST

## 2023-01-01 PROCEDURE — 80076 HEPATIC FUNCTION PANEL: CPT

## 2023-01-01 PROCEDURE — 92610 EVALUATE SWALLOWING FUNCTION: CPT

## 2023-01-01 PROCEDURE — 2500000003 HC RX 250 WO HCPCS: Performed by: INTERNAL MEDICINE

## 2023-01-01 PROCEDURE — 2500000003 HC RX 250 WO HCPCS: Performed by: EMERGENCY MEDICINE

## 2023-01-01 PROCEDURE — 83880 ASSAY OF NATRIURETIC PEPTIDE: CPT

## 2023-01-01 PROCEDURE — 7100000000 HC PACU RECOVERY - FIRST 15 MIN: Performed by: SURGERY

## 2023-01-01 PROCEDURE — 84300 ASSAY OF URINE SODIUM: CPT

## 2023-01-01 PROCEDURE — 8010000000 HC HEMODIALYSIS ACUTE INPT

## 2023-01-01 PROCEDURE — 36556 INSERT NON-TUNNEL CV CATH: CPT

## 2023-01-01 PROCEDURE — 3700000000 HC ANESTHESIA ATTENDED CARE: Performed by: SURGERY

## 2023-01-01 PROCEDURE — APPSS15 APP SPLIT SHARED TIME 0-15 MINUTES: Performed by: NURSE PRACTITIONER

## 2023-01-01 PROCEDURE — 71045 X-RAY EXAM CHEST 1 VIEW: CPT | Performed by: GENERAL PRACTICE

## 2023-01-01 PROCEDURE — 82271 OCCULT BLOOD OTHER SOURCES: CPT

## 2023-01-01 PROCEDURE — 99222 1ST HOSP IP/OBS MODERATE 55: CPT | Performed by: SURGERY

## 2023-01-01 PROCEDURE — 84100 ASSAY OF PHOSPHORUS: CPT

## 2023-01-01 PROCEDURE — 99222 1ST HOSP IP/OBS MODERATE 55: CPT | Performed by: INTERNAL MEDICINE

## 2023-01-01 PROCEDURE — 6360000002 HC RX W HCPCS: Performed by: EMERGENCY MEDICINE

## 2023-01-01 PROCEDURE — 93321 DOPPLER ECHO F-UP/LMTD STD: CPT

## 2023-01-01 PROCEDURE — 87040 BLOOD CULTURE FOR BACTERIA: CPT

## 2023-01-01 PROCEDURE — 36600 WITHDRAWAL OF ARTERIAL BLOOD: CPT

## 2023-01-01 PROCEDURE — 85027 COMPLETE CBC AUTOMATED: CPT

## 2023-01-01 PROCEDURE — 51702 INSERT TEMP BLADDER CATH: CPT

## 2023-01-01 PROCEDURE — 6370000000 HC RX 637 (ALT 250 FOR IP): Performed by: EMERGENCY MEDICINE

## 2023-01-01 PROCEDURE — 99223 1ST HOSP IP/OBS HIGH 75: CPT | Performed by: SURGERY

## 2023-01-01 PROCEDURE — 93010 ELECTROCARDIOGRAM REPORT: CPT | Performed by: INTERNAL MEDICINE

## 2023-01-01 PROCEDURE — C1750 CATH, HEMODIALYSIS,LONG-TERM: HCPCS | Performed by: SURGERY

## 2023-01-01 PROCEDURE — 85730 THROMBOPLASTIN TIME PARTIAL: CPT

## 2023-01-01 PROCEDURE — 6360000002 HC RX W HCPCS: Performed by: HOSPITALIST

## 2023-01-01 PROCEDURE — 87507 IADNA-DNA/RNA PROBE TQ 12-25: CPT

## 2023-01-01 PROCEDURE — 86900 BLOOD TYPING SEROLOGIC ABO: CPT

## 2023-01-01 PROCEDURE — 6360000004 HC RX CONTRAST MEDICATION: Performed by: EMERGENCY MEDICINE

## 2023-01-01 PROCEDURE — C8929 TTE W OR WO FOL WCON,DOPPLER: HCPCS

## 2023-01-01 PROCEDURE — 92522 EVALUATE SPEECH PRODUCTION: CPT

## 2023-01-01 PROCEDURE — 76770 US EXAM ABDO BACK WALL COMP: CPT

## 2023-01-01 PROCEDURE — 80074 ACUTE HEPATITIS PANEL: CPT

## 2023-01-01 PROCEDURE — 80069 RENAL FUNCTION PANEL: CPT

## 2023-01-01 PROCEDURE — 6360000004 HC RX CONTRAST MEDICATION: Performed by: INTERNAL MEDICINE

## 2023-01-01 PROCEDURE — 94640 AIRWAY INHALATION TREATMENT: CPT

## 2023-01-01 PROCEDURE — 82248 BILIRUBIN DIRECT: CPT

## 2023-01-01 PROCEDURE — 77001 FLUOROGUIDE FOR VEIN DEVICE: CPT | Performed by: SURGERY

## 2023-01-01 PROCEDURE — 82306 VITAMIN D 25 HYDROXY: CPT

## 2023-01-01 PROCEDURE — 71275 CT ANGIOGRAPHY CHEST: CPT | Performed by: RADIOLOGY

## 2023-01-01 PROCEDURE — 97530 THERAPEUTIC ACTIVITIES: CPT

## 2023-01-01 PROCEDURE — 3600000004 HC SURGERY LEVEL 4 BASE: Performed by: SURGERY

## 2023-01-01 PROCEDURE — 0202U NFCT DS 22 TRGT SARS-COV-2: CPT

## 2023-01-01 PROCEDURE — 36556 INSERT NON-TUNNEL CV CATH: CPT | Performed by: SURGERY

## 2023-01-01 PROCEDURE — 93325 DOPPLER ECHO COLOR FLOW MAPG: CPT | Performed by: INTERNAL MEDICINE

## 2023-01-01 PROCEDURE — 36591 DRAW BLOOD OFF VENOUS DEVICE: CPT

## 2023-01-01 PROCEDURE — 7100000001 HC PACU RECOVERY - ADDTL 15 MIN: Performed by: SURGERY

## 2023-01-01 PROCEDURE — 93005 ELECTROCARDIOGRAM TRACING: CPT | Performed by: EMERGENCY MEDICINE

## 2023-01-01 PROCEDURE — 76937 US GUIDE VASCULAR ACCESS: CPT

## 2023-01-01 PROCEDURE — 83615 LACTATE (LD) (LDH) ENZYME: CPT

## 2023-01-01 PROCEDURE — 99231 SBSQ HOSP IP/OBS SF/LOW 25: CPT | Performed by: SURGERY

## 2023-01-01 PROCEDURE — 99233 SBSQ HOSP IP/OBS HIGH 50: CPT | Performed by: INTERNAL MEDICINE

## 2023-01-01 PROCEDURE — 83540 ASSAY OF IRON: CPT

## 2023-01-01 PROCEDURE — 70450 CT HEAD/BRAIN W/O DYE: CPT

## 2023-01-01 PROCEDURE — 97535 SELF CARE MNGMENT TRAINING: CPT

## 2023-01-01 PROCEDURE — 93325 DOPPLER ECHO COLOR FLOW MAPG: CPT

## 2023-01-01 PROCEDURE — P9047 ALBUMIN (HUMAN), 25%, 50ML: HCPCS | Performed by: INTERNAL MEDICINE

## 2023-01-01 PROCEDURE — APPSS15 APP SPLIT SHARED TIME 0-15 MINUTES: Performed by: PHYSICIAN ASSISTANT

## 2023-01-01 PROCEDURE — 06HY33Z INSERTION OF INFUSION DEVICE INTO LOWER VEIN, PERCUTANEOUS APPROACH: ICD-10-PCS | Performed by: SURGERY

## 2023-01-01 PROCEDURE — 99221 1ST HOSP IP/OBS SF/LOW 40: CPT | Performed by: SURGERY

## 2023-01-01 PROCEDURE — 2709999900 HC NON-CHARGEABLE SUPPLY

## 2023-01-01 PROCEDURE — 86850 RBC ANTIBODY SCREEN: CPT

## 2023-01-01 PROCEDURE — 82043 UR ALBUMIN QUANTITATIVE: CPT

## 2023-01-01 PROCEDURE — 93005 ELECTROCARDIOGRAM TRACING: CPT | Performed by: INTERNAL MEDICINE

## 2023-01-01 PROCEDURE — 99291 CRITICAL CARE FIRST HOUR: CPT | Performed by: INTERNAL MEDICINE

## 2023-01-01 PROCEDURE — 93312 ECHO TRANSESOPHAGEAL: CPT | Performed by: INTERNAL MEDICINE

## 2023-01-01 PROCEDURE — 83550 IRON BINDING TEST: CPT

## 2023-01-01 PROCEDURE — 86706 HEP B SURFACE ANTIBODY: CPT

## 2023-01-01 PROCEDURE — B24BZZ4 ULTRASONOGRAPHY OF HEART WITH AORTA, TRANSESOPHAGEAL: ICD-10-PCS | Performed by: INTERNAL MEDICINE

## 2023-01-01 PROCEDURE — 0JH63XZ INSERTION OF TUNNELED VASCULAR ACCESS DEVICE INTO CHEST SUBCUTANEOUS TISSUE AND FASCIA, PERCUTANEOUS APPROACH: ICD-10-PCS | Performed by: SURGERY

## 2023-01-01 PROCEDURE — 84550 ASSAY OF BLOOD/URIC ACID: CPT

## 2023-01-01 PROCEDURE — 02HV33Z INSERTION OF INFUSION DEVICE INTO SUPERIOR VENA CAVA, PERCUTANEOUS APPROACH: ICD-10-PCS | Performed by: SURGERY

## 2023-01-01 PROCEDURE — 93312 ECHO TRANSESOPHAGEAL: CPT

## 2023-01-01 PROCEDURE — 81001 URINALYSIS AUTO W/SCOPE: CPT

## 2023-01-01 PROCEDURE — 87070 CULTURE OTHR SPECIMN AEROBIC: CPT

## 2023-01-01 PROCEDURE — 71275 CT ANGIOGRAPHY CHEST: CPT

## 2023-01-01 PROCEDURE — 5A1D70Z PERFORMANCE OF URINARY FILTRATION, INTERMITTENT, LESS THAN 6 HOURS PER DAY: ICD-10-PCS | Performed by: INTERNAL MEDICINE

## 2023-01-01 PROCEDURE — 3700000001 HC ADD 15 MINUTES (ANESTHESIA): Performed by: SURGERY

## 2023-01-01 PROCEDURE — 87205 SMEAR GRAM STAIN: CPT

## 2023-01-01 PROCEDURE — 87641 MR-STAPH DNA AMP PROBE: CPT

## 2023-01-01 PROCEDURE — 6370000000 HC RX 637 (ALT 250 FOR IP)

## 2023-01-01 PROCEDURE — 87150 DNA/RNA AMPLIFIED PROBE: CPT

## 2023-01-01 PROCEDURE — 99152 MOD SED SAME PHYS/QHP 5/>YRS: CPT

## 2023-01-01 PROCEDURE — 71045 X-RAY EXAM CHEST 1 VIEW: CPT | Performed by: RADIOLOGY

## 2023-01-01 PROCEDURE — 97162 PT EVAL MOD COMPLEX 30 MIN: CPT

## 2023-01-01 PROCEDURE — A4217 STERILE WATER/SALINE, 500 ML: HCPCS | Performed by: SURGERY

## 2023-01-01 PROCEDURE — 99285 EMERGENCY DEPT VISIT HI MDM: CPT

## 2023-01-01 PROCEDURE — 85379 FIBRIN DEGRADATION QUANT: CPT

## 2023-01-01 PROCEDURE — 77001 FLUOROGUIDE FOR VEIN DEVICE: CPT

## 2023-01-01 PROCEDURE — 87449 NOS EACH ORGANISM AG IA: CPT

## 2023-01-01 PROCEDURE — 2580000003 HC RX 258: Performed by: EMERGENCY MEDICINE

## 2023-01-01 PROCEDURE — 93321 DOPPLER ECHO F-UP/LMTD STD: CPT | Performed by: INTERNAL MEDICINE

## 2023-01-01 PROCEDURE — 80202 ASSAY OF VANCOMYCIN: CPT

## 2023-01-01 PROCEDURE — 3600000014 HC SURGERY LEVEL 4 ADDTL 15MIN: Performed by: SURGERY

## 2023-01-01 PROCEDURE — 6360000002 HC RX W HCPCS

## 2023-01-01 RX ORDER — MIDODRINE HYDROCHLORIDE 5 MG/1
5 TABLET ORAL 3 TIMES DAILY PRN
Status: DISCONTINUED | OUTPATIENT
Start: 2023-01-01 | End: 2023-01-01 | Stop reason: HOSPADM

## 2023-01-01 RX ORDER — MIDODRINE HYDROCHLORIDE 2.5 MG/1
2.5 TABLET ORAL 3 TIMES DAILY
Status: DISCONTINUED | OUTPATIENT
Start: 2023-01-01 | End: 2023-01-01

## 2023-01-01 RX ORDER — SODIUM CHLORIDE 9 MG/ML
INJECTION, SOLUTION INTRAVENOUS CONTINUOUS
Status: DISCONTINUED | OUTPATIENT
Start: 2023-01-01 | End: 2023-01-01

## 2023-01-01 RX ORDER — SODIUM CHLORIDE 0.9 % (FLUSH) 0.9 %
5-40 SYRINGE (ML) INJECTION PRN
Status: DISCONTINUED | OUTPATIENT
Start: 2023-01-01 | End: 2023-01-01 | Stop reason: HOSPADM

## 2023-01-01 RX ORDER — FENTANYL CITRATE 50 UG/ML
25 INJECTION, SOLUTION INTRAMUSCULAR; INTRAVENOUS
Status: DISCONTINUED | OUTPATIENT
Start: 2023-01-01 | End: 2023-01-01 | Stop reason: HOSPADM

## 2023-01-01 RX ORDER — VANCOMYCIN HYDROCHLORIDE 125 MG/1
250 CAPSULE ORAL EVERY 6 HOURS SCHEDULED
Status: DISCONTINUED | OUTPATIENT
Start: 2023-01-01 | End: 2023-01-01 | Stop reason: HOSPADM

## 2023-01-01 RX ORDER — BUMETANIDE 0.25 MG/ML
1 INJECTION INTRAMUSCULAR; INTRAVENOUS 2 TIMES DAILY
Status: DISCONTINUED | OUTPATIENT
Start: 2023-01-01 | End: 2023-01-01

## 2023-01-01 RX ORDER — ROSUVASTATIN CALCIUM 10 MG/1
10 TABLET, COATED ORAL NIGHTLY
Status: DISCONTINUED | OUTPATIENT
Start: 2023-01-01 | End: 2023-01-01 | Stop reason: HOSPADM

## 2023-01-01 RX ORDER — 0.9 % SODIUM CHLORIDE 0.9 %
1000 INTRAVENOUS SOLUTION INTRAVENOUS ONCE
Status: COMPLETED | OUTPATIENT
Start: 2023-01-01 | End: 2023-01-01

## 2023-01-01 RX ORDER — ONDANSETRON 2 MG/ML
4 INJECTION INTRAMUSCULAR; INTRAVENOUS
Status: DISCONTINUED | OUTPATIENT
Start: 2023-01-01 | End: 2023-01-01 | Stop reason: HOSPADM

## 2023-01-01 RX ORDER — PROMETHAZINE HYDROCHLORIDE 25 MG/ML
12.5 INJECTION, SOLUTION INTRAMUSCULAR; INTRAVENOUS EVERY 6 HOURS PRN
Status: DISCONTINUED | OUTPATIENT
Start: 2023-01-01 | End: 2023-01-01 | Stop reason: HOSPADM

## 2023-01-01 RX ORDER — HEPARIN SODIUM 1000 [USP'U]/ML
3600 INJECTION, SOLUTION INTRAVENOUS; SUBCUTANEOUS
Status: COMPLETED | OUTPATIENT
Start: 2023-01-01 | End: 2023-01-01

## 2023-01-01 RX ORDER — ERGOCALCIFEROL 1.25 MG/1
50000 CAPSULE ORAL WEEKLY
Status: DISCONTINUED | OUTPATIENT
Start: 2023-01-01 | End: 2023-01-01 | Stop reason: HOSPADM

## 2023-01-01 RX ORDER — FENTANYL CITRATE 50 UG/ML
50 INJECTION, SOLUTION INTRAMUSCULAR; INTRAVENOUS
Status: DISCONTINUED | OUTPATIENT
Start: 2023-01-01 | End: 2023-01-01 | Stop reason: HOSPADM

## 2023-01-01 RX ORDER — HYDROXYZINE HYDROCHLORIDE 10 MG/1
10 TABLET, FILM COATED ORAL EVERY 4 HOURS PRN
Status: DISCONTINUED | OUTPATIENT
Start: 2023-01-01 | End: 2023-01-01

## 2023-01-01 RX ORDER — FENTANYL CITRATE 50 UG/ML
50 INJECTION, SOLUTION INTRAMUSCULAR; INTRAVENOUS EVERY 5 MIN PRN
Status: DISCONTINUED | OUTPATIENT
Start: 2023-01-01 | End: 2023-01-01 | Stop reason: HOSPADM

## 2023-01-01 RX ORDER — SODIUM CHLORIDE 9 MG/ML
INJECTION, SOLUTION INTRAVENOUS PRN
Status: DISCONTINUED | OUTPATIENT
Start: 2023-01-01 | End: 2023-01-01 | Stop reason: HOSPADM

## 2023-01-01 RX ORDER — SODIUM CHLORIDE 0.9 % (FLUSH) 0.9 %
5-40 SYRINGE (ML) INJECTION EVERY 12 HOURS SCHEDULED
Status: DISCONTINUED | OUTPATIENT
Start: 2023-01-01 | End: 2023-01-01 | Stop reason: HOSPADM

## 2023-01-01 RX ORDER — HYDROXYZINE HYDROCHLORIDE 25 MG/1
25 TABLET, FILM COATED ORAL EVERY 6 HOURS PRN
Status: DISCONTINUED | OUTPATIENT
Start: 2023-01-01 | End: 2023-01-01 | Stop reason: HOSPADM

## 2023-01-01 RX ORDER — DILTIAZEM HYDROCHLORIDE 5 MG/ML
10 INJECTION INTRAVENOUS ONCE
Status: COMPLETED | OUTPATIENT
Start: 2023-01-01 | End: 2023-01-01

## 2023-01-01 RX ORDER — BUMETANIDE 0.25 MG/ML
1 INJECTION INTRAMUSCULAR; INTRAVENOUS EVERY 12 HOURS
Status: DISCONTINUED | OUTPATIENT
Start: 2023-01-01 | End: 2023-01-01

## 2023-01-01 RX ORDER — SODIUM POLYSTYRENE SULFONATE 15 G/60ML
30 SUSPENSION ORAL; RECTAL ONCE
Status: COMPLETED | OUTPATIENT
Start: 2023-01-01 | End: 2023-01-01

## 2023-01-01 RX ORDER — METHYLPREDNISOLONE SODIUM SUCCINATE 40 MG/ML
40 INJECTION, POWDER, LYOPHILIZED, FOR SOLUTION INTRAMUSCULAR; INTRAVENOUS DAILY
Status: DISCONTINUED | OUTPATIENT
Start: 2023-01-01 | End: 2023-01-01

## 2023-01-01 RX ORDER — DILTIAZEM HYDROCHLORIDE 120 MG/1
120 CAPSULE, COATED, EXTENDED RELEASE ORAL 2 TIMES DAILY
Status: DISCONTINUED | OUTPATIENT
Start: 2023-01-01 | End: 2023-01-01

## 2023-01-01 RX ORDER — POTASSIUM CHLORIDE 7.45 MG/ML
10 INJECTION INTRAVENOUS PRN
Status: DISCONTINUED | OUTPATIENT
Start: 2023-01-01 | End: 2023-01-01

## 2023-01-01 RX ORDER — DILTIAZEM HYDROCHLORIDE 180 MG/1
180 CAPSULE, COATED, EXTENDED RELEASE ORAL 2 TIMES DAILY
Status: DISCONTINUED | OUTPATIENT
Start: 2023-01-01 | End: 2023-01-01 | Stop reason: HOSPADM

## 2023-01-01 RX ORDER — IPRATROPIUM BROMIDE AND ALBUTEROL SULFATE 2.5; .5 MG/3ML; MG/3ML
1 SOLUTION RESPIRATORY (INHALATION) ONCE
Status: COMPLETED | OUTPATIENT
Start: 2023-01-01 | End: 2023-01-01

## 2023-01-01 RX ORDER — GUAIFENESIN 600 MG/1
600 TABLET, EXTENDED RELEASE ORAL 2 TIMES DAILY
Status: DISCONTINUED | OUTPATIENT
Start: 2023-01-01 | End: 2023-01-01 | Stop reason: HOSPADM

## 2023-01-01 RX ORDER — ENOXAPARIN SODIUM 150 MG/ML
1 INJECTION SUBCUTANEOUS 2 TIMES DAILY
Status: DISCONTINUED | OUTPATIENT
Start: 2023-01-01 | End: 2023-01-01

## 2023-01-01 RX ORDER — LEVALBUTEROL INHALATION SOLUTION 1.25 MG/3ML
1.25 SOLUTION RESPIRATORY (INHALATION) EVERY 8 HOURS PRN
Status: DISCONTINUED | OUTPATIENT
Start: 2023-01-01 | End: 2023-01-01 | Stop reason: HOSPADM

## 2023-01-01 RX ORDER — PROCHLORPERAZINE EDISYLATE 5 MG/ML
5 INJECTION INTRAMUSCULAR; INTRAVENOUS
Status: DISCONTINUED | OUTPATIENT
Start: 2023-01-01 | End: 2023-01-01 | Stop reason: HOSPADM

## 2023-01-01 RX ORDER — MEGESTROL ACETATE 40 MG/ML
200 SUSPENSION ORAL DAILY
Status: DISCONTINUED | OUTPATIENT
Start: 2023-01-01 | End: 2023-01-01 | Stop reason: HOSPADM

## 2023-01-01 RX ORDER — POTASSIUM CHLORIDE 20 MEQ/1
40 TABLET, EXTENDED RELEASE ORAL ONCE
Status: COMPLETED | OUTPATIENT
Start: 2023-01-01 | End: 2023-01-01

## 2023-01-01 RX ORDER — IPRATROPIUM BROMIDE AND ALBUTEROL SULFATE 2.5; .5 MG/3ML; MG/3ML
1 SOLUTION RESPIRATORY (INHALATION)
Status: DISCONTINUED | OUTPATIENT
Start: 2023-01-01 | End: 2023-01-01

## 2023-01-01 RX ORDER — FUROSEMIDE 40 MG/1
40 TABLET ORAL DAILY
Status: DISCONTINUED | OUTPATIENT
Start: 2023-01-01 | End: 2023-01-01

## 2023-01-01 RX ORDER — METOPROLOL SUCCINATE 50 MG/1
100 TABLET, EXTENDED RELEASE ORAL 2 TIMES DAILY
Status: DISCONTINUED | OUTPATIENT
Start: 2023-01-01 | End: 2023-01-01

## 2023-01-01 RX ORDER — HEPARIN SODIUM 1000 [USP'U]/ML
4000 INJECTION, SOLUTION INTRAVENOUS; SUBCUTANEOUS
Status: COMPLETED | OUTPATIENT
Start: 2023-01-01 | End: 2023-01-01

## 2023-01-01 RX ORDER — METRONIDAZOLE 500 MG/100ML
500 INJECTION, SOLUTION INTRAVENOUS EVERY 8 HOURS
Status: DISCONTINUED | OUTPATIENT
Start: 2023-01-01 | End: 2023-01-01 | Stop reason: HOSPADM

## 2023-01-01 RX ORDER — HEPARIN SODIUM 1000 [USP'U]/ML
4000 INJECTION, SOLUTION INTRAVENOUS; SUBCUTANEOUS PRN
Status: DISCONTINUED | OUTPATIENT
Start: 2023-01-01 | End: 2023-01-01

## 2023-01-01 RX ORDER — POTASSIUM CHLORIDE 29.8 MG/ML
20 INJECTION INTRAVENOUS PRN
Status: DISCONTINUED | OUTPATIENT
Start: 2023-01-01 | End: 2023-01-01 | Stop reason: HOSPADM

## 2023-01-01 RX ORDER — ALBUMIN (HUMAN) 12.5 G/50ML
25 SOLUTION INTRAVENOUS
Status: ACTIVE | OUTPATIENT
Start: 2023-01-01 | End: 2023-01-01

## 2023-01-01 RX ORDER — BUMETANIDE 0.25 MG/ML
1 INJECTION INTRAMUSCULAR; INTRAVENOUS
Status: DISCONTINUED | OUTPATIENT
Start: 2023-01-01 | End: 2023-01-01

## 2023-01-01 RX ORDER — MIDODRINE HYDROCHLORIDE 5 MG/1
5 TABLET ORAL
Status: DISCONTINUED | OUTPATIENT
Start: 2023-01-01 | End: 2023-01-01

## 2023-01-01 RX ORDER — VITAMIN E 268 MG
400 CAPSULE ORAL DAILY
Status: DISCONTINUED | OUTPATIENT
Start: 2023-01-01 | End: 2023-01-01 | Stop reason: HOSPADM

## 2023-01-01 RX ORDER — ACETAMINOPHEN 325 MG/1
650 TABLET ORAL EVERY 6 HOURS PRN
Status: DISCONTINUED | OUTPATIENT
Start: 2023-01-01 | End: 2023-01-01 | Stop reason: HOSPADM

## 2023-01-01 RX ORDER — FUROSEMIDE 40 MG/1
40 TABLET ORAL DAILY
Status: CANCELLED | OUTPATIENT
Start: 2023-01-01

## 2023-01-01 RX ORDER — HEPARIN SODIUM 10000 [USP'U]/100ML
5-30 INJECTION, SOLUTION INTRAVENOUS CONTINUOUS
Status: DISCONTINUED | OUTPATIENT
Start: 2023-01-01 | End: 2023-01-01

## 2023-01-01 RX ORDER — ONDANSETRON 2 MG/ML
4 INJECTION INTRAMUSCULAR; INTRAVENOUS EVERY 6 HOURS PRN
Status: DISCONTINUED | OUTPATIENT
Start: 2023-01-01 | End: 2023-01-01 | Stop reason: HOSPADM

## 2023-01-01 RX ORDER — METOPROLOL SUCCINATE 50 MG/1
100 TABLET, EXTENDED RELEASE ORAL 2 TIMES DAILY
Status: DISCONTINUED | OUTPATIENT
Start: 2023-01-01 | End: 2023-01-01 | Stop reason: HOSPADM

## 2023-01-01 RX ORDER — ALBUMIN (HUMAN) 12.5 G/50ML
25 SOLUTION INTRAVENOUS
Status: COMPLETED | OUTPATIENT
Start: 2023-01-01 | End: 2023-01-01

## 2023-01-01 RX ORDER — ENOXAPARIN SODIUM 100 MG/ML
40 INJECTION SUBCUTANEOUS DAILY
Status: DISCONTINUED | OUTPATIENT
Start: 2023-01-01 | End: 2023-01-01

## 2023-01-01 RX ORDER — SUCRALFATE 1 G/1
1 TABLET ORAL EVERY 12 HOURS SCHEDULED
Status: DISCONTINUED | OUTPATIENT
Start: 2023-01-01 | End: 2023-01-01 | Stop reason: HOSPADM

## 2023-01-01 RX ORDER — ALLOPURINOL 100 MG/1
300 TABLET ORAL DAILY
Status: DISCONTINUED | OUTPATIENT
Start: 2023-01-01 | End: 2023-01-01 | Stop reason: HOSPADM

## 2023-01-01 RX ORDER — VANCOMYCIN HYDROCHLORIDE 125 MG/1
125 CAPSULE ORAL EVERY 6 HOURS SCHEDULED
Status: DISPENSED | OUTPATIENT
Start: 2023-01-01 | End: 2023-01-01

## 2023-01-01 RX ORDER — HEPARIN SODIUM 1000 [USP'U]/ML
2000 INJECTION, SOLUTION INTRAVENOUS; SUBCUTANEOUS PRN
Status: DISCONTINUED | OUTPATIENT
Start: 2023-01-01 | End: 2023-01-01

## 2023-01-01 RX ORDER — HYDROXYZINE HYDROCHLORIDE 10 MG/1
10 TABLET, FILM COATED ORAL EVERY 6 HOURS PRN
Status: DISCONTINUED | OUTPATIENT
Start: 2023-01-01 | End: 2023-01-01

## 2023-01-01 RX ORDER — LIDOCAINE HYDROCHLORIDE 10 MG/ML
1 INJECTION, SOLUTION EPIDURAL; INFILTRATION; INTRACAUDAL; PERINEURAL
Status: DISCONTINUED | OUTPATIENT
Start: 2023-01-01 | End: 2023-01-01 | Stop reason: HOSPADM

## 2023-01-01 RX ORDER — HEPARIN SODIUM 1000 [USP'U]/ML
3000 INJECTION, SOLUTION INTRAVENOUS; SUBCUTANEOUS
Status: COMPLETED | OUTPATIENT
Start: 2023-01-01 | End: 2023-01-01

## 2023-01-01 RX ORDER — DIPHENHYDRAMINE HYDROCHLORIDE 50 MG/ML
12.5 INJECTION INTRAMUSCULAR; INTRAVENOUS
Status: DISCONTINUED | OUTPATIENT
Start: 2023-01-01 | End: 2023-01-01 | Stop reason: HOSPADM

## 2023-01-01 RX ORDER — MIDAZOLAM HYDROCHLORIDE 2 MG/2ML
2 INJECTION, SOLUTION INTRAMUSCULAR; INTRAVENOUS
Status: DISCONTINUED | OUTPATIENT
Start: 2023-01-01 | End: 2023-01-01 | Stop reason: HOSPADM

## 2023-01-01 RX ORDER — MIDODRINE HYDROCHLORIDE 10 MG/1
10 TABLET ORAL
Status: DISCONTINUED | OUTPATIENT
Start: 2023-01-01 | End: 2023-01-01 | Stop reason: HOSPADM

## 2023-01-01 RX ORDER — HEPARIN SODIUM 1000 [USP'U]/ML
3800 INJECTION, SOLUTION INTRAVENOUS; SUBCUTANEOUS PRN
Status: DISCONTINUED | OUTPATIENT
Start: 2023-01-01 | End: 2023-01-01

## 2023-01-01 RX ORDER — METHYLPREDNISOLONE SODIUM SUCCINATE 40 MG/ML
40 INJECTION, POWDER, LYOPHILIZED, FOR SOLUTION INTRAMUSCULAR; INTRAVENOUS EVERY 8 HOURS
Status: DISCONTINUED | OUTPATIENT
Start: 2023-01-01 | End: 2023-01-01

## 2023-01-01 RX ORDER — PROCHLORPERAZINE 25 MG
25 SUPPOSITORY, RECTAL RECTAL EVERY 12 HOURS PRN
Status: DISCONTINUED | OUTPATIENT
Start: 2023-01-01 | End: 2023-01-01 | Stop reason: HOSPADM

## 2023-01-01 RX ORDER — FENTANYL CITRATE 50 UG/ML
25 INJECTION, SOLUTION INTRAMUSCULAR; INTRAVENOUS EVERY 5 MIN PRN
Status: DISCONTINUED | OUTPATIENT
Start: 2023-01-01 | End: 2023-01-01 | Stop reason: HOSPADM

## 2023-01-01 RX ORDER — ACETAMINOPHEN 650 MG/1
650 SUPPOSITORY RECTAL EVERY 6 HOURS PRN
Status: DISCONTINUED | OUTPATIENT
Start: 2023-01-01 | End: 2023-01-01 | Stop reason: HOSPADM

## 2023-01-01 RX ORDER — FUROSEMIDE 10 MG/ML
40 INJECTION INTRAMUSCULAR; INTRAVENOUS ONCE
Status: COMPLETED | OUTPATIENT
Start: 2023-01-01 | End: 2023-01-01

## 2023-01-01 RX ADMIN — VANCOMYCIN HYDROCHLORIDE 125 MG: 125 CAPSULE ORAL at 21:58

## 2023-01-01 RX ADMIN — HYDROXYZINE HYDROCHLORIDE 10 MG: 10 TABLET ORAL at 22:05

## 2023-01-01 RX ADMIN — SODIUM CHLORIDE, PRESERVATIVE FREE 10 ML: 5 INJECTION INTRAVENOUS at 20:21

## 2023-01-01 RX ADMIN — GUAIFENESIN 600 MG: 600 TABLET, EXTENDED RELEASE ORAL at 08:29

## 2023-01-01 RX ADMIN — MIDODRINE HYDROCHLORIDE 10 MG: 5 TABLET ORAL at 09:38

## 2023-01-01 RX ADMIN — METHYLPREDNISOLONE SODIUM SUCCINATE 40 MG: 40 INJECTION, POWDER, FOR SOLUTION INTRAMUSCULAR; INTRAVENOUS at 11:04

## 2023-01-01 RX ADMIN — VANCOMYCIN HYDROCHLORIDE 125 MG: 125 CAPSULE ORAL at 04:52

## 2023-01-01 RX ADMIN — DILTIAZEM HYDROCHLORIDE 180 MG: 180 CAPSULE, COATED, EXTENDED RELEASE ORAL at 08:15

## 2023-01-01 RX ADMIN — MEROPENEM 500 MG: 500 INJECTION, POWDER, FOR SOLUTION INTRAVENOUS at 15:37

## 2023-01-01 RX ADMIN — ACETAMINOPHEN 650 MG: 325 TABLET ORAL at 00:25

## 2023-01-01 RX ADMIN — MEROPENEM 500 MG: 500 INJECTION, POWDER, FOR SOLUTION INTRAVENOUS at 16:47

## 2023-01-01 RX ADMIN — MIDODRINE HYDROCHLORIDE 10 MG: 5 TABLET ORAL at 12:58

## 2023-01-01 RX ADMIN — SODIUM CHLORIDE 15 MG/HR: 900 INJECTION, SOLUTION INTRAVENOUS at 16:22

## 2023-01-01 RX ADMIN — HEPARIN SODIUM 3600 UNITS: 1000 INJECTION INTRAVENOUS; SUBCUTANEOUS at 11:30

## 2023-01-01 RX ADMIN — ALLOPURINOL 300 MG: 300 TABLET ORAL at 14:02

## 2023-01-01 RX ADMIN — CEFEPIME 2000 MG: 2 INJECTION, POWDER, FOR SOLUTION INTRAVENOUS at 20:19

## 2023-01-01 RX ADMIN — MIDODRINE HYDROCHLORIDE 10 MG: 5 TABLET ORAL at 21:45

## 2023-01-01 RX ADMIN — SODIUM CHLORIDE, PRESERVATIVE FREE 10 ML: 5 INJECTION INTRAVENOUS at 09:23

## 2023-01-01 RX ADMIN — GUAIFENESIN 600 MG: 600 TABLET, EXTENDED RELEASE ORAL at 20:19

## 2023-01-01 RX ADMIN — METOPROLOL SUCCINATE 100 MG: 50 TABLET, EXTENDED RELEASE ORAL at 22:32

## 2023-01-01 RX ADMIN — CEFEPIME 2000 MG: 2 INJECTION, POWDER, FOR SOLUTION INTRAVENOUS at 08:06

## 2023-01-01 RX ADMIN — GUAIFENESIN 600 MG: 600 TABLET, EXTENDED RELEASE ORAL at 14:02

## 2023-01-01 RX ADMIN — SODIUM CHLORIDE, PRESERVATIVE FREE 10 ML: 5 INJECTION INTRAVENOUS at 07:00

## 2023-01-01 RX ADMIN — SODIUM CHLORIDE 10 MG/HR: 900 INJECTION, SOLUTION INTRAVENOUS at 09:43

## 2023-01-01 RX ADMIN — DILTIAZEM HYDROCHLORIDE 180 MG: 180 CAPSULE, COATED, EXTENDED RELEASE ORAL at 20:19

## 2023-01-01 RX ADMIN — MIDODRINE HYDROCHLORIDE 10 MG: 5 TABLET ORAL at 17:18

## 2023-01-01 RX ADMIN — METHYLPREDNISOLONE SODIUM SUCCINATE 40 MG: 40 INJECTION, POWDER, FOR SOLUTION INTRAMUSCULAR; INTRAVENOUS at 15:06

## 2023-01-01 RX ADMIN — MIDODRINE HYDROCHLORIDE 10 MG: 5 TABLET ORAL at 17:41

## 2023-01-01 RX ADMIN — BUMETANIDE 1 MG: 0.25 INJECTION INTRAMUSCULAR; INTRAVENOUS at 10:49

## 2023-01-01 RX ADMIN — GUAIFENESIN 600 MG: 600 TABLET, EXTENDED RELEASE ORAL at 21:09

## 2023-01-01 RX ADMIN — MIDODRINE HYDROCHLORIDE 5 MG: 5 TABLET ORAL at 13:16

## 2023-01-01 RX ADMIN — DILTIAZEM HYDROCHLORIDE 180 MG: 180 CAPSULE, COATED, EXTENDED RELEASE ORAL at 08:11

## 2023-01-01 RX ADMIN — GUAIFENESIN 600 MG: 600 TABLET, EXTENDED RELEASE ORAL at 19:44

## 2023-01-01 RX ADMIN — BUMETANIDE 1 MG: 0.25 INJECTION, SOLUTION INTRAMUSCULAR; INTRAVENOUS at 13:50

## 2023-01-01 RX ADMIN — APIXABAN 5 MG: 5 TABLET, FILM COATED ORAL at 22:12

## 2023-01-01 RX ADMIN — VANCOMYCIN HYDROCHLORIDE 125 MG: 125 CAPSULE ORAL at 22:32

## 2023-01-01 RX ADMIN — MIDODRINE HYDROCHLORIDE 10 MG: 5 TABLET ORAL at 17:48

## 2023-01-01 RX ADMIN — SODIUM CHLORIDE: 9 INJECTION, SOLUTION INTRAVENOUS at 09:51

## 2023-01-01 RX ADMIN — ROSUVASTATIN CALCIUM 10 MG: 10 TABLET, FILM COATED ORAL at 21:07

## 2023-01-01 RX ADMIN — ENOXAPARIN SODIUM 135 MG: 150 INJECTION SUBCUTANEOUS at 20:19

## 2023-01-01 RX ADMIN — APIXABAN 5 MG: 5 TABLET, FILM COATED ORAL at 12:16

## 2023-01-01 RX ADMIN — METOPROLOL SUCCINATE 100 MG: 50 TABLET, EXTENDED RELEASE ORAL at 09:29

## 2023-01-01 RX ADMIN — VANCOMYCIN HYDROCHLORIDE 125 MG: 125 CAPSULE ORAL at 19:45

## 2023-01-01 RX ADMIN — SUCRALFATE 1 G: 1 TABLET ORAL at 21:07

## 2023-01-01 RX ADMIN — SODIUM CHLORIDE, PRESERVATIVE FREE 10 ML: 5 INJECTION INTRAVENOUS at 20:41

## 2023-01-01 RX ADMIN — BUMETANIDE 1 MG: 0.25 INJECTION, SOLUTION INTRAMUSCULAR; INTRAVENOUS at 21:19

## 2023-01-01 RX ADMIN — METOPROLOL SUCCINATE 100 MG: 50 TABLET, EXTENDED RELEASE ORAL at 09:22

## 2023-01-01 RX ADMIN — ONDANSETRON HYDROCHLORIDE 4 MG: 2 INJECTION, SOLUTION INTRAMUSCULAR; INTRAVENOUS at 16:33

## 2023-01-01 RX ADMIN — VANCOMYCIN HYDROCHLORIDE 2500 MG: 10 INJECTION, POWDER, LYOPHILIZED, FOR SOLUTION INTRAVENOUS at 16:45

## 2023-01-01 RX ADMIN — VANCOMYCIN HYDROCHLORIDE 125 MG: 125 CAPSULE ORAL at 17:53

## 2023-01-01 RX ADMIN — MIDODRINE HYDROCHLORIDE 10 MG: 5 TABLET ORAL at 10:08

## 2023-01-01 RX ADMIN — APIXABAN 5 MG: 5 TABLET, FILM COATED ORAL at 21:45

## 2023-01-01 RX ADMIN — VANCOMYCIN HYDROCHLORIDE 125 MG: 125 CAPSULE ORAL at 16:40

## 2023-01-01 RX ADMIN — METOPROLOL SUCCINATE 100 MG: 50 TABLET, EXTENDED RELEASE ORAL at 20:20

## 2023-01-01 RX ADMIN — GUAIFENESIN 600 MG: 600 TABLET, EXTENDED RELEASE ORAL at 10:08

## 2023-01-01 RX ADMIN — ROSUVASTATIN CALCIUM 10 MG: 10 TABLET, FILM COATED ORAL at 21:58

## 2023-01-01 RX ADMIN — SODIUM CHLORIDE, PRESERVATIVE FREE 10 ML: 5 INJECTION INTRAVENOUS at 09:15

## 2023-01-01 RX ADMIN — GUAIFENESIN 600 MG: 600 TABLET, EXTENDED RELEASE ORAL at 20:23

## 2023-01-01 RX ADMIN — VANCOMYCIN HYDROCHLORIDE 125 MG: 125 CAPSULE ORAL at 14:02

## 2023-01-01 RX ADMIN — VANCOMYCIN HYDROCHLORIDE 125 MG: 125 CAPSULE ORAL at 21:22

## 2023-01-01 RX ADMIN — ONDANSETRON HYDROCHLORIDE 4 MG: 2 INJECTION, SOLUTION INTRAMUSCULAR; INTRAVENOUS at 22:13

## 2023-01-01 RX ADMIN — SODIUM CHLORIDE: 9 INJECTION, SOLUTION INTRAVENOUS at 10:52

## 2023-01-01 RX ADMIN — MIDODRINE HYDROCHLORIDE 10 MG: 5 TABLET ORAL at 13:20

## 2023-01-01 RX ADMIN — GUAIFENESIN 600 MG: 600 TABLET, EXTENDED RELEASE ORAL at 12:37

## 2023-01-01 RX ADMIN — METOPROLOL SUCCINATE 100 MG: 50 TABLET, EXTENDED RELEASE ORAL at 21:59

## 2023-01-01 RX ADMIN — DILTIAZEM HYDROCHLORIDE 180 MG: 180 CAPSULE, COATED, EXTENDED RELEASE ORAL at 09:38

## 2023-01-01 RX ADMIN — EPOETIN ALFA-EPBX 3000 UNITS: 3000 INJECTION, SOLUTION INTRAVENOUS; SUBCUTANEOUS at 12:12

## 2023-01-01 RX ADMIN — MEROPENEM 1000 MG: 1 INJECTION, POWDER, FOR SOLUTION INTRAVENOUS at 08:28

## 2023-01-01 RX ADMIN — Medication 400 UNITS: at 09:42

## 2023-01-01 RX ADMIN — DILTIAZEM HYDROCHLORIDE 180 MG: 180 CAPSULE, COATED, EXTENDED RELEASE ORAL at 08:17

## 2023-01-01 RX ADMIN — METOPROLOL SUCCINATE 100 MG: 50 TABLET, EXTENDED RELEASE ORAL at 21:34

## 2023-01-01 RX ADMIN — FUROSEMIDE 40 MG: 40 TABLET ORAL at 08:11

## 2023-01-01 RX ADMIN — VANCOMYCIN HYDROCHLORIDE 125 MG: 125 CAPSULE ORAL at 04:21

## 2023-01-01 RX ADMIN — ACETAMINOPHEN 650 MG: 325 TABLET ORAL at 00:19

## 2023-01-01 RX ADMIN — METOPROLOL SUCCINATE 100 MG: 50 TABLET, EXTENDED RELEASE ORAL at 09:07

## 2023-01-01 RX ADMIN — HEPARIN SODIUM AND DEXTROSE 5 UNITS/KG/HR: 10000; 5 INJECTION INTRAVENOUS at 08:26

## 2023-01-01 RX ADMIN — SODIUM CHLORIDE, PRESERVATIVE FREE 10 ML: 5 INJECTION INTRAVENOUS at 08:52

## 2023-01-01 RX ADMIN — GUAIFENESIN 600 MG: 600 TABLET, EXTENDED RELEASE ORAL at 08:14

## 2023-01-01 RX ADMIN — PERFLUTREN 1.5 ML: 6.52 INJECTION, SUSPENSION INTRAVENOUS at 14:20

## 2023-01-01 RX ADMIN — ROSUVASTATIN CALCIUM 10 MG: 10 TABLET, FILM COATED ORAL at 20:38

## 2023-01-01 RX ADMIN — SODIUM CHLORIDE: 9 INJECTION, SOLUTION INTRAVENOUS at 08:42

## 2023-01-01 RX ADMIN — DILTIAZEM HYDROCHLORIDE 15 MG/HR: 5 INJECTION INTRAVENOUS at 15:56

## 2023-01-01 RX ADMIN — DILTIAZEM HYDROCHLORIDE 180 MG: 180 CAPSULE, COATED, EXTENDED RELEASE ORAL at 20:38

## 2023-01-01 RX ADMIN — MIDODRINE HYDROCHLORIDE 5 MG: 5 TABLET ORAL at 20:19

## 2023-01-01 RX ADMIN — METOPROLOL SUCCINATE 100 MG: 50 TABLET, EXTENDED RELEASE ORAL at 08:10

## 2023-01-01 RX ADMIN — VANCOMYCIN HYDROCHLORIDE 125 MG: 125 CAPSULE ORAL at 01:54

## 2023-01-01 RX ADMIN — METHYLPREDNISOLONE SODIUM SUCCINATE 40 MG: 40 INJECTION, POWDER, FOR SOLUTION INTRAMUSCULAR; INTRAVENOUS at 20:12

## 2023-01-01 RX ADMIN — GUAIFENESIN 600 MG: 600 TABLET, EXTENDED RELEASE ORAL at 20:38

## 2023-01-01 RX ADMIN — DILTIAZEM HYDROCHLORIDE 180 MG: 180 CAPSULE, COATED, EXTENDED RELEASE ORAL at 21:08

## 2023-01-01 RX ADMIN — MEGESTROL ACETATE 200 MG: 40 SUSPENSION ORAL at 09:08

## 2023-01-01 RX ADMIN — POTASSIUM CHLORIDE 20 MEQ: 29.8 INJECTION, SOLUTION INTRAVENOUS at 11:05

## 2023-01-01 RX ADMIN — METOPROLOL SUCCINATE 100 MG: 50 TABLET, EXTENDED RELEASE ORAL at 08:12

## 2023-01-01 RX ADMIN — GUAIFENESIN 600 MG: 600 TABLET, EXTENDED RELEASE ORAL at 09:14

## 2023-01-01 RX ADMIN — APIXABAN 5 MG: 5 TABLET, FILM COATED ORAL at 22:02

## 2023-01-01 RX ADMIN — DILTIAZEM HYDROCHLORIDE 180 MG: 180 CAPSULE, COATED, EXTENDED RELEASE ORAL at 08:12

## 2023-01-01 RX ADMIN — SODIUM CHLORIDE, PRESERVATIVE FREE 40 MG: 5 INJECTION INTRAVENOUS at 21:51

## 2023-01-01 RX ADMIN — SODIUM CHLORIDE, PRESERVATIVE FREE 10 ML: 5 INJECTION INTRAVENOUS at 22:02

## 2023-01-01 RX ADMIN — SODIUM CHLORIDE, PRESERVATIVE FREE 40 MG: 5 INJECTION INTRAVENOUS at 16:27

## 2023-01-01 RX ADMIN — SODIUM CHLORIDE, PRESERVATIVE FREE 10 ML: 5 INJECTION INTRAVENOUS at 08:42

## 2023-01-01 RX ADMIN — HEPARIN SODIUM AND DEXTROSE 5 UNITS/KG/HR: 10000; 5 INJECTION INTRAVENOUS at 06:11

## 2023-01-01 RX ADMIN — SODIUM CHLORIDE, PRESERVATIVE FREE 10 ML: 5 INJECTION INTRAVENOUS at 08:15

## 2023-01-01 RX ADMIN — ACETAMINOPHEN 650 MG: 650 SUPPOSITORY RECTAL at 09:55

## 2023-01-01 RX ADMIN — ALLOPURINOL 300 MG: 300 TABLET ORAL at 08:05

## 2023-01-01 RX ADMIN — GUAIFENESIN 600 MG: 600 TABLET, EXTENDED RELEASE ORAL at 09:23

## 2023-01-01 RX ADMIN — ACETAMINOPHEN 650 MG: 325 TABLET ORAL at 09:42

## 2023-01-01 RX ADMIN — MIDODRINE HYDROCHLORIDE 10 MG: 5 TABLET ORAL at 16:46

## 2023-01-01 RX ADMIN — SODIUM CHLORIDE, PRESERVATIVE FREE 10 ML: 5 INJECTION INTRAVENOUS at 22:03

## 2023-01-01 RX ADMIN — VANCOMYCIN HYDROCHLORIDE 125 MG: 125 CAPSULE ORAL at 15:22

## 2023-01-01 RX ADMIN — SODIUM CHLORIDE, PRESERVATIVE FREE 40 MG: 5 INJECTION INTRAVENOUS at 17:49

## 2023-01-01 RX ADMIN — SODIUM CHLORIDE, PRESERVATIVE FREE 10 ML: 5 INJECTION INTRAVENOUS at 21:34

## 2023-01-01 RX ADMIN — VANCOMYCIN HYDROCHLORIDE 125 MG: 125 CAPSULE ORAL at 05:19

## 2023-01-01 RX ADMIN — CEFEPIME 2000 MG: 2 INJECTION, POWDER, FOR SOLUTION INTRAVENOUS at 16:22

## 2023-01-01 RX ADMIN — MEROPENEM 1000 MG: 1 INJECTION, POWDER, FOR SOLUTION INTRAVENOUS at 20:51

## 2023-01-01 RX ADMIN — DILTIAZEM HYDROCHLORIDE 10 MG/HR: 5 INJECTION INTRAVENOUS at 22:20

## 2023-01-01 RX ADMIN — VANCOMYCIN HYDROCHLORIDE 125 MG: 125 CAPSULE ORAL at 11:47

## 2023-01-01 RX ADMIN — APIXABAN 5 MG: 5 TABLET, FILM COATED ORAL at 08:48

## 2023-01-01 RX ADMIN — METOPROLOL SUCCINATE 100 MG: 50 TABLET, EXTENDED RELEASE ORAL at 08:34

## 2023-01-01 RX ADMIN — VANCOMYCIN HYDROCHLORIDE 125 MG: 125 CAPSULE ORAL at 00:05

## 2023-01-01 RX ADMIN — ROSUVASTATIN CALCIUM 10 MG: 10 TABLET, FILM COATED ORAL at 22:12

## 2023-01-01 RX ADMIN — SODIUM CHLORIDE, PRESERVATIVE FREE 10 ML: 5 INJECTION INTRAVENOUS at 12:34

## 2023-01-01 RX ADMIN — HYDROXYZINE HYDROCHLORIDE 25 MG: 25 TABLET, FILM COATED ORAL at 22:53

## 2023-01-01 RX ADMIN — SODIUM CHLORIDE, PRESERVATIVE FREE 10 ML: 5 INJECTION INTRAVENOUS at 07:33

## 2023-01-01 RX ADMIN — GUAIFENESIN 600 MG: 600 TABLET, EXTENDED RELEASE ORAL at 21:04

## 2023-01-01 RX ADMIN — METHYLPREDNISOLONE SODIUM SUCCINATE 40 MG: 40 INJECTION, POWDER, FOR SOLUTION INTRAMUSCULAR; INTRAVENOUS at 03:55

## 2023-01-01 RX ADMIN — SODIUM CHLORIDE, PRESERVATIVE FREE 10 ML: 5 INJECTION INTRAVENOUS at 08:18

## 2023-01-01 RX ADMIN — MIDODRINE HYDROCHLORIDE 10 MG: 5 TABLET ORAL at 11:48

## 2023-01-01 RX ADMIN — IPRATROPIUM BROMIDE AND ALBUTEROL SULFATE 1 AMPULE: 2.5; .5 SOLUTION RESPIRATORY (INHALATION) at 14:14

## 2023-01-01 RX ADMIN — ERGOCALCIFEROL 50000 UNITS: 1.25 CAPSULE ORAL at 14:48

## 2023-01-01 RX ADMIN — SODIUM CHLORIDE, PRESERVATIVE FREE 40 MG: 5 INJECTION INTRAVENOUS at 15:54

## 2023-01-01 RX ADMIN — GUAIFENESIN 600 MG: 600 TABLET, EXTENDED RELEASE ORAL at 12:14

## 2023-01-01 RX ADMIN — MEGESTROL ACETATE 200 MG: 40 SUSPENSION ORAL at 14:04

## 2023-01-01 RX ADMIN — CEFEPIME 2000 MG: 2 INJECTION, POWDER, FOR SOLUTION INTRAVENOUS at 09:11

## 2023-01-01 RX ADMIN — DILTIAZEM HYDROCHLORIDE 180 MG: 180 CAPSULE, COATED, EXTENDED RELEASE ORAL at 22:32

## 2023-01-01 RX ADMIN — POTASSIUM CHLORIDE 20 MEQ: 29.8 INJECTION, SOLUTION INTRAVENOUS at 20:17

## 2023-01-01 RX ADMIN — DILTIAZEM HYDROCHLORIDE 120 MG: 120 CAPSULE, COATED, EXTENDED RELEASE ORAL at 20:49

## 2023-01-01 RX ADMIN — ALLOPURINOL 300 MG: 300 TABLET ORAL at 09:07

## 2023-01-01 RX ADMIN — Medication 400 UNITS: at 14:03

## 2023-01-01 RX ADMIN — Medication 400 UNITS: at 09:07

## 2023-01-01 RX ADMIN — DILTIAZEM HYDROCHLORIDE 15 MG/HR: 5 INJECTION INTRAVENOUS at 09:32

## 2023-01-01 RX ADMIN — SODIUM CHLORIDE, PRESERVATIVE FREE 10 ML: 5 INJECTION INTRAVENOUS at 12:18

## 2023-01-01 RX ADMIN — VANCOMYCIN HYDROCHLORIDE 125 MG: 125 CAPSULE ORAL at 06:00

## 2023-01-01 RX ADMIN — GUAIFENESIN 600 MG: 600 TABLET, EXTENDED RELEASE ORAL at 21:34

## 2023-01-01 RX ADMIN — ACETAMINOPHEN 650 MG: 325 TABLET ORAL at 13:52

## 2023-01-01 RX ADMIN — SODIUM CHLORIDE, PRESERVATIVE FREE 40 MG: 5 INJECTION INTRAVENOUS at 17:38

## 2023-01-01 RX ADMIN — METOPROLOL SUCCINATE 100 MG: 50 TABLET, EXTENDED RELEASE ORAL at 08:48

## 2023-01-01 RX ADMIN — MIDODRINE HYDROCHLORIDE 5 MG: 5 TABLET ORAL at 21:49

## 2023-01-01 RX ADMIN — VANCOMYCIN HYDROCHLORIDE 125 MG: 125 CAPSULE ORAL at 11:49

## 2023-01-01 RX ADMIN — SODIUM CHLORIDE, PRESERVATIVE FREE 10 ML: 5 INJECTION INTRAVENOUS at 21:49

## 2023-01-01 RX ADMIN — SODIUM CHLORIDE, PRESERVATIVE FREE 10 ML: 5 INJECTION INTRAVENOUS at 07:54

## 2023-01-01 RX ADMIN — SODIUM CHLORIDE, PRESERVATIVE FREE 10 ML: 5 INJECTION INTRAVENOUS at 21:01

## 2023-01-01 RX ADMIN — MEGESTROL ACETATE 200 MG: 40 SUSPENSION ORAL at 08:32

## 2023-01-01 RX ADMIN — GUAIFENESIN 600 MG: 600 TABLET, EXTENDED RELEASE ORAL at 22:32

## 2023-01-01 RX ADMIN — MIDODRINE HYDROCHLORIDE 5 MG: 5 TABLET ORAL at 00:24

## 2023-01-01 RX ADMIN — ROSUVASTATIN CALCIUM 10 MG: 10 TABLET, FILM COATED ORAL at 22:32

## 2023-01-01 RX ADMIN — ROSUVASTATIN CALCIUM 10 MG: 10 TABLET, FILM COATED ORAL at 21:34

## 2023-01-01 RX ADMIN — VANCOMYCIN HYDROCHLORIDE 125 MG: 125 CAPSULE ORAL at 12:33

## 2023-01-01 RX ADMIN — ACETAMINOPHEN 650 MG: 325 TABLET ORAL at 21:17

## 2023-01-01 RX ADMIN — MIDODRINE HYDROCHLORIDE 10 MG: 5 TABLET ORAL at 14:03

## 2023-01-01 RX ADMIN — MIDODRINE HYDROCHLORIDE 5 MG: 5 TABLET ORAL at 18:26

## 2023-01-01 RX ADMIN — MIDODRINE HYDROCHLORIDE 10 MG: 5 TABLET ORAL at 08:14

## 2023-01-01 RX ADMIN — Medication 400 UNITS: at 08:16

## 2023-01-01 RX ADMIN — MIDODRINE HYDROCHLORIDE 5 MG: 5 TABLET ORAL at 22:53

## 2023-01-01 RX ADMIN — GUAIFENESIN 600 MG: 600 TABLET, EXTENDED RELEASE ORAL at 20:49

## 2023-01-01 RX ADMIN — METOPROLOL SUCCINATE 100 MG: 50 TABLET, EXTENDED RELEASE ORAL at 22:13

## 2023-01-01 RX ADMIN — DILTIAZEM HYDROCHLORIDE 180 MG: 180 CAPSULE, COATED, EXTENDED RELEASE ORAL at 21:34

## 2023-01-01 RX ADMIN — ENOXAPARIN SODIUM 135 MG: 150 INJECTION SUBCUTANEOUS at 09:23

## 2023-01-01 RX ADMIN — Medication 1500 MG: at 20:52

## 2023-01-01 RX ADMIN — SODIUM CHLORIDE, PRESERVATIVE FREE 10 ML: 5 INJECTION INTRAVENOUS at 22:04

## 2023-01-01 RX ADMIN — HYDROXYZINE HYDROCHLORIDE 25 MG: 25 TABLET, FILM COATED ORAL at 21:07

## 2023-01-01 RX ADMIN — BUMETANIDE 1 MG: 0.25 INJECTION, SOLUTION INTRAMUSCULAR; INTRAVENOUS at 20:21

## 2023-01-01 RX ADMIN — HEPARIN SODIUM AND DEXTROSE 6 UNITS/KG/HR: 10000; 5 INJECTION INTRAVENOUS at 08:32

## 2023-01-01 RX ADMIN — METOPROLOL SUCCINATE 100 MG: 50 TABLET, EXTENDED RELEASE ORAL at 22:02

## 2023-01-01 RX ADMIN — ALLOPURINOL 300 MG: 300 TABLET ORAL at 08:29

## 2023-01-01 RX ADMIN — DILTIAZEM HYDROCHLORIDE 180 MG: 180 CAPSULE, COATED, EXTENDED RELEASE ORAL at 19:45

## 2023-01-01 RX ADMIN — SODIUM CHLORIDE, PRESERVATIVE FREE 10 ML: 5 INJECTION INTRAVENOUS at 21:35

## 2023-01-01 RX ADMIN — HYDROXYZINE HYDROCHLORIDE 10 MG: 10 TABLET ORAL at 09:55

## 2023-01-01 RX ADMIN — BUMETANIDE 2 MG/HR: 0.25 INJECTION INTRAMUSCULAR; INTRAVENOUS at 06:07

## 2023-01-01 RX ADMIN — MIDODRINE HYDROCHLORIDE 10 MG: 5 TABLET ORAL at 12:16

## 2023-01-01 RX ADMIN — IPRATROPIUM BROMIDE AND ALBUTEROL SULFATE 1 AMPULE: 2.5; .5 SOLUTION RESPIRATORY (INHALATION) at 14:04

## 2023-01-01 RX ADMIN — SUCRALFATE 1 G: 1 TABLET ORAL at 21:46

## 2023-01-01 RX ADMIN — SUCRALFATE 1 G: 1 TABLET ORAL at 21:34

## 2023-01-01 RX ADMIN — MEROPENEM 1000 MG: 1 INJECTION, POWDER, FOR SOLUTION INTRAVENOUS at 21:05

## 2023-01-01 RX ADMIN — ROSUVASTATIN CALCIUM 10 MG: 10 TABLET, FILM COATED ORAL at 21:59

## 2023-01-01 RX ADMIN — ALLOPURINOL 300 MG: 300 TABLET ORAL at 10:08

## 2023-01-01 RX ADMIN — HYDROXYZINE HYDROCHLORIDE 25 MG: 25 TABLET, FILM COATED ORAL at 01:54

## 2023-01-01 RX ADMIN — GUAIFENESIN 600 MG: 600 TABLET, EXTENDED RELEASE ORAL at 21:20

## 2023-01-01 RX ADMIN — ONDANSETRON HYDROCHLORIDE 4 MG: 2 INJECTION, SOLUTION INTRAMUSCULAR; INTRAVENOUS at 03:15

## 2023-01-01 RX ADMIN — GUAIFENESIN 600 MG: 600 TABLET, EXTENDED RELEASE ORAL at 21:59

## 2023-01-01 RX ADMIN — IOPAMIDOL 70 ML: 755 INJECTION, SOLUTION INTRAVENOUS at 13:36

## 2023-01-01 RX ADMIN — GUAIFENESIN 600 MG: 600 TABLET, EXTENDED RELEASE ORAL at 22:02

## 2023-01-01 RX ADMIN — POTASSIUM CHLORIDE 20 MEQ: 29.8 INJECTION, SOLUTION INTRAVENOUS at 09:41

## 2023-01-01 RX ADMIN — MEGESTROL ACETATE 200 MG: 40 SUSPENSION ORAL at 12:32

## 2023-01-01 RX ADMIN — VANCOMYCIN HYDROCHLORIDE 125 MG: 125 CAPSULE ORAL at 04:24

## 2023-01-01 RX ADMIN — SODIUM CHLORIDE: 9 INJECTION, SOLUTION INTRAVENOUS at 02:41

## 2023-01-01 RX ADMIN — POTASSIUM CHLORIDE 40 MEQ: 1500 TABLET, EXTENDED RELEASE ORAL at 17:28

## 2023-01-01 RX ADMIN — VANCOMYCIN HYDROCHLORIDE 125 MG: 125 CAPSULE ORAL at 14:49

## 2023-01-01 RX ADMIN — POTASSIUM CHLORIDE 10 MEQ: 7.46 INJECTION, SOLUTION INTRAVENOUS at 07:10

## 2023-01-01 RX ADMIN — METHYLPREDNISOLONE SODIUM SUCCINATE 40 MG: 40 INJECTION, POWDER, FOR SOLUTION INTRAMUSCULAR; INTRAVENOUS at 12:05

## 2023-01-01 RX ADMIN — CEFEPIME 2000 MG: 2 INJECTION, POWDER, FOR SOLUTION INTRAVENOUS at 16:23

## 2023-01-01 RX ADMIN — ALLOPURINOL 300 MG: 300 TABLET ORAL at 08:09

## 2023-01-01 RX ADMIN — SODIUM CHLORIDE, PRESERVATIVE FREE 40 MG: 5 INJECTION INTRAVENOUS at 05:38

## 2023-01-01 RX ADMIN — ACETAMINOPHEN 650 MG: 325 TABLET ORAL at 03:23

## 2023-01-01 RX ADMIN — HEPARIN SODIUM 3000 UNITS: 1000 INJECTION INTRAVENOUS; SUBCUTANEOUS at 12:22

## 2023-01-01 RX ADMIN — DILTIAZEM HYDROCHLORIDE 15 MG/HR: 5 INJECTION INTRAVENOUS at 08:28

## 2023-01-01 RX ADMIN — METOPROLOL SUCCINATE 100 MG: 50 TABLET, EXTENDED RELEASE ORAL at 19:45

## 2023-01-01 RX ADMIN — SODIUM CHLORIDE, PRESERVATIVE FREE 10 ML: 5 INJECTION INTRAVENOUS at 09:38

## 2023-01-01 RX ADMIN — SODIUM CHLORIDE, PRESERVATIVE FREE 10 ML: 5 INJECTION INTRAVENOUS at 21:21

## 2023-01-01 RX ADMIN — SODIUM CHLORIDE, PRESERVATIVE FREE 40 MG: 5 INJECTION INTRAVENOUS at 05:02

## 2023-01-01 RX ADMIN — SODIUM CHLORIDE, PRESERVATIVE FREE 10 ML: 5 INJECTION INTRAVENOUS at 22:17

## 2023-01-01 RX ADMIN — METOPROLOL SUCCINATE 100 MG: 50 TABLET, EXTENDED RELEASE ORAL at 09:14

## 2023-01-01 RX ADMIN — MIDODRINE HYDROCHLORIDE 10 MG: 5 TABLET ORAL at 16:51

## 2023-01-01 RX ADMIN — APIXABAN 5 MG: 5 TABLET, FILM COATED ORAL at 21:07

## 2023-01-01 RX ADMIN — VANCOMYCIN HYDROCHLORIDE 125 MG: 125 CAPSULE ORAL at 22:11

## 2023-01-01 RX ADMIN — ACETAMINOPHEN 650 MG: 325 TABLET ORAL at 20:20

## 2023-01-01 RX ADMIN — SODIUM CHLORIDE, PRESERVATIVE FREE 10 ML: 5 INJECTION INTRAVENOUS at 08:12

## 2023-01-01 RX ADMIN — SUCRALFATE 1 G: 1 TABLET ORAL at 10:08

## 2023-01-01 RX ADMIN — METOPROLOL SUCCINATE 100 MG: 50 TABLET, EXTENDED RELEASE ORAL at 22:12

## 2023-01-01 RX ADMIN — MEGESTROL ACETATE 200 MG: 40 SUSPENSION ORAL at 07:32

## 2023-01-01 RX ADMIN — GUAIFENESIN 600 MG: 600 TABLET, EXTENDED RELEASE ORAL at 21:07

## 2023-01-01 RX ADMIN — DILTIAZEM HYDROCHLORIDE 180 MG: 180 CAPSULE, COATED, EXTENDED RELEASE ORAL at 08:04

## 2023-01-01 RX ADMIN — ROSUVASTATIN CALCIUM 10 MG: 10 TABLET, FILM COATED ORAL at 20:20

## 2023-01-01 RX ADMIN — VANCOMYCIN HYDROCHLORIDE 125 MG: 125 CAPSULE ORAL at 16:51

## 2023-01-01 RX ADMIN — ALLOPURINOL 300 MG: 300 TABLET ORAL at 08:15

## 2023-01-01 RX ADMIN — SUCRALFATE 1 G: 1 TABLET ORAL at 08:14

## 2023-01-01 RX ADMIN — GUAIFENESIN 600 MG: 600 TABLET, EXTENDED RELEASE ORAL at 09:07

## 2023-01-01 RX ADMIN — FUROSEMIDE 40 MG: 10 INJECTION, SOLUTION INTRAMUSCULAR; INTRAVENOUS at 09:59

## 2023-01-01 RX ADMIN — IPRATROPIUM BROMIDE AND ALBUTEROL SULFATE 1 AMPULE: 2.5; .5 SOLUTION RESPIRATORY (INHALATION) at 06:40

## 2023-01-01 RX ADMIN — GUAIFENESIN 600 MG: 600 TABLET, EXTENDED RELEASE ORAL at 20:48

## 2023-01-01 RX ADMIN — SODIUM CHLORIDE, PRESERVATIVE FREE 10 ML: 5 INJECTION INTRAVENOUS at 20:54

## 2023-01-01 RX ADMIN — DILTIAZEM HYDROCHLORIDE 180 MG: 180 CAPSULE, COATED, EXTENDED RELEASE ORAL at 21:58

## 2023-01-01 RX ADMIN — MIDODRINE HYDROCHLORIDE 10 MG: 5 TABLET ORAL at 16:40

## 2023-01-01 RX ADMIN — DILTIAZEM HYDROCHLORIDE 180 MG: 180 CAPSULE, COATED, EXTENDED RELEASE ORAL at 22:02

## 2023-01-01 RX ADMIN — VANCOMYCIN HYDROCHLORIDE 125 MG: 125 CAPSULE ORAL at 16:29

## 2023-01-01 RX ADMIN — PROMETHAZINE HYDROCHLORIDE 12.5 MG: 25 INJECTION INTRAMUSCULAR; INTRAVENOUS at 03:46

## 2023-01-01 RX ADMIN — METHYLPREDNISOLONE SODIUM SUCCINATE 40 MG: 40 INJECTION, POWDER, FOR SOLUTION INTRAMUSCULAR; INTRAVENOUS at 06:27

## 2023-01-01 RX ADMIN — Medication 400 UNITS: at 12:15

## 2023-01-01 RX ADMIN — ROSUVASTATIN CALCIUM 10 MG: 10 TABLET, FILM COATED ORAL at 20:22

## 2023-01-01 RX ADMIN — IPRATROPIUM BROMIDE AND ALBUTEROL SULFATE 1 AMPULE: 2.5; .5 SOLUTION RESPIRATORY (INHALATION) at 14:36

## 2023-01-01 RX ADMIN — APIXABAN 5 MG: 5 TABLET, FILM COATED ORAL at 20:19

## 2023-01-01 RX ADMIN — FUROSEMIDE 40 MG: 40 TABLET ORAL at 17:29

## 2023-01-01 RX ADMIN — GUAIFENESIN 600 MG: 600 TABLET, EXTENDED RELEASE ORAL at 20:12

## 2023-01-01 RX ADMIN — SODIUM CHLORIDE, PRESERVATIVE FREE 10 ML: 5 INJECTION INTRAVENOUS at 20:49

## 2023-01-01 RX ADMIN — GUAIFENESIN 600 MG: 600 TABLET, EXTENDED RELEASE ORAL at 21:48

## 2023-01-01 RX ADMIN — VANCOMYCIN HYDROCHLORIDE 125 MG: 125 CAPSULE ORAL at 02:58

## 2023-01-01 RX ADMIN — Medication 400 UNITS: at 12:32

## 2023-01-01 RX ADMIN — BUMETANIDE 1 MG: 0.25 INJECTION, SOLUTION INTRAMUSCULAR; INTRAVENOUS at 20:19

## 2023-01-01 RX ADMIN — Medication 400 UNITS: at 08:34

## 2023-01-01 RX ADMIN — RIVAROXABAN 20 MG: 20 TABLET, FILM COATED ORAL at 09:07

## 2023-01-01 RX ADMIN — MIDODRINE HYDROCHLORIDE 10 MG: 5 TABLET ORAL at 08:34

## 2023-01-01 RX ADMIN — POTASSIUM CHLORIDE 20 MEQ: 29.8 INJECTION, SOLUTION INTRAVENOUS at 10:57

## 2023-01-01 RX ADMIN — AZITHROMYCIN MONOHYDRATE 500 MG: 500 INJECTION, POWDER, LYOPHILIZED, FOR SOLUTION INTRAVENOUS at 14:42

## 2023-01-01 RX ADMIN — ALLOPURINOL 300 MG: 300 TABLET ORAL at 17:29

## 2023-01-01 RX ADMIN — SODIUM CHLORIDE, PRESERVATIVE FREE 40 MG: 5 INJECTION INTRAVENOUS at 16:39

## 2023-01-01 RX ADMIN — DILTIAZEM HYDROCHLORIDE 180 MG: 180 CAPSULE, COATED, EXTENDED RELEASE ORAL at 08:48

## 2023-01-01 RX ADMIN — SODIUM CHLORIDE, PRESERVATIVE FREE 10 ML: 5 INJECTION INTRAVENOUS at 12:17

## 2023-01-01 RX ADMIN — ROSUVASTATIN CALCIUM 10 MG: 10 TABLET, FILM COATED ORAL at 22:13

## 2023-01-01 RX ADMIN — METOPROLOL SUCCINATE 100 MG: 50 TABLET, EXTENDED RELEASE ORAL at 21:09

## 2023-01-01 RX ADMIN — HYDROXYZINE HYDROCHLORIDE 10 MG: 10 TABLET ORAL at 21:09

## 2023-01-01 RX ADMIN — SODIUM CHLORIDE 1000 ML: 9 INJECTION, SOLUTION INTRAVENOUS at 09:31

## 2023-01-01 RX ADMIN — DILTIAZEM HYDROCHLORIDE 180 MG: 180 CAPSULE, COATED, EXTENDED RELEASE ORAL at 21:59

## 2023-01-01 RX ADMIN — HYDROXYZINE HYDROCHLORIDE 25 MG: 25 TABLET, FILM COATED ORAL at 22:00

## 2023-01-01 RX ADMIN — SUCRALFATE 1 G: 1 TABLET ORAL at 08:34

## 2023-01-01 RX ADMIN — VANCOMYCIN HYDROCHLORIDE 125 MG: 125 CAPSULE ORAL at 21:34

## 2023-01-01 RX ADMIN — SODIUM CHLORIDE, PRESERVATIVE FREE 10 ML: 5 INJECTION INTRAVENOUS at 09:07

## 2023-01-01 RX ADMIN — MIDODRINE HYDROCHLORIDE 5 MG: 5 TABLET ORAL at 22:11

## 2023-01-01 RX ADMIN — GUAIFENESIN 600 MG: 600 TABLET, EXTENDED RELEASE ORAL at 08:17

## 2023-01-01 RX ADMIN — Medication 400 UNITS: at 08:17

## 2023-01-01 RX ADMIN — DILTIAZEM HYDROCHLORIDE 180 MG: 180 CAPSULE, COATED, EXTENDED RELEASE ORAL at 22:11

## 2023-01-01 RX ADMIN — ACETAMINOPHEN 650 MG: 325 TABLET ORAL at 03:42

## 2023-01-01 RX ADMIN — METOPROLOL SUCCINATE 100 MG: 50 TABLET, EXTENDED RELEASE ORAL at 10:30

## 2023-01-01 RX ADMIN — APIXABAN 5 MG: 5 TABLET, FILM COATED ORAL at 19:45

## 2023-01-01 RX ADMIN — FUROSEMIDE 40 MG: 40 TABLET ORAL at 09:07

## 2023-01-01 RX ADMIN — Medication 400 UNITS: at 09:23

## 2023-01-01 RX ADMIN — SODIUM CHLORIDE, PRESERVATIVE FREE 40 MG: 5 INJECTION INTRAVENOUS at 05:21

## 2023-01-01 RX ADMIN — METOPROLOL SUCCINATE 100 MG: 50 TABLET, EXTENDED RELEASE ORAL at 08:17

## 2023-01-01 RX ADMIN — SODIUM CHLORIDE, PRESERVATIVE FREE 10 ML: 5 INJECTION INTRAVENOUS at 08:35

## 2023-01-01 RX ADMIN — VANCOMYCIN HYDROCHLORIDE 125 MG: 125 CAPSULE ORAL at 08:09

## 2023-01-01 RX ADMIN — SUCRALFATE 1 G: 1 TABLET ORAL at 20:38

## 2023-01-01 RX ADMIN — POTASSIUM CHLORIDE 10 MEQ: 7.46 INJECTION, SOLUTION INTRAVENOUS at 09:14

## 2023-01-01 RX ADMIN — IPRATROPIUM BROMIDE AND ALBUTEROL SULFATE 1 AMPULE: 2.5; .5 SOLUTION RESPIRATORY (INHALATION) at 18:33

## 2023-01-01 RX ADMIN — DILTIAZEM HYDROCHLORIDE 180 MG: 180 CAPSULE, COATED, EXTENDED RELEASE ORAL at 09:14

## 2023-01-01 RX ADMIN — VANCOMYCIN HYDROCHLORIDE 125 MG: 125 CAPSULE ORAL at 08:14

## 2023-01-01 RX ADMIN — MIDODRINE HYDROCHLORIDE 10 MG: 5 TABLET ORAL at 11:49

## 2023-01-01 RX ADMIN — GUAIFENESIN 600 MG: 600 TABLET, EXTENDED RELEASE ORAL at 09:42

## 2023-01-01 RX ADMIN — METHYLPREDNISOLONE SODIUM SUCCINATE 40 MG: 40 INJECTION, POWDER, FOR SOLUTION INTRAMUSCULAR; INTRAVENOUS at 12:38

## 2023-01-01 RX ADMIN — SODIUM POLYSTYRENE SULFONATE 30 G: 15 SUSPENSION ORAL; RECTAL at 06:28

## 2023-01-01 RX ADMIN — SUCRALFATE 1 G: 1 TABLET ORAL at 09:38

## 2023-01-01 RX ADMIN — SODIUM CHLORIDE, PRESERVATIVE FREE 40 MG: 5 INJECTION INTRAVENOUS at 04:21

## 2023-01-01 RX ADMIN — VANCOMYCIN HYDROCHLORIDE 125 MG: 125 CAPSULE ORAL at 15:54

## 2023-01-01 RX ADMIN — DILTIAZEM HYDROCHLORIDE 180 MG: 180 CAPSULE, COATED, EXTENDED RELEASE ORAL at 21:09

## 2023-01-01 RX ADMIN — MIDODRINE HYDROCHLORIDE 5 MG: 5 TABLET ORAL at 07:32

## 2023-01-01 RX ADMIN — VANCOMYCIN HYDROCHLORIDE 125 MG: 125 CAPSULE ORAL at 06:31

## 2023-01-01 RX ADMIN — SODIUM CHLORIDE, PRESERVATIVE FREE 10 ML: 5 INJECTION INTRAVENOUS at 21:09

## 2023-01-01 RX ADMIN — ROSUVASTATIN CALCIUM 10 MG: 10 TABLET, FILM COATED ORAL at 21:09

## 2023-01-01 RX ADMIN — METOPROLOL SUCCINATE 100 MG: 50 TABLET, EXTENDED RELEASE ORAL at 21:46

## 2023-01-01 RX ADMIN — METHYLPREDNISOLONE SODIUM SUCCINATE 40 MG: 40 INJECTION, POWDER, FOR SOLUTION INTRAMUSCULAR; INTRAVENOUS at 20:42

## 2023-01-01 RX ADMIN — HEPARIN SODIUM AND DEXTROSE 5 UNITS/KG/HR: 10000; 5 INJECTION INTRAVENOUS at 07:53

## 2023-01-01 RX ADMIN — IPRATROPIUM BROMIDE AND ALBUTEROL SULFATE 1 AMPULE: 2.5; .5 SOLUTION RESPIRATORY (INHALATION) at 14:50

## 2023-01-01 RX ADMIN — ROSUVASTATIN CALCIUM 10 MG: 10 TABLET, FILM COATED ORAL at 20:41

## 2023-01-01 RX ADMIN — APIXABAN 5 MG: 5 TABLET, FILM COATED ORAL at 14:49

## 2023-01-01 RX ADMIN — METOPROLOL SUCCINATE 100 MG: 50 TABLET, EXTENDED RELEASE ORAL at 21:20

## 2023-01-01 RX ADMIN — EPOETIN ALFA-EPBX 3000 UNITS: 3000 INJECTION, SOLUTION INTRAVENOUS; SUBCUTANEOUS at 09:38

## 2023-01-01 RX ADMIN — ALBUMIN (HUMAN) 25 G: 0.25 INJECTION, SOLUTION INTRAVENOUS at 14:44

## 2023-01-01 RX ADMIN — PROMETHAZINE HYDROCHLORIDE 12.5 MG: 25 INJECTION INTRAMUSCULAR; INTRAVENOUS at 13:12

## 2023-01-01 RX ADMIN — GUAIFENESIN 600 MG: 600 TABLET, EXTENDED RELEASE ORAL at 08:47

## 2023-01-01 RX ADMIN — DILTIAZEM HYDROCHLORIDE 180 MG: 180 CAPSULE, COATED, EXTENDED RELEASE ORAL at 09:44

## 2023-01-01 RX ADMIN — VANCOMYCIN HYDROCHLORIDE 125 MG: 125 CAPSULE ORAL at 00:06

## 2023-01-01 RX ADMIN — GUAIFENESIN 600 MG: 600 TABLET, EXTENDED RELEASE ORAL at 20:41

## 2023-01-01 RX ADMIN — CEFEPIME 2000 MG: 2 INJECTION, POWDER, FOR SOLUTION INTRAVENOUS at 01:33

## 2023-01-01 RX ADMIN — SODIUM CHLORIDE, PRESERVATIVE FREE 10 ML: 5 INJECTION INTRAVENOUS at 22:12

## 2023-01-01 RX ADMIN — GUAIFENESIN 600 MG: 600 TABLET, EXTENDED RELEASE ORAL at 08:05

## 2023-01-01 RX ADMIN — MEGESTROL ACETATE 200 MG: 40 SUSPENSION ORAL at 09:32

## 2023-01-01 RX ADMIN — GUAIFENESIN 600 MG: 600 TABLET, EXTENDED RELEASE ORAL at 21:58

## 2023-01-01 RX ADMIN — SODIUM CHLORIDE, PRESERVATIVE FREE 10 ML: 5 INJECTION INTRAVENOUS at 21:04

## 2023-01-01 RX ADMIN — MIDODRINE HYDROCHLORIDE 10 MG: 5 TABLET ORAL at 16:33

## 2023-01-01 RX ADMIN — IPRATROPIUM BROMIDE AND ALBUTEROL SULFATE 1 AMPULE: 2.5; .5 SOLUTION RESPIRATORY (INHALATION) at 10:41

## 2023-01-01 RX ADMIN — Medication 400 UNITS: at 08:29

## 2023-01-01 RX ADMIN — DILTIAZEM HYDROCHLORIDE 180 MG: 180 CAPSULE, COATED, EXTENDED RELEASE ORAL at 22:03

## 2023-01-01 RX ADMIN — POTASSIUM CHLORIDE 10 MEQ: 7.46 INJECTION, SOLUTION INTRAVENOUS at 05:59

## 2023-01-01 RX ADMIN — ALLOPURINOL 300 MG: 300 TABLET ORAL at 09:38

## 2023-01-01 RX ADMIN — ALLOPURINOL 300 MG: 300 TABLET ORAL at 08:17

## 2023-01-01 RX ADMIN — ALLOPURINOL 300 MG: 300 TABLET ORAL at 09:29

## 2023-01-01 RX ADMIN — ENOXAPARIN SODIUM 150 MG: 150 INJECTION SUBCUTANEOUS at 20:42

## 2023-01-01 RX ADMIN — ENOXAPARIN SODIUM 135 MG: 150 INJECTION SUBCUTANEOUS at 16:47

## 2023-01-01 RX ADMIN — MEROPENEM 1000 MG: 1 INJECTION, POWDER, FOR SOLUTION INTRAVENOUS at 09:39

## 2023-01-01 RX ADMIN — Medication 400 UNITS: at 09:38

## 2023-01-01 RX ADMIN — IPRATROPIUM BROMIDE AND ALBUTEROL SULFATE 1 AMPULE: 2.5; .5 SOLUTION RESPIRATORY (INHALATION) at 18:34

## 2023-01-01 RX ADMIN — CEFEPIME 2000 MG: 2 INJECTION, POWDER, FOR SOLUTION INTRAVENOUS at 09:36

## 2023-01-01 RX ADMIN — ROSUVASTATIN CALCIUM 10 MG: 10 TABLET, FILM COATED ORAL at 21:46

## 2023-01-01 RX ADMIN — IPRATROPIUM BROMIDE AND ALBUTEROL SULFATE 1 AMPULE: 2.5; .5 SOLUTION RESPIRATORY (INHALATION) at 18:36

## 2023-01-01 RX ADMIN — ONDANSETRON HYDROCHLORIDE 4 MG: 2 INJECTION, SOLUTION INTRAMUSCULAR; INTRAVENOUS at 04:21

## 2023-01-01 RX ADMIN — VANCOMYCIN HYDROCHLORIDE 125 MG: 125 CAPSULE ORAL at 12:38

## 2023-01-01 RX ADMIN — METHYLPREDNISOLONE SODIUM SUCCINATE 40 MG: 40 INJECTION, POWDER, FOR SOLUTION INTRAMUSCULAR; INTRAVENOUS at 03:18

## 2023-01-01 RX ADMIN — MIDODRINE HYDROCHLORIDE 10 MG: 5 TABLET ORAL at 12:33

## 2023-01-01 RX ADMIN — METRONIDAZOLE 500 MG: 500 INJECTION, SOLUTION INTRAVENOUS at 09:00

## 2023-01-01 RX ADMIN — VANCOMYCIN HYDROCHLORIDE 125 MG: 125 CAPSULE ORAL at 00:26

## 2023-01-01 RX ADMIN — DILTIAZEM HYDROCHLORIDE 15 MG/HR: 5 INJECTION INTRAVENOUS at 18:05

## 2023-01-01 RX ADMIN — MIDODRINE HYDROCHLORIDE 10 MG: 5 TABLET ORAL at 14:49

## 2023-01-01 RX ADMIN — POTASSIUM CHLORIDE 20 MEQ: 29.8 INJECTION, SOLUTION INTRAVENOUS at 16:51

## 2023-01-01 RX ADMIN — DILTIAZEM HYDROCHLORIDE 120 MG: 120 CAPSULE, COATED, EXTENDED RELEASE ORAL at 20:23

## 2023-01-01 RX ADMIN — DILTIAZEM HYDROCHLORIDE 180 MG: 180 CAPSULE, COATED, EXTENDED RELEASE ORAL at 12:14

## 2023-01-01 RX ADMIN — DILTIAZEM HYDROCHLORIDE 15 MG/HR: 5 INJECTION INTRAVENOUS at 00:44

## 2023-01-01 RX ADMIN — MEROPENEM 500 MG: 500 INJECTION, POWDER, FOR SOLUTION INTRAVENOUS at 12:47

## 2023-01-01 RX ADMIN — ALLOPURINOL 300 MG: 300 TABLET ORAL at 12:14

## 2023-01-01 RX ADMIN — MEROPENEM 1000 MG: 1 INJECTION, POWDER, FOR SOLUTION INTRAVENOUS at 12:58

## 2023-01-01 RX ADMIN — BUMETANIDE 1 MG: 0.25 INJECTION INTRAMUSCULAR; INTRAVENOUS at 21:04

## 2023-01-01 RX ADMIN — MEROPENEM 500 MG: 500 INJECTION, POWDER, FOR SOLUTION INTRAVENOUS at 16:35

## 2023-01-01 RX ADMIN — VANCOMYCIN HYDROCHLORIDE 125 MG: 125 CAPSULE ORAL at 03:18

## 2023-01-01 RX ADMIN — SODIUM CHLORIDE, PRESERVATIVE FREE 10 ML: 5 INJECTION INTRAVENOUS at 22:00

## 2023-01-01 RX ADMIN — MIDODRINE HYDROCHLORIDE 10 MG: 5 TABLET ORAL at 13:40

## 2023-01-01 RX ADMIN — SODIUM CHLORIDE, PRESERVATIVE FREE 10 ML: 5 INJECTION INTRAVENOUS at 08:17

## 2023-01-01 RX ADMIN — METOPROLOL SUCCINATE 100 MG: 50 TABLET, EXTENDED RELEASE ORAL at 10:08

## 2023-01-01 RX ADMIN — MIDODRINE HYDROCHLORIDE 10 MG: 5 TABLET ORAL at 07:49

## 2023-01-01 RX ADMIN — POTASSIUM CHLORIDE 20 MEQ: 29.8 INJECTION, SOLUTION INTRAVENOUS at 18:22

## 2023-01-01 RX ADMIN — ALLOPURINOL 300 MG: 300 TABLET ORAL at 09:14

## 2023-01-01 RX ADMIN — ALLOPURINOL 300 MG: 300 TABLET ORAL at 12:33

## 2023-01-01 RX ADMIN — MIDODRINE HYDROCHLORIDE 10 MG: 5 TABLET ORAL at 09:44

## 2023-01-01 RX ADMIN — SODIUM CHLORIDE: 9 INJECTION, SOLUTION INTRAVENOUS at 17:29

## 2023-01-01 RX ADMIN — MEROPENEM 1000 MG: 1 INJECTION, POWDER, FOR SOLUTION INTRAVENOUS at 09:15

## 2023-01-01 RX ADMIN — MIDODRINE HYDROCHLORIDE 2.5 MG: 2.5 TABLET ORAL at 20:22

## 2023-01-01 RX ADMIN — METOPROLOL SUCCINATE 100 MG: 50 TABLET, EXTENDED RELEASE ORAL at 08:14

## 2023-01-01 RX ADMIN — ALLOPURINOL 300 MG: 300 TABLET ORAL at 12:38

## 2023-01-01 RX ADMIN — ALLOPURINOL 300 MG: 300 TABLET ORAL at 08:48

## 2023-01-01 RX ADMIN — APIXABAN 5 MG: 5 TABLET, FILM COATED ORAL at 20:38

## 2023-01-01 RX ADMIN — ONDANSETRON HYDROCHLORIDE 4 MG: 2 INJECTION, SOLUTION INTRAMUSCULAR; INTRAVENOUS at 21:19

## 2023-01-01 RX ADMIN — MEGESTROL ACETATE 200 MG: 40 SUSPENSION ORAL at 08:48

## 2023-01-01 RX ADMIN — Medication 400 UNITS: at 09:14

## 2023-01-01 RX ADMIN — CEFEPIME 2000 MG: 2 INJECTION, POWDER, FOR SOLUTION INTRAVENOUS at 09:10

## 2023-01-01 RX ADMIN — MIDODRINE HYDROCHLORIDE 10 MG: 5 TABLET ORAL at 12:15

## 2023-01-01 RX ADMIN — MEGESTROL ACETATE 200 MG: 40 SUSPENSION ORAL at 08:29

## 2023-01-01 RX ADMIN — CEFEPIME 2000 MG: 2 INJECTION, POWDER, FOR SOLUTION INTRAVENOUS at 16:09

## 2023-01-01 RX ADMIN — MIDODRINE HYDROCHLORIDE 10 MG: 5 TABLET ORAL at 08:47

## 2023-01-01 RX ADMIN — ONDANSETRON HYDROCHLORIDE 4 MG: 2 INJECTION, SOLUTION INTRAMUSCULAR; INTRAVENOUS at 21:56

## 2023-01-01 RX ADMIN — SODIUM CHLORIDE, PRESERVATIVE FREE 5 ML: 5 INJECTION INTRAVENOUS at 21:59

## 2023-01-01 RX ADMIN — SODIUM CHLORIDE, PRESERVATIVE FREE 40 MG: 5 INJECTION INTRAVENOUS at 05:22

## 2023-01-01 RX ADMIN — GUAIFENESIN 600 MG: 600 TABLET, EXTENDED RELEASE ORAL at 09:29

## 2023-01-01 RX ADMIN — Medication 400 UNITS: at 10:07

## 2023-01-01 RX ADMIN — CEFEPIME 2000 MG: 2 INJECTION, POWDER, FOR SOLUTION INTRAVENOUS at 17:02

## 2023-01-01 RX ADMIN — ONDANSETRON HYDROCHLORIDE 4 MG: 2 INJECTION, SOLUTION INTRAMUSCULAR; INTRAVENOUS at 10:10

## 2023-01-01 RX ADMIN — SUCRALFATE 1 G: 1 TABLET ORAL at 19:44

## 2023-01-01 RX ADMIN — GUAIFENESIN 600 MG: 600 TABLET, EXTENDED RELEASE ORAL at 20:20

## 2023-01-01 RX ADMIN — METOPROLOL SUCCINATE 100 MG: 50 TABLET, EXTENDED RELEASE ORAL at 20:49

## 2023-01-01 RX ADMIN — APIXABAN 5 MG: 5 TABLET, FILM COATED ORAL at 08:34

## 2023-01-01 RX ADMIN — METOPROLOL SUCCINATE 100 MG: 50 TABLET, EXTENDED RELEASE ORAL at 09:38

## 2023-01-01 RX ADMIN — MIDODRINE HYDROCHLORIDE 5 MG: 5 TABLET ORAL at 05:22

## 2023-01-01 RX ADMIN — IPRATROPIUM BROMIDE AND ALBUTEROL SULFATE 1 AMPULE: 2.5; .5 SOLUTION RESPIRATORY (INHALATION) at 06:15

## 2023-01-01 RX ADMIN — METHYLPREDNISOLONE SODIUM SUCCINATE 40 MG: 40 INJECTION, POWDER, FOR SOLUTION INTRAMUSCULAR; INTRAVENOUS at 13:25

## 2023-01-01 RX ADMIN — SODIUM CHLORIDE, PRESERVATIVE FREE 10 ML: 5 INJECTION INTRAVENOUS at 08:34

## 2023-01-01 RX ADMIN — ROSUVASTATIN CALCIUM 10 MG: 10 TABLET, FILM COATED ORAL at 21:04

## 2023-01-01 RX ADMIN — HYDROXYZINE HYDROCHLORIDE 10 MG: 10 TABLET ORAL at 12:33

## 2023-01-01 RX ADMIN — GUAIFENESIN 600 MG: 600 TABLET, EXTENDED RELEASE ORAL at 09:08

## 2023-01-01 RX ADMIN — ROSUVASTATIN CALCIUM 10 MG: 10 TABLET, FILM COATED ORAL at 19:44

## 2023-01-01 RX ADMIN — SODIUM CHLORIDE, PRESERVATIVE FREE 40 MG: 5 INJECTION INTRAVENOUS at 03:18

## 2023-01-01 RX ADMIN — GUAIFENESIN 600 MG: 600 TABLET, EXTENDED RELEASE ORAL at 22:11

## 2023-01-01 RX ADMIN — GUAIFENESIN 600 MG: 600 TABLET, EXTENDED RELEASE ORAL at 09:38

## 2023-01-01 RX ADMIN — VANCOMYCIN HYDROCHLORIDE 125 MG: 125 CAPSULE ORAL at 10:08

## 2023-01-01 RX ADMIN — Medication 400 UNITS: at 17:29

## 2023-01-01 RX ADMIN — DILTIAZEM HYDROCHLORIDE 15 MG/HR: 5 INJECTION INTRAVENOUS at 15:21

## 2023-01-01 RX ADMIN — CEFEPIME 2000 MG: 2 INJECTION, POWDER, FOR SOLUTION INTRAVENOUS at 00:33

## 2023-01-01 RX ADMIN — MEGESTROL ACETATE 200 MG: 40 SUSPENSION ORAL at 17:29

## 2023-01-01 RX ADMIN — ALLOPURINOL 300 MG: 300 TABLET ORAL at 09:08

## 2023-01-01 RX ADMIN — FUROSEMIDE 40 MG: 40 TABLET ORAL at 09:29

## 2023-01-01 RX ADMIN — METOPROLOL SUCCINATE 100 MG: 50 TABLET, EXTENDED RELEASE ORAL at 21:58

## 2023-01-01 RX ADMIN — HEPARIN SODIUM 3600 UNITS: 1000 INJECTION INTRAVENOUS; SUBCUTANEOUS at 16:40

## 2023-01-01 RX ADMIN — MEGESTROL ACETATE 200 MG: 40 SUSPENSION ORAL at 14:48

## 2023-01-01 RX ADMIN — SODIUM CHLORIDE, PRESERVATIVE FREE 40 MG: 5 INJECTION INTRAVENOUS at 16:46

## 2023-01-01 RX ADMIN — APIXABAN 5 MG: 5 TABLET, FILM COATED ORAL at 09:55

## 2023-01-01 RX ADMIN — GUAIFENESIN 600 MG: 600 TABLET, EXTENDED RELEASE ORAL at 22:14

## 2023-01-01 RX ADMIN — VANCOMYCIN HYDROCHLORIDE 125 MG: 125 CAPSULE ORAL at 16:33

## 2023-01-01 RX ADMIN — VANCOMYCIN HYDROCHLORIDE 125 MG: 125 CAPSULE ORAL at 18:26

## 2023-01-01 RX ADMIN — VANCOMYCIN HYDROCHLORIDE 125 MG: 125 CAPSULE ORAL at 21:45

## 2023-01-01 RX ADMIN — MIDODRINE HYDROCHLORIDE 10 MG: 5 TABLET ORAL at 10:59

## 2023-01-01 RX ADMIN — DILTIAZEM HYDROCHLORIDE 180 MG: 180 CAPSULE, COATED, EXTENDED RELEASE ORAL at 08:34

## 2023-01-01 RX ADMIN — METOPROLOL SUCCINATE 100 MG: 50 TABLET, EXTENDED RELEASE ORAL at 21:08

## 2023-01-01 RX ADMIN — SODIUM CHLORIDE, PRESERVATIVE FREE 10 ML: 5 INJECTION INTRAVENOUS at 08:05

## 2023-01-01 RX ADMIN — MEGESTROL ACETATE 200 MG: 40 SUSPENSION ORAL at 08:14

## 2023-01-01 RX ADMIN — ONDANSETRON HYDROCHLORIDE 4 MG: 2 INJECTION, SOLUTION INTRAMUSCULAR; INTRAVENOUS at 16:18

## 2023-01-01 RX ADMIN — MEGESTROL ACETATE 200 MG: 40 SUSPENSION ORAL at 10:07

## 2023-01-01 RX ADMIN — SODIUM CHLORIDE, PRESERVATIVE FREE 40 MG: 5 INJECTION INTRAVENOUS at 05:33

## 2023-01-01 RX ADMIN — ROSUVASTATIN CALCIUM 10 MG: 10 TABLET, FILM COATED ORAL at 20:49

## 2023-01-01 RX ADMIN — ROSUVASTATIN CALCIUM 10 MG: 10 TABLET, FILM COATED ORAL at 22:02

## 2023-01-01 RX ADMIN — ONDANSETRON HYDROCHLORIDE 4 MG: 2 INJECTION, SOLUTION INTRAMUSCULAR; INTRAVENOUS at 20:49

## 2023-01-01 RX ADMIN — Medication 400 UNITS: at 07:32

## 2023-01-01 RX ADMIN — MEROPENEM 1000 MG: 1 INJECTION, POWDER, FOR SOLUTION INTRAVENOUS at 08:27

## 2023-01-01 RX ADMIN — VANCOMYCIN HYDROCHLORIDE 125 MG: 125 CAPSULE ORAL at 06:28

## 2023-01-01 RX ADMIN — IPRATROPIUM BROMIDE AND ALBUTEROL SULFATE 1 AMPULE: 2.5; .5 SOLUTION RESPIRATORY (INHALATION) at 06:21

## 2023-01-01 RX ADMIN — VANCOMYCIN HYDROCHLORIDE 125 MG: 125 CAPSULE ORAL at 05:30

## 2023-01-01 RX ADMIN — APIXABAN 5 MG: 5 TABLET, FILM COATED ORAL at 12:33

## 2023-01-01 RX ADMIN — MEROPENEM 1000 MG: 1 INJECTION, POWDER, FOR SOLUTION INTRAVENOUS at 20:22

## 2023-01-01 RX ADMIN — Medication 1000 MG: at 05:23

## 2023-01-01 RX ADMIN — MEGESTROL ACETATE 200 MG: 40 SUSPENSION ORAL at 09:20

## 2023-01-01 RX ADMIN — MEGESTROL ACETATE 200 MG: 40 SUSPENSION ORAL at 09:07

## 2023-01-01 RX ADMIN — HEPARIN SODIUM 4000 UNITS: 1000 INJECTION INTRAVENOUS; SUBCUTANEOUS at 11:35

## 2023-01-01 RX ADMIN — ROSUVASTATIN CALCIUM 10 MG: 10 TABLET, FILM COATED ORAL at 21:20

## 2023-01-01 RX ADMIN — DILTIAZEM HYDROCHLORIDE 180 MG: 180 CAPSULE, COATED, EXTENDED RELEASE ORAL at 21:20

## 2023-01-01 RX ADMIN — MEGESTROL ACETATE 200 MG: 40 SUSPENSION ORAL at 09:41

## 2023-01-01 RX ADMIN — ROSUVASTATIN CALCIUM 10 MG: 10 TABLET, FILM COATED ORAL at 20:19

## 2023-01-01 RX ADMIN — DILTIAZEM HYDROCHLORIDE 15 MG/HR: 5 INJECTION INTRAVENOUS at 03:33

## 2023-01-01 RX ADMIN — MIDODRINE HYDROCHLORIDE 10 MG: 5 TABLET ORAL at 08:11

## 2023-01-01 RX ADMIN — METHYLPREDNISOLONE SODIUM SUCCINATE 40 MG: 40 INJECTION, POWDER, FOR SOLUTION INTRAMUSCULAR; INTRAVENOUS at 21:04

## 2023-01-01 RX ADMIN — VANCOMYCIN HYDROCHLORIDE 125 MG: 125 CAPSULE ORAL at 03:15

## 2023-01-01 RX ADMIN — DILTIAZEM HYDROCHLORIDE 120 MG: 120 CAPSULE, COATED, EXTENDED RELEASE ORAL at 09:29

## 2023-01-01 RX ADMIN — APIXABAN 5 MG: 5 TABLET, FILM COATED ORAL at 21:09

## 2023-01-01 RX ADMIN — BUMETANIDE 2 MG/HR: 0.25 INJECTION INTRAMUSCULAR; INTRAVENOUS at 15:06

## 2023-01-01 RX ADMIN — CEFEPIME 2000 MG: 2 INJECTION, POWDER, FOR SOLUTION INTRAVENOUS at 00:29

## 2023-01-01 RX ADMIN — DILTIAZEM HYDROCHLORIDE 120 MG: 120 CAPSULE, COATED, EXTENDED RELEASE ORAL at 09:07

## 2023-01-01 RX ADMIN — VANCOMYCIN HYDROCHLORIDE 125 MG: 125 CAPSULE ORAL at 20:49

## 2023-01-01 RX ADMIN — HEPARIN SODIUM 2000 UNITS: 1000 INJECTION, SOLUTION INTRAVENOUS; SUBCUTANEOUS at 13:26

## 2023-01-01 RX ADMIN — DILTIAZEM HYDROCHLORIDE 5 MG/HR: 5 INJECTION INTRAVENOUS at 08:25

## 2023-01-01 RX ADMIN — SODIUM CHLORIDE, PRESERVATIVE FREE 10 ML: 5 INJECTION INTRAVENOUS at 08:16

## 2023-01-01 RX ADMIN — SODIUM CHLORIDE, PRESERVATIVE FREE 40 MG: 5 INJECTION INTRAVENOUS at 06:29

## 2023-01-01 RX ADMIN — SODIUM CHLORIDE, PRESERVATIVE FREE 10 ML: 5 INJECTION INTRAVENOUS at 22:33

## 2023-01-01 RX ADMIN — MIDODRINE HYDROCHLORIDE 10 MG: 5 TABLET ORAL at 08:05

## 2023-01-01 RX ADMIN — DILTIAZEM HYDROCHLORIDE 180 MG: 180 CAPSULE, COATED, EXTENDED RELEASE ORAL at 10:07

## 2023-01-01 RX ADMIN — SODIUM CHLORIDE, PRESERVATIVE FREE 10 ML: 5 INJECTION INTRAVENOUS at 20:50

## 2023-01-01 RX ADMIN — MIDODRINE HYDROCHLORIDE 10 MG: 5 TABLET ORAL at 08:17

## 2023-01-01 RX ADMIN — MEGESTROL ACETATE 200 MG: 40 SUSPENSION ORAL at 09:38

## 2023-01-01 RX ADMIN — SODIUM CHLORIDE, PRESERVATIVE FREE 10 ML: 5 INJECTION INTRAVENOUS at 20:33

## 2023-01-01 RX ADMIN — ONDANSETRON HYDROCHLORIDE 4 MG: 2 INJECTION, SOLUTION INTRAMUSCULAR; INTRAVENOUS at 15:57

## 2023-01-01 RX ADMIN — BUMETANIDE 1 MG: 0.25 INJECTION INTRAMUSCULAR; INTRAVENOUS at 09:31

## 2023-01-01 RX ADMIN — SUCRALFATE 1 G: 1 TABLET ORAL at 20:49

## 2023-01-01 RX ADMIN — HEPARIN SODIUM 2000 UNITS: 1000 INJECTION, SOLUTION INTRAVENOUS; SUBCUTANEOUS at 06:07

## 2023-01-01 RX ADMIN — MIDODRINE HYDROCHLORIDE 5 MG: 5 TABLET ORAL at 12:37

## 2023-01-01 RX ADMIN — SODIUM CHLORIDE, PRESERVATIVE FREE 10 ML: 5 INJECTION INTRAVENOUS at 21:07

## 2023-01-01 RX ADMIN — IPRATROPIUM BROMIDE AND ALBUTEROL SULFATE 1 AMPULE: 2.5; .5 SOLUTION RESPIRATORY (INHALATION) at 10:15

## 2023-01-01 RX ADMIN — ERGOCALCIFEROL 50000 UNITS: 1.25 CAPSULE ORAL at 08:34

## 2023-01-01 RX ADMIN — Medication 400 UNITS: at 08:47

## 2023-01-01 RX ADMIN — ALLOPURINOL 300 MG: 300 TABLET ORAL at 09:23

## 2023-01-01 RX ADMIN — DILTIAZEM HYDROCHLORIDE 180 MG: 180 CAPSULE, COATED, EXTENDED RELEASE ORAL at 14:05

## 2023-01-01 RX ADMIN — VANCOMYCIN HYDROCHLORIDE 125 MG: 125 CAPSULE ORAL at 08:04

## 2023-01-01 RX ADMIN — HYDROXYZINE HYDROCHLORIDE 10 MG: 10 TABLET ORAL at 00:24

## 2023-01-01 RX ADMIN — MIDODRINE HYDROCHLORIDE 5 MG: 5 TABLET ORAL at 14:02

## 2023-01-01 RX ADMIN — GUAIFENESIN 600 MG: 600 TABLET, EXTENDED RELEASE ORAL at 12:34

## 2023-01-01 RX ADMIN — MIDODRINE HYDROCHLORIDE 10 MG: 5 TABLET ORAL at 17:49

## 2023-01-01 RX ADMIN — BUMETANIDE 1 MG: 0.25 INJECTION, SOLUTION INTRAMUSCULAR; INTRAVENOUS at 07:52

## 2023-01-01 RX ADMIN — Medication 400 UNITS: at 08:14

## 2023-01-01 RX ADMIN — METHYLPREDNISOLONE SODIUM SUCCINATE 40 MG: 40 INJECTION, POWDER, FOR SOLUTION INTRAMUSCULAR; INTRAVENOUS at 20:21

## 2023-01-01 RX ADMIN — ROSUVASTATIN CALCIUM 10 MG: 10 TABLET, FILM COATED ORAL at 20:12

## 2023-01-01 RX ADMIN — METHYLPREDNISOLONE SODIUM SUCCINATE 40 MG: 40 INJECTION, POWDER, FOR SOLUTION INTRAMUSCULAR; INTRAVENOUS at 13:52

## 2023-01-01 RX ADMIN — SODIUM CHLORIDE, PRESERVATIVE FREE 10 ML: 5 INJECTION INTRAVENOUS at 21:24

## 2023-01-01 RX ADMIN — SODIUM CHLORIDE, PRESERVATIVE FREE 10 ML: 5 INJECTION INTRAVENOUS at 08:30

## 2023-01-01 RX ADMIN — HEPARIN SODIUM 3600 UNITS: 1000 INJECTION INTRAVENOUS; SUBCUTANEOUS at 15:57

## 2023-01-01 RX ADMIN — Medication 400 UNITS: at 09:29

## 2023-01-01 RX ADMIN — IPRATROPIUM BROMIDE AND ALBUTEROL SULFATE 1 AMPULE: 2.5; .5 SOLUTION RESPIRATORY (INHALATION) at 10:21

## 2023-01-01 RX ADMIN — IPRATROPIUM BROMIDE AND ALBUTEROL SULFATE 1 AMPULE: 2.5; .5 SOLUTION RESPIRATORY (INHALATION) at 06:42

## 2023-01-01 RX ADMIN — VANCOMYCIN HYDROCHLORIDE 125 MG: 125 CAPSULE ORAL at 13:25

## 2023-01-01 RX ADMIN — ONDANSETRON HYDROCHLORIDE 4 MG: 2 INJECTION, SOLUTION INTRAMUSCULAR; INTRAVENOUS at 22:06

## 2023-01-01 RX ADMIN — METOPROLOL SUCCINATE 100 MG: 50 TABLET, EXTENDED RELEASE ORAL at 20:12

## 2023-01-01 RX ADMIN — ALLOPURINOL 300 MG: 300 TABLET ORAL at 09:42

## 2023-01-01 RX ADMIN — CEFEPIME 2000 MG: 2 INJECTION, POWDER, FOR SOLUTION INTRAVENOUS at 08:03

## 2023-01-01 RX ADMIN — MEGESTROL ACETATE 200 MG: 40 SUSPENSION ORAL at 12:14

## 2023-01-01 RX ADMIN — VANCOMYCIN HYDROCHLORIDE 125 MG: 125 CAPSULE ORAL at 06:22

## 2023-01-01 RX ADMIN — MEGESTROL ACETATE 200 MG: 40 SUSPENSION ORAL at 08:17

## 2023-01-01 RX ADMIN — ONDANSETRON HYDROCHLORIDE 4 MG: 2 INJECTION, SOLUTION INTRAMUSCULAR; INTRAVENOUS at 03:18

## 2023-01-01 RX ADMIN — CEFEPIME 2000 MG: 2 INJECTION, POWDER, FOR SOLUTION INTRAVENOUS at 00:46

## 2023-01-01 RX ADMIN — POTASSIUM CHLORIDE 20 MEQ: 29.8 INJECTION, SOLUTION INTRAVENOUS at 05:16

## 2023-01-01 RX ADMIN — SUCRALFATE 1 G: 1 TABLET ORAL at 21:58

## 2023-01-01 RX ADMIN — POTASSIUM CHLORIDE 20 MEQ: 29.8 INJECTION, SOLUTION INTRAVENOUS at 09:58

## 2023-01-01 RX ADMIN — POTASSIUM CHLORIDE 10 MEQ: 7.46 INJECTION, SOLUTION INTRAVENOUS at 10:19

## 2023-01-01 RX ADMIN — MEROPENEM 1000 MG: 1 INJECTION, POWDER, FOR SOLUTION INTRAVENOUS at 21:38

## 2023-01-01 RX ADMIN — METOPROLOL SUCCINATE 100 MG: 50 TABLET, EXTENDED RELEASE ORAL at 20:39

## 2023-01-01 RX ADMIN — METHYLPREDNISOLONE SODIUM SUCCINATE 40 MG: 40 INJECTION, POWDER, FOR SOLUTION INTRAMUSCULAR; INTRAVENOUS at 06:10

## 2023-01-01 RX ADMIN — METOPROLOL SUCCINATE 100 MG: 50 TABLET, EXTENDED RELEASE ORAL at 12:17

## 2023-01-01 RX ADMIN — VANCOMYCIN HYDROCHLORIDE 125 MG: 125 CAPSULE ORAL at 17:19

## 2023-01-01 RX ADMIN — VANCOMYCIN HYDROCHLORIDE 125 MG: 125 CAPSULE ORAL at 00:50

## 2023-01-01 RX ADMIN — METOPROLOL SUCCINATE 100 MG: 50 TABLET, EXTENDED RELEASE ORAL at 22:58

## 2023-01-01 RX ADMIN — IPRATROPIUM BROMIDE AND ALBUTEROL SULFATE 1 AMPULE: 2.5; .5 SOLUTION RESPIRATORY (INHALATION) at 20:17

## 2023-01-01 RX ADMIN — GUAIFENESIN 600 MG: 600 TABLET, EXTENDED RELEASE ORAL at 08:11

## 2023-01-01 RX ADMIN — METOPROLOL SUCCINATE 100 MG: 50 TABLET, EXTENDED RELEASE ORAL at 08:05

## 2023-01-01 RX ADMIN — MIDODRINE HYDROCHLORIDE 10 MG: 5 TABLET ORAL at 16:28

## 2023-01-01 RX ADMIN — DILTIAZEM HYDROCHLORIDE 180 MG: 180 CAPSULE, COATED, EXTENDED RELEASE ORAL at 20:49

## 2023-01-01 RX ADMIN — DILTIAZEM HYDROCHLORIDE 10 MG: 5 INJECTION INTRAVENOUS at 16:03

## 2023-01-01 RX ADMIN — APIXABAN 5 MG: 5 TABLET, FILM COATED ORAL at 09:38

## 2023-01-01 RX ADMIN — SODIUM CHLORIDE, PRESERVATIVE FREE 40 MG: 5 INJECTION INTRAVENOUS at 17:41

## 2023-01-01 RX ADMIN — IPRATROPIUM BROMIDE AND ALBUTEROL SULFATE 1 AMPULE: 2.5; .5 SOLUTION RESPIRATORY (INHALATION) at 10:25

## 2023-01-01 RX ADMIN — ALLOPURINOL 300 MG: 300 TABLET ORAL at 08:34

## 2023-01-01 RX ADMIN — GUAIFENESIN 600 MG: 600 TABLET, EXTENDED RELEASE ORAL at 08:34

## 2023-01-01 RX ADMIN — ENOXAPARIN SODIUM 135 MG: 150 INJECTION SUBCUTANEOUS at 09:29

## 2023-01-01 SDOH — ECONOMIC STABILITY: TRANSPORTATION INSECURITY
IN THE PAST 12 MONTHS, HAS LACK OF TRANSPORTATION KEPT YOU FROM MEETINGS, WORK, OR FROM GETTING THINGS NEEDED FOR DAILY LIVING?: NO

## 2023-01-01 SDOH — ECONOMIC STABILITY: HOUSING INSECURITY: IN THE LAST 12 MONTHS, HOW MANY PLACES HAVE YOU LIVED?: 1

## 2023-01-01 SDOH — ECONOMIC STABILITY: INCOME INSECURITY: IN THE LAST 12 MONTHS, WAS THERE A TIME WHEN YOU WERE NOT ABLE TO PAY THE MORTGAGE OR RENT ON TIME?: NO

## 2023-01-01 SDOH — ECONOMIC STABILITY: HOUSING INSECURITY
IN THE LAST 12 MONTHS, WAS THERE A TIME WHEN YOU DID NOT HAVE A STEADY PLACE TO SLEEP OR SLEPT IN A SHELTER (INCLUDING NOW)?: NO

## 2023-01-01 SDOH — ECONOMIC STABILITY: TRANSPORTATION INSECURITY
IN THE PAST 12 MONTHS, HAS THE LACK OF TRANSPORTATION KEPT YOU FROM MEDICAL APPOINTMENTS OR FROM GETTING MEDICATIONS?: NO

## 2023-01-01 ASSESSMENT — PAIN SCALES - GENERAL
PAINLEVEL_OUTOF10: 0
PAINLEVEL_OUTOF10: 5
PAINLEVEL_OUTOF10: 0
PAINLEVEL_OUTOF10: 4
PAINLEVEL_OUTOF10: 0
PAINLEVEL_OUTOF10: 4
PAINLEVEL_OUTOF10: 0
PAINLEVEL_OUTOF10: 7
PAINLEVEL_OUTOF10: 0
PAINLEVEL_OUTOF10: 8
PAINLEVEL_OUTOF10: 0
PAINLEVEL_OUTOF10: 5
PAINLEVEL_OUTOF10: 0
PAINLEVEL_OUTOF10: 3
PAINLEVEL_OUTOF10: 0
PAINLEVEL_OUTOF10: 2
PAINLEVEL_OUTOF10: 0
PAINLEVEL_OUTOF10: 3
PAINLEVEL_OUTOF10: 0
PAINLEVEL_OUTOF10: 0

## 2023-01-01 ASSESSMENT — ENCOUNTER SYMPTOMS
FACIAL SWELLING: 0
SORE THROAT: 0
NAUSEA: 0
SHORTNESS OF BREATH: 1
BLOOD IN STOOL: 0
CHOKING: 0
DIARRHEA: 0
COUGH: 1
RESPIRATORY NEGATIVE: 1
ABDOMINAL PAIN: 0
VOMITING: 0
EYE DISCHARGE: 0
NAUSEA: 0
DIARRHEA: 0
GASTROINTESTINAL NEGATIVE: 1
SINUS PRESSURE: 0
SHORTNESS OF BREATH: 0
VOICE CHANGE: 0
EYES NEGATIVE: 1
APNEA: 0
CONSTIPATION: 0

## 2023-01-01 ASSESSMENT — PAIN DESCRIPTION - ORIENTATION
ORIENTATION: LEFT

## 2023-01-01 ASSESSMENT — SOCIAL DETERMINANTS OF HEALTH (SDOH)
WITHIN THE LAST YEAR, HAVE TO BEEN RAPED OR FORCED TO HAVE ANY KIND OF SEXUAL ACTIVITY BY YOUR PARTNER OR EX-PARTNER?: NO
WITHIN THE LAST YEAR, HAVE YOU BEEN KICKED, HIT, SLAPPED, OR OTHERWISE PHYSICALLY HURT BY YOUR PARTNER OR EX-PARTNER?: NO
HOW OFTEN DO YOU ATTEND CHURCH OR RELIGIOUS SERVICES?: MORE THAN 4 TIMES PER YEAR
HOW OFTEN DO YOU ATTENT MEETINGS OF THE CLUB OR ORGANIZATION YOU BELONG TO?: 1 TO 4 TIMES PER YEAR
HOW OFTEN DO YOU GET TOGETHER WITH FRIENDS OR RELATIVES?: ONCE A WEEK
WITHIN THE LAST YEAR, HAVE YOU BEEN HUMILIATED OR EMOTIONALLY ABUSED IN OTHER WAYS BY YOUR PARTNER OR EX-PARTNER?: NO
DO YOU BELONG TO ANY CLUBS OR ORGANIZATIONS SUCH AS CHURCH GROUPS UNIONS, FRATERNAL OR ATHLETIC GROUPS, OR SCHOOL GROUPS?: YES
IN A TYPICAL WEEK, HOW MANY TIMES DO YOU TALK ON THE PHONE WITH FAMILY, FRIENDS, OR NEIGHBORS?: MORE THAN THREE TIMES A WEEK
ARE YOU MARRIED, WIDOWED, DIVORCED, SEPARATED, NEVER MARRIED, OR LIVING WITH A PARTNER?: NEVER MARRIED
WITHIN THE LAST YEAR, HAVE YOU BEEN AFRAID OF YOUR PARTNER OR EX-PARTNER?: NO

## 2023-01-01 ASSESSMENT — PAIN DESCRIPTION - ONSET: ONSET: GRADUAL

## 2023-01-01 ASSESSMENT — PAIN DESCRIPTION - LOCATION
LOCATION: ARM
LOCATION: ELBOW
LOCATION: ARM
LOCATION: HEAD
LOCATION: KNEE
LOCATION: GENERALIZED
LOCATION: HEAD
LOCATION: GENERALIZED

## 2023-01-01 ASSESSMENT — PAIN SCALES - WONG BAKER
WONGBAKER_NUMERICALRESPONSE: 0

## 2023-01-01 ASSESSMENT — PAIN DESCRIPTION - DESCRIPTORS
DESCRIPTORS: ACHING
DESCRIPTORS: ACHING
DESCRIPTORS: DISCOMFORT
DESCRIPTORS: ACHING
DESCRIPTORS: POUNDING
DESCRIPTORS: ACHING

## 2023-01-01 ASSESSMENT — PAIN DESCRIPTION - FREQUENCY: FREQUENCY: INTERMITTENT

## 2023-01-01 ASSESSMENT — LIFESTYLE VARIABLES: HOW MANY STANDARD DRINKS CONTAINING ALCOHOL DO YOU HAVE ON A TYPICAL DAY: PATIENT DOES NOT DRINK

## 2023-01-01 ASSESSMENT — PAIN - FUNCTIONAL ASSESSMENT
PAIN_FUNCTIONAL_ASSESSMENT: NONE - DENIES PAIN
PAIN_FUNCTIONAL_ASSESSMENT: ACTIVITIES ARE NOT PREVENTED

## 2023-01-01 ASSESSMENT — PAIN DESCRIPTION - PAIN TYPE: TYPE: SURGICAL PAIN;CHRONIC PAIN

## 2023-01-04 ENCOUNTER — TELEPHONE (OUTPATIENT)
Dept: ENT CLINIC | Age: 68
End: 2023-01-04

## 2023-01-04 ENCOUNTER — HOSPITAL ENCOUNTER (OUTPATIENT)
Dept: INFUSION THERAPY | Age: 68
Discharge: HOME | End: 2023-01-04
Payer: MEDICARE

## 2023-01-04 DIAGNOSIS — C83.31 RETICULOSARCOMA OF LYMPH NODES OF HEAD, FACE, AND NECK (HCC): ICD-10-CM

## 2023-01-04 LAB
BASOPHILS ABSOLUTE: 0.05 K/UL (ref 0.01–0.08)
BASOPHILS RELATIVE PERCENT: 0.7 % (ref 0.1–1.2)
EOSINOPHILS ABSOLUTE: 0.17 K/UL (ref 0.04–0.54)
EOSINOPHILS RELATIVE PERCENT: 2.5 % (ref 0.7–7)
HCT VFR BLD CALC: 43.4 % (ref 40.1–51)
HEMOGLOBIN: 14.3 G/DL (ref 13.7–17.5)
LYMPHOCYTES ABSOLUTE: 1.64 K/UL (ref 1.18–3.74)
LYMPHOCYTES RELATIVE PERCENT: 23.9 % (ref 19.3–53.1)
MCH RBC QN AUTO: 28.7 PG (ref 25.7–32.2)
MCHC RBC AUTO-ENTMCNC: 32.9 G/DL (ref 32.3–36.5)
MCV RBC AUTO: 87 FL (ref 79–92.2)
MONOCYTES ABSOLUTE: 0.5 K/UL (ref 0.24–0.82)
MONOCYTES RELATIVE PERCENT: 7.3 % (ref 4.7–12.5)
NEUTROPHILS ABSOLUTE: 4.44 K/UL (ref 1.56–6.13)
NEUTROPHILS RELATIVE PERCENT: 64.7 % (ref 34–71.1)
PDW BLD-RTO: 12.8 % (ref 11.6–14.4)
PLATELET # BLD: 238 K/UL (ref 163–337)
PMV BLD AUTO: 9.8 FL (ref 7.4–10.4)
RBC # BLD: 4.99 M/UL (ref 4.63–6.08)
WBC # BLD: 6.86 K/UL (ref 4.23–9.07)

## 2023-01-04 PROCEDURE — 85025 COMPLETE CBC W/AUTO DIFF WBC: CPT

## 2023-01-04 PROCEDURE — 36415 COLL VENOUS BLD VENIPUNCTURE: CPT

## 2023-01-05 ENCOUNTER — HOSPITAL ENCOUNTER (OUTPATIENT)
Age: 68
Setting detail: OUTPATIENT SURGERY
Discharge: HOME | End: 2023-01-05
Attending: OTOLARYNGOLOGY | Admitting: OTOLARYNGOLOGY
Payer: MEDICARE

## 2023-01-05 ENCOUNTER — TELEPHONE (OUTPATIENT)
Dept: ENT CLINIC | Age: 68
End: 2023-01-05

## 2023-01-05 VITALS
OXYGEN SATURATION: 98 % | BODY MASS INDEX: 41.75 KG/M2 | DIASTOLIC BLOOD PRESSURE: 69 MMHG | HEIGHT: 73 IN | TEMPERATURE: 97 F | RESPIRATION RATE: 18 BRPM | WEIGHT: 315 LBS | SYSTOLIC BLOOD PRESSURE: 136 MMHG | HEART RATE: 71 BPM

## 2023-01-05 DIAGNOSIS — G89.18 POSTOPERATIVE PAIN: Primary | ICD-10-CM

## 2023-01-05 DIAGNOSIS — C85.91 LYMPHOMA OF LYMPH NODES OF NECK, UNSPECIFIED LYMPHOMA TYPE (HCC): ICD-10-CM

## 2023-01-05 PROCEDURE — 3700000001 HC ADD 15 MINUTES (ANESTHESIA): Performed by: OTOLARYNGOLOGY

## 2023-01-05 PROCEDURE — 2709999900 HC NON-CHARGEABLE SUPPLY: Performed by: OTOLARYNGOLOGY

## 2023-01-05 PROCEDURE — 2500000003 HC RX 250 WO HCPCS: Performed by: OTOLARYNGOLOGY

## 2023-01-05 PROCEDURE — 7100000010 HC PHASE II RECOVERY - FIRST 15 MIN: Performed by: OTOLARYNGOLOGY

## 2023-01-05 PROCEDURE — 88305 TISSUE EXAM BY PATHOLOGIST: CPT

## 2023-01-05 PROCEDURE — 88360 TUMOR IMMUNOHISTOCHEM/MANUAL: CPT

## 2023-01-05 PROCEDURE — 3700000000 HC ANESTHESIA ATTENDED CARE: Performed by: OTOLARYNGOLOGY

## 2023-01-05 PROCEDURE — 38510 BIOPSY/REMOVAL LYMPH NODES: CPT | Performed by: OTOLARYNGOLOGY

## 2023-01-05 PROCEDURE — 3600000013 HC SURGERY LEVEL 3 ADDTL 15MIN: Performed by: OTOLARYNGOLOGY

## 2023-01-05 PROCEDURE — 7100000000 HC PACU RECOVERY - FIRST 15 MIN: Performed by: OTOLARYNGOLOGY

## 2023-01-05 PROCEDURE — 6370000000 HC RX 637 (ALT 250 FOR IP): Performed by: OTOLARYNGOLOGY

## 2023-01-05 PROCEDURE — 2580000003 HC RX 258: Performed by: NURSE ANESTHETIST, CERTIFIED REGISTERED

## 2023-01-05 PROCEDURE — 6360000002 HC RX W HCPCS: Performed by: NURSE ANESTHETIST, CERTIFIED REGISTERED

## 2023-01-05 PROCEDURE — 7100000001 HC PACU RECOVERY - ADDTL 15 MIN: Performed by: OTOLARYNGOLOGY

## 2023-01-05 PROCEDURE — 2500000003 HC RX 250 WO HCPCS: Performed by: NURSE ANESTHETIST, CERTIFIED REGISTERED

## 2023-01-05 PROCEDURE — 2720000010 HC SURG SUPPLY STERILE: Performed by: OTOLARYNGOLOGY

## 2023-01-05 PROCEDURE — 7100000011 HC PHASE II RECOVERY - ADDTL 15 MIN: Performed by: OTOLARYNGOLOGY

## 2023-01-05 PROCEDURE — 3600000003 HC SURGERY LEVEL 3 BASE: Performed by: OTOLARYNGOLOGY

## 2023-01-05 RX ORDER — FENTANYL CITRATE 50 UG/ML
INJECTION, SOLUTION INTRAMUSCULAR; INTRAVENOUS PRN
Status: DISCONTINUED | OUTPATIENT
Start: 2023-01-05 | End: 2023-01-05 | Stop reason: SDUPTHER

## 2023-01-05 RX ORDER — SODIUM CHLORIDE 0.9 % (FLUSH) 0.9 %
5-40 SYRINGE (ML) INJECTION PRN
Status: DISCONTINUED | OUTPATIENT
Start: 2023-01-05 | End: 2023-01-05 | Stop reason: HOSPADM

## 2023-01-05 RX ORDER — BACITRACIN, NEOMYCIN, POLYMYXIN B 400; 3.5; 5 [USP'U]/G; MG/G; [USP'U]/G
OINTMENT TOPICAL PRN
Status: DISCONTINUED | OUTPATIENT
Start: 2023-01-05 | End: 2023-01-05 | Stop reason: HOSPADM

## 2023-01-05 RX ORDER — DIPHENHYDRAMINE HYDROCHLORIDE 50 MG/ML
12.5 INJECTION INTRAMUSCULAR; INTRAVENOUS
Status: DISCONTINUED | OUTPATIENT
Start: 2023-01-05 | End: 2023-01-05 | Stop reason: HOSPADM

## 2023-01-05 RX ORDER — DROPERIDOL 2.5 MG/ML
0.62 INJECTION, SOLUTION INTRAMUSCULAR; INTRAVENOUS
Status: DISCONTINUED | OUTPATIENT
Start: 2023-01-05 | End: 2023-01-05 | Stop reason: HOSPADM

## 2023-01-05 RX ORDER — CEFAZOLIN SODIUM 1 G/3ML
INJECTION, POWDER, FOR SOLUTION INTRAMUSCULAR; INTRAVENOUS PRN
Status: DISCONTINUED | OUTPATIENT
Start: 2023-01-05 | End: 2023-01-05 | Stop reason: SDUPTHER

## 2023-01-05 RX ORDER — SODIUM CHLORIDE 9 MG/ML
INJECTION, SOLUTION INTRAVENOUS PRN
Status: DISCONTINUED | OUTPATIENT
Start: 2023-01-05 | End: 2023-01-05 | Stop reason: HOSPADM

## 2023-01-05 RX ORDER — LABETALOL HYDROCHLORIDE 5 MG/ML
10 INJECTION, SOLUTION INTRAVENOUS
Status: DISCONTINUED | OUTPATIENT
Start: 2023-01-05 | End: 2023-01-05 | Stop reason: HOSPADM

## 2023-01-05 RX ORDER — OXYCODONE HYDROCHLORIDE AND ACETAMINOPHEN 5; 325 MG/1; MG/1
1 TABLET ORAL EVERY 6 HOURS PRN
Qty: 28 TABLET | Refills: 0 | Status: SHIPPED | OUTPATIENT
Start: 2023-01-05 | End: 2023-01-12

## 2023-01-05 RX ORDER — LIDOCAINE HYDROCHLORIDE AND EPINEPHRINE 10; 10 MG/ML; UG/ML
INJECTION, SOLUTION INFILTRATION; PERINEURAL PRN
Status: DISCONTINUED | OUTPATIENT
Start: 2023-01-05 | End: 2023-01-05 | Stop reason: HOSPADM

## 2023-01-05 RX ORDER — SODIUM CHLORIDE 0.9 % (FLUSH) 0.9 %
10 SYRINGE (ML) INJECTION PRN
Status: DISCONTINUED | OUTPATIENT
Start: 2023-01-05 | End: 2023-01-05 | Stop reason: HOSPADM

## 2023-01-05 RX ORDER — MIDAZOLAM HYDROCHLORIDE 2 MG/2ML
2 INJECTION, SOLUTION INTRAMUSCULAR; INTRAVENOUS
Status: DISCONTINUED | OUTPATIENT
Start: 2023-01-05 | End: 2023-01-05 | Stop reason: HOSPADM

## 2023-01-05 RX ORDER — IPRATROPIUM BROMIDE AND ALBUTEROL SULFATE 2.5; .5 MG/3ML; MG/3ML
1 SOLUTION RESPIRATORY (INHALATION)
Status: DISCONTINUED | OUTPATIENT
Start: 2023-01-05 | End: 2023-01-05

## 2023-01-05 RX ORDER — SODIUM CHLORIDE, SODIUM LACTATE, POTASSIUM CHLORIDE, CALCIUM CHLORIDE 600; 310; 30; 20 MG/100ML; MG/100ML; MG/100ML; MG/100ML
INJECTION, SOLUTION INTRAVENOUS CONTINUOUS PRN
Status: DISCONTINUED | OUTPATIENT
Start: 2023-01-05 | End: 2023-01-05 | Stop reason: SDUPTHER

## 2023-01-05 RX ORDER — LIDOCAINE HYDROCHLORIDE 10 MG/ML
1 INJECTION, SOLUTION EPIDURAL; INFILTRATION; INTRACAUDAL; PERINEURAL
Status: DISCONTINUED | OUTPATIENT
Start: 2023-01-05 | End: 2023-01-05 | Stop reason: HOSPADM

## 2023-01-05 RX ORDER — FAMOTIDINE 20 MG/1
20 TABLET, FILM COATED ORAL ONCE
Status: DISCONTINUED | OUTPATIENT
Start: 2023-01-05 | End: 2023-01-05 | Stop reason: HOSPADM

## 2023-01-05 RX ORDER — ROCURONIUM BROMIDE 10 MG/ML
INJECTION, SOLUTION INTRAVENOUS PRN
Status: DISCONTINUED | OUTPATIENT
Start: 2023-01-05 | End: 2023-01-05 | Stop reason: SDUPTHER

## 2023-01-05 RX ORDER — ONDANSETRON 2 MG/ML
INJECTION INTRAMUSCULAR; INTRAVENOUS PRN
Status: DISCONTINUED | OUTPATIENT
Start: 2023-01-05 | End: 2023-01-05 | Stop reason: SDUPTHER

## 2023-01-05 RX ORDER — LIDOCAINE HYDROCHLORIDE 10 MG/ML
INJECTION, SOLUTION EPIDURAL; INFILTRATION; INTRACAUDAL; PERINEURAL PRN
Status: DISCONTINUED | OUTPATIENT
Start: 2023-01-05 | End: 2023-01-05 | Stop reason: SDUPTHER

## 2023-01-05 RX ORDER — MORPHINE SULFATE 2 MG/ML
2 INJECTION, SOLUTION INTRAMUSCULAR; INTRAVENOUS EVERY 5 MIN PRN
Status: DISCONTINUED | OUTPATIENT
Start: 2023-01-05 | End: 2023-01-05 | Stop reason: HOSPADM

## 2023-01-05 RX ORDER — PROPOFOL 10 MG/ML
INJECTION, EMULSION INTRAVENOUS PRN
Status: DISCONTINUED | OUTPATIENT
Start: 2023-01-05 | End: 2023-01-05 | Stop reason: SDUPTHER

## 2023-01-05 RX ORDER — LORAZEPAM 2 MG/ML
0.5 INJECTION INTRAMUSCULAR
Status: DISCONTINUED | OUTPATIENT
Start: 2023-01-05 | End: 2023-01-05 | Stop reason: HOSPADM

## 2023-01-05 RX ORDER — SCOLOPAMINE TRANSDERMAL SYSTEM 1 MG/1
1 PATCH, EXTENDED RELEASE TRANSDERMAL
Status: DISCONTINUED | OUTPATIENT
Start: 2023-01-05 | End: 2023-01-05 | Stop reason: HOSPADM

## 2023-01-05 RX ORDER — MIDAZOLAM HYDROCHLORIDE 1 MG/ML
INJECTION INTRAMUSCULAR; INTRAVENOUS PRN
Status: DISCONTINUED | OUTPATIENT
Start: 2023-01-05 | End: 2023-01-05 | Stop reason: SDUPTHER

## 2023-01-05 RX ORDER — MEPERIDINE HYDROCHLORIDE 25 MG/ML
12.5 INJECTION INTRAMUSCULAR; INTRAVENOUS; SUBCUTANEOUS EVERY 5 MIN PRN
Status: DISCONTINUED | OUTPATIENT
Start: 2023-01-05 | End: 2023-01-05 | Stop reason: HOSPADM

## 2023-01-05 RX ORDER — MORPHINE SULFATE 4 MG/ML
4 INJECTION, SOLUTION INTRAMUSCULAR; INTRAVENOUS EVERY 5 MIN PRN
Status: DISCONTINUED | OUTPATIENT
Start: 2023-01-05 | End: 2023-01-05 | Stop reason: HOSPADM

## 2023-01-05 RX ORDER — METOCLOPRAMIDE HYDROCHLORIDE 5 MG/ML
10 INJECTION INTRAMUSCULAR; INTRAVENOUS
Status: DISCONTINUED | OUTPATIENT
Start: 2023-01-05 | End: 2023-01-05 | Stop reason: HOSPADM

## 2023-01-05 RX ORDER — EPHEDRINE SULFATE 50 MG/ML
INJECTION, SOLUTION INTRAVENOUS PRN
Status: DISCONTINUED | OUTPATIENT
Start: 2023-01-05 | End: 2023-01-05 | Stop reason: SDUPTHER

## 2023-01-05 RX ORDER — SODIUM CHLORIDE 0.9 % (FLUSH) 0.9 %
5-40 SYRINGE (ML) INJECTION EVERY 12 HOURS SCHEDULED
Status: DISCONTINUED | OUTPATIENT
Start: 2023-01-05 | End: 2023-01-05 | Stop reason: HOSPADM

## 2023-01-05 RX ORDER — CLINDAMYCIN HYDROCHLORIDE 300 MG/1
300 CAPSULE ORAL 3 TIMES DAILY
Qty: 30 CAPSULE | Refills: 0 | Status: SHIPPED | OUTPATIENT
Start: 2023-01-05 | End: 2023-01-15

## 2023-01-05 RX ORDER — SUCCINYLCHOLINE/SOD CL,ISO/PF 100 MG/5ML
SYRINGE (ML) INTRAVENOUS PRN
Status: DISCONTINUED | OUTPATIENT
Start: 2023-01-05 | End: 2023-01-05 | Stop reason: SDUPTHER

## 2023-01-05 RX ADMIN — SUGAMMADEX 200 MG: 100 INJECTION, SOLUTION INTRAVENOUS at 08:12

## 2023-01-05 RX ADMIN — EPHEDRINE SULFATE 10 MG: 50 INJECTION INTRAMUSCULAR; INTRAVENOUS; SUBCUTANEOUS at 07:12

## 2023-01-05 RX ADMIN — FENTANYL CITRATE 100 MCG: 50 INJECTION, SOLUTION INTRAMUSCULAR; INTRAVENOUS at 07:05

## 2023-01-05 RX ADMIN — MIDAZOLAM 2 MG: 1 INJECTION INTRAMUSCULAR; INTRAVENOUS at 06:57

## 2023-01-05 RX ADMIN — ROCURONIUM BROMIDE 10 MG: 10 INJECTION, SOLUTION INTRAVENOUS at 07:05

## 2023-01-05 RX ADMIN — SODIUM CHLORIDE, SODIUM LACTATE, POTASSIUM CHLORIDE, AND CALCIUM CHLORIDE: 600; 310; 30; 20 INJECTION, SOLUTION INTRAVENOUS at 06:57

## 2023-01-05 RX ADMIN — ONDANSETRON 4 MG: 2 INJECTION INTRAMUSCULAR; INTRAVENOUS at 07:05

## 2023-01-05 RX ADMIN — PROPOFOL 200 MG: 10 INJECTION, EMULSION INTRAVENOUS at 07:05

## 2023-01-05 RX ADMIN — CEFAZOLIN SODIUM 3 G: 1 INJECTION, POWDER, FOR SOLUTION INTRAMUSCULAR; INTRAVENOUS at 07:07

## 2023-01-05 RX ADMIN — Medication 160 MG: at 07:05

## 2023-01-05 RX ADMIN — LIDOCAINE HYDROCHLORIDE 50 MG: 10 INJECTION, SOLUTION EPIDURAL; INFILTRATION; INTRACAUDAL; PERINEURAL at 07:05

## 2023-01-05 ASSESSMENT — LIFESTYLE VARIABLES: SMOKING_STATUS: 0

## 2023-01-05 ASSESSMENT — PAIN - FUNCTIONAL ASSESSMENT: PAIN_FUNCTIONAL_ASSESSMENT: NONE - DENIES PAIN

## 2023-01-05 ASSESSMENT — ENCOUNTER SYMPTOMS: SHORTNESS OF BREATH: 0

## 2023-01-05 NOTE — BRIEF OP NOTE
Brief Postoperative Note      Patient: Jay Mercado  YOB: 1955  MRN: [id number]    Date of Procedure: 1/5/2023    Pre-Op Diagnosis: Lymphoma of lymph nodes of neck, unspecified lymphoma type [facility] [C85.91]    Post-Op Diagnosis: Same       Procedure(s):  EXCISION OF CERVICLE LYMPH NODY-BIOPSY    Surgeon(s):  Lizzette Garcia MD    Assistant:  * No surgical staff found *    Anesthesia: General    Estimated Blood Loss (mL): Minimal    Complications: None    Specimens:   ID Type Source Tests Collected by Time Destination   A : left cervical lymph node for flow cytometry Tissue Lymph Node SURGICAL PATHOLOGY Lizzette Garcia MD 1/5/2023 4780        Implants:  * No implants in log *      Drains: * No LDAs found *    Findings: cervical lymphadenopathy    Electronically signed by Lizzette Garcia MD on 1/5/2023 at 8:15 AM

## 2023-01-05 NOTE — ANESTHESIA POSTPROCEDURE EVALUATION
Department of Anesthesiology  Postprocedure Note    Patient: Jesse Streeter  MRN: [id number]  YOB: 1955  Date of evaluation: 1/5/2023      Procedure Summary     Date: 01/05/23 Room / Location: [address]    Anesthesia Start: 2039 Anesthesia Stop: 1402    Procedure: EXCISION OF CERVICLE LYMPH NODY-BIOPSY (Left) Diagnosis:       Lymphoma of lymph nodes of neck, unspecified lymphoma type ([address]      (Lymphoma of lymph nodes of neck, unspecified lymphoma type ([address] [C85.91])    Surgeons: Kalin Moore MD Responsible Provider: JODY Felder CRNA    Anesthesia Type: general, MAC ASA Status: 4          Anesthesia Type: No value filed.     Paul Phase I: Paul Score: 10    Paul Phase II:        Anesthesia Post Evaluation    Patient location during evaluation: bedside  Patient participation: complete - patient participated  Level of consciousness: awake and alert  Pain score: 0  Airway patency: patent  Nausea & Vomiting: no nausea  Complications: no  Cardiovascular status: hemodynamically stable  Respiratory status: acceptable  Hydration status: euvolemic  Comments: 122/97  108  18  90%

## 2023-01-05 NOTE — DISCHARGE INSTRUCTIONS
Please call Dr Edward Stratton with questions or concerns. Pain meds as needed. Take antibiotics until complete. I will call when we have results. Follow up on one week. Wound Post-Operative Instructions    Soft tissue and skin procedures are common and are usually performed an outpatient setting. Do not drive the first day after surgery or if you are taking narcotic pain medications. Pain  You will be given a prescription for a narcotic for pain. You should take one tablet every 4-6 hours for pain. You should also avoid driving while taking a narcotic because it can make you drowsy. Once pain improves, you may substitute the narcotic with Tylenol. In general, thyroid and parathyroid surgery is well-tolerated with minimal post-operative discomfort. Incision  Any bandage covering the incision can be carefully removed on the day after surgery. The wound is often closed with a wound sealant and this should be left in place. Usually there are no outside stitches. Coat incision with Mupirocin ointment 3 times daily. You might notice bruising around your incision or upper chest and/or slight swelling. In addition, the scar may become pink and hard. This hardening will peak at about 3 weeks and will disappear over the next 2 to 3 months. Some numbness is typical around the incision site and this usually fades away. After completely healing, you will need to cover with sunscreen for the first year. Bathing  The wound should never get wet during the first 2 weeks following surgery. When you shower, cover wound with a dry wash cloth to minimize getting the wound wet. Do not run the shower directly onto the incision. Physical Activity  When you return home, your body will tell you how much and what kind of activity you are able to do. When you start experiencing soreness or pain, it's time to slow down or stop what you are doing.  Remember - it is important to walk as much as possible for the first week after surgery. More vigorous activities such as jogging, aerobic exercises, etc. are not recommended for approximately one week. Do not lift over 15 pounds. Recovery  Patients generally return to work one week after surgery. Let the office know if a work excuse is needed. Diet  Start slow with liquids and bland foods to avoid any nausea. Follow-Up  Your follow up will be scheduled the day of surgery. Please call the office if you experience any of the following:    Persistent fever over 101° F    Bleeding from the neck incision or trouble breathing. Increasing neck swelling    Numbness or tingling around your mouth or fingertips    Pain that is not relieved by your medications    Chills    Purulent drainage (pus) from the incision    Redness surrounding the incision that is worsening or getting bigger     In the event of an emergency, go to the Emergency Room and the ER will contact me. If any significant problems such as bleeding, swelling or tenderness, go to the ER.       If you have any questions or concerns, please call the office at [phone]

## 2023-01-05 NOTE — TELEPHONE ENCOUNTER
Celina Teran with same day surgery called and stated that her notes said that the pt should follow up in one week. The pt has been scheduled for 2 weeks post op.

## 2023-01-05 NOTE — ANESTHESIA PRE PROCEDURE
Department of Anesthesiology  Preprocedure Note       Name:  Fela Dickerson   Age:  78 y.o.  :  1955                                          MRN:  [id number]         Date:  2023      Surgeon: Janell Wagoner):  Rose Mary España MD    Procedure: Procedure(s):  EXCISION OF CERVICLE LYMPH NODY-BIOPSY    Medications prior to admission:   Prior to Admission medications    Medication Sig Start Date End Date Taking?  Authorizing Provider   ondansetron (ZOFRAN-ODT) 4 MG disintegrating tablet Take 1 tablet by mouth every 8 hours as needed for Nausea or Vomiting 22   Leah Stone MD   docusate sodium (COLACE) 100 MG capsule Take 1 capsule by mouth 2 times daily 12/16/22 1/15/23  Leah Stone MD   rivaroxaban (XARELTO) 20 MG TABS tablet TAKE 1 TABLET EVERY DAY WITH BREAKFAST  Patient taking differently: 20 mg daily (with breakfast) TAKE 1 TABLET EVERY DAY WITH BREAKFAST 22   JODY Cuba   amiodarone (CORDARONE) 200 MG tablet TAKE ONE TABLET BY MOUTH EVERY DAY  Patient taking differently: Take 200 mg by mouth daily 22   JODY Cuba   rosuvastatin (CRESTOR) 10 MG tablet TAKE 1 TABLET EVERY NIGHT  Patient taking differently: Take 10 mg by mouth daily 22   JODY Cuba   metoprolol succinate (TOPROL XL) 100 MG extended release tablet TAKE 1 TABLET BY MOUTH IN THE MORNING. 22   JODY Cuba   losartan (COZAAR) 100 MG tablet TAKE 1 TABLET EVERY DAY  Patient taking differently: Take 100 mg by mouth daily 11/15/22   JODY Cuba   flecainide (TAMBOCOR) 50 MG tablet Take 1 tablet by mouth 2 times daily 10/13/22   Keira Arceo MD   dilTIAZem (CARDIZEM CD) 120 MG extended release capsule Take 1 capsule by mouth in the morning and at bedtime 10/13/22   Keira Arceo MD   Cholecalciferol (VITAMIN D3) 50 MCG (2000) Dai Gomez 178  Patient taking differently: Take 2,000 Units by mouth daily 22   JODY Cuba   fenofibrate 160 MG tablet Take 1 tablet by mouth daily 1/14/20 1/5/23  Torri Hernandez, JODY   Multiple Vitamin (MULTI-VITAMIN PO) Take 1 tablet by mouth daily    Historical Provider, MD       Current medications:    No current outpatient medications on file. No current facility-administered medications for this visit. Allergies:     Allergies   Allergen Reactions    Pcn [Penicillins] Rash       Problem List:    Patient Active Problem List   Diagnosis Code    Adenomatous colon polyp D12.6    S/P colonoscopic polypectomy Z98.890    History of colonic polyps Z86.010    Obesity E66.9    Mixed hyperlipidemia E78.2    Essential hypertension I10    History of prostate cancer Z85.46    Morbid obesity ([facility] [address] E66.01    Chronic deep vein thrombosis (DVT) of left peroneal vein (HCC) I82.552    Vitamin D deficiency E55.9    Closed nondisplaced longitudinal fracture of left patella S82.025A    Cancer of prostate ([facility] [address] C61    Gastroesophageal reflux disease without esophagitis K21.9    Irregular heart beat I49.9    Atrial fibrillation with RVR (HCC) I48.91    Atrial fibrillation (HCC) I48.91    Atrial fibrillation with rapid ventricular response (HCC) I48.91    Nasal septum ulceration J34.0    Epistaxis R04.0    Old head injury Z87.828    Generalized enlarged lymph nodes R59.1    Lymphoma (HCC) C85.90       Past Medical History:        Diagnosis Date    Atrial fibrillation (HCC)     Cellulitis of left leg     Colon polyp     Elevated blood pressure reading without diagnosis of hypertension     Esophageal reflux     History of prostate cancer     Hx of blood clots     Hypertension     Mixed hyperlipidemia     TG>>LDL    Obesity     Pericardial effusion     Prostate cancer [facility])     2010 Dr Flroa Thorne;  implants, XRT       Past Surgical History:        Procedure Laterality Date    BONE MARROW BIOPSY N/A 12/9/2022    BONE MARROW ASPIRATION BIOPSY performed by Siena Ambriz PA-C at [facility]    COLONOSCOPY 6/2/2009        COLONOSCOPY  2012        PORT SURGERY N/A 12/16/2022    single lumen port placement with ultrasound and fluoroscopy performed by Nasir Emanuel MD at [address] [facility]  3/22/2000        VASECTOMY         Social History:    Social History     Tobacco Use    Smoking status: Never    Smokeless tobacco: Never   Substance Use Topics    Alcohol use: No                                Counseling given: Not Answered      Vital Signs (Current): There were no vitals filed for this visit.                                            BP Readings from Last 3 Encounters:   01/05/23 (!) 150/92   12/23/22 (!) 147/88   12/22/22 130/84       NPO Status:                                                                                 BMI:   Wt Readings from Last 3 Encounters:   01/05/23 (!) 327 lb (148.3 kg)   12/23/22 (!) 336 lb (152.4 kg)   12/22/22 (!) 336 lb 1.6 oz (152.5 kg)     There is no height or weight on file to calculate BMI.    CBC:   Lab Results   Component Value Date/Time    WBC 6.86 01/04/2023 10:06 AM    RBC 4.99 01/04/2023 10:06 AM    HGB 14.3 01/04/2023 10:06 AM    HCT 43.4 01/04/2023 10:06 AM    MCV 87.0 01/04/2023 10:06 AM    RDW 12.8 01/04/2023 10:06 AM     01/04/2023 10:06 AM       CMP:   Lab Results   Component Value Date/Time     12/22/2022 12:24 PM    K 4.4 12/22/2022 12:24 PM    K 4.2 10/09/2022 07:54 AM     12/22/2022 12:24 PM    CO2 25 12/22/2022 12:24 PM    BUN 23 12/22/2022 12:24 PM    CREATININE 1.5 12/22/2022 12:24 PM    GFRAA >59 10/09/2022 07:54 AM    LABGLOM 51 12/22/2022 12:24 PM    GLUCOSE 112 12/22/2022 12:24 PM    PROT 7.1 12/22/2022 12:24 PM    CALCIUM 9.5 12/22/2022 12:24 PM    BILITOT 0.4 12/22/2022 12:24 PM    ALKPHOS 63 12/22/2022 12:24 PM    AST 24 12/22/2022 12:24 PM    ALT 24 12/22/2022 12:24 PM       POC Tests: No results for input(s): POCGLU, POCNA, POCK, POCCL, POCBUN, POCHEMO, POCHCT in the last 72 hours. Coags:   Lab Results   Component Value Date/Time    PROTIME 22.6 10/18/2022 01:07 PM    INR 1.94 10/18/2022 01:07 PM    APTT 36.6 10/18/2022 01:07 PM       HCG (If Applicable): No results found for: PREGTESTUR, PREGSERUM, HCG, HCGQUANT     ABGs: No results found for: PHART, PO2ART, TGY1YAJ, CAS4XTV, BEART, W8VXTEQY     Type & Screen (If Applicable):  No results found for: LABABO, LABRH    Drug/Infectious Status (If Applicable):  No results found for: HIV, HEPCAB    COVID-19 Screening (If Applicable):   Lab Results   Component Value Date/Time    COVID19 Not Detected 07/26/2022 04:17 PM           Anesthesia Evaluation  Patient summary reviewed and Nursing notes reviewed no history of anesthetic complications:   Airway: Mallampati: IV  TM distance: >3 FB   Neck ROM: full  Mouth opening: < 3 FB   Dental:    (+) upper dentures      Pulmonary:Negative Pulmonary ROS and normal exam  breath sounds clear to auscultation  (+) sleep apnea:      (-) shortness of breath and not a current smoker          Patient did not smoke on day of surgery. Cardiovascular:    (+) hypertension:, dysrhythmias: atrial fibrillation, hyperlipidemia    (-) CAD,  angina and  CHF    NYHA Classification: I  ECG reviewed  Rhythm: regular  Rate: normal           Beta Blocker:  Not on Beta Blocker         Neuro/Psych:   Negative Neuro/Psych ROS     (-) seizures, CVA and depression/anxiety            GI/Hepatic/Renal: Neg GI/Hepatic/Renal ROS  (+) GERD:, morbid obesity     (-) hiatal hernia       Endo/Other: Negative Endo/Other ROS   (+) blood dyscrasia: anticoagulation therapy:., malignancy/cancer. Pt had PAT visit. Abdominal:   (+) obese,     Abdomen: soft. Vascular:   + DVT, . Other Findings:             Anesthesia Plan      general and MAC     ASA 4     (Iv zofran within 30 min of closing   Likely difficult airway?  Had port uneventfully will discuss with surgeon need for geta   Surgeon needs geta, will do LNA, no paralysis )  Induction: intravenous. BIS  MIPS: Postoperative opioids intended and Prophylactic antiemetics administered. Anesthetic plan and risks discussed with patient. Use of blood products discussed with patient whom. Plan discussed with CRNA.     Attending anesthesiologist reviewed and agrees with Pre Eval content                Rolanda Hall MD   1/5/2023

## 2023-01-05 NOTE — INTERVAL H&P NOTE
Update History & Physical    The patient's History and Physical of December 22, 2022 was reviewed with the patient and I examined the patient. There was no change. The surgical site was confirmed by the patient and me. Plan: The risks, benefits, expected outcome, and alternative to the recommended procedure have been discussed with the patient. Patient understands and wants to proceed with the procedure.      Electronically signed by Kalin Moore MD on 1/5/2023 at 6:33 AM

## 2023-01-06 ENCOUNTER — TELEPHONE (OUTPATIENT)
Dept: HEMATOLOGY | Age: 68
End: 2023-01-06

## 2023-01-06 NOTE — TELEPHONE ENCOUNTER
----- Message from Bridgett Lawson RN sent at 1/6/2023  2:09 PM CST -----  It looks like patient was NO SHOW for echo on 12/29/22.   Can this be r/s?  Will need prior to chemotherapy.      [place]

## 2023-01-09 NOTE — OP NOTE
Operative Note      Patient: Jay Mercado  YOB: 1955  MRN: [id number]    Date of Procedure: 1/5/2023    Pre-Op Diagnosis: Lymphoma of lymph nodes of neck, unspecified lymphoma type ([facility] [address] [C85.91]    Post-Op Diagnosis: Same       Procedure(s):  EXCISION OF CERVICLE LYMPH NODY-BIOPSY    Surgeon(s):  Lizzette Garcia MD    Assistant:   * No surgical staff found *    Anesthesia: General    Estimated Blood Loss (mL): Minimal    Complications: None    Specimens:   ID Type Source Tests Collected by Time Destination   A : left cervical lymph node for flow cytometry Tissue Lymph Node SURGICAL PATHOLOGY Lizzette Garcia MD 1/5/2023 [id number]        Implants:  * No implants in log *      Drains: * No LDAs found *    Findings: cervical adenopathy    Detailed Description of Procedure:   After obtaining informed consent, the patient was taken to the operating room placed the upper table in supine position. After induction of general endotracheal anesthesia the patient was prepped in standard fashion for lymph node dissection. Once a time was performed the area was prepped and draped in the standard fashion for a cervical lymph node dissection. There have incision was injected with lidocaine with epinephrine. Incision was then made through the skin down to the submucosa. Incision continued through the platysma down to the lymph node. The lymph node was found to be enlarged. Using a combination of blunt and sharp dissection the lymph node was removed and passed off the specimen. Surgicel was placed in the wound bed after thorough irrigation. The wound was then closed in multiple layers. Patient was returned to anesthesia having suffered no complications.     Electronically signed by Lizzette Garcia MD on 1/9/2023 at 4:53 PM

## 2023-01-10 RX ORDER — AMIODARONE HYDROCHLORIDE 200 MG/1
TABLET ORAL
Qty: 30 TABLET | Refills: 1 | Status: SHIPPED | OUTPATIENT
Start: 2023-01-10
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
## 2023-01-10 NOTE — TELEPHONE ENCOUNTER
Nitin Allan called requesting a refill of the below medication which has been pended for you:     Requested Prescriptions     Pending Prescriptions Disp Refills    amiodarone (CORDARONE) 200 MG tablet 30 tablet 1     Sig: TAKE ONE TABLET BY MOUTH EVERY DAY       Last Appointment Date: 10/26/2022  Next Appointment Date: 1/31/2023    Allergies   Allergen Reactions    Pcn [Penicillins] Rash

## 2023-01-11 ENCOUNTER — HOSPITAL ENCOUNTER (OUTPATIENT)
Dept: INFUSION THERAPY | Age: 68
Discharge: HOME | End: 2023-01-11
Payer: MEDICARE

## 2023-01-11 ENCOUNTER — OFFICE VISIT (OUTPATIENT)
Dept: HEMATOLOGY | Age: 68
End: 2023-01-11
Payer: MEDICARE

## 2023-01-11 VITALS
HEIGHT: 73 IN | TEMPERATURE: 98.5 F | HEART RATE: 83 BPM | WEIGHT: 315 LBS | OXYGEN SATURATION: 97 % | BODY MASS INDEX: 41.75 KG/M2 | DIASTOLIC BLOOD PRESSURE: 88 MMHG | SYSTOLIC BLOOD PRESSURE: 126 MMHG

## 2023-01-11 DIAGNOSIS — C82.48 FOLLICULAR LYMPHOMA GRADE IIIB OF LYMPH NODES OF MULTIPLE SITES (HCC): ICD-10-CM

## 2023-01-11 DIAGNOSIS — E78.5 HYPERLIPIDEMIA, UNSPECIFIED HYPERLIPIDEMIA TYPE: ICD-10-CM

## 2023-01-11 DIAGNOSIS — Z01.818 PRE-PROCEDURAL EXAMINATION: ICD-10-CM

## 2023-01-11 DIAGNOSIS — Z51.11 ENCOUNTER FOR CHEMOTHERAPY MANAGEMENT: ICD-10-CM

## 2023-01-11 DIAGNOSIS — Z01.89 ENCOUNTER FOR OTHER SPECIFIED SPECIAL EXAMINATIONS: ICD-10-CM

## 2023-01-11 DIAGNOSIS — Z51.11 ENCOUNTER FOR CHEMOTHERAPY MANAGEMENT: Primary | ICD-10-CM

## 2023-01-11 DIAGNOSIS — C83.31 RETICULOSARCOMA OF LYMPH NODES OF HEAD, FACE, AND NECK (HCC): ICD-10-CM

## 2023-01-11 DIAGNOSIS — I82.5Y9 CHRONIC DEEP VEIN THROMBOSIS (DVT) OF PROXIMAL VEIN OF LOWER EXTREMITY, UNSPECIFIED LATERALITY (HCC): ICD-10-CM

## 2023-01-11 DIAGNOSIS — Z85.46 HX OF MALIGNANT NEOPLASM OF PROSTATE: Primary | ICD-10-CM

## 2023-01-11 DIAGNOSIS — E66.01 MORBID OBESITY (HCC): ICD-10-CM

## 2023-01-11 DIAGNOSIS — Z87.820 HX OF TRAUMATIC BRAIN INJURY: ICD-10-CM

## 2023-01-11 DIAGNOSIS — C82.48 FOLLICULAR LYMPHOMA GRADE IIIB OF LYMPH NODES OF MULTIPLE SITES (HCC): Primary | ICD-10-CM

## 2023-01-11 PROBLEM — C85.90 LYMPHOMA (HCC): Status: RESOLVED | Noted: 2022-12-16 | Resolved: 2023-01-11

## 2023-01-11 LAB
ALBUMIN SERPL-MCNC: 4.5 G/DL (ref 3.5–5.2)
ALP BLD-CCNC: 66 U/L (ref 40–130)
ALT SERPL-CCNC: 23 U/L (ref 21–72)
ANION GAP SERPL CALCULATED.3IONS-SCNC: 13 MMOL/L (ref 7–19)
AST SERPL-CCNC: 27 U/L (ref 17–59)
BASOPHILS ABSOLUTE: 0.05 K/UL (ref 0.01–0.08)
BASOPHILS RELATIVE PERCENT: 0.8 % (ref 0.1–1.2)
BILIRUB SERPL-MCNC: 0.6 MG/DL (ref 0.2–1.3)
BUN BLDV-MCNC: 24 MG/DL (ref 9–20)
CALCIUM SERPL-MCNC: 10 MG/DL (ref 8.4–10.2)
CHLORIDE BLD-SCNC: 103 MMOL/L (ref 98–111)
CO2: 26 MMOL/L (ref 22–29)
CREAT SERPL-MCNC: 1.5 MG/DL (ref 0.6–1.2)
EOSINOPHILS ABSOLUTE: 0.25 K/UL (ref 0.04–0.54)
EOSINOPHILS RELATIVE PERCENT: 4.1 % (ref 0.7–7)
GFR SERPL CREATININE-BSD FRML MDRD: 51 ML/MIN/{1.73_M2}
GLOBULIN: 3 G/DL
GLUCOSE BLD-MCNC: 100 MG/DL (ref 74–106)
HAV IGM SER IA-ACNC: NORMAL
HCT VFR BLD CALC: 40.9 % (ref 40.1–51)
HEMOGLOBIN: 13.7 G/DL (ref 13.7–17.5)
HEPATITIS B CORE IGM ANTIBODY: NORMAL
HEPATITIS B SURFACE ANTIGEN INTERPRETATION: NORMAL
HEPATITIS C ANTIBODY INTERPRETATION: NORMAL
LACTATE DEHYDROGENASE: 153 U/L (ref 120–246)
LYMPHOCYTES ABSOLUTE: 1.87 K/UL (ref 1.18–3.74)
LYMPHOCYTES RELATIVE PERCENT: 30.4 % (ref 19.3–53.1)
MCH RBC QN AUTO: 28.8 PG (ref 25.7–32.2)
MCHC RBC AUTO-ENTMCNC: 33.5 G/DL (ref 32.3–36.5)
MCV RBC AUTO: 85.9 FL (ref 79–92.2)
MONOCYTES ABSOLUTE: 0.55 K/UL (ref 0.24–0.82)
MONOCYTES RELATIVE PERCENT: 8.9 % (ref 4.7–12.5)
NEUTROPHILS ABSOLUTE: 3.41 K/UL (ref 1.56–6.13)
NEUTROPHILS RELATIVE PERCENT: 55.5 % (ref 34–71.1)
PDW BLD-RTO: 12.7 % (ref 11.6–14.4)
PLATELET # BLD: 269 K/UL (ref 163–337)
PMV BLD AUTO: 9.8 FL (ref 7.4–10.4)
POTASSIUM SERPL-SCNC: 4.4 MMOL/L (ref 3.5–5.1)
RBC # BLD: 4.76 M/UL (ref 4.63–6.08)
SODIUM BLD-SCNC: 142 MMOL/L (ref 137–145)
TOTAL PROTEIN: 7.5 G/DL (ref 6.3–8.2)
URIC ACID, SERUM: 6.5 MG/DL (ref 3.5–8.5)
WBC # BLD: 6.15 K/UL (ref 4.23–9.07)

## 2023-01-11 PROCEDURE — 3017F COLORECTAL CA SCREEN DOC REV: CPT | Performed by: INTERNAL MEDICINE

## 2023-01-11 PROCEDURE — 80053 COMPREHEN METABOLIC PANEL: CPT

## 2023-01-11 PROCEDURE — G8484 FLU IMMUNIZE NO ADMIN: HCPCS | Performed by: INTERNAL MEDICINE

## 2023-01-11 PROCEDURE — 99212 OFFICE O/P EST SF 10 MIN: CPT

## 2023-01-11 PROCEDURE — 3074F SYST BP LT 130 MM HG: CPT | Performed by: INTERNAL MEDICINE

## 2023-01-11 PROCEDURE — 85025 COMPLETE CBC W/AUTO DIFF WBC: CPT

## 2023-01-11 PROCEDURE — G8417 CALC BMI ABV UP PARAM F/U: HCPCS | Performed by: INTERNAL MEDICINE

## 2023-01-11 PROCEDURE — 1036F TOBACCO NON-USER: CPT | Performed by: INTERNAL MEDICINE

## 2023-01-11 PROCEDURE — 99215 OFFICE O/P EST HI 40 MIN: CPT | Performed by: INTERNAL MEDICINE

## 2023-01-11 PROCEDURE — 3079F DIAST BP 80-89 MM HG: CPT | Performed by: INTERNAL MEDICINE

## 2023-01-11 PROCEDURE — 84550 ASSAY OF BLOOD/URIC ACID: CPT

## 2023-01-11 PROCEDURE — 36415 COLL VENOUS BLD VENIPUNCTURE: CPT

## 2023-01-11 PROCEDURE — 1123F ACP DISCUSS/DSCN MKR DOCD: CPT | Performed by: INTERNAL MEDICINE

## 2023-01-11 PROCEDURE — 83615 LACTATE (LD) (LDH) ENZYME: CPT

## 2023-01-11 PROCEDURE — G8427 DOCREV CUR MEDS BY ELIG CLIN: HCPCS | Performed by: INTERNAL MEDICINE

## 2023-01-11 RX ORDER — ALBUTEROL SULFATE 90 UG/1
4 AEROSOL, METERED RESPIRATORY (INHALATION) PRN
Status: CANCELLED | OUTPATIENT
Start: 2023-01-23

## 2023-01-11 RX ORDER — PREDNISONE 50 MG/1
100 TABLET ORAL SEE ADMIN INSTRUCTIONS
Qty: 60 TABLET | Refills: 0 | Status: SHIPPED | OUTPATIENT
Start: 2023-01-11

## 2023-01-11 RX ORDER — ACETAMINOPHEN 325 MG/1
650 TABLET ORAL
Status: CANCELLED | OUTPATIENT
Start: 2023-01-23

## 2023-01-11 RX ORDER — SODIUM CHLORIDE 9 MG/ML
INJECTION, SOLUTION INTRAVENOUS CONTINUOUS
Status: CANCELLED | OUTPATIENT
Start: 2023-01-23

## 2023-01-11 RX ORDER — PALONOSETRON 0.05 MG/ML
0.25 INJECTION, SOLUTION INTRAVENOUS ONCE
Status: CANCELLED | OUTPATIENT
Start: 2023-01-23 | End: 2023-01-11

## 2023-01-11 RX ORDER — FAMOTIDINE 10 MG/ML
20 INJECTION, SOLUTION INTRAVENOUS
Status: CANCELLED | OUTPATIENT
Start: 2023-01-24

## 2023-01-11 RX ORDER — EPINEPHRINE 1 MG/ML
0.3 INJECTION, SOLUTION, CONCENTRATE INTRAVENOUS PRN
Status: CANCELLED | OUTPATIENT
Start: 2023-01-24

## 2023-01-11 RX ORDER — HEPARIN SODIUM (PORCINE) LOCK FLUSH IV SOLN 100 UNIT/ML 100 UNIT/ML
500 SOLUTION INTRAVENOUS PRN
Status: CANCELLED | OUTPATIENT
Start: 2023-01-24

## 2023-01-11 RX ORDER — SODIUM CHLORIDE 9 MG/ML
INJECTION, SOLUTION INTRAVENOUS CONTINUOUS
Status: CANCELLED | OUTPATIENT
Start: 2023-01-24

## 2023-01-11 RX ORDER — ONDANSETRON 2 MG/ML
8 INJECTION INTRAMUSCULAR; INTRAVENOUS
Status: CANCELLED | OUTPATIENT
Start: 2023-01-24

## 2023-01-11 RX ORDER — EPINEPHRINE 1 MG/ML
0.3 INJECTION, SOLUTION, CONCENTRATE INTRAVENOUS PRN
Status: CANCELLED | OUTPATIENT
Start: 2023-01-23

## 2023-01-11 RX ORDER — FAMOTIDINE 10 MG/ML
20 INJECTION, SOLUTION INTRAVENOUS
Status: CANCELLED | OUTPATIENT
Start: 2023-01-23

## 2023-01-11 RX ORDER — SODIUM CHLORIDE 9 MG/ML
5-40 INJECTION INTRAVENOUS PRN
Status: CANCELLED | OUTPATIENT
Start: 2023-01-23

## 2023-01-11 RX ORDER — ALBUTEROL SULFATE 90 UG/1
4 AEROSOL, METERED RESPIRATORY (INHALATION) PRN
Status: CANCELLED | OUTPATIENT
Start: 2023-01-24

## 2023-01-11 RX ORDER — MEPERIDINE HYDROCHLORIDE 50 MG/ML
12.5 INJECTION INTRAMUSCULAR; INTRAVENOUS; SUBCUTANEOUS PRN
Status: CANCELLED | OUTPATIENT
Start: 2023-01-24

## 2023-01-11 RX ORDER — SODIUM CHLORIDE 9 MG/ML
5-250 INJECTION, SOLUTION INTRAVENOUS PRN
Status: CANCELLED | OUTPATIENT
Start: 2023-01-23

## 2023-01-11 RX ORDER — HEPARIN SODIUM (PORCINE) LOCK FLUSH IV SOLN 100 UNIT/ML 100 UNIT/ML
500 SOLUTION INTRAVENOUS PRN
Status: CANCELLED | OUTPATIENT
Start: 2023-01-23

## 2023-01-11 RX ORDER — ONDANSETRON 2 MG/ML
8 INJECTION INTRAMUSCULAR; INTRAVENOUS
Status: CANCELLED | OUTPATIENT
Start: 2023-01-23

## 2023-01-11 RX ORDER — SODIUM CHLORIDE 9 MG/ML
5-40 INJECTION INTRAVENOUS PRN
Status: CANCELLED | OUTPATIENT
Start: 2023-01-24

## 2023-01-11 RX ORDER — DIPHENHYDRAMINE HYDROCHLORIDE 50 MG/ML
50 INJECTION INTRAMUSCULAR; INTRAVENOUS
Status: CANCELLED | OUTPATIENT
Start: 2023-01-23

## 2023-01-11 RX ORDER — DIPHENHYDRAMINE HYDROCHLORIDE 50 MG/ML
50 INJECTION INTRAMUSCULAR; INTRAVENOUS ONCE
Status: CANCELLED | OUTPATIENT
Start: 2023-01-23 | End: 2023-01-11

## 2023-01-11 RX ORDER — ALLOPURINOL 300 MG/1
300 TABLET ORAL DAILY
Qty: 90 TABLET | Refills: 1 | Status: SHIPPED | OUTPATIENT
Start: 2023-01-11

## 2023-01-11 RX ORDER — SODIUM CHLORIDE 9 MG/ML
5-250 INJECTION, SOLUTION INTRAVENOUS PRN
Status: CANCELLED | OUTPATIENT
Start: 2023-01-24

## 2023-01-11 RX ORDER — DIPHENHYDRAMINE HYDROCHLORIDE 50 MG/ML
50 INJECTION INTRAMUSCULAR; INTRAVENOUS
Status: CANCELLED | OUTPATIENT
Start: 2023-01-24

## 2023-01-11 RX ORDER — ACETAMINOPHEN 325 MG/1
650 TABLET ORAL
Status: CANCELLED | OUTPATIENT
Start: 2023-01-24

## 2023-01-11 RX ORDER — ACETAMINOPHEN 325 MG/1
650 TABLET ORAL ONCE
Status: CANCELLED | OUTPATIENT
Start: 2023-01-23 | End: 2023-01-11

## 2023-01-11 RX ORDER — MEPERIDINE HYDROCHLORIDE 50 MG/ML
12.5 INJECTION INTRAMUSCULAR; INTRAVENOUS; SUBCUTANEOUS PRN
Status: CANCELLED | OUTPATIENT
Start: 2023-01-23

## 2023-01-11 NOTE — TELEPHONE ENCOUNTER
Instructions given to patient, his brother Monica Hoyt) and mother regarding Rxs sent to [facility] to be takne with planned treatment for Follicular lymphoma to include Allopurinol 300mg daily (TLS prevention) and  Prednisone 100mg D1-5 of each 28 day cycle ((Premedication for Bendeka/Rituxan treatment). Baseline labs collected today to include CMP, LDH, Hep panel, Uric Acid.     Pending insurance authorization, treatment (Bendeka/Rituxan) to begin in approx 1 week (following verification of ECHO results , planned for 1/3/2023)

## 2023-01-13 ENCOUNTER — HOSPITAL ENCOUNTER (OUTPATIENT)
Dept: NON INVASIVE DIAGNOSTICS | Age: 68
Discharge: HOME | End: 2023-01-13
Payer: MEDICARE

## 2023-01-13 DIAGNOSIS — Z01.818 PREOP EXAMINATION: ICD-10-CM

## 2023-01-13 DIAGNOSIS — C85.10 LARGE B-CELL LYMPHOMA (HCC): ICD-10-CM

## 2023-01-13 LAB
LV EF: 59 %
LVEF MODALITY: NORMAL

## 2023-01-13 PROCEDURE — 6360000004 HC RX CONTRAST MEDICATION: Performed by: INTERNAL MEDICINE

## 2023-01-13 PROCEDURE — C8923 2D TTE W OR W/O FOL W/CON,CO: HCPCS

## 2023-01-13 RX ADMIN — PERFLUTREN 1.5 ML: 6.52 INJECTION, SUSPENSION INTRAVENOUS at 14:56

## 2023-01-16 ENCOUNTER — TELEPHONE (OUTPATIENT)
Dept: INFUSION THERAPY | Age: 68
End: 2023-01-16

## 2023-01-16 RX ORDER — AMIODARONE HYDROCHLORIDE 200 MG/1
TABLET ORAL
Qty: 30 TABLET | Refills: 1 | Status: SHIPPED | OUTPATIENT
Start: 2023-01-16

## 2023-01-16 NOTE — TELEPHONE ENCOUNTER
Jessica Abarca called requesting a refill of the below medication which has been pended for you:     Requested Prescriptions     Pending Prescriptions Disp Refills    amiodarone (CORDARONE) 200 MG tablet 30 tablet 1     Sig: TAKE ONE TABLET BY MOUTH EVERY DAY       Last Appointment Date: 10/26/2022  Next Appointment Date: 1/31/2023    Allergies   Allergen Reactions    Pcn [Penicillins] Rash

## 2023-01-16 NOTE — TELEPHONE ENCOUNTER
Called and introduced myself to Julián Lange who is the POA for Mr. Chaz Cage. We went over his health benefits. He has [employer] and [employer] supplement. He should have all of his treatments covered. I am mailing out my contact information for any future questions.       [address] [facility]  [phone]

## 2023-01-17 ENCOUNTER — OFFICE VISIT (OUTPATIENT)
Dept: ENT CLINIC | Age: 68
End: 2023-01-17
Payer: MEDICARE

## 2023-01-17 VITALS
WEIGHT: 315 LBS | BODY MASS INDEX: 41.75 KG/M2 | DIASTOLIC BLOOD PRESSURE: 74 MMHG | HEIGHT: 73 IN | SYSTOLIC BLOOD PRESSURE: 122 MMHG

## 2023-01-17 DIAGNOSIS — C85.11 B-CELL LYMPHOMA OF LYMPH NODES OF NECK, UNSPECIFIED B-CELL LYMPHOMA TYPE (HCC): Primary | ICD-10-CM

## 2023-01-17 PROCEDURE — G8484 FLU IMMUNIZE NO ADMIN: HCPCS | Performed by: OTOLARYNGOLOGY

## 2023-01-17 PROCEDURE — 1036F TOBACCO NON-USER: CPT | Performed by: OTOLARYNGOLOGY

## 2023-01-17 PROCEDURE — 1123F ACP DISCUSS/DSCN MKR DOCD: CPT | Performed by: OTOLARYNGOLOGY

## 2023-01-17 PROCEDURE — 3017F COLORECTAL CA SCREEN DOC REV: CPT | Performed by: OTOLARYNGOLOGY

## 2023-01-17 PROCEDURE — G8427 DOCREV CUR MEDS BY ELIG CLIN: HCPCS | Performed by: OTOLARYNGOLOGY

## 2023-01-17 PROCEDURE — 3078F DIAST BP <80 MM HG: CPT | Performed by: OTOLARYNGOLOGY

## 2023-01-17 PROCEDURE — G8417 CALC BMI ABV UP PARAM F/U: HCPCS | Performed by: OTOLARYNGOLOGY

## 2023-01-17 PROCEDURE — 99213 OFFICE O/P EST LOW 20 MIN: CPT | Performed by: OTOLARYNGOLOGY

## 2023-01-17 PROCEDURE — 3074F SYST BP LT 130 MM HG: CPT | Performed by: OTOLARYNGOLOGY

## 2023-01-17 RX ORDER — DOCUSATE SODIUM 100 MG/1
100 CAPSULE, LIQUID FILLED ORAL 2 TIMES DAILY
Qty: 60 CAPSULE | Refills: 0 | Status: SHIPPED | OUTPATIENT
Start: 2023-01-17 | End: 2023-01-19 | Stop reason: SDUPTHER

## 2023-01-17 ASSESSMENT — ENCOUNTER SYMPTOMS
RESPIRATORY NEGATIVE: 1
GASTROINTESTINAL NEGATIVE: 1
EYES NEGATIVE: 1
ALLERGIC/IMMUNOLOGIC NEGATIVE: 1

## 2023-01-17 NOTE — PROGRESS NOTES
2023    Figueroa Aguero (:  1955) is a 67 y.o. male, Established patient, here for evaluation of the following chief complaint(s):  Post-Op Check (Lymphoma of lymph nodes of neck)      Vitals:    23 1310   BP: 122/74   Weight: (!) 333 lb (151 kg)   Height: 6' 1\" (1.854 m)       Wt Readings from Last 3 Encounters:   23 (!) 333 lb (151 kg)   23 (!) 333 lb 11.2 oz (151.4 kg)   23 (!) 327 lb (148.3 kg)       BP Readings from Last 3 Encounters:   23 122/74   23 126/88   23 136/69         SUBJECTIVE/OBJECTIVE:    Is seen today for his neck.  About 2 weeks ago I took him to the OR for excisional biopsy of a lymph node.  This shows to be B-cell lymphoma.  Here today and doing well.  We have not taken his sutures out yet.  Complains of no pain.      Review of Systems   Constitutional: Negative.    HENT: Negative.     Eyes: Negative.    Respiratory: Negative.     Cardiovascular: Negative.    Gastrointestinal: Negative.    Endocrine: Negative.    Musculoskeletal: Negative.    Skin: Negative.    Allergic/Immunologic: Negative.    Neurological: Negative.    Hematological: Negative.    Psychiatric/Behavioral: Negative.        Physical Exam  Vitals reviewed.   Constitutional:       Appearance: Normal appearance. He is normal weight.   HENT:      Head: Normocephalic and atraumatic.      Right Ear: Tympanic membrane, ear canal and external ear normal.      Left Ear: Tympanic membrane, ear canal and external ear normal.      Nose: Nose normal.      Mouth/Throat:      Mouth: Mucous membranes are moist.      Pharynx: Oropharynx is clear.   Eyes:      Extraocular Movements: Extraocular movements intact.      Pupils: Pupils are equal, round, and reactive to light.   Neck:      Comments: Incision clean dry and intact sutures removed  Cardiovascular:      Rate and Rhythm: Normal rate and regular rhythm.   Pulmonary:      Effort: Pulmonary effort is normal.      Breath sounds: Normal  breath sounds. Musculoskeletal:      Cervical back: Normal range of motion. Skin:     General: Skin is warm and dry. Neurological:      General: No focal deficit present. Mental Status: He is alert and oriented to person, place, and time. Psychiatric:         Mood and Affect: Mood normal.         Behavior: Behavior normal.            ASSESSMENT/PLAN:    1. B-cell lymphoma of lymph nodes of neck, unspecified B-cell lymphoma type (HCC)  Sutures removed and healing well. Continue on with treatment. Follow-up with any other issues. Return if symptoms worsen or fail to improve. An electronic signature was used to authenticate this note. Rebecca Gabriel MD       Please note that this chart was generated using dragon dictation software. Although every effort was made to ensure the accuracy of this automated transcription, some errors in transcription may have occurred.

## 2023-01-17 NOTE — TELEPHONE ENCOUNTER
Awilda Mcgraw called requesting a refill of the below medication which has been pended for you:     Requested Prescriptions     Pending Prescriptions Disp Refills    docusate sodium (COLACE) 100 MG capsule 60 capsule 0     Sig: Take 1 capsule by mouth 2 times daily       Last Appointment Date: 10/26/2022  Next Appointment Date: 1/31/2023    Allergies   Allergen Reactions    Pcn [Penicillins] Rash
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
## 2023-01-19 RX ORDER — DOCUSATE SODIUM 100 MG/1
100 CAPSULE, LIQUID FILLED ORAL 2 TIMES DAILY
Qty: 60 CAPSULE | Refills: 0 | Status: SHIPPED | OUTPATIENT
Start: 2023-01-19 | End: 2023-02-18

## 2023-01-19 NOTE — TELEPHONE ENCOUNTER
Laurita Rolon called requesting a refill of the below medication which has been pended for you:     Requested Prescriptions     Pending Prescriptions Disp Refills    docusate sodium (COLACE) 100 MG capsule 60 capsule 0     Sig: Take 1 capsule by mouth 2 times daily       Last Appointment Date: 10/26/2022  Next Appointment Date: 1/31/2023    Allergies   Allergen Reactions    Pcn [Penicillins] Rash

## 2023-01-23 ENCOUNTER — OFFICE VISIT (OUTPATIENT)
Dept: HEMATOLOGY | Age: 68
End: 2023-01-23

## 2023-01-23 ENCOUNTER — HOSPITAL ENCOUNTER (OUTPATIENT)
Dept: INFUSION THERAPY | Age: 68
Discharge: HOME | End: 2023-01-23
Payer: MEDICARE

## 2023-01-23 ENCOUNTER — SOCIAL WORK (OUTPATIENT)
Dept: HEMATOLOGY | Age: 68
End: 2023-01-23

## 2023-01-23 VITALS
HEART RATE: 66 BPM | BODY MASS INDEX: 41.75 KG/M2 | TEMPERATURE: 98 F | WEIGHT: 315 LBS | RESPIRATION RATE: 18 BRPM | HEIGHT: 73 IN | OXYGEN SATURATION: 96 % | DIASTOLIC BLOOD PRESSURE: 73 MMHG | SYSTOLIC BLOOD PRESSURE: 121 MMHG

## 2023-01-23 VITALS — DIASTOLIC BLOOD PRESSURE: 61 MMHG | RESPIRATION RATE: 18 BRPM | HEART RATE: 70 BPM | SYSTOLIC BLOOD PRESSURE: 112 MMHG

## 2023-01-23 DIAGNOSIS — C82.48 FOLLICULAR LYMPHOMA GRADE IIIB OF LYMPH NODES OF MULTIPLE SITES (HCC): Primary | ICD-10-CM

## 2023-01-23 DIAGNOSIS — Z51.11 ENCOUNTER FOR CHEMOTHERAPY MANAGEMENT: ICD-10-CM

## 2023-01-23 LAB
ALBUMIN SERPL-MCNC: 4.2 G/DL (ref 3.5–5.2)
ALP BLD-CCNC: 61 U/L (ref 40–130)
ALT SERPL-CCNC: 20 U/L (ref 21–72)
ANION GAP SERPL CALCULATED.3IONS-SCNC: 4 MMOL/L (ref 7–19)
AST SERPL-CCNC: 19 U/L (ref 17–59)
BILIRUB SERPL-MCNC: 0.6 MG/DL (ref 0.2–1.3)
BUN BLDV-MCNC: 22 MG/DL (ref 9–20)
CALCIUM SERPL-MCNC: 9.4 MG/DL (ref 8.4–10.2)
CHLORIDE BLD-SCNC: 110 MMOL/L (ref 98–111)
CO2: 26 MMOL/L (ref 22–29)
CREAT SERPL-MCNC: 1.6 MG/DL (ref 0.6–1.2)
GFR SERPL CREATININE-BSD FRML MDRD: 47 ML/MIN/{1.73_M2}
GLOBULIN: 2.9 G/DL
GLUCOSE BLD-MCNC: 98 MG/DL (ref 74–106)
HCT VFR BLD CALC: 38.1 % (ref 40.1–51)
HEMOGLOBIN: 13.2 G/DL (ref 13.7–17.5)
LYMPHOCYTES ABSOLUTE: 2.13 K/UL (ref 1.18–3.74)
LYMPHOCYTES RELATIVE PERCENT: 31.2 % (ref 19.3–53.1)
MCH RBC QN AUTO: 28.5 PG (ref 25.7–32.2)
MCHC RBC AUTO-ENTMCNC: 34.6 G/DL (ref 32.3–36.5)
MCV RBC AUTO: 82.3 FL (ref 79–92.2)
MONOCYTES ABSOLUTE: 0.58 K/UL (ref 0.24–0.82)
MONOCYTES RELATIVE PERCENT: 8.5 % (ref 4.7–12.5)
NEUTROPHILS ABSOLUTE: 3.83 K/UL (ref 1.56–6.13)
NEUTROPHILS RELATIVE PERCENT: 56.1 % (ref 34–71.1)
PDW BLD-RTO: 12.7 % (ref 11.6–14.4)
PLATELET # BLD: 291 K/UL (ref 163–337)
PMV BLD AUTO: 9.3 FL (ref 7.4–10.4)
POTASSIUM SERPL-SCNC: 4 MMOL/L (ref 3.5–5.1)
RBC # BLD: 4.63 M/UL (ref 4.63–6.08)
SODIUM BLD-SCNC: 140 MMOL/L (ref 137–145)
TOTAL PROTEIN: 7.1 G/DL (ref 6.3–8.2)
WBC # BLD: 6.83 K/UL (ref 4.23–9.07)

## 2023-01-23 PROCEDURE — 36415 COLL VENOUS BLD VENIPUNCTURE: CPT

## 2023-01-23 PROCEDURE — 96411 CHEMO IV PUSH ADDL DRUG: CPT

## 2023-01-23 PROCEDURE — 6370000000 HC RX 637 (ALT 250 FOR IP): Performed by: INTERNAL MEDICINE

## 2023-01-23 PROCEDURE — 2580000003 HC RX 258: Performed by: INTERNAL MEDICINE

## 2023-01-23 PROCEDURE — 6360000002 HC RX W HCPCS: Performed by: INTERNAL MEDICINE

## 2023-01-23 PROCEDURE — 80053 COMPREHEN METABOLIC PANEL: CPT

## 2023-01-23 PROCEDURE — 96415 CHEMO IV INFUSION ADDL HR: CPT

## 2023-01-23 PROCEDURE — 96367 TX/PROPH/DG ADDL SEQ IV INF: CPT

## 2023-01-23 PROCEDURE — 85025 COMPLETE CBC W/AUTO DIFF WBC: CPT

## 2023-01-23 PROCEDURE — 96413 CHEMO IV INFUSION 1 HR: CPT

## 2023-01-23 PROCEDURE — 96375 TX/PRO/DX INJ NEW DRUG ADDON: CPT

## 2023-01-23 RX ORDER — HEPARIN SODIUM (PORCINE) LOCK FLUSH IV SOLN 100 UNIT/ML 100 UNIT/ML
500 SOLUTION INTRAVENOUS PRN
Status: DISCONTINUED | OUTPATIENT
Start: 2023-01-23 | End: 2023-01-24 | Stop reason: HOSPADM

## 2023-01-23 RX ORDER — PALONOSETRON 0.05 MG/ML
0.25 INJECTION, SOLUTION INTRAVENOUS ONCE
Status: COMPLETED | OUTPATIENT
Start: 2023-01-23 | End: 2023-01-23

## 2023-01-23 RX ORDER — SODIUM CHLORIDE 9 MG/ML
5-40 INJECTION INTRAVENOUS PRN
Status: DISCONTINUED | OUTPATIENT
Start: 2023-01-23 | End: 2023-01-24 | Stop reason: HOSPADM

## 2023-01-23 RX ORDER — ACETAMINOPHEN 500 MG
1000 TABLET ORAL ONCE
Status: COMPLETED | OUTPATIENT
Start: 2023-01-23 | End: 2023-01-23

## 2023-01-23 RX ORDER — SODIUM CHLORIDE 9 MG/ML
5-250 INJECTION, SOLUTION INTRAVENOUS PRN
Status: DISCONTINUED | OUTPATIENT
Start: 2023-01-23 | End: 2023-01-24 | Stop reason: HOSPADM

## 2023-01-23 RX ADMIN — SODIUM CHLORIDE, PRESERVATIVE FREE 10 ML: 5 INJECTION INTRAVENOUS at 15:32

## 2023-01-23 RX ADMIN — PALONOSETRON 0.25 MG: 0.25 INJECTION, SOLUTION INTRAVENOUS at 09:42

## 2023-01-23 RX ADMIN — DEXAMETHASONE SODIUM PHOSPHATE: 10 INJECTION, SOLUTION INTRAMUSCULAR; INTRAVENOUS at 09:44

## 2023-01-23 RX ADMIN — ACETAMINOPHEN 1000 MG: 500 TABLET, FILM COATED ORAL at 09:43

## 2023-01-23 RX ADMIN — DIPHENHYDRAMINE HYDROCHLORIDE: 50 INJECTION, SOLUTION INTRAMUSCULAR; INTRAVENOUS at 09:54

## 2023-01-23 RX ADMIN — BENDAMUSTINE HYDROCHLORIDE 250 MG: 25 INJECTION, SOLUTION INTRAVENOUS at 15:17

## 2023-01-23 RX ADMIN — HEPARIN 500 UNITS: 100 SYRINGE at 15:32

## 2023-01-23 RX ADMIN — SODIUM CHLORIDE 25 ML/HR: 9 INJECTION, SOLUTION INTRAVENOUS at 09:42

## 2023-01-23 RX ADMIN — SODIUM CHLORIDE 1050 MG: 9 INJECTION, SOLUTION INTRAVENOUS at 10:21

## 2023-01-23 NOTE — PROGRESS NOTES
DAVID Covarrubias completed visit with Lily Good who is present for his first chemotherapy treatment. Patient is accompanied by his mother Shruthi Tavares . Patient lives with his mother due to his developmental disability. Shruthi Tavraes states they are low on propane and do not have the money to refill their tank she states she set up a payment plan when she had it filled in the summer and has not been able to finish paying on it to get their tank filled. She states of the 800 gallons they received in the summer they now have 300 gallons left. SW to research community resources to assist. Patient and Shruthi Tavares denied any other needs at this time. SW provided support and education regarding SW role and encouraged the patient to call with needs or concerns. Patient voiced acceptance and appreciation.

## 2023-01-24 ENCOUNTER — HOSPITAL ENCOUNTER (OUTPATIENT)
Dept: INFUSION THERAPY | Age: 68
Discharge: HOME | End: 2023-01-24
Payer: MEDICARE

## 2023-01-24 VITALS
HEART RATE: 84 BPM | RESPIRATION RATE: 18 BRPM | OXYGEN SATURATION: 96 % | SYSTOLIC BLOOD PRESSURE: 135 MMHG | TEMPERATURE: 98.2 F | DIASTOLIC BLOOD PRESSURE: 74 MMHG

## 2023-01-24 DIAGNOSIS — C82.48 FOLLICULAR LYMPHOMA GRADE IIIB OF LYMPH NODES OF MULTIPLE SITES (HCC): Primary | ICD-10-CM

## 2023-01-24 PROCEDURE — 96413 CHEMO IV INFUSION 1 HR: CPT

## 2023-01-24 PROCEDURE — 96367 TX/PROPH/DG ADDL SEQ IV INF: CPT

## 2023-01-24 PROCEDURE — 6360000002 HC RX W HCPCS: Performed by: INTERNAL MEDICINE

## 2023-01-24 PROCEDURE — 2580000003 HC RX 258: Performed by: INTERNAL MEDICINE

## 2023-01-24 RX ORDER — HEPARIN SODIUM (PORCINE) LOCK FLUSH IV SOLN 100 UNIT/ML 100 UNIT/ML
500 SOLUTION INTRAVENOUS PRN
Status: DISCONTINUED | OUTPATIENT
Start: 2023-01-24 | End: 2023-01-25 | Stop reason: HOSPADM

## 2023-01-24 RX ORDER — SODIUM CHLORIDE 9 MG/ML
5-250 INJECTION, SOLUTION INTRAVENOUS PRN
Status: DISCONTINUED | OUTPATIENT
Start: 2023-01-24 | End: 2023-01-25 | Stop reason: HOSPADM

## 2023-01-24 RX ORDER — DOCUSATE SODIUM 100 MG/1
100 CAPSULE, LIQUID FILLED ORAL 2 TIMES DAILY
Qty: 180 CAPSULE | Refills: 1 | Status: SHIPPED | OUTPATIENT
Start: 2023-01-24 | End: 2023-04-24

## 2023-01-24 RX ORDER — SODIUM CHLORIDE 9 MG/ML
5-40 INJECTION INTRAVENOUS PRN
Status: DISCONTINUED | OUTPATIENT
Start: 2023-01-24 | End: 2023-01-25 | Stop reason: HOSPADM

## 2023-01-24 RX ADMIN — HEPARIN 500 UNITS: 100 SYRINGE at 09:11

## 2023-01-24 RX ADMIN — SODIUM CHLORIDE 25 ML/HR: 9 INJECTION, SOLUTION INTRAVENOUS at 08:17

## 2023-01-24 RX ADMIN — DEXAMETHASONE SODIUM PHOSPHATE 16 MG: 10 INJECTION, SOLUTION INTRAMUSCULAR; INTRAVENOUS at 08:25

## 2023-01-24 RX ADMIN — SODIUM CHLORIDE, PRESERVATIVE FREE 10 ML: 5 INJECTION INTRAVENOUS at 09:11

## 2023-01-24 RX ADMIN — BENDAMUSTINE HYDROCHLORIDE 250 MG: 25 INJECTION, SOLUTION INTRAVENOUS at 08:55

## 2023-01-24 NOTE — TELEPHONE ENCOUNTER
Requested Prescriptions     Pending Prescriptions Disp Refills    docusate sodium (COLACE) 100 MG capsule 180 capsule 1     Sig: Take 1 capsule by mouth 2 times daily

## 2023-01-30 ENCOUNTER — HOSPITAL ENCOUNTER (OUTPATIENT)
Dept: INFUSION THERAPY | Age: 68
Discharge: HOME | End: 2023-01-30
Payer: MEDICARE

## 2023-01-30 DIAGNOSIS — C83.31 RETICULOSARCOMA OF LYMPH NODES OF HEAD, FACE, AND NECK (HCC): ICD-10-CM

## 2023-01-30 LAB
HCT VFR BLD CALC: 37.1 % (ref 40.1–51)
HEMOGLOBIN: 13 G/DL (ref 13.7–17.5)
LYMPHOCYTES ABSOLUTE: 0.13 K/UL (ref 1.18–3.74)
LYMPHOCYTES RELATIVE PERCENT: 1.1 % (ref 19.3–53.1)
MCH RBC QN AUTO: 28.4 PG (ref 25.7–32.2)
MCHC RBC AUTO-ENTMCNC: 35 G/DL (ref 32.3–36.5)
MCV RBC AUTO: 81 FL (ref 79–92.2)
MONOCYTES ABSOLUTE: 0.35 K/UL (ref 0.24–0.82)
MONOCYTES RELATIVE PERCENT: 2.9 % (ref 4.7–12.5)
NEUTROPHILS ABSOLUTE: 11.48 K/UL (ref 1.56–6.13)
NEUTROPHILS RELATIVE PERCENT: 95.1 % (ref 34–71.1)
PDW BLD-RTO: 12.7 % (ref 11.6–14.4)
PLATELET # BLD: 288 K/UL (ref 163–337)
PMV BLD AUTO: 9.3 FL (ref 7.4–10.4)
RBC # BLD: 4.58 M/UL (ref 4.63–6.08)
WBC # BLD: 12.07 K/UL (ref 4.23–9.07)

## 2023-01-30 PROCEDURE — 85025 COMPLETE CBC W/AUTO DIFF WBC: CPT

## 2023-01-30 PROCEDURE — 36415 COLL VENOUS BLD VENIPUNCTURE: CPT

## 2023-01-31 ENCOUNTER — TELEPHONE (OUTPATIENT)
Dept: CARDIOLOGY | Facility: CLINIC | Age: 68
End: 2023-01-31
Payer: MEDICARE

## 2023-01-31 NOTE — TELEPHONE ENCOUNTER
Spoke with Pt mother Monse, she  will get back with me about FU appointment on 2/8/2023 with Litzy after she  speaks with Dr. Rdz.

## 2023-02-06 ENCOUNTER — TELEPHONE (OUTPATIENT)
Dept: CARDIOLOGY CLINIC | Age: 68
End: 2023-02-06

## 2023-02-06 ENCOUNTER — HOSPITAL ENCOUNTER (OUTPATIENT)
Dept: INFUSION THERAPY | Age: 68
Discharge: HOME | End: 2023-02-06
Payer: MEDICARE

## 2023-02-06 DIAGNOSIS — C82.48 FOLLICULAR LYMPHOMA GRADE IIIB OF LYMPH NODES OF MULTIPLE SITES (HCC): ICD-10-CM

## 2023-02-06 DIAGNOSIS — C83.31 RETICULOSARCOMA OF LYMPH NODES OF HEAD, FACE, AND NECK (HCC): ICD-10-CM

## 2023-02-06 DIAGNOSIS — C85.10 LARGE B-CELL LYMPHOMA (HCC): ICD-10-CM

## 2023-02-06 LAB
ALBUMIN SERPL-MCNC: 3.8 G/DL (ref 3.5–5.2)
ALP BLD-CCNC: 58 U/L (ref 40–130)
ALT SERPL-CCNC: 29 U/L (ref 21–72)
ANION GAP SERPL CALCULATED.3IONS-SCNC: 5 MMOL/L (ref 7–19)
AST SERPL-CCNC: 17 U/L (ref 17–59)
BILIRUB SERPL-MCNC: 0.6 MG/DL (ref 0.2–1.3)
BUN BLDV-MCNC: 36 MG/DL (ref 9–20)
CALCIUM SERPL-MCNC: 9.3 MG/DL (ref 8.4–10.2)
CHLORIDE BLD-SCNC: 108 MMOL/L (ref 98–111)
CO2: 25 MMOL/L (ref 22–29)
CREAT SERPL-MCNC: 1.4 MG/DL (ref 0.6–1.2)
GFR SERPL CREATININE-BSD FRML MDRD: 55 ML/MIN/{1.73_M2}
GLOBULIN: 2.7 G/DL
GLUCOSE BLD-MCNC: 150 MG/DL (ref 74–106)
HCT VFR BLD CALC: 41.7 % (ref 40.1–51)
HEMOGLOBIN: 14.1 G/DL (ref 13.7–17.5)
LACTATE DEHYDROGENASE: 201 U/L (ref 120–246)
LYMPHOCYTES ABSOLUTE: 0.37 K/UL (ref 1.18–3.74)
LYMPHOCYTES RELATIVE PERCENT: 2.5 % (ref 19.3–53.1)
MCH RBC QN AUTO: 28.4 PG (ref 25.7–32.2)
MCHC RBC AUTO-ENTMCNC: 33.8 G/DL (ref 32.3–36.5)
MCV RBC AUTO: 83.9 FL (ref 79–92.2)
MONOCYTES ABSOLUTE: 0.55 K/UL (ref 0.24–0.82)
MONOCYTES RELATIVE PERCENT: 3.8 % (ref 4.7–12.5)
NEUTROPHILS ABSOLUTE: 13.55 K/UL (ref 1.56–6.13)
NEUTROPHILS RELATIVE PERCENT: 92.7 % (ref 34–71.1)
PDW BLD-RTO: 13.7 % (ref 11.6–14.4)
PLATELET # BLD: 236 K/UL (ref 163–337)
PMV BLD AUTO: 9.3 FL (ref 7.4–10.4)
POTASSIUM SERPL-SCNC: 4.8 MMOL/L (ref 3.5–5.1)
RBC # BLD: 4.97 M/UL (ref 4.63–6.08)
SODIUM BLD-SCNC: 138 MMOL/L (ref 137–145)
TOTAL PROTEIN: 6.5 G/DL (ref 6.3–8.2)
WBC # BLD: 14.62 K/UL (ref 4.23–9.07)

## 2023-02-06 PROCEDURE — 36415 COLL VENOUS BLD VENIPUNCTURE: CPT

## 2023-02-06 PROCEDURE — 80053 COMPREHEN METABOLIC PANEL: CPT

## 2023-02-06 PROCEDURE — 83615 LACTATE (LD) (LDH) ENZYME: CPT

## 2023-02-06 PROCEDURE — 85025 COMPLETE CBC W/AUTO DIFF WBC: CPT

## 2023-02-07 ENCOUNTER — OFFICE VISIT (OUTPATIENT)
Dept: CARDIOLOGY CLINIC | Age: 68
End: 2023-02-07
Payer: MEDICARE

## 2023-02-07 VITALS
HEART RATE: 113 BPM | DIASTOLIC BLOOD PRESSURE: 88 MMHG | WEIGHT: 315 LBS | SYSTOLIC BLOOD PRESSURE: 126 MMHG | BODY MASS INDEX: 41.75 KG/M2 | HEIGHT: 73 IN

## 2023-02-07 DIAGNOSIS — Z87.820 HISTORY OF TRAUMATIC BRAIN INJURY: ICD-10-CM

## 2023-02-07 DIAGNOSIS — C85.90 LYMPHOMA, UNSPECIFIED BODY REGION, UNSPECIFIED LYMPHOMA TYPE (HCC): ICD-10-CM

## 2023-02-07 DIAGNOSIS — Z86.718 HISTORY OF DVT (DEEP VEIN THROMBOSIS): ICD-10-CM

## 2023-02-07 DIAGNOSIS — I10 ESSENTIAL HYPERTENSION: ICD-10-CM

## 2023-02-07 DIAGNOSIS — Z79.899 ON AMIODARONE THERAPY: ICD-10-CM

## 2023-02-07 DIAGNOSIS — R04.0 EPISTAXIS: ICD-10-CM

## 2023-02-07 DIAGNOSIS — I48.91 ATRIAL FIBRILLATION WITH RVR (HCC): Primary | ICD-10-CM

## 2023-02-07 PROCEDURE — G8484 FLU IMMUNIZE NO ADMIN: HCPCS | Performed by: CLINICAL NURSE SPECIALIST

## 2023-02-07 PROCEDURE — G8427 DOCREV CUR MEDS BY ELIG CLIN: HCPCS | Performed by: CLINICAL NURSE SPECIALIST

## 2023-02-07 PROCEDURE — G8417 CALC BMI ABV UP PARAM F/U: HCPCS | Performed by: CLINICAL NURSE SPECIALIST

## 2023-02-07 PROCEDURE — 1123F ACP DISCUSS/DSCN MKR DOCD: CPT | Performed by: CLINICAL NURSE SPECIALIST

## 2023-02-07 PROCEDURE — 3017F COLORECTAL CA SCREEN DOC REV: CPT | Performed by: CLINICAL NURSE SPECIALIST

## 2023-02-07 PROCEDURE — 3079F DIAST BP 80-89 MM HG: CPT | Performed by: CLINICAL NURSE SPECIALIST

## 2023-02-07 PROCEDURE — 99214 OFFICE O/P EST MOD 30 MIN: CPT | Performed by: CLINICAL NURSE SPECIALIST

## 2023-02-07 PROCEDURE — 3074F SYST BP LT 130 MM HG: CPT | Performed by: CLINICAL NURSE SPECIALIST

## 2023-02-07 PROCEDURE — 93000 ELECTROCARDIOGRAM COMPLETE: CPT | Performed by: CLINICAL NURSE SPECIALIST

## 2023-02-07 PROCEDURE — 1036F TOBACCO NON-USER: CPT | Performed by: CLINICAL NURSE SPECIALIST

## 2023-02-07 ASSESSMENT — ENCOUNTER SYMPTOMS
CHEST TIGHTNESS: 0
FACIAL SWELLING: 0
EYE REDNESS: 0
VOMITING: 0
WHEEZING: 0
SHORTNESS OF BREATH: 1
ABDOMINAL PAIN: 0
COUGH: 0
NAUSEA: 0

## 2023-02-07 NOTE — PROGRESS NOTES
[facility], [address], [address] [address]  Phone: [phone]  Fax: [phone]    OFFICE VISIT:  2023    Jhonatan Alba - : 1955    Reason For Visit:  Javier Dasilva is a 78 y.o. male who is here for Follow-up and Atrial Fibrillation       Diagnosis Orders   1. Atrial fibrillation with RVR (HCC)  EKG 12 lead      2. Essential hypertension        3. Epistaxis        4. History of DVT (deep vein thrombosis)        5. History of traumatic brain injury        6. On amiodarone therapy        7. Lymphoma, unspecified body region, unspecified lymphoma type [facility])              HPI  Patient is followed in our office with a history of atrial fibrillation, hypertension, hyperlipidemia, history of DVT. Other history includes traumatic brain injury at age 9, CKD. Patient is here with his mother today who answers most of the questions. Patient was seen once by Dr. Lin Domínguez on 2020 2:02 hospitalizations for atrial fibrillation. At some point in time he was placed on amiodarone and flecainide and has been taking these medications together. He saw JANAY Dawkins, at [facility] on 10/28/2022 in [place]. He suggested stopping flecainide and returning to our office for further follow-up. Patient has also been diagnosed with lymphoma since this time, undergoing biopsies and starting chemotherapy which has been on the forefront of his mind. He did have an emergency room visit in October for epistaxis while on Eliquis     He denies palpitations or fast heart rates. No unusual shortness of breath, orthopnea, PND. He has some mild, chronic lower extremity edema is not worse than usual.  No complaints of chest pain    JODY Richard is PCP.   Jhonatan Alba has the following history as [facility] in [facility]:    Patient Active Problem List    Diagnosis Date Noted    Follicular lymphoma grade IIIb of lymph nodes of multiple sites ([facility] [address] 2023    Old head injury 10/26/2022    Generalized enlarged lymph nodes 10/26/2022    Nasal septum ulceration 10/24/2022    Epistaxis 10/24/2022    Atrial fibrillation with rapid ventricular response ([address] 10/06/2022    Atrial fibrillation ([address] 07/01/2022    Irregular heart beat 06/29/2022    Atrial fibrillation with RVR ([address] 06/29/2022    Gastroesophageal reflux disease without esophagitis 03/29/2022    Cancer of prostate ([address] 03/15/2021    Vitamin D deficiency 01/02/2020    Closed nondisplaced longitudinal fracture of left patella 01/02/2020    Chronic deep vein thrombosis (DVT) of left peroneal vein ([address] 11/25/2019    Morbid obesity ([address] 09/25/2019    History of prostate cancer     Obesity     Mixed hyperlipidemia     Essential hypertension     Adenomatous colon polyp 06/20/2012    S/P colonoscopic polypectomy 06/20/2012    History of colonic polyps 06/20/2012     Past Medical History:   Diagnosis Date    Atrial fibrillation ([address]     Cellulitis of left leg     Colon polyp     Elevated blood pressure reading without diagnosis of hypertension     Esophageal reflux     History of prostate cancer     Hx of blood clots     Hypertension     Mixed hyperlipidemia     TG>>LDL    Obesity     Pericardial effusion     Prostate cancer ([address]     2010 Dr Karel Anderson;  implants, XRT     Past Surgical History:   Procedure Laterality Date    BONE MARROW BIOPSY N/A 12/9/2022    BONE MARROW ASPIRATION BIOPSY performed by Soila Stevenson PA-C at [facility]  6/2/2009        COLONOSCOPY  2012        LYMPH NODE BIOPSY Left 1/5/2023    EXCISION OF CERVICLE LYMPH NODY-BIOPSY performed by Telma Jennigns MD at [facility] N/A 12/16/2022    single lumen port placement with ultrasound and fluoroscopy performed by Yamile Tejeda MD at [facility]  3/22/2000        VASECTOMY       Family History   Problem Relation Age of Onset    Colon Polyps Mother     Heart Disease Father     Cancer Brother         prostate    Colon Polyps Brother     High Blood Pressure Other      Social History     Tobacco Use    Smoking status: Never    Smokeless tobacco: Never   Substance Use Topics    Alcohol use: No      Current Outpatient Medications   Medication Sig Dispense Refill    docusate sodium (COLACE) 100 MG capsule Take 1 capsule by mouth 2 times daily 180 capsule 1    amiodarone (CORDARONE) 200 MG tablet TAKE ONE TABLET BY MOUTH EVERY DAY 30 tablet 1    allopurinol (ZYLOPRIM) 300 MG tablet Take 1 tablet by mouth daily 90 tablet 1    predniSONE (DELTASONE) 50 MG tablet Take 2 tablets by mouth See Admin Instructions for 30 doses Days 1 - 5 of a 28 day cycle x 6 cycles (for treatment of Follicular Lymphoma) 60 tablet 0    ondansetron (ZOFRAN-ODT) 4 MG disintegrating tablet Take 1 tablet by mouth every 8 hours as needed for Nausea or Vomiting 10 tablet 2    rivaroxaban (XARELTO) 20 MG TABS tablet TAKE 1 TABLET EVERY DAY WITH BREAKFAST (Patient taking differently: 20 mg daily (with breakfast) TAKE 1 TABLET EVERY DAY WITH BREAKFAST) 90 tablet 1    rosuvastatin (CRESTOR) 10 MG tablet TAKE 1 TABLET EVERY NIGHT (Patient taking differently: Take 10 mg by mouth daily) 90 tablet 1    losartan (COZAAR) 100 MG tablet TAKE 1 TABLET EVERY DAY (Patient taking differently: Take 100 mg by mouth daily) 90 tablet 1    dilTIAZem (CARDIZEM CD) 120 MG extended release capsule Take 1 capsule by mouth in the morning and at bedtime 60 capsule 3    Cholecalciferol (VITAMIN D3) 50 MCG (2000 UT) CAPS TAKE 1 CAPSULE EVERY DAY (Patient taking differently: Take 2,000 Units by mouth daily) 90 capsule 1    fenofibrate 160 MG tablet Take 1 tablet by mouth daily 90 tablet 1    Multiple Vitamin (MULTI-VITAMIN PO) Take 1 tablet by mouth daily      metoprolol succinate (TOPROL XL) 100 MG extended release tablet TAKE 1 TABLET BY MOUTH IN THE MORNING. 90 tablet 1     No current facility-administered medications for this visit. Allergies: Pcn [penicillins]    Review of Systems  Review of Systems   Constitutional:  Positive for fatigue. Negative for activity change, diaphoresis, fever and unexpected weight change. HENT:  Negative for facial swelling and nosebleeds. Eyes:  Negative for redness and visual disturbance. Respiratory:  Positive for shortness of breath (mild). Negative for cough, chest tightness and wheezing. Cardiovascular:  Negative for chest pain, palpitations and leg swelling. Gastrointestinal:  Negative for abdominal pain, nausea and vomiting. Endocrine: Negative for cold intolerance and heat intolerance. Genitourinary:  Negative for dysuria and hematuria. Musculoskeletal:  Negative for arthralgias and myalgias. Skin:  Negative for pallor and rash. Neurological:  Negative for dizziness, seizures, syncope, weakness and light-headedness. Hematological:  Does not bruise/bleed easily. Psychiatric/Behavioral:  Negative for agitation. The patient is not nervous/anxious. Objective  Vital Signs - /88   Pulse (!) 113   Ht 6' 1\" (1.854 m)   Wt (!) 325 lb (147.4 kg)   BMI 42.88 kg/m²   Physical Exam  Vitals and nursing note reviewed. Constitutional:       General: He is not in acute distress. Appearance: He is well-developed. He is obese. He is not diaphoretic. HENT:      Head: Normocephalic and atraumatic. Right Ear: Hearing and external ear normal.      Left Ear: Hearing and external ear normal.      Nose: Nose normal.   Eyes:      General:         Right eye: No discharge. Left eye: No discharge. Pupils: Pupils are equal, round, and reactive to light. Neck:      Thyroid: No thyromegaly. Vascular: No carotid bruit or JVD. Trachea: No tracheal deviation. Cardiovascular:      Rate and Rhythm: Normal rate and regular rhythm. Heart sounds: Normal heart sounds. No murmur heard. No friction rub. No gallop.    Pulmonary:      Effort: Pulmonary effort is normal. No respiratory distress. Breath sounds: Normal breath sounds. No wheezing or rales. Comments: C/o snoring  Abdominal:      Palpations: Abdomen is soft. Tenderness: There is no abdominal tenderness. Musculoskeletal:         General: No swelling or deformity. Cervical back: Neck supple. No muscular tenderness. Right lower leg: No edema. Left lower leg: No edema. Skin:     General: Skin is warm and dry. Findings: No rash. Neurological:      General: No focal deficit present. Mental Status: He is alert and oriented to person, place, and time. Cranial Nerves: No cranial nerve deficit. Psychiatric:         Mood and Affect: Mood normal.         Behavior: Behavior normal.      Comments: Depends on mother to answer some questions       Data:  Lab Results   Component Value Date/Time    WBC 14.62 02/06/2023 10:08 AM    RBC 4.97 02/06/2023 10:08 AM    HGB 14.1 02/06/2023 10:08 AM    HCT 41.7 02/06/2023 10:08 AM     02/06/2023 10:08 AM      Lab Results   Component Value Date/Time    CHOL 125 10/06/2022 01:35 PM    TRIG 178 10/06/2022 01:35 PM    HDL 33 10/06/2022 01:35 PM    LDLCALC 56 10/06/2022 01:35 PM     Lab Results   Component Value Date/Time     02/06/2023 10:14 AM    K 4.8 02/06/2023 10:14 AM    K 4.2 10/09/2022 07:54 AM     02/06/2023 10:14 AM    CO2 25 02/06/2023 10:14 AM    GLUCOSE 150 02/06/2023 10:14 AM    BUN 36 02/06/2023 10:14 AM    CREATININE 1.4 02/06/2023 10:14 AM    CALCIUM 9.3 02/06/2023 10:14 AM    ALT 29 02/06/2023 10:14 AM    AST 17 02/06/2023 10:14 AM     Lab Results   Component Value Date/Time    TSH 2.380 10/07/2022 03:18 AM     Echo 1/13/23  Conclusions      Summary   The mitral valve was not well visualized . The aortic valve leaflets were not well visualized. Tricuspid valve was not well visualized. Trace tricuspid regurgitation. Normal left ventricular size and function.    Left ventricular ejection fraction is estimated at 59%. Normal left ventricular wall thickness. No regional wall motion abnormalities. Definity contrast was used to enhance the endocardial borders. IVC normal.      Signature      ----------------------------------------------------------------   Electronically signed by Trinidad Shook MD(Interpreting   physician) on 01/14/2023 08:24 AM   ----------------------------------------------------------------    EKG shows atrial fib rate 113    Assessment:     Diagnosis Orders   1. Atrial fibrillation with RVR (HCC)  EKG 12 lead      2. Essential hypertension        3. Epistaxis        4. History of DVT (deep vein thrombosis)        5. History of traumatic brain injury        6. On amiodarone therapy        7. Lymphoma, unspecified body region, unspecified lymphoma type [facility])          Previous hospital visits and cardiology visits at [facility] reviewed    Atrial fibrillation-patient is in atrial fibrillation per EKG in the office today. Previous EKG a 12/16/2022 shows sinus rhythm. Review of previous EKGs, seems to be in and out of atrial fibrillation. He is taking both amiodarone and flecainide. He saw electrophysiologist, Dr. Anil Hatfield at [facility] in October that recommended discontinuing the flecainide, but patient has continued it. We will plan to discontinue flecainide. Patient is asymptomatic with the atrial fibrillation. Consideration for a rate control strategy rather than rhythm control. Patient will establish with our electrophysiologist, Dr. Louis Vázquez to review case and make recommendations    Hypertension-stable on current regimen with diltiazem, metoprolol, losartan    Epistaxis-with ER visit.   Encouraged moisturization of nasal passages twice daily with saline spray or gel and sleeping with a humidifier    History of DVT-continues on Eliquis therapy    History of traumatic brain injury-mother is with patient today answering most questions    Lymphoma-newer diagnosis and work-up this past fall.  He is currently on chemotherapy       Plan    Orders Placed This Encounter   Procedures    EKG 12 lead     Order Specific Question:   Reason for Exam?     Answer:   Irregular heart rate     Return in about 2 weeks (around 2/21/2023) for Dr Keke Mathews. Discontinue Flecainide  Discussed rate control strategy versus rhythm control strategy with Dr. Palencia Lock nasal spray/ gel to prevent nosebleeds, sleep with humidifier  Consider referral for sleep apnea    Call with any questionsor concerns  Follow up with JODY De Leon for non cardiac problems  Report any new problems  Cardiovascular Fitness-Exercise as tolerated. Strive for 15 minutes of exercise most days of the week. Cardiac / HealthyDiet  Continue current medications as directed  Continue plan of treatment  It is always recommended that you bring your medicationsbottles with you to each visit - this is for your safety!        JODY Shanks

## 2023-02-07 NOTE — PATIENT INSTRUCTIONS
Return in about 2 weeks (around 2/21/2023) for Dr Eduarda Berman.   Discontinue Flecainide  Continue Eliquis  Saline nasal spray, sleep with humidifier  Consider referral for sleep apnea

## 2023-02-08 DIAGNOSIS — Z12.5 SCREENING PSA (PROSTATE SPECIFIC ANTIGEN): Primary | ICD-10-CM

## 2023-02-13 ENCOUNTER — HOSPITAL ENCOUNTER (OUTPATIENT)
Dept: INFUSION THERAPY | Age: 68
Discharge: HOME | End: 2023-02-13
Payer: MEDICARE

## 2023-02-13 DIAGNOSIS — C82.48 FOLLICULAR LYMPHOMA GRADE IIIB OF LYMPH NODES OF MULTIPLE SITES (HCC): Primary | ICD-10-CM

## 2023-02-13 DIAGNOSIS — C83.31 RETICULOSARCOMA OF LYMPH NODES OF HEAD, FACE, AND NECK (HCC): ICD-10-CM

## 2023-02-13 DIAGNOSIS — C85.10 LARGE B-CELL LYMPHOMA (HCC): ICD-10-CM

## 2023-02-13 LAB
ALBUMIN SERPL-MCNC: 3.7 G/DL (ref 3.5–5.2)
ALP BLD-CCNC: 56 U/L (ref 40–130)
ALT SERPL-CCNC: 63 U/L (ref 21–72)
ANION GAP SERPL CALCULATED.3IONS-SCNC: 5 MMOL/L (ref 7–19)
AST SERPL-CCNC: 30 U/L (ref 17–59)
BILIRUB SERPL-MCNC: 0.9 MG/DL (ref 0.2–1.3)
BUN BLDV-MCNC: 39 MG/DL (ref 9–20)
CALCIUM SERPL-MCNC: 8.9 MG/DL (ref 8.4–10.2)
CHLORIDE BLD-SCNC: 108 MMOL/L (ref 98–111)
CO2: 24 MMOL/L (ref 22–29)
CREAT SERPL-MCNC: 1.4 MG/DL (ref 0.6–1.2)
GFR SERPL CREATININE-BSD FRML MDRD: 55 ML/MIN/{1.73_M2}
GLOBULIN: 1.6 G/DL
GLUCOSE BLD-MCNC: 123 MG/DL (ref 74–106)
HCT VFR BLD CALC: 42.9 % (ref 40.1–51)
HEMOGLOBIN: 14.5 G/DL (ref 13.7–17.5)
LYMPHOCYTES ABSOLUTE: 0.54 K/UL (ref 1.18–3.74)
LYMPHOCYTES RELATIVE PERCENT: 5.6 % (ref 19.3–53.1)
MCH RBC QN AUTO: 28.3 PG (ref 25.7–32.2)
MCHC RBC AUTO-ENTMCNC: 33.8 G/DL (ref 32.3–36.5)
MCV RBC AUTO: 83.8 FL (ref 79–92.2)
MONOCYTES ABSOLUTE: 0.39 K/UL (ref 0.24–0.82)
MONOCYTES RELATIVE PERCENT: 4 % (ref 4.7–12.5)
NEUTROPHILS ABSOLUTE: 8.68 K/UL (ref 1.56–6.13)
NEUTROPHILS RELATIVE PERCENT: 89.4 % (ref 34–71.1)
PDW BLD-RTO: 13.6 % (ref 11.6–14.4)
PLATELET # BLD: 192 K/UL (ref 163–337)
PMV BLD AUTO: 8.9 FL (ref 7.4–10.4)
POTASSIUM SERPL-SCNC: 4.7 MMOL/L (ref 3.5–5.1)
RBC # BLD: 5.12 M/UL (ref 4.63–6.08)
SODIUM BLD-SCNC: 137 MMOL/L (ref 137–145)
TOTAL PROTEIN: 5.3 G/DL (ref 6.3–8.2)
WBC # BLD: 9.71 K/UL (ref 4.23–9.07)

## 2023-02-13 PROCEDURE — 80053 COMPREHEN METABOLIC PANEL: CPT

## 2023-02-13 PROCEDURE — 85025 COMPLETE CBC W/AUTO DIFF WBC: CPT

## 2023-02-13 PROCEDURE — 36415 COLL VENOUS BLD VENIPUNCTURE: CPT

## 2023-02-14 ENCOUNTER — OFFICE VISIT (OUTPATIENT)
Dept: INTERNAL MEDICINE | Age: 68
End: 2023-02-14

## 2023-02-14 VITALS
WEIGHT: 315 LBS | DIASTOLIC BLOOD PRESSURE: 74 MMHG | HEIGHT: 73 IN | BODY MASS INDEX: 41.75 KG/M2 | TEMPERATURE: 96.9 F | HEART RATE: 127 BPM | SYSTOLIC BLOOD PRESSURE: 128 MMHG | OXYGEN SATURATION: 97 %

## 2023-02-14 DIAGNOSIS — Z12.5 SCREENING PSA (PROSTATE SPECIFIC ANTIGEN): ICD-10-CM

## 2023-02-14 DIAGNOSIS — E78.2 MIXED HYPERLIPIDEMIA: ICD-10-CM

## 2023-02-14 DIAGNOSIS — Z00.00 MEDICARE ANNUAL WELLNESS VISIT, SUBSEQUENT: ICD-10-CM

## 2023-02-14 DIAGNOSIS — E55.9 VITAMIN D DEFICIENCY: ICD-10-CM

## 2023-02-14 DIAGNOSIS — I48.91 ATRIAL FIBRILLATION WITH RVR (HCC): ICD-10-CM

## 2023-02-14 DIAGNOSIS — C82.48 FOLLICULAR LYMPHOMA GRADE IIIB OF LYMPH NODES OF MULTIPLE SITES (HCC): ICD-10-CM

## 2023-02-14 DIAGNOSIS — Z87.39 HISTORY OF GOUT: ICD-10-CM

## 2023-02-14 DIAGNOSIS — E66.01 MORBID OBESITY (HCC): ICD-10-CM

## 2023-02-14 DIAGNOSIS — Z87.828 OLD HEAD INJURY: ICD-10-CM

## 2023-02-14 DIAGNOSIS — F41.9 ANXIETY: ICD-10-CM

## 2023-02-14 DIAGNOSIS — Z85.46 HISTORY OF PROSTATE CANCER: ICD-10-CM

## 2023-02-14 DIAGNOSIS — I48.11 LONGSTANDING PERSISTENT ATRIAL FIBRILLATION (HCC): Primary | ICD-10-CM

## 2023-02-14 PROBLEM — S82.025A CLOSED NONDISPLACED LONGITUDINAL FRACTURE OF LEFT PATELLA: Status: RESOLVED | Noted: 2020-01-02 | Resolved: 2023-02-14

## 2023-02-14 RX ORDER — BUSPIRONE HYDROCHLORIDE 5 MG/1
5 TABLET ORAL 3 TIMES DAILY
Qty: 90 TABLET | Refills: 1 | Status: SHIPPED | OUTPATIENT
Start: 2023-02-14 | End: 2023-03-16

## 2023-02-14 SDOH — ECONOMIC STABILITY: FOOD INSECURITY: WITHIN THE PAST 12 MONTHS, YOU WORRIED THAT YOUR FOOD WOULD RUN OUT BEFORE YOU GOT MONEY TO BUY MORE.: NEVER TRUE

## 2023-02-14 SDOH — ECONOMIC STABILITY: INCOME INSECURITY: HOW HARD IS IT FOR YOU TO PAY FOR THE VERY BASICS LIKE FOOD, HOUSING, MEDICAL CARE, AND HEATING?: NOT HARD AT ALL

## 2023-02-14 SDOH — ECONOMIC STABILITY: FOOD INSECURITY: WITHIN THE PAST 12 MONTHS, THE FOOD YOU BOUGHT JUST DIDN'T LAST AND YOU DIDN'T HAVE MONEY TO GET MORE.: NEVER TRUE

## 2023-02-14 ASSESSMENT — ENCOUNTER SYMPTOMS
COLOR CHANGE: 0
VOMITING: 0
TROUBLE SWALLOWING: 0
SHORTNESS OF BREATH: 0
ABDOMINAL DISTENTION: 0
CONSTIPATION: 0
WHEEZING: 0
EYE DISCHARGE: 0
CHOKING: 0
ABDOMINAL PAIN: 1
COUGH: 0
NAUSEA: 0
BLOOD IN STOOL: 0
EYE ITCHING: 0
SORE THROAT: 0
STRIDOR: 0
DIARRHEA: 0

## 2023-02-14 ASSESSMENT — PATIENT HEALTH QUESTIONNAIRE - PHQ9
SUM OF ALL RESPONSES TO PHQ QUESTIONS 1-9: 0
SUM OF ALL RESPONSES TO PHQ QUESTIONS 1-9: 0
1. LITTLE INTEREST OR PLEASURE IN DOING THINGS: 0
SUM OF ALL RESPONSES TO PHQ QUESTIONS 1-9: 0
SUM OF ALL RESPONSES TO PHQ9 QUESTIONS 1 & 2: 0
2. FEELING DOWN, DEPRESSED OR HOPELESS: 0
SUM OF ALL RESPONSES TO PHQ QUESTIONS 1-9: 0

## 2023-02-14 ASSESSMENT — LIFESTYLE VARIABLES
HOW MANY STANDARD DRINKS CONTAINING ALCOHOL DO YOU HAVE ON A TYPICAL DAY: PATIENT DOES NOT DRINK
HOW OFTEN DO YOU HAVE A DRINK CONTAINING ALCOHOL: NEVER

## 2023-02-14 NOTE — PATIENT INSTRUCTIONS
PATIENT INSTRUCTIONS:  History of prostate cancer  2. Persistent atrial fibs with frequent exacerbations with rapid ventricular rate he is on Cordarone Cardizem Xarelto metoprolol  3. Lymphoma treated by Dr. Miroslava Daley and he is currently undergoing infusions  4. Hyperlipidemia  5. Morbid obesity  6. Old head injury  7. History of gout         Fatigue: Care Instructions  Your Care Instructions     Fatigue is a feeling of tiredness, exhaustion, or lack of energy. You may feel fatigue because of too much or not enough activity. It can also come from stress, lack of sleep, boredom, and poor diet. Many medical problems, such as viral infections, can cause fatigue. Emotional problems, especially depression, are often the cause of fatigue. Fatigue is most often a symptom of another problem. Treatment for fatigue depends on the cause. For example, if you have fatigue because you have a certain health problem, treating this problem also treats your fatigue. If depression or anxiety is the cause, treatment may help. Follow-up care is a key part of your treatment and safety. Be sure to make and go to all appointments, and call your doctor if you are having problems. It's also a good idea to know your test results and keep a list of the medicines you take. How can you care for yourself at home? Get regular exercise. But don't overdo it. Go back and forth between rest and exercise. Get plenty of rest.  Eat a healthy diet. Do not skip meals, especially breakfast.  Reduce your use of caffeine, tobacco, and alcohol. Caffeine is most often found in coffee, tea, cola drinks, and chocolate. Limit medicines that can cause fatigue. This includes tranquilizers and cold and allergy medicines. When should you call for help?   Watch closely for changes in your health, and be sure to contact your doctor if:    You have new symptoms such as fever or a rash.     Your fatigue gets worse.     You have been feeling down, depressed, or hopeless. Or you may have lost interest in things that you usually enjoy.     You are not getting better as expected. Where can you learn more? Go to http://www.woods.com/ and enter R117 to learn more about \"Fatigue: Care Instructions. \"  Current as of: February 9, 2022               Content Version: 13.5  © 2006-2022 [employer]. Care instructions adapted under license by [facility] ([facility]). If you have questions about a medical condition or this instruction, always ask your healthcare professional. [place] [address] any warranty or liability for your use of this information. Advance Directives: Care Instructions  Overview  An advance directive is a legal way to state your wishes at the end of your life. It tells your family and your doctor what to do if you can't say what you want. There are two main types of advance directives. You can change them any time your wishes change. Living will. This form tells your family and your doctor your wishes about life support and other treatment. The form is also called a declaration. Medical power of . This form lets you name a person to make treatment decisions for you when you can't speak for yourself. This person is called a health care agent (health care proxy, health care surrogate). The form is also called a durable power of  for health care. If you do not have an advance directive, decisions about your medical care may be made by a family member, or by a doctor or a  who doesn't know you. It may help to think of an advance directive as a gift to the people who care for you. If you have one, they won't have to make tough decisions by themselves. For more information, including forms for your state, see the [address] website (www.caringinfo.org/planning/advance-directives/). Follow-up care is a key part of your treatment and safety.  Be sure to make and go to all appointments, and call your doctor if you are having problems. It's also a good idea to know your test results and keep a list of the medicines you take. What should you include in an advance directive? Many states have a unique advance directive form. (It may ask you to address specific issues.) Or you might use a universal form that's approved by many states. If your form doesn't tell you what to address, it may be hard to know what to include in your advance directive. Use the questions below to help you get started. Who do you want to make decisions about your medical care if you are not able to? What life-support measures do you want if you have a serious illness that gets worse over time or can't be cured? What are you most afraid of that might happen? (Maybe you're afraid of having pain, losing your independence, or being kept alive by machines.)  Where would you prefer to die? (Your home? A hospital? A nursing home?)  Do you want to donate your organs when you die? Do you want certain Ten[employer] practices performed before you die? When should you call for help? Be sure to contact your doctor if you have any questions. Where can you learn more? Go to http://www.petty.com/ and enter R264 to learn more about \"Advance Directives: Care Instructions. \"  Current as of: June 16, 2022               Content Version: 13.5  © 3540-7987 [employer]. Care instructions adapted under license by [facility] ([facility]). If you have questions about a medical condition or this instruction, always ask your healthcare professional. [place] [address] any warranty or liability for your use of this information. Starting a Weight Loss Plan: Care Instructions  Overview     If you're thinking about losing weight, it can be hard to know where to start. Your doctor can help you set up a weight loss plan that best meets your needs.  You may want to take a class on nutrition or exercise, or you could join a weight loss support group. If you have questions about how to make changes to your eating or exercise habits, ask your doctor about seeing a registered dietitian or an exercise specialist.  It can be a big challenge to lose weight. But you don't have to make huge changes at once. Make small changes, and stick with them. When those changes become habit, add a few more changes. If you don't think you're ready to make changes right now, try to pick a date in the future. Make an appointment to see your doctor to discuss whether the time is right for you to start a plan. Follow-up care is a key part of your treatment and safety. Be sure to make and go to all appointments, and call your doctor if you are having problems. It's also a good idea to know your test results and keep a list of the medicines you take. How can you care for yourself at home? Set realistic goals. Many people expect to lose much more weight than is likely. A weight loss of 5% to 10% of your body weight may be enough to improve your health. Get family and friends involved to provide support. Talk to them about why you are trying to lose weight, and ask them to help. They can help by participating in exercise and having meals with you, even if they may be eating something different. Find what works best for you. If you do not have time or do not like to cook, a program that offers meal replacement bars or shakes may be better for you. Or if you like to prepare meals, finding a plan that includes daily menus and recipes may be best.  Ask your doctor about other health professionals who can help you achieve your weight loss goals. A dietitian can help you make healthy changes in your diet.   An exercise specialist or  can help you develop a safe and effective exercise program.  A counselor or psychiatrist can help you cope with issues such as depression, anxiety, or family problems that can make it hard to focus on weight loss.  Consider joining a support group for people who are trying to lose weight. Your doctor can suggest groups in your area. Where can you learn more? Go to http://www.woods.com/ and enter U357 to learn more about \"Starting a Weight Loss Plan: Care Instructions. \"  Current as of: August 25, 2022               Content Version: 13.5  © 2006-2022 [employer]. Care instructions adapted under license by [facility] ([facility]). If you have questions about a medical condition or this instruction, always ask your healthcare professional. [address] any warranty or liability for your use of this information. A Healthy Heart: Care Instructions  Your Care Instructions     Coronary artery disease, also called heart disease, occurs when a substance called plaque builds up in the vessels that supply oxygen-rich blood to your heart muscle. This can narrow the blood vessels and reduce blood flow. A heart attack happens when blood flow is completely blocked. A high-fat diet, smoking, and other factors increase the risk of heart disease. Your doctor has found that you have a chance of having heart disease. You can do lots of things to keep your heart healthy. It may not be easy, but you can change your diet, exercise more, and quit smoking. These steps really work to lower your chance of heart disease. Follow-up care is a key part of your treatment and safety. Be sure to make and go to all appointments, and call your doctor if you are having problems. It's also a good idea to know your test results and keep a list of the medicines you take. How can you care for yourself at home? Diet    Use less salt when you cook and eat. This helps lower your blood pressure. Taste food before salting. Add only a little salt when you think you need it.  With time, your taste buds will adjust to less salt.     Eat fewer snack items, fast foods, canned soups, and other high-salt, high-fat, processed foods.     Read food labels and try to avoid saturated and trans fats. They increase your risk of heart disease by raising cholesterol levels.     Limit the amount of solid fat-butter, margarine, and shortening-you eat. Use olive, peanut, or canola oil when you cook. Bake, broil, and steam foods instead of frying them.     Eat a variety of fruit and vegetables every day. Dark green, deep orange, red, or yellow fruits and vegetables are especially good for you. Examples include spinach, carrots, peaches, and berries.     Foods high in fiber can reduce your cholesterol and provide important vitamins and minerals. High-fiber foods include whole-grain cereals and breads, oatmeal, beans, brown rice, citrus fruits, and apples.     Eat lean proteins. Heart-healthy proteins include seafood, lean meats and poultry, eggs, beans, peas, nuts, seeds, and soy products.     Limit drinks and foods with added sugar. These include candy, desserts, and soda pop. Lifestyle changes    If your doctor recommends it, get more exercise. Walking is a good choice. Bit by bit, increase the amount you walk every day. Try for at least 30 minutes on most days of the week. You also may want to swim, bike, or do other activities.     Do not smoke. If you need help quitting, talk to your doctor about stop-smoking programs and medicines. These can increase your chances of quitting for good. Quitting smoking may be the most important step you can take to protect your heart. It is never too late to quit.     Limit alcohol to 2 drinks a day for men and 1 drink a day for women. Too much alcohol can cause health problems.     Manage other health problems such as diabetes, high blood pressure, and high cholesterol. If you think you may have a problem with alcohol or drug use, talk to your doctor. Medicines    Take your medicines exactly as prescribed.  Call your doctor if you think you are having a problem with your medicine.     If your doctor recommends aspirin, take the amount directed each day. Make sure you take aspirin and not another kind of pain reliever, such as acetaminophen (Tylenol). When should you call for help? Call 911 if you have symptoms of a heart attack. These may include:    Chest pain or pressure, or a strange feeling in the chest.     Sweating.     Shortness of breath.     Pain, pressure, or a strange feeling in the back, neck, jaw, or upper belly or in one or both shoulders or arms.     Lightheadedness or sudden weakness.     A fast or irregular heartbeat. After you call 911, the  may tell you to chew 1 adult-strength or 2 to 4 low-dose aspirin. Wait for an ambulance. Do not try to drive yourself. Watch closely for changes in your health, and be sure to contact your doctor if you have any problems. Where can you learn more? Go to http://www.petty.com/ and enter F075 to learn more about \"A Healthy Heart: Care Instructions. \"  Current as of: September 7, 2022               Content Version: 13.5  © 6768-6547 Healthwise, [employer]. Care instructions adapted under license by [facility] ([facility]). If you have questions about a medical condition or this instruction, always ask your healthcare professional. [address] any warranty or liability for your use of this information. Personalized Preventive Plan for Shyla Chinchilla - 2/14/2023  Medicare offers a range of preventive health benefits. Some of the tests and screenings are paid in full while other may be subject to a deductible, co-insurance, and/or copay. Some of these benefits include a comprehensive review of your medical history including lifestyle, illnesses that may run in your family, and various assessments and screenings as appropriate. After reviewing your medical record and screening and assessments performed today your provider may have ordered immunizations, labs, imaging, and/or referrals for you.   A list of these orders (if applicable) as well as your Preventive Care list are included within your After Visit Summary for your review. Other Preventive Recommendations:    A preventive eye exam performed by an eye specialist is recommended every 1-2 years to screen for glaucoma; cataracts, macular degeneration, and other eye disorders. A preventive dental visit is recommended every 6 months. Try to get at least 150 minutes of exercise per week or 10,000 steps per day on a pedometer . Order or download the FREE \"Exercise & Physical Activity: Your Everyday Guide\" from [employer]. Call 3-926.324.7995 or search [employer] online. You need 5025-9996 mg of calcium and 5886-6017 IU of vitamin D per day. It is possible to meet your calcium requirement with diet alone, but a vitamin D supplement is usually necessary to meet this goal.  When exposed to the sun, use a sunscreen that protects against both UVA and UVB radiation with an SPF of 30 or greater. Reapply every 2 to 3 hours or after sweating, drying off with a towel, or swimming. Always wear a seat belt when traveling in a car. Always wear a helmet when riding a bicycle or motorcycle.

## 2023-02-14 NOTE — PROGRESS NOTES
[address] [facility]ICINE  [address] [address] [address] [place] [id number]  Dept: [phone]  Dept Fax: [phone]: [phone]    Fatmata Gambino (:  1955) is a 78 y.o. male,Established patient  with green , here for evaluation of the following chief complaint(s): Medicare AWV      Fatmata Gambino is a 78 y.o. male who presents today for his medical conditions/complaints as noted below. Fatmata Gambino is c/bryon Medicare AW        HPI:     Chief Complaint   Patient presents with    Medicare AWV       HPI    Hypertension he is on losartan 100 daily  Afib with frequent RVR exacerbations; he is on amiodarone 200 daily and he is on Cardizem 120 twice daily and metoprolol 100 daily  Chronic DVT  on Xarelto 20 daily  Lymphoma followed by Dr. Stacey Lewis; he is currently receiving infusions  History of prostate cancer  6. Hyperlipidemia he takes fenofibrate 160 daily and Crestor 10 daily  7. Obesity   9..  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   Vitamin D deficiency he is taking 2000 units daily      Past Medical History:   Diagnosis Date    Atrial fibrillation ([address]     Cellulitis of left leg     Colon polyp     Elevated blood pressure reading without diagnosis of hypertension     Esophageal reflux     History of prostate cancer     Hx of blood clots     Hypertension     Mixed hyperlipidemia     TG>>LDL    Obesity     Pericardial effusion     Prostate cancer [facility])      Dr Kristie Sears;  implants, XRT      Past Surgical History:   Procedure Laterality Date    BONE MARROW BIOPSY N/A 2022    BONE MARROW ASPIRATION BIOPSY performed by Iza Chandra PA-C at [facility]    COLONOSCOPY  2009        COLONOSCOPY  2012        LYMPH NODE BIOPSY Left 2023    EXCISION OF CERVICLE LYMPH NODY-BIOPSY performed by Cheyenne Justice MD at [address] [place] [address] N/A 2022    single lumen port placement with ultrasound and fluoroscopy performed by Vesta Pak MD at [facility] [address]  3/22/2000        VASECTOMY         Vitals 2/14/2023 2/7/2023 1/24/2023 1/23/2023 1/23/2023 6/10/2905   SYSTOLIC 304 347 016 933 319 180   DIASTOLIC 74 88 74 61 54 51   Pulse 127 113 84 70 69 65   Temp 96.9 - 98.2 - - -   Resp - - 18 18 18 18   SpO2 97 - 96 - - -   Weight 322 lb 325 lb - - - -   Height 6' 1\" 6' 1\" - - - -   Body mass index 42.48 kg/m2 42.87 kg/m2 - - - -   Some recent data might be hidden       Family History   Problem Relation Age of Onset    Colon Polyps Mother     Heart Disease Father     Cancer Brother         prostate    Colon Polyps Brother     High Blood Pressure Other        Social History     Tobacco Use    Smoking status: Never    Smokeless tobacco: Never   Substance Use Topics    Alcohol use: No      Current Outpatient Medications   Medication Sig Dispense Refill    busPIRone (BUSPAR) 5 MG tablet Take 1 tablet by mouth 3 times daily 90 tablet 1    docusate sodium (COLACE) 100 MG capsule Take 1 capsule by mouth 2 times daily 180 capsule 1    amiodarone (CORDARONE) 200 MG tablet TAKE ONE TABLET BY MOUTH EVERY DAY 30 tablet 1    allopurinol (ZYLOPRIM) 300 MG tablet Take 1 tablet by mouth daily 90 tablet 1    predniSONE (DELTASONE) 50 MG tablet Take 2 tablets by mouth See Admin Instructions for 30 doses Days 1 - 5 of a 28 day cycle x 6 cycles (for treatment of Follicular Lymphoma) 60 tablet 0    ondansetron (ZOFRAN-ODT) 4 MG disintegrating tablet Take 1 tablet by mouth every 8 hours as needed for Nausea or Vomiting 10 tablet 2    rivaroxaban (XARELTO) 20 MG TABS tablet TAKE 1 TABLET EVERY DAY WITH BREAKFAST (Patient taking differently: 20 mg daily (with breakfast) TAKE 1 TABLET EVERY DAY WITH BREAKFAST) 90 tablet 1    rosuvastatin (CRESTOR) 10 MG tablet TAKE 1 TABLET EVERY NIGHT (Patient taking differently: Take 10 mg by mouth daily) 90 tablet 1    metoprolol succinate (TOPROL XL) 100 MG extended release tablet TAKE 1 TABLET BY MOUTH IN THE MORNING. 90 tablet 1    losartan (COZAAR) 100 MG tablet TAKE 1 TABLET EVERY DAY (Patient taking differently: Take 100 mg by mouth daily) 90 tablet 1    dilTIAZem (CARDIZEM CD) 120 MG extended release capsule Take 1 capsule by mouth in the morning and at bedtime 60 capsule 3    Cholecalciferol (VITAMIN D3) 50 MCG (2000 UT) CAPS TAKE 1 CAPSULE EVERY DAY (Patient taking differently: Take 2,000 Units by mouth daily) 90 capsule 1    Multiple Vitamin (MULTI-VITAMIN PO) Take 1 tablet by mouth daily      fenofibrate 160 MG tablet Take 1 tablet by mouth daily 90 tablet 1     No current facility-administered medications for this visit.      Allergies   Allergen Reactions    Pcn [Penicillins] Rash       Health Maintenance   Topic Date Due    DTaP/Tdap/Td vaccine (1 - Tdap) Never done    Colorectal Cancer Screen  05/30/2022    Prostate Specific Antigen (PSA) Screening or Monitoring  09/29/2022    Shingles vaccine (1 of 2) 03/29/2023 (Originally 6/7/1974)    Pneumococcal 65+ years Vaccine (1 - PCV) 08/21/2023 (Originally 6/7/1961)    COVID-19 Vaccine (3 - Moderna risk series) 10/16/2023 (Originally 10/24/2021)    Lipids  10/06/2023    GFR test (Diabetes, CKD 3-4, OR last GFR 15-59)  02/13/2024    Depression Screen  02/14/2024    Annual Wellness Visit (AWV)  02/15/2024    Diabetes screen  10/06/2025    Flu vaccine  Completed    Hepatitis C screen  Completed    Hepatitis A vaccine  Aged Out    Hib vaccine  Aged Out    Meningococcal (ACWY) vaccine  Aged Out       Lab Results   Component Value Date    LABA1C 5.4 10/06/2022     Lab Results   Component Value Date    PSA 0.51 09/29/2021    PSA 0.79 09/15/2020    PSA 0.52 01/02/2020     TSH   Date Value Ref Range Status   10/07/2022 2.380 0.270 - 4.200 uIU/mL Final   ]  Lab Results   Component Value Date     02/13/2023    K 4.7 02/13/2023     02/13/2023    CO2 24 02/13/2023    BUN 39 (H) 02/13/2023    CREATININE 1.4 (H) 02/13/2023    GLUCOSE 123 (H) 02/13/2023    CALCIUM 8.9 02/13/2023    PROT 5.3 (L) 02/13/2023    LABALBU 3.7 02/13/2023    BILITOT 0.9 02/13/2023    ALKPHOS 56 02/13/2023    AST 30 02/13/2023    ALT 63 02/13/2023    LABGLOM 55 (A) 02/13/2023    GFRAA >59 10/09/2022    GLOB 1.6 02/13/2023     Lab Results   Component Value Date    CHOL 125 (L) 10/06/2022    CHOL 164 06/30/2022    CHOL 191 03/28/2022     Lab Results   Component Value Date    TRIG 178 (H) 10/06/2022    TRIG 175 (H) 06/30/2022    TRIG 191 (H) 06/29/2022     Lab Results   Component Value Date    HDL 33 (L) 10/06/2022    HDL 29 (L) 06/30/2022    HDL 33 (L) 03/28/2022     Lab Results   Component Value Date    LDLCALC 56 10/06/2022    LDLCALC 100 06/30/2022    LDLCALC 111 03/28/2022     Lab Results   Component Value Date/Time     02/13/2023 08:31 AM    K 4.7 02/13/2023 08:31 AM    K 4.2 10/09/2022 07:54 AM     02/13/2023 08:31 AM    CO2 24 02/13/2023 08:31 AM    BUN 39 02/13/2023 08:31 AM    CREATININE 1.4 02/13/2023 08:31 AM    GLUCOSE 123 02/13/2023 08:31 AM    CALCIUM 8.9 02/13/2023 08:31 AM      Lab Results   Component Value Date    WBC 9.71 (H) 02/13/2023    HGB 14.5 02/13/2023    HCT 42.9 02/13/2023    MCV 83.8 02/13/2023     02/13/2023    LYMPHOPCT 5.6 (L) 02/13/2023    RBC 5.12 02/13/2023    MCH 28.3 02/13/2023    MCHC 33.8 02/13/2023    RDW 13.6 02/13/2023     Lab Results   Component Value Date    VITD25 52.0 06/29/2022     Labs reviewed from 2/13/2023    Subjective:      Review of Systems   Constitutional:  Negative for fatigue, fever and unexpected weight change. HENT:  Negative for ear discharge, ear pain, mouth sores, sore throat and trouble swallowing. Eyes:  Negative for discharge, itching and visual disturbance. Respiratory:  Negative for cough, choking, shortness of breath, wheezing and stridor. Cardiovascular:  Negative for chest pain, palpitations and leg swelling.    Gastrointestinal:  Positive for abdominal pain. Negative for abdominal distention, blood in stool, constipation, diarrhea, nausea and vomiting. Endocrine: Negative for cold intolerance, polydipsia and polyuria. Genitourinary:  Negative for difficulty urinating, dysuria, frequency and urgency. Musculoskeletal:  Negative for arthralgias and gait problem. Skin:  Negative for color change and rash. Allergic/Immunologic: Negative for food allergies and immunocompromised state. Neurological:  Negative for dizziness, tremors, syncope, speech difficulty, weakness and headaches. Hematological:  Negative for adenopathy. Does not bruise/bleed easily. Psychiatric/Behavioral:  Negative for confusion and hallucinations. Objective:     Physical Exam  Constitutional:       General: He is not in acute distress. Appearance: He is well-developed. HENT:      Head: Normocephalic and atraumatic. Eyes:      General: No scleral icterus. Right eye: No discharge. Left eye: No discharge. Pupils: Pupils are equal, round, and reactive to light. Neck:      Thyroid: No thyromegaly. Vascular: No JVD. Cardiovascular:      Rate and Rhythm: Normal rate and regular rhythm. Heart sounds: Normal heart sounds. No murmur heard. Pulmonary:      Effort: Pulmonary effort is normal. No respiratory distress. Breath sounds: Normal breath sounds. No wheezing or rales. Abdominal:      General: Bowel sounds are normal. There is no distension. Palpations: Abdomen is soft. There is no mass. Tenderness: There is no abdominal tenderness. There is no guarding or rebound. Musculoskeletal:         General: No tenderness. Normal range of motion. Cervical back: Normal range of motion and neck supple. Skin:     General: Skin is warm and dry. Findings: No erythema or rash. Neurological:      Mental Status: He is alert and oriented to person, place, and time. Cranial Nerves: No cranial nerve deficit. Coordination: Coordination normal.      Deep Tendon Reflexes: Reflexes are normal and symmetric. Reflexes normal.   Psychiatric:         Mood and Affect: Mood is not depressed. Behavior: Behavior normal.         Thought Content:  Thought content normal.         Judgment: Judgment normal.     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 Iveth Urena          Relevant Medications    allopurinol (ZYLOPRIM) 300 MG tablet    predniSONE (DELTASONE) 50 MG tablet    Atrial fibrillation (HCC) - Primary    Relevant Medications    dilTIAZem (CARDIZEM CD) 120 MG extended release capsule    losartan (COZAAR) 100 MG tablet    rosuvastatin (CRESTOR) 10 MG tablet    metoprolol succinate (TOPROL XL) 100 MG extended release tablet    rivaroxaban (XARELTO) 20 MG TABS tablet    Atrial fibrillation with RVR (HCC)     He does have frequent exacerbations he is on amiodarone 200 daily Cardizem 120 twice daily metoprolol 100 daily and Xarelto          Relevant Medications    dilTIAZem (CARDIZEM CD) 120 MG extended release capsule    losartan (COZAAR) 100 MG tablet    rosuvastatin (CRESTOR) 10 MG tablet    metoprolol succinate (TOPROL XL) 100 MG extended release tablet    rivaroxaban (XARELTO) 20 MG TABS tablet    Other Relevant Orders    Vitamin D 25 Hydroxy    TSH    History of gout     Stable with allopurinol          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          History of prostate cancer     This has been normal with normal PSAs          Mixed hyperlipidemia     He is stable with fenofibrate 160 Crestor 10          Relevant Medications    dilTIAZem (CARDIZEM CD) 120 MG extended release capsule    losartan (COZAAR) 100 MG tablet    rosuvastatin (CRESTOR) 10 MG tablet    metoprolol succinate (TOPROL XL) 100 MG extended release tablet    rivaroxaban (XARELTO) 20 MG TABS tablet    amiodarone (CORDARONE) 200 MG tablet    Other Relevant Orders    CBC with Auto Differential    Comprehensive Metabolic Panel    Lipid Panel    Morbid obesity ([address]     He is not interested in any weight adjustments          Vitamin D deficiency     Stable with current dose of 2000 daily          Relevant Medications    Cholecalciferol (VITAMIN D3) 50 MCG (2000 UT) CAPS    Other Relevant Orders    Vitamin D 25 Hydroxy       Plan:        Patient given educational materials - see patient instructions. Discussed use, benefit, and side effects of prescribed medications. Allpatient questions answered. Pt voiced understanding. Reviewed health maintenance. Instructed to continue current medications, diet and exercise. Patient agreed with treatment plan. Follow up as directed. MEDICATIONS:  Orders Placed This Encounter   Medications    busPIRone (BUSPAR) 5 MG tablet     Sig: Take 1 tablet by mouth 3 times daily     Dispense:  90 tablet     Refill:  1           ORDERS:  Orders Placed This Encounter   Procedures    CBC with Auto Differential    Comprehensive Metabolic Panel    Vitamin D 25 Hydroxy    TSH    Lipid Panel         Follow-up:  Return in about 3 months (around 5/14/2023) for have labs done prior to appt. PATIENT INSTRUCTIONS:  History of prostate cancer  2. Persistent atrial fibs with frequent exacerbations with rapid ventricular rate he is on Cordarone Cardizem Xarelto metoprolol  3. Lymphoma treated by Dr. Miroslava Daley and he is currently undergoing infusions  4. Hyperlipidemia  5. Morbid obesity  6. Old head injury  7. History of gout  8. Vitamin D deficiency  Patient Instructions   PATIENT INSTRUCTIONS:  History of prostate cancer  2. Persistent atrial fibs with frequent exacerbations with rapid ventricular rate he is on Cordarone Cardizem Xarelto metoprolol  3.   Lymphoma treated by Dr. Miroslava Daley and he is currently undergoing infusions  4. Hyperlipidemia  5. Morbid obesity  6. Old head injury  7. History of gout    Electronically signed by Karyl Osler, APRN on 2/14/2023 at 1:37 PM    @    EMRDragon/transcription disclaimer:  Much of this encounter note is electronic transcription/translation of spoken language to printed texts. The electronic translation of spoken language may be erroneous, or at times,nonsensical words or phrases may be inadvertently transcribed. 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  Recommended screening schedule for the next 5-10 years is provided to the patient in written form: see Patient Instructions/AVS.     Return in about 3 months (around 5/14/2023) for have labs done prior to appt. Subjective       Patient's complete Health Risk Assessment and screening values have been reviewed and are found in Flowsheets. The following problems were reviewed today and where indicated follow up appointments were made and/or referrals ordered. Positive Risk Factor Screenings with Interventions:               General HRA Questions:  Select all that apply: (!) New or Increased Fatigue    Fatigue Interventions:  He is getting CHemo for lymphoma        Weight and Activity:  Physical Activity: Insufficiently Active    Days of Exercise per Week: 2 days    Minutes of Exercise per Session: 20 min     On average, how many days per week do you engage in moderate to strenuous exercise (like a brisk walk)?: 2 days  Have you lost any weight without trying in the past 3 months?: No  Body mass index: (!) 42.48  Obesity Interventions:  Encouraged to walk                                Objective   Vitals:    02/14/23 1237   BP: 128/74   Pulse: (!) 127   Temp: 96.9 °F (36.1 °C)   SpO2: 97%   Weight: (!) 322 lb (146.1 kg)   Height: 6' 1\" (1.854 m)      Body mass index is 42.48 kg/m². Allergies   Allergen Reactions    Pcn [Penicillins] Rash     Prior to Visit Medications    Medication Sig Taking?  Authorizing Provider   busPIRone (BUSPAR) 5 MG tablet Take 1 tablet by mouth 3 times daily Yes JODY Dunham   docusate sodium (COLACE) 100 MG capsule Take 1 capsule by mouth 2 times daily Yes JODY Dunham   amiodarone (CORDARONE) 200 MG tablet TAKE ONE TABLET BY MOUTH EVERY DAY Yes JODY Dunham   allopurinol (ZYLOPRIM) 300 MG tablet Take 1 tablet by mouth daily Yes Soren Collins MD   predniSONE (DELTASONE) 50 MG tablet Take 2 tablets by mouth See Admin Instructions for 30 doses Days 1 - 5 of a 28 day cycle x 6 cycles (for treatment of Follicular Lymphoma) Yes Terrial Gravely Mellisa Palomares MD   ondansetron (ZOFRAN-ODT) 4 MG disintegrating tablet Take 1 tablet by mouth every 8 hours as needed for Nausea or Vomiting Yes Zee Phillips MD   rivaroxaban (XARELTO) 20 MG TABS tablet TAKE 1 TABLET EVERY DAY WITH BREAKFAST  Patient taking differently: 20 mg daily (with breakfast) TAKE 1 TABLET EVERY DAY WITH BREAKFAST Yes JODY Peterson   rosuvastatin (CRESTOR) 10 MG tablet TAKE 1 TABLET EVERY NIGHT  Patient taking differently: Take 10 mg by mouth daily Yes JODY Peterson   metoprolol succinate (TOPROL XL) 100 MG extended release tablet TAKE 1 TABLET BY MOUTH IN THE MORNING.  Yes JOYD Peterson   losartan (COZAAR) 100 MG tablet TAKE 1 TABLET EVERY DAY  Patient taking differently: Take 100 mg by mouth daily Yes JODY Peterson   dilTIAZem (CARDIZEM CD) 120 MG extended release capsule Take 1 capsule by mouth in the morning and at bedtime Yes Concepción Stokes MD   Cholecalciferol (VITAMIN D3) 50 MCG (2000 UT) [facility] [address]  Patient taking differently: Take 2,000 Units by mouth daily Yes JODY Peterson   Multiple Vitamin (MULTI-VITAMIN PO) Take 1 tablet by mouth daily Yes Historical Provider, MD   fenofibrate 160 MG tablet Take 1 tablet by mouth daily  JODY Peterson       CareTeam (Including outside providers/suppliers regularly involved in providing care):   Patient Care Team:  JODY Peterson as PCP - General (Nurse Practitioner Acute Care)  JODY Peterson as PCP - Empaneled Provider     Reviewed and updated this visit:  Allergies  Meds              JODY Peterson

## 2023-02-14 NOTE — ASSESSMENT & PLAN NOTE
He does have frequent exacerbations he is on amiodarone 200 daily Cardizem 120 twice daily metoprolol 100 daily and Xarelto

## 2023-02-16 DIAGNOSIS — I10 ESSENTIAL HYPERTENSION: ICD-10-CM

## 2023-02-20 ENCOUNTER — HOSPITAL ENCOUNTER (OUTPATIENT)
Dept: INFUSION THERAPY | Age: 68
End: 2023-02-20

## 2023-02-20 DIAGNOSIS — E55.9 VITAMIN D DEFICIENCY: ICD-10-CM

## 2023-02-20 DIAGNOSIS — C82.48 FOLLICULAR LYMPHOMA GRADE IIIB OF LYMPH NODES OF MULTIPLE SITES (HCC): ICD-10-CM

## 2023-02-20 DIAGNOSIS — Z51.11 ENCOUNTER FOR CHEMOTHERAPY MANAGEMENT: ICD-10-CM

## 2023-02-20 RX ORDER — AMLODIPINE BESYLATE 5 MG/1
TABLET ORAL
Qty: 90 TABLET | Refills: 1 | OUTPATIENT
Start: 2023-02-20

## 2023-02-20 RX ORDER — ACETAMINOPHEN 160 MG
TABLET,DISINTEGRATING ORAL
Qty: 90 CAPSULE | Refills: 1 | Status: SHIPPED | OUTPATIENT
Start: 2023-02-20 | End: 2023-05-20

## 2023-02-20 RX ORDER — PREDNISONE 50 MG/1
100 TABLET ORAL SEE ADMIN INSTRUCTIONS
Qty: 60 TABLET | Refills: 0 | Status: SHIPPED | OUTPATIENT
Start: 2023-02-20

## 2023-02-20 RX ORDER — AMIODARONE HYDROCHLORIDE 200 MG/1
TABLET ORAL
Qty: 30 TABLET | Refills: 1 | Status: SHIPPED | OUTPATIENT
Start: 2023-02-20

## 2023-02-20 NOTE — TELEPHONE ENCOUNTER
Herberth Purcell called requesting a refill of the below medication which has been pended for you:     Requested Prescriptions     Pending Prescriptions Disp Refills    amiodarone (CORDARONE) 200 MG tablet 30 tablet 1     Sig: TAKE ONE TABLET BY MOUTH EVERY DAY    predniSONE (DELTASONE) 50 MG tablet 60 tablet 0     Sig: Take 2 tablets by mouth See Admin Instructions for 30 doses Days 1 - 5 of a 28 day cycle x 6 cycles (for treatment of Follicular Lymphoma)       Last Appointment Date: 2/14/2023  Next Appointment Date: 5/15/2023    Allergies   Allergen Reactions    Pcn [Penicillins] Rash

## 2023-02-21 ENCOUNTER — HOSPITAL ENCOUNTER (OUTPATIENT)
Dept: INFUSION THERAPY | Age: 68
Discharge: HOME | End: 2023-02-21

## 2023-02-21 DIAGNOSIS — C82.48 FOLLICULAR LYMPHOMA GRADE IIIB OF LYMPH NODES OF MULTIPLE SITES (HCC): Primary | ICD-10-CM

## 2023-02-21 RX ORDER — ALBUTEROL SULFATE 90 UG/1
4 AEROSOL, METERED RESPIRATORY (INHALATION) PRN
OUTPATIENT
Start: 2023-02-24

## 2023-02-21 RX ORDER — SODIUM CHLORIDE 9 MG/ML
5-250 INJECTION, SOLUTION INTRAVENOUS PRN
OUTPATIENT
Start: 2023-02-24

## 2023-02-21 RX ORDER — MEPERIDINE HYDROCHLORIDE 25 MG/ML
12.5 INJECTION INTRAMUSCULAR; INTRAVENOUS; SUBCUTANEOUS PRN
OUTPATIENT
Start: 2023-02-24

## 2023-02-21 RX ORDER — ONDANSETRON 2 MG/ML
8 INJECTION INTRAMUSCULAR; INTRAVENOUS
Status: CANCELLED | OUTPATIENT
Start: 2023-02-23

## 2023-02-21 RX ORDER — ACETAMINOPHEN 500 MG
1000 TABLET ORAL ONCE
Status: CANCELLED | OUTPATIENT
Start: 2023-02-23 | End: 2023-02-23

## 2023-02-21 RX ORDER — ALBUTEROL SULFATE 90 UG/1
4 AEROSOL, METERED RESPIRATORY (INHALATION) PRN
Status: CANCELLED | OUTPATIENT
Start: 2023-02-23

## 2023-02-21 RX ORDER — SODIUM CHLORIDE 9 MG/ML
INJECTION, SOLUTION INTRAVENOUS CONTINUOUS
Status: CANCELLED | OUTPATIENT
Start: 2023-02-23

## 2023-02-21 RX ORDER — SODIUM CHLORIDE 9 MG/ML
5-40 INJECTION INTRAVENOUS PRN
Status: CANCELLED | OUTPATIENT
Start: 2023-02-23

## 2023-02-21 RX ORDER — SODIUM CHLORIDE 9 MG/ML
5-40 INJECTION INTRAVENOUS PRN
Status: CANCELLED | OUTPATIENT
Start: 2023-02-24

## 2023-02-21 RX ORDER — ONDANSETRON 2 MG/ML
8 INJECTION INTRAMUSCULAR; INTRAVENOUS
OUTPATIENT
Start: 2023-02-24

## 2023-02-21 RX ORDER — MEPERIDINE HYDROCHLORIDE 25 MG/ML
12.5 INJECTION INTRAMUSCULAR; INTRAVENOUS; SUBCUTANEOUS PRN
Status: CANCELLED | OUTPATIENT
Start: 2023-02-23

## 2023-02-21 RX ORDER — EPINEPHRINE 1 MG/ML
0.3 INJECTION, SOLUTION, CONCENTRATE INTRAVENOUS PRN
Status: CANCELLED | OUTPATIENT
Start: 2023-02-23

## 2023-02-21 RX ORDER — HEPARIN SODIUM (PORCINE) LOCK FLUSH IV SOLN 100 UNIT/ML 100 UNIT/ML
500 SOLUTION INTRAVENOUS PRN
Status: CANCELLED | OUTPATIENT
Start: 2023-02-24

## 2023-02-21 RX ORDER — SODIUM CHLORIDE 9 MG/ML
5-250 INJECTION, SOLUTION INTRAVENOUS PRN
Status: CANCELLED | OUTPATIENT
Start: 2023-02-23

## 2023-02-21 RX ORDER — DIPHENHYDRAMINE HYDROCHLORIDE 50 MG/ML
50 INJECTION INTRAMUSCULAR; INTRAVENOUS
Status: CANCELLED | OUTPATIENT
Start: 2023-02-23

## 2023-02-21 RX ORDER — ACETAMINOPHEN 325 MG/1
650 TABLET ORAL
OUTPATIENT
Start: 2023-02-24

## 2023-02-21 RX ORDER — PALONOSETRON 0.05 MG/ML
0.25 INJECTION, SOLUTION INTRAVENOUS ONCE
Status: CANCELLED | OUTPATIENT
Start: 2023-02-23 | End: 2023-02-23

## 2023-02-21 RX ORDER — ACETAMINOPHEN 325 MG/1
650 TABLET ORAL
Status: CANCELLED | OUTPATIENT
Start: 2023-02-23

## 2023-02-21 RX ORDER — HEPARIN SODIUM (PORCINE) LOCK FLUSH IV SOLN 100 UNIT/ML 100 UNIT/ML
500 SOLUTION INTRAVENOUS PRN
Status: CANCELLED | OUTPATIENT
Start: 2023-02-23

## 2023-02-21 RX ORDER — EPINEPHRINE 1 MG/ML
0.3 INJECTION, SOLUTION, CONCENTRATE INTRAVENOUS PRN
OUTPATIENT
Start: 2023-02-24

## 2023-02-21 RX ORDER — SODIUM CHLORIDE 9 MG/ML
INJECTION, SOLUTION INTRAVENOUS CONTINUOUS
OUTPATIENT
Start: 2023-02-24

## 2023-02-21 RX ORDER — DIPHENHYDRAMINE HYDROCHLORIDE 50 MG/ML
50 INJECTION INTRAMUSCULAR; INTRAVENOUS
OUTPATIENT
Start: 2023-02-24

## 2023-02-23 ENCOUNTER — HOSPITAL ENCOUNTER (OUTPATIENT)
Dept: INFUSION THERAPY | Age: 68
Discharge: HOME | End: 2023-02-23
Payer: MEDICARE

## 2023-02-23 VITALS
SYSTOLIC BLOOD PRESSURE: 110 MMHG | TEMPERATURE: 97.8 F | OXYGEN SATURATION: 97 % | DIASTOLIC BLOOD PRESSURE: 64 MMHG | WEIGHT: 315 LBS | HEIGHT: 73 IN | BODY MASS INDEX: 41.75 KG/M2 | HEART RATE: 95 BPM | RESPIRATION RATE: 18 BRPM

## 2023-02-23 DIAGNOSIS — C82.48 FOLLICULAR LYMPHOMA GRADE IIIB OF LYMPH NODES OF MULTIPLE SITES (HCC): Primary | ICD-10-CM

## 2023-02-23 LAB
ALBUMIN SERPL-MCNC: 3.2 G/DL (ref 3.5–5.2)
ALP BLD-CCNC: 61 U/L (ref 40–130)
ALT SERPL-CCNC: 70 U/L (ref 21–72)
ANION GAP SERPL CALCULATED.3IONS-SCNC: 4 MMOL/L (ref 7–19)
AST SERPL-CCNC: 26 U/L (ref 17–59)
BILIRUB SERPL-MCNC: 0.9 MG/DL (ref 0.2–1.3)
BUN BLDV-MCNC: 30 MG/DL (ref 9–20)
CALCIUM SERPL-MCNC: 8.7 MG/DL (ref 8.4–10.2)
CHLORIDE BLD-SCNC: 106 MMOL/L (ref 98–111)
CO2: 27 MMOL/L (ref 22–29)
CREAT SERPL-MCNC: 1.4 MG/DL (ref 0.6–1.2)
GFR SERPL CREATININE-BSD FRML MDRD: 55 ML/MIN/{1.73_M2}
GLOBULIN: 2 G/DL
GLUCOSE BLD-MCNC: 200 MG/DL (ref 74–106)
HCT VFR BLD CALC: 41.9 % (ref 40.1–51)
HEMOGLOBIN: 14 G/DL (ref 13.7–17.5)
LYMPHOCYTES ABSOLUTE: 0.85 K/UL (ref 1.18–3.74)
LYMPHOCYTES RELATIVE PERCENT: 21.4 % (ref 19.3–53.1)
MCH RBC QN AUTO: 28.3 PG (ref 25.7–32.2)
MCHC RBC AUTO-ENTMCNC: 33.4 G/DL (ref 32.3–36.5)
MCV RBC AUTO: 84.6 FL (ref 79–92.2)
MONOCYTES ABSOLUTE: 0.11 K/UL (ref 0.24–0.82)
MONOCYTES RELATIVE PERCENT: 2.8 % (ref 4.7–12.5)
NEUTROPHILS ABSOLUTE: 2.96 K/UL (ref 1.56–6.13)
NEUTROPHILS RELATIVE PERCENT: 74.5 % (ref 34–71.1)
PDW BLD-RTO: 14.1 % (ref 11.6–14.4)
PLATELET # BLD: 124 K/UL (ref 163–337)
PMV BLD AUTO: 9.2 FL (ref 7.4–10.4)
POTASSIUM SERPL-SCNC: 4.2 MMOL/L (ref 3.5–5.1)
RBC # BLD: 4.95 M/UL (ref 4.63–6.08)
SODIUM BLD-SCNC: 137 MMOL/L (ref 137–145)
TOTAL PROTEIN: 5.2 G/DL (ref 6.3–8.2)
WBC # BLD: 3.97 K/UL (ref 4.23–9.07)

## 2023-02-23 PROCEDURE — 36415 COLL VENOUS BLD VENIPUNCTURE: CPT

## 2023-02-23 PROCEDURE — 96413 CHEMO IV INFUSION 1 HR: CPT

## 2023-02-23 PROCEDURE — 80053 COMPREHEN METABOLIC PANEL: CPT

## 2023-02-23 PROCEDURE — 6360000002 HC RX W HCPCS: Performed by: INTERNAL MEDICINE

## 2023-02-23 PROCEDURE — 2580000003 HC RX 258: Performed by: INTERNAL MEDICINE

## 2023-02-23 PROCEDURE — 6370000000 HC RX 637 (ALT 250 FOR IP): Performed by: INTERNAL MEDICINE

## 2023-02-23 PROCEDURE — 96375 TX/PRO/DX INJ NEW DRUG ADDON: CPT

## 2023-02-23 PROCEDURE — 96411 CHEMO IV PUSH ADDL DRUG: CPT

## 2023-02-23 PROCEDURE — 96415 CHEMO IV INFUSION ADDL HR: CPT

## 2023-02-23 PROCEDURE — 85025 COMPLETE CBC W/AUTO DIFF WBC: CPT

## 2023-02-23 RX ORDER — HEPARIN SODIUM (PORCINE) LOCK FLUSH IV SOLN 100 UNIT/ML 100 UNIT/ML
500 SOLUTION INTRAVENOUS PRN
Status: DISCONTINUED | OUTPATIENT
Start: 2023-02-23 | End: 2023-02-24 | Stop reason: HOSPADM

## 2023-02-23 RX ORDER — SODIUM CHLORIDE 9 MG/ML
5-250 INJECTION, SOLUTION INTRAVENOUS PRN
Status: DISCONTINUED | OUTPATIENT
Start: 2023-02-23 | End: 2023-02-24 | Stop reason: HOSPADM

## 2023-02-23 RX ORDER — ACETAMINOPHEN 500 MG
1000 TABLET ORAL ONCE
Status: COMPLETED | OUTPATIENT
Start: 2023-02-23 | End: 2023-02-23

## 2023-02-23 RX ORDER — PALONOSETRON 0.05 MG/ML
0.25 INJECTION, SOLUTION INTRAVENOUS ONCE
Status: COMPLETED | OUTPATIENT
Start: 2023-02-23 | End: 2023-02-23

## 2023-02-23 RX ORDER — SODIUM CHLORIDE 9 MG/ML
5-40 INJECTION INTRAVENOUS PRN
Status: DISCONTINUED | OUTPATIENT
Start: 2023-02-23 | End: 2023-02-24 | Stop reason: HOSPADM

## 2023-02-23 RX ADMIN — BENDAMUSTINE HYDROCHLORIDE 250 MG: 25 INJECTION, SOLUTION INTRAVENOUS at 11:16

## 2023-02-23 RX ADMIN — DEXAMETHASONE SODIUM PHOSPHATE 10 MG: 10 INJECTION, SOLUTION INTRAMUSCULAR; INTRAVENOUS at 09:12

## 2023-02-23 RX ADMIN — ACETAMINOPHEN 1000 MG: 500 TABLET, FILM COATED ORAL at 09:11

## 2023-02-23 RX ADMIN — HEPARIN 500 UNITS: 100 SYRINGE at 11:31

## 2023-02-23 RX ADMIN — SODIUM CHLORIDE 25 ML/HR: 9 INJECTION, SOLUTION INTRAVENOUS at 09:13

## 2023-02-23 RX ADMIN — DIPHENHYDRAMINE HYDROCHLORIDE 50 MG: 50 INJECTION, SOLUTION INTRAMUSCULAR; INTRAVENOUS at 09:22

## 2023-02-23 RX ADMIN — SODIUM CHLORIDE, PRESERVATIVE FREE 10 ML: 5 INJECTION INTRAVENOUS at 11:31

## 2023-02-23 RX ADMIN — SODIUM CHLORIDE 1000 MG: 0.9 INJECTION, SOLUTION INTRAVENOUS at 09:38

## 2023-02-23 RX ADMIN — PALONOSETRON 0.25 MG: 0.05 INJECTION, SOLUTION INTRAVENOUS at 09:11

## 2023-02-24 ENCOUNTER — HOSPITAL ENCOUNTER (OUTPATIENT)
Dept: INFUSION THERAPY | Age: 68
Discharge: HOME | End: 2023-02-24
Payer: MEDICARE

## 2023-02-24 VITALS
OXYGEN SATURATION: 97 % | TEMPERATURE: 97.8 F | HEART RATE: 123 BPM | DIASTOLIC BLOOD PRESSURE: 67 MMHG | SYSTOLIC BLOOD PRESSURE: 104 MMHG | RESPIRATION RATE: 20 BRPM

## 2023-02-24 DIAGNOSIS — C82.48 FOLLICULAR LYMPHOMA GRADE IIIB OF LYMPH NODES OF MULTIPLE SITES (HCC): Primary | ICD-10-CM

## 2023-02-24 PROCEDURE — 96367 TX/PROPH/DG ADDL SEQ IV INF: CPT

## 2023-02-24 PROCEDURE — 6360000002 HC RX W HCPCS: Performed by: INTERNAL MEDICINE

## 2023-02-24 PROCEDURE — 96413 CHEMO IV INFUSION 1 HR: CPT

## 2023-02-24 PROCEDURE — 2580000003 HC RX 258: Performed by: INTERNAL MEDICINE

## 2023-02-24 RX ORDER — SODIUM CHLORIDE 9 MG/ML
5-40 INJECTION INTRAVENOUS PRN
Status: DISCONTINUED | OUTPATIENT
Start: 2023-02-24 | End: 2023-02-25 | Stop reason: HOSPADM

## 2023-02-24 RX ORDER — HEPARIN SODIUM (PORCINE) LOCK FLUSH IV SOLN 100 UNIT/ML 100 UNIT/ML
500 SOLUTION INTRAVENOUS PRN
Status: DISCONTINUED | OUTPATIENT
Start: 2023-02-24 | End: 2023-02-25 | Stop reason: HOSPADM

## 2023-02-24 RX ADMIN — HEPARIN 500 UNITS: 100 SYRINGE at 14:34

## 2023-02-24 RX ADMIN — DEXAMETHASONE SODIUM PHOSPHATE 16 MG: 10 INJECTION, SOLUTION INTRAMUSCULAR; INTRAVENOUS at 13:39

## 2023-02-24 RX ADMIN — SODIUM CHLORIDE, PRESERVATIVE FREE 10 ML: 5 INJECTION INTRAVENOUS at 14:34

## 2023-02-24 RX ADMIN — BENDAMUSTINE HYDROCHLORIDE 250 MG: 25 INJECTION, SOLUTION INTRAVENOUS at 14:14

## 2023-02-26 DIAGNOSIS — I10 ESSENTIAL HYPERTENSION: ICD-10-CM

## 2023-02-27 RX ORDER — AMLODIPINE BESYLATE 5 MG/1
TABLET ORAL
Qty: 90 TABLET | Refills: 1 | Status: ON HOLD | OUTPATIENT
Start: 2023-02-27 | End: 2023-03-03 | Stop reason: HOSPADM

## 2023-02-27 NOTE — TELEPHONE ENCOUNTER
Stas Rogers called requesting a refill of the below medication which has been pended for you:     Requested Prescriptions     Pending Prescriptions Disp Refills    amLODIPine (NORVASC) 5 MG tablet [Pharmacy Med Name: AMLODIPINE BESYLATE 5 MG Tablet] 90 tablet 1     Sig: TAKE 1 TABLET EVERY DAY       Last Appointment Date: 2/14/2023  Next Appointment Date: 5/15/2023    Allergies   Allergen Reactions    Pcn [Penicillins] Rash

## 2023-02-28 ENCOUNTER — HOSPITAL ENCOUNTER (INPATIENT)
Age: 68
LOS: 2 days | Discharge: HOME | DRG: 308 | End: 2023-03-03
Attending: EMERGENCY MEDICINE | Admitting: STUDENT IN AN ORGANIZED HEALTH CARE EDUCATION/TRAINING PROGRAM
Payer: MEDICARE

## 2023-02-28 ENCOUNTER — APPOINTMENT (OUTPATIENT)
Dept: GENERAL RADIOLOGY | Age: 68
DRG: 308 | End: 2023-02-28
Payer: MEDICARE

## 2023-02-28 DIAGNOSIS — R53.1 GENERAL WEAKNESS: ICD-10-CM

## 2023-02-28 DIAGNOSIS — C82.48 FOLLICULAR LYMPHOMA GRADE IIIB OF LYMPH NODES OF MULTIPLE SITES (HCC): ICD-10-CM

## 2023-02-28 DIAGNOSIS — I48.91 ATRIAL FIBRILLATION WITH RVR (HCC): Primary | ICD-10-CM

## 2023-02-28 LAB
ALBUMIN SERPL-MCNC: 3.4 G/DL (ref 3.5–5.2)
ALP BLD-CCNC: 57 U/L (ref 40–130)
ALT SERPL-CCNC: 69 U/L (ref 5–41)
ANION GAP SERPL CALCULATED.3IONS-SCNC: 11 MMOL/L (ref 7–19)
AST SERPL-CCNC: 41 U/L (ref 5–40)
ATYPICAL LYMPHOCYTE RELATIVE PERCENT: 4 % (ref 0–8)
BANDED NEUTROPHILS RELATIVE PERCENT: 6 % (ref 0–5)
BASOPHILS ABSOLUTE: 0 K/UL (ref 0–0.2)
BASOPHILS RELATIVE PERCENT: 0 % (ref 0–1)
BILIRUB SERPL-MCNC: 1 MG/DL (ref 0.2–1.2)
BILIRUBIN URINE: ABNORMAL
BLOOD, URINE: NEGATIVE
BUN BLDV-MCNC: 29 MG/DL (ref 8–23)
CALCIUM SERPL-MCNC: 8.6 MG/DL (ref 8.8–10.2)
CHLORIDE BLD-SCNC: 103 MMOL/L (ref 98–111)
CLARITY: ABNORMAL
CO2: 25 MMOL/L (ref 22–29)
COLOR: ABNORMAL
CREAT SERPL-MCNC: 1.7 MG/DL (ref 0.5–1.2)
EOSINOPHILS ABSOLUTE: 0.04 K/UL (ref 0–0.6)
EOSINOPHILS RELATIVE PERCENT: 2 % (ref 0–5)
FOLATE: 12.8 NG/ML (ref 4.5–32.2)
GFR SERPL CREATININE-BSD FRML MDRD: 44 ML/MIN/{1.73_M2}
GLUCOSE BLD-MCNC: 127 MG/DL (ref 74–109)
GLUCOSE URINE: NEGATIVE MG/DL
HCT VFR BLD CALC: 44.4 % (ref 42–52)
HEMOGLOBIN: 14.7 G/DL (ref 14–18)
HYALINE CASTS: NORMAL /LPF (ref 0–5)
IMMATURE GRANULOCYTES #: 0 K/UL
KETONES, URINE: ABNORMAL MG/DL
LEUKOCYTE ESTERASE, URINE: NEGATIVE
LYMPHOCYTES ABSOLUTE: 0.2 K/UL (ref 1.1–4.5)
LYMPHOCYTES RELATIVE PERCENT: 6 % (ref 20–40)
MAGNESIUM: 2.3 MG/DL (ref 1.6–2.4)
MCH RBC QN AUTO: 27.9 PG (ref 27–31)
MCHC RBC AUTO-ENTMCNC: 33.1 G/DL (ref 33–37)
MCV RBC AUTO: 84.3 FL (ref 80–94)
MONOCYTES ABSOLUTE: 0.1 K/UL (ref 0–0.9)
MONOCYTES RELATIVE PERCENT: 4 % (ref 0–10)
NEUTROPHILS ABSOLUTE: 1.6 K/UL (ref 1.5–7.5)
NEUTROPHILS RELATIVE PERCENT: 78 % (ref 50–65)
NITRITE, URINE: NEGATIVE
OVALOCYTES: ABNORMAL
PDW BLD-RTO: 14.1 % (ref 11.5–14.5)
PH UA: 5 (ref 5–8)
PLATELET # BLD: 119 K/UL (ref 130–400)
PLATELET SLIDE REVIEW: ABNORMAL
PMV BLD AUTO: 9.9 FL (ref 9.4–12.4)
POTASSIUM SERPL-SCNC: 4.2 MMOL/L (ref 3.5–5)
PRO-BNP: 429 PG/ML (ref 0–900)
PROTEIN UA: 100 MG/DL
RBC # BLD: 5.27 M/UL (ref 4.7–6.1)
SARS-COV-2, NAAT: NOT DETECTED
SODIUM BLD-SCNC: 139 MMOL/L (ref 136–145)
SPECIFIC GRAVITY UA: 1.04 (ref 1–1.03)
TOTAL PROTEIN: 5.9 G/DL (ref 6.6–8.7)
TROPONIN: <0.01 NG/ML (ref 0–0.03)
TSH SERPL DL<=0.05 MIU/L-ACNC: 0.3 UIU/ML (ref 0.27–4.2)
UROBILINOGEN, URINE: 1 E.U./DL
VITAMIN B-12: 425 PG/ML (ref 211–946)
VITAMIN D 25-HYDROXY: 29.5 NG/ML
WBC # BLD: 1.9 K/UL (ref 4.8–10.8)

## 2023-02-28 PROCEDURE — 83735 ASSAY OF MAGNESIUM: CPT

## 2023-02-28 PROCEDURE — 71045 X-RAY EXAM CHEST 1 VIEW: CPT | Performed by: RADIOLOGY

## 2023-02-28 PROCEDURE — 6360000002 HC RX W HCPCS: Performed by: HOSPITALIST

## 2023-02-28 PROCEDURE — 96372 THER/PROPH/DIAG INJ SC/IM: CPT

## 2023-02-28 PROCEDURE — 99285 EMERGENCY DEPT VISIT HI MDM: CPT

## 2023-02-28 PROCEDURE — 80053 COMPREHEN METABOLIC PANEL: CPT

## 2023-02-28 PROCEDURE — 84443 ASSAY THYROID STIM HORMONE: CPT

## 2023-02-28 PROCEDURE — G0378 HOSPITAL OBSERVATION PER HR: HCPCS

## 2023-02-28 PROCEDURE — 93005 ELECTROCARDIOGRAM TRACING: CPT | Performed by: NURSE PRACTITIONER

## 2023-02-28 PROCEDURE — 83880 ASSAY OF NATRIURETIC PEPTIDE: CPT

## 2023-02-28 PROCEDURE — 96361 HYDRATE IV INFUSION ADD-ON: CPT

## 2023-02-28 PROCEDURE — 71045 X-RAY EXAM CHEST 1 VIEW: CPT

## 2023-02-28 PROCEDURE — 87635 SARS-COV-2 COVID-19 AMP PRB: CPT

## 2023-02-28 PROCEDURE — 85025 COMPLETE CBC W/AUTO DIFF WBC: CPT

## 2023-02-28 PROCEDURE — 2500000003 HC RX 250 WO HCPCS: Performed by: NURSE PRACTITIONER

## 2023-02-28 PROCEDURE — 2140000000 HC CCU INTERMEDIATE R&B

## 2023-02-28 PROCEDURE — 84484 ASSAY OF TROPONIN QUANT: CPT

## 2023-02-28 PROCEDURE — 2580000003 HC RX 258: Performed by: NURSE PRACTITIONER

## 2023-02-28 PROCEDURE — 82746 ASSAY OF FOLIC ACID SERUM: CPT

## 2023-02-28 PROCEDURE — 82607 VITAMIN B-12: CPT

## 2023-02-28 PROCEDURE — 82306 VITAMIN D 25 HYDROXY: CPT

## 2023-02-28 PROCEDURE — 96374 THER/PROPH/DIAG INJ IV PUSH: CPT

## 2023-02-28 PROCEDURE — 81001 URINALYSIS AUTO W/SCOPE: CPT

## 2023-02-28 PROCEDURE — 36415 COLL VENOUS BLD VENIPUNCTURE: CPT

## 2023-02-28 PROCEDURE — 2580000003 HC RX 258: Performed by: HOSPITALIST

## 2023-02-28 PROCEDURE — 6370000000 HC RX 637 (ALT 250 FOR IP): Performed by: HOSPITALIST

## 2023-02-28 PROCEDURE — 2500000003 HC RX 250 WO HCPCS: Performed by: HOSPITALIST

## 2023-02-28 RX ORDER — ROSUVASTATIN CALCIUM 10 MG/1
10 TABLET, COATED ORAL NIGHTLY
Status: DISCONTINUED | OUTPATIENT
Start: 2023-03-01 | End: 2023-03-03 | Stop reason: HOSPADM

## 2023-02-28 RX ORDER — DILTIAZEM HYDROCHLORIDE 5 MG/ML
10 INJECTION INTRAVENOUS ONCE
Status: COMPLETED | OUTPATIENT
Start: 2023-02-28 | End: 2023-02-28

## 2023-02-28 RX ORDER — DILTIAZEM HYDROCHLORIDE 5 MG/ML
20 INJECTION INTRAVENOUS ONCE
Status: COMPLETED | OUTPATIENT
Start: 2023-02-28 | End: 2023-02-28

## 2023-02-28 RX ORDER — SODIUM CHLORIDE 0.9 % (FLUSH) 0.9 %
5-40 SYRINGE (ML) INJECTION PRN
Status: DISCONTINUED | OUTPATIENT
Start: 2023-02-28 | End: 2023-03-02 | Stop reason: SDUPTHER

## 2023-02-28 RX ORDER — ALLOPURINOL 300 MG/1
300 TABLET ORAL DAILY
Status: DISCONTINUED | OUTPATIENT
Start: 2023-03-01 | End: 2023-03-03 | Stop reason: HOSPADM

## 2023-02-28 RX ORDER — ERGOCALCIFEROL 1.25 MG/1
50000 CAPSULE ORAL WEEKLY
Status: DISCONTINUED | OUTPATIENT
Start: 2023-03-01 | End: 2023-03-03 | Stop reason: HOSPADM

## 2023-02-28 RX ORDER — METOPROLOL SUCCINATE 50 MG/1
100 TABLET, EXTENDED RELEASE ORAL DAILY
Status: DISCONTINUED | OUTPATIENT
Start: 2023-03-01 | End: 2023-03-01

## 2023-02-28 RX ORDER — ENOXAPARIN SODIUM 150 MG/ML
1 INJECTION SUBCUTANEOUS 2 TIMES DAILY
Status: DISCONTINUED | OUTPATIENT
Start: 2023-02-28 | End: 2023-03-01 | Stop reason: DRUGHIGH

## 2023-02-28 RX ORDER — ACETAMINOPHEN 325 MG/1
650 TABLET ORAL EVERY 6 HOURS PRN
Status: DISCONTINUED | OUTPATIENT
Start: 2023-02-28 | End: 2023-03-03 | Stop reason: HOSPADM

## 2023-02-28 RX ORDER — 0.9 % SODIUM CHLORIDE 0.9 %
500 INTRAVENOUS SOLUTION INTRAVENOUS ONCE
Status: COMPLETED | OUTPATIENT
Start: 2023-02-28 | End: 2023-02-28

## 2023-02-28 RX ORDER — SODIUM CHLORIDE 0.9 % (FLUSH) 0.9 %
5-40 SYRINGE (ML) INJECTION EVERY 12 HOURS SCHEDULED
Status: DISCONTINUED | OUTPATIENT
Start: 2023-02-28 | End: 2023-03-02 | Stop reason: SDUPTHER

## 2023-02-28 RX ORDER — ACETAMINOPHEN 650 MG/1
650 SUPPOSITORY RECTAL EVERY 6 HOURS PRN
Status: DISCONTINUED | OUTPATIENT
Start: 2023-02-28 | End: 2023-03-03 | Stop reason: HOSPADM

## 2023-02-28 RX ORDER — SODIUM CHLORIDE 9 MG/ML
INJECTION, SOLUTION INTRAVENOUS PRN
Status: DISCONTINUED | OUTPATIENT
Start: 2023-02-28 | End: 2023-03-02 | Stop reason: SDUPTHER

## 2023-02-28 RX ORDER — DOCUSATE SODIUM 100 MG/1
100 CAPSULE, LIQUID FILLED ORAL 2 TIMES DAILY
Status: DISCONTINUED | OUTPATIENT
Start: 2023-02-28 | End: 2023-03-03 | Stop reason: HOSPADM

## 2023-02-28 RX ORDER — FENOFIBRATE 54 MG/1
54 TABLET ORAL DAILY
Status: DISCONTINUED | OUTPATIENT
Start: 2023-03-01 | End: 2023-03-03 | Stop reason: HOSPADM

## 2023-02-28 RX ORDER — POLYETHYLENE GLYCOL 3350 17 G/17G
17 POWDER, FOR SOLUTION ORAL DAILY PRN
Status: DISCONTINUED | OUTPATIENT
Start: 2023-02-28 | End: 2023-03-03 | Stop reason: HOSPADM

## 2023-02-28 RX ORDER — DILTIAZEM HYDROCHLORIDE 120 MG/1
120 CAPSULE, COATED, EXTENDED RELEASE ORAL 2 TIMES DAILY
Status: DISCONTINUED | OUTPATIENT
Start: 2023-02-28 | End: 2023-03-03 | Stop reason: HOSPADM

## 2023-02-28 RX ORDER — ONDANSETRON 4 MG/1
4 TABLET, ORALLY DISINTEGRATING ORAL EVERY 8 HOURS PRN
Status: DISCONTINUED | OUTPATIENT
Start: 2023-02-28 | End: 2023-03-03 | Stop reason: HOSPADM

## 2023-02-28 RX ORDER — SODIUM CHLORIDE 9 MG/ML
INJECTION, SOLUTION INTRAVENOUS CONTINUOUS
Status: DISCONTINUED | OUTPATIENT
Start: 2023-02-28 | End: 2023-03-02

## 2023-02-28 RX ORDER — BUSPIRONE HYDROCHLORIDE 5 MG/1
5 TABLET ORAL 3 TIMES DAILY
Status: DISCONTINUED | OUTPATIENT
Start: 2023-02-28 | End: 2023-03-03 | Stop reason: HOSPADM

## 2023-02-28 RX ORDER — DILTIAZEM HYDROCHLORIDE 5 MG/ML
10 INJECTION INTRAVENOUS ONCE
Status: DISCONTINUED | OUTPATIENT
Start: 2023-02-28 | End: 2023-02-28

## 2023-02-28 RX ORDER — AMIODARONE HYDROCHLORIDE 200 MG/1
200 TABLET ORAL DAILY
Status: DISCONTINUED | OUTPATIENT
Start: 2023-03-01 | End: 2023-03-01

## 2023-02-28 RX ORDER — ONDANSETRON 2 MG/ML
4 INJECTION INTRAMUSCULAR; INTRAVENOUS EVERY 6 HOURS PRN
Status: DISCONTINUED | OUTPATIENT
Start: 2023-02-28 | End: 2023-03-03 | Stop reason: HOSPADM

## 2023-02-28 RX ADMIN — BUSPIRONE HYDROCHLORIDE 5 MG: 5 TABLET ORAL at 21:10

## 2023-02-28 RX ADMIN — DILTIAZEM HYDROCHLORIDE 10 MG: 5 INJECTION INTRAVENOUS at 21:15

## 2023-02-28 RX ADMIN — SODIUM CHLORIDE: 9 INJECTION, SOLUTION INTRAVENOUS at 20:59

## 2023-02-28 RX ADMIN — ENOXAPARIN SODIUM 150 MG: 150 INJECTION SUBCUTANEOUS at 21:10

## 2023-02-28 RX ADMIN — DILTIAZEM HYDROCHLORIDE 20 MG: 5 INJECTION INTRAVENOUS at 19:18

## 2023-02-28 RX ADMIN — DILTIAZEM HYDROCHLORIDE 5 MG/HR: 5 INJECTION, SOLUTION INTRAVENOUS at 21:22

## 2023-02-28 RX ADMIN — DILTIAZEM HYDROCHLORIDE 120 MG: 120 CAPSULE, COATED, EXTENDED RELEASE ORAL at 21:10

## 2023-02-28 RX ADMIN — SODIUM CHLORIDE 500 ML: 9 INJECTION, SOLUTION INTRAVENOUS at 17:34

## 2023-02-28 RX ADMIN — DOCUSATE SODIUM 100 MG: 100 CAPSULE, LIQUID FILLED ORAL at 21:10

## 2023-02-28 ASSESSMENT — ENCOUNTER SYMPTOMS: VOMITING: 0

## 2023-02-28 ASSESSMENT — LIFESTYLE VARIABLES: HOW OFTEN DO YOU HAVE A DRINK CONTAINING ALCOHOL: NEVER

## 2023-02-28 NOTE — ED NOTES
Pt placed on cardiac monitor, and sp02.  alarms on      Renella Simón, [place]  02/28/23 1170

## 2023-03-01 DIAGNOSIS — C82.48 FOLLICULAR LYMPHOMA GRADE IIIB OF LYMPH NODES OF MULTIPLE SITES (HCC): Primary | ICD-10-CM

## 2023-03-01 LAB
ADENOVIRUS BY PCR: NOT DETECTED
ALBUMIN SERPL-MCNC: 2.7 G/DL (ref 3.5–5.2)
ALP BLD-CCNC: 46 U/L (ref 40–130)
ALT SERPL-CCNC: 56 U/L (ref 5–41)
AMORPHOUS: ABNORMAL /HPF
ANION GAP SERPL CALCULATED.3IONS-SCNC: 9 MMOL/L (ref 7–19)
AST SERPL-CCNC: 32 U/L (ref 5–40)
ATYPICAL LYMPHOCYTE RELATIVE PERCENT: 3 % (ref 0–8)
BACTERIA: ABNORMAL /HPF
BANDED NEUTROPHILS RELATIVE PERCENT: 3 % (ref 0–5)
BASOPHILS ABSOLUTE: 0 K/UL (ref 0–0.2)
BASOPHILS RELATIVE PERCENT: 0 % (ref 0–1)
BILIRUB SERPL-MCNC: 0.8 MG/DL (ref 0.2–1.2)
BILIRUBIN URINE: NEGATIVE
BLOOD, URINE: NEGATIVE
BORDETELLA PARAPERTUSSIS BY PCR: NOT DETECTED
BORDETELLA PERTUSSIS BY PCR: NOT DETECTED
BUN BLDV-MCNC: 27 MG/DL (ref 8–23)
CALCIUM SERPL-MCNC: 7.7 MG/DL (ref 8.8–10.2)
CHLAMYDOPHILIA PNEUMONIAE BY PCR: NOT DETECTED
CHLORIDE BLD-SCNC: 103 MMOL/L (ref 98–111)
CLARITY: ABNORMAL
CO2: 25 MMOL/L (ref 22–29)
COLOR: YELLOW
CORONAVIRUS 229E BY PCR: NOT DETECTED
CORONAVIRUS HKU1 BY PCR: NOT DETECTED
CORONAVIRUS NL63 BY PCR: NOT DETECTED
CORONAVIRUS OC43 BY PCR: NOT DETECTED
CREAT SERPL-MCNC: 1.5 MG/DL (ref 0.5–1.2)
EKG P AXIS: NORMAL DEGREES
EKG P-R INTERVAL: NORMAL MS
EKG Q-T INTERVAL: 282 MS
EKG QRS DURATION: 80 MS
EKG QTC CALCULATION (BAZETT): 429 MS
EKG T AXIS: 82 DEGREES
EOSINOPHILS ABSOLUTE: 0.04 K/UL (ref 0–0.6)
EOSINOPHILS RELATIVE PERCENT: 3 % (ref 0–5)
EPITHELIAL CELLS, UA: ABNORMAL /HPF
GFR SERPL CREATININE-BSD FRML MDRD: 51 ML/MIN/{1.73_M2}
GLUCOSE BLD-MCNC: 107 MG/DL (ref 70–99)
GLUCOSE BLD-MCNC: 108 MG/DL (ref 70–99)
GLUCOSE BLD-MCNC: 95 MG/DL (ref 74–109)
GLUCOSE URINE: NEGATIVE MG/DL
HCT VFR BLD CALC: 36.6 % (ref 42–52)
HEMOGLOBIN: 12.2 G/DL (ref 14–18)
HUMAN METAPNEUMOVIRUS BY PCR: NOT DETECTED
HUMAN RHINOVIRUS/ENTEROVIRUS BY PCR: NOT DETECTED
INFLUENZA A BY PCR: NOT DETECTED
INFLUENZA B BY PCR: NOT DETECTED
KETONES, URINE: NEGATIVE MG/DL
LACTIC ACID: 1.6 MMOL/L (ref 0.5–1.9)
LEUKOCYTE ESTERASE, URINE: NEGATIVE
LYMPHOCYTES ABSOLUTE: 0.1 K/UL (ref 1.1–4.5)
LYMPHOCYTES RELATIVE PERCENT: 7 % (ref 20–40)
MCH RBC QN AUTO: 28.3 PG (ref 27–31)
MCHC RBC AUTO-ENTMCNC: 33.3 G/DL (ref 33–37)
MCV RBC AUTO: 84.9 FL (ref 80–94)
MONOCYTES ABSOLUTE: 0.1 K/UL (ref 0–0.9)
MONOCYTES RELATIVE PERCENT: 4 % (ref 0–10)
MYCOPLASMA PNEUMONIAE BY PCR: NOT DETECTED
NEUTROPHILS ABSOLUTE: 1.1 K/UL (ref 1.5–7.5)
NEUTROPHILS RELATIVE PERCENT: 80 % (ref 50–65)
NITRITE, URINE: NEGATIVE
PARAINFLUENZA VIRUS 1 BY PCR: NOT DETECTED
PARAINFLUENZA VIRUS 2 BY PCR: NOT DETECTED
PARAINFLUENZA VIRUS 3 BY PCR: NOT DETECTED
PARAINFLUENZA VIRUS 4 BY PCR: NOT DETECTED
PDW BLD-RTO: 13.9 % (ref 11.5–14.5)
PERFORMED ON: ABNORMAL
PERFORMED ON: ABNORMAL
PH UA: 5 (ref 5–8)
PLATELET # BLD: 109 K/UL (ref 130–400)
PLATELET SLIDE REVIEW: ABNORMAL
PMV BLD AUTO: 9.4 FL (ref 9.4–12.4)
POTASSIUM SERPL-SCNC: 3.7 MMOL/L (ref 3.5–5)
PROTEIN UA: 30 MG/DL
RBC # BLD: 4.31 M/UL (ref 4.7–6.1)
RBC # BLD: NORMAL 10*6/UL
RBC UA: ABNORMAL /HPF (ref 0–2)
RESPIRATORY SYNCYTIAL VIRUS BY PCR: NOT DETECTED
SARS-COV-2, PCR: NOT DETECTED
SODIUM BLD-SCNC: 137 MMOL/L (ref 136–145)
SPECIFIC GRAVITY UA: 1.03 (ref 1–1.03)
TOTAL PROTEIN: 4.8 G/DL (ref 6.6–8.7)
TROPONIN: <0.01 NG/ML (ref 0–0.03)
TROPONIN: <0.01 NG/ML (ref 0–0.03)
UROBILINOGEN, URINE: 1 E.U./DL
WBC # BLD: 1.3 K/UL (ref 4.8–10.8)
WBC UA: ABNORMAL /HPF (ref 0–5)

## 2023-03-01 PROCEDURE — 6360000002 HC RX W HCPCS: Performed by: HOSPITALIST

## 2023-03-01 PROCEDURE — 96372 THER/PROPH/DIAG INJ SC/IM: CPT

## 2023-03-01 PROCEDURE — 97116 GAIT TRAINING THERAPY: CPT

## 2023-03-01 PROCEDURE — 6370000000 HC RX 637 (ALT 250 FOR IP): Performed by: STUDENT IN AN ORGANIZED HEALTH CARE EDUCATION/TRAINING PROGRAM

## 2023-03-01 PROCEDURE — 93010 ELECTROCARDIOGRAM REPORT: CPT | Performed by: INTERNAL MEDICINE

## 2023-03-01 PROCEDURE — 97535 SELF CARE MNGMENT TRAINING: CPT

## 2023-03-01 PROCEDURE — 80053 COMPREHEN METABOLIC PANEL: CPT

## 2023-03-01 PROCEDURE — 99223 1ST HOSP IP/OBS HIGH 75: CPT | Performed by: INTERNAL MEDICINE

## 2023-03-01 PROCEDURE — 94760 N-INVAS EAR/PLS OXIMETRY 1: CPT

## 2023-03-01 PROCEDURE — G0378 HOSPITAL OBSERVATION PER HR: HCPCS

## 2023-03-01 PROCEDURE — 0202U NFCT DS 22 TRGT SARS-COV-2: CPT

## 2023-03-01 PROCEDURE — 83605 ASSAY OF LACTIC ACID: CPT

## 2023-03-01 PROCEDURE — 2500000003 HC RX 250 WO HCPCS: Performed by: HOSPITALIST

## 2023-03-01 PROCEDURE — 2580000003 HC RX 258: Performed by: HOSPITALIST

## 2023-03-01 PROCEDURE — 2140000000 HC CCU INTERMEDIATE R&B

## 2023-03-01 PROCEDURE — 97165 OT EVAL LOW COMPLEX 30 MIN: CPT

## 2023-03-01 PROCEDURE — 84484 ASSAY OF TROPONIN QUANT: CPT

## 2023-03-01 PROCEDURE — 6370000000 HC RX 637 (ALT 250 FOR IP): Performed by: INTERNAL MEDICINE

## 2023-03-01 PROCEDURE — 87040 BLOOD CULTURE FOR BACTERIA: CPT

## 2023-03-01 PROCEDURE — 94761 N-INVAS EAR/PLS OXIMETRY MLT: CPT

## 2023-03-01 PROCEDURE — 6370000000 HC RX 637 (ALT 250 FOR IP): Performed by: HOSPITALIST

## 2023-03-01 PROCEDURE — 82962 GLUCOSE BLOOD TEST: CPT

## 2023-03-01 PROCEDURE — 36415 COLL VENOUS BLD VENIPUNCTURE: CPT

## 2023-03-01 PROCEDURE — 97161 PT EVAL LOW COMPLEX 20 MIN: CPT

## 2023-03-01 PROCEDURE — 81001 URINALYSIS AUTO W/SCOPE: CPT

## 2023-03-01 PROCEDURE — 85025 COMPLETE CBC W/AUTO DIFF WBC: CPT

## 2023-03-01 RX ORDER — DIPHENHYDRAMINE HYDROCHLORIDE 50 MG/ML
50 INJECTION INTRAMUSCULAR; INTRAVENOUS
OUTPATIENT
Start: 2023-03-24

## 2023-03-01 RX ORDER — SODIUM CHLORIDE 9 MG/ML
5-250 INJECTION, SOLUTION INTRAVENOUS PRN
OUTPATIENT
Start: 2023-03-24

## 2023-03-01 RX ORDER — SODIUM CHLORIDE 9 MG/ML
5-40 INJECTION INTRAVENOUS PRN
OUTPATIENT
Start: 2023-03-24

## 2023-03-01 RX ORDER — HEPARIN SODIUM (PORCINE) LOCK FLUSH IV SOLN 100 UNIT/ML 100 UNIT/ML
500 SOLUTION INTRAVENOUS PRN
OUTPATIENT
Start: 2023-03-24

## 2023-03-01 RX ORDER — SODIUM CHLORIDE 9 MG/ML
INJECTION, SOLUTION INTRAVENOUS CONTINUOUS
OUTPATIENT
Start: 2023-03-23

## 2023-03-01 RX ORDER — SODIUM CHLORIDE 9 MG/ML
5-250 INJECTION, SOLUTION INTRAVENOUS PRN
OUTPATIENT
Start: 2023-03-23

## 2023-03-01 RX ORDER — ONDANSETRON 2 MG/ML
8 INJECTION INTRAMUSCULAR; INTRAVENOUS
OUTPATIENT
Start: 2023-03-23

## 2023-03-01 RX ORDER — MEPERIDINE HYDROCHLORIDE 50 MG/ML
12.5 INJECTION INTRAMUSCULAR; INTRAVENOUS; SUBCUTANEOUS PRN
OUTPATIENT
Start: 2023-03-24

## 2023-03-01 RX ORDER — EPINEPHRINE 1 MG/ML
0.3 INJECTION, SOLUTION, CONCENTRATE INTRAVENOUS PRN
OUTPATIENT
Start: 2023-03-24

## 2023-03-01 RX ORDER — ALBUTEROL SULFATE 90 UG/1
4 AEROSOL, METERED RESPIRATORY (INHALATION) PRN
OUTPATIENT
Start: 2023-03-23

## 2023-03-01 RX ORDER — MEPERIDINE HYDROCHLORIDE 50 MG/ML
12.5 INJECTION INTRAMUSCULAR; INTRAVENOUS; SUBCUTANEOUS PRN
OUTPATIENT
Start: 2023-03-23

## 2023-03-01 RX ORDER — ONDANSETRON 2 MG/ML
8 INJECTION INTRAMUSCULAR; INTRAVENOUS
OUTPATIENT
Start: 2023-03-24

## 2023-03-01 RX ORDER — ALBUTEROL SULFATE 90 UG/1
4 AEROSOL, METERED RESPIRATORY (INHALATION) PRN
OUTPATIENT
Start: 2023-03-24

## 2023-03-01 RX ORDER — EPINEPHRINE 1 MG/ML
0.3 INJECTION, SOLUTION, CONCENTRATE INTRAVENOUS PRN
OUTPATIENT
Start: 2023-03-23

## 2023-03-01 RX ORDER — AMIODARONE HYDROCHLORIDE 200 MG/1
400 TABLET ORAL 2 TIMES DAILY
Status: DISCONTINUED | OUTPATIENT
Start: 2023-03-01 | End: 2023-03-03 | Stop reason: HOSPADM

## 2023-03-01 RX ORDER — SODIUM CHLORIDE 9 MG/ML
INJECTION, SOLUTION INTRAVENOUS PRN
Status: DISCONTINUED | OUTPATIENT
Start: 2023-03-01 | End: 2023-03-03 | Stop reason: HOSPADM

## 2023-03-01 RX ORDER — SODIUM CHLORIDE 0.9 % (FLUSH) 0.9 %
5-40 SYRINGE (ML) INJECTION EVERY 12 HOURS SCHEDULED
Status: DISCONTINUED | OUTPATIENT
Start: 2023-03-01 | End: 2023-03-03 | Stop reason: HOSPADM

## 2023-03-01 RX ORDER — ENOXAPARIN SODIUM 150 MG/ML
1 INJECTION SUBCUTANEOUS 2 TIMES DAILY
Status: DISCONTINUED | OUTPATIENT
Start: 2023-03-01 | End: 2023-03-02

## 2023-03-01 RX ORDER — DIPHENHYDRAMINE HYDROCHLORIDE 50 MG/ML
50 INJECTION INTRAMUSCULAR; INTRAVENOUS
OUTPATIENT
Start: 2023-03-23

## 2023-03-01 RX ORDER — SODIUM CHLORIDE 0.9 % (FLUSH) 0.9 %
5-40 SYRINGE (ML) INJECTION PRN
Status: DISCONTINUED | OUTPATIENT
Start: 2023-03-01 | End: 2023-03-03 | Stop reason: HOSPADM

## 2023-03-01 RX ORDER — ACETAMINOPHEN 325 MG/1
650 TABLET ORAL
OUTPATIENT
Start: 2023-03-23

## 2023-03-01 RX ORDER — HEPARIN SODIUM (PORCINE) LOCK FLUSH IV SOLN 100 UNIT/ML 100 UNIT/ML
500 SOLUTION INTRAVENOUS PRN
OUTPATIENT
Start: 2023-03-23

## 2023-03-01 RX ORDER — FAMOTIDINE 10 MG/ML
20 INJECTION, SOLUTION INTRAVENOUS
OUTPATIENT
Start: 2023-03-24

## 2023-03-01 RX ORDER — ACETAMINOPHEN 325 MG/1
1000 TABLET ORAL ONCE
OUTPATIENT
Start: 2023-03-23 | End: 2023-03-23

## 2023-03-01 RX ORDER — ACETAMINOPHEN 325 MG/1
650 TABLET ORAL
OUTPATIENT
Start: 2023-03-24

## 2023-03-01 RX ORDER — METOPROLOL SUCCINATE 50 MG/1
100 TABLET, EXTENDED RELEASE ORAL 2 TIMES DAILY
Status: DISCONTINUED | OUTPATIENT
Start: 2023-03-01 | End: 2023-03-03 | Stop reason: HOSPADM

## 2023-03-01 RX ORDER — SODIUM CHLORIDE 9 MG/ML
INJECTION, SOLUTION INTRAVENOUS CONTINUOUS
OUTPATIENT
Start: 2023-03-24

## 2023-03-01 RX ORDER — FAMOTIDINE 10 MG/ML
20 INJECTION, SOLUTION INTRAVENOUS
OUTPATIENT
Start: 2023-03-23

## 2023-03-01 RX ORDER — PALONOSETRON 0.05 MG/ML
0.25 INJECTION, SOLUTION INTRAVENOUS ONCE
OUTPATIENT
Start: 2023-03-23 | End: 2023-03-23

## 2023-03-01 RX ORDER — SODIUM CHLORIDE 9 MG/ML
5-40 INJECTION INTRAVENOUS PRN
OUTPATIENT
Start: 2023-03-23

## 2023-03-01 RX ADMIN — ENOXAPARIN SODIUM 135 MG: 150 INJECTION SUBCUTANEOUS at 09:03

## 2023-03-01 RX ADMIN — DOCUSATE SODIUM 100 MG: 100 CAPSULE, LIQUID FILLED ORAL at 20:38

## 2023-03-01 RX ADMIN — ALLOPURINOL 300 MG: 300 TABLET ORAL at 09:02

## 2023-03-01 RX ADMIN — ENOXAPARIN SODIUM 135 MG: 150 INJECTION SUBCUTANEOUS at 20:36

## 2023-03-01 RX ADMIN — AMIODARONE HYDROCHLORIDE 400 MG: 200 TABLET ORAL at 20:37

## 2023-03-01 RX ADMIN — DILTIAZEM HYDROCHLORIDE 120 MG: 120 CAPSULE, COATED, EXTENDED RELEASE ORAL at 10:47

## 2023-03-01 RX ADMIN — SODIUM CHLORIDE, PRESERVATIVE FREE 10 ML: 5 INJECTION INTRAVENOUS at 09:03

## 2023-03-01 RX ADMIN — DILTIAZEM HYDROCHLORIDE 120 MG: 120 CAPSULE, COATED, EXTENDED RELEASE ORAL at 20:38

## 2023-03-01 RX ADMIN — FENOFIBRATE 54 MG: 54 TABLET ORAL at 09:02

## 2023-03-01 RX ADMIN — BUSPIRONE HYDROCHLORIDE 5 MG: 5 TABLET ORAL at 09:02

## 2023-03-01 RX ADMIN — AMIODARONE HYDROCHLORIDE 200 MG: 200 TABLET ORAL at 09:02

## 2023-03-01 RX ADMIN — ROSUVASTATIN CALCIUM 10 MG: 10 TABLET, FILM COATED ORAL at 20:37

## 2023-03-01 RX ADMIN — VANCOMYCIN HYDROCHLORIDE 2500 MG: 10 INJECTION, POWDER, LYOPHILIZED, FOR SOLUTION INTRAVENOUS at 22:40

## 2023-03-01 RX ADMIN — SODIUM CHLORIDE, PRESERVATIVE FREE 10 ML: 5 INJECTION INTRAVENOUS at 20:38

## 2023-03-01 RX ADMIN — BUSPIRONE HYDROCHLORIDE 5 MG: 5 TABLET ORAL at 14:25

## 2023-03-01 RX ADMIN — DILTIAZEM HYDROCHLORIDE 5 MG/HR: 5 INJECTION, SOLUTION INTRAVENOUS at 20:51

## 2023-03-01 RX ADMIN — BUSPIRONE HYDROCHLORIDE 5 MG: 5 TABLET ORAL at 20:37

## 2023-03-01 RX ADMIN — METOPROLOL SUCCINATE 100 MG: 50 TABLET, EXTENDED RELEASE ORAL at 09:02

## 2023-03-01 RX ADMIN — ERGOCALCIFEROL 50000 UNITS: 1.25 CAPSULE ORAL at 09:02

## 2023-03-01 RX ADMIN — METOPROLOL SUCCINATE 100 MG: 50 TABLET, EXTENDED RELEASE ORAL at 20:37

## 2023-03-01 NOTE — PROGRESS NOTES
4 Eyes Skin Assessment    Gary Ferguson is being assessed upon: {Blank single:01035::\"Admission\",\"Transfer to New Unit\"}    I agree that I, Shagufta Alicea, RN, along with *** (either 2 RN's or 1 LPN and 1 RN) have performed a thorough Head to Toe Skin Assessment on the patient. ALL assessment sites listed below have been assessed. Areas assessed by both nurses:     [x]   Head, Face, and Ears   [x]   Shoulders, Back, and Chest  [x]   Arms, Elbows, and Hands   [x]   Coccyx, Sacrum, and Ischium  [x]   Legs, Feet, and Heels    Does the Patient have Skin Breakdown?  No    Kali Prevention initiated: No  Wound Care Orders initiated: No    [facility] nurse consulted for Pressure Injury (Stage 3,4, Unstageable, DTI, NWPT, and Complex wounds) and New or Established Ostomies: No        Primary Nurse eSignature: Lo Perez RN on 2/28/2023 at 11:32 PM      Co-Signer eSignature: {Esignature:[id number]}

## 2023-03-01 NOTE — PROGRESS NOTES
[facility] PHYSICAL THERAPY EVALUATION      Travis Jacome    : 1955  MRN: [id number]   PHYSICIAN:  Chapin Parada MD  Primary Problem    Patient Active Problem List   Diagnosis    Adenomatous colon polyp    S/P colonoscopic polypectomy    History of colonic polyps    Obesity    Mixed hyperlipidemia    Essential hypertension    History of prostate cancer    Morbid obesity ([facility]    Chronic deep vein thrombosis (DVT) of left peroneal vein (HCC)    Vitamin D deficiency    Cancer of prostate (HCC)    Gastroesophageal reflux disease without esophagitis    Irregular heart beat    Atrial fibrillation with RVR (HCC)    Atrial fibrillation (HCC)    Atrial fibrillation with rapid ventricular response (HCC)    Nasal septum ulceration    Epistaxis    Old head injury    Generalized enlarged lymph nodes    Follicular lymphoma grade 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 Reports being at baseline mobility, mother agrees. Pain: No complaint of pain    OBJECTIVE:    Orientation is Within normal limits           ACTIVE ROM:       All major lower extremity joints are within functional limits      STRENGTH     Both lower extremities are grossly within functional limits.     TRANSFERS   Sit to stand   CGA     Bed to chair   CGA    Bed to mobility   Supine to sit   Independent       Roll     Independent     Scoot Side to side / Up and down   Independent    AMBULATION   Distance: 10'     Device: NONE     Assistance: Independent       Comment:    BALANCE   Sitting    Good     Standing    Good    Tx Initiated: Amb 25' in room no ad IND    ASSESSMENT      Independent and safe with all functional mobility. No skilled physical therapy needs identified at this time. Encouraged to sit up with meals and amb in room or alvarez with family/nsg supervision. PLAN: Discharge from skilled physical therapy. Nursing has been updated.       GOALS   Independent and safe with all functional mobility (MET)            Electronically signed by Miriam Gonzales PT on 3/1/2023 at 10:12 AM

## 2023-03-01 NOTE — ED PROVIDER NOTES
[facility] EMERGENCY DEPT  eMERGENCY dEPARTMENT eNCOUnter      Pt Name: Yenny Gould  MRN: [id number]  [place] 1955  Date of evaluation: 2/28/2023  Provider: JODY Rodriguez    CHIEF COMPLAINT       Chief Complaint   Patient presents with    Failure To Thrive     Dr Fahad Liu pt, has not eaten in 3 days per mother and wont get out of bed. Mother states too weak to walk          HISTORY OF PRESENT ILLNESS   (Location/Symptom, Timing/Onset,Context/Setting, Quality, Duration, Modifying Factors, Severity)  Note limiting factors. Yenny Gould is a 78 y.o. male who presents to the emergency department with his family. He has not been eating or getting out of bed for the last 3 days. LAst chemo was 23 and 24. 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 Pt denies chest pain. Heart rate is 150. Pt does have a history of afib and is on eliquis. Pt tells me his legs are heavy. Poor historian    The history is provided by the patient, a parent and a relative. Extremity Weakness  Severity:  Severe  Onset quality:  Sudden  Duration:  3 days  Timing:  Constant  Progression:  Unchanged  Chronicity:  New  Context comment:  Chemo treatments  Associated symptoms: no chest pain, no fever and no vomiting      NursingNotes were reviewed. REVIEW OF SYSTEMS    (2-9 systems for level 4, 10 or more for level 5)     Review of Systems   Constitutional:  Negative for fever. Cardiovascular:  Negative for chest pain. Gastrointestinal:  Negative for vomiting. Neurological:  Positive for weakness. Except as noted above the remainder of the review of systems was reviewed and negative.        PAST MEDICAL HISTORY     Past Medical History:   Diagnosis Date    Atrial fibrillation ([address]     Cellulitis of left leg     Colon polyp     Elevated blood pressure reading without diagnosis of hypertension     Esophageal reflux     History of prostate cancer     Hx of blood clots     Hypertension     Mixed hyperlipidemia     TG>>LDL    Obesity     Pericardial effusion     Prostate cancer [facility])     2010 Dr [employer];  implants, XRT         SURGICALHISTORY       Past Surgical History:   Procedure Laterality Date    BONE MARROW BIOPSY N/A 12/9/2022    BONE MARROW ASPIRATION BIOPSY performed by Collette Croon, PA-C at [facility]  6/2/2009        COLONOSCOPY  2012        LYMPH NODE BIOPSY Left 1/5/2023    EXCISION OF CERVICLE LYMPH NODY-BIOPSY performed by Franklin Gong MD at [address] [facility] N/A 12/16/2022    single lumen port placement with ultrasound and fluoroscopy performed by Mable Toney MD at [address] [facility]  3/22/2000        VASECTOMY           CURRENT MEDICATIONS       Previous Medications    ALLOPURINOL (ZYLOPRIM) 300 MG TABLET    Take 1 tablet by mouth daily    AMIODARONE (CORDARONE) 200 MG TABLET    TAKE ONE TABLET BY MOUTH EVERY DAY    AMLODIPINE (NORVASC) 5 MG TABLET    TAKE 1 TABLET EVERY DAY    BUSPIRONE (BUSPAR) 5 MG TABLET    Take 1 tablet by mouth 3 times daily    CHOLECALCIFEROL (VITAMIN D3) 50 MCG (2000 UT) CAPS    TAKE 1 CAPSULE EVERY DAY    DILTIAZEM (CARDIZEM CD) 120 MG EXTENDED RELEASE CAPSULE    Take 1 capsule by mouth in the morning and at bedtime    DOCUSATE SODIUM (COLACE) 100 MG CAPSULE    Take 1 capsule by mouth 2 times daily    FENOFIBRATE 160 MG TABLET    Take 1 tablet by mouth daily    LOSARTAN (COZAAR) 100 MG TABLET    TAKE 1 TABLET EVERY DAY    METOPROLOL SUCCINATE (TOPROL XL) 100 MG EXTENDED RELEASE TABLET    TAKE 1 TABLET BY MOUTH IN THE MORNING.     MULTIPLE VITAMIN (MULTI-VITAMIN PO)    Take 1 tablet by mouth daily    ONDANSETRON (ZOFRAN-ODT) 4 MG DISINTEGRATING TABLET    Take 1 tablet by mouth every 8 hours as needed for Nausea or Vomiting    PREDNISONE (DELTASONE) 50 MG TABLET    Take 2 tablets by mouth See Admin Instructions for 30 doses Days 1 - 5 of a 28 day cycle x 6 cycles (for treatment of Follicular Lymphoma)    RIVAROXABAN (XARELTO) 20 MG TABS TABLET    TAKE 1 TABLET EVERY DAY WITH BREAKFAST    ROSUVASTATIN (CRESTOR) 10 MG TABLET    TAKE 1 TABLET EVERY NIGHT            Pcn [penicillins]    FAMILY HISTORY       Family History   Problem Relation Age of Onset    Colon Polyps Mother     Heart Disease Father     Cancer Brother         prostate    Colon Polyps Brother     High Blood Pressure Other           SOCIAL HISTORY       Social History     Socioeconomic History    Marital status: Single   Tobacco Use    Smoking status: Never    Smokeless tobacco: Never   Vaping Use    Vaping Use: Never used   Substance and Sexual Activity    Alcohol use: No    Drug use: No     Social Determinants of Health     Financial Resource Strain: Low Risk     Difficulty of Paying Living Expenses: Not hard at all   Food Insecurity: No Food Insecurity    Worried About Running Out of Food in the Last Year: Never true    Ran Out of Food in the Last Year: Never true   Transportation Needs: Unknown    Lack of Transportation (Non-Medical): No   Physical Activity: Insufficiently Active    Days of Exercise per Week: 2 days    Minutes of Exercise per Session: 20 min   Housing Stability: Unknown    Unstable Housing in the Last Year: No       SCREENINGS    Saul Coma Scale  Eye Opening: Spontaneous  Best Verbal Response: Oriented  Best Motor Response: Obeys commands  [place] Coma Scale Score: 15        PHYSICAL EXAM    (up to 7 for level 4, 8 or more for level 5)     ED Triage Vitals [02/28/23 1535]   BP Temp Temp Source Heart Rate Resp SpO2 Height Weight   127/76 98.7 °F (37.1 °C) Oral 68 16 96 % -- --       Physical Exam  Vitals and nursing note reviewed. Constitutional:       Appearance: Normal appearance. He is well-developed. He is obese. HENT:      Head: Normocephalic and atraumatic. Eyes:      General: No scleral icterus. Right eye: No discharge. Left eye: No discharge.    Cardiovascular:      Rate and Rhythm: Tachycardia present. Rhythm irregular. Heart sounds: Normal heart sounds. Pulmonary:      Effort: No respiratory distress. Breath sounds: Normal breath sounds. Musculoskeletal:      Cervical back: Normal range of motion and neck supple. Neurological:      Mental Status: He is alert and oriented to person, place, and time. Psychiatric:         Behavior: Behavior normal.       RESULTS     EKG: All EKG's are interpreted by the Emergency Department Physician who either signs or Co-signsthis chart in the absence of a cardiologist.  146 A-fib read at [phone] by Dr. Maritza Rodríguez:   Catheline Quant such as CT, Ultrasound and MRI are read by the radiologist. [facility] radiographic images are visualized and preliminarily interpreted by the emergency physician with the below findings:      Interpretation per the Radiologist below, if available at the time of this note:    XR CHEST PORTABLE   Final Result   No radiographic evidence of acute cardiopulmonary process. Mediastinal widening correlates to previously visualized mediastinal   lymphadenopathy and large amount of mediastinal fat.             ED BEDSIDEULTRASOUND:   Performed by ED Physician -none    LABS:  Labs Reviewed   COMPREHENSIVE METABOLIC PANEL - Abnormal; Notable for the following components:       Result Value    Glucose 127 (*)     BUN 29 (*)     Creatinine 1.7 (*)     Est, Glom Filt Rate 44 (*)     Calcium 8.6 (*)     Total Protein 5.9 (*)     Albumin 3.4 (*)     ALT 69 (*)     AST 41 (*)     All other components within normal limits   CBC WITH AUTO DIFFERENTIAL - Abnormal; Notable for the following components:    WBC 1.9 (*)     Platelets 393 (*)     Neutrophils % 78.0 (*)     Lymphocytes % 6.0 (*)     Lymphocytes Absolute 0.2 (*)     Bands Relative 6 (*)     Ovalocytes Occasional (*)     All other components within normal limits   URINALYSIS WITH REFLEX TO CULTURE - Abnormal; Notable for the following components:    Color, UA DARK YELLOW (*)     Clarity, UA TURBID (*)     Bilirubin Urine MODERATE (*)     Ketones, Urine TRACE (*)     Protein,  (*)     All other components within normal limits   COVID-19, RAPID   TROPONIN   BRAIN NATRIURETIC PEPTIDE   MICROSCOPIC URINALYSIS   MAGNESIUM   TSH   VITAMIN D 25 HYDROXY   FOLATE   VITAMIN B12   COMPREHENSIVE METABOLIC PANEL   CBC       All other labs were within normal range or not returned as of this dictation. 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 Spoke to hospitalist for admission. Giving a cardizem bolus after a fluid bolus       Amount and/or Complexity of Data Reviewed  Clinical lab tests: ordered and reviewed  Tests in the radiology section of CPT®: ordered  Tests in the medicine section of CPT®: ordered and reviewed               CONSULTS:  IP CONSULT TO ONCOLOGY    PROCEDURES:  Unless otherwise noted below, none     Procedures    FINAL IMPRESSION      1. Atrial fibrillation with RVR ([facility]    2. 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 General weakness        DISPOSITION/PLAN   DISPOSITION Admitted 02/28/2023 07:24:38 PM      PATIENT REFERRED TO:  No follow-up provider specified.     DISCHARGE MEDICATIONS:  New Prescriptions    No medications on file          (Please note that portions of this note were completed with a voice recognitionprogram.  Efforts were made to edit the dictations but occasionally words are mis-transcribed.)    JODY Nazario (electronically signed)           JODY Nazario  02/28/23 1930

## 2023-03-01 NOTE — ED NOTES
Called Dr. Veras Bloodgood office for Roberto Carlos Alberto.   Left call back     Makenna Parry  02/28/23 5413

## 2023-03-01 NOTE — PROGRESS NOTES
Daily Progress Note    Date:3/1/2023  Patient: Claudia Morel  : 1955  KKJ:[id number]  CODE:Full Code No additional code details  PCP:JODY Barriga    Admit Date: 2023  3:38 PM   LOS: 0 days       Subjective:      [id number] male with follicular lymphoma followed by oncology locally, presenting with worsening fatigue, decreased appetite, poor p.o. intake, weakness over the past few days, noted to be dehydrated with LAURA and new onset A-fib with RVR with rates up to 150s. Has been on Eliquis with history of DVT. Noted mediastinal widening on chest x-ray consistent with his known mediastinal adenopathy. Creatinine noted up to 1.7 which improved with IV fluid hydration. A-fib managed with rate control on IV Cardizem. Continued on oral Cardizem, metoprolol and amiodarone. Cardiology consulted. This a.m. patient remains in A-fib with RVR. Denies any palpitations, chest pain or worsening shortness of breath. Continues to endorse fatigue, poor appetite, generalized weakness.         Objective:      Vital signs in last 24 hours:  Patient Vitals for the past 24 hrs:   BP Temp Temp src Pulse Resp SpO2 Height Weight   23 1453 (!) 104/54 97 °F (36.1 °C) Temporal (!) 115 18 95 % -- --   23 1045 (!) 102/59 -- -- (!) 120 -- -- -- --   23 1014 -- -- -- -- -- 96 % -- --   23 0913 -- -- -- -- -- -- -- (!) 310 lb 2 oz (140.7 kg)   23 0900 102/74 97.1 °F (36.2 °C) Temporal (!) 130 18 95 % -- --   23 0630 107/63 97.5 °F (36.4 °C) Temporal (!) 117 16 96 % -- --   23 2243 -- -- -- -- -- -- 6' 1\" (1.854 m) (!) 306 lb (138.8 kg)   02/28/23 2231 (!) 102/48 98.2 °F (36.8 °C) Temporal 81 18 95 % -- --   23 105/86 97 °F (36.1 °C) Temporal (!) 128 18 95 % -- --   23 118/60 -- -- -- -- -- -- --   23 118/66 -- -- (!) 132 27 -- -- --   23 121/69 -- -- (!) 127 20 -- -- --   23 (!) 109/55 -- -- (!) 118 -- -- -- --   23 1918 (!) 115/54 -- -- (!) 137 -- -- -- --   02/28/23 1800 126/77 -- -- (!) 144 28 95 % -- --   02/28/23 1730 104/80 -- -- (!) 140 18 93 % -- --   02/28/23 1700 112/73 -- -- (!) 150 17 94 % -- --     Physical exam    General: Fatigued appearing, no acute distress  Psych: Calm and cooperative  Cardiac: Tachycardic with irregularly irregular rhythm  Respiratory: No wheezes rales or rhonchi  Abdomen: Nontender, nondistended  Extremities: No edema        Lab Review   Recent Results (from the past 24 hour(s))   Urinalysis with Reflex to Culture    Collection Time: 02/28/23  6:15 PM    Specimen: Urine, clean catch   Result Value Ref Range    Color, UA DARK YELLOW (A) Straw/Yellow    Clarity, UA TURBID (A) Clear    Glucose, Ur Negative Negative mg/dL    Bilirubin Urine MODERATE (A) Negative    Ketones, Urine TRACE (A) Negative mg/dL    Specific Gravity, UA 1.041 1.005 - 1.030    Blood, Urine Negative Negative    pH, UA 5.0 5.0 - 8.0    Protein,  (A) Negative mg/dL    Urobilinogen, Urine 1.0 <2.0 E.U./dL    Nitrite, Urine Negative Negative    Leukocyte Esterase, Urine Negative Negative   Microscopic Urinalysis    Collection Time: 02/28/23  6:15 PM   Result Value Ref Range    Hyaline Casts, UA 2-4 0 - 5 /LPF   COVID-19, Rapid    Collection Time: 02/28/23  6:43 PM    Specimen: Nasopharyngeal Swab   Result Value Ref Range    SARS-CoV-2, NAAT Not Detected Not Detected   Magnesium    Collection Time: 02/28/23  9:14 PM   Result Value Ref Range    Magnesium 2.3 1.6 - 2.4 mg/dL   TSH    Collection Time: 02/28/23  9:14 PM   Result Value Ref Range    TSH 0.304 0.270 - 4.200 uIU/mL   Vitamin D 25 Hydroxy    Collection Time: 02/28/23  9:14 PM   Result Value Ref Range    Vit D, 25-Hydroxy 29.5 (L) >=30 ng/mL   Folate    Collection Time: 02/28/23  9:14 PM   Result Value Ref Range    Folate 12.8 4.5 - 32.2 ng/mL   Vitamin B12    Collection Time: 02/28/23  9:14 PM   Result Value Ref Range    Vitamin B-12 425 211 - 946 pg/mL   Troponin Collection Time: 03/01/23 12:17 AM   Result Value Ref Range    Troponin <0.01 0.00 - 0.03 ng/mL   Comprehensive Metabolic Panel    Collection Time: 03/01/23  3:39 AM   Result Value Ref Range    Sodium 137 136 - 145 mmol/L    Potassium 3.7 3.5 - 5.0 mmol/L    Chloride 103 98 - 111 mmol/L    CO2 25 22 - 29 mmol/L    Anion Gap 9 7 - 19 mmol/L    Glucose 95 74 - 109 mg/dL    BUN 27 (H) 8 - 23 mg/dL    Creatinine 1.5 (H) 0.5 - 1.2 mg/dL    Est, Glom Filt Rate 51 (A) >60    Calcium 7.7 (L) 8.8 - 10.2 mg/dL    Total Protein 4.8 (L) 6.6 - 8.7 g/dL    Albumin 2.7 (L) 3.5 - 5.2 g/dL    Total Bilirubin 0.8 0.2 - 1.2 mg/dL    Alkaline Phosphatase 46 40 - 130 U/L    ALT 56 (H) 5 - 41 U/L    AST 32 5 - 40 U/L   Troponin    Collection Time: 03/01/23  3:39 AM   Result Value Ref Range    Troponin <0.01 0.00 - 0.03 ng/mL   CBC with Auto Differential    Collection Time: 03/01/23  3:39 AM   Result Value Ref Range    WBC 1.3 (L) 4.8 - 10.8 K/uL    RBC 4.31 (L) 4.70 - 6.10 M/uL    Hemoglobin 12.2 (L) 14.0 - 18.0 g/dL    Hematocrit 36.6 (L) 42.0 - 52.0 %    MCV 84.9 80.0 - 94.0 fL    MCH 28.3 27.0 - 31.0 pg    MCHC 33.3 33.0 - 37.0 g/dL    RDW 13.9 11.5 - 14.5 %    Platelets 681 (L) 032 - 400 K/uL    MPV 9.4 9.4 - 12.4 fL    PLATELET SLIDE REVIEW Decreased     Neutrophils % 80.0 (H) 50.0 - 65.0 %    Lymphocytes % 7.0 (L) 20.0 - 40.0 %    Monocytes % 4.0 0.0 - 10.0 %    Eosinophils % 3.0 0.0 - 5.0 %    Basophils % 0.0 0.0 - 1.0 %    Neutrophils Absolute 1.1 (L) 1.5 - 7.5 K/uL    Lymphocytes Absolute 0.1 (L) 1.1 - 4.5 K/uL    Monocytes Absolute 0.10 0.00 - 0.90 K/uL    Eosinophils Absolute 0.04 0.00 - 0.60 K/uL    Basophils Absolute 0.00 0.00 - 0.20 K/uL    Bands Relative 3 0 - 5 %    Atypical Lymphocytes Relative 3 0 - 8 %    RBC Morphology Normal        I/O last 3 completed shifts:  In: -   Out: 250 [Urine:250]  I/O this shift:  In: 600 [P.O.:600]  Out: 350 [Urine:350]      Current Facility-Administered Medications:     metoprolol succinate (TOPROL XL) extended release tablet 100 mg, 100 mg, Oral, BID, Nicole Canela MD, 100 mg at 03/01/23 0902    enoxaparin (LOVENOX) injection 135 mg, 1 mg/kg, SubCUTAneous, BID, Liv Desai MD, 135 mg at 03/01/23 3056    amiodarone (CORDARONE) tablet 400 mg, 400 mg, Oral, BID, Elana Cornell MD    0.9 % sodium chloride infusion, , IntraVENous, Continuous, Nicole Canela MD, Last Rate: 100 mL/hr at 03/01/23 0859, Rate Change at 03/01/23 0859    sodium chloride flush 0.9 % injection 5-40 mL, 5-40 mL, IntraVENous, 2 times per day, Liv Desai MD, 10 mL at 03/01/23 2569    sodium chloride flush 0.9 % injection 5-40 mL, 5-40 mL, IntraVENous, PRN, Liv Desai MD    0.9 % sodium chloride infusion, , IntraVENous, PRN, Liv Desai MD    ondansetron (ZOFRAN-ODT) disintegrating tablet 4 mg, 4 mg, Oral, Q8H PRN **OR** ondansetron (ZOFRAN) injection 4 mg, 4 mg, IntraVENous, Q6H PRN, Liv Desai MD    polyethylene glycol (GLYCOLAX) packet 17 g, 17 g, Oral, Daily PRN, Liv Desai MD    acetaminophen (TYLENOL) tablet 650 mg, 650 mg, Oral, Q6H PRN **OR** acetaminophen (TYLENOL) suppository 650 mg, 650 mg, Rectal, Q6H PRN, Liv Desai MD    [COMPLETED] dilTIAZem injection 10 mg, 10 mg, IntraVENous, Once, 10 mg at 02/28/23 2115 **FOLLOWED BY** dilTIAZem 125 mg in sodium chloride 0.9 % 125 mL infusion, 2.5-15 mg/hr, IntraVENous, Continuous, Liv Desai MD, Last Rate: 5 mL/hr at 02/28/23 2122, 5 mg/hr at 02/28/23 2122    allopurinol (ZYLOPRIM) tablet 300 mg, 300 mg, Oral, Daily, Liv Desai MD, 300 mg at 03/01/23 0902    busPIRone (BUSPAR) tablet 5 mg, 5 mg, Oral, TID, Liv Desai MD, 5 mg at 03/01/23 1425    dilTIAZem (CARDIZEM CD) extended release capsule 120 mg, 120 mg, Oral, BID, Liv Desai MD, 120 mg at 03/01/23 1047    docusate sodium (COLACE) capsule 100 mg, 100 mg, Oral, BID, Ewelina White MD, 100 mg at 02/28/23 2110    fenofibrate (TRICOR) tablet 54 mg, 54 mg, Oral, Daily, Ewelina White MD, 54 mg at 03/01/23 0902    rosuvastatin (CRESTOR) tablet 10 mg, 10 mg, Oral, Nightly, Ewelina White MD    vitamin D (ERGOCALCIFEROL) capsule 50,000 Units, 50,000 Units, Oral, Weekly, Ewelina White MD, 50,000 Units at 03/01/23 0902        Reviewed chart, history, vitals, labs, diagnostics and medications          Assessment/plan  Principal Problem:    Atrial fibrillation with RVR ([facility] [address]  Active Problems:    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)    Essential hypertension  Resolved Problems:    * No resolved hospital problems.  *      Paroxysmal atrial fibrillation with RVR  Monitor telemetry, remains tachycardic  -Continue IV Cardizem  --Increase oral amiodarone to 400 mg twice daily  --Increase metoprolol 100 mg to twice daily as long as able to tolerate hemodynamically  - Continue oral Cardizem 120 mg twice daily  --Continue therapeutic anticoagulation on Lovenox  - BP running on low normal side, monitor for hypotension  --Cardiology following    Consideration for possible cardioversion tomorrow pending final decision from patient after discussion with cardiology    LAURA with dehydration, creatinine up to 1.7 on admission (previous creatinine 1.4)  - Improving, reviewed metabolic profile  - Continue IV fluid hydration, follow urine output and renal function    Follicular lymphoma  Bicytopenia secondary to chemotherapy, immunocompromise status  - Hematology/oncology following  -Monitor CBC  -Monitor for fever    Transaminitis  Reviewed LFTs, monitor    Morbidity and mortality: High risk  Medical decision making: High complexity      Janes Simon MD 3/1/2023 4:48 PM

## 2023-03-01 NOTE — CONSULTS
Cardiology Consult Note    ADMISSION DAY:  2/28/2023  3:38 PM   Consult Date:  3/1/2023 4:32 PM    Reason for Consultation: Rapid atrial fibrillation    History of Present Illness: The patient started chemotherapy and developed shortness of breath and tachycardia. Normally when he goes into atrial fibrillation his mother reports to me that he is minimally symptomatic. He sometimes has fatigue but never complains about tachycardia per se.   At this point he says he is feeling better and he denies any chest pain or shortness of breath at rest.    Allergies   Allergen Reactions    Pcn [Penicillins] Rash       Current Medications  Current Facility-Administered Medications: metoprolol succinate (TOPROL XL) extended release tablet 100 mg, 100 mg, Oral, BID  enoxaparin (LOVENOX) injection 135 mg, 1 mg/kg, SubCUTAneous, BID  0.9 % sodium chloride infusion, , IntraVENous, Continuous  sodium chloride flush 0.9 % injection 5-40 mL, 5-40 mL, IntraVENous, 2 times per day  sodium chloride flush 0.9 % injection 5-40 mL, 5-40 mL, IntraVENous, PRN  0.9 % sodium chloride infusion, , IntraVENous, PRN  ondansetron (ZOFRAN-ODT) disintegrating tablet 4 mg, 4 mg, Oral, Q8H PRN **OR** ondansetron (ZOFRAN) injection 4 mg, 4 mg, IntraVENous, Q6H PRN  polyethylene glycol (GLYCOLAX) packet 17 g, 17 g, Oral, Daily PRN  acetaminophen (TYLENOL) tablet 650 mg, 650 mg, Oral, Q6H PRN **OR** acetaminophen (TYLENOL) suppository 650 mg, 650 mg, Rectal, Q6H PRN  [COMPLETED] dilTIAZem injection 10 mg, 10 mg, IntraVENous, Once **FOLLOWED BY** dilTIAZem 125 mg in sodium chloride 0.9 % 125 mL infusion, 2.5-15 mg/hr, IntraVENous, Continuous  allopurinol (ZYLOPRIM) tablet 300 mg, 300 mg, Oral, Daily  amiodarone (CORDARONE) tablet 200 mg, 200 mg, Oral, Daily  busPIRone (BUSPAR) tablet 5 mg, 5 mg, Oral, TID  dilTIAZem (CARDIZEM CD) extended release capsule 120 mg, 120 mg, Oral, BID  docusate sodium (COLACE) capsule 100 mg, 100 mg, Oral, BID  fenofibrate (TRICOR) tablet 54 mg, 54 mg, Oral, Daily  rosuvastatin (CRESTOR) tablet 10 mg, 10 mg, Oral, Nightly  vitamin D (ERGOCALCIFEROL) capsule 50,000 Units, 50,000 Units, Oral, Weekly   sodium chloride 100 mL/hr at 03/01/23 0859    sodium chloride      dilTIAZem 5 mg/hr (02/28/23 2122)       Past Medical History   has a past medical history of Atrial fibrillation ([address] Cellulitis of left leg, Colon polyp, Elevated blood pressure reading without diagnosis of hypertension, Esophageal reflux, History of prostate cancer, Hx of blood clots, Hypertension, Mixed hyperlipidemia, Obesity, Pericardial effusion, and Prostate cancer ([address] Past Surgical History   has a past surgical history that includes Upper gastrointestinal endoscopy (3/22/2000); Colonoscopy (6/2/2009); Colonoscopy (2012); Vasectomy; bone marrow biopsy (N/A, 12/9/2022); Port Surgery (N/A, 12/16/2022); and lymph node biopsy (Left, 1/5/2023). Social History   reports that he has never smoked. He has never used smokeless tobacco. He reports that he does not drink alcohol and does not use drugs. Family History  family history includes Cancer in his brother; Colon Polyps in his brother and mother; Heart Disease in his father; High Blood Pressure in an other family member. Review of Systems:   Negative except as noted in HPI, 10 organ review performed. The ten organ systems reviewed included: cardiac, respiratory, eyes, constitutional, gastrointestinal, genitourinary, musculoskeletal, neurologic, integumentary, and ear-nose-mouth-throat. Physical Examination:    Vitals:    03/01/23 0913 03/01/23 1014 03/01/23 1045 03/01/23 1453   BP:   (!) 102/59 (!) 104/54   Pulse:   (!) 120 (!) 115   Resp:    18   Temp:    97 °F (36.1 °C)   TempSrc:    Temporal   SpO2:  96%  95%   Weight: (!) 310 lb 2 oz (140.7 kg)      Height:         General - He is a well-developed, well-nourished. HEENT - Ears and nose are normal.   Moist mucous membranes.   Hearing is normal.  EYES -  No conjunctival hemorrhages or discharge. PERRLA. Neck - no thyromegaly  Respiratory - respirations are not labored, fairly clear bilaterally  Cardiovascular -S1-S2 irregular rapid heart sounds. Abdominal -  Soft. Bowel sounds present. Nontender. No hernia. Extremities -no cyanosis  Musculoskeletal -   No cyanosis or clubbing. Skin -  No statis ulcers or dermatitis. Warm, normal turgor. Neuro/Psych -  He is alert and oriented x3 and answered questions appropriately with normal affect and mood. Data:  I/O last 3 completed shifts:  In: -   Out: 250 [Urine:250]   Date 03/01/23 0000 - 03/01/23 2359   Shift 5952-3122 1157-3108 1037-0095 24 Hour Total   INTAKE   P.O.(mL/kg/hr)  600(0.5)  600   Shift Total(mL/kg)  600(4.3)  600(4.3)   OUTPUT   Urine(mL/kg/hr) 250(0.2) 350(0.3)  600   Shift Total(mL/kg) 250(1.8) 350(2.5)  600(4.3)   Weight (kg) 138.8 140.7 140.7 140.7     Wt Readings from Last 3 Encounters:   03/01/23 (!) 310 lb 2 oz (140.7 kg)   02/23/23 (!) 322 lb 14.4 oz (146.5 kg)   02/14/23 (!) 322 lb (146.1 kg)      Body mass index is 40.92 kg/m².       Recent Labs     02/28/23  1632 03/01/23  0339   WBC 1.9* 1.3*   RBC 5.27 4.31*   HGB 14.7 12.2*   HCT 44.4 36.6*   MCV 84.3 84.9   MCH 27.9 28.3   MCHC 33.1 33.3   RDW 14.1 13.9   * 109*   MPV 9.9 9.4         Recent Labs     02/28/23  1632 03/01/23  0339    137   K 4.2 3.7    103   CO2 25 25   BUN 29* 27*   CREATININE 1.7* 1.5*   GLUCOSE 127* 95   CALCIUM 8.6* 7.7*   PROT 5.9* 4.8*   LABALBU 3.4* 2.7*   BILITOT 1.0 0.8   ALKPHOS 57 46   AST 41* 32   ALT 69* 56*      Other Electrolytes:  FLP:    Lab Results   Component Value Date/Time    TRIG 178 10/06/2022 01:35 PM    HDL 33 10/06/2022 01:35 PM    LDLCALC 56 10/06/2022 01:35 PM    LDLDIRECT 128 09/11/2017 10:07 AM     TSH:    Lab Results   Component Value Date/Time    TSH 0.304 02/28/2023 09:14 PM     Troponin T:   Recent Labs     02/28/23  1632 03/01/23  0017 03/01/23  0339   TROPONINI <0.01 <0.01 <0.01     INR: No results for input(s): INR in the last 72 hours. Telemetry:  (I reviewed) atrial fibrillation  EKG: (I reviewed) time    Assessment/Plan:    Assessment plan #1 paroxysmal atrial fibrillation present since last year on full anticoagulation with a history of amiodarone use. His blood pressures are little soft in the 100/60 range but he is tolerating twice a day diltiazem as well as metoprolol and amiodarone. I think increasing the amiodarone will get us the best rate/rhythm control without dropping the blood pressure. He may convert on this as well. They are trying to decide if they want to undergo cardioversion as his mother has had that in the past but it failed. If they decide they do want cardioversion he needs to be n.p.o. for breakfast as I will be able to do that late morning.      Kina Abbott MD, 3/1/2023 4:32 PM

## 2023-03-01 NOTE — CARE COORDINATION
03/01/23 1401   IMM Letter   IMM Letter given to Patient/Family/Significant other/Guardian/POA/by: Rashel Parker   IMM Letter date given: 03/01/23   IMM Letter time given: 18     SW met with pt, brother, and mother at bedside. Explained that pt had been upgraded to Inpatient and the reason for the letter. Pt stated his brother could sign for him. Mother states brother Carlos Holderalvaro) is pts POA. Signed copy left in soft chart. A copy was provided to the pt.   Electronically signed by Rashel Parker on 3/1/2023 at 2:04 PM

## 2023-03-01 NOTE — PROGRESS NOTES
Occupational Therapy  Facility/Department: [facility] [place] PROGRESSIVE CARE  Occupational Therapy Initial Assessment    Name: Aydee Wing  : 1955  MRN: [id number]  Date of Service: 3/1/2023    Discharge Recommendations:             Patient Diagnosis(es): The primary encounter diagnosis was Atrial fibrillation with RVR ([facility] [address] 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 Past Medical History:  has a past medical history of Atrial fibrillation ([facility] [address] Cellulitis of left leg, Colon polyp, Elevated blood pressure reading without diagnosis of hypertension, Esophageal reflux, History of prostate cancer, Hx of blood clots, Hypertension, Mixed hyperlipidemia, Obesity, Pericardial effusion, and Prostate cancer ([facility] [address] Past Surgical History:  has a past surgical history that includes Upper gastrointestinal endoscopy (3/22/2000); Colonoscopy (2009); Colonoscopy (); Vasectomy; bone marrow biopsy (N/A, 2022); Port Surgery (N/A, 2022); and lymph node biopsy (Left, 2023). Treatment Diagnosis: A-fib, Weakness      Assessment   Assessment: Pt met 1 STG and should have no trouble returning home with mother.   OT eval and 1 treatment and then d/c OT  Treatment Diagnosis: A-fib, Weakness  Prognosis: Good  Decision Making: Low Complexity  REQUIRES OT FOLLOW-UP: No  Activity Tolerance  Activity Tolerance: Patient Tolerated treatment well        Plan   Occupational Therapy Plan  Times Per Week: OT eval and 1 treatment and d/c     Restrictions       Subjective   General  Chart Reviewed: Yes  Patient assessed for rehabilitation services?: Yes  Additional Pertinent Hx: Lymphoma with chemo  Family / Caregiver Present: Yes  Diagnosis: A-fib, weakness     Social/Functional History  Social/Functional History  Lives With: Family (Lives with mother)  Home Equipment:  (None)  ADL Assistance: Independent  Ambulation Assistance: Independent  Transfer Assistance: Independent       Objective   Heart Rate: (!) 130  Heart Rate Source: Monitor  BP: 102/74  BP Location: Left upper arm  Patient Position: Supine  MAP (Calculated): 83  Resp: 18  SpO2: 95 %  O2 Device: None (Room air)                      AROM: Within functional limits  Strength: Within functional limits  ADL  Feeding: Independent  Grooming: Supervision  UE Bathing: Supervision  LE Bathing: Supervision  UE Dressing: Supervision  LE Dressing: Supervision  Toileting: Supervision           Transfers  Stand Step Transfers: Supervision  Sit to stand: Independent  Vision  Vision: Within Functional Limits  Hearing  Hearing: Within functional limits  Cognition  Overall Cognitive Status: Exceptions  Problem Solving: Assistance required to generate solutions  Insights: Decreased awareness of deficits  Orientation  Overall Orientation Status: Impaired  Orientation Level: Oriented to situation;Oriented to person;Oriented to place; Disoriented to time                                           G-Code     OutComes Score                                                  AM-PAC Score             Tinneti Score       Goals  Short Term Goals  Short Term Goal 1: Pt to be supervision with light ambulatory ADLs in room - Goal met  Long Term Goals  Long Term Goal 1: Return to PLOF       Therapy Time   Individual Concurrent Group Co-treatment   Time In           Time Out           Minutes                   Genevieve Scheuermann, OT

## 2023-03-01 NOTE — H&P
[facility]      Hospitalist - History & Physical      PCP: JODY Ortiz    Date of Admission: 2/28/2023    Date of Service: 2/28/2023    Chief Complaint:  Generalized weakness    History Of Present Illness: The patient is a 78 y.o. male who presented to [facility] ED for evaluation of generalized weakness. Pt has history of atrial fibrillation, HTN, hyperlipidemia, TBI, dvt and lymphoma followed by Dr. Coty Jarquin currently treated with chemotherapy. Pt has had decreased appetite for food and fluids associated with generalized weakness having difficulty getting out of bed due to symptoms over past 3 days. He has had sweats however denies fevers, chest pain as well as vomiting. He is here with his mother and brother who provide majority of history of present illness. In ED, ekg with afib w/rvr noted treated diltiazem bolus and infusion, sodium 139, potassium 4.2, creatinine 1.7/bun 29-creatinine 1.5 on 7/1/2022, troponin less than 0.01, wbc 1.9k, hgb 15, platelets 124N, bnp 321, cxr-No radiographic evidence of acute cardiopulmonary process. Mediastinal widening correlates to previously visualized mediastinal lymphadenopathy and large amount of mediastinal fat. Pt is admitted to hospitalist service for further evaluation.      Past Medical History:        Diagnosis Date    Atrial fibrillation ([address]     Cellulitis of left leg     Colon polyp     Elevated blood pressure reading without diagnosis of hypertension     Esophageal reflux     History of prostate cancer     Hx of blood clots     Hypertension     Mixed hyperlipidemia     TG>>LDL    Obesity     Pericardial effusion     Prostate cancer [facility])     2010 Dr Ezekiel Mckeon;  implants, XRT       Past Surgical History:        Procedure Laterality Date    BONE MARROW BIOPSY N/A 12/9/2022    BONE MARROW ASPIRATION BIOPSY performed by Harland Councilman, PA-C at [facility]    COLONOSCOPY  6/2/2009        COLONOSCOPY  2012        LYMPH NODE BIOPSY Left 1/5/2023    EXCISION OF CERVICLE LYMPH NODY-BIOPSY performed by Kristofer Krishna MD at [facility] 12/16/2022    single lumen port placement with ultrasound and fluoroscopy performed by Denzel Finney MD at [address] [place] [address]  3/22/2000    [address] [place] [address] Medications:  Prior to Admission medications    Medication Sig Start Date End Date Taking?  Authorizing Provider   amLODIPine (NORVASC) 5 MG tablet TAKE 1 TABLET EVERY DAY 2/27/23   JODY Cadena   amiodarone (CORDARONE) 200 MG tablet TAKE ONE TABLET BY MOUTH EVERY DAY 2/20/23   JODY Cadena   predniSONE (DELTASONE) 50 MG tablet Take 2 tablets by mouth See Admin Instructions for 30 doses Days 1 - 5 of a 28 day cycle x 6 cycles (for treatment of Follicular Lymphoma) 5/04/89   JODY Cadena   Cholecalciferol (VITAMIN D3) 50 MCG (2000 UT) CAPS TAKE 1 CAPSULE EVERY DAY 2/20/23 5/20/23  JODY Cadena   busPIRone (BUSPAR) 5 MG tablet Take 1 tablet by mouth 3 times daily 2/14/23 3/16/23  JODY Cadena   docusate sodium (COLACE) 100 MG capsule Take 1 capsule by mouth 2 times daily 1/24/23 4/24/23  JODY Cadena   allopurinol (ZYLOPRIM) 300 MG tablet Take 1 tablet by mouth daily 1/11/23   Erika Yoo MD   ondansetron (ZOFRAN-ODT) 4 MG disintegrating tablet Take 1 tablet by mouth every 8 hours as needed for Nausea or Vomiting 12/16/22   Denzel Finney MD   rivaroxaban (XARELTO) 20 MG TABS tablet TAKE 1 TABLET EVERY DAY WITH BREAKFAST  Patient taking differently: 20 mg daily (with breakfast) TAKE 1 TABLET EVERY DAY WITH BREAKFAST 12/14/22   JODY Cadena   rosuvastatin (CRESTOR) 10 MG tablet TAKE 1 TABLET EVERY NIGHT  Patient taking differently: Take 10 mg by mouth daily 12/12/22   JODY Cadena   metoprolol succinate (TOPROL XL) 100 MG extended release tablet TAKE 1 TABLET BY MOUTH IN THE MORNING. 12/12/22   JODY Cadena   losartan (COZAAR) 100 MG tablet TAKE 1 TABLET EVERY DAY  Patient taking differently: Take 100 mg by mouth daily 11/15/22   JODY Bauer   dilTIAZem (CARDIZEM CD) 120 MG extended release capsule Take 1 capsule by mouth in the morning and at bedtime 10/13/22   Juanita Mark MD   fenofibrate 160 MG tablet Take 1 tablet by mouth daily 1/14/20 2/7/23  JODY Bauer   Multiple Vitamin (MULTI-VITAMIN PO) Take 1 tablet by mouth daily    Historical Provider, MD       Allergies:    Pcn [penicillins]    Social History:    The patient currently lives with mother and brother  Tobacco:   reports that he has never smoked. He has never used smokeless tobacco.  Alcohol:   reports no history of alcohol use. Illicit Drugs: denies    Family History:      Problem Relation Age of Onset    Colon Polyps Mother     Heart Disease Father     Cancer Brother         prostate    Colon Polyps Brother     High Blood Pressure Other        Review of Systems:   Review of Systems   Constitutional:  Positive for appetite change, diaphoresis and fatigue. Neurological:  Positive for weakness. All other systems reviewed and are negative. 14 point review of systems is negative except as specifically addressed above. Physical Examination:  BP (!) 109/55   Pulse (!) 118   Temp 98.7 °F (37.1 °C) (Oral)   Resp 28   SpO2 95%   Physical Exam  Vitals reviewed. HENT:      Right Ear: External ear normal.      Left Ear: External ear normal.      Mouth/Throat:      Pharynx: Oropharynx is clear. Eyes:      Conjunctiva/sclera: Conjunctivae normal.   Cardiovascular:      Rate and Rhythm: Tachycardia present. Rhythm irregular. Pulmonary:      Effort: Pulmonary effort is normal.      Breath sounds: Normal breath sounds. Abdominal:      Tenderness: There is no abdominal tenderness. Musculoskeletal:      Cervical back: Neck supple. Right lower leg: No edema. Left lower leg: No edema. Skin:     General: Skin is warm and dry. Neurological:      Mental Status: He is alert. Comments: Disoriented to time-family relates that patient's mental status is at baseline        Diagnostic Data:  CBC:  Recent Labs     02/28/23  1632   WBC 1.9*   HGB 14.7   HCT 44.4   *     BMP:  Recent Labs     02/28/23  1632      K 4.2      CO2 25   BUN 29*   CREATININE 1.7*   CALCIUM 8.6*     Recent Labs     02/28/23  1632   AST 41*   ALT 69*   BILITOT 1.0   ALKPHOS 57       Cardiac Enzymes:   Recent Labs     02/28/23  1632   TROPONINI <0.01     Urinalysis:  Lab Results   Component Value Date/Time    NITRU Negative 02/28/2023 06:15 PM    BLOODU Negative 02/28/2023 06:15 PM    SPECGRAV 1.041 02/28/2023 06:15 PM    GLUCOSEU Negative 02/28/2023 06:15 PM       XR CHEST PORTABLE    Result Date: 2/28/2023  Exam: Portable chest radiograph. Technique: Single AP view of the chest was obtained. Clinical information: Failure to thrive. Comparison: Noncontrast CT of the chest performed on October 7, 2022 Findings: The lungs are clear without focal infiltrate or effusion. Patient is post right IJ Mediport placement with the tip at the level of the SVC. Mediastinal widening is noted correlating to prior mediastinal lymphadenopathy and large volume mediastinal fat. The heart is normal in size. There are no acute osseous abnormalities. No radiographic evidence of acute cardiopulmonary process. Mediastinal widening correlates to previously visualized mediastinal lymphadenopathy and large amount of mediastinal fat. Transthoracic Echocardiography Report (TTE)      Demographics      Patient Name   Tommy Castillo  Date of Study            01/13/2023      MRN            [id number]         Gender                   Male         TTE procedure:ECHO 2D W/DOPPLER/CONTRAST LIMITD. Study Location: Echo Lab  Technical Quality: Poor visualization due to body habitus. Patient Status: Outpatient     Contrast Medium: Definity.  Amount - 2 ml     Rhythm: Within normal limits BP: 126/88 mmHg     Indications:Pre-chemotherapy. Conclusions      Summary   The mitral valve was not well visualized . The aortic valve leaflets were not well visualized. Tricuspid valve was not well visualized. Trace tricuspid regurgitation. Normal left ventricular size and function. Left ventricular ejection fraction is estimated at 59%. Normal left ventricular wall thickness. No regional wall motion abnormalities. Definity contrast was used to enhance the endocardial borders. IVC normal.      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  *  Signed:  JODY Jaime - CNP, 2/28/2023 7:37 PM      Attestation Statement     I have independently seen and examined this patient and agree with the asesment and plan by mid level provider                                                         Objective:   Vitals: /69   Pulse (!) 112   Temp 98.6 °F (37 °C) (Temporal)   Resp 18   Ht 6' 1\" (1.854 m)   Wt (!) 310 lb 2 oz (140.7 kg)   SpO2 94%   BMI 40.92 kg/m²   General appearance: alert, appears stated age and cooperative  Skin: Skin color, texture, turgor normal.   HEENT: {Exam; heent:86175}  Neck: {neck exam:13120::\"no adenopathy\",\"no carotid bruit\",\"no JVD\",\"supple, symmetrical, trachea midline\",\"thyroid not enlarged, symmetric, no tenderness/mass/nodules\"}  Lungs: {lung exam:66441::\"clear to auscultation bilaterally\"}  Heart: {heart exam:5510::\"regular rate and rhythm, S1, S2 normal, no murmur, click, rub or gallop\"}  Abdomen: {abdominal exam:60587::\"soft, non-tender; bowel sounds normal; no masses,  no organomegaly\"}  Extremities: {extremity exam:5109::\"extremities normal, atraumatic, no cyanosis or edema\"}  Lymphatic: No significant lymph node enlargement papable  Neurologic: Mental status: {:43454}      Assessment & Plan:    ***    Albert Berry MD

## 2023-03-01 NOTE — H&P
Left foot with ace bandage, ice pack applied, and elevated on pillow.    [facility]      Hospitalist - History & Physical      PCP: JODY Tompkins    Date of Admission: 2/28/2023    Date of Service: 2/28/2023    Chief Complaint:  Generalized weakness    History Of Present Illness: The patient is a 78 y.o. male who presented to [facility] ED for evaluation of generalized weakness. Pt has history of atrial fibrillation, HTN, hyperlipidemia, TBI, dvt and lymphoma followed by Dr. Crisostomo Memory currently treated with chemotherapy. Pt has had decreased appetite for food and fluids associated with generalized weakness having difficulty getting out of bed due to symptoms over past 3 days. He has had sweats however denies fevers, chest pain as well as vomiting. He is here with his mother and brother who provide majority of history of present illness. In ED, ekg with afib w/rvr noted treated diltiazem bolus and infusion, sodium 139, potassium 4.2, creatinine 1.7/bun 29-creatinine 1.5 on 7/1/2022, troponin less than 0.01, wbc 1.9k, hgb 15, platelets 002R, bnp 028, cxr-No radiographic evidence of acute cardiopulmonary process. Mediastinal widening correlates to previously visualized mediastinal lymphadenopathy and large amount of mediastinal fat. Pt is admitted to hospitalist service for further evaluation.      Past Medical History:        Diagnosis Date    Atrial fibrillation ([address]     Cellulitis of left leg     Colon polyp     Elevated blood pressure reading without diagnosis of hypertension     Esophageal reflux     History of prostate cancer     Hx of blood clots     Hypertension     Mixed hyperlipidemia     TG>>LDL    Obesity     Pericardial effusion     Prostate cancer [facility])     2010 Dr Deedee Feliz;  implants, XRT       Past Surgical History:        Procedure Laterality Date    BONE MARROW BIOPSY N/A 12/9/2022    BONE MARROW ASPIRATION BIOPSY performed by Dionisio Perez PA-C at [facility]    COLONOSCOPY  6/2/2009        COLONOSCOPY  2012        LYMPH NODE BIOPSY Left 1/5/2023    EXCISION OF CERVICLE LYMPH NODY-BIOPSY performed by Kristofer Krishna MD at [facility] 12/16/2022    single lumen port placement with ultrasound and fluoroscopy performed by Denzel Finney MD at [address] [place] [address]  3/22/2000    [address] [place] [address] Medications:  Prior to Admission medications    Medication Sig Start Date End Date Taking?  Authorizing Provider   amLODIPine (NORVASC) 5 MG tablet TAKE 1 TABLET EVERY DAY 2/27/23   JODY Cadena   amiodarone (CORDARONE) 200 MG tablet TAKE ONE TABLET BY MOUTH EVERY DAY 2/20/23   JODY Cadena   predniSONE (DELTASONE) 50 MG tablet Take 2 tablets by mouth See Admin Instructions for 30 doses Days 1 - 5 of a 28 day cycle x 6 cycles (for treatment of Follicular Lymphoma) 2/22/68   JODY Cadena   Cholecalciferol (VITAMIN D3) 50 MCG (2000 UT) CAPS TAKE 1 CAPSULE EVERY DAY 2/20/23 5/20/23  JODY Cadena   busPIRone (BUSPAR) 5 MG tablet Take 1 tablet by mouth 3 times daily 2/14/23 3/16/23  JODY Cadena   docusate sodium (COLACE) 100 MG capsule Take 1 capsule by mouth 2 times daily 1/24/23 4/24/23  JODY Cadena   allopurinol (ZYLOPRIM) 300 MG tablet Take 1 tablet by mouth daily 1/11/23   Erika Yoo MD   ondansetron (ZOFRAN-ODT) 4 MG disintegrating tablet Take 1 tablet by mouth every 8 hours as needed for Nausea or Vomiting 12/16/22   Denzel Finney MD   rivaroxaban (XARELTO) 20 MG TABS tablet TAKE 1 TABLET EVERY DAY WITH BREAKFAST  Patient taking differently: 20 mg daily (with breakfast) TAKE 1 TABLET EVERY DAY WITH BREAKFAST 12/14/22   JODY Cadena   rosuvastatin (CRESTOR) 10 MG tablet TAKE 1 TABLET EVERY NIGHT  Patient taking differently: Take 10 mg by mouth daily 12/12/22   JODY Cadena   metoprolol succinate (TOPROL XL) 100 MG extended release tablet TAKE 1 TABLET BY MOUTH IN THE MORNING. 12/12/22   JODY Cadena   losartan (COZAAR) 100 MG tablet TAKE 1 TABLET EVERY DAY  Patient taking differently: Take 100 mg by mouth daily 11/15/22   JODY Chambers   dilTIAZem (CARDIZEM CD) 120 MG extended release capsule Take 1 capsule by mouth in the morning and at bedtime 10/13/22   Makenna Burrell MD   fenofibrate 160 MG tablet Take 1 tablet by mouth daily 1/14/20 2/7/23  JODY Chambers   Multiple Vitamin (MULTI-VITAMIN PO) Take 1 tablet by mouth daily    Historical Provider, MD       Allergies:    Pcn [penicillins]    Social History:    The patient currently lives with mother and brother  Tobacco:   reports that he has never smoked. He has never used smokeless tobacco.  Alcohol:   reports no history of alcohol use. Illicit Drugs: denies    Family History:      Problem Relation Age of Onset    Colon Polyps Mother     Heart Disease Father     Cancer Brother         prostate    Colon Polyps Brother     High Blood Pressure Other        Review of Systems:   Review of Systems   Constitutional:  Positive for appetite change, diaphoresis and fatigue. Neurological:  Positive for weakness. All other systems reviewed and are negative. 14 point review of systems is negative except as specifically addressed above. Physical Examination:  BP (!) 109/55   Pulse (!) 118   Temp 98.7 °F (37.1 °C) (Oral)   Resp 28   SpO2 95%   Physical Exam  Vitals reviewed. HENT:      Right Ear: External ear normal.      Left Ear: External ear normal.      Mouth/Throat:      Pharynx: Oropharynx is clear. Eyes:      Conjunctiva/sclera: Conjunctivae normal.   Cardiovascular:      Rate and Rhythm: Tachycardia present. Rhythm irregular. Pulmonary:      Effort: Pulmonary effort is normal.      Breath sounds: Normal breath sounds. Abdominal:      Tenderness: There is no abdominal tenderness. Musculoskeletal:      Cervical back: Neck supple. Right lower leg: No edema. Left lower leg: No edema. Skin:     General: Skin is warm and dry. Neurological:      Mental Status: He is alert. Comments: Disoriented to time-family relates that patient's mental status is at baseline        Diagnostic Data:  CBC:  Recent Labs     02/28/23  1632   WBC 1.9*   HGB 14.7   HCT 44.4   *     BMP:  Recent Labs     02/28/23  1632      K 4.2      CO2 25   BUN 29*   CREATININE 1.7*   CALCIUM 8.6*     Recent Labs     02/28/23  1632   AST 41*   ALT 69*   BILITOT 1.0   ALKPHOS 57       Cardiac Enzymes:   Recent Labs     02/28/23  1632   TROPONINI <0.01     Urinalysis:  Lab Results   Component Value Date/Time    NITRU Negative 02/28/2023 06:15 PM    BLOODU Negative 02/28/2023 06:15 PM    SPECGRAV 1.041 02/28/2023 06:15 PM    GLUCOSEU Negative 02/28/2023 06:15 PM       XR CHEST PORTABLE    Result Date: 2/28/2023  Exam: Portable chest radiograph. Technique: Single AP view of the chest was obtained. Clinical information: Failure to thrive. Comparison: Noncontrast CT of the chest performed on October 7, 2022 Findings: The lungs are clear without focal infiltrate or effusion. Patient is post right IJ Mediport placement with the tip at the level of the SVC. Mediastinal widening is noted correlating to prior mediastinal lymphadenopathy and large volume mediastinal fat. The heart is normal in size. There are no acute osseous abnormalities. No radiographic evidence of acute cardiopulmonary process. Mediastinal widening correlates to previously visualized mediastinal lymphadenopathy and large amount of mediastinal fat. Transthoracic Echocardiography Report (TTE)      Demographics      Patient Name   Dina Buster  Date of Study            01/13/2023      MRN            [id number]         Gender                   Male         TTE procedure:ECHO 2D W/DOPPLER/CONTRAST LIMITD. Study Location: Echo Lab  Technical Quality: Poor visualization due to body habitus. Patient Status: Outpatient     Contrast Medium: Definity.  Amount - 2 ml     Rhythm: Within normal limits BP: 126/88 mmHg     Indications:Pre-chemotherapy. Conclusions      Summary   The mitral valve was not well visualized . The aortic valve leaflets were not well visualized. Tricuspid valve was not well visualized. Trace tricuspid regurgitation. Normal left ventricular size and function. Left ventricular ejection fraction is estimated at 59%. Normal left ventricular wall thickness. No regional wall motion abnormalities. Definity contrast was used to enhance the endocardial borders. IVC normal.      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  *  Signed:  JODY Larsen CNP, 2/28/2023 7:37 PM

## 2023-03-01 NOTE — PLAN OF CARE
Problem: Discharge Planning  Goal: Discharge to home or other facility with appropriate resources  Outcome: Progressing  Flowsheets (Taken 2/28/2023 2240)  Discharge to home or other facility with appropriate resources: Identify barriers to discharge with patient and caregiver     Problem: Safety - Adult  Goal: Free from fall injury  Outcome: Progressing     Problem: ABCDS Injury Assessment  Goal: Absence of physical injury  Outcome: Progressing
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
## 2023-03-01 NOTE — PROGRESS NOTES
Janet Concepcion arrived to room # 726. Presented with: Shortness of Breath, Fib RVR  Mental Status: Patient is oriented, alert, coherent, logical, and thought processes intact. Vitals:    02/28/23 2231   BP: (!) 102/48   Pulse: 81   Resp: 18   Temp: 98.2 °F (36.8 °C)   SpO2: 95%     Patient safety contract and falls prevention contract reviewed with patient Yes. Oriented Patient to room. Call light within reach. Yes.   Needs, issues or concerns expressed at this time: no.      Electronically signed by Rosa Yo RN on 2/28/2023 at 11:31 PM

## 2023-03-02 ENCOUNTER — APPOINTMENT (OUTPATIENT)
Dept: CARDIAC CATH/INVASIVE PROCEDURES | Age: 68
DRG: 308 | End: 2023-03-02
Payer: MEDICARE

## 2023-03-02 ENCOUNTER — TELEPHONE (OUTPATIENT)
Dept: HEMATOLOGY | Age: 68
End: 2023-03-02

## 2023-03-02 LAB
ALBUMIN SERPL-MCNC: 2.5 G/DL (ref 3.5–5.2)
ALP BLD-CCNC: 45 U/L (ref 40–130)
ALT SERPL-CCNC: 47 U/L (ref 5–41)
ANION GAP SERPL CALCULATED.3IONS-SCNC: 10 MMOL/L (ref 7–19)
AST SERPL-CCNC: 28 U/L (ref 5–40)
ATYPICAL LYMPHOCYTE RELATIVE PERCENT: 8 % (ref 0–8)
BANDED NEUTROPHILS RELATIVE PERCENT: 5 % (ref 0–5)
BASOPHILS ABSOLUTE: 0 K/UL (ref 0–0.2)
BASOPHILS RELATIVE PERCENT: 0 % (ref 0–1)
BILIRUB SERPL-MCNC: 0.5 MG/DL (ref 0.2–1.2)
BUN BLDV-MCNC: 22 MG/DL (ref 8–23)
CALCIUM SERPL-MCNC: 7.7 MG/DL (ref 8.8–10.2)
CHLORIDE BLD-SCNC: 106 MMOL/L (ref 98–111)
CO2: 23 MMOL/L (ref 22–29)
CREAT SERPL-MCNC: 1.5 MG/DL (ref 0.5–1.2)
EOSINOPHILS ABSOLUTE: 0 K/UL (ref 0–0.6)
EOSINOPHILS RELATIVE PERCENT: 0 % (ref 0–5)
GFR SERPL CREATININE-BSD FRML MDRD: 51 ML/MIN/{1.73_M2}
GLUCOSE BLD-MCNC: 102 MG/DL (ref 70–99)
GLUCOSE BLD-MCNC: 105 MG/DL (ref 70–99)
GLUCOSE BLD-MCNC: 116 MG/DL (ref 70–99)
GLUCOSE BLD-MCNC: 87 MG/DL (ref 74–109)
GLUCOSE BLD-MCNC: 93 MG/DL (ref 70–99)
HCT VFR BLD CALC: 37 % (ref 42–52)
HEMOGLOBIN: 12.1 G/DL (ref 14–18)
IMMATURE GRANULOCYTES #: 0 K/UL
LYMPHOCYTES ABSOLUTE: 0.5 K/UL (ref 1.1–4.5)
LYMPHOCYTES RELATIVE PERCENT: 14 % (ref 20–40)
MCH RBC QN AUTO: 28 PG (ref 27–31)
MCHC RBC AUTO-ENTMCNC: 32.7 G/DL (ref 33–37)
MCV RBC AUTO: 85.6 FL (ref 80–94)
MONOCYTES ABSOLUTE: 0 K/UL (ref 0–0.9)
MONOCYTES RELATIVE PERCENT: 1 % (ref 0–10)
NEUTROPHILS ABSOLUTE: 1.6 K/UL (ref 1.5–7.5)
NEUTROPHILS RELATIVE PERCENT: 72 % (ref 50–65)
OVALOCYTES: ABNORMAL
PDW BLD-RTO: 14 % (ref 11.5–14.5)
PERFORMED ON: ABNORMAL
PERFORMED ON: NORMAL
PLATELET # BLD: 124 K/UL (ref 130–400)
PLATELET SLIDE REVIEW: ADEQUATE
PMV BLD AUTO: 10 FL (ref 9.4–12.4)
POIKILOCYTES: ABNORMAL
POTASSIUM SERPL-SCNC: 3.9 MMOL/L (ref 3.5–5)
RBC # BLD: 4.32 M/UL (ref 4.7–6.1)
SODIUM BLD-SCNC: 139 MMOL/L (ref 136–145)
TOTAL PROTEIN: 4.6 G/DL (ref 6.6–8.7)
WBC # BLD: 2.1 K/UL (ref 4.8–10.8)

## 2023-03-02 PROCEDURE — 99152 MOD SED SAME PHYS/QHP 5/>YRS: CPT

## 2023-03-02 PROCEDURE — 80053 COMPREHEN METABOLIC PANEL: CPT

## 2023-03-02 PROCEDURE — 6370000000 HC RX 637 (ALT 250 FOR IP): Performed by: STUDENT IN AN ORGANIZED HEALTH CARE EDUCATION/TRAINING PROGRAM

## 2023-03-02 PROCEDURE — 2140000000 HC CCU INTERMEDIATE R&B

## 2023-03-02 PROCEDURE — 99232 SBSQ HOSP IP/OBS MODERATE 35: CPT | Performed by: INTERNAL MEDICINE

## 2023-03-02 PROCEDURE — 85025 COMPLETE CBC W/AUTO DIFF WBC: CPT

## 2023-03-02 PROCEDURE — 92960 CARDIOVERSION ELECTRIC EXT: CPT | Performed by: INTERNAL MEDICINE

## 2023-03-02 PROCEDURE — 6360000002 HC RX W HCPCS

## 2023-03-02 PROCEDURE — 2580000003 HC RX 258: Performed by: INTERNAL MEDICINE

## 2023-03-02 PROCEDURE — 6370000000 HC RX 637 (ALT 250 FOR IP): Performed by: HOSPITALIST

## 2023-03-02 PROCEDURE — 6370000000 HC RX 637 (ALT 250 FOR IP): Performed by: INTERNAL MEDICINE

## 2023-03-02 PROCEDURE — 92960 CARDIOVERSION ELECTRIC EXT: CPT

## 2023-03-02 PROCEDURE — 6360000002 HC RX W HCPCS: Performed by: HOSPITALIST

## 2023-03-02 PROCEDURE — 36415 COLL VENOUS BLD VENIPUNCTURE: CPT

## 2023-03-02 PROCEDURE — 82962 GLUCOSE BLOOD TEST: CPT

## 2023-03-02 PROCEDURE — 5A2204Z RESTORATION OF CARDIAC RHYTHM, SINGLE: ICD-10-PCS | Performed by: INTERNAL MEDICINE

## 2023-03-02 RX ADMIN — DILTIAZEM HYDROCHLORIDE 120 MG: 120 CAPSULE, COATED, EXTENDED RELEASE ORAL at 09:14

## 2023-03-02 RX ADMIN — ENOXAPARIN SODIUM 135 MG: 150 INJECTION SUBCUTANEOUS at 09:15

## 2023-03-02 RX ADMIN — FENOFIBRATE 54 MG: 54 TABLET ORAL at 09:14

## 2023-03-02 RX ADMIN — BUSPIRONE HYDROCHLORIDE 5 MG: 5 TABLET ORAL at 19:58

## 2023-03-02 RX ADMIN — BUSPIRONE HYDROCHLORIDE 5 MG: 5 TABLET ORAL at 09:14

## 2023-03-02 RX ADMIN — DILTIAZEM HYDROCHLORIDE 120 MG: 120 CAPSULE, COATED, EXTENDED RELEASE ORAL at 19:59

## 2023-03-02 RX ADMIN — METOPROLOL SUCCINATE 100 MG: 50 TABLET, EXTENDED RELEASE ORAL at 19:59

## 2023-03-02 RX ADMIN — SODIUM CHLORIDE, PRESERVATIVE FREE 10 ML: 5 INJECTION INTRAVENOUS at 09:15

## 2023-03-02 RX ADMIN — AMIODARONE HYDROCHLORIDE 400 MG: 200 TABLET ORAL at 19:58

## 2023-03-02 RX ADMIN — ALLOPURINOL 300 MG: 300 TABLET ORAL at 09:14

## 2023-03-02 RX ADMIN — AMIODARONE HYDROCHLORIDE 400 MG: 200 TABLET ORAL at 09:14

## 2023-03-02 RX ADMIN — SODIUM CHLORIDE, PRESERVATIVE FREE 10 ML: 5 INJECTION INTRAVENOUS at 19:59

## 2023-03-02 RX ADMIN — DOCUSATE SODIUM 100 MG: 100 CAPSULE, LIQUID FILLED ORAL at 09:14

## 2023-03-02 RX ADMIN — BUSPIRONE HYDROCHLORIDE 5 MG: 5 TABLET ORAL at 15:16

## 2023-03-02 RX ADMIN — DOCUSATE SODIUM 100 MG: 100 CAPSULE, LIQUID FILLED ORAL at 19:58

## 2023-03-02 RX ADMIN — ROSUVASTATIN CALCIUM 10 MG: 10 TABLET, FILM COATED ORAL at 19:57

## 2023-03-02 RX ADMIN — METOPROLOL SUCCINATE 100 MG: 50 TABLET, EXTENDED RELEASE ORAL at 09:14
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
## 2023-03-02 NOTE — PROGRESS NOTES
[facility] Pharmacokinetic Monitoring Service - Vancomycin     Kavon Hernandez is a 78 y.o. male starting on vancomycin therapy for Sepsis of Unknown Etiology. Pharmacy consulted by Colletta Kinder, MD for monitoring and adjustment. Target Concentration: Goal AUC/-600 mg*hr/L    Additional Antimicrobials: None ordered at this time    Pertinent Laboratory Values: Wt Readings from Last 1 Encounters:   03/01/23 (!) 310 lb 2 oz (140.7 kg)     Temp Readings from Last 1 Encounters:   03/01/23 (!) 101.6 °F (38.7 °C) (Oral)     Estimated Creatinine Clearance: 71 mL/min (A) (based on SCr of 1.5 mg/dL (H)). Recent Labs     02/28/23  1632 03/01/23  0339   CREATININE 1.7* 1.5*   WBC 1.9* 1.3*     Procalcitonin: None ordered at this time    Pertinent Cultures:  Culture Date Source Results    Blood x 2 Ordered   MRSA Nasal Swab: N/A. Non-respiratory infection.     Plan:  Dosing recommendations based on Bayesian software  Start Vancomycin 2500 mg IV x 1 followed by Vancomycin 1500 mg IV every 24 hours  Anticipated AUC of 551 and trough concentration of 13.8 at steady state  Renal labs as indicated   Vancomycin concentration ordered for 03/03/23 @ 2130   Pharmacy will continue to monitor patient and adjust therapy as indicated    Thank you for the consult,  Isabell Parekh, [facility]  3/1/2023 9:35 PM

## 2023-03-02 NOTE — PROGRESS NOTES
hospitals MEDICINE  - PROGRESS NOTE    Admit Date: 2/28/2023       3/2/2023    Subjective: The patient underwent successful cardioversion this morning    Objective:   Vitals: /69   Pulse (!) 112   Temp 98.6 °F (37 °C) (Temporal)   Resp 18   Ht 6' 1\" (1.854 m)   Wt (!) 310 lb 2 oz (140.7 kg)   SpO2 94%   BMI 40.92 kg/m²   General appearance: alert, appears stated age and cooperative  Skin: Skin color, texture, turgor normal.   HEENT: Head: Normocephalic, no lesions, without obvious abnormality. Neck: no adenopathy, no carotid bruit, no JVD, supple, symmetrical, trachea midline, and thyroid not enlarged, symmetric, no tenderness/mass/nodules  Lungs: clear to auscultation bilaterally  Heart: regular rate and rhythm, S1, S2 normal, no murmur, click, rub or gallop  Abdomen: soft, non-tender; bowel sounds normal; no masses,  no organomegaly  Extremities: extremities normal, atraumatic, no cyanosis or edema  Lymphatic: No significant lymph node enlargement papable  Neurologic: Mental status: Alert, oriented, thought content appropriate      Data:   Scheduled Meds: Reviewed  Continuous Infusions:   sodium chloride      sodium chloride 150 mL/hr at 03/01/23 2111    dilTIAZem 5 mg/hr (03/01/23 2051)       Intake/Output Summary (Last 24 hours) at 3/2/2023 0812  Last data filed at 3/2/2023 0610  Gross per 24 hour   Intake 2305.62 ml   Output 1300 ml   Net 1005.62 ml     CBC:   Recent Labs     03/02/23  0134   WBC 2.1*   HGB 12.1*   *     BMP:  Recent Labs     03/02/23  0134      K 3.9      CO2 23   BUN 22   CREATININE 1.5*   GLUCOSE 87     ABGs: No results found for: PHART, PO2ART, BLX2SCE  INR: No results for input(s): INR in the last 72 hours.   -----------------------------------------------------------------            Assessment/Plan    Patient Active Problem List:     Adenomatous colon polyp     S/P colonoscopic polypectomy     History of colonic polyps     Obesity     Mixed hyperlipidemia     Essential hypertension     History of prostate cancer     Morbid obesity ([address]     Chronic deep vein thrombosis (DVT) of left peroneal vein (HCC)     Vitamin D deficiency     Cancer of prostate (HCC)     Gastroesophageal reflux disease without esophagitis     Irregular heart beat     Atrial fibrillation with RVR (HCC)     Atrial fibrillation (HCC)     Atrial fibrillation with rapid ventricular response (HCC)     Nasal septum ulceration     Epistaxis     Old head injury     Generalized enlarged lymph nodes     Follicular lymphoma grade IIIb of lymph nodes of multiple sites ([address]     History of gout    A/P  1) AF s/p cardioverison - cardizem IV discontinued. He may be discharged on oral diltiazem, metoprolol, and 200 daily of amiodarone as he tolerates these from sinus rate and BP standpoint. F/U with me 2-3 weeks    Dary Bardales MD MD  8:12 AM  3/2/2023     Addendum: 10:00  Pt again developed AF with rate controlled and BP improved. There is no point in continued attempts at cardioversion. He is asymptomatic and can be discharged from my standpoint on his current regimen. I discussed with his mother at length.

## 2023-03-02 NOTE — PLAN OF CARE
Problem: Discharge Planning  Goal: Discharge to home or other facility with appropriate resources  Outcome: Progressing     Problem: Safety - Adult  Goal: Free from fall injury  Outcome: Progressing     Problem: ABCDS Injury Assessment  Goal: Absence of physical injury  Outcome: Progressing Yes

## 2023-03-02 NOTE — PROCEDURES
Date of Procedure:  3/2/2023     Indications:  Atrial fibrillation with an appropriate duration of anticoagulation     Conscious Sedation Protocol Used During this Procedure -        Anesthesia: Moderate   Sedation:  6  mg Midazolam (Versed)     150 mcg Fentanyl   Start time: 7:30   Stop time: 7:45   ASA Class: 2 MP 2   EBL Not applicable     A trained medical personnel administered medications at my direction. The patient was monitored continuously with ECG, pulse oximetry, blood pressure monitoring and direct observation. After obtaining informed written consent and an appropriate level of conscious sedation, DCCV with 360 J was successful in restoring sinus rhythm. Complications:  none      Impression:  Successful DC cardioversion of atrial fibrillation to normal sinus rhythm on Rivaroxaban used for anticoagulation.     Electronically signed by Erwin Gonzales MD on 3/2/23

## 2023-03-02 NOTE — PROGRESS NOTES
Patient has a temp of 101.6 oral. Tylenol 650 po is giving. Notified the hospital Dr. Shanelle Manzanares for order  for blood cultures x 2 and the UA. UA is come back is positive for rare bacteria. Patient is put on Vancomycin 2500 mg of loading dose. Temp is 100.0 0ral. Patient is alert and oriented and family is in the room at this time. Denies any pain . Still on Cardizem drip  @ 5 mg /hr. Tolerating well. Blood sugar is good 107 tonight and received night time food and tolerated well. Patient was sweating so bed is changed and received a bath. Planing to go to cardioversion today am,. Resting well. Vital signs are 115/75 , % . Temp is 99.0 . Will continue to monitor patient.

## 2023-03-02 NOTE — PLAN OF CARE
Problem: Discharge Planning  Goal: Discharge to home or other facility with appropriate resources  Outcome: Progressing  Flowsheets (Taken 3/2/2023 0617 by Iván Johnson RN)  Discharge to home or other facility with appropriate resources:   Identify barriers to discharge with patient and caregiver   Arrange for needed discharge resources and transportation as appropriate   Identify discharge learning needs (meds, wound care, etc)     Problem: Safety - Adult  Goal: Free from fall injury  Outcome: Progressing     Problem: ABCDS Injury Assessment  Goal: Absence of physical injury  Outcome: Progressing

## 2023-03-02 NOTE — PROGRESS NOTES
Daily Progress Note    Date:3/2/2023  Patient: Lina Hagen  : 1955  VNU:[id number]  CODE:Full Code No additional code details  PCP:JODY Cruz    Admit Date: 2023  3:38 PM   LOS: 1 day       Subjective:      [id number] male with follicular lymphoma followed by oncology locally, presenting with worsening fatigue, decreased appetite, poor p.o. intake, weakness over the past few days, noted to be dehydrated with LAURA and new onset A-fib with RVR with rates up to 150s. Has been on Eliquis with history of DVT. Noted mediastinal widening on chest x-ray consistent with his known mediastinal adenopathy. Creatinine noted up to 1.7 which improved with IV fluid hydration. A-fib managed with rate control on IV Cardizem. Continued on oral Cardizem, metoprolol and amiodarone. Cardiology consulted. He had fever last night. Underwent cardioversion this a.m. back to sinus rhythm briefly however back on the floor went back into A-fib intermittently with rapid rates. However patient denies any chest pain, worsening shortness of breath or palpitations. Has not had any further fevers noted today.           Objective:      Vital signs in last 24 hours:  Patient Vitals for the past 24 hrs:   BP Temp Temp src Pulse Resp SpO2   23 1337 (!) 96/59 98.8 °F (37.1 °C) Oral 73 16 93 %   23 0900 118/78 98.2 °F (36.8 °C) Temporal (!) 120 18 96 %   23 0827 (!) 96/54 98.6 °F (37 °C) Oral (!) 104 18 94 %   23 0715 112/69 98.6 °F (37 °C) Temporal (!) 112 18 94 %   23 0617 -- -- -- (!) 102 -- --   23 0045 (!) 119/51 99 °F (37.2 °C) Oral (!) 102 20 --   23 -- 100.4 °F (38 °C) Oral -- -- --   23 -- -- -- -- -- 94 %   23 (!) 119/51 -- -- (!) 116 -- --   23 -- -- -- (!) 114 -- --   23 -- (!) 101.6 °F (38.7 °C) Oral -- -- --   23 119/61 97 °F (36.1 °C) Temporal (!) 116 16 93 %       Physical exam    General: Fatigued appearing, no acute distress  Psych: Calm and cooperative  Cardiac: Tachycardic with irregularly irregular rhythm  Respiratory: Normal effort, no use of accessory muscles  Abdomen: nondistended  Extremities: No edema  Neuro: Normal speech, no focal weakness          Lab Review   Recent Results (from the past 24 hour(s))   POCT Glucose    Collection Time: 03/01/23  8:37 PM   Result Value Ref Range    POC Glucose 107 (H) 70 - 99 mg/dl    Performed on AccuChek    Urinalysis with Reflex to Culture    Collection Time: 03/01/23  9:09 PM    Specimen: Urine   Result Value Ref Range    Color, UA YELLOW Straw/Yellow    Clarity, UA CLOUDY (A) Clear    Glucose, Ur Negative Negative mg/dL    Bilirubin Urine Negative Negative    Ketones, Urine Negative Negative mg/dL    Specific Gravity, UA 1.027 1.005 - 1.030    Blood, Urine Negative Negative    pH, UA 5.0 5.0 - 8.0    Protein, UA 30 (A) Negative mg/dL    Urobilinogen, Urine 1.0 <2.0 E.U./dL    Nitrite, Urine Negative Negative    Leukocyte Esterase, Urine Negative Negative   Microscopic Urinalysis    Collection Time: 03/01/23  9:09 PM   Result Value Ref Range    WBC, UA 0-1 0 - 5 /HPF    RBC, UA 0-1 0 - 2 /HPF    Epithelial Cells, UA 0-2 /HPF    Bacteria, UA Rare (A) None Seen /HPF    Amorphous, UA Rare (A) /HPF   Respiratory Panel, Molecular, with COVID-19 (Restricted: peds pts or suitable admitted adults)    Collection Time: 03/01/23  9:35 PM   Result Value Ref Range    Adenovirus by PCR Not Detected Not Detected    Bordetella parapertussis by PCR Not Detected Not Detected    Bordetella pertussis by PCR Not Detected Not Detected    Chlamydophilia pneumoniae by PCR Not Detected Not Detected    Coronavirus 229E by PCR Not Detected Not Detected    Coronavirus HKU1 by PCR Not Detected Not Detected    Coronavirus NL63 by PCR Not Detected Not Detected    Coronavirus OC43 by PCR Not Detected Not Detected    SARS-CoV-2, PCR Not Detected Not Detected    Human Metapneumovirus by PCR Not Detected Not Detected    Human Rhinovirus/Enterovirus by PCR Not Detected Not Detected    Influenza A by PCR Not Detected Not Detected    Influenza B by PCR Not Detected Not Detected    Mycoplasma pneumoniae by PCR Not Detected Not Detected    Parainfluenza Virus 1 by PCR Not Detected Not Detected    Parainfluenza Virus 2 by PCR Not Detected Not Detected    Parainfluenza Virus 3 by PCR Not Detected Not Detected    Parainfluenza Virus 4 by PCR Not Detected Not Detected    Respiratory Syncytial Virus by PCR Not Detected Not Detected   Lactic Acid    Collection Time: 03/01/23  9:40 PM   Result Value Ref Range    Lactic Acid 1.6 0.5 - 1.9 mmol/L   POCT Glucose    Collection Time: 03/01/23 10:48 PM   Result Value Ref Range    POC Glucose 108 (H) 70 - 99 mg/dl    Performed on AccuChek    Comprehensive Metabolic Panel    Collection Time: 03/02/23  1:34 AM   Result Value Ref Range    Sodium 139 136 - 145 mmol/L    Potassium 3.9 3.5 - 5.0 mmol/L    Chloride 106 98 - 111 mmol/L    CO2 23 22 - 29 mmol/L    Anion Gap 10 7 - 19 mmol/L    Glucose 87 74 - 109 mg/dL    BUN 22 8 - 23 mg/dL    Creatinine 1.5 (H) 0.5 - 1.2 mg/dL    Est, Glom Filt Rate 51 (A) >60    Calcium 7.7 (L) 8.8 - 10.2 mg/dL    Total Protein 4.6 (L) 6.6 - 8.7 g/dL    Albumin 2.5 (L) 3.5 - 5.2 g/dL    Total Bilirubin 0.5 0.2 - 1.2 mg/dL    Alkaline Phosphatase 45 40 - 130 U/L    ALT 47 (H) 5 - 41 U/L    AST 28 5 - 40 U/L   CBC with Auto Differential    Collection Time: 03/02/23  1:34 AM   Result Value Ref Range    WBC 2.1 (L) 4.8 - 10.8 K/uL    RBC 4.32 (L) 4.70 - 6.10 M/uL    Hemoglobin 12.1 (L) 14.0 - 18.0 g/dL    Hematocrit 37.0 (L) 42.0 - 52.0 %    MCV 85.6 80.0 - 94.0 fL    MCH 28.0 27.0 - 31.0 pg    MCHC 32.7 (L) 33.0 - 37.0 g/dL    RDW 14.0 11.5 - 14.5 %    Platelets 414 (L) 736 - 400 K/uL    MPV 10.0 9.4 - 12.4 fL    PLATELET SLIDE REVIEW Adequate     Neutrophils % 72.0 (H) 50.0 - 65.0 %    Lymphocytes % 14.0 (L) 20.0 - 40.0 %    Monocytes % 1.0 0.0 - 10.0 % Eosinophils % 0.0 0.0 - 5.0 %    Basophils % 0.0 0.0 - 1.0 %    Neutrophils Absolute 1.6 1.5 - 7.5 K/uL    Immature Granulocytes # 0.0 K/uL    Lymphocytes Absolute 0.5 (L) 1.1 - 4.5 K/uL    Monocytes Absolute 0.00 0.00 - 0.90 K/uL    Eosinophils Absolute 0.00 0.00 - 0.60 K/uL    Basophils Absolute 0.00 0.00 - 0.20 K/uL    Bands Relative 5 0 - 5 %    Atypical Lymphocytes Relative 8 0 - 8 %    Poikilocytes 1+ (A)     Ovalocytes Occasional (A)    POCT Glucose    Collection Time: 03/02/23  7:09 AM   Result Value Ref Range    POC Glucose 105 (H) 70 - 99 mg/dl    Performed on AccuChek    POCT Glucose    Collection Time: 03/02/23 11:08 AM   Result Value Ref Range    POC Glucose 116 (H) 70 - 99 mg/dl    Performed on AccuChek    POCT Glucose    Collection Time: 03/02/23  4:41 PM   Result Value Ref Range    POC Glucose 93 70 - 99 mg/dl    Performed on AccuChek        I/O last 3 completed shifts: In: 2305.6 [P.O.:700; I.V.:1047.1; IV Piggyback:558.5]  Out: 1550 [OOAVM:8927]  I/O this shift:   In: 720 [P.O.:720]  Out: -       Current Facility-Administered Medications:     metoprolol succinate (TOPROL XL) extended release tablet 100 mg, 100 mg, Oral, BID, Julia Huang MD, 100 mg at 03/02/23 0914    enoxaparin (LOVENOX) injection 135 mg, 1 mg/kg, SubCUTAneous, BID, Tha Lebron MD, 135 mg at 03/02/23 0915    amiodarone (CORDARONE) tablet 400 mg, 400 mg, Oral, BID, Jennifer Michelle MD, 400 mg at 03/02/23 9594    sodium chloride flush 0.9 % injection 5-40 mL, 5-40 mL, IntraVENous, 2 times per day, Jennifer Michelle MD, 10 mL at 03/02/23 0915    sodium chloride flush 0.9 % injection 5-40 mL, 5-40 mL, IntraVENous, PRN, Jennifer Michelle MD    0.9 % sodium chloride infusion, , IntraVENous, PRN, Jennifer Michelle MD    ondansetron (ZOFRAN-ODT) disintegrating tablet 4 mg, 4 mg, Oral, Q8H PRN **OR** ondansetron (ZOFRAN) injection 4 mg, 4 mg, IntraVENous, Q6H PRN, Tha Lebron MD    polyethylene glycol (GLYCOLAX) packet 17 g, 17 g, Oral, Daily PRN, Sheila Antoine MD    acetaminophen (TYLENOL) tablet 650 mg, 650 mg, Oral, Q6H PRN **OR** acetaminophen (TYLENOL) suppository 650 mg, 650 mg, Rectal, Q6H PRN, Sheila Antoine MD    [COMPLETED] dilTIAZem injection 10 mg, 10 mg, IntraVENous, Once, 10 mg at 02/28/23 2115 **FOLLOWED BY** dilTIAZem 125 mg in sodium chloride 0.9 % 125 mL infusion, 2.5-15 mg/hr, IntraVENous, Continuous, Sheila Antoine MD, Last Rate: 5 mL/hr at 03/01/23 2051, 5 mg/hr at 03/01/23 2051    allopurinol (ZYLOPRIM) tablet 300 mg, 300 mg, Oral, Daily, Sheila Antoine MD, 300 mg at 03/02/23 0914    busPIRone (BUSPAR) tablet 5 mg, 5 mg, Oral, TID, Sheila Antoine MD, 5 mg at 03/02/23 1516    dilTIAZem (CARDIZEM CD) extended release capsule 120 mg, 120 mg, Oral, BID, Sheila Antoine MD, 120 mg at 03/02/23 0914    docusate sodium (COLACE) capsule 100 mg, 100 mg, Oral, BID, Sheila Antoine MD, 100 mg at 03/02/23 0914    fenofibrate (TRICOR) tablet 54 mg, 54 mg, Oral, Daily, Sheila Antoine MD, 54 mg at 03/02/23 0914    rosuvastatin (CRESTOR) tablet 10 mg, 10 mg, Oral, Nightly, Sheila Antoine MD, 10 mg at 03/01/23 2037    vitamin D (ERGOCALCIFEROL) capsule 50,000 Units, 50,000 Units, Oral, Weekly, Sheila Antoine MD, 50,000 Units at 03/01/23 0902        Reviewed chart, history, vitals, labs, diagnostics and medications          Assessment/plan  Principal Problem:    Atrial fibrillation with RVR ([facility] [address]  Active Problems:    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  *      Paroxysmal atrial fibrillation with RVR  S/p cardioversion however went back into A-fib still intermittently with RVR  --Attempt rate control on oral medications with Cardizem 120 mg twice daily and metoprolol 100 mg twice daily as long as able to tolerate hemodynamically, monitor BP closely, BP running on lower side of normal  - Continue oral amiodarone at higher dose of 400 mg twice daily for attempts at rhythm control  --Resume IV AV usama blocking agents if warranted and able to hemodynamically tolerate  -Stop Lovenox and switch back to Xarelto for anticoagulation    LAURA with dehydration, creatinine up to 1.7 on admission (previous creatinine 1.4)  - Some improvement noted with IV fluids, stop IV fluid hydration today, encourage adequate p.o. intake, reviewed I's and O's and metabolic profile    Recent fever without clear infectious source, no tenderness around port site, no symptoms suggestive of pulmonary or urinary source  - Continue to follow for any further fevers, if so will obtain repeat blood cultures including blood cultures from port  --Will observe off antibiotics for now unless develops recurrent or persistent fever  - Differential considered for fever includes his known malignancy    Follicular lymphoma  Bicytopenia secondary to chemotherapy, immunocompromise status  - Hematology/oncology following  -Monitor CBC    Transaminitis  Reviewed LFTs, downtrend noted, monitor    Morbidity and mortality: High risk  Medical decision making: High complexity      Marquita Smith MD 3/2/2023 5:08 PM

## 2023-03-03 VITALS
DIASTOLIC BLOOD PRESSURE: 62 MMHG | SYSTOLIC BLOOD PRESSURE: 110 MMHG | BODY MASS INDEX: 41.1 KG/M2 | WEIGHT: 310.13 LBS | TEMPERATURE: 97.5 F | HEIGHT: 73 IN | HEART RATE: 110 BPM | OXYGEN SATURATION: 95 % | RESPIRATION RATE: 16 BRPM

## 2023-03-03 LAB
ALBUMIN SERPL-MCNC: 2.7 G/DL (ref 3.5–5.2)
ALP BLD-CCNC: 42 U/L (ref 40–130)
ALT SERPL-CCNC: 45 U/L (ref 5–41)
ANION GAP SERPL CALCULATED.3IONS-SCNC: 9 MMOL/L (ref 7–19)
AST SERPL-CCNC: 30 U/L (ref 5–40)
ATYPICAL LYMPHOCYTE RELATIVE PERCENT: 10 % (ref 0–8)
BANDED NEUTROPHILS RELATIVE PERCENT: 4 % (ref 0–5)
BASOPHILS ABSOLUTE: 0 K/UL (ref 0–0.2)
BASOPHILS RELATIVE PERCENT: 2 % (ref 0–1)
BILIRUB SERPL-MCNC: 0.4 MG/DL (ref 0.2–1.2)
BLOOD CULTURE, ROUTINE: NORMAL
BUN BLDV-MCNC: 16 MG/DL (ref 8–23)
CALCIUM SERPL-MCNC: 7.6 MG/DL (ref 8.8–10.2)
CHLORIDE BLD-SCNC: 106 MMOL/L (ref 98–111)
CO2: 24 MMOL/L (ref 22–29)
CREAT SERPL-MCNC: 1.4 MG/DL (ref 0.5–1.2)
CULTURE, BLOOD 2: NORMAL
EOSINOPHILS ABSOLUTE: 0 K/UL (ref 0–0.6)
EOSINOPHILS RELATIVE PERCENT: 0 % (ref 0–5)
GFR SERPL CREATININE-BSD FRML MDRD: 55 ML/MIN/{1.73_M2}
GLUCOSE BLD-MCNC: 106 MG/DL (ref 70–99)
GLUCOSE BLD-MCNC: 94 MG/DL (ref 74–109)
GLUCOSE BLD-MCNC: 95 MG/DL (ref 70–99)
HCT VFR BLD CALC: 34.9 % (ref 42–52)
HEMOGLOBIN: 11.5 G/DL (ref 14–18)
IMMATURE GRANULOCYTES #: 0 K/UL
LYMPHOCYTES ABSOLUTE: 0.3 K/UL (ref 1.1–4.5)
LYMPHOCYTES RELATIVE PERCENT: 6 % (ref 20–40)
MCH RBC QN AUTO: 28.3 PG (ref 27–31)
MCHC RBC AUTO-ENTMCNC: 33 G/DL (ref 33–37)
MCV RBC AUTO: 85.7 FL (ref 80–94)
MONOCYTES ABSOLUTE: 0.1 K/UL (ref 0–0.9)
MONOCYTES RELATIVE PERCENT: 4 % (ref 0–10)
NEUTROPHILS ABSOLUTE: 1.4 K/UL (ref 1.5–7.5)
NEUTROPHILS RELATIVE PERCENT: 74 % (ref 50–65)
OVALOCYTES: ABNORMAL
PDW BLD-RTO: 14 % (ref 11.5–14.5)
PERFORMED ON: ABNORMAL
PERFORMED ON: NORMAL
PLATELET # BLD: 138 K/UL (ref 130–400)
PLATELET SLIDE REVIEW: ADEQUATE
PMV BLD AUTO: 9.2 FL (ref 9.4–12.4)
POIKILOCYTES: ABNORMAL
POTASSIUM SERPL-SCNC: 3.6 MMOL/L (ref 3.5–5)
RBC # BLD: 4.07 M/UL (ref 4.7–6.1)
SMUDGE CELLS: ABNORMAL
SODIUM BLD-SCNC: 139 MMOL/L (ref 136–145)
TOTAL PROTEIN: 4.3 G/DL (ref 6.6–8.7)
WBC # BLD: 1.8 K/UL (ref 4.8–10.8)

## 2023-03-03 PROCEDURE — 99232 SBSQ HOSP IP/OBS MODERATE 35: CPT | Performed by: INTERNAL MEDICINE

## 2023-03-03 PROCEDURE — 85025 COMPLETE CBC W/AUTO DIFF WBC: CPT

## 2023-03-03 PROCEDURE — 82962 GLUCOSE BLOOD TEST: CPT

## 2023-03-03 PROCEDURE — 2580000003 HC RX 258: Performed by: INTERNAL MEDICINE

## 2023-03-03 PROCEDURE — 36415 COLL VENOUS BLD VENIPUNCTURE: CPT

## 2023-03-03 PROCEDURE — 6370000000 HC RX 637 (ALT 250 FOR IP): Performed by: HOSPITALIST

## 2023-03-03 PROCEDURE — 6370000000 HC RX 637 (ALT 250 FOR IP): Performed by: INTERNAL MEDICINE

## 2023-03-03 PROCEDURE — 80053 COMPREHEN METABOLIC PANEL: CPT

## 2023-03-03 PROCEDURE — 94760 N-INVAS EAR/PLS OXIMETRY 1: CPT

## 2023-03-03 PROCEDURE — 6370000000 HC RX 637 (ALT 250 FOR IP): Performed by: STUDENT IN AN ORGANIZED HEALTH CARE EDUCATION/TRAINING PROGRAM

## 2023-03-03 RX ORDER — AMIODARONE HYDROCHLORIDE 200 MG/1
TABLET ORAL
Qty: 30 TABLET | Refills: 1 | Status: SHIPPED | OUTPATIENT
Start: 2023-03-03 | End: 2023-03-08

## 2023-03-03 RX ORDER — METOPROLOL SUCCINATE 100 MG/1
100 TABLET, EXTENDED RELEASE ORAL 2 TIMES DAILY
Qty: 90 TABLET | Refills: 1 | Status: SHIPPED
Start: 2023-03-03 | End: 2023-03-08

## 2023-03-03 RX ORDER — DILTIAZEM HYDROCHLORIDE 120 MG/1
120 CAPSULE, COATED, EXTENDED RELEASE ORAL 2 TIMES DAILY
Qty: 60 CAPSULE | Refills: 1 | Status: SHIPPED | OUTPATIENT
Start: 2023-03-03

## 2023-03-03 RX ADMIN — BUSPIRONE HYDROCHLORIDE 5 MG: 5 TABLET ORAL at 13:32

## 2023-03-03 RX ADMIN — METOPROLOL SUCCINATE 100 MG: 50 TABLET, EXTENDED RELEASE ORAL at 09:03

## 2023-03-03 RX ADMIN — FENOFIBRATE 54 MG: 54 TABLET ORAL at 09:03

## 2023-03-03 RX ADMIN — ALLOPURINOL 300 MG: 300 TABLET ORAL at 09:03

## 2023-03-03 RX ADMIN — DILTIAZEM HYDROCHLORIDE 120 MG: 120 CAPSULE, COATED, EXTENDED RELEASE ORAL at 09:03

## 2023-03-03 RX ADMIN — AMIODARONE HYDROCHLORIDE 400 MG: 200 TABLET ORAL at 09:03

## 2023-03-03 RX ADMIN — SODIUM CHLORIDE, PRESERVATIVE FREE 10 ML: 5 INJECTION INTRAVENOUS at 09:03

## 2023-03-03 RX ADMIN — DOCUSATE SODIUM 100 MG: 100 CAPSULE, LIQUID FILLED ORAL at 09:03

## 2023-03-03 RX ADMIN — RIVAROXABAN 20 MG: 20 TABLET, FILM COATED ORAL at 09:03

## 2023-03-03 RX ADMIN — BUSPIRONE HYDROCHLORIDE 5 MG: 5 TABLET ORAL at 09:03

## 2023-03-03 NOTE — PLAN OF CARE
Problem: Discharge Planning  Goal: Discharge to home or other facility with appropriate resources  3/3/2023 1056 by Terri Henry RN  Outcome: Completed  3/2/2023 2338 by Katie Silva RN  Outcome: Progressing  Flowsheets (Taken 3/2/2023 2004)  Discharge to home or other facility with appropriate resources: Identify barriers to discharge with patient and caregiver     Problem: Safety - Adult  Goal: Free from fall injury  3/3/2023 1056 by Terri Henry RN  Outcome: Completed  3/2/2023 2338 by Katie Silva RN  Outcome: Progressing     Problem: ABCDS Injury Assessment  Goal: Absence of physical injury  3/3/2023 1056 by Terri Henry RN  Outcome: Completed  3/2/2023 2338 by Katie Silva RN  Outcome: Progressing

## 2023-03-03 NOTE — DISCHARGE SUMMARY
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 Technique: Single AP view of the chest was obtained. Clinical information: Failure to thrive. Comparison: Noncontrast CT of the chest performed on 2022 Findings: The lungs are clear without focal infiltrate or effusion. Patient is post right IJ Mediport placement with the tip at the level of the SVC. Mediastinal widening is noted correlating to prior mediastinal lymphadenopathy and large volume mediastinal fat. The heart is normal in size. There are no acute osseous abnormalities. No radiographic evidence of acute cardiopulmonary process. Mediastinal widening correlates to previously visualized mediastinal lymphadenopathy and large amount of mediastinal fat.       Pertinent Labs:   CBC:   Recent Labs     23  0339 23  0134 23  0226   WBC 1.3* 2.1* 1.8*   HGB 12.2* 12.1* 11.5*   * 124* 138     BMP:    Recent Labs     23  0339 23  0134 23  0226    139 139   K 3.7 3.9 3.6    106 106   CO2 25 23 24   BUN 27* 22 16   CREATININE 1.5* 1.5* 1.4*   GLUCOSE 95 87 94             Hospital Course: [id number] male with follicular lymphoma followed by oncology locally, presenting with worsening fatigue, decreased appetite, poor p.o. intake, weakness over the past few days, noted to be dehydrated with LAURA and new onset A-fib with RVR with rates up to 150s.  Has been on Eliquis with history of DVT. Noted mediastinal widening on chest x-ray consistent with his known mediastinal adenopathy. Creatinine noted up to 1.7 in setting of dehydration which improved with IV fluid hydration. A-fib managed with rate control on IV Cardizem. Continued on oral Cardizem, metoprolol and amiodarone. Cardiology consulted. He underwent cardioversion on 3/2 back to sinus rhythm briefly however then went back into A-fib. Despite this able to maintain fair rate control on oral medications and patient asymptomatic. Patient was cleared for discharge from cardiology standpoint. Medically stable for discharge home to continue on oral amiodarone daily, oral Cardizem twice daily, and oral metoprolol twice daily. Continue on current anticoagulation with Xarelto. Arranged for outpatient follow-up with cardiac electrophysiology (Dr. Keke Mathews) in 2 weeks. He will follow-up with oncology as scheduled. Physical Exam:  Vital Signs: /62   Pulse (!) 110   Temp 97.5 °F (36.4 °C) (Temporal)   Resp 16   Ht 6' 1\" (1.854 m)   Wt (!) 310 lb 2 oz (140.7 kg)   SpO2 95%   BMI 40.92 kg/m²   General appearance:. Alert and Cooperative   HEENT: Normocephalic. Chest: clear to auscultation bilaterally without wheezes or rhonchi. Cardiac: Irregular rhythm  Extremities: No clubbing or cyanosis    Discharge Medications:         Medication List        CHANGE how you take these medications      metoprolol succinate 100 MG extended release tablet  Commonly known as: TOPROL XL  Take 1 tablet by mouth in the morning and at bedtime  What changed: when to take this     rivaroxaban 20 MG Tabs tablet  Commonly known as: Xarelto  TAKE 1 TABLET EVERY DAY WITH BREAKFAST  What changed:   how much to take  when to take this     rosuvastatin 10 MG tablet  Commonly known as: CRESTOR  TAKE 1 TABLET EVERY NIGHT  What changed: See the new instructions.             CONTINUE taking these medications allopurinol 300 MG tablet  Commonly known as: ZYLOPRIM  Take 1 tablet by mouth daily     amiodarone 200 MG tablet  Commonly known as: CORDARONE  TAKE ONE TABLET BY MOUTH EVERY DAY     busPIRone 5 MG tablet  Commonly known as: BUSPAR  Take 1 tablet by mouth 3 times daily     dilTIAZem 120 MG extended release capsule  Commonly known as: CARDIZEM CD  Take 1 capsule by mouth in the morning and at bedtime     docusate sodium 100 MG capsule  Commonly known as: COLACE  Take 1 capsule by mouth 2 times daily     fenofibrate 160 MG tablet  Commonly known as: TRIGLIDE  Take 1 tablet by mouth daily     MULTI-VITAMIN PO     ondansetron 4 MG disintegrating tablet  Commonly known as: ZOFRAN-ODT  Take 1 tablet by mouth every 8 hours as needed for Nausea or Vomiting     Vitamin D3 50 MCG (2000 UT) Caps  TAKE 1 CAPSULE EVERY DAY            STOP taking these medications      amLODIPine 5 MG tablet  Commonly known as: NORVASC     losartan 100 MG tablet  Commonly known as: COZAAR     predniSONE 50 MG tablet  Commonly known as: Xuan Sophia               Where to Get Your Medications        Information about where to get these medications is not yet available    Ask your nurse or doctor about these medications  metoprolol succinate 100 MG extended release tablet         Discharge Instructions: Follow up with JODY Triana within 1 week. Take medications as directed. Resume activity as tolerated. Disposition: Patient is medically stable and will be discharged home.     Time spent on discharge 35 minutes    Signed:  Sacha Mosley MD  3/3/2023 9:18 AM

## 2023-03-03 NOTE — PLAN OF CARE
Problem: Discharge Planning  Goal: Discharge to home or other facility with appropriate resources  3/2/2023 2338 by Iona Weiss RN  Outcome: Progressing  Flowsheets (Taken 3/2/2023 2004)  Discharge to home or other facility with appropriate resources: Identify barriers to discharge with patient and caregiver     Problem: Safety - Adult  Goal: Free from fall injury  3/2/2023 2338 by Iona Weiss RN  Outcome: Progressing     Problem: ABCDS Injury Assessment  Goal: Absence of physical injury  3/2/2023 2338 by Iona Weiss RN  Outcome: Progressing
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
## 2023-03-06 ENCOUNTER — TELEPHONE (OUTPATIENT)
Dept: INTERNAL MEDICINE | Age: 68
End: 2023-03-06

## 2023-03-06 NOTE — TELEPHONE ENCOUNTER
[facility] 45 Transitions Initial Follow Up Call    Outreach made within 2 business days of discharge: Yes    Patient: Arnulfo Thomas   Patient : 1955 MRN: [id number]    Reason for Admission: worsening fatigue, decreased appetite, poor p.o. intake, weakness over the past few days    Discharge Date: 3/3/23      Discharge Diagnoses:  Principal Problem:  Paroxysmal atrial fibrillation with RVR ([address]  Active Problems:  Acute kidney injury  Dehydration  Transaminitis    Follicular lymphoma grade IIIb of lymph nodes of multiple sites [facility])    Essential hypertension     Spoke with: Attempted to make contact with patient/caregiver for an initial transitions of care follow up call post discharge without success. I will reach out again at a later time. Any previously scheduled hospital follow up appointments noted below.         Discharge department/facility: [place] [id number]    Scheduled appointment with PCP within 7-14 days    Follow Up  Future Appointments   Date Time Provider Sam Valenzuela   3/8/2023 11:00 AM JODY Cortés [facility]   3/9/2023  2:00 PM SCHEDULE, [facility] MA [facility] [place] [facility]   3/16/2023  2:00 PM SCHEDULE, [facility] MA [facility] [place] [facility]OD   3/23/2023  7:45 AM Abel Leyden, MD N [facility]   3/23/2023  8:30 AM SCHEDULE, [facility] TREATMENTS [facility] [place] [facility]OD   3/24/2023  8:30 AM SCHEDULE, [facility] TREATMENTS [facility] [place] [facility]OD   3/28/2023  9:00 AM MD KAI Min [facility] Cardio [facility]   5/15/2023  2:15 PM JODY Cortés [facility]       Geovanna Suarez MA

## 2023-03-07 ENCOUNTER — TELEPHONE (OUTPATIENT)
Dept: INTERNAL MEDICINE | Age: 68
End: 2023-03-07

## 2023-03-07 LAB
BLOOD CULTURE, ROUTINE: NORMAL
CULTURE, BLOOD 2: NORMAL

## 2023-03-07 NOTE — TELEPHONE ENCOUNTER
[facility] 45 Transitions Initial Follow Up Call    Outreach made within 2 business days of discharge: Yes    Patient: Conchis Youssef   Patient : 1955 MRN: [id number]    Reason for Admission: worsening fatigue, decreased appetite, poor p.o. intake, weakness over the past few days    Discharge Date: 3/3/23      Discharge Diagnoses:  Principal Problem:  Paroxysmal atrial fibrillation with RVR ([address]  Active Problems:  Acute kidney injury  Dehydration  Transaminitis    Follicular lymphoma grade IIIb of lymph nodes of multiple sites [facility])    Essential hypertension       Spoke with: Left 2nd message for pt to call us back to do the TCM call. Pt is already scheduled for follow up.     Discharge department/facility: [place] [id number]    Scheduled appointment with PCP within 7-14 days    Follow Up  Future Appointments   Date Time Provider Sam Valenzuela   3/8/2023 11:00 AM [facility]   3/9/2023  2:00 PM SCHEDULE, [facility] ON[facility] MA [facility] [place] [facility]OD   3/16/2023  2:00 PM SCHEDULE, [facility] ONC MA [facility] [place] [facility]OD   3/23/2023  7:45 AM MD KAI Rao [facility]AD [facility]   3/23/2023  8:30 AM SCHEDULE, [facility] ONC TREATMENTS [facility] [place] HOD   3/24/2023  8:30 AM SCHEDULE, [facility] TREATMENTS [facility] [place] [facility]OD   3/28/2023  9:00 AM MD KAI Townsend [facility]   5/15/2023  2:15 PM [facility]       Oscar Menjivar MA

## 2023-03-08 ENCOUNTER — OFFICE VISIT (OUTPATIENT)
Dept: INTERNAL MEDICINE | Age: 68
End: 2023-03-08

## 2023-03-08 VITALS
WEIGHT: 310 LBS | HEART RATE: 66 BPM | DIASTOLIC BLOOD PRESSURE: 60 MMHG | OXYGEN SATURATION: 97 % | BODY MASS INDEX: 41.08 KG/M2 | SYSTOLIC BLOOD PRESSURE: 102 MMHG | HEIGHT: 73 IN

## 2023-03-08 DIAGNOSIS — E46 HYPOALBUMINEMIA DUE TO PROTEIN-CALORIE MALNUTRITION (HCC): ICD-10-CM

## 2023-03-08 DIAGNOSIS — I48.91 ATRIAL FIBRILLATION WITH RAPID VENTRICULAR RESPONSE (HCC): ICD-10-CM

## 2023-03-08 DIAGNOSIS — N17.9 ACUTE KIDNEY INJURY (HCC): ICD-10-CM

## 2023-03-08 DIAGNOSIS — E46 HYPOALBUMINEMIA DUE TO PROTEIN-CALORIE MALNUTRITION (HCC): Primary | ICD-10-CM

## 2023-03-08 DIAGNOSIS — E88.09 HYPOALBUMINEMIA DUE TO PROTEIN-CALORIE MALNUTRITION (HCC): ICD-10-CM

## 2023-03-08 DIAGNOSIS — E88.09 HYPOALBUMINEMIA DUE TO PROTEIN-CALORIE MALNUTRITION (HCC): Primary | ICD-10-CM

## 2023-03-08 LAB
ALBUMIN SERPL-MCNC: 3.2 G/DL (ref 3.5–5.2)
ALP BLD-CCNC: 64 U/L (ref 40–130)
ALT SERPL-CCNC: 55 U/L (ref 5–41)
ANION GAP SERPL CALCULATED.3IONS-SCNC: 12 MMOL/L (ref 7–19)
AST SERPL-CCNC: 48 U/L (ref 5–40)
BILIRUB SERPL-MCNC: 0.6 MG/DL (ref 0.2–1.2)
BUN BLDV-MCNC: 18 MG/DL (ref 8–23)
CALCIUM SERPL-MCNC: 8.7 MG/DL (ref 8.8–10.2)
CHLORIDE BLD-SCNC: 102 MMOL/L (ref 98–111)
CO2: 25 MMOL/L (ref 22–29)
CREAT SERPL-MCNC: 1.5 MG/DL (ref 0.5–1.2)
GFR SERPL CREATININE-BSD FRML MDRD: 51 ML/MIN/{1.73_M2}
GLUCOSE BLD-MCNC: 111 MG/DL (ref 74–109)
POTASSIUM SERPL-SCNC: 3.5 MMOL/L (ref 3.5–5)
PREALBUMIN: 14 MG/DL (ref 20–40)
SODIUM BLD-SCNC: 139 MMOL/L (ref 136–145)
TOTAL PROTEIN: 5.7 G/DL (ref 6.6–8.7)

## 2023-03-08 RX ORDER — VITAMIN E 268 MG
400 CAPSULE ORAL DAILY
COMMUNITY

## 2023-03-08 RX ORDER — AMIODARONE HYDROCHLORIDE 200 MG/1
TABLET ORAL
Qty: 90 TABLET | Refills: 1 | Status: SHIPPED | OUTPATIENT
Start: 2023-03-08

## 2023-03-08 RX ORDER — METOPROLOL SUCCINATE 100 MG/1
100 TABLET, EXTENDED RELEASE ORAL 2 TIMES DAILY
Qty: 90 TABLET | Refills: 1
Start: 2023-03-08

## 2023-03-08 RX ORDER — NYSTATIN 100000 U/G
OINTMENT TOPICAL
Qty: 30 G | Refills: 0 | Status: SHIPPED | OUTPATIENT
Start: 2023-03-08

## 2023-03-08 RX ORDER — ROSUVASTATIN CALCIUM 10 MG/1
TABLET, COATED ORAL
Qty: 90 TABLET | Refills: 1
Start: 2023-03-08

## 2023-03-08 RX ORDER — BUSPIRONE HYDROCHLORIDE 5 MG/1
5 TABLET ORAL 3 TIMES DAILY
Qty: 90 TABLET | Refills: 1
Start: 2023-03-08 | End: 2023-04-07

## 2023-03-08 NOTE — ASSESSMENT & PLAN NOTE
During his hospital stay he had cardioversion but then immediately back into A-[facility].   He was discharged on Cardizem and amiodarone metoprolol and changed from Eliquis to Xarelto

## 2023-03-08 NOTE — PROGRESS NOTES
Post-Discharge Transitional Care  Follow Up      Joceline Mix   YOB: 1955    Date of Office Visit:  3/8/2023  Date of Hospital Admission: 2/28/23  Date of Hospital Discharge: 3/3/23  Risk of hospital readmission (high >=14%. Medium >=10%) :Readmission Risk Score: 19.8      Care management risk score Rising risk (score 2-5) and Complex Care (Scores >=6): No Risk Score On File     Non face to face  following discharge, date last encounter closed (first attempt may have been earlier): 03/07/2023    Call initiated 2 business days of discharge: Yes    ASSESSMENT/PLAN:   Atrial fibrillation with rapid ventricular response [facility])  Assessment & Plan:  During his hospital stay he had cardioversion but then immediately back into A-fib. He was discharged on Cardizem and amiodarone metoprolol and changed from 5u   Xarelto   Acute kidney injury [facility])  Assessment & Plan: This is due to dehydration he was hydrated in the hospital and discharged home     Medical Decision Making: high complexity  No follow-ups on file. On this date 3/8/2023 I have spent 42 minutes reviewing previous notes, test results and face to face with the patient discussing the diagnosis and importance of compliance with the treatment plan as well as documenting on the day of the visit. Subjective:   HPI:  Follow up of Hospital problems/diagnosis(es):    PAF with RVR he initially presented with rapid rate this has been a recurrent problem for him is his multiple admissions for this, this admission he was cardioverted continued with oral Cardizem metoprolol and amiodarone he went to sinus rhythm briefly then back to atrial fibs but on the current medical regimen he remained stable and he was discharged with those meds  LAURA with dehydration he had not been feeling well his mom called our office because she could not get him to get out of bed or eat or drink.   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 Chato Spencer treating he is also had some radiation as well  Hypertension     Hypoalbuminemia;  very low protien and albumin level     Inpatient course: Discharge summary reviewed- see chart. Interval history/Current status:    PAF with RVR his rate was called with Cardizem he has been on Eliquis for DVT which was in place when he was found to be in atrial fibs in 2022; he will have a follow-up with Dr. Kartik Mcdaniel in 2 weeks so he will remain on amiodarone metoprolol and Cardizem seems that they have switched him from Eliquis to Xarelto . They did not have a prescription for amiodarone I sent that. They were not taking metoprolol twice a day so I have changed that prescription. LAURA with dehydration he was hydrated in the unit monitor carefully  Elevated LFT's these were monitored during his admission  Lymphoma multiple sites chemo continues  Hypertension blood pressure stable today 102/60. He is on no other medications at that his blood pressure other than metoprolol and Cardizem  6. Albumin and protein are extremely low going to have a prealbumin level done today is just labs may be an off because of all the IV fluids he got to rehydrate  7.. ALT and AST were slightly elevated on admission at 69 and 41 respectively.   At discharge they had normalized as well  Patient Active Problem List   Diagnosis    Adenomatous colon polyp    S/P colonoscopic polypectomy    History of colonic polyps    Obesity    Mixed hyperlipidemia    Essential hypertension    History of prostate cancer    Morbid obesity ([address]    Chronic deep vein thrombosis (DVT) of left peroneal vein (HCC)    Vitamin D deficiency    Cancer of prostate (HCC)    Gastroesophageal reflux disease without esophagitis    Irregular heart beat    Atrial fibrillation (HCC)    Atrial fibrillation with rapid ventricular response (HCC)    Nasal septum ulceration    Epistaxis Old head injury    Generalized enlarged lymph nodes    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 If you have any questions, ask your nurse or doctor. START taking these medications      nystatin 766216 UNIT/GM ointment  Commonly known as: MYCOSTATIN  Apply topically 2 times daily. Started by: Karyl Osler, APRN            CHANGE how you take these medications      metoprolol succinate 100 MG extended release tablet  Commonly known as: TOPROL XL  Take 1 tablet by mouth in the morning and at bedtime  What changed: when to take this     rivaroxaban 20 MG Tabs tablet  Commonly known as: Xarelto  TAKE 1 TABLET EVERY DAY WITH BREAKFAST  What changed:   how much to take  when to take this     rosuvastatin 10 MG tablet  Commonly known as: CRESTOR  TAKE 1 TABLET EVERY NIGHT  What changed: See the new instructions.             CONTINUE taking these medications      allopurinol 300 MG tablet  Commonly known as: ZYLOPRIM  Take 1 tablet by mouth daily     amiodarone 200 MG tablet  Commonly known as: CORDARONE  TAKE ONE TABLET BY MOUTH EVERY DAY     busPIRone 5 MG tablet  Commonly known as: BUSPAR  Take 1 tablet by mouth 3 times daily     dilTIAZem 120 MG extended release capsule  Commonly known as: CARDIZEM CD  Take 1 capsule by mouth in the morning and at bedtime     docusate sodium 100 MG capsule  Commonly known as: COLACE  Take 1 capsule by mouth 2 times daily     vitamin E 400 UNIT capsule               Where to Get Your Medications        [facility] - [address] [facility] [phone]  [address] [place] [address],[address], [address] [facility] [address]      Phone: [phone] amiodarone 200 MG tablet  nystatin 233977 UNIT/GM ointment       Information about where to get these medications is not yet available    Ask your nurse or doctor about these medications  busPIRone 5 MG tablet  metoprolol succinate 100 MG extended release tablet  rosuvastatin 10 MG tablet           Medications marked \"taking\" at this time  Outpatient Medications Marked as Taking for the 3/8/23 encounter (Office Visit) with JODY Dunn   Medication Sig Dispense Refill    vitamin E 400 UNIT capsule Take 400 Units by mouth daily      nystatin (MYCOSTATIN) 593582 UNIT/GM ointment Apply topically 2 times daily. 30 g 0    amiodarone (CORDARONE) 200 MG tablet TAKE ONE TABLET BY MOUTH EVERY DAY 90 tablet 1    busPIRone (BUSPAR) 5 MG tablet Take 1 tablet by mouth 3 times daily 90 tablet 1    metoprolol succinate (TOPROL XL) 100 MG extended release tablet Take 1 tablet by mouth in the morning and at bedtime 90 tablet 1    rosuvastatin (CRESTOR) 10 MG tablet TAKE 1 TABLET EVERY NIGHT 90 tablet 1    dilTIAZem (CARDIZEM CD) 120 MG extended release capsule Take 1 capsule by mouth in the morning and at bedtime 60 capsule 1    docusate sodium (COLACE) 100 MG capsule Take 1 capsule by mouth 2 times daily 180 capsule 1    allopurinol (ZYLOPRIM) 300 MG tablet Take 1 tablet by mouth daily 90 tablet 1    rivaroxaban (XARELTO) 20 MG TABS tablet TAKE 1 TABLET EVERY DAY WITH BREAKFAST (Patient taking differently: 20 mg daily (with breakfast) TAKE 1 TABLET EVERY DAY WITH BREAKFAST) 90 tablet 1        Medications patient taking as of now reconciled against medications ordered at time of hospital discharge: No they didn't have script for amiodarone    A comprehensive review of systems was negative except for what was noted in the HPI.     Objective:    /60   Pulse 66   Ht 6' 1\" (1.854 m)   Wt (!) 310 lb (140.6 kg)   SpO2 97%   BMI 40.90 kg/m²   General Appearance: alert and oriented to person, place and time, well developed and well- nourished, in no acute distress  Skin: He has a yeast type rash under both arms.  Also has a lot of bruising to the left antecubital from a IV draw.  He now has a port in place  Head: normocephalic and atraumatic  Eyes: pupils equal, round, and reactive to light, extraocular eye movements intact, conjunctivae normal  ENT: tympanic membrane, external ear and ear canal normal bilaterally, nose without deformity, nasal mucosa and turbinates normal without polyps  Neck: supple and non-tender without mass, no thyromegaly or thyroid nodules, no cervical lymphadenopathy  Pulmonary/Chest: clear to auscultation bilaterally- no wheezes, rales or rhonchi, normal air movement, no respiratory distress his port is in place of the right upper chest.  There is no signs of infection has healed well.  Cardiovascular: normal rate, regular rhythm, normal S1 and S2, no murmurs, rubs, clicks, or gallops, distal pulses intact, no carotid bruits  Abdomen: soft, non-tender, non-distended, normal bowel sounds, no masses or organomegaly  Extremities: no cyanosis, clubbing or edema  Musculoskeletal: normal range of motion, no joint swelling, deformity or tenderness  Neurologic: reflexes normal and symmetric, no cranial nerve deficit, gait, coordination and speech normal      An electronic signature was used to authenticate this note.  --Palma Trent APRN

## 2023-03-09 ENCOUNTER — HOSPITAL ENCOUNTER (OUTPATIENT)
Dept: INFUSION THERAPY | Age: 68
Discharge: HOME | End: 2023-03-09
Payer: MEDICARE

## 2023-03-09 DIAGNOSIS — C82.48 FOLLICULAR LYMPHOMA GRADE IIIB OF LYMPH NODES OF MULTIPLE SITES (HCC): ICD-10-CM

## 2023-03-09 DIAGNOSIS — C83.31 RETICULOSARCOMA OF LYMPH NODES OF HEAD, FACE, AND NECK (HCC): ICD-10-CM

## 2023-03-09 LAB
ALBUMIN SERPL-MCNC: 3.1 G/DL (ref 3.5–5.2)
ALP BLD-CCNC: 69 U/L (ref 40–130)
ALT SERPL-CCNC: 56 U/L (ref 21–72)
ANION GAP SERPL CALCULATED.3IONS-SCNC: 6 MMOL/L (ref 7–19)
AST SERPL-CCNC: 44 U/L (ref 17–59)
BILIRUB SERPL-MCNC: 0.6 MG/DL (ref 0.2–1.3)
BUN BLDV-MCNC: 26 MG/DL (ref 9–20)
CALCIUM SERPL-MCNC: 8.8 MG/DL (ref 8.4–10.2)
CHLORIDE BLD-SCNC: 106 MMOL/L (ref 98–111)
CO2: 28 MMOL/L (ref 22–29)
CREAT SERPL-MCNC: 1.3 MG/DL (ref 0.6–1.2)
GFR SERPL CREATININE-BSD FRML MDRD: >60 ML/MIN/{1.73_M2}
GLOBULIN: 1.8 G/DL
GLUCOSE BLD-MCNC: 154 MG/DL (ref 74–106)
HCT VFR BLD CALC: 36.3 % (ref 40.1–51)
HEMOGLOBIN: 12.2 G/DL (ref 13.7–17.5)
LYMPHOCYTES ABSOLUTE: 0.98 K/UL (ref 1.18–3.74)
LYMPHOCYTES RELATIVE PERCENT: 23.3 % (ref 19.3–53.1)
MCH RBC QN AUTO: 27.7 PG (ref 25.7–32.2)
MCHC RBC AUTO-ENTMCNC: 33.6 G/DL (ref 32.3–36.5)
MCV RBC AUTO: 82.5 FL (ref 79–92.2)
MONOCYTES ABSOLUTE: 0.21 K/UL (ref 0.24–0.82)
MONOCYTES RELATIVE PERCENT: 5 % (ref 4.7–12.5)
NEUTROPHILS ABSOLUTE: 2.9 K/UL (ref 1.56–6.13)
NEUTROPHILS RELATIVE PERCENT: 68.9 % (ref 34–71.1)
PDW BLD-RTO: 14.3 % (ref 11.6–14.4)
PLATELET # BLD: 212 K/UL (ref 163–337)
PMV BLD AUTO: 10.4 FL (ref 7.4–10.4)
POTASSIUM SERPL-SCNC: 3.7 MMOL/L (ref 3.5–5.1)
RBC # BLD: 4.4 M/UL (ref 4.63–6.08)
SODIUM BLD-SCNC: 140 MMOL/L (ref 137–145)
TOTAL PROTEIN: 4.9 G/DL (ref 6.3–8.2)
WBC # BLD: 4.21 K/UL (ref 4.23–9.07)

## 2023-03-09 PROCEDURE — 36415 COLL VENOUS BLD VENIPUNCTURE: CPT

## 2023-03-09 PROCEDURE — 85025 COMPLETE CBC W/AUTO DIFF WBC: CPT

## 2023-03-09 PROCEDURE — 80053 COMPREHEN METABOLIC PANEL: CPT

## 2023-03-09 RX ORDER — AMIODARONE HYDROCHLORIDE 200 MG/1
TABLET ORAL
Qty: 60 TABLET | OUTPATIENT
Start: 2023-03-09

## 2023-03-13 ENCOUNTER — TELEPHONE (OUTPATIENT)
Dept: INTERNAL MEDICINE | Age: 68
End: 2023-03-13

## 2023-03-13 NOTE — TELEPHONE ENCOUNTER
----- Message from Bridgett Che sent at 3/13/2023  1:23 PM CDT -----  Subject: Medication Problem    Medication: Other - Prednisone   Dosage: 50mg   Ordering Provider: sonia    Question/Problem: Pt's brother called stating that he is confused on the   direction of this script. He said its to be taken for his molecular   lymphoma. But there is confusion regarding exactly how many he gives his   brother and how often. Please advise.       Pharmacy: [facility] - [facility] [address] -   P [phone] - F [phone]    ---------------------------------------------------------------------------  --------------  CALL BACK INFO  [phone]; OK to leave message on voicemail  ---------------------------------------------------------------------------  --------------    SCRIPT ANSWERS  Relationship to Patient: Other  Representative Name: Prashanth  Is the representative on the Communication Release of Information (RICKIE)   form in Epic: Yes

## 2023-03-14 DIAGNOSIS — C82.48 FOLLICULAR LYMPHOMA GRADE IIIB OF LYMPH NODES OF MULTIPLE SITES (HCC): ICD-10-CM

## 2023-03-14 DIAGNOSIS — Z51.11 ENCOUNTER FOR CHEMOTHERAPY MANAGEMENT: ICD-10-CM

## 2023-03-14 RX ORDER — PREDNISONE 50 MG/1
100 TABLET ORAL SEE ADMIN INSTRUCTIONS
Qty: 15 TABLET | Refills: 0 | Status: SHIPPED | OUTPATIENT
Start: 2023-03-14

## 2023-03-16 ENCOUNTER — HOSPITAL ENCOUNTER (OUTPATIENT)
Dept: INFUSION THERAPY | Age: 68
Discharge: HOME | End: 2023-03-16
Payer: MEDICARE

## 2023-03-16 VITALS
OXYGEN SATURATION: 97 % | HEART RATE: 85 BPM | SYSTOLIC BLOOD PRESSURE: 104 MMHG | TEMPERATURE: 97.9 F | DIASTOLIC BLOOD PRESSURE: 67 MMHG

## 2023-03-16 DIAGNOSIS — C83.31 RETICULOSARCOMA OF LYMPH NODES OF HEAD, FACE, AND NECK (HCC): Primary | ICD-10-CM

## 2023-03-16 DIAGNOSIS — C82.48 FOLLICULAR LYMPHOMA GRADE IIIB OF LYMPH NODES OF MULTIPLE SITES (HCC): ICD-10-CM

## 2023-03-16 LAB
ALBUMIN SERPL-MCNC: 3 G/DL (ref 3.5–5.2)
ALP SERPL-CCNC: 70 U/L (ref 40–130)
ALT SERPL-CCNC: 68 U/L (ref 21–72)
ANION GAP SERPL CALCULATED.3IONS-SCNC: 4 MMOL/L (ref 7–19)
AST SERPL-CCNC: 47 U/L (ref 17–59)
BILIRUB SERPL-MCNC: 0.8 MG/DL (ref 0.2–1.3)
BUN SERPL-MCNC: 31 MG/DL (ref 9–20)
CALCIUM SERPL-MCNC: 8.7 MG/DL (ref 8.4–10.2)
CHLORIDE SERPL-SCNC: 106 MMOL/L (ref 98–111)
CO2 SERPL-SCNC: 28 MMOL/L (ref 22–29)
CREAT SERPL-MCNC: 1.4 MG/DL (ref 0.6–1.2)
ERYTHROCYTE [DISTWIDTH] IN BLOOD BY AUTOMATED COUNT: 15.3 % (ref 11.6–14.4)
GLOBULIN: 1.7 G/DL
GLUCOSE SERPL-MCNC: 95 MG/DL (ref 74–106)
HCT VFR BLD AUTO: 38.9 % (ref 40.1–51)
HGB BLD-MCNC: 13 G/DL (ref 13.7–17.5)
LYMPHOCYTES # BLD: 0.66 K/UL (ref 1.18–3.74)
LYMPHOCYTES NFR BLD: 7.5 % (ref 19.3–53.1)
MCH RBC QN AUTO: 28.4 PG (ref 25.7–32.2)
MCHC RBC AUTO-ENTMCNC: 33.4 G/DL (ref 32.3–36.5)
MCV RBC AUTO: 84.9 FL (ref 79–92.2)
MONOCYTES # BLD: 0.64 K/UL (ref 0.24–0.82)
MONOCYTES NFR BLD: 7.3 % (ref 4.7–12.5)
NEUTROPHILS # BLD: 6.3 K/UL (ref 1.56–6.13)
NEUTS SEG NFR BLD: 72.1 % (ref 34–71.1)
PLATELET # BLD AUTO: 286 K/UL (ref 163–337)
PMV BLD AUTO: 10.2 FL (ref 7.4–10.4)
POTASSIUM SERPL-SCNC: 3.8 MMOL/L (ref 3.5–5.1)
PROT SERPL-MCNC: 4.7 G/DL (ref 6.3–8.2)
RBC # BLD AUTO: 4.58 M/UL (ref 4.63–6.08)
SODIUM SERPL-SCNC: 138 MMOL/L (ref 137–145)
WBC # BLD AUTO: 8.75 K/UL (ref 4.23–9.07)

## 2023-03-16 PROCEDURE — 85025 COMPLETE CBC W/AUTO DIFF WBC: CPT

## 2023-03-16 PROCEDURE — 36415 COLL VENOUS BLD VENIPUNCTURE: CPT

## 2023-03-16 PROCEDURE — 96360 HYDRATION IV INFUSION INIT: CPT

## 2023-03-16 PROCEDURE — 96361 HYDRATE IV INFUSION ADD-ON: CPT

## 2023-03-16 PROCEDURE — 6360000002 HC RX W HCPCS: Performed by: INTERNAL MEDICINE

## 2023-03-16 PROCEDURE — 2580000003 HC RX 258: Performed by: INTERNAL MEDICINE

## 2023-03-16 PROCEDURE — 80053 COMPREHEN METABOLIC PANEL: CPT

## 2023-03-16 RX ORDER — SODIUM CHLORIDE 9 MG/ML
5-250 INJECTION, SOLUTION INTRAVENOUS PRN
OUTPATIENT
Start: 2023-03-16

## 2023-03-16 RX ORDER — 0.9 % SODIUM CHLORIDE 0.9 %
1000 INTRAVENOUS SOLUTION INTRAVENOUS ONCE
Status: CANCELLED | OUTPATIENT
Start: 2023-03-16 | End: 2023-03-16

## 2023-03-16 RX ORDER — SODIUM CHLORIDE 0.9 % (FLUSH) 0.9 %
5-40 SYRINGE (ML) INJECTION PRN
Status: DISCONTINUED | OUTPATIENT
Start: 2023-03-16 | End: 2023-03-17 | Stop reason: HOSPADM

## 2023-03-16 RX ORDER — HEPARIN SODIUM (PORCINE) LOCK FLUSH IV SOLN 100 UNIT/ML 100 UNIT/ML
500 SOLUTION INTRAVENOUS PRN
OUTPATIENT
Start: 2023-03-16

## 2023-03-16 RX ORDER — 0.9 % SODIUM CHLORIDE 0.9 %
1000 INTRAVENOUS SOLUTION INTRAVENOUS ONCE
Status: COMPLETED | OUTPATIENT
Start: 2023-03-16 | End: 2023-03-16

## 2023-03-16 RX ORDER — HEPARIN SODIUM (PORCINE) LOCK FLUSH IV SOLN 100 UNIT/ML 100 UNIT/ML
500 SOLUTION INTRAVENOUS PRN
Status: DISCONTINUED | OUTPATIENT
Start: 2023-03-16 | End: 2023-03-17 | Stop reason: HOSPADM

## 2023-03-16 RX ORDER — SODIUM CHLORIDE 0.9 % (FLUSH) 0.9 %
5-40 SYRINGE (ML) INJECTION PRN
OUTPATIENT
Start: 2023-03-16

## 2023-03-16 RX ADMIN — SODIUM CHLORIDE, PRESERVATIVE FREE 10 ML: 5 INJECTION INTRAVENOUS at 16:23

## 2023-03-16 RX ADMIN — HEPARIN 500 UNITS: 100 SYRINGE at 16:23

## 2023-03-16 RX ADMIN — SODIUM CHLORIDE 1000 ML: 9 INJECTION, SOLUTION INTRAVENOUS at 14:46

## 2023-03-22 NOTE — PROGRESS NOTES
TABLET BY MOUTH EVERY DAY 90 tablet 1    busPIRone (BUSPAR) 5 MG tablet Take 1 tablet by mouth 3 times daily 90 tablet 1    metoprolol succinate (TOPROL XL) 100 MG extended release tablet Take 1 tablet by mouth in the morning and at bedtime 90 tablet 1    rosuvastatin (CRESTOR) 10 MG tablet TAKE 1 TABLET EVERY NIGHT 90 tablet 1    dilTIAZem (CARDIZEM CD) 120 MG extended release capsule Take 1 capsule by mouth in the morning and at bedtime 60 capsule 1    docusate sodium (COLACE) 100 MG capsule Take 1 capsule by mouth 2 times daily 180 capsule 1    allopurinol (ZYLOPRIM) 300 MG tablet Take 1 tablet by mouth daily 90 tablet 1    rivaroxaban (XARELTO) 20 MG TABS tablet TAKE 1 TABLET EVERY DAY WITH BREAKFAST (Patient taking differently: 20 mg daily (with breakfast) TAKE 1 TABLET EVERY DAY WITH BREAKFAST) 90 tablet 1     No current facility-administered medications for this visit.      Facility-Administered Medications Ordered in Other Visits   Medication Dose Route Frequency Provider Last Rate Last Admin    0.9 % sodium chloride infusion  5-250 mL/hr IntraVENous PRN Glenis Zimmerman MD 50 mL/hr at 03/23/23 0927 50 mL/hr at 03/23/23 0927    riTUXimab-pvvr (RUXIENCE) 1,000 mg in sodium chloride 0.9 % 600 mL Rapid chemo IVPB  1,000 mg IntraVENous Once Glenis Zimmerman MD        bendamustine HCl [facility]) 250 mg in sodium chloride 0.9 % 50 mL chemo infusion  90 mg/m2 (Treatment Plan Recorded) IntraVENous Once Glenis Zimmerman MD        sodium chloride (PF) 0.9 % injection 5-40 mL  5-40 mL IntraVENous PRN Glenis Zimmerman MD        heparin flush 100 UNIT/ML injection 500 Units  500 Units IntraCATHeter PRN Glenis Zimmerman MD           Past Medical History:      Diagnosis Date    Atrial fibrillation ([facility] [address]     Cellulitis of left leg     Colon polyp     Elevated blood pressure reading without diagnosis of hypertension     Esophageal reflux     History of prostate cancer     Hx of blood clots     Hypertension     Mixed

## 2023-03-23 ENCOUNTER — HOSPITAL ENCOUNTER (OUTPATIENT)
Dept: INFUSION THERAPY | Age: 68
Discharge: HOME | End: 2023-03-23
Payer: MEDICARE

## 2023-03-23 ENCOUNTER — OFFICE VISIT (OUTPATIENT)
Dept: HEMATOLOGY | Age: 68
End: 2023-03-23
Payer: MEDICARE

## 2023-03-23 VITALS
HEIGHT: 73 IN | SYSTOLIC BLOOD PRESSURE: 108 MMHG | WEIGHT: 304.6 LBS | OXYGEN SATURATION: 98 % | DIASTOLIC BLOOD PRESSURE: 63 MMHG | HEART RATE: 77 BPM | TEMPERATURE: 98.1 F | BODY MASS INDEX: 40.37 KG/M2 | RESPIRATION RATE: 20 BRPM

## 2023-03-23 DIAGNOSIS — Z79.01 CURRENT USE OF LONG TERM ANTICOAGULATION: ICD-10-CM

## 2023-03-23 DIAGNOSIS — C82.48 FOLLICULAR LYMPHOMA GRADE IIIB OF LYMPH NODES OF MULTIPLE SITES (HCC): Primary | ICD-10-CM

## 2023-03-23 DIAGNOSIS — Z51.11 ENCOUNTER FOR CHEMOTHERAPY MANAGEMENT: ICD-10-CM

## 2023-03-23 DIAGNOSIS — I82.5Y9 CHRONIC DEEP VEIN THROMBOSIS (DVT) OF PROXIMAL VEIN OF LOWER EXTREMITY, UNSPECIFIED LATERALITY (HCC): ICD-10-CM

## 2023-03-23 LAB
ALBUMIN SERPL-MCNC: 3.3 G/DL (ref 3.5–5.2)
ALP SERPL-CCNC: 76 U/L (ref 40–130)
ALT SERPL-CCNC: 81 U/L (ref 21–72)
ANION GAP SERPL CALCULATED.3IONS-SCNC: 4 MMOL/L (ref 7–19)
AST SERPL-CCNC: 55 U/L (ref 17–59)
BILIRUB SERPL-MCNC: 0.8 MG/DL (ref 0.2–1.3)
BUN SERPL-MCNC: 19 MG/DL (ref 9–20)
CALCIUM SERPL-MCNC: 8.7 MG/DL (ref 8.4–10.2)
CHLORIDE SERPL-SCNC: 106 MMOL/L (ref 98–111)
CO2 SERPL-SCNC: 27 MMOL/L (ref 22–29)
CREAT SERPL-MCNC: 1.5 MG/DL (ref 0.6–1.2)
ERYTHROCYTE [DISTWIDTH] IN BLOOD BY AUTOMATED COUNT: 16.4 % (ref 11.6–14.4)
GLOBULIN: 2.3 G/DL
GLUCOSE SERPL-MCNC: 115 MG/DL (ref 74–106)
HCT VFR BLD AUTO: 34.6 % (ref 40.1–51)
HGB BLD-MCNC: 11.5 G/DL (ref 13.7–17.5)
LYMPHOCYTES # BLD: 1.08 K/UL (ref 1.18–3.74)
LYMPHOCYTES NFR BLD: 14.6 % (ref 19.3–53.1)
MCH RBC QN AUTO: 28.5 PG (ref 25.7–32.2)
MCHC RBC AUTO-ENTMCNC: 33.2 G/DL (ref 32.3–36.5)
MCV RBC AUTO: 85.9 FL (ref 79–92.2)
MONOCYTES # BLD: 0.23 K/UL (ref 0.24–0.82)
MONOCYTES NFR BLD: 3.1 % (ref 4.7–12.5)
NEUTROPHILS # BLD: 5.78 K/UL (ref 1.56–6.13)
NEUTS SEG NFR BLD: 77.9 % (ref 34–71.1)
PLATELET # BLD AUTO: 157 K/UL (ref 163–337)
PMV BLD AUTO: 9.8 FL (ref 7.4–10.4)
POTASSIUM SERPL-SCNC: 3.9 MMOL/L (ref 3.5–5.1)
PROT SERPL-MCNC: 5.5 G/DL (ref 6.3–8.2)
RBC # BLD AUTO: 4.03 M/UL (ref 4.63–6.08)
SODIUM SERPL-SCNC: 137 MMOL/L (ref 137–145)
WBC # BLD AUTO: 7.42 K/UL (ref 4.23–9.07)

## 2023-03-23 PROCEDURE — 1111F DSCHRG MED/CURRENT MED MERGE: CPT | Performed by: INTERNAL MEDICINE

## 2023-03-23 PROCEDURE — 99214 OFFICE O/P EST MOD 30 MIN: CPT | Performed by: INTERNAL MEDICINE

## 2023-03-23 PROCEDURE — 6360000002 HC RX W HCPCS: Performed by: INTERNAL MEDICINE

## 2023-03-23 PROCEDURE — G8484 FLU IMMUNIZE NO ADMIN: HCPCS | Performed by: INTERNAL MEDICINE

## 2023-03-23 PROCEDURE — 3078F DIAST BP <80 MM HG: CPT | Performed by: INTERNAL MEDICINE

## 2023-03-23 PROCEDURE — G8417 CALC BMI ABV UP PARAM F/U: HCPCS | Performed by: INTERNAL MEDICINE

## 2023-03-23 PROCEDURE — 3074F SYST BP LT 130 MM HG: CPT | Performed by: INTERNAL MEDICINE

## 2023-03-23 PROCEDURE — 1123F ACP DISCUSS/DSCN MKR DOCD: CPT | Performed by: INTERNAL MEDICINE

## 2023-03-23 PROCEDURE — 96417 CHEMO IV INFUS EACH ADDL SEQ: CPT

## 2023-03-23 PROCEDURE — G8428 CUR MEDS NOT DOCUMENT: HCPCS | Performed by: INTERNAL MEDICINE

## 2023-03-23 PROCEDURE — 96413 CHEMO IV INFUSION 1 HR: CPT

## 2023-03-23 PROCEDURE — 3017F COLORECTAL CA SCREEN DOC REV: CPT | Performed by: INTERNAL MEDICINE

## 2023-03-23 PROCEDURE — 6370000000 HC RX 637 (ALT 250 FOR IP): Performed by: INTERNAL MEDICINE

## 2023-03-23 PROCEDURE — 36415 COLL VENOUS BLD VENIPUNCTURE: CPT

## 2023-03-23 PROCEDURE — 2580000003 HC RX 258: Performed by: INTERNAL MEDICINE

## 2023-03-23 PROCEDURE — 1036F TOBACCO NON-USER: CPT | Performed by: INTERNAL MEDICINE

## 2023-03-23 PROCEDURE — 80053 COMPREHEN METABOLIC PANEL: CPT

## 2023-03-23 PROCEDURE — 96415 CHEMO IV INFUSION ADDL HR: CPT

## 2023-03-23 PROCEDURE — 96375 TX/PRO/DX INJ NEW DRUG ADDON: CPT

## 2023-03-23 PROCEDURE — 85025 COMPLETE CBC W/AUTO DIFF WBC: CPT

## 2023-03-23 RX ORDER — SODIUM CHLORIDE 9 MG/ML
5-40 INJECTION INTRAVENOUS PRN
Status: DISCONTINUED | OUTPATIENT
Start: 2023-03-23 | End: 2023-03-24 | Stop reason: HOSPADM

## 2023-03-23 RX ORDER — ACETAMINOPHEN 500 MG
1000 TABLET ORAL ONCE
Status: COMPLETED | OUTPATIENT
Start: 2023-03-23 | End: 2023-03-23

## 2023-03-23 RX ORDER — PALONOSETRON 0.05 MG/ML
0.25 INJECTION, SOLUTION INTRAVENOUS ONCE
Status: COMPLETED | OUTPATIENT
Start: 2023-03-23 | End: 2023-03-23

## 2023-03-23 RX ORDER — SODIUM CHLORIDE 9 MG/ML
5-250 INJECTION, SOLUTION INTRAVENOUS PRN
Status: DISCONTINUED | OUTPATIENT
Start: 2023-03-23 | End: 2023-03-24 | Stop reason: HOSPADM

## 2023-03-23 RX ORDER — HEPARIN SODIUM (PORCINE) LOCK FLUSH IV SOLN 100 UNIT/ML 100 UNIT/ML
500 SOLUTION INTRAVENOUS PRN
Status: DISCONTINUED | OUTPATIENT
Start: 2023-03-23 | End: 2023-03-24 | Stop reason: HOSPADM

## 2023-03-23 RX ADMIN — ACETAMINOPHEN 1000 MG: 500 TABLET, FILM COATED ORAL at 09:29

## 2023-03-23 RX ADMIN — DEXAMETHASONE SODIUM PHOSPHATE 10 MG: 10 INJECTION, SOLUTION INTRAMUSCULAR; INTRAVENOUS at 09:45

## 2023-03-23 RX ADMIN — Medication 500 UNITS: at 12:22

## 2023-03-23 RX ADMIN — SODIUM CHLORIDE 50 ML/HR: 9 INJECTION, SOLUTION INTRAVENOUS at 09:27

## 2023-03-23 RX ADMIN — PALONOSETRON 0.25 MG: 0.05 INJECTION, SOLUTION INTRAVENOUS at 09:29

## 2023-03-23 RX ADMIN — SODIUM CHLORIDE 1000 MG: 9 INJECTION, SOLUTION INTRAVENOUS at 10:17

## 2023-03-23 RX ADMIN — BENDAMUSTINE HYDROCHLORIDE 250 MG: 25 INJECTION, SOLUTION INTRAVENOUS at 11:56

## 2023-03-23 RX ADMIN — SODIUM CHLORIDE, PRESERVATIVE FREE 10 ML: 5 INJECTION INTRAVENOUS at 12:22

## 2023-03-23 RX ADMIN — DIPHENHYDRAMINE HYDROCHLORIDE 50 MG: 50 INJECTION, SOLUTION INTRAMUSCULAR; INTRAVENOUS at 09:29

## 2023-03-24 ENCOUNTER — HOSPITAL ENCOUNTER (OUTPATIENT)
Dept: GENERAL RADIOLOGY | Facility: HOSPITAL | Age: 68
Discharge: HOME | End: 2023-03-24
Payer: MEDICARE

## 2023-03-24 ENCOUNTER — OFFICE VISIT (OUTPATIENT)
Dept: FAMILY MEDICINE CLINIC | Facility: CLINIC | Age: 68
End: 2023-03-24
Payer: MEDICARE

## 2023-03-24 ENCOUNTER — HOSPITAL ENCOUNTER (OUTPATIENT)
Dept: INFUSION THERAPY | Age: 68
Discharge: HOME | End: 2023-03-24
Payer: MEDICARE

## 2023-03-24 VITALS
TEMPERATURE: 97.7 F | SYSTOLIC BLOOD PRESSURE: 114 MMHG | RESPIRATION RATE: 20 BRPM | OXYGEN SATURATION: 96 % | HEART RATE: 80 BPM | DIASTOLIC BLOOD PRESSURE: 61 MMHG

## 2023-03-24 VITALS
HEART RATE: 72 BPM | OXYGEN SATURATION: 100 % | WEIGHT: 310 LBS | BODY MASS INDEX: 41.08 KG/M2 | DIASTOLIC BLOOD PRESSURE: 62 MMHG | TEMPERATURE: 98.5 F | HEIGHT: 73 IN | SYSTOLIC BLOOD PRESSURE: 95 MMHG

## 2023-03-24 DIAGNOSIS — M79.604 RIGHT LEG PAIN: ICD-10-CM

## 2023-03-24 DIAGNOSIS — C82.48 FOLLICULAR LYMPHOMA GRADE IIIB OF LYMPH NODES OF MULTIPLE SITES (HCC): ICD-10-CM
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
## 2023-03-24 DIAGNOSIS — C83.31 RETICULOSARCOMA OF LYMPH NODES OF HEAD, FACE, AND NECK (HCC): Primary | ICD-10-CM

## 2023-03-24 DIAGNOSIS — L89.021: ICD-10-CM

## 2023-03-24 DIAGNOSIS — S80.211A ABRASION OF RIGHT KNEE, INITIAL ENCOUNTER: ICD-10-CM

## 2023-03-24 PROCEDURE — 6360000002 HC RX W HCPCS: Performed by: INTERNAL MEDICINE

## 2023-03-24 PROCEDURE — 73590 X-RAY EXAM OF LOWER LEG: CPT

## 2023-03-24 PROCEDURE — 96367 TX/PROPH/DG ADDL SEQ IV INF: CPT

## 2023-03-24 PROCEDURE — 2580000003 HC RX 258: Performed by: INTERNAL MEDICINE

## 2023-03-24 PROCEDURE — 96409 CHEMO IV PUSH SNGL DRUG: CPT

## 2023-03-24 RX ORDER — HEPARIN SODIUM (PORCINE) LOCK FLUSH IV SOLN 100 UNIT/ML 100 UNIT/ML
500 SOLUTION INTRAVENOUS PRN
Status: DISCONTINUED | OUTPATIENT
Start: 2023-03-24 | End: 2023-03-25 | Stop reason: HOSPADM

## 2023-03-24 RX ORDER — PSEUDOEPHEDRINE HCL 30 MG
100 TABLET ORAL
COMMUNITY
Start: 2023-01-24 | End: 2023-04-25

## 2023-03-24 RX ORDER — METOPROLOL SUCCINATE 100 MG/1
100 TABLET, EXTENDED RELEASE ORAL
COMMUNITY
Start: 2023-03-08 | End: 2023-03-24 | Stop reason: SDDI

## 2023-03-24 RX ORDER — BUSPIRONE HYDROCHLORIDE 5 MG/1
1 TABLET ORAL 3 TIMES DAILY
Qty: 90 TABLET | Refills: 1 | COMMUNITY
Start: 2023-03-08 | End: 2023-04-08

## 2023-03-24 RX ORDER — DIAZEPAM 5 MG/1
TABLET ORAL
COMMUNITY
Start: 2022-12-06

## 2023-03-24 RX ORDER — SODIUM CHLORIDE 9 MG/ML
5-40 INJECTION INTRAVENOUS PRN
Status: DISCONTINUED | OUTPATIENT
Start: 2023-03-24 | End: 2023-03-25 | Stop reason: HOSPADM

## 2023-03-24 RX ORDER — ALLOPURINOL 300 MG/1
1 TABLET ORAL DAILY
COMMUNITY
Start: 2023-01-11

## 2023-03-24 RX ORDER — NYSTATIN 100000 U/G
OINTMENT TOPICAL
COMMUNITY
Start: 2023-03-08

## 2023-03-24 RX ORDER — PREDNISONE 50 MG/1
100 TABLET ORAL
COMMUNITY
Start: 2023-03-14

## 2023-03-24 RX ORDER — AMIODARONE HYDROCHLORIDE 200 MG/1
1 TABLET ORAL DAILY
COMMUNITY
Start: 2023-03-08 | End: 2023-03-24 | Stop reason: SDDI

## 2023-03-24 RX ORDER — DILTIAZEM HYDROCHLORIDE 120 MG/1
120 CAPSULE, EXTENDED RELEASE ORAL
COMMUNITY
Start: 2023-03-03

## 2023-03-24 RX ADMIN — SODIUM CHLORIDE, PRESERVATIVE FREE 10 ML: 5 INJECTION INTRAVENOUS at 09:03

## 2023-03-24 RX ADMIN — Medication 500 UNITS: at 09:03

## 2023-03-24 RX ADMIN — DEXAMETHASONE SODIUM PHOSPHATE 16 MG: 10 INJECTION, SOLUTION INTRAMUSCULAR; INTRAVENOUS at 08:15

## 2023-03-24 RX ADMIN — BENDAMUSTINE HYDROCHLORIDE 250 MG: 25 INJECTION, SOLUTION INTRAVENOUS at 08:44

## 2023-03-24 RX ADMIN — SODIUM CHLORIDE, PRESERVATIVE FREE 10 ML: 5 INJECTION INTRAVENOUS at 08:12

## 2023-03-24 NOTE — PROGRESS NOTES
"Chief Complaint  Arm Injury and Leg Injury    Subjective    History of Present Illness      Patient presents to [facility] for   History of Present Illness  Patient is here today for sore spot on arm. States it has been there for about 5 days.  Patient fell outside while coming into our office. Hurt his right leg and needs to be evaluated. Injury on the knee. States he can move the leg without any pain.   Arm Injury     Leg Injury         Review of Systems   All other systems reviewed and are negative.      I have reviewed and agree with the HPI and ROS information as above.  YOLI Akers     Objective   Vital Signs:   BP 95/62   Pulse 72   Temp 98.5 °F (36.9 °C)   Ht 185.4 cm (73\")   Wt (!) 141 kg (310 lb)   SpO2 100%   BMI 40.90 kg/m²     Class 3 Severe Obesity (BMI >=40). Obesity-related health conditions include the following: none. Obesity is improving with lifestyle modifications. BMI is is above average; BMI management plan is completed. We discussed portion control and increasing exercise.      Physical Exam  Constitutional:       Appearance: Normal appearance. He is well-developed.   HENT:      Head: Normocephalic and atraumatic.      Right Ear: Tympanic membrane, ear canal and external ear normal.      Left Ear: Tympanic membrane, ear canal and external ear normal.      Nose: Nose normal. No septal deviation, nasal tenderness or congestion.      Mouth/Throat:      Lips: Pink. No lesions.      Mouth: Mucous membranes are moist. No oral lesions.      Dentition: Normal dentition.      Pharynx: Oropharynx is clear. No pharyngeal swelling, oropharyngeal exudate or posterior oropharyngeal erythema.   Eyes:      General: Lids are normal. Vision grossly intact. No scleral icterus.        Right eye: No discharge.         Left eye: No discharge.      Extraocular Movements: Extraocular movements intact.      Conjunctiva/sclera: Conjunctivae normal.      Right eye: Right " conjunctiva is not injected.      Left eye: Left conjunctiva is not injected.      Pupils: Pupils are equal, round, and reactive to light.   Neck:      Thyroid: No thyroid mass.      Trachea: Trachea normal.   Cardiovascular:      Rate and Rhythm: Normal rate and regular rhythm.      Heart sounds: Normal heart sounds. No murmur heard.    No gallop.   Pulmonary:      Effort: Pulmonary effort is normal.      Breath sounds: Normal breath sounds and air entry. No wheezing, rhonchi or rales.   Abdominal:      General: There is no distension.      Palpations: Abdomen is soft. There is no mass.      Tenderness: There is no abdominal tenderness. There is no right CVA tenderness, left CVA tenderness, guarding or rebound.   Musculoskeletal:         General: No tenderness or deformity. Normal range of motion.      Cervical back: Full passive range of motion without pain, normal range of motion and neck supple.      Thoracic back: Normal.      Right lower leg: No edema.      Left lower leg: No edema.      Comments: Abrasion to left knee; healing well   Pressure ulcer to left elbow, no redness noted, healing well.    Skin:     General: Skin is warm and dry.      Coloration: Skin is not jaundiced.      Findings: No rash.   Neurological:      Mental Status: He is alert and oriented to person, place, and time.      Sensory: Sensation is intact.      Motor: Motor function is intact.      Coordination: Coordination is intact.      Gait: Gait is intact.      Deep Tendon Reflexes: Reflexes are normal and symmetric.   Psychiatric:         Mood and Affect: Mood and affect normal.         Judgment: Judgment normal.          DIPAK-7:      PHQ-2 Depression Screening  Little interest or pleasure in doing things? 0-->not at all   Feeling down, depressed, or hopeless? 0-->not at all   PHQ-2 Total Score 0     PHQ-9 Depression Screening  Little interest or pleasure in doing things? 0-->not at all   Feeling down, depressed, or hopeless? 0-->not at  all   Trouble falling or staying asleep, or sleeping too much?     Feeling tired or having little energy?     Poor appetite or overeating?     Feeling bad about yourself - or that you are a failure or have let yourself or your family down?     Trouble concentrating on things, such as reading the newspaper or watching television?     Moving or speaking so slowly that other people could have noticed? Or the opposite - being so fidgety or restless that you have been moving around a lot more than usual?     Thoughts that you would be better off dead, or of hurting yourself in some way?     PHQ-9 Total Score 0   If you checked off any problems, how difficult have these problems made it for you to do your work, take care of things at home, or get along with other people?        Result Review  Data Reviewed:                   Assessment and Plan      Diagnoses and all orders for this visit:    1. Fall, initial encounter (Primary)  -     XR Tibia Fibula 2 View Right; Future    2. Right leg pain  -     XR Tibia Fibula 2 View Right; Future    3. Abrasion of right knee, initial encounter    4. Pressure injury of left elbow, stage 1    Patient is seen today to have pressure ulcer on L elbow evaluated. Patient also did fall when coming in to our office. Patient does have a small abrasion on the R knee. Patient has full ROM and denies pain at this time. Patient lost his balance and fell. I did offer patient xrays due to him falling today and he would like this done.     Patient pressure ulcer on the left elbow is healing well, no redness noted. Patient educated to use neosporin and mepilex dressing on the area as well. Patient educated to continue abx oitnment and dressing to healing area.     Plan  1. Neosporin and mepilex dressing to pressure ulcer to left lebow  2. Educated to use neosporin to right knee abrasion  3. Xray of R tib/fib  4. Return if any new or worsening symptoms.       Follow Up   Return if symptoms worsen or  fail to improve.  Patient was given instructions and counseling regarding his condition or for health maintenance advice. Please see specific information pulled into the AVS if appropriate.

## 2023-03-27 DIAGNOSIS — C82.48 FOLLICULAR LYMPHOMA GRADE IIIB OF LYMPH NODES OF MULTIPLE SITES (HCC): ICD-10-CM

## 2023-03-27 DIAGNOSIS — Z51.11 ENCOUNTER FOR CHEMOTHERAPY MANAGEMENT: ICD-10-CM

## 2023-03-27 RX ORDER — AMIODARONE HYDROCHLORIDE 200 MG/1
TABLET ORAL
Qty: 90 TABLET | Refills: 1 | Status: SHIPPED | OUTPATIENT
Start: 2023-03-27

## 2023-03-27 RX ORDER — NYSTATIN 100000 U/G
OINTMENT TOPICAL
Qty: 30 G | Refills: 0 | Status: SHIPPED | OUTPATIENT
Start: 2023-03-27

## 2023-03-27 RX ORDER — AMIODARONE HYDROCHLORIDE 200 MG/1
TABLET ORAL
Qty: 90 TABLET | Refills: 1 | OUTPATIENT
Start: 2023-03-27

## 2023-03-27 RX ORDER — PREDNISONE 50 MG/1
100 TABLET ORAL SEE ADMIN INSTRUCTIONS
Qty: 15 TABLET | Refills: 0 | Status: SHIPPED | OUTPATIENT
Start: 2023-03-27

## 2023-03-27 NOTE — TELEPHONE ENCOUNTER
Daniela Vasquez called requesting a refill of the below medication which has been pended for you:     Requested Prescriptions     Pending Prescriptions Disp Refills    amiodarone (CORDARONE) 200 MG tablet 90 tablet 1     Sig: TAKE 1 TABLET EVERY DAY       Last Appointment Date: 3/8/2023  Next Appointment Date: 5/15/2023    Allergies   Allergen Reactions    Pcn [Penicillins] Rash

## 2023-03-27 NOTE — TELEPHONE ENCOUNTER
Slime Singh called requesting a refill of the below medication which has been pended for you:     Requested Prescriptions     Pending Prescriptions Disp Refills    predniSONE (DELTASONE) 50 MG tablet 15 tablet 0     Sig: Take 2 tablets by mouth See Admin Instructions for 30 doses Days 1 - 5 of a 28 day cycle x 6 cycles (for treatment of Follicular Lymphoma)    nystatin (MYCOSTATIN) [id number] UNIT/GM ointment 30 g 0     Sig: Apply topically 2 times daily.        Last Appointment Date: 3/8/2023  Next Appointment Date: 5/15/2023    Allergies   Allergen Reactions    Pcn [Penicillins] Rash

## 2023-03-29 ENCOUNTER — TELEPHONE (OUTPATIENT)
Dept: INFUSION THERAPY | Age: 68
End: 2023-03-29

## 2023-03-29 DIAGNOSIS — Z51.11 ENCOUNTER FOR CHEMOTHERAPY MANAGEMENT: ICD-10-CM

## 2023-03-29 DIAGNOSIS — C82.48 FOLLICULAR LYMPHOMA GRADE IIIB OF LYMPH NODES OF MULTIPLE SITES (HCC): ICD-10-CM

## 2023-03-29 RX ORDER — ALLOPURINOL 300 MG/1
300 TABLET ORAL DAILY
Qty: 90 TABLET | Refills: 1 | Status: SHIPPED | OUTPATIENT
Start: 2023-04-10

## 2023-03-29 NOTE — TELEPHONE ENCOUNTER
Patient's mother called stating that Daquan Carter has been having diarrhea since 3/25. She has given him Pepto Bismol without any relief. Nurse advised to his mother to begin giving him immodium as directed and to continue to encourage fluids. Mother verbalized understanding.

## 2023-04-06 ENCOUNTER — OFFICE VISIT (OUTPATIENT)
Dept: CARDIOLOGY CLINIC | Age: 68
End: 2023-04-06
Payer: MEDICARE

## 2023-04-06 VITALS
HEIGHT: 73 IN | WEIGHT: 293 LBS | DIASTOLIC BLOOD PRESSURE: 62 MMHG | SYSTOLIC BLOOD PRESSURE: 102 MMHG | BODY MASS INDEX: 38.83 KG/M2 | HEART RATE: 102 BPM

## 2023-04-06 DIAGNOSIS — I48.91 ATRIAL FIBRILLATION WITH RVR (HCC): Primary | ICD-10-CM

## 2023-04-06 PROCEDURE — 93000 ELECTROCARDIOGRAM COMPLETE: CPT | Performed by: INTERNAL MEDICINE

## 2023-04-06 PROCEDURE — 99213 OFFICE O/P EST LOW 20 MIN: CPT | Performed by: INTERNAL MEDICINE

## 2023-04-06 PROCEDURE — 3074F SYST BP LT 130 MM HG: CPT | Performed by: INTERNAL MEDICINE

## 2023-04-06 PROCEDURE — 1123F ACP DISCUSS/DSCN MKR DOCD: CPT | Performed by: INTERNAL MEDICINE

## 2023-04-06 PROCEDURE — 3078F DIAST BP <80 MM HG: CPT | Performed by: INTERNAL MEDICINE

## 2023-04-06 PROCEDURE — G8417 CALC BMI ABV UP PARAM F/U: HCPCS | Performed by: INTERNAL MEDICINE

## 2023-04-06 PROCEDURE — 1036F TOBACCO NON-USER: CPT | Performed by: INTERNAL MEDICINE

## 2023-04-06 PROCEDURE — 3017F COLORECTAL CA SCREEN DOC REV: CPT | Performed by: INTERNAL MEDICINE

## 2023-04-06 PROCEDURE — G8427 DOCREV CUR MEDS BY ELIG CLIN: HCPCS | Performed by: INTERNAL MEDICINE

## 2023-04-06 RX ORDER — FUROSEMIDE 40 MG/1
40 TABLET ORAL DAILY
Qty: 30 TABLET | Refills: 5 | Status: SHIPPED | OUTPATIENT
Start: 2023-04-06

## 2023-04-06 RX ORDER — MIDODRINE HYDROCHLORIDE 2.5 MG/1
2.5 TABLET ORAL 3 TIMES DAILY
Qty: 90 TABLET | Refills: 3 | Status: SHIPPED | OUTPATIENT
Start: 2023-04-06

## 2023-04-06 NOTE — PROGRESS NOTES
of Food in the Last Year: Never true    [address] [facility] [address] in the Last Year: Never true   Transportation Needs: Unknown    Lack of Transportation (Medical): Not on file    Lack of Transportation (Non-Medical): No   Physical Activity: Insufficiently Active    Days of Exercise per Week: 2 days    Minutes of Exercise per Session: 20 min   Stress: Not on file   Social Connections: Not on file   Intimate Partner Violence: Not on file   Housing Stability: Unknown    Unable to Pay for Housing in the Last Year: Not on file    Number of Places Lived in the Last Year: Not on file    Unstable Housing in the Last Year: No       Allergies   Allergen Reactions    Pcn [Penicillins] Rash         Current Outpatient Medications:     Naproxen Sod-diphenhydrAMINE (ALEVE PM PO), Take by mouth at bedtime, Disp: , Rfl:     midodrine (PROAMATINE) 2.5 MG tablet, Take 1 tablet by mouth 3 times daily, Disp: 90 tablet, Rfl: 3    furosemide (LASIX) 40 MG tablet, Take 1 tablet by mouth daily, Disp: 30 tablet, Rfl: 5    [START ON 4/10/2023] allopurinol (ZYLOPRIM) 300 MG tablet, Take 1 tablet by mouth daily, Disp: 90 tablet, Rfl: 1    predniSONE (DELTASONE) 50 MG tablet, Take 2 tablets by mouth See Admin Instructions for 30 doses Days 1 - 5 of a 28 day cycle x 6 cycles (for treatment of Follicular Lymphoma), Disp: 15 tablet, Rfl: 0    nystatin (MYCOSTATIN) 304135 UNIT/GM ointment, Apply topically 2 times daily. , Disp: 30 g, Rfl: 0    vitamin E 400 UNIT capsule, Take 1 capsule by mouth daily, Disp: , Rfl:     busPIRone (BUSPAR) 5 MG tablet, Take 1 tablet by mouth 3 times daily, Disp: 90 tablet, Rfl: 1    metoprolol succinate (TOPROL XL) 100 MG extended release tablet, Take 1 tablet by mouth in the morning and at bedtime, Disp: 90 tablet, Rfl: 1    rosuvastatin (CRESTOR) 10 MG tablet, TAKE 1 TABLET EVERY NIGHT, Disp: 90 tablet, Rfl: 1    dilTIAZem (CARDIZEM CD) 120 MG extended release capsule, Take 1 capsule by mouth in the morning and at bedtime,

## 2023-04-10 ENCOUNTER — HOSPITAL ENCOUNTER (OUTPATIENT)
Dept: INFUSION THERAPY | Age: 68
Setting detail: INFUSION SERIES
Discharge: HOME | End: 2023-04-10
Payer: MEDICARE

## 2023-04-10 VITALS
SYSTOLIC BLOOD PRESSURE: 82 MMHG | HEART RATE: 92 BPM | TEMPERATURE: 98.2 F | RESPIRATION RATE: 20 BRPM | DIASTOLIC BLOOD PRESSURE: 52 MMHG | OXYGEN SATURATION: 95 %

## 2023-04-10 DIAGNOSIS — R19.7 DIARRHEA, UNSPECIFIED TYPE: ICD-10-CM

## 2023-04-10 DIAGNOSIS — E86.0 DEHYDRATION: Primary | ICD-10-CM

## 2023-04-10 LAB
ALBUMIN SERPL-MCNC: 3.2 G/DL (ref 3.5–5.2)
ALP SERPL-CCNC: 79 U/L (ref 40–130)
ALT SERPL-CCNC: 56 U/L (ref 5–41)
ANION GAP SERPL CALCULATED.3IONS-SCNC: 10 MMOL/L (ref 7–19)
AST SERPL-CCNC: 40 U/L (ref 5–40)
BASOPHILS # BLD: 0 K/UL (ref 0–0.2)
BASOPHILS NFR BLD: 0.8 % (ref 0–1)
BILIRUB SERPL-MCNC: 1.1 MG/DL (ref 0.2–1.2)
BUN SERPL-MCNC: 25 MG/DL (ref 8–23)
CALCIUM SERPL-MCNC: 8.8 MG/DL (ref 8.8–10.2)
CHLORIDE SERPL-SCNC: 102 MMOL/L (ref 98–111)
CO2 SERPL-SCNC: 27 MMOL/L (ref 22–29)
CREAT SERPL-MCNC: 1.6 MG/DL (ref 0.5–1.2)
EOSINOPHIL # BLD: 0 K/UL (ref 0–0.6)
EOSINOPHIL NFR BLD: 0.3 % (ref 0–5)
ERYTHROCYTE [DISTWIDTH] IN BLOOD BY AUTOMATED COUNT: 16.9 % (ref 11.5–14.5)
GLUCOSE SERPL-MCNC: 111 MG/DL (ref 74–109)
HCT VFR BLD AUTO: 33.6 % (ref 42–52)
HGB BLD-MCNC: 10.9 G/DL (ref 14–18)
IMM GRANULOCYTES # BLD: 0.1 K/UL
LYMPHOCYTES # BLD: 0.3 K/UL (ref 1.1–4.5)
LYMPHOCYTES NFR BLD: 7.4 % (ref 20–40)
MAGNESIUM SERPL-MCNC: 2.1 MG/DL (ref 1.6–2.4)
MCH RBC QN AUTO: 28.5 PG (ref 27–31)
MCHC RBC AUTO-ENTMCNC: 32.4 G/DL (ref 33–37)
MCV RBC AUTO: 88 FL (ref 80–94)
MONOCYTES # BLD: 0.3 K/UL (ref 0–0.9)
MONOCYTES NFR BLD: 7.4 % (ref 0–10)
NEUTROPHILS # BLD: 2.9 K/UL (ref 1.5–7.5)
NEUTS SEG NFR BLD: 82.4 % (ref 50–65)
PLATELET # BLD AUTO: 186 K/UL (ref 130–400)
PMV BLD AUTO: 10.7 FL (ref 9.4–12.4)
POTASSIUM SERPL-SCNC: 3.8 MMOL/L (ref 3.5–5)
PROT SERPL-MCNC: 5.4 G/DL (ref 6.6–8.7)
RBC # BLD AUTO: 3.82 M/UL (ref 4.7–6.1)
SODIUM SERPL-SCNC: 139 MMOL/L (ref 136–145)
WBC # BLD AUTO: 3.5 K/UL (ref 4.8–10.8)

## 2023-04-10 PROCEDURE — 85025 COMPLETE CBC W/AUTO DIFF WBC: CPT

## 2023-04-10 PROCEDURE — 2580000003 HC RX 258: Performed by: INTERNAL MEDICINE

## 2023-04-10 PROCEDURE — 36591 DRAW BLOOD OFF VENOUS DEVICE: CPT

## 2023-04-10 PROCEDURE — 96360 HYDRATION IV INFUSION INIT: CPT

## 2023-04-10 PROCEDURE — 80053 COMPREHEN METABOLIC PANEL: CPT

## 2023-04-10 PROCEDURE — 6360000002 HC RX W HCPCS: Performed by: INTERNAL MEDICINE

## 2023-04-10 PROCEDURE — 83735 ASSAY OF MAGNESIUM: CPT

## 2023-04-10 RX ORDER — 0.9 % SODIUM CHLORIDE 0.9 %
1000 INTRAVENOUS SOLUTION INTRAVENOUS ONCE
Status: COMPLETED | OUTPATIENT
Start: 2023-04-10 | End: 2023-04-10

## 2023-04-10 RX ORDER — 0.9 % SODIUM CHLORIDE 0.9 %
1000 INTRAVENOUS SOLUTION INTRAVENOUS ONCE
Status: CANCELLED | OUTPATIENT
Start: 2023-04-10 | End: 2023-04-10

## 2023-04-10 RX ORDER — SODIUM CHLORIDE 0.9 % (FLUSH) 0.9 %
5-40 SYRINGE (ML) INJECTION PRN
Status: CANCELLED | OUTPATIENT
Start: 2023-04-10

## 2023-04-10 RX ORDER — SODIUM CHLORIDE 0.9 % (FLUSH) 0.9 %
5-40 SYRINGE (ML) INJECTION PRN
Status: DISCONTINUED | OUTPATIENT
Start: 2023-04-10 | End: 2023-04-11 | Stop reason: HOSPADM

## 2023-04-10 RX ORDER — HEPARIN SODIUM (PORCINE) LOCK FLUSH IV SOLN 100 UNIT/ML 100 UNIT/ML
500 SOLUTION INTRAVENOUS PRN
Status: DISCONTINUED | OUTPATIENT
Start: 2023-04-10 | End: 2023-04-11 | Stop reason: HOSPADM

## 2023-04-10 RX ORDER — HEPARIN SODIUM (PORCINE) LOCK FLUSH IV SOLN 100 UNIT/ML 100 UNIT/ML
500 SOLUTION INTRAVENOUS PRN
Status: CANCELLED | OUTPATIENT
Start: 2023-04-10

## 2023-04-10 RX ADMIN — Medication 500 UNITS: at 17:10

## 2023-04-10 RX ADMIN — SODIUM CHLORIDE 1000 ML: 9 INJECTION, SOLUTION INTRAVENOUS at 15:44

## 2023-04-10 RX ADMIN — SODIUM CHLORIDE, PRESERVATIVE FREE 20 ML: 5 INJECTION INTRAVENOUS at 17:10

## 2023-04-10 NOTE — PROGRESS NOTES
PORT ACCESSED AND LABS DRAWN. NS 1 LITER GIVEN AS ORDERED. PRE INFUSION BP 72/48. POST INFUSION  BP 80/52. Inga Guaman RN NOTIFIED AND ORDERS RECEIVED TO DO ORTHOSTATIC BP. PT UNABLE TO STAND FOR TIME TO DO BP, PT C/O DIZZINESS. WHEN SAT DOWN BP 82/52. PT STARTED TAKING PROAMATINE 3 DAYS AGO. DOSE DUE NOW. PT IS BEING TREATED BY PCP FOR ORTHOSTASIS. ALMA ROSA NOTIFIED OF THIS. OK TO DISCHARGE. PT AND FAMILY INSTRUCTED FOR PT TO BE UP WITH ASSIST ONLY AND TO TAKE PROAMATINE AS ORDERED. IF CONDITION WORSENS TO GO TO ER FOR EVALUATION.

## 2023-04-17 ENCOUNTER — HOSPITAL ENCOUNTER (OUTPATIENT)
Dept: CT IMAGING | Age: 68
Discharge: HOME | End: 2023-04-17
Payer: MEDICARE

## 2023-04-17 DIAGNOSIS — C82.48 FOLLICULAR LYMPHOMA GRADE IIIB OF LYMPH NODES OF MULTIPLE SITES (HCC): ICD-10-CM

## 2023-04-17 DIAGNOSIS — Z51.11 ENCOUNTER FOR CHEMOTHERAPY MANAGEMENT: ICD-10-CM

## 2023-04-17 PROCEDURE — 70491 CT SOFT TISSUE NECK W/DYE: CPT

## 2023-04-17 PROCEDURE — 74177 CT ABD & PELVIS W/CONTRAST: CPT | Performed by: RADIOLOGY

## 2023-04-17 PROCEDURE — 6360000004 HC RX CONTRAST MEDICATION: Performed by: INTERNAL MEDICINE

## 2023-04-17 PROCEDURE — 71260 CT THORAX DX C+: CPT | Performed by: RADIOLOGY

## 2023-04-17 PROCEDURE — 74177 CT ABD & PELVIS W/CONTRAST: CPT

## 2023-04-17 PROCEDURE — 70491 CT SOFT TISSUE NECK W/DYE: CPT | Performed by: RADIOLOGY

## 2023-04-17 PROCEDURE — 71260 CT THORAX DX C+: CPT

## 2023-04-17 RX ORDER — DILTIAZEM HYDROCHLORIDE 120 MG/1
CAPSULE, EXTENDED RELEASE ORAL
Qty: 60 CAPSULE | Refills: 3 | Status: ON HOLD | OUTPATIENT
Start: 2023-04-17

## 2023-04-17 RX ADMIN — IOPAMIDOL 100 ML: 755 INJECTION, SOLUTION INTRAVENOUS at 14:07

## 2023-04-19 DIAGNOSIS — C82.48 FOLLICULAR LYMPHOMA GRADE IIIB OF LYMPH NODES OF MULTIPLE SITES (HCC): Primary | ICD-10-CM

## 2023-04-19 RX ORDER — SODIUM CHLORIDE 0.9 % (FLUSH) 0.9 %
5-40 SYRINGE (ML) INJECTION PRN
OUTPATIENT
Start: 2023-04-28

## 2023-04-19 RX ORDER — HEPARIN SODIUM (PORCINE) LOCK FLUSH IV SOLN 100 UNIT/ML 100 UNIT/ML
500 SOLUTION INTRAVENOUS PRN
OUTPATIENT
Start: 2023-04-27

## 2023-04-19 RX ORDER — SODIUM CHLORIDE 9 MG/ML
INJECTION, SOLUTION INTRAVENOUS CONTINUOUS
OUTPATIENT
Start: 2023-04-27

## 2023-04-19 RX ORDER — HEPARIN SODIUM (PORCINE) LOCK FLUSH IV SOLN 100 UNIT/ML 100 UNIT/ML
500 SOLUTION INTRAVENOUS PRN
OUTPATIENT
Start: 2023-04-28

## 2023-04-19 RX ORDER — MEPERIDINE HYDROCHLORIDE 50 MG/ML
12.5 INJECTION INTRAMUSCULAR; INTRAVENOUS; SUBCUTANEOUS PRN
OUTPATIENT
Start: 2023-04-28

## 2023-04-19 RX ORDER — EPINEPHRINE 1 MG/ML
0.3 INJECTION, SOLUTION, CONCENTRATE INTRAVENOUS PRN
OUTPATIENT
Start: 2023-04-28

## 2023-04-19 RX ORDER — ONDANSETRON 2 MG/ML
8 INJECTION INTRAMUSCULAR; INTRAVENOUS
OUTPATIENT
Start: 2023-04-27

## 2023-04-19 RX ORDER — SODIUM CHLORIDE 9 MG/ML
INJECTION, SOLUTION INTRAVENOUS CONTINUOUS
OUTPATIENT
Start: 2023-04-28

## 2023-04-19 RX ORDER — DIPHENHYDRAMINE HYDROCHLORIDE 50 MG/ML
50 INJECTION INTRAMUSCULAR; INTRAVENOUS
OUTPATIENT
Start: 2023-04-28

## 2023-04-19 RX ORDER — SODIUM CHLORIDE 9 MG/ML
5-250 INJECTION, SOLUTION INTRAVENOUS PRN
OUTPATIENT
Start: 2023-04-27

## 2023-04-19 RX ORDER — DIPHENHYDRAMINE HYDROCHLORIDE 50 MG/ML
50 INJECTION INTRAMUSCULAR; INTRAVENOUS
OUTPATIENT
Start: 2023-04-27

## 2023-04-19 RX ORDER — ACETAMINOPHEN 325 MG/1
650 TABLET ORAL
OUTPATIENT
Start: 2023-04-27

## 2023-04-19 RX ORDER — ALBUTEROL SULFATE 90 UG/1
4 AEROSOL, METERED RESPIRATORY (INHALATION) PRN
OUTPATIENT
Start: 2023-04-27

## 2023-04-19 RX ORDER — SODIUM CHLORIDE 9 MG/ML
5-250 INJECTION, SOLUTION INTRAVENOUS PRN
OUTPATIENT
Start: 2023-04-28

## 2023-04-19 RX ORDER — ALBUTEROL SULFATE 90 UG/1
4 AEROSOL, METERED RESPIRATORY (INHALATION) PRN
OUTPATIENT
Start: 2023-04-28

## 2023-04-19 RX ORDER — SODIUM CHLORIDE 0.9 % (FLUSH) 0.9 %
5-40 SYRINGE (ML) INJECTION PRN
OUTPATIENT
Start: 2023-04-27

## 2023-04-19 RX ORDER — EPINEPHRINE 1 MG/ML
0.3 INJECTION, SOLUTION, CONCENTRATE INTRAVENOUS PRN
OUTPATIENT
Start: 2023-04-27

## 2023-04-19 RX ORDER — ONDANSETRON 2 MG/ML
8 INJECTION INTRAMUSCULAR; INTRAVENOUS
OUTPATIENT
Start: 2023-04-28

## 2023-04-19 RX ORDER — ACETAMINOPHEN 325 MG/1
650 TABLET ORAL
OUTPATIENT
Start: 2023-04-28

## 2023-04-19 RX ORDER — FAMOTIDINE 10 MG/ML
20 INJECTION, SOLUTION INTRAVENOUS
OUTPATIENT
Start: 2023-04-27

## 2023-04-19 RX ORDER — FAMOTIDINE 10 MG/ML
20 INJECTION, SOLUTION INTRAVENOUS
OUTPATIENT
Start: 2023-04-28

## 2023-04-19 RX ORDER — PALONOSETRON 0.05 MG/ML
0.25 INJECTION, SOLUTION INTRAVENOUS ONCE
OUTPATIENT
Start: 2023-04-27 | End: 2023-04-20

## 2023-04-19 RX ORDER — MEPERIDINE HYDROCHLORIDE 50 MG/ML
12.5 INJECTION INTRAMUSCULAR; INTRAVENOUS; SUBCUTANEOUS PRN
OUTPATIENT
Start: 2023-04-27

## 2023-04-19 RX ORDER — ACETAMINOPHEN 325 MG/1
1000 TABLET ORAL ONCE
OUTPATIENT
Start: 2023-04-27 | End: 2023-04-20

## 2023-04-19 NOTE — PROGRESS NOTES
Status:   Future     Standing Expiration Date:   4/20/2024               Return in about 1 week (around 4/27/2023) for treatment and see Dr. Rosie Ashraf.

## 2023-04-20 ENCOUNTER — HOSPITAL ENCOUNTER (OUTPATIENT)
Dept: INFUSION THERAPY | Age: 68
Discharge: HOME | End: 2023-04-20
Payer: MEDICARE

## 2023-04-20 ENCOUNTER — OFFICE VISIT (OUTPATIENT)
Dept: HEMATOLOGY | Age: 68
End: 2023-04-20
Payer: MEDICARE

## 2023-04-20 ENCOUNTER — TELEPHONE (OUTPATIENT)
Dept: INFUSION THERAPY | Age: 68
End: 2023-04-20

## 2023-04-20 VITALS
WEIGHT: 268 LBS | SYSTOLIC BLOOD PRESSURE: 69 MMHG | BODY MASS INDEX: 35.52 KG/M2 | RESPIRATION RATE: 18 BRPM | DIASTOLIC BLOOD PRESSURE: 48 MMHG | TEMPERATURE: 98.9 F | HEART RATE: 96 BPM | OXYGEN SATURATION: 98 % | HEIGHT: 73 IN

## 2023-04-20 DIAGNOSIS — R63.0 LACK OF APPETITE: ICD-10-CM

## 2023-04-20 DIAGNOSIS — R19.7 DIARRHEA, UNSPECIFIED TYPE: Primary | ICD-10-CM

## 2023-04-20 DIAGNOSIS — C82.48 FOLLICULAR LYMPHOMA GRADE IIIB OF LYMPH NODES OF MULTIPLE SITES (HCC): Primary | ICD-10-CM

## 2023-04-20 DIAGNOSIS — R19.7 DIARRHEA, UNSPECIFIED TYPE: ICD-10-CM

## 2023-04-20 DIAGNOSIS — R63.4 WEIGHT LOSS: Primary | ICD-10-CM

## 2023-04-20 DIAGNOSIS — C82.48 FOLLICULAR LYMPHOMA GRADE IIIB OF LYMPH NODES OF MULTIPLE SITES (HCC): ICD-10-CM

## 2023-04-20 DIAGNOSIS — E86.0 DEHYDRATION: ICD-10-CM

## 2023-04-20 LAB
ALBUMIN SERPL-MCNC: 3.1 G/DL (ref 3.5–5.2)
ALP SERPL-CCNC: 121 U/L (ref 40–130)
ALT SERPL-CCNC: 75 U/L (ref 21–72)
ANION GAP SERPL CALCULATED.3IONS-SCNC: 9 MMOL/L (ref 7–19)
AST SERPL-CCNC: 69 U/L (ref 17–59)
BILIRUB SERPL-MCNC: 2 MG/DL (ref 0.2–1.3)
BUN SERPL-MCNC: 29 MG/DL (ref 9–20)
CALCIUM SERPL-MCNC: 8.3 MG/DL (ref 8.4–10.2)
CHLORIDE SERPL-SCNC: 97 MMOL/L (ref 98–111)
CO2 SERPL-SCNC: 28 MMOL/L (ref 22–29)
CREAT SERPL-MCNC: 2 MG/DL (ref 0.6–1.2)
ERYTHROCYTE [DISTWIDTH] IN BLOOD BY AUTOMATED COUNT: 16.2 % (ref 11.6–14.4)
GLOBULIN: 2.4 G/DL
GLUCOSE SERPL-MCNC: 132 MG/DL (ref 74–106)
HCT VFR BLD AUTO: 34.9 % (ref 40.1–51)
HGB BLD-MCNC: 11.4 G/DL (ref 13.7–17.5)
LDH SERPL-CCNC: 361 U/L (ref 120–246)
LYMPHOCYTES # BLD: 0.32 K/UL (ref 1.18–3.74)
LYMPHOCYTES NFR BLD: 6.6 % (ref 19.3–53.1)
MAGNESIUM SERPL-MCNC: 2.1 MG/DL (ref 1.6–2.3)
MCH RBC QN AUTO: 28.3 PG (ref 25.7–32.2)
MCHC RBC AUTO-ENTMCNC: 32.7 G/DL (ref 32.3–36.5)
MCV RBC AUTO: 86.6 FL (ref 79–92.2)
MONOCYTES # BLD: 0.32 K/UL (ref 0.24–0.82)
MONOCYTES NFR BLD: 6.6 % (ref 4.7–12.5)
NEUTROPHILS # BLD: 4.04 K/UL (ref 1.56–6.13)
NEUTS SEG NFR BLD: 82.7 % (ref 34–71.1)
PLATELET # BLD AUTO: 140 K/UL (ref 163–337)
PMV BLD AUTO: 10.9 FL (ref 7.4–10.4)
POTASSIUM SERPL-SCNC: 3.6 MMOL/L (ref 3.5–5.1)
PROT SERPL-MCNC: 5.5 G/DL (ref 6.3–8.2)
RBC # BLD AUTO: 4.03 M/UL (ref 4.63–6.08)
SODIUM SERPL-SCNC: 134 MMOL/L (ref 137–145)
WBC # BLD AUTO: 4.88 K/UL (ref 4.23–9.07)

## 2023-04-20 PROCEDURE — 3078F DIAST BP <80 MM HG: CPT | Performed by: INTERNAL MEDICINE

## 2023-04-20 PROCEDURE — 83615 LACTATE (LD) (LDH) ENZYME: CPT

## 2023-04-20 PROCEDURE — 6360000002 HC RX W HCPCS: Performed by: INTERNAL MEDICINE

## 2023-04-20 PROCEDURE — 80053 COMPREHEN METABOLIC PANEL: CPT

## 2023-04-20 PROCEDURE — 3074F SYST BP LT 130 MM HG: CPT | Performed by: INTERNAL MEDICINE

## 2023-04-20 PROCEDURE — G8417 CALC BMI ABV UP PARAM F/U: HCPCS | Performed by: INTERNAL MEDICINE

## 2023-04-20 PROCEDURE — 96361 HYDRATE IV INFUSION ADD-ON: CPT

## 2023-04-20 PROCEDURE — 85025 COMPLETE CBC W/AUTO DIFF WBC: CPT

## 2023-04-20 PROCEDURE — 3017F COLORECTAL CA SCREEN DOC REV: CPT | Performed by: INTERNAL MEDICINE

## 2023-04-20 PROCEDURE — 1123F ACP DISCUSS/DSCN MKR DOCD: CPT | Performed by: INTERNAL MEDICINE

## 2023-04-20 PROCEDURE — 1036F TOBACCO NON-USER: CPT | Performed by: INTERNAL MEDICINE

## 2023-04-20 PROCEDURE — 99215 OFFICE O/P EST HI 40 MIN: CPT | Performed by: INTERNAL MEDICINE

## 2023-04-20 PROCEDURE — 83735 ASSAY OF MAGNESIUM: CPT

## 2023-04-20 PROCEDURE — G8427 DOCREV CUR MEDS BY ELIG CLIN: HCPCS | Performed by: INTERNAL MEDICINE

## 2023-04-20 PROCEDURE — 96360 HYDRATION IV INFUSION INIT: CPT

## 2023-04-20 PROCEDURE — 2580000003 HC RX 258: Performed by: INTERNAL MEDICINE

## 2023-04-20 PROCEDURE — 36415 COLL VENOUS BLD VENIPUNCTURE: CPT

## 2023-04-20 RX ORDER — SODIUM CHLORIDE 0.9 % (FLUSH) 0.9 %
5-40 SYRINGE (ML) INJECTION PRN
Status: DISCONTINUED | OUTPATIENT
Start: 2023-04-20 | End: 2023-04-21 | Stop reason: HOSPADM

## 2023-04-20 RX ORDER — 0.9 % SODIUM CHLORIDE 0.9 %
1000 INTRAVENOUS SOLUTION INTRAVENOUS ONCE
Status: CANCELLED | OUTPATIENT
Start: 2023-04-20 | End: 2023-04-20

## 2023-04-20 RX ORDER — SODIUM CHLORIDE 0.9 % (FLUSH) 0.9 %
5-40 SYRINGE (ML) INJECTION PRN
Status: CANCELLED | OUTPATIENT
Start: 2023-04-20

## 2023-04-20 RX ORDER — 0.9 % SODIUM CHLORIDE 0.9 %
1000 INTRAVENOUS SOLUTION INTRAVENOUS ONCE
Status: COMPLETED | OUTPATIENT
Start: 2023-04-20 | End: 2023-04-20

## 2023-04-20 RX ORDER — HEPARIN SODIUM (PORCINE) LOCK FLUSH IV SOLN 100 UNIT/ML 100 UNIT/ML
500 SOLUTION INTRAVENOUS PRN
Status: CANCELLED | OUTPATIENT
Start: 2023-04-20

## 2023-04-20 RX ORDER — MEGESTROL ACETATE 40 MG/ML
200 SUSPENSION ORAL DAILY
Qty: 240 ML | Refills: 3 | Status: ON HOLD | OUTPATIENT
Start: 2023-04-20

## 2023-04-20 RX ORDER — HEPARIN SODIUM (PORCINE) LOCK FLUSH IV SOLN 100 UNIT/ML 100 UNIT/ML
500 SOLUTION INTRAVENOUS PRN
Status: DISCONTINUED | OUTPATIENT
Start: 2023-04-20 | End: 2023-04-21 | Stop reason: HOSPADM

## 2023-04-20 RX ADMIN — HEPARIN 500 UNITS: 100 SYRINGE at 11:20

## 2023-04-20 RX ADMIN — SODIUM CHLORIDE, PRESERVATIVE FREE 10 ML: 5 INJECTION INTRAVENOUS at 11:20

## 2023-04-20 RX ADMIN — SODIUM CHLORIDE 1000 ML: 9 INJECTION, SOLUTION INTRAVENOUS at 09:10

## 2023-04-20 NOTE — TELEPHONE ENCOUNTER
Patient's brother called re: blood pressure medication and HOLDING BP MED, it is correct that patient needs to HOLD ALL BP MEDS. Eric Mcdaniel.  Rosario Adames

## 2023-04-21 ENCOUNTER — HOSPITAL ENCOUNTER (OUTPATIENT)
Dept: INFUSION THERAPY | Age: 68
Discharge: HOME | End: 2023-04-21
Payer: MEDICARE

## 2023-04-21 ENCOUNTER — APPOINTMENT (OUTPATIENT)
Dept: CT IMAGING | Age: 68
DRG: 478 | End: 2023-04-21
Payer: MEDICARE

## 2023-04-21 ENCOUNTER — APPOINTMENT (OUTPATIENT)
Dept: INFUSION THERAPY | Age: 68
DRG: 478 | End: 2023-04-21
Payer: MEDICARE

## 2023-04-21 ENCOUNTER — APPOINTMENT (OUTPATIENT)
Dept: GENERAL RADIOLOGY | Age: 68
DRG: 478 | End: 2023-04-21
Payer: MEDICARE

## 2023-04-21 ENCOUNTER — HOSPITAL ENCOUNTER (INPATIENT)
Age: 68
LOS: 7 days | Discharge: NURSING, REHAB OR LONG-TERM CARE | DRG: 478 | End: 2023-04-28
Attending: PEDIATRICS | Admitting: INTERNAL MEDICINE
Payer: MEDICARE

## 2023-04-21 VITALS
TEMPERATURE: 98.1 F | RESPIRATION RATE: 18 BRPM | OXYGEN SATURATION: 99 % | HEART RATE: 98 BPM | SYSTOLIC BLOOD PRESSURE: 69 MMHG | DIASTOLIC BLOOD PRESSURE: 38 MMHG

## 2023-04-21 DIAGNOSIS — C83.31 RETICULOSARCOMA OF LYMPH NODES OF HEAD, FACE, AND NECK (HCC): ICD-10-CM

## 2023-04-21 DIAGNOSIS — C82.48 FOLLICULAR LYMPHOMA GRADE IIIB OF LYMPH NODES OF MULTIPLE SITES (HCC): ICD-10-CM

## 2023-04-21 DIAGNOSIS — E86.0 DEHYDRATION: Primary | ICD-10-CM

## 2023-04-21 DIAGNOSIS — M86.9 OSTEOMYELITIS OF LEFT ELBOW (HCC): ICD-10-CM

## 2023-04-21 DIAGNOSIS — R19.7 DIARRHEA, UNSPECIFIED TYPE: ICD-10-CM

## 2023-04-21 DIAGNOSIS — I95.9 HYPOTENSION, UNSPECIFIED HYPOTENSION TYPE: ICD-10-CM

## 2023-04-21 DIAGNOSIS — L08.9 INFECTED ABRASION OF LEFT ELBOW, INITIAL ENCOUNTER: ICD-10-CM

## 2023-04-21 DIAGNOSIS — A41.9 SEPSIS, DUE TO UNSPECIFIED ORGANISM, UNSPECIFIED WHETHER ACUTE ORGAN DYSFUNCTION PRESENT (HCC): Primary | ICD-10-CM

## 2023-04-21 DIAGNOSIS — S50.312A INFECTED ABRASION OF LEFT ELBOW, INITIAL ENCOUNTER: ICD-10-CM

## 2023-04-21 LAB
ALBUMIN SERPL-MCNC: 2.9 G/DL (ref 3.5–5.2)
ALBUMIN SERPL-MCNC: 3.4 G/DL (ref 3.5–5.2)
ALP SERPL-CCNC: 64 U/L (ref 40–130)
ALP SERPL-CCNC: 99 U/L (ref 40–130)
ALT SERPL-CCNC: 18 U/L (ref 5–41)
ALT SERPL-CCNC: 66 U/L (ref 21–72)
ANION GAP SERPL CALCULATED.3IONS-SCNC: 10 MMOL/L (ref 7–19)
ANION GAP SERPL CALCULATED.3IONS-SCNC: 7 MMOL/L (ref 7–19)
AST SERPL-CCNC: 34 U/L (ref 5–40)
AST SERPL-CCNC: 61 U/L (ref 17–59)
BACTERIA URNS QL MICRO: ABNORMAL /HPF
BASOPHILS # BLD: 0 K/UL (ref 0–0.2)
BASOPHILS NFR BLD: 0.8 % (ref 0–1)
BILIRUB SERPL-MCNC: 0.5 MG/DL (ref 0.2–1.2)
BILIRUB SERPL-MCNC: 1.3 MG/DL (ref 0.2–1.3)
BILIRUB UR QL STRIP: ABNORMAL
BNP BLD-MCNC: 932 PG/ML (ref 0–124)
BUN SERPL-MCNC: 11 MG/DL (ref 8–23)
BUN SERPL-MCNC: 39 MG/DL (ref 9–20)
CALCIUM SERPL-MCNC: 8.1 MG/DL (ref 8.4–10.2)
CALCIUM SERPL-MCNC: 8.8 MG/DL (ref 8.8–10.2)
CHLORIDE SERPL-SCNC: 100 MMOL/L (ref 98–111)
CHLORIDE SERPL-SCNC: 108 MMOL/L (ref 98–111)
CLARITY UR: ABNORMAL
CO2 SERPL-SCNC: 21 MMOL/L (ref 22–29)
CO2 SERPL-SCNC: 27 MMOL/L (ref 22–29)
COLOR UR: ABNORMAL
CREAT SERPL-MCNC: 0.5 MG/DL (ref 0.5–1.2)
CREAT SERPL-MCNC: 2.4 MG/DL (ref 0.6–1.2)
CRP SERPL HS-MCNC: 3.33 MG/DL (ref 0–0.5)
CRYSTALS URNS MICRO: ABNORMAL /HPF
EOSINOPHIL # BLD: 0 K/UL (ref 0–0.6)
EOSINOPHIL NFR BLD: 0.2 % (ref 0–5)
EPI CELLS #/AREA URNS AUTO: 0 /HPF (ref 0–5)
ERYTHROCYTE [DISTWIDTH] IN BLOOD BY AUTOMATED COUNT: 16.5 % (ref 11.5–14.5)
ERYTHROCYTE [SEDIMENTATION RATE] IN BLOOD BY WESTERGREN METHOD: 26 MM/HR (ref 0–15)
GLUCOSE SERPL-MCNC: 105 MG/DL (ref 74–106)
GLUCOSE SERPL-MCNC: 173 MG/DL (ref 74–109)
GLUCOSE UR STRIP.AUTO-MCNC: NEGATIVE MG/DL
HCT VFR BLD AUTO: 34 % (ref 42–52)
HGB BLD-MCNC: 10.9 G/DL (ref 14–18)
HGB UR STRIP.AUTO-MCNC: NEGATIVE MG/L
HYALINE CASTS #/AREA URNS AUTO: 21 /HPF (ref 0–8)
IMM GRANULOCYTES # BLD: 0.1 K/UL
KETONES UR STRIP.AUTO-MCNC: ABNORMAL MG/DL
LACTATE BLDV-SCNC: 0.8 MMOL/L (ref 0.5–1.9)
LEUKOCYTE ESTERASE UR QL STRIP.AUTO: ABNORMAL
LYMPHOCYTES # BLD: 0.3 K/UL (ref 1.1–4.5)
LYMPHOCYTES NFR BLD: 5.8 % (ref 20–40)
MAGNESIUM SERPL-MCNC: 2.1 MG/DL (ref 1.6–2.3)
MCH RBC QN AUTO: 28.9 PG (ref 27–31)
MCHC RBC AUTO-ENTMCNC: 32.1 G/DL (ref 33–37)
MCV RBC AUTO: 90.2 FL (ref 80–94)
MONOCYTES # BLD: 0.3 K/UL (ref 0–0.9)
MONOCYTES NFR BLD: 6.4 % (ref 0–10)
NEUTROPHILS # BLD: 4.3 K/UL (ref 1.5–7.5)
NEUTS SEG NFR BLD: 84.7 % (ref 50–65)
NITRITE UR QL STRIP.AUTO: POSITIVE
PH UR STRIP.AUTO: 5 [PH] (ref 5–8)
PLATELET # BLD AUTO: 158 K/UL (ref 130–400)
PMV BLD AUTO: 11.6 FL (ref 9.4–12.4)
POTASSIUM SERPL-SCNC: 3.6 MMOL/L (ref 3.5–5.1)
POTASSIUM SERPL-SCNC: 4.5 MMOL/L (ref 3.5–5)
PROT SERPL-MCNC: 5.1 G/DL (ref 6.3–8.2)
PROT SERPL-MCNC: 8.1 G/DL (ref 6.6–8.7)
PROT UR STRIP.AUTO-MCNC: 30 MG/DL
RBC # BLD AUTO: 3.77 M/UL (ref 4.7–6.1)
RBC #/AREA URNS AUTO: 2 /HPF (ref 0–4)
SARS-COV-2 RDRP RESP QL NAA+PROBE: NOT DETECTED
SODIUM SERPL-SCNC: 134 MMOL/L (ref 137–145)
SODIUM SERPL-SCNC: 139 MMOL/L (ref 136–145)
SP GR UR STRIP.AUTO: 1.02 (ref 1–1.03)
TROPONIN T SERPL-MCNC: <0.01 NG/ML (ref 0–0.03)
UROBILINOGEN UR STRIP.AUTO-MCNC: 1 E.U./DL
WBC # BLD AUTO: 5.1 K/UL (ref 4.8–10.8)
WBC #/AREA URNS AUTO: 24 /HPF (ref 0–5)

## 2023-04-21 PROCEDURE — 2580000003 HC RX 258: Performed by: INTERNAL MEDICINE

## 2023-04-21 PROCEDURE — 71045 X-RAY EXAM CHEST 1 VIEW: CPT

## 2023-04-21 PROCEDURE — 2000000000 HC ICU R&B

## 2023-04-21 PROCEDURE — 73080 X-RAY EXAM OF ELBOW: CPT

## 2023-04-21 PROCEDURE — 87186 SC STD MICRODIL/AGAR DIL: CPT

## 2023-04-21 PROCEDURE — 96360 HYDRATION IV INFUSION INIT: CPT

## 2023-04-21 PROCEDURE — 87635 SARS-COV-2 COVID-19 AMP PRB: CPT

## 2023-04-21 PROCEDURE — 99285 EMERGENCY DEPT VISIT HI MDM: CPT

## 2023-04-21 PROCEDURE — 6370000000 HC RX 637 (ALT 250 FOR IP): Performed by: INTERNAL MEDICINE

## 2023-04-21 PROCEDURE — 86140 C-REACTIVE PROTEIN: CPT

## 2023-04-21 PROCEDURE — 86403 PARTICLE AGGLUT ANTBDY SCRN: CPT

## 2023-04-21 PROCEDURE — 6360000004 HC RX CONTRAST MEDICATION: Performed by: INTERNAL MEDICINE

## 2023-04-21 PROCEDURE — 2500000003 HC RX 250 WO HCPCS: Performed by: PEDIATRICS

## 2023-04-21 PROCEDURE — 80053 COMPREHEN METABOLIC PANEL: CPT

## 2023-04-21 PROCEDURE — 96365 THER/PROPH/DIAG IV INF INIT: CPT

## 2023-04-21 PROCEDURE — 81001 URINALYSIS AUTO W/SCOPE: CPT

## 2023-04-21 PROCEDURE — 93005 ELECTROCARDIOGRAM TRACING: CPT | Performed by: PEDIATRICS

## 2023-04-21 PROCEDURE — 83880 ASSAY OF NATRIURETIC PEPTIDE: CPT

## 2023-04-21 PROCEDURE — 83605 ASSAY OF LACTIC ACID: CPT

## 2023-04-21 PROCEDURE — 6360000002 HC RX W HCPCS: Performed by: INTERNAL MEDICINE

## 2023-04-21 PROCEDURE — 84484 ASSAY OF TROPONIN QUANT: CPT

## 2023-04-21 PROCEDURE — 36415 COLL VENOUS BLD VENIPUNCTURE: CPT

## 2023-04-21 PROCEDURE — 85025 COMPLETE CBC W/AUTO DIFF WBC: CPT

## 2023-04-21 PROCEDURE — 83735 ASSAY OF MAGNESIUM: CPT

## 2023-04-21 PROCEDURE — 2580000003 HC RX 258: Performed by: PEDIATRICS

## 2023-04-21 PROCEDURE — 87086 URINE CULTURE/COLONY COUNT: CPT

## 2023-04-21 PROCEDURE — 87075 CULTR BACTERIA EXCEPT BLOOD: CPT

## 2023-04-21 PROCEDURE — 87205 SMEAR GRAM STAIN: CPT

## 2023-04-21 PROCEDURE — 85652 RBC SED RATE AUTOMATED: CPT

## 2023-04-21 PROCEDURE — 87070 CULTURE OTHR SPECIMN AEROBIC: CPT

## 2023-04-21 PROCEDURE — 96361 HYDRATE IV INFUSION ADD-ON: CPT

## 2023-04-21 PROCEDURE — 73201 CT UPPER EXTREMITY W/DYE: CPT

## 2023-04-21 PROCEDURE — 6360000002 HC RX W HCPCS: Performed by: PEDIATRICS

## 2023-04-21 PROCEDURE — 87040 BLOOD CULTURE FOR BACTERIA: CPT

## 2023-04-21 RX ORDER — HEPARIN SODIUM (PORCINE) LOCK FLUSH IV SOLN 100 UNIT/ML 100 UNIT/ML
500 SOLUTION INTRAVENOUS PRN
Status: DISCONTINUED | OUTPATIENT
Start: 2023-04-21 | End: 2023-04-22 | Stop reason: HOSPADM

## 2023-04-21 RX ORDER — 0.9 % SODIUM CHLORIDE 0.9 %
1000 INTRAVENOUS SOLUTION INTRAVENOUS ONCE
OUTPATIENT
Start: 2023-04-21 | End: 2023-04-21

## 2023-04-21 RX ORDER — FUROSEMIDE 40 MG/1
40 TABLET ORAL DAILY
Status: DISCONTINUED | OUTPATIENT
Start: 2023-04-21 | End: 2023-04-27

## 2023-04-21 RX ORDER — VITAMIN E 268 MG
400 CAPSULE ORAL DAILY
Status: DISCONTINUED | OUTPATIENT
Start: 2023-04-21 | End: 2023-04-28 | Stop reason: HOSPADM

## 2023-04-21 RX ORDER — ACETAMINOPHEN 325 MG/1
650 TABLET ORAL EVERY 6 HOURS PRN
Status: DISCONTINUED | OUTPATIENT
Start: 2023-04-21 | End: 2023-04-28 | Stop reason: HOSPADM

## 2023-04-21 RX ORDER — DOCUSATE SODIUM 100 MG/1
100 CAPSULE, LIQUID FILLED ORAL 2 TIMES DAILY
Status: DISCONTINUED | OUTPATIENT
Start: 2023-04-21 | End: 2023-04-28 | Stop reason: HOSPADM

## 2023-04-21 RX ORDER — MIDODRINE HYDROCHLORIDE 5 MG/1
2.5 TABLET ORAL 3 TIMES DAILY
Status: DISCONTINUED | OUTPATIENT
Start: 2023-04-21 | End: 2023-04-24

## 2023-04-21 RX ORDER — SODIUM CHLORIDE 0.9 % (FLUSH) 0.9 %
5-40 SYRINGE (ML) INJECTION PRN
Status: DISCONTINUED | OUTPATIENT
Start: 2023-04-21 | End: 2023-04-22 | Stop reason: HOSPADM

## 2023-04-21 RX ORDER — 0.9 % SODIUM CHLORIDE 0.9 %
1000 INTRAVENOUS SOLUTION INTRAVENOUS ONCE
Status: COMPLETED | OUTPATIENT
Start: 2023-04-21 | End: 2023-04-21

## 2023-04-21 RX ORDER — SODIUM CHLORIDE, SODIUM LACTATE, POTASSIUM CHLORIDE, CALCIUM CHLORIDE 600; 310; 30; 20 MG/100ML; MG/100ML; MG/100ML; MG/100ML
INJECTION, SOLUTION INTRAVENOUS CONTINUOUS
Status: DISCONTINUED | OUTPATIENT
Start: 2023-04-21 | End: 2023-04-26

## 2023-04-21 RX ORDER — ACETAMINOPHEN 650 MG/1
650 SUPPOSITORY RECTAL EVERY 6 HOURS PRN
Status: DISCONTINUED | OUTPATIENT
Start: 2023-04-21 | End: 2023-04-28 | Stop reason: HOSPADM

## 2023-04-21 RX ORDER — METOPROLOL SUCCINATE 50 MG/1
100 TABLET, EXTENDED RELEASE ORAL 2 TIMES DAILY
Status: DISCONTINUED | OUTPATIENT
Start: 2023-04-21 | End: 2023-04-28 | Stop reason: HOSPADM

## 2023-04-21 RX ORDER — ALLOPURINOL 300 MG/1
300 TABLET ORAL DAILY
Status: DISCONTINUED | OUTPATIENT
Start: 2023-04-21 | End: 2023-04-28 | Stop reason: HOSPADM

## 2023-04-21 RX ORDER — ROSUVASTATIN CALCIUM 10 MG/1
10 TABLET, COATED ORAL NIGHTLY
Status: DISCONTINUED | OUTPATIENT
Start: 2023-04-21 | End: 2023-04-28 | Stop reason: HOSPADM

## 2023-04-21 RX ORDER — NOREPINEPHRINE BIT/0.9 % NACL 16MG/250ML
1-100 INFUSION BOTTLE (ML) INTRAVENOUS CONTINUOUS
Status: DISCONTINUED | OUTPATIENT
Start: 2023-04-21 | End: 2023-04-22

## 2023-04-21 RX ORDER — SODIUM CHLORIDE 0.9 % (FLUSH) 0.9 %
5-40 SYRINGE (ML) INJECTION PRN
OUTPATIENT
Start: 2023-04-21

## 2023-04-21 RX ORDER — DILTIAZEM HYDROCHLORIDE 120 MG/1
120 CAPSULE, COATED, EXTENDED RELEASE ORAL DAILY
Status: DISCONTINUED | OUTPATIENT
Start: 2023-04-21 | End: 2023-04-27

## 2023-04-21 RX ORDER — HEPARIN SODIUM (PORCINE) LOCK FLUSH IV SOLN 100 UNIT/ML 100 UNIT/ML
500 SOLUTION INTRAVENOUS PRN
OUTPATIENT
Start: 2023-04-21

## 2023-04-21 RX ORDER — MEGESTROL ACETATE 40 MG/ML
200 SUSPENSION ORAL DAILY
Status: DISCONTINUED | OUTPATIENT
Start: 2023-04-21 | End: 2023-04-28 | Stop reason: HOSPADM

## 2023-04-21 RX ADMIN — DOCUSATE SODIUM 100 MG: 100 CAPSULE, LIQUID FILLED ORAL at 21:42

## 2023-04-21 RX ADMIN — ROSUVASTATIN CALCIUM 10 MG: 10 TABLET, FILM COATED ORAL at 21:42

## 2023-04-21 RX ADMIN — SODIUM CHLORIDE 1000 ML: 9 INJECTION, SOLUTION INTRAVENOUS at 19:07

## 2023-04-21 RX ADMIN — SODIUM CHLORIDE 1000 ML: 9 INJECTION, SOLUTION INTRAVENOUS at 16:32

## 2023-04-21 RX ADMIN — CEFEPIME 1000 MG: 1 INJECTION, POWDER, FOR SOLUTION INTRAMUSCULAR; INTRAVENOUS at 18:18

## 2023-04-21 RX ADMIN — IOPAMIDOL 90 ML: 755 INJECTION, SOLUTION INTRAVENOUS at 19:37

## 2023-04-21 RX ADMIN — SODIUM CHLORIDE, POTASSIUM CHLORIDE, SODIUM LACTATE AND CALCIUM CHLORIDE: 600; 310; 30; 20 INJECTION, SOLUTION INTRAVENOUS at 21:28

## 2023-04-21 RX ADMIN — MIDODRINE HYDROCHLORIDE 2.5 MG: 5 TABLET ORAL at 21:42

## 2023-04-21 RX ADMIN — Medication 5 MCG/MIN: at 18:09

## 2023-04-21 RX ADMIN — SODIUM CHLORIDE 1000 ML: 9 INJECTION, SOLUTION INTRAVENOUS at 14:32

## 2023-04-21 RX ADMIN — VANCOMYCIN HYDROCHLORIDE 2500 MG: 5 INJECTION, POWDER, LYOPHILIZED, FOR SOLUTION INTRAVENOUS at 21:35

## 2023-04-21 ASSESSMENT — PAIN SCALES - GENERAL: PAINLEVEL_OUTOF10: 0

## 2023-04-21 ASSESSMENT — PAIN - FUNCTIONAL ASSESSMENT: PAIN_FUNCTIONAL_ASSESSMENT: NONE - DENIES PAIN

## 2023-04-22 ENCOUNTER — ANESTHESIA (OUTPATIENT)
Dept: OPERATING ROOM | Age: 68
End: 2023-04-22
Payer: MEDICARE

## 2023-04-22 ENCOUNTER — ANESTHESIA EVENT (OUTPATIENT)
Dept: OPERATING ROOM | Age: 68
End: 2023-04-22
Payer: MEDICARE

## 2023-04-22 LAB
ANION GAP SERPL CALCULATED.3IONS-SCNC: 11 MMOL/L (ref 7–19)
BASOPHILS # BLD: 0 K/UL (ref 0–0.2)
BASOPHILS NFR BLD: 0.4 % (ref 0–1)
BUN SERPL-MCNC: 32 MG/DL (ref 8–23)
CALCIUM SERPL-MCNC: 7.7 MG/DL (ref 8.8–10.2)
CHLORIDE SERPL-SCNC: 107 MMOL/L (ref 98–111)
CO2 SERPL-SCNC: 23 MMOL/L (ref 22–29)
CREAT SERPL-MCNC: 2 MG/DL (ref 0.5–1.2)
EOSINOPHIL # BLD: 0 K/UL (ref 0–0.6)
EOSINOPHIL NFR BLD: 0.4 % (ref 0–5)
ERYTHROCYTE [DISTWIDTH] IN BLOOD BY AUTOMATED COUNT: 16.4 % (ref 11.5–14.5)
GLUCOSE SERPL-MCNC: 80 MG/DL (ref 74–109)
HCT VFR BLD AUTO: 30.5 % (ref 42–52)
HGB BLD-MCNC: 9.7 G/DL (ref 14–18)
IMM GRANULOCYTES # BLD: 0.1 K/UL
KOH PREP SPEC: NORMAL
KOH PREP SPEC: NORMAL
LYMPHOCYTES # BLD: 0.2 K/UL (ref 1.1–4.5)
LYMPHOCYTES NFR BLD: 6.3 % (ref 20–40)
MAGNESIUM SERPL-MCNC: 1.9 MG/DL (ref 1.6–2.4)
MCH RBC QN AUTO: 28.4 PG (ref 27–31)
MCHC RBC AUTO-ENTMCNC: 31.8 G/DL (ref 33–37)
MCV RBC AUTO: 89.2 FL (ref 80–94)
MONOCYTES # BLD: 0.1 K/UL (ref 0–0.9)
MONOCYTES NFR BLD: 5 % (ref 0–10)
MRSA DNA SPEC QL NAA+PROBE: NOT DETECTED
NEUTROPHILS # BLD: 2 K/UL (ref 1.5–7.5)
NEUTS SEG NFR BLD: 83.3 % (ref 50–65)
PLATELET # BLD AUTO: 119 K/UL (ref 130–400)
PMV BLD AUTO: 10.4 FL (ref 9.4–12.4)
POTASSIUM SERPL-SCNC: 3.4 MMOL/L (ref 3.5–5)
POTASSIUM SERPL-SCNC: 4.6 MMOL/L (ref 3.5–5)
RBC # BLD AUTO: 3.42 M/UL (ref 4.7–6.1)
SODIUM SERPL-SCNC: 141 MMOL/L (ref 136–145)
VANCOMYCIN TROUGH SERPL-MCNC: 15.1 UG/ML (ref 10–20)
WBC # BLD AUTO: 2.4 K/UL (ref 4.8–10.8)

## 2023-04-22 PROCEDURE — 87205 SMEAR GRAM STAIN: CPT

## 2023-04-22 PROCEDURE — 3700000001 HC ADD 15 MINUTES (ANESTHESIA): Performed by: ORTHOPAEDIC SURGERY

## 2023-04-22 PROCEDURE — 6370000000 HC RX 637 (ALT 250 FOR IP): Performed by: ORTHOPAEDIC SURGERY

## 2023-04-22 PROCEDURE — 84132 ASSAY OF SERUM POTASSIUM: CPT

## 2023-04-22 PROCEDURE — 3600000003 HC SURGERY LEVEL 3 BASE: Performed by: ORTHOPAEDIC SURGERY

## 2023-04-22 PROCEDURE — 2709999900 HC NON-CHARGEABLE SUPPLY: Performed by: ORTHOPAEDIC SURGERY

## 2023-04-22 PROCEDURE — 86403 PARTICLE AGGLUT ANTBDY SCRN: CPT

## 2023-04-22 PROCEDURE — 2000000000 HC ICU R&B

## 2023-04-22 PROCEDURE — 0MB40ZZ EXCISION OF LEFT ELBOW BURSA AND LIGAMENT, OPEN APPROACH: ICD-10-PCS | Performed by: ORTHOPAEDIC SURGERY

## 2023-04-22 PROCEDURE — 3700000000 HC ANESTHESIA ATTENDED CARE: Performed by: ORTHOPAEDIC SURGERY

## 2023-04-22 PROCEDURE — 3600000013 HC SURGERY LEVEL 3 ADDTL 15MIN: Performed by: ORTHOPAEDIC SURGERY

## 2023-04-22 PROCEDURE — 2580000003 HC RX 258: Performed by: INTERNAL MEDICINE

## 2023-04-22 PROCEDURE — 6360000002 HC RX W HCPCS: Performed by: INTERNAL MEDICINE

## 2023-04-22 PROCEDURE — 6370000000 HC RX 637 (ALT 250 FOR IP): Performed by: INTERNAL MEDICINE

## 2023-04-22 PROCEDURE — 83735 ASSAY OF MAGNESIUM: CPT

## 2023-04-22 PROCEDURE — 6360000002 HC RX W HCPCS: Performed by: ORTHOPAEDIC SURGERY

## 2023-04-22 PROCEDURE — 87176 TISSUE HOMOGENIZATION CULTR: CPT

## 2023-04-22 PROCEDURE — 85025 COMPLETE CBC W/AUTO DIFF WBC: CPT

## 2023-04-22 PROCEDURE — 7100000000 HC PACU RECOVERY - FIRST 15 MIN: Performed by: ORTHOPAEDIC SURGERY

## 2023-04-22 PROCEDURE — 80048 BASIC METABOLIC PNL TOTAL CA: CPT

## 2023-04-22 PROCEDURE — 87070 CULTURE OTHR SPECIMN AEROBIC: CPT

## 2023-04-22 PROCEDURE — 0PBL0ZX EXCISION OF LEFT ULNA, OPEN APPROACH, DIAGNOSTIC: ICD-10-PCS | Performed by: ORTHOPAEDIC SURGERY

## 2023-04-22 PROCEDURE — 0X9C3ZZ DRAINAGE OF LEFT ELBOW REGION, PERCUTANEOUS APPROACH: ICD-10-PCS | Performed by: ORTHOPAEDIC SURGERY

## 2023-04-22 PROCEDURE — 6360000002 HC RX W HCPCS

## 2023-04-22 PROCEDURE — 87102 FUNGUS ISOLATION CULTURE: CPT

## 2023-04-22 PROCEDURE — 2500000003 HC RX 250 WO HCPCS

## 2023-04-22 PROCEDURE — 80202 ASSAY OF VANCOMYCIN: CPT

## 2023-04-22 PROCEDURE — 87075 CULTR BACTERIA EXCEPT BLOOD: CPT

## 2023-04-22 PROCEDURE — 88304 TISSUE EXAM BY PATHOLOGIST: CPT

## 2023-04-22 PROCEDURE — 2580000003 HC RX 258: Performed by: ORTHOPAEDIC SURGERY

## 2023-04-22 PROCEDURE — 7100000001 HC PACU RECOVERY - ADDTL 15 MIN: Performed by: ORTHOPAEDIC SURGERY

## 2023-04-22 PROCEDURE — 2500000003 HC RX 250 WO HCPCS: Performed by: INTERNAL MEDICINE

## 2023-04-22 PROCEDURE — 87641 MR-STAPH DNA AMP PROBE: CPT

## 2023-04-22 RX ORDER — DIPHENHYDRAMINE HYDROCHLORIDE 50 MG/ML
12.5 INJECTION INTRAMUSCULAR; INTRAVENOUS
Status: DISCONTINUED | OUTPATIENT
Start: 2023-04-22 | End: 2023-04-22 | Stop reason: HOSPADM

## 2023-04-22 RX ORDER — ESMOLOL HYDROCHLORIDE 10 MG/ML
INJECTION INTRAVENOUS PRN
Status: DISCONTINUED | OUTPATIENT
Start: 2023-04-22 | End: 2023-04-22 | Stop reason: SDUPTHER

## 2023-04-22 RX ORDER — ONDANSETRON 2 MG/ML
INJECTION INTRAMUSCULAR; INTRAVENOUS PRN
Status: DISCONTINUED | OUTPATIENT
Start: 2023-04-22 | End: 2023-04-22 | Stop reason: SDUPTHER

## 2023-04-22 RX ORDER — VERAPAMIL HYDROCHLORIDE 2.5 MG/ML
2.5 INJECTION, SOLUTION INTRAVENOUS ONCE
Status: COMPLETED | OUTPATIENT
Start: 2023-04-22 | End: 2023-04-22

## 2023-04-22 RX ORDER — ROCURONIUM BROMIDE 10 MG/ML
INJECTION, SOLUTION INTRAVENOUS PRN
Status: DISCONTINUED | OUTPATIENT
Start: 2023-04-22 | End: 2023-04-22 | Stop reason: SDUPTHER

## 2023-04-22 RX ORDER — SODIUM CHLORIDE 0.9 % (FLUSH) 0.9 %
5-40 SYRINGE (ML) INJECTION EVERY 12 HOURS SCHEDULED
Status: DISCONTINUED | OUTPATIENT
Start: 2023-04-22 | End: 2023-04-22 | Stop reason: HOSPADM

## 2023-04-22 RX ORDER — OXYCODONE HYDROCHLORIDE 5 MG/1
10 TABLET ORAL EVERY 4 HOURS PRN
Status: DISCONTINUED | OUTPATIENT
Start: 2023-04-22 | End: 2023-04-28 | Stop reason: HOSPADM

## 2023-04-22 RX ORDER — OXYCODONE HYDROCHLORIDE 5 MG/1
5 TABLET ORAL EVERY 4 HOURS PRN
Status: DISCONTINUED | OUTPATIENT
Start: 2023-04-22 | End: 2023-04-28 | Stop reason: HOSPADM

## 2023-04-22 RX ORDER — SODIUM CHLORIDE 0.9 % (FLUSH) 0.9 %
5-40 SYRINGE (ML) INJECTION PRN
Status: DISCONTINUED | OUTPATIENT
Start: 2023-04-22 | End: 2023-04-22 | Stop reason: HOSPADM

## 2023-04-22 RX ORDER — HYDROMORPHONE HYDROCHLORIDE 1 MG/ML
0.5 INJECTION, SOLUTION INTRAMUSCULAR; INTRAVENOUS; SUBCUTANEOUS
Status: DISCONTINUED | OUTPATIENT
Start: 2023-04-22 | End: 2023-04-28 | Stop reason: HOSPADM

## 2023-04-22 RX ORDER — CEFAZOLIN SODIUM 1 G/3ML
INJECTION, POWDER, FOR SOLUTION INTRAMUSCULAR; INTRAVENOUS PRN
Status: DISCONTINUED | OUTPATIENT
Start: 2023-04-22 | End: 2023-04-22 | Stop reason: SDUPTHER

## 2023-04-22 RX ORDER — POTASSIUM CHLORIDE 29.8 MG/ML
20 INJECTION INTRAVENOUS PRN
Status: DISCONTINUED | OUTPATIENT
Start: 2023-04-22 | End: 2023-04-28 | Stop reason: HOSPADM

## 2023-04-22 RX ORDER — OXYCODONE HYDROCHLORIDE 5 MG/1
15 TABLET ORAL EVERY 4 HOURS PRN
Status: DISCONTINUED | OUTPATIENT
Start: 2023-04-22 | End: 2023-04-28 | Stop reason: HOSPADM

## 2023-04-22 RX ORDER — HYDROMORPHONE HYDROCHLORIDE 1 MG/ML
1 INJECTION, SOLUTION INTRAMUSCULAR; INTRAVENOUS; SUBCUTANEOUS
Status: DISCONTINUED | OUTPATIENT
Start: 2023-04-22 | End: 2023-04-28 | Stop reason: HOSPADM

## 2023-04-22 RX ORDER — HYDROMORPHONE HYDROCHLORIDE 1 MG/ML
0.5 INJECTION, SOLUTION INTRAMUSCULAR; INTRAVENOUS; SUBCUTANEOUS EVERY 5 MIN PRN
Status: DISCONTINUED | OUTPATIENT
Start: 2023-04-22 | End: 2023-04-22 | Stop reason: HOSPADM

## 2023-04-22 RX ORDER — BISMUTH TRIBROMOPH/PETROLATUM 5"X9"
BANDAGE TOPICAL PRN
Status: DISCONTINUED | OUTPATIENT
Start: 2023-04-22 | End: 2023-04-22 | Stop reason: HOSPADM

## 2023-04-22 RX ORDER — LIDOCAINE HYDROCHLORIDE 10 MG/ML
INJECTION, SOLUTION EPIDURAL; INFILTRATION; INTRACAUDAL; PERINEURAL PRN
Status: DISCONTINUED | OUTPATIENT
Start: 2023-04-22 | End: 2023-04-22 | Stop reason: SDUPTHER

## 2023-04-22 RX ORDER — PROPOFOL 10 MG/ML
INJECTION, EMULSION INTRAVENOUS PRN
Status: DISCONTINUED | OUTPATIENT
Start: 2023-04-22 | End: 2023-04-22 | Stop reason: SDUPTHER

## 2023-04-22 RX ORDER — DEXAMETHASONE SODIUM PHOSPHATE 10 MG/ML
INJECTION, SOLUTION INTRAMUSCULAR; INTRAVENOUS PRN
Status: DISCONTINUED | OUTPATIENT
Start: 2023-04-22 | End: 2023-04-22 | Stop reason: SDUPTHER

## 2023-04-22 RX ORDER — MEPERIDINE HYDROCHLORIDE 25 MG/ML
12.5 INJECTION INTRAMUSCULAR; INTRAVENOUS; SUBCUTANEOUS EVERY 5 MIN PRN
Status: DISCONTINUED | OUTPATIENT
Start: 2023-04-22 | End: 2023-04-22 | Stop reason: HOSPADM

## 2023-04-22 RX ORDER — HYDROMORPHONE HYDROCHLORIDE 1 MG/ML
0.25 INJECTION, SOLUTION INTRAMUSCULAR; INTRAVENOUS; SUBCUTANEOUS EVERY 5 MIN PRN
Status: DISCONTINUED | OUTPATIENT
Start: 2023-04-22 | End: 2023-04-22 | Stop reason: HOSPADM

## 2023-04-22 RX ORDER — SUCCINYLCHOLINE CHLORIDE 20 MG/ML
INJECTION INTRAMUSCULAR; INTRAVENOUS PRN
Status: DISCONTINUED | OUTPATIENT
Start: 2023-04-22 | End: 2023-04-22 | Stop reason: SDUPTHER

## 2023-04-22 RX ORDER — EPHEDRINE SULFATE 50 MG/ML
INJECTION, SOLUTION INTRAVENOUS PRN
Status: DISCONTINUED | OUTPATIENT
Start: 2023-04-22 | End: 2023-04-22 | Stop reason: SDUPTHER

## 2023-04-22 RX ORDER — METOCLOPRAMIDE HYDROCHLORIDE 5 MG/ML
10 INJECTION INTRAMUSCULAR; INTRAVENOUS
Status: DISCONTINUED | OUTPATIENT
Start: 2023-04-22 | End: 2023-04-22 | Stop reason: HOSPADM

## 2023-04-22 RX ORDER — HYDROMORPHONE HYDROCHLORIDE 1 MG/ML
0.25 INJECTION, SOLUTION INTRAMUSCULAR; INTRAVENOUS; SUBCUTANEOUS
Status: DISCONTINUED | OUTPATIENT
Start: 2023-04-22 | End: 2023-04-28 | Stop reason: HOSPADM

## 2023-04-22 RX ORDER — FENTANYL CITRATE 50 UG/ML
INJECTION, SOLUTION INTRAMUSCULAR; INTRAVENOUS PRN
Status: DISCONTINUED | OUTPATIENT
Start: 2023-04-22 | End: 2023-04-22 | Stop reason: SDUPTHER

## 2023-04-22 RX ORDER — SODIUM CHLORIDE 9 MG/ML
INJECTION, SOLUTION INTRAVENOUS PRN
Status: DISCONTINUED | OUTPATIENT
Start: 2023-04-22 | End: 2023-04-22 | Stop reason: HOSPADM

## 2023-04-22 RX ADMIN — VANCOMYCIN HYDROCHLORIDE 1250 MG: 10 INJECTION, POWDER, LYOPHILIZED, FOR SOLUTION INTRAVENOUS at 07:49

## 2023-04-22 RX ADMIN — PHENYLEPHRINE HYDROCHLORIDE 100 MCG: 10 INJECTION INTRAVENOUS at 09:58

## 2023-04-22 RX ADMIN — ESMOLOL HYDROCHLORIDE 10 MG: 10 INJECTION, SOLUTION INTRAVENOUS at 10:41

## 2023-04-22 RX ADMIN — VANCOMYCIN HYDROCHLORIDE 1250 MG: 10 INJECTION, POWDER, LYOPHILIZED, FOR SOLUTION INTRAVENOUS at 22:22

## 2023-04-22 RX ADMIN — CEFEPIME 2000 MG: 2 INJECTION, POWDER, FOR SOLUTION INTRAVENOUS at 02:58

## 2023-04-22 RX ADMIN — FENTANYL CITRATE 50 MCG: 0.05 INJECTION, SOLUTION INTRAMUSCULAR; INTRAVENOUS at 09:53

## 2023-04-22 RX ADMIN — PHENYLEPHRINE HYDROCHLORIDE 100 MCG: 10 INJECTION INTRAVENOUS at 09:41

## 2023-04-22 RX ADMIN — PHENYLEPHRINE HYDROCHLORIDE 100 MCG: 10 INJECTION INTRAVENOUS at 09:42

## 2023-04-22 RX ADMIN — MIDODRINE HYDROCHLORIDE 2.5 MG: 5 TABLET ORAL at 11:37

## 2023-04-22 RX ADMIN — EPHEDRINE SULFATE 10 MG: 50 INJECTION INTRAMUSCULAR; INTRAVENOUS; SUBCUTANEOUS at 09:43

## 2023-04-22 RX ADMIN — SUCCINYLCHOLINE CHLORIDE 200 MG: 20 INJECTION, SOLUTION INTRAMUSCULAR; INTRAVENOUS at 09:30

## 2023-04-22 RX ADMIN — CEFEPIME 2000 MG: 2 INJECTION, POWDER, FOR SOLUTION INTRAVENOUS at 12:18

## 2023-04-22 RX ADMIN — ROCURONIUM BROMIDE 25 MG: 10 INJECTION, SOLUTION INTRAVENOUS at 09:35

## 2023-04-22 RX ADMIN — ESMOLOL HYDROCHLORIDE 10 MG: 10 INJECTION, SOLUTION INTRAVENOUS at 09:52

## 2023-04-22 RX ADMIN — DEXAMETHASONE SODIUM PHOSPHATE 4 MG: 10 INJECTION, SOLUTION INTRAMUSCULAR; INTRAVENOUS at 09:30

## 2023-04-22 RX ADMIN — MIDODRINE HYDROCHLORIDE 2.5 MG: 5 TABLET ORAL at 15:59

## 2023-04-22 RX ADMIN — ROCURONIUM BROMIDE 5 MG: 10 INJECTION, SOLUTION INTRAVENOUS at 09:30

## 2023-04-22 RX ADMIN — EPHEDRINE SULFATE 10 MG: 50 INJECTION INTRAMUSCULAR; INTRAVENOUS; SUBCUTANEOUS at 10:22

## 2023-04-22 RX ADMIN — ALLOPURINOL 300 MG: 300 TABLET ORAL at 07:52

## 2023-04-22 RX ADMIN — PROPOFOL 80 MG: 10 INJECTION, EMULSION INTRAVENOUS at 09:30

## 2023-04-22 RX ADMIN — ROSUVASTATIN CALCIUM 10 MG: 10 TABLET, FILM COATED ORAL at 20:57

## 2023-04-22 RX ADMIN — POTASSIUM CHLORIDE 20 MEQ: 20 INJECTION INTRAVENOUS at 12:55

## 2023-04-22 RX ADMIN — POTASSIUM CHLORIDE 20 MEQ: 20 INJECTION INTRAVENOUS at 14:00

## 2023-04-22 RX ADMIN — LIDOCAINE HYDROCHLORIDE 5 ML: 10 INJECTION, SOLUTION EPIDURAL; INFILTRATION; INTRACAUDAL; PERINEURAL at 09:30

## 2023-04-22 RX ADMIN — FENTANYL CITRATE 50 MCG: 0.05 INJECTION, SOLUTION INTRAMUSCULAR; INTRAVENOUS at 09:30

## 2023-04-22 RX ADMIN — EPHEDRINE SULFATE 10 MG: 50 INJECTION INTRAMUSCULAR; INTRAVENOUS; SUBCUTANEOUS at 10:03

## 2023-04-22 RX ADMIN — ESMOLOL HYDROCHLORIDE 10 MG: 10 INJECTION, SOLUTION INTRAVENOUS at 10:25

## 2023-04-22 RX ADMIN — EPHEDRINE SULFATE 10 MG: 50 INJECTION INTRAMUSCULAR; INTRAVENOUS; SUBCUTANEOUS at 10:04

## 2023-04-22 RX ADMIN — ONDANSETRON 4 MG: 2 INJECTION INTRAMUSCULAR; INTRAVENOUS at 10:22

## 2023-04-22 RX ADMIN — HYDROMORPHONE HYDROCHLORIDE 0.25 MG: 1 INJECTION, SOLUTION INTRAMUSCULAR; INTRAVENOUS; SUBCUTANEOUS at 15:59

## 2023-04-22 RX ADMIN — MIDODRINE HYDROCHLORIDE 2.5 MG: 5 TABLET ORAL at 07:51

## 2023-04-22 RX ADMIN — CEFAZOLIN SODIUM 3 G: 1 INJECTION, POWDER, FOR SOLUTION INTRAMUSCULAR; INTRAVENOUS at 09:55

## 2023-04-22 RX ADMIN — VERAPAMIL HYDROCHLORIDE 2.5 MG: 2.5 INJECTION, SOLUTION INTRAVENOUS at 14:12

## 2023-04-22 RX ADMIN — VANCOMYCIN HYDROCHLORIDE 1250 MG: 10 INJECTION, POWDER, LYOPHILIZED, FOR SOLUTION INTRAVENOUS at 09:27

## 2023-04-22 ASSESSMENT — ENCOUNTER SYMPTOMS
DIARRHEA: 1
VOMITING: 0
ABDOMINAL PAIN: 0
NAUSEA: 1
COLOR CHANGE: 0
COUGH: 0
BACK PAIN: 0
RHINORRHEA: 0
EYE DISCHARGE: 0
SHORTNESS OF BREATH: 0

## 2023-04-22 ASSESSMENT — PAIN DESCRIPTION - LOCATION: LOCATION: ELBOW

## 2023-04-22 ASSESSMENT — PAIN DESCRIPTION - ORIENTATION: ORIENTATION: LEFT

## 2023-04-22 ASSESSMENT — PAIN DESCRIPTION - DESCRIPTORS: DESCRIPTORS: ACHING;SORE

## 2023-04-22 ASSESSMENT — PAIN SCALES - GENERAL
PAINLEVEL_OUTOF10: 3
PAINLEVEL_OUTOF10: 0

## 2023-04-22 ASSESSMENT — LIFESTYLE VARIABLES: SMOKING_STATUS: 0

## 2023-04-22 NOTE — ANESTHESIA POSTPROCEDURE EVALUATION
Department of Anesthesiology  Postprocedure Note    Patient: Calin Padilla  MRN: [id number]  YOB: 1955  Date of evaluation: 4/22/2023      Procedure Summary     Date: 04/22/23 Room / Location: [address]    Anesthesia Start: 9259 Anesthesia Stop:     Procedure: ELBOW INCISION AND DRAINAGE (Left: Elbow) Diagnosis:       Infected abrasion of left elbow, initial encounter      (Infected abrasion of left elbow, initial encounter [M16.088S, L08.9])    Surgeons: Jackie Bardales MD Responsible Provider: JODY Barroso CRNA    Anesthesia Type: general ASA Status: 3 - Emergent          Anesthesia Type: No value filed.     Paul Phase I:      Paul Phase II:        Anesthesia Post Evaluation    Patient location during evaluation: PACU  Patient participation: complete - patient participated  Level of consciousness: awake and alert  Pain score: 0  Airway patency: patent  Nausea & Vomiting: no nausea and no vomiting  Complications: no  Cardiovascular status: blood pressure returned to baseline  Respiratory status: acceptable and nasal cannula  Hydration status: euvolemic  Comments: BP (!) 98/55   Pulse (!) 124   Temp 98 °F (36.7 °C) (Temporal)   Resp 19   Ht 6' 1\" (1.854 m)   Wt 281 lb 4.8 oz (127.6 kg)   SpO2 96%   BMI 37.11 kg/m²

## 2023-04-22 NOTE — ANESTHESIA PRE PROCEDURE
Department of Anesthesiology  Preprocedure Note       Name:  Bret Cordero   Age:  78 y.o.  :  1955                                          MRN:  [id number]         Date:  2023      Surgeon: Mukul Glaser):  Lianna Farooq MD    Procedure: Procedure(s):  ELBOW INCISION AND DRAINAGE    Medications prior to admission:   Prior to Admission medications    Medication Sig Start Date End Date Taking? Authorizing Provider   megestrol (MEGACE) 40 MG/ML suspension Take 5 mLs by mouth daily 23   Cristian Vences MD   CARTIA  MG extended release capsule Take 1 capsule by mouth in the morning and at bedtime FOR HEART 23   Osman OriJODY   Naproxen Sod-diphenhydrAMINE ([facility] PM PO) Take by mouth at bedtime    Historical Provider, MD   midodrine (PROAMATINE) 2.5 MG tablet Take 1 tablet by mouth 3 times daily 23   Manuel Rivas MD   furosemide (LASIX) 40 MG tablet Take 1 tablet by mouth daily 23   Manuel Rivas MD   allopurinol (ZYLOPRIM) 300 MG tablet Take 1 tablet by mouth daily 4/10/23   Cristian Vences MD   predniSONE (DELTASONE) 50 MG tablet Take 2 tablets by mouth See Admin Instructions for 30 doses Days 1 - 5 of a 28 day cycle x 6 cycles (for treatment of Follicular Lymphoma) 3/29/27   Osman Ori, APRKAI   nystatin (MYCOSTATIN) 764518 UNIT/GM ointment Apply topically 2 times daily.  3/27/23   Osman OriJODY hyatt   vitamin E 400 UNIT capsule Take 1 capsule by mouth daily    Historical Provider, MD   metoprolol succinate (TOPROL XL) 100 MG extended release tablet Take 1 tablet by mouth in the morning and at bedtime 3/8/23   Osman Ori APRKAI   rosuvastatin (CRESTOR) 10 MG tablet TAKE 1 TABLET EVERY NIGHT 3/8/23   Osman Ori APRKAI   docusate sodium (COLACE) 100 MG capsule Take 1 capsule by mouth 2 times daily 23  [place] Ori APRKAI   rivaroxaban (XARELTO) 20 MG TABS tablet TAKE 1 TABLET EVERY DAY WITH BREAKFAST  Patient taking differently: 1 tablet daily

## 2023-04-23 LAB
ANION GAP SERPL CALCULATED.3IONS-SCNC: 9 MMOL/L (ref 7–19)
BACTERIA UR CULT: ABNORMAL
BACTERIA UR CULT: ABNORMAL
BASOPHILS # BLD: 0 K/UL (ref 0–0.2)
BASOPHILS NFR BLD: 0 % (ref 0–1)
BUN SERPL-MCNC: 26 MG/DL (ref 8–23)
CALCIUM SERPL-MCNC: 8 MG/DL (ref 8.8–10.2)
CHLORIDE SERPL-SCNC: 107 MMOL/L (ref 98–111)
CO2 SERPL-SCNC: 23 MMOL/L (ref 22–29)
CREAT SERPL-MCNC: 1.4 MG/DL (ref 0.5–1.2)
EKG P AXIS: NORMAL DEGREES
EKG P-R INTERVAL: NORMAL MS
EKG Q-T INTERVAL: 388 MS
EKG QRS DURATION: 86 MS
EKG QTC CALCULATION (BAZETT): 454 MS
EKG T AXIS: 1 DEGREES
EOSINOPHIL # BLD: 0 K/UL (ref 0–0.6)
EOSINOPHIL NFR BLD: 0 % (ref 0–5)
ERYTHROCYTE [DISTWIDTH] IN BLOOD BY AUTOMATED COUNT: 16.5 % (ref 11.5–14.5)
GLUCOSE SERPL-MCNC: 129 MG/DL (ref 74–109)
HCT VFR BLD AUTO: 27.5 % (ref 42–52)
HGB BLD-MCNC: 8.9 G/DL (ref 14–18)
IMM GRANULOCYTES # BLD: 0.1 K/UL
LYMPHOCYTES # BLD: 0.2 K/UL (ref 1.1–4.5)
LYMPHOCYTES NFR BLD: 7.8 % (ref 20–40)
MAGNESIUM SERPL-MCNC: 1.9 MG/DL (ref 1.6–2.4)
MCH RBC QN AUTO: 28.8 PG (ref 27–31)
MCHC RBC AUTO-ENTMCNC: 32.4 G/DL (ref 33–37)
MCV RBC AUTO: 89 FL (ref 80–94)
MONOCYTES # BLD: 0.1 K/UL (ref 0–0.9)
MONOCYTES NFR BLD: 6.8 % (ref 0–10)
NEUTROPHILS # BLD: 1.7 K/UL (ref 1.5–7.5)
NEUTS SEG NFR BLD: 81 % (ref 50–65)
ORGANISM: ABNORMAL
PLATELET # BLD AUTO: 119 K/UL (ref 130–400)
PMV BLD AUTO: 10.4 FL (ref 9.4–12.4)
POTASSIUM SERPL-SCNC: 4.3 MMOL/L (ref 3.5–5)
RBC # BLD AUTO: 3.09 M/UL (ref 4.7–6.1)
SODIUM SERPL-SCNC: 139 MMOL/L (ref 136–145)
VANCOMYCIN TROUGH SERPL-MCNC: 13.8 UG/ML (ref 10–20)
WBC # BLD AUTO: 2.1 K/UL (ref 4.8–10.8)

## 2023-04-23 PROCEDURE — 83735 ASSAY OF MAGNESIUM: CPT

## 2023-04-23 PROCEDURE — 6360000002 HC RX W HCPCS: Performed by: INTERNAL MEDICINE

## 2023-04-23 PROCEDURE — 85025 COMPLETE CBC W/AUTO DIFF WBC: CPT

## 2023-04-23 PROCEDURE — 2580000003 HC RX 258: Performed by: INTERNAL MEDICINE

## 2023-04-23 PROCEDURE — 80048 BASIC METABOLIC PNL TOTAL CA: CPT

## 2023-04-23 PROCEDURE — 2000000000 HC ICU R&B

## 2023-04-23 PROCEDURE — 80202 ASSAY OF VANCOMYCIN: CPT

## 2023-04-23 PROCEDURE — 2580000003 HC RX 258: Performed by: ORTHOPAEDIC SURGERY

## 2023-04-23 PROCEDURE — 6370000000 HC RX 637 (ALT 250 FOR IP): Performed by: ORTHOPAEDIC SURGERY

## 2023-04-23 RX ADMIN — AMIODARONE HYDROCHLORIDE 1 MG/MIN: 50 INJECTION, SOLUTION INTRAVENOUS at 12:03

## 2023-04-23 RX ADMIN — FUROSEMIDE 40 MG: 40 TABLET ORAL at 18:05

## 2023-04-23 RX ADMIN — Medication 0.5 MG/MIN: at 22:23

## 2023-04-23 RX ADMIN — Medication 400 UNITS: at 09:00

## 2023-04-23 RX ADMIN — SODIUM CHLORIDE, POTASSIUM CHLORIDE, SODIUM LACTATE AND CALCIUM CHLORIDE: 600; 310; 30; 20 INJECTION, SOLUTION INTRAVENOUS at 07:57

## 2023-04-23 RX ADMIN — CEFEPIME 2000 MG: 2 INJECTION, POWDER, FOR SOLUTION INTRAVENOUS at 11:47

## 2023-04-23 RX ADMIN — MIDODRINE HYDROCHLORIDE 2.5 MG: 5 TABLET ORAL at 17:01

## 2023-04-23 RX ADMIN — MEGESTROL ACETATE 200 MG: 40 SUSPENSION ORAL at 09:00

## 2023-04-23 RX ADMIN — DOCUSATE SODIUM 100 MG: 100 CAPSULE, LIQUID FILLED ORAL at 09:00

## 2023-04-23 RX ADMIN — CEFEPIME 2000 MG: 2 INJECTION, POWDER, FOR SOLUTION INTRAVENOUS at 00:29

## 2023-04-23 RX ADMIN — ROSUVASTATIN CALCIUM 10 MG: 10 TABLET, FILM COATED ORAL at 20:50

## 2023-04-23 RX ADMIN — MIDODRINE HYDROCHLORIDE 2.5 MG: 5 TABLET ORAL at 09:00

## 2023-04-23 RX ADMIN — VANCOMYCIN HYDROCHLORIDE 1250 MG: 10 INJECTION, POWDER, LYOPHILIZED, FOR SOLUTION INTRAVENOUS at 17:15

## 2023-04-23 RX ADMIN — CEFTRIAXONE 1000 MG: 1 INJECTION, POWDER, FOR SOLUTION INTRAMUSCULAR; INTRAVENOUS at 18:08

## 2023-04-23 RX ADMIN — AMIODARONE HYDROCHLORIDE 150 MG: 50 INJECTION, SOLUTION INTRAVENOUS at 11:45

## 2023-04-23 RX ADMIN — OXYCODONE 5 MG: 5 TABLET ORAL at 04:57

## 2023-04-23 RX ADMIN — DILTIAZEM HYDROCHLORIDE 120 MG: 120 CAPSULE, COATED, EXTENDED RELEASE ORAL at 09:41

## 2023-04-23 RX ADMIN — ALLOPURINOL 300 MG: 300 TABLET ORAL at 09:00

## 2023-04-23 RX ADMIN — MIDODRINE HYDROCHLORIDE 2.5 MG: 5 TABLET ORAL at 11:42

## 2023-04-23 RX ADMIN — RIVAROXABAN 20 MG: 20 TABLET, FILM COATED ORAL at 09:00

## 2023-04-23 ASSESSMENT — PAIN DESCRIPTION - ORIENTATION: ORIENTATION: LEFT

## 2023-04-23 ASSESSMENT — PAIN SCALES - GENERAL
PAINLEVEL_OUTOF10: 0
PAINLEVEL_OUTOF10: 5

## 2023-04-23 ASSESSMENT — PAIN DESCRIPTION - LOCATION: LOCATION: ELBOW

## 2023-04-23 ASSESSMENT — PAIN DESCRIPTION - DESCRIPTORS: DESCRIPTORS: DISCOMFORT

## 2023-04-24 LAB
ANION GAP SERPL CALCULATED.3IONS-SCNC: 9 MMOL/L (ref 7–19)
BASOPHILS # BLD: 0 K/UL (ref 0–0.2)
BASOPHILS NFR BLD: 0 % (ref 0–1)
BUN SERPL-MCNC: 23 MG/DL (ref 8–23)
CALCIUM SERPL-MCNC: 8.3 MG/DL (ref 8.8–10.2)
CHLORIDE SERPL-SCNC: 102 MMOL/L (ref 98–111)
CO2 SERPL-SCNC: 25 MMOL/L (ref 22–29)
CREAT SERPL-MCNC: 1.3 MG/DL (ref 0.5–1.2)
EOSINOPHIL # BLD: 0 K/UL (ref 0–0.6)
EOSINOPHIL NFR BLD: 0.4 % (ref 0–5)
ERYTHROCYTE [DISTWIDTH] IN BLOOD BY AUTOMATED COUNT: 16.3 % (ref 11.5–14.5)
GLUCOSE SERPL-MCNC: 95 MG/DL (ref 74–109)
HCT VFR BLD AUTO: 29.6 % (ref 42–52)
HGB BLD-MCNC: 9.8 G/DL (ref 14–18)
IMM GRANULOCYTES # BLD: 0.1 K/UL
LYMPHOCYTES # BLD: 0.1 K/UL (ref 1.1–4.5)
LYMPHOCYTES NFR BLD: 2.9 % (ref 20–40)
MAGNESIUM SERPL-MCNC: 1.8 MG/DL (ref 1.6–2.4)
MCH RBC QN AUTO: 29.3 PG (ref 27–31)
MCHC RBC AUTO-ENTMCNC: 33.1 G/DL (ref 33–37)
MCV RBC AUTO: 88.6 FL (ref 80–94)
MONOCYTES # BLD: 0.1 K/UL (ref 0–0.9)
MONOCYTES NFR BLD: 5.1 % (ref 0–10)
NEUTROPHILS # BLD: 2.4 K/UL (ref 1.5–7.5)
NEUTS SEG NFR BLD: 88.3 % (ref 50–65)
PLATELET # BLD AUTO: 132 K/UL (ref 130–400)
PMV BLD AUTO: 10.7 FL (ref 9.4–12.4)
POTASSIUM SERPL-SCNC: 3.9 MMOL/L (ref 3.5–5)
RBC # BLD AUTO: 3.34 M/UL (ref 4.7–6.1)
SODIUM SERPL-SCNC: 136 MMOL/L (ref 136–145)
TSH SERPL DL<=0.005 MIU/L-ACNC: 1.52 UIU/ML (ref 0.35–5.5)
WBC # BLD AUTO: 2.7 K/UL (ref 4.8–10.8)

## 2023-04-24 PROCEDURE — 2500000003 HC RX 250 WO HCPCS: Performed by: INTERNAL MEDICINE

## 2023-04-24 PROCEDURE — 2000000000 HC ICU R&B

## 2023-04-24 PROCEDURE — 6370000000 HC RX 637 (ALT 250 FOR IP): Performed by: ORTHOPAEDIC SURGERY

## 2023-04-24 PROCEDURE — 84443 ASSAY THYROID STIM HORMONE: CPT

## 2023-04-24 PROCEDURE — 6360000002 HC RX W HCPCS: Performed by: INTERNAL MEDICINE

## 2023-04-24 PROCEDURE — 94760 N-INVAS EAR/PLS OXIMETRY 1: CPT

## 2023-04-24 PROCEDURE — 2580000003 HC RX 258: Performed by: INTERNAL MEDICINE

## 2023-04-24 PROCEDURE — 2700000000 HC OXYGEN THERAPY PER DAY

## 2023-04-24 PROCEDURE — 6370000000 HC RX 637 (ALT 250 FOR IP): Performed by: INTERNAL MEDICINE

## 2023-04-24 PROCEDURE — 80048 BASIC METABOLIC PNL TOTAL CA: CPT

## 2023-04-24 PROCEDURE — 85025 COMPLETE CBC W/AUTO DIFF WBC: CPT

## 2023-04-24 PROCEDURE — 83735 ASSAY OF MAGNESIUM: CPT

## 2023-04-24 RX ORDER — MIDODRINE HYDROCHLORIDE 5 MG/1
2.5 TABLET ORAL ONCE
Status: COMPLETED | OUTPATIENT
Start: 2023-04-24 | End: 2023-04-24

## 2023-04-24 RX ORDER — METOPROLOL TARTRATE 5 MG/5ML
2.5 INJECTION INTRAVENOUS EVERY 6 HOURS
Status: DISCONTINUED | OUTPATIENT
Start: 2023-04-24 | End: 2023-04-28 | Stop reason: HOSPADM

## 2023-04-24 RX ORDER — MIDODRINE HYDROCHLORIDE 5 MG/1
5 TABLET ORAL 3 TIMES DAILY
Status: DISCONTINUED | OUTPATIENT
Start: 2023-04-24 | End: 2023-04-25

## 2023-04-24 RX ADMIN — CEFAZOLIN 2000 MG: 2 INJECTION, POWDER, FOR SOLUTION INTRAMUSCULAR; INTRAVENOUS at 08:34

## 2023-04-24 RX ADMIN — MIDODRINE HYDROCHLORIDE 2.5 MG: 5 TABLET ORAL at 12:46

## 2023-04-24 RX ADMIN — OXYCODONE 10 MG: 5 TABLET ORAL at 08:34

## 2023-04-24 RX ADMIN — FUROSEMIDE 40 MG: 40 TABLET ORAL at 08:36

## 2023-04-24 RX ADMIN — ROSUVASTATIN CALCIUM 10 MG: 10 TABLET, FILM COATED ORAL at 20:09

## 2023-04-24 RX ADMIN — Medication 0.5 MG/MIN: at 15:31

## 2023-04-24 RX ADMIN — MIDODRINE HYDROCHLORIDE 2.5 MG: 5 TABLET ORAL at 08:35

## 2023-04-24 RX ADMIN — METOPROLOL TARTRATE 2.5 MG: 5 INJECTION INTRAVENOUS at 12:33

## 2023-04-24 RX ADMIN — MIDODRINE HYDROCHLORIDE 5 MG: 5 TABLET ORAL at 16:48

## 2023-04-24 RX ADMIN — RIVAROXABAN 20 MG: 20 TABLET, FILM COATED ORAL at 08:36

## 2023-04-24 RX ADMIN — CEFAZOLIN 2000 MG: 2 INJECTION, POWDER, FOR SOLUTION INTRAMUSCULAR; INTRAVENOUS at 16:48

## 2023-04-24 RX ADMIN — Medication 400 UNITS: at 08:35

## 2023-04-24 RX ADMIN — METOPROLOL TARTRATE 2.5 MG: 5 INJECTION INTRAVENOUS at 18:53

## 2023-04-24 RX ADMIN — DILTIAZEM HYDROCHLORIDE 120 MG: 120 CAPSULE, COATED, EXTENDED RELEASE ORAL at 08:35

## 2023-04-24 RX ADMIN — MEGESTROL ACETATE 200 MG: 40 SUSPENSION ORAL at 08:34

## 2023-04-24 RX ADMIN — ALLOPURINOL 300 MG: 300 TABLET ORAL at 08:35

## 2023-04-24 ASSESSMENT — PAIN DESCRIPTION - LOCATION: LOCATION: ELBOW

## 2023-04-24 ASSESSMENT — PAIN DESCRIPTION - ORIENTATION: ORIENTATION: LEFT

## 2023-04-24 ASSESSMENT — PAIN SCALES - GENERAL: PAINLEVEL_OUTOF10: 5

## 2023-04-25 LAB
ANION GAP SERPL CALCULATED.3IONS-SCNC: 11 MMOL/L (ref 7–19)
BACTERIA SPEC ANAEROBE CULT: ABNORMAL
BACTERIA SPEC ANAEROBE CULT: ABNORMAL
BACTERIA SPEC ANAEROBE+AEROBE CULT: ABNORMAL
BACTERIA SPEC ANAEROBE+AEROBE CULT: ABNORMAL
BASOPHILS # BLD: 0 K/UL (ref 0–0.2)
BASOPHILS NFR BLD: 0 % (ref 0–1)
BUN SERPL-MCNC: 26 MG/DL (ref 8–23)
CALCIUM SERPL-MCNC: 8.2 MG/DL (ref 8.8–10.2)
CHLORIDE SERPL-SCNC: 100 MMOL/L (ref 98–111)
CO2 SERPL-SCNC: 26 MMOL/L (ref 22–29)
CREAT SERPL-MCNC: 1.7 MG/DL (ref 0.5–1.2)
EOSINOPHIL # BLD: 0 K/UL (ref 0–0.6)
EOSINOPHIL NFR BLD: 0 % (ref 0–5)
ERYTHROCYTE [DISTWIDTH] IN BLOOD BY AUTOMATED COUNT: 16.4 % (ref 11.5–14.5)
GLUCOSE SERPL-MCNC: 95 MG/DL (ref 74–109)
GRAM STN SPEC: ABNORMAL
GRAM STN SPEC: ABNORMAL
HCT VFR BLD AUTO: 29 % (ref 42–52)
HGB BLD-MCNC: 9.5 G/DL (ref 14–18)
IMM GRANULOCYTES # BLD: 0.1 K/UL
LYMPHOCYTES # BLD: 0.2 K/UL (ref 1.1–4.5)
LYMPHOCYTES NFR BLD: 4 % (ref 20–40)
MAGNESIUM SERPL-MCNC: 1.7 MG/DL (ref 1.6–2.4)
MCH RBC QN AUTO: 28.9 PG (ref 27–31)
MCHC RBC AUTO-ENTMCNC: 32.8 G/DL (ref 33–37)
MCV RBC AUTO: 88.1 FL (ref 80–94)
MONOCYTES # BLD: 0.1 K/UL (ref 0–0.9)
MONOCYTES NFR BLD: 5 % (ref 0–10)
NEUTROPHILS # BLD: 2.2 K/UL (ref 1.5–7.5)
NEUTS SEG NFR BLD: 89 % (ref 50–65)
ORGANISM: ABNORMAL
ORGANISM: ABNORMAL
PLATELET # BLD AUTO: 107 K/UL (ref 130–400)
PLATELET SLIDE REVIEW: ADEQUATE
PMV BLD AUTO: 11.3 FL (ref 9.4–12.4)
POTASSIUM SERPL-SCNC: 3.8 MMOL/L (ref 3.5–5)
RBC # BLD AUTO: 3.29 M/UL (ref 4.7–6.1)
SODIUM SERPL-SCNC: 137 MMOL/L (ref 136–145)
VARIANT LYMPHS NFR BLD: 2 % (ref 0–8)
WBC # BLD AUTO: 2.5 K/UL (ref 4.8–10.8)

## 2023-04-25 PROCEDURE — 6360000002 HC RX W HCPCS: Performed by: INTERNAL MEDICINE

## 2023-04-25 PROCEDURE — 6370000000 HC RX 637 (ALT 250 FOR IP): Performed by: INTERNAL MEDICINE

## 2023-04-25 PROCEDURE — 97530 THERAPEUTIC ACTIVITIES: CPT

## 2023-04-25 PROCEDURE — 97166 OT EVAL MOD COMPLEX 45 MIN: CPT

## 2023-04-25 PROCEDURE — 97162 PT EVAL MOD COMPLEX 30 MIN: CPT

## 2023-04-25 PROCEDURE — 2000000000 HC ICU R&B

## 2023-04-25 PROCEDURE — 97535 SELF CARE MNGMENT TRAINING: CPT

## 2023-04-25 PROCEDURE — 6370000000 HC RX 637 (ALT 250 FOR IP): Performed by: ORTHOPAEDIC SURGERY

## 2023-04-25 PROCEDURE — 99223 1ST HOSP IP/OBS HIGH 75: CPT | Performed by: INTERNAL MEDICINE

## 2023-04-25 PROCEDURE — 80048 BASIC METABOLIC PNL TOTAL CA: CPT

## 2023-04-25 PROCEDURE — 97116 GAIT TRAINING THERAPY: CPT

## 2023-04-25 PROCEDURE — 85025 COMPLETE CBC W/AUTO DIFF WBC: CPT

## 2023-04-25 PROCEDURE — 94760 N-INVAS EAR/PLS OXIMETRY 1: CPT

## 2023-04-25 PROCEDURE — 2500000003 HC RX 250 WO HCPCS: Performed by: INTERNAL MEDICINE

## 2023-04-25 PROCEDURE — 2580000003 HC RX 258: Performed by: INTERNAL MEDICINE

## 2023-04-25 PROCEDURE — 83735 ASSAY OF MAGNESIUM: CPT

## 2023-04-25 RX ORDER — MIDODRINE HYDROCHLORIDE 10 MG/1
10 TABLET ORAL 3 TIMES DAILY
Status: DISCONTINUED | OUTPATIENT
Start: 2023-04-25 | End: 2023-04-28 | Stop reason: HOSPADM

## 2023-04-25 RX ADMIN — DILTIAZEM HYDROCHLORIDE 120 MG: 120 CAPSULE, COATED, EXTENDED RELEASE ORAL at 08:53

## 2023-04-25 RX ADMIN — DILTIAZEM HYDROCHLORIDE 30 MG: 30 TABLET, FILM COATED ORAL at 22:17

## 2023-04-25 RX ADMIN — METOPROLOL TARTRATE 2.5 MG: 5 INJECTION INTRAVENOUS at 07:18

## 2023-04-25 RX ADMIN — METOPROLOL TARTRATE 2.5 MG: 5 INJECTION INTRAVENOUS at 13:19

## 2023-04-25 RX ADMIN — Medication 0.5 MG/MIN: at 22:18

## 2023-04-25 RX ADMIN — RIVAROXABAN 20 MG: 20 TABLET, FILM COATED ORAL at 08:53

## 2023-04-25 RX ADMIN — MIDODRINE HYDROCHLORIDE 5 MG: 5 TABLET ORAL at 13:19

## 2023-04-25 RX ADMIN — MIDODRINE HYDROCHLORIDE 5 MG: 5 TABLET ORAL at 08:53

## 2023-04-25 RX ADMIN — METOPROLOL TARTRATE 2.5 MG: 5 INJECTION INTRAVENOUS at 18:03

## 2023-04-25 RX ADMIN — Medication 400 UNITS: at 08:53

## 2023-04-25 RX ADMIN — ALLOPURINOL 300 MG: 300 TABLET ORAL at 08:53

## 2023-04-25 RX ADMIN — CEFAZOLIN 2000 MG: 2 INJECTION, POWDER, FOR SOLUTION INTRAMUSCULAR; INTRAVENOUS at 00:06

## 2023-04-25 RX ADMIN — MIDODRINE HYDROCHLORIDE 10 MG: 10 TABLET ORAL at 18:03

## 2023-04-25 RX ADMIN — DILTIAZEM HYDROCHLORIDE 30 MG: 30 TABLET, FILM COATED ORAL at 18:03

## 2023-04-25 RX ADMIN — CEFAZOLIN 2000 MG: 2 INJECTION, POWDER, FOR SOLUTION INTRAMUSCULAR; INTRAVENOUS at 16:30

## 2023-04-25 RX ADMIN — CEFAZOLIN 2000 MG: 2 INJECTION, POWDER, FOR SOLUTION INTRAMUSCULAR; INTRAVENOUS at 08:53

## 2023-04-25 RX ADMIN — METOPROLOL TARTRATE 2.5 MG: 5 INJECTION INTRAVENOUS at 00:07

## 2023-04-25 RX ADMIN — ROSUVASTATIN CALCIUM 10 MG: 10 TABLET, FILM COATED ORAL at 20:21

## 2023-04-25 RX ADMIN — FUROSEMIDE 40 MG: 40 TABLET ORAL at 08:53

## 2023-04-25 RX ADMIN — DOCUSATE SODIUM 100 MG: 100 CAPSULE, LIQUID FILLED ORAL at 08:53

## 2023-04-25 NOTE — CONSULTS
INFECTIOUS DISEASES CONSULT NOTE    Patient:  Renu Jean 78 y.o. male  ROOM # [unfilled]  YOB: 1955  MRN: [id number]  CSN:  [id number]  Admit date: 4/21/2023   Admitting Physician: Edna Perdue MD  Primary Care Physician: JODY Campuzano  REFERRING PROVIDER: No ref. provider found    Reason for Consultation: Staph aureus wound infection    History of Present Illness/Chief Complaint: Pleasant 59-year-old man. History is obtained from patient and chart review. It was a little bit difficult to get definite description of symptoms and sequence events from the patient. As best I can tell from talking with him and per chart review he was diagnosed with lymphoma in January of this year via a cervical lymph node biopsy. He also has a history of prostate cancer. He has been followed by Dr. Fausto Lind. It sounds like he went to Dr. Russ Robles office for follow-up last week. He was experiencing chills. In addition per review of some of the admission H&P and consultation materials, he had also had some increasing pain and swelling in the region of the left elbow. On further questioning patient does indicate he was having some loose stools at home. He was not experiencing any urinary tract complaints. When seen for follow-up as an outpatient he had had chills and hypotension. He was sent to the emergency room. He had low blood pressure initially. He was admitted to ICU. He has received fluids and antibiotic treatment. He has undergone surgery on the left elbow area. MSSA was recovered from cultures. He has been receiving antibiotic treatment with vancomycin and ceftriaxone. He is feeling better. He indicates he is comfortable at present. He does not have productive cough or sputum production. He has an external urinary catheter in place with good amount of clear urine output. He has formed stool at this point. Reports no nausea, vomiting, or abdominal pain.   He indicates oral intake has
Labs     04/23/23 0314 04/24/23  0252 04/25/23  0406   WBC 2.1* 2.7* 2.5*   RBC 3.09* 3.34* 3.29*   HGB 8.9* 9.8* 9.5*   HCT 27.5* 29.6* 29.0*   MCV 89.0 88.6 88.1   MCH 28.8 29.3 28.9   MCHC 32.4* 33.1 32.8*   RDW 16.5* 16.3* 16.4*   * 132 107*   MPV 10.4 10.7 11.3         Recent Labs     04/23/23 0314 04/24/23  0252 04/25/23  0406    136 137   K 4.3 3.9 3.8    102 100   CO2 23 25 26   BUN 26* 23 26*   CREATININE 1.4* 1.3* 1.7*   GLUCOSE 129* 95 95   CALCIUM 8.0* 8.3* 8.2*      Other Electrolytes:  FLP:    Lab Results   Component Value Date/Time    TRIG 178 10/06/2022 01:35 PM    HDL 33 10/06/2022 01:35 PM    LDLCALC 56 10/06/2022 01:35 PM    LDLDIRECT 128 09/11/2017 10:07 AM     TSH:    Lab Results   Component Value Date/Time    TSH 0.304 02/28/2023 09:14 PM     Troponin T: No results for input(s): TROPONINI in the last 72 hours. INR: No results for input(s): INR in the last 72 hours. Telemetry:  (I reviewed) atrial fibrillation  EKG: (I reviewed) AF with nonspecific TW changes    Assessment/Plan:    1) rapid AF which is chronic :  No plans for attempt at cardioversion. Will add short acting diltiazem q4hrs in case it worsens hypotension, and if it is tolerated will then increase the long acting diltiazem tomorrow. . No plans to continue amiodarone as an outpt given potential side effects and lack of need for rhythm control, however its use for rate control with sepsis is fine. He is anticoagulated with Xarelto.      Janelle Pillai MD, 4/25/2023 3:13 PM

## 2023-04-25 NOTE — PLAN OF CARE
Problem: Discharge Planning  Goal: Discharge to home or other facility with appropriate resources  4/25/2023 0838 by Veronica Shea RN  Outcome: Progressing  4/25/2023 0423 by Baldemar Krishna RN  Outcome: Progressing     Problem: Skin/Tissue Integrity  Goal: Absence of new skin breakdown  Description: 1. Monitor for areas of redness and/or skin breakdown  2. Assess vascular access sites hourly  3. Every 4-6 hours minimum:  Change oxygen saturation probe site  4. Every 4-6 hours:  If on nasal continuous positive airway pressure, respiratory therapy assess nares and determine need for appliance change or resting period.   4/25/2023 0838 by Veronica Shea RN  Outcome: Progressing  4/25/2023 0423 by Baldemar Krishna RN  Outcome: Progressing     Problem: Safety - Adult  Goal: Free from fall injury  4/25/2023 0423 by Baldemar Krishna RN  Outcome: Progressing     Problem: Pain  Goal: Verbalizes/displays adequate comfort level or baseline comfort level  4/25/2023 0423 by Baldemar Krishna RN  Outcome: Progressing     Problem: Nutrition Deficit:  Goal: Optimize nutritional status  Recent Flowsheet Documentation  Taken 4/25/2023 0749 by Reece Nelson, MS, RD, LD  Nutrient intake appropriate for improving, restoring, or maintaining nutritional needs:   Assess nutritional status and recommend course of action   Monitor oral intake, labs, and treatment plans   Recommend appropriate diets, oral nutritional supplements, and vitamin/mineral supplements  4/25/2023 0423 by Baldemar Krishna RN  Outcome: Progressing

## 2023-04-26 LAB
ANION GAP SERPL CALCULATED.3IONS-SCNC: 12 MMOL/L (ref 7–19)
BACTERIA BLD CULT ORG #2: NORMAL
BACTERIA BLD CULT: NORMAL
BACTERIA SPEC AEROBE CULT: ABNORMAL
BACTERIA SPEC AEROBE CULT: ABNORMAL
BACTERIA SPEC ANAEROBE CULT: ABNORMAL
BACTERIA SPEC ANAEROBE CULT: ABNORMAL
BASOPHILS # BLD: 0 K/UL (ref 0–0.2)
BASOPHILS NFR BLD: 1 % (ref 0–1)
BUN SERPL-MCNC: 30 MG/DL (ref 8–23)
CALCIUM SERPL-MCNC: 8.3 MG/DL (ref 8.8–10.2)
CHLORIDE SERPL-SCNC: 96 MMOL/L (ref 98–111)
CO2 SERPL-SCNC: 26 MMOL/L (ref 22–29)
CREAT SERPL-MCNC: 1.7 MG/DL (ref 0.5–1.2)
EOSINOPHIL # BLD: 0 K/UL (ref 0–0.6)
EOSINOPHIL NFR BLD: 1 % (ref 0–5)
ERYTHROCYTE [DISTWIDTH] IN BLOOD BY AUTOMATED COUNT: 16.3 % (ref 11.5–14.5)
GLUCOSE SERPL-MCNC: 97 MG/DL (ref 74–109)
GRAM STN SPEC: ABNORMAL
GRAM STN SPEC: ABNORMAL
HCT VFR BLD AUTO: 29.6 % (ref 42–52)
HGB BLD-MCNC: 9.8 G/DL (ref 14–18)
IMM GRANULOCYTES # BLD: 0.2 K/UL
LYMPHOCYTES # BLD: 0.4 K/UL (ref 1.1–4.5)
LYMPHOCYTES NFR BLD: 13.4 % (ref 20–40)
MAGNESIUM SERPL-MCNC: 1.7 MG/DL (ref 1.6–2.4)
MCH RBC QN AUTO: 28.7 PG (ref 27–31)
MCHC RBC AUTO-ENTMCNC: 33.1 G/DL (ref 33–37)
MCV RBC AUTO: 86.5 FL (ref 80–94)
MONOCYTES # BLD: 0.2 K/UL (ref 0–0.9)
MONOCYTES NFR BLD: 6.2 % (ref 0–10)
NEUTROPHILS # BLD: 2.1 K/UL (ref 1.5–7.5)
NEUTS SEG NFR BLD: 72.2 % (ref 50–65)
ORGANISM: ABNORMAL
ORGANISM: ABNORMAL
PLATELET # BLD AUTO: 133 K/UL (ref 130–400)
PLATELET SLIDE REVIEW: ADEQUATE
PMV BLD AUTO: 11.1 FL (ref 9.4–12.4)
POTASSIUM SERPL-SCNC: 3.7 MMOL/L (ref 3.5–5)
RBC # BLD AUTO: 3.42 M/UL (ref 4.7–6.1)
SODIUM SERPL-SCNC: 134 MMOL/L (ref 136–145)
WBC # BLD AUTO: 2.9 K/UL (ref 4.8–10.8)

## 2023-04-26 PROCEDURE — 6370000000 HC RX 637 (ALT 250 FOR IP): Performed by: ORTHOPAEDIC SURGERY

## 2023-04-26 PROCEDURE — 99233 SBSQ HOSP IP/OBS HIGH 50: CPT | Performed by: INTERNAL MEDICINE

## 2023-04-26 PROCEDURE — 2500000003 HC RX 250 WO HCPCS: Performed by: INTERNAL MEDICINE

## 2023-04-26 PROCEDURE — 6360000002 HC RX W HCPCS: Performed by: INTERNAL MEDICINE

## 2023-04-26 PROCEDURE — 2580000003 HC RX 258: Performed by: INTERNAL MEDICINE

## 2023-04-26 PROCEDURE — 80048 BASIC METABOLIC PNL TOTAL CA: CPT

## 2023-04-26 PROCEDURE — 83735 ASSAY OF MAGNESIUM: CPT

## 2023-04-26 PROCEDURE — 94760 N-INVAS EAR/PLS OXIMETRY 1: CPT

## 2023-04-26 PROCEDURE — 85025 COMPLETE CBC W/AUTO DIFF WBC: CPT

## 2023-04-26 PROCEDURE — 6370000000 HC RX 637 (ALT 250 FOR IP): Performed by: INTERNAL MEDICINE

## 2023-04-26 PROCEDURE — 2140000000 HC CCU INTERMEDIATE R&B

## 2023-04-26 RX ADMIN — CEFAZOLIN 2000 MG: 2 INJECTION, POWDER, FOR SOLUTION INTRAMUSCULAR; INTRAVENOUS at 00:19

## 2023-04-26 RX ADMIN — METOPROLOL TARTRATE 2.5 MG: 5 INJECTION INTRAVENOUS at 00:19

## 2023-04-26 RX ADMIN — FUROSEMIDE 40 MG: 40 TABLET ORAL at 07:53

## 2023-04-26 RX ADMIN — METOPROLOL TARTRATE 2.5 MG: 5 INJECTION INTRAVENOUS at 12:41

## 2023-04-26 RX ADMIN — OXYCODONE 10 MG: 5 TABLET ORAL at 12:29

## 2023-04-26 RX ADMIN — CEFAZOLIN 2000 MG: 2 INJECTION, POWDER, FOR SOLUTION INTRAMUSCULAR; INTRAVENOUS at 16:33

## 2023-04-26 RX ADMIN — DILTIAZEM HYDROCHLORIDE 30 MG: 30 TABLET, FILM COATED ORAL at 16:31

## 2023-04-26 RX ADMIN — DILTIAZEM HYDROCHLORIDE 30 MG: 30 TABLET, FILM COATED ORAL at 05:36

## 2023-04-26 RX ADMIN — ACETAMINOPHEN 650 MG: 325 TABLET ORAL at 02:20

## 2023-04-26 RX ADMIN — CEFAZOLIN 2000 MG: 2 INJECTION, POWDER, FOR SOLUTION INTRAMUSCULAR; INTRAVENOUS at 07:53

## 2023-04-26 RX ADMIN — MEGESTROL ACETATE 200 MG: 40 SUSPENSION ORAL at 07:53

## 2023-04-26 RX ADMIN — DILTIAZEM HYDROCHLORIDE 30 MG: 30 TABLET, FILM COATED ORAL at 12:41

## 2023-04-26 RX ADMIN — METOPROLOL TARTRATE 2.5 MG: 5 INJECTION INTRAVENOUS at 07:54

## 2023-04-26 RX ADMIN — MIDODRINE HYDROCHLORIDE 10 MG: 10 TABLET ORAL at 12:41

## 2023-04-26 RX ADMIN — DILTIAZEM HYDROCHLORIDE 120 MG: 120 CAPSULE, COATED, EXTENDED RELEASE ORAL at 07:52

## 2023-04-26 RX ADMIN — MIDODRINE HYDROCHLORIDE 10 MG: 10 TABLET ORAL at 07:53

## 2023-04-26 RX ADMIN — METOPROLOL TARTRATE 2.5 MG: 5 INJECTION INTRAVENOUS at 16:32

## 2023-04-26 RX ADMIN — ROSUVASTATIN CALCIUM 10 MG: 10 TABLET, FILM COATED ORAL at 21:17

## 2023-04-26 RX ADMIN — RIVAROXABAN 20 MG: 20 TABLET, FILM COATED ORAL at 07:53

## 2023-04-26 RX ADMIN — MIDODRINE HYDROCHLORIDE 10 MG: 10 TABLET ORAL at 16:31

## 2023-04-26 RX ADMIN — Medication 400 UNITS: at 07:53

## 2023-04-26 RX ADMIN — ALLOPURINOL 300 MG: 300 TABLET ORAL at 07:53

## 2023-04-26 ASSESSMENT — PAIN SCALES - GENERAL
PAINLEVEL_OUTOF10: 0
PAINLEVEL_OUTOF10: 0
PAINLEVEL_OUTOF10: 6
PAINLEVEL_OUTOF10: 10

## 2023-04-26 ASSESSMENT — PAIN - FUNCTIONAL ASSESSMENT: PAIN_FUNCTIONAL_ASSESSMENT: ACTIVITIES ARE NOT PREVENTED

## 2023-04-26 ASSESSMENT — ENCOUNTER SYMPTOMS
NAUSEA: 0
CONSTIPATION: 0
VOMITING: 0
VOICE CHANGE: 0
DIARRHEA: 0
COLOR CHANGE: 0
SHORTNESS OF BREATH: 0
BACK PAIN: 0

## 2023-04-26 ASSESSMENT — PAIN DESCRIPTION - FREQUENCY: FREQUENCY: INTERMITTENT

## 2023-04-26 ASSESSMENT — PAIN DESCRIPTION - ONSET: ONSET: ON-GOING

## 2023-04-26 ASSESSMENT — PAIN DESCRIPTION - DESCRIPTORS: DESCRIPTORS: ACHING

## 2023-04-26 ASSESSMENT — PAIN DESCRIPTION - LOCATION
LOCATION: ELBOW
LOCATION: HEAD

## 2023-04-26 ASSESSMENT — PAIN DESCRIPTION - PAIN TYPE: TYPE: CHRONIC PAIN;SURGICAL PAIN

## 2023-04-26 ASSESSMENT — PAIN DESCRIPTION - ORIENTATION: ORIENTATION: LEFT

## 2023-04-26 NOTE — PLAN OF CARE
Problem: Discharge Planning  Goal: Discharge to home or other facility with appropriate resources  Outcome: Progressing  Flowsheets (Taken 4/25/2023 2000)  Discharge to home or other facility with appropriate resources: Identify barriers to discharge with patient and caregiver     Problem: Skin/Tissue Integrity  Goal: Absence of new skin breakdown  Description: 1. Monitor for areas of redness and/or skin breakdown  2. Assess vascular access sites hourly  3. Every 4-6 hours minimum:  Change oxygen saturation probe site  4. Every 4-6 hours:  If on nasal continuous positive airway pressure, respiratory therapy assess nares and determine need for appliance change or resting period.   Outcome: Progressing     Problem: Safety - Adult  Goal: Free from fall injury  Outcome: Progressing     Problem: Pain  Goal: Verbalizes/displays adequate comfort level or baseline comfort level  Outcome: Progressing     Problem: Nutrition Deficit:  Goal: Optimize nutritional status  Outcome: Progressing

## 2023-04-26 NOTE — PLAN OF CARE
Problem: Nutrition Deficit:  Goal: Optimize nutritional status  4/26/2023 0818 by Blas Rivas, MS, RD, LD  Outcome: Progressing  Flowsheets (Taken 4/26/2023 3170)  Nutrient intake appropriate for improving, restoring, or maintaining nutritional needs:   Assess nutritional status and recommend course of action   Monitor oral intake, labs, and treatment plans   Recommend appropriate diets, oral nutritional supplements, and vitamin/mineral supplements  4/26/2023 0309 by Alyse Blair RN  Outcome: Progressing

## 2023-04-27 ENCOUNTER — HOSPITAL ENCOUNTER (OUTPATIENT)
Dept: INFUSION THERAPY | Age: 68
End: 2023-04-27

## 2023-04-27 ENCOUNTER — TELEPHONE (OUTPATIENT)
Dept: HEMATOLOGY | Age: 68
End: 2023-04-27

## 2023-04-27 PROBLEM — I48.20 CHRONIC A-FIB (HCC): Status: ACTIVE | Noted: 2022-07-01

## 2023-04-27 LAB
ALBUMIN SERPL-MCNC: 2.8 G/DL (ref 3.5–5.2)
ALP SERPL-CCNC: 89 U/L (ref 40–130)
ALT SERPL-CCNC: 26 U/L (ref 5–41)
ANION GAP SERPL CALCULATED.3IONS-SCNC: 10 MMOL/L (ref 7–19)
AST SERPL-CCNC: 49 U/L (ref 5–40)
BASOPHILS # BLD: 0 K/UL (ref 0–0.2)
BASOPHILS NFR BLD: 0.3 % (ref 0–1)
BILIRUB SERPL-MCNC: 0.4 MG/DL (ref 0.2–1.2)
BILIRUB UR QL STRIP: NEGATIVE
BUN SERPL-MCNC: 35 MG/DL (ref 8–23)
CALCIUM SERPL-MCNC: 8.5 MG/DL (ref 8.8–10.2)
CHLORIDE SERPL-SCNC: 95 MMOL/L (ref 98–111)
CLARITY UR: ABNORMAL
CO2 SERPL-SCNC: 27 MMOL/L (ref 22–29)
COLOR UR: YELLOW
CREAT SERPL-MCNC: 1.8 MG/DL (ref 0.5–1.2)
CREAT UR-MCNC: 145.6 MG/DL (ref 4.2–622)
CRYSTALS URNS MICRO: ABNORMAL /HPF
EOSINOPHIL # BLD: 0 K/UL (ref 0–0.6)
EOSINOPHIL NFR BLD: 0.3 % (ref 0–5)
ERYTHROCYTE [DISTWIDTH] IN BLOOD BY AUTOMATED COUNT: 16.5 % (ref 11.5–14.5)
GLUCOSE SERPL-MCNC: 93 MG/DL (ref 74–109)
GLUCOSE UR STRIP.AUTO-MCNC: NEGATIVE MG/DL
HCT VFR BLD AUTO: 30.4 % (ref 42–52)
HGB BLD-MCNC: 9.9 G/DL (ref 14–18)
HGB UR STRIP.AUTO-MCNC: NEGATIVE MG/L
IMM GRANULOCYTES # BLD: 0.2 K/UL
KETONES UR STRIP.AUTO-MCNC: NEGATIVE MG/DL
LEUKOCYTE ESTERASE UR QL STRIP.AUTO: NEGATIVE
LYMPHOCYTES # BLD: 0.6 K/UL (ref 1.1–4.5)
LYMPHOCYTES NFR BLD: 18.5 % (ref 20–40)
MAGNESIUM SERPL-MCNC: 1.9 MG/DL (ref 1.6–2.4)
MCH RBC QN AUTO: 28.4 PG (ref 27–31)
MCHC RBC AUTO-ENTMCNC: 32.6 G/DL (ref 33–37)
MCV RBC AUTO: 87.4 FL (ref 80–94)
MONOCYTES # BLD: 0.2 K/UL (ref 0–0.9)
MONOCYTES NFR BLD: 5.5 % (ref 0–10)
NEUTROPHILS # BLD: 2.2 K/UL (ref 1.5–7.5)
NEUTS SEG NFR BLD: 69.9 % (ref 50–65)
NITRITE UR QL STRIP.AUTO: NEGATIVE
PH UR STRIP.AUTO: 5 [PH] (ref 5–8)
PHOSPHATE SERPL-MCNC: 4 MG/DL (ref 2.5–4.5)
PLATELET # BLD AUTO: 145 K/UL (ref 130–400)
PMV BLD AUTO: 11.6 FL (ref 9.4–12.4)
POTASSIUM SERPL-SCNC: 3.9 MMOL/L (ref 3.5–5)
PROT SERPL-MCNC: 5 G/DL (ref 6.6–8.7)
PROT UR STRIP.AUTO-MCNC: 30 MG/DL
PROT UR-MCNC: 54 MG/DL (ref 15–45)
RBC # BLD AUTO: 3.48 M/UL (ref 4.7–6.1)
SODIUM SERPL-SCNC: 132 MMOL/L (ref 136–145)
SODIUM UR-SCNC: <20 MMOL/L
SP GR UR STRIP.AUTO: 1.02 (ref 1–1.03)
UROBILINOGEN UR STRIP.AUTO-MCNC: 0.2 E.U./DL
WBC # BLD AUTO: 3.1 K/UL (ref 4.8–10.8)

## 2023-04-27 PROCEDURE — 84156 ASSAY OF PROTEIN URINE: CPT

## 2023-04-27 PROCEDURE — 99232 SBSQ HOSP IP/OBS MODERATE 35: CPT | Performed by: INTERNAL MEDICINE

## 2023-04-27 PROCEDURE — 2140000000 HC CCU INTERMEDIATE R&B

## 2023-04-27 PROCEDURE — 85025 COMPLETE CBC W/AUTO DIFF WBC: CPT

## 2023-04-27 PROCEDURE — 84100 ASSAY OF PHOSPHORUS: CPT

## 2023-04-27 PROCEDURE — 6370000000 HC RX 637 (ALT 250 FOR IP): Performed by: INTERNAL MEDICINE

## 2023-04-27 PROCEDURE — 82570 ASSAY OF URINE CREATININE: CPT

## 2023-04-27 PROCEDURE — 94760 N-INVAS EAR/PLS OXIMETRY 1: CPT

## 2023-04-27 PROCEDURE — 2500000003 HC RX 250 WO HCPCS: Performed by: INTERNAL MEDICINE

## 2023-04-27 PROCEDURE — 80053 COMPREHEN METABOLIC PANEL: CPT

## 2023-04-27 PROCEDURE — 6360000002 HC RX W HCPCS: Performed by: INTERNAL MEDICINE

## 2023-04-27 PROCEDURE — 84300 ASSAY OF URINE SODIUM: CPT

## 2023-04-27 PROCEDURE — 81001 URINALYSIS AUTO W/SCOPE: CPT

## 2023-04-27 PROCEDURE — 2580000003 HC RX 258: Performed by: INTERNAL MEDICINE

## 2023-04-27 PROCEDURE — 83735 ASSAY OF MAGNESIUM: CPT

## 2023-04-27 RX ORDER — CEPHALEXIN 250 MG/1
1000 CAPSULE ORAL EVERY 12 HOURS SCHEDULED
Status: DISCONTINUED | OUTPATIENT
Start: 2023-04-27 | End: 2023-04-28 | Stop reason: HOSPADM

## 2023-04-27 RX ORDER — DILTIAZEM HYDROCHLORIDE 240 MG/1
240 CAPSULE, COATED, EXTENDED RELEASE ORAL DAILY
Status: DISCONTINUED | OUTPATIENT
Start: 2023-04-28 | End: 2023-04-28 | Stop reason: HOSPADM

## 2023-04-27 RX ORDER — DILTIAZEM HYDROCHLORIDE 120 MG/1
120 CAPSULE, COATED, EXTENDED RELEASE ORAL ONCE
Status: COMPLETED | OUTPATIENT
Start: 2023-04-27 | End: 2023-04-27

## 2023-04-27 RX ADMIN — DILTIAZEM HYDROCHLORIDE 30 MG: 30 TABLET, FILM COATED ORAL at 17:29

## 2023-04-27 RX ADMIN — MEGESTROL ACETATE 200 MG: 40 SUSPENSION ORAL at 09:36

## 2023-04-27 RX ADMIN — DILTIAZEM HYDROCHLORIDE 30 MG: 30 TABLET, FILM COATED ORAL at 00:30

## 2023-04-27 RX ADMIN — MIDODRINE HYDROCHLORIDE 10 MG: 10 TABLET ORAL at 17:29

## 2023-04-27 RX ADMIN — DILTIAZEM HYDROCHLORIDE 120 MG: 120 CAPSULE, COATED, EXTENDED RELEASE ORAL at 14:36

## 2023-04-27 RX ADMIN — OXYCODONE 10 MG: 5 TABLET ORAL at 09:40

## 2023-04-27 RX ADMIN — MIDODRINE HYDROCHLORIDE 10 MG: 10 TABLET ORAL at 09:36

## 2023-04-27 RX ADMIN — Medication 400 UNITS: at 09:36

## 2023-04-27 RX ADMIN — METOPROLOL TARTRATE 2.5 MG: 5 INJECTION INTRAVENOUS at 17:29

## 2023-04-27 RX ADMIN — DILTIAZEM HYDROCHLORIDE 30 MG: 30 TABLET, FILM COATED ORAL at 11:27

## 2023-04-27 RX ADMIN — METOPROLOL TARTRATE 2.5 MG: 5 INJECTION INTRAVENOUS at 01:29

## 2023-04-27 RX ADMIN — METOPROLOL TARTRATE 2.5 MG: 5 INJECTION INTRAVENOUS at 06:06

## 2023-04-27 RX ADMIN — RIVAROXABAN 20 MG: 20 TABLET, FILM COATED ORAL at 09:36

## 2023-04-27 RX ADMIN — ALLOPURINOL 300 MG: 300 TABLET ORAL at 09:36

## 2023-04-27 RX ADMIN — CEFAZOLIN 2000 MG: 2 INJECTION, POWDER, FOR SOLUTION INTRAMUSCULAR; INTRAVENOUS at 00:30

## 2023-04-27 RX ADMIN — CEPHALEXIN 1000 MG: 250 CAPSULE ORAL at 09:35

## 2023-04-27 RX ADMIN — FUROSEMIDE 40 MG: 40 TABLET ORAL at 09:36

## 2023-04-27 RX ADMIN — DILTIAZEM HYDROCHLORIDE 120 MG: 120 CAPSULE, COATED, EXTENDED RELEASE ORAL at 09:36

## 2023-04-27 RX ADMIN — ROSUVASTATIN CALCIUM 10 MG: 10 TABLET, FILM COATED ORAL at 20:19

## 2023-04-27 RX ADMIN — DILTIAZEM HYDROCHLORIDE 30 MG: 30 TABLET, FILM COATED ORAL at 06:06

## 2023-04-27 RX ADMIN — CEPHALEXIN 1000 MG: 250 CAPSULE ORAL at 20:19

## 2023-04-27 RX ADMIN — METOPROLOL TARTRATE 2.5 MG: 5 INJECTION INTRAVENOUS at 11:27

## 2023-04-27 RX ADMIN — OXYCODONE 10 MG: 5 TABLET ORAL at 21:29

## 2023-04-27 RX ADMIN — MIDODRINE HYDROCHLORIDE 10 MG: 10 TABLET ORAL at 11:27

## 2023-04-27 ASSESSMENT — PAIN DESCRIPTION - LOCATION
LOCATION: ELBOW
LOCATION: ELBOW

## 2023-04-27 ASSESSMENT — ENCOUNTER SYMPTOMS
COLOR CHANGE: 0
SHORTNESS OF BREATH: 0
NAUSEA: 0
DIARRHEA: 0
VOICE CHANGE: 0
BACK PAIN: 0
CONSTIPATION: 0
VOMITING: 0

## 2023-04-27 ASSESSMENT — PAIN SCALES - GENERAL
PAINLEVEL_OUTOF10: 10
PAINLEVEL_OUTOF10: 4

## 2023-04-27 ASSESSMENT — PAIN DESCRIPTION - ORIENTATION
ORIENTATION: LEFT
ORIENTATION: RIGHT

## 2023-04-27 ASSESSMENT — PAIN - FUNCTIONAL ASSESSMENT: PAIN_FUNCTIONAL_ASSESSMENT: PREVENTS OR INTERFERES SOME ACTIVE ACTIVITIES AND ADLS

## 2023-04-27 ASSESSMENT — PAIN DESCRIPTION - DESCRIPTORS: DESCRIPTORS: DISCOMFORT

## 2023-04-27 NOTE — PLAN OF CARE
Problem: Discharge Planning  Goal: Discharge to home or other facility with appropriate resources  Outcome: Progressing  Flowsheets (Taken 4/26/2023 2200)  Discharge to home or other facility with appropriate resources: Identify barriers to discharge with patient and caregiver     Problem: Skin/Tissue Integrity  Goal: Absence of new skin breakdown  Description: 1. Monitor for areas of redness and/or skin breakdown  2. Assess vascular access sites hourly  3. Every 4-6 hours minimum:  Change oxygen saturation probe site  4. Every 4-6 hours:  If on nasal continuous positive airway pressure, respiratory therapy assess nares and determine need for appliance change or resting period.   Outcome: Progressing     Problem: Safety - Adult  Goal: Free from fall injury  Outcome: Progressing     Problem: Pain  Goal: Verbalizes/displays adequate comfort level or baseline comfort level  Outcome: Progressing     Problem: Nutrition Deficit:  Goal: Optimize nutritional status  Outcome: Progressing     Problem: ABCDS Injury Assessment  Goal: Absence of physical injury  Outcome: Progressing

## 2023-04-27 NOTE — TELEPHONE ENCOUNTER
Received call from [address] will be getting discharged to [address] [place] [address] and rehab and insurance will not pay for chemo, per Dr. Margarette castillo to hold Chemo 2 weeks. Appointment rescheduled from 05/03/2023 to 05/09/2023.

## 2023-04-27 NOTE — PLAN OF CARE
Problem: Discharge Planning  Goal: Discharge to home or other facility with appropriate resources  4/27/2023 1414 by Carlos Roa RN  Outcome: Progressing  4/27/2023 0614 by China Marques RN  Outcome: Progressing  Flowsheets (Taken 4/26/2023 2200)  Discharge to home or other facility with appropriate resources: Identify barriers to discharge with patient and caregiver     Problem: Skin/Tissue Integrity  Goal: Absence of new skin breakdown  Description: 1. Monitor for areas of redness and/or skin breakdown  2. Assess vascular access sites hourly  3. Every 4-6 hours minimum:  Change oxygen saturation probe site  4. Every 4-6 hours:  If on nasal continuous positive airway pressure, respiratory therapy assess nares and determine need for appliance change or resting period.   4/27/2023 1414 by Carlos Roa RN  Outcome: Progressing  4/27/2023 0614 by China Marques RN  Outcome: Progressing     Problem: Safety - Adult  Goal: Free from fall injury  4/27/2023 1414 by Carlos Roa RN  Outcome: Progressing  4/27/2023 0614 by China Marques RN  Outcome: Progressing     Problem: Pain  Goal: Verbalizes/displays adequate comfort level or baseline comfort level  4/27/2023 1414 by Carlos Roa RN  Outcome: Progressing  4/27/2023 0614 by China Marques RN  Outcome: Progressing     Problem: Nutrition Deficit:  Goal: Optimize nutritional status  4/27/2023 1414 by Carlos Roa RN  Outcome: Progressing  4/27/2023 0614 by China Marques RN  Outcome: Progressing     Problem: ABCDS Injury Assessment  Goal: Absence of physical injury  4/27/2023 1414 by Carlos Roa RN  Outcome: Progressing  4/27/2023 0614 by China Marques RN  Outcome: Progressing

## 2023-04-28 VITALS
OXYGEN SATURATION: 98 % | DIASTOLIC BLOOD PRESSURE: 77 MMHG | HEIGHT: 73 IN | WEIGHT: 296.5 LBS | RESPIRATION RATE: 19 BRPM | BODY MASS INDEX: 39.3 KG/M2 | SYSTOLIC BLOOD PRESSURE: 127 MMHG | TEMPERATURE: 98.8 F | HEART RATE: 100 BPM

## 2023-04-28 PROBLEM — I95.9 HYPOTENSION: Status: RESOLVED | Noted: 2023-04-21 | Resolved: 2023-04-28

## 2023-04-28 PROBLEM — I48.20 CHRONIC A-FIB (HCC): Status: RESOLVED | Noted: 2022-01-01 | Resolved: 2023-01-01

## 2023-04-28 PROCEDURE — 2500000003 HC RX 250 WO HCPCS: Performed by: INTERNAL MEDICINE

## 2023-04-28 PROCEDURE — 97110 THERAPEUTIC EXERCISES: CPT

## 2023-04-28 PROCEDURE — 94760 N-INVAS EAR/PLS OXIMETRY 1: CPT

## 2023-04-28 PROCEDURE — 6370000000 HC RX 637 (ALT 250 FOR IP): Performed by: INTERNAL MEDICINE

## 2023-04-28 PROCEDURE — 97530 THERAPEUTIC ACTIVITIES: CPT

## 2023-04-28 RX ORDER — CEPHALEXIN 500 MG/1
1000 CAPSULE ORAL EVERY 12 HOURS SCHEDULED
Qty: 22 CAPSULE | Refills: 0 | Status: SHIPPED | OUTPATIENT
Start: 2023-04-28 | End: 2023-05-04

## 2023-04-28 RX ORDER — ONDANSETRON 4 MG/1
4 TABLET, FILM COATED ORAL ONCE
Status: COMPLETED | OUTPATIENT
Start: 2023-04-28 | End: 2023-04-28

## 2023-04-28 RX ORDER — HEPARIN SODIUM (PORCINE) LOCK FLUSH IV SOLN 100 UNIT/ML 100 UNIT/ML
100 SOLUTION INTRAVENOUS PRN
Status: DISCONTINUED | OUTPATIENT
Start: 2023-04-28 | End: 2023-04-28 | Stop reason: HOSPADM

## 2023-04-28 RX ORDER — OXYCODONE HYDROCHLORIDE 10 MG/1
10 TABLET ORAL EVERY 4 HOURS PRN
Qty: 15 TABLET | Refills: 0 | Status: SHIPPED | OUTPATIENT
Start: 2023-04-28 | End: 2023-05-01

## 2023-04-28 RX ADMIN — METOPROLOL TARTRATE 2.5 MG: 5 INJECTION INTRAVENOUS at 13:09

## 2023-04-28 RX ADMIN — DILTIAZEM HYDROCHLORIDE 30 MG: 30 TABLET, FILM COATED ORAL at 13:09

## 2023-04-28 RX ADMIN — DILTIAZEM HYDROCHLORIDE 30 MG: 30 TABLET, FILM COATED ORAL at 06:06

## 2023-04-28 RX ADMIN — Medication 400 UNITS: at 09:38

## 2023-04-28 RX ADMIN — MIDODRINE HYDROCHLORIDE 10 MG: 10 TABLET ORAL at 13:09

## 2023-04-28 RX ADMIN — ONDANSETRON HYDROCHLORIDE 4 MG: 4 TABLET, FILM COATED ORAL at 18:17

## 2023-04-28 RX ADMIN — METOPROLOL TARTRATE 2.5 MG: 5 INJECTION INTRAVENOUS at 00:50

## 2023-04-28 RX ADMIN — MEGESTROL ACETATE 200 MG: 40 SUSPENSION ORAL at 09:38

## 2023-04-28 RX ADMIN — DILTIAZEM HYDROCHLORIDE 240 MG: 240 CAPSULE, COATED, EXTENDED RELEASE ORAL at 09:38

## 2023-04-28 RX ADMIN — CEPHALEXIN 1000 MG: 250 CAPSULE ORAL at 09:38

## 2023-04-28 RX ADMIN — DILTIAZEM HYDROCHLORIDE 30 MG: 30 TABLET, FILM COATED ORAL at 00:50

## 2023-04-28 RX ADMIN — RIVAROXABAN 20 MG: 20 TABLET, FILM COATED ORAL at 09:38

## 2023-04-28 RX ADMIN — MIDODRINE HYDROCHLORIDE 10 MG: 10 TABLET ORAL at 09:38

## 2023-04-28 RX ADMIN — ALLOPURINOL 300 MG: 300 TABLET ORAL at 09:40

## 2023-04-28 RX ADMIN — METOPROLOL TARTRATE 2.5 MG: 5 INJECTION INTRAVENOUS at 06:06

## 2023-04-28 ASSESSMENT — PAIN DESCRIPTION - FREQUENCY: FREQUENCY: INTERMITTENT

## 2023-04-28 ASSESSMENT — PAIN - FUNCTIONAL ASSESSMENT: PAIN_FUNCTIONAL_ASSESSMENT: PREVENTS OR INTERFERES WITH ALL ACTIVE AND SOME PASSIVE ACTIVITIES

## 2023-04-28 ASSESSMENT — PAIN DESCRIPTION - LOCATION: LOCATION: ELBOW

## 2023-04-28 ASSESSMENT — PAIN DESCRIPTION - DESCRIPTORS: DESCRIPTORS: ACHING

## 2023-04-28 ASSESSMENT — PAIN SCALES - GENERAL: PAINLEVEL_OUTOF10: 10

## 2023-04-28 ASSESSMENT — PAIN DESCRIPTION - PAIN TYPE: TYPE: SURGICAL PAIN

## 2023-04-28 ASSESSMENT — PAIN DESCRIPTION - ORIENTATION: ORIENTATION: LEFT

## 2023-04-28 NOTE — DISCHARGE SUMMARY
Discharge Summary    Tj Cohen  :  1955  MRN:  [id number]    Admit date:  2023  Discharge date: 2023     Admitting Physician:  Mary Grace Onofre MD    Advance Directive: Full Code    Consults: cardiology, ID, hematology/oncology, orthopedic surgery, and palliative care    Primary Care Physician:  JODY Molina    Discharge Diagnoses:  Principal Problem (Resolved):    Hypotension  Active Problems:    * No active hospital problems. *  Resolved Problems:    Chronic a-fib (HCC)      Significant Diagnostic Studies:   XR ELBOW LEFT (MIN 3 VIEWS)    Result Date: 2023  EXAMINATION: XR ELBOW LEFT (MIN 3 VIEWS) CLINICAL INDICATION: wound with drainage COMPARISON: None. FINDINGS: No fracture. Anatomic alignment. No destructive lesions. Mild osteoarthritis. Dorsal subcutaneous fat stranding. 1.No acute Osseous findings. XR CHEST PORTABLE    Result Date: 2023  Exam: Portable chest radiograph. Technique: Single AP view of the chest was obtained. Clinical information: Cough Comparison: Contrast-enhanced chest CT performed on 2023 Findings: The lungs are clear without focal infiltrate or effusion. Patient is post right IJ Mediport placement with the tip at the level of the SVC. Prominent right paratracheal stripe is visualized correlating to increased amount of mediastinal fat. The heart is normal in size. There are no acute osseous abnormalities. No radiographic evidence of acute cardiopulmonary process. CT ELBOW LEFT W CONTRAST    Result Date: 2023  EXAM: CT L elbow W CLINICAL INDICATION: Pain TECHNIQUE: CT L elbow w contrast COMPARISON: radiographs 23 FINDINGS: No fracture. Anatomic alignment. Mild osteoarthritis. No destructive osseous lesion. Sclerosis of the olecranon. No fluid collection. Fat stranding in the dorsal elbow soft tissues with skin thickening and hyperenhancement. Few air bubbles beneath the Ulceration.     Dorsal soft tissue ulceration width associated

## 2023-04-28 NOTE — PROGRESS NOTES
Comprehensive Nutrition Assessment    Type and Reason for Visit:  Reassess    Nutrition Recommendations/Plan:   Continue current POC     Malnutrition Assessment:  Malnutrition Status: At risk for malnutrition (Comment) (04/26/23 6260)    Context:  Acute Illness     Findings of the 6 clinical characteristics of malnutrition:  Energy Intake:  Mild decrease in energy intake (Comment)  Weight Loss:  Greater than 7.5% over 3 months     Body Fat Loss:  No significant body fat loss     Muscle Mass Loss:  No significant muscle mass loss    Fluid Accumulation:  Mild Extremities   Strength:  Not Performed    Nutrition Assessment:    Following for PO intake. Intake has improved with more meal intake at 50-75%   Weight has increased since admission with +1 edema    Nutrition Related Findings:    hx-CA Wound Type: Surgical Incision (abcess)       Current Nutrition Intake & Therapies:    Average Meal Intake: 26-50%, 51-75%  Average Supplements Intake: None Ordered  ADULT DIET; Regular    Anthropometric Measures:  Height: 6' 1\" (185.4 cm)  Ideal Body Weight (IBW): 184 lbs (84 kg)    Admission Body Weight: 265 lb (120.2 kg) (stated)  Current Body Weight: 284 lb 1.6 oz (128.9 kg), 154.4 % IBW.  Weight Source: Bed Scale  Current BMI (kg/m2): 37.5  Usual Body Weight: 333 lb 11.2 oz (151.4 kg) (1/11/2023)  % Weight Change (Calculated): -15.7  Weight Adjustment For: No Adjustment  BMI Categories: Obese Class 2 (BMI 35.0 -39.9)    Estimated Daily Nutrient Needs:  Energy Requirements Based On: Kcal/kg  Weight Used for Energy Requirements: Current  Energy (kcal/day): 6817-8012 kcals (11-14 kcals/kg)  Weight Used for Protein Requirements: Ideal  Protein (g/day): 167g  Method Used for Fluid Requirements: 1 ml/kcal  Fluid (ml/day): 6733-8915 ml    Nutrition Diagnosis:   Inadequate oral intake related to increase demand for energy/nutrients, acute injury/trauma, catabolic illness as evidenced by weight loss, weight loss 7.5% in 3 months,
Comprehensive Nutrition Assessment    Type and Reason for Visit:  Reassess    Nutrition Recommendations/Plan:   Follow for po intake, if needed start ONS or provide other nutritional intervention     Malnutrition Assessment:  Malnutrition Status: At risk for malnutrition (Comment) (04/25/23 9230)    Context:  Acute Illness     Findings of the 6 clinical characteristics of malnutrition:  Energy Intake:  Mild decrease in energy intake (Comment)  Weight Loss:  Greater than 7.5% over 3 months     Body Fat Loss:  No significant body fat loss     Muscle Mass Loss:  No significant muscle mass loss    Fluid Accumulation:  Mild Extremities   Strength:  Not Performed    Nutrition Assessment:    Patient's diet has been advanced to Regular. PO intake us slowly improving with intake ranging 25-75%. New wt NA at this time. Will as for a new wt. Nutrition Related Findings:    hx-CA Wound Type: Surgical Incision       Current Nutrition Intake & Therapies:    Average Meal Intake: 26-50%, 51-75%  Average Supplements Intake: None Ordered  ADULT DIET; Regular    Anthropometric Measures:  Height: 6' 1\" (185.4 cm)  Ideal Body Weight (IBW): 184 lbs (84 kg)    Admission Body Weight: 265 lb (120.2 kg) (stated)  Current Body Weight: 281 lb 4.8 oz (127.6 kg), 152.9 % IBW.  Weight Source: Bed Scale  Current BMI (kg/m2): 37.1  Usual Body Weight: 333 lb 11.2 oz (151.4 kg) (1/11/2023)  % Weight Change (Calculated): -15.7  Weight Adjustment For: No Adjustment                 BMI Categories: Obese Class 2 (BMI 35.0 -39.9)    Estimated Daily Nutrient Needs:  Energy Requirements Based On: Kcal/kg  Weight Used for Energy Requirements: Current  Energy (kcal/day): 4645-5484 kcals (11-14 kcals/kg)  Weight Used for Protein Requirements: Ideal  Protein (g/day): 167g  Method Used for Fluid Requirements: 1 ml/kcal  Fluid (ml/day): 2959-8112 ml    Nutrition Diagnosis:   Inadequate oral intake related to increase demand for energy/nutrients, acute
Hospitalist Progress Note    Patient:  Milissa Favre  YOB: 1955  Date of Service: 4/26/2023  MRN: [id number]   Acct: [de-identified]   Primary Care Physician: JODY Carranza  Advance Directive: Full Code  Admit Date: 4/21/2023       Hospital Day: 5  Referring Provider: Dain Boast, DO    Patient Seen, Chart, Consults, Notes, Labs, Radiology studies reviewed. Subjective:  Milissa Favre is a 78 y.o. male  whom we are following for follicular B-cell lymphoma, septic left olecranon bursa, sepsis, chronic atrial fibrillation. He is doing fairly well. Hemodynamics are stable. He has been afebrile. Pain over the left elbow seems to be controlled. He is stable for transfer out of ICU.     Allergies:  Pcn [penicillins]    Medicines:  Current Facility-Administered Medications   Medication Dose Route Frequency Provider Last Rate Last Admin    dilTIAZem (CARDIZEM) tablet 30 mg  30 mg Oral 4 times per day Sherrell Garnett MD   30 mg at 04/26/23 1631    midodrine (PROAMATINE) tablet 10 mg  10 mg Oral TID Sherrell Garnett MD   10 mg at 04/26/23 1631    ceFAZolin (ANCEF) 2,000 mg in sterile water 20 mL IV syringe  2,000 mg IntraVENous Q8H Zaira Huitron MD   2,000 mg at 04/26/23 1633    metoprolol (LOPRESSOR) injection 2.5 mg  2.5 mg IntraVENous Q6H Ching Chung MD   2.5 mg at 04/26/23 1632    potassium chloride 20 mEq/50 mL IVPB (Central Line)  20 mEq IntraVENous PRN Zaynab Gabriel MD 50 mL/hr at 04/22/23 1400 20 mEq at 04/22/23 1400    oxyCODONE (ROXICODONE) immediate release tablet 5 mg  5 mg Oral Q4H PRN Zaynab Gabriel MD   5 mg at 04/23/23 0457    Or    oxyCODONE (ROXICODONE) immediate release tablet 10 mg  10 mg Oral Q4H PRN Zaynab Gabriel MD   10 mg at 04/26/23 1229    Or    oxyCODONE (ROXICODONE) immediate release tablet 15 mg  15 mg Oral Q4H PRN Zaynab Gabriel MD        HYDROmorphone HCl PF (DILAUDID) injection 0.25 mg  0.25 mg IntraVENous Q3H PRN Zaynab Gabriel MD   0.25 mg at
Hospitalist Progress Note    Patient:  Stephanie Weaver  YOB: 1955  Date of Service: 4/27/2023  MRN: [id number]   Acct: [de-identified]   Primary Care Physician: JODY Hendricks  Advance Directive: Full Code  Admit Date: 4/21/2023       Hospital Day: 6  Referring Provider: Ethan Alvarez DO    Patient Seen, Chart, Consults, Notes, Labs, Radiology studies reviewed. Subjective:  Stephanie Weaver is a 78 y.o. male  whom we are following for follicular B-cell lymphoma, septic left olecranon bursa, sepsis, chronic atrial fibrillation. He is still complaining of pain over the left elbow. He is feeling better than when he came in.     Allergies:  Pcn [penicillins]    Medicines:  Current Facility-Administered Medications   Medication Dose Route Frequency Provider Last Rate Last Admin    cephALEXin (KEFLEX) capsule 1,000 mg  1,000 mg Oral 2 times per day Rebecca Lay MD   1,000 mg at 04/27/23 0935    [START ON 4/28/2023] dilTIAZem (CARDIZEM CD) extended release capsule 240 mg  240 mg Oral Daily Miles Ward MD        dilTIAZem (CARDIZEM) tablet 30 mg  30 mg Oral 4 times per day Miles Ward MD   30 mg at 04/27/23 1729    midodrine (PROAMATINE) tablet 10 mg  10 mg Oral TID Ethan Slocumb, DO   10 mg at 04/27/23 1729    metoprolol (LOPRESSOR) injection 2.5 mg  2.5 mg IntraVENous Q6H Ethan Slocumb, DO   2.5 mg at 04/27/23 1729    potassium chloride 20 mEq/50 mL IVPB (Central Line)  20 mEq IntraVENous PRN Ethan Slocumb, DO 50 mL/hr at 04/22/23 1400 20 mEq at 04/22/23 1400    oxyCODONE (ROXICODONE) immediate release tablet 5 mg  5 mg Oral Q4H PRN Ethan Slocumb, DO   5 mg at 04/23/23 4106    Or    oxyCODONE (ROXICODONE) immediate release tablet 10 mg  10 mg Oral Q4H PRN Ethan Slocumb, DO   10 mg at 04/27/23 0940    Or    oxyCODONE (ROXICODONE) immediate release tablet 15 mg  15 mg Oral Q4H PRN Ethan Slocumb, DO        HYDROmorphone HCl PF (DILAUDID) injection 0.25 mg
Hospitalist Progress Note  [facility]     Patient: Ashlee Tian  : 1955  MRN: [id number]  Code Status: Full Code    Hospital Day: 3   Date of Service: 2023    Subjective:   Patient seen and examined. No current complaints.     Past Medical History:   Diagnosis Date    Atrial fibrillation ([address]     Cellulitis of left leg     Colon polyp     Elevated blood pressure reading without diagnosis of hypertension     Esophageal reflux     History of prostate cancer     Hx of blood clots     Hypertension     Mixed hyperlipidemia     TG>>LDL    Obesity     Pericardial effusion     Prostate cancer [facility])      Dr David Bey;  implants, XRT       Past Surgical History:   Procedure Laterality Date    ARM SURGERY Left 2023    ELBOW INCISION AND DRAINAGE performed by Kimberly Powell MD at [facility] N/A 2022    BONE MARROW ASPIRATION BIOPSY performed by Renate Bloch, PA-C at [facility]    COLONOSCOPY  2009        COLONOSCOPY  2012        LYMPH NODE BIOPSY Left 2023    EXCISION OF CERVICLE LYMPH NODY-BIOPSY performed by Josie Peterson MD at [address] [facility] N/A 2022    single lumen port placement with ultrasound and fluoroscopy performed by Donta Guerra MD at [address] [place] [address]  3/22/2000        VASECTOMY         Family History   Problem Relation Age of Onset    Colon Polyps Mother     Heart Disease Father     Cancer Brother         prostate    Colon Polyps Brother     High Blood Pressure Other        Social History     Socioeconomic History    Marital status: Single     Spouse name: Not on file    Number of children: Not on file    Years of education: Not on file    Highest education level: Not on file   Occupational History    Not on file   Tobacco Use    Smoking status: Never    Smokeless tobacco: Never   Vaping Use    Vaping Use: Never used   Substance and Sexual Activity    Alcohol use:
Hospitalist Progress Note  [facility]     Patient: Keith Busby  : 1955  MRN: [id number]  Code Status: Full Code    Hospital Day: 4   Date of Service: 2023    Subjective:   Patient seen and examined. No current complaints.     Past Medical History:   Diagnosis Date    Atrial fibrillation ([address]     Cellulitis of left leg     Colon polyp     Elevated blood pressure reading without diagnosis of hypertension     Esophageal reflux     History of prostate cancer     Hx of blood clots     Hypertension     Mixed hyperlipidemia     TG>>LDL    Obesity     Pericardial effusion     Prostate cancer [facility])      Dr Casandra North;  implants, XRT       Past Surgical History:   Procedure Laterality Date    ARM SURGERY Left 2023    ELBOW INCISION AND DRAINAGE performed by Lazara Mcdonnell MD at [address] [place] [address] N/A 2022    BONE MARROW ASPIRATION BIOPSY performed by Evonne Breaux PA-C at [facility]    COLONOSCOPY  2009        COLONOSCOPY  2012        LYMPH NODE BIOPSY Left 2023    EXCISION OF CERVICLE LYMPH NODY-BIOPSY performed by Carl Zavala MD at [address] [facility] N/A 2022    single lumen port placement with ultrasound and fluoroscopy performed by Jerel Olivares MD at [address] [place] [address]  3/22/2000        VASECTOMY         Family History   Problem Relation Age of Onset    Colon Polyps Mother     Heart Disease Father     Cancer Brother         prostate    Colon Polyps Brother     High Blood Pressure Other        Social History     Socioeconomic History    Marital status: Single     Spouse name: Not on file    Number of children: Not on file    Years of education: Not on file    Highest education level: Not on file   Occupational History    Not on file   Tobacco Use    Smoking status: Never    Smokeless tobacco: Never   Vaping Use    Vaping Use: Never used   Substance and Sexual Activity    Alcohol use:
[facility] [place] MEDICINE  - PROGRESS NOTE    Admit Date: 4/21/2023 4/27/2023    Subjective: Pt feels better. Still c/o dizziness    Objective:   Vitals: BP (!) 124/91   Pulse (!) 107   Temp 97.7 °F (36.5 °C) (Temporal)   Resp 18   Ht 6' 1\" (1.854 m)   Wt 284 lb (128.8 kg)   SpO2 94%   BMI 37.47 kg/m²   General appearance: alert, appears stated age and cooperative  Skin: Skin color, texture, turgor normal.   HEENT: Head: Normocephalic, no lesions, without obvious abnormality. Neck: no adenopathy, no carotid bruit, no JVD, supple, symmetrical, trachea midline, and thyroid not enlarged, symmetric, no tenderness/mass/nodules  Lungs: clear to auscultation bilaterally  Heart: irregularly irregular rhythm  Abdomen: soft, non-tender; bowel sounds normal; no masses,  no organomegaly  Extremities: extremities normal, atraumatic, no cyanosis or edema  Lymphatic: No significant lymph node enlargement papable  Neurologic: Mental status: Alert, oriented, thought content appropriate      Data:   Scheduled Meds: Reviewed  Continuous Infusions:    Intake/Output Summary (Last 24 hours) at 4/27/2023 1413  Last data filed at 4/27/2023 1112  Gross per 24 hour   Intake 866.59 ml   Output 870 ml   Net -3.41 ml     CBC:   Recent Labs     04/27/23  0125   WBC 3.1*   HGB 9.9*        BMP:  Recent Labs     04/27/23  0125   *   K 3.9   CL 95*   CO2 27   BUN 35*   CREATININE 1.8*   GLUCOSE 93     ABGs: No results found for: PHART, PO2ART, [id number]  INR: No results for input(s): INR in the last 72 hours.   -----------------------------------------------------------------            Assessment/Plan    Patient Active Problem List:     Adenomatous colon polyp     S/P colonoscopic polypectomy     History of colonic polyps     Obesity     Mixed hyperlipidemia     Essential hypertension     History of prostate cancer     Morbid obesity ([address]     Chronic deep vein thrombosis (DVT)
Infectious Diseases Progress Note    Patient:  Dannis Riedel  YOB: 1955  MRN: [id number]   Admit date: 4/21/2023   Admitting Physician: Minor Pitts MD  Primary Care Physician: JODY Soto    Chief Complaint/Interval History: He is up to chair. Brother at bedside. Patient is comfortable at present. Heart rate variable with atrial fibrillation. He is without fever. He is on no pressor support. In/Out    Intake/Output Summary (Last 24 hours) at 4/25/2023 1711  Last data filed at 4/25/2023 1400  Gross per 24 hour   Intake 662.35 ml   Output 1550 ml   Net -887.65 ml     Allergies:    Allergies   Allergen Reactions    Pcn [Penicillins] Rash     Current Meds: dilTIAZem (CARDIZEM) tablet 30 mg, 4 times per day  midodrine (PROAMATINE) tablet 10 mg, TID  ceFAZolin (ANCEF) 2,000 mg in sterile water 20 mL IV syringe, Q8H  metoprolol (LOPRESSOR) injection 2.5 mg, Q6H  amiodarone (CORDARONE) 450 mg in dextrose 5% 250 mL infusion, Continuous  phenylephrine (JUAN-SYNEPHRINE) 50 mg in sodium chloride 0.9 % 250 mL infusion, Continuous  potassium chloride 20 mEq/50 mL IVPB (Central Line), PRN  oxyCODONE (ROXICODONE) immediate release tablet 5 mg, Q4H PRN   Or  oxyCODONE (ROXICODONE) immediate release tablet 10 mg, Q4H PRN   Or  oxyCODONE (ROXICODONE) immediate release tablet 15 mg, Q4H PRN  HYDROmorphone HCl PF (DILAUDID) injection 0.25 mg, Q3H PRN   Or  HYDROmorphone HCl PF (DILAUDID) injection 0.5 mg, Q3H PRN   Or  HYDROmorphone HCl PF (DILAUDID) injection 1 mg, Q3H PRN  allopurinol (ZYLOPRIM) tablet 300 mg, Daily  dilTIAZem (CARDIZEM CD) extended release capsule 120 mg, Daily  docusate sodium (COLACE) capsule 100 mg, BID  furosemide (LASIX) tablet 40 mg, Daily  megestrol (MEGACE) 40 MG/ML suspension 200 mg, Daily  [Held by provider] metoprolol succinate (TOPROL XL) extended release tablet 100 mg, BID  rivaroxaban (XARELTO) tablet 20 mg, Daily with breakfast  rosuvastatin (CRESTOR) tablet 10 mg,
Occupational Therapy Initial Assessment  Date: 2023   Patient Name: Nasima Baron  MRN: [id number]     : 1955    Date of Service: 2023    Discharge Recommendations:  Patient would benefit from continued therapy after discharge       Assessment   Assessment: Evaluation completed and tx initiated. The patient does not appear to have the cognitive or physical status for discharge home. Recommend further rehab prior to discharge home. Will continue to follow. Treatment Diagnosis: Hypotension, s/p bursectomy for septic olecranon bursitis, persistent AF, recent chemo for lyphoma (diag 2023)  REQUIRES OT FOLLOW-UP: Yes  Activity Tolerance  Activity Tolerance: Patient Tolerated treatment well           Patient Diagnosis(es): The primary encounter diagnosis was Sepsis, due to unspecified organism, unspecified whether acute organ dysfunction present ([address] Diagnoses of Hypotension, unspecified hypotension type, Osteomyelitis of left elbow ([address] and Infected abrasion of left elbow, initial encounter were also pertinent to this visit. has a past medical history of Atrial fibrillation ([address] Cellulitis of left leg, Colon polyp, Elevated blood pressure reading without diagnosis of hypertension, Esophageal reflux, History of prostate cancer, Hx of blood clots, Hypertension, Mixed hyperlipidemia, Obesity, Pericardial effusion, and Prostate cancer ([address] has a past surgical history that includes Upper gastrointestinal endoscopy (3/22/2000); Colonoscopy (2009); Colonoscopy (); Vasectomy; bone marrow biopsy (N/A, 2022); Port Surgery (N/A, 2022); lymph node biopsy (Left, 2023); and Arm Surgery (Left, 2023).     Treatment Diagnosis: Hypotension, s/p bursectomy for septic olecranon bursitis, persistent AF, recent chemo for lyphoma (diag 2023)      Restrictions  Restrictions/Precautions  Restrictions/Precautions: Fall Risk  Position Activity Restriction  Other position/activity
Per Dr. Xuan Henriquez, follow up with orthopedics in 2 weeks and staples to come out 14 days post op (May 6th).  Electronically signed by Ting Crouch RN on 4/28/2023 at 5:34 PM
Physical Therapy     04/28/23 1105   Subjective   Subjective Pt willing to work with therapy   Pain Assessment   Pain Assessment 0-10   Pain Level 10   Pain Location Elbow   Pain Orientation Left   Pain Descriptors Aching   Functional Pain Assessment Prevents or interferes with all active and some passive activities   Pain Type Surgical pain   Pain Frequency Intermittent   Bed Mobility   Supine to Sit Stand by assistance   Scooting Independent   Comment BSS 10 min   Transfers   Sit to Stand Minimal Assistance   Stand to Sit Minimal Assistance   Bed to Chair Minimal assistance   Ambulation   Surface Level tile   Assistance Contact guard assistance;Minimal assistance   Quality of Gait unsteady   Gait Deviations Slow Asya;Decreased step length;Decreased step height   Distance 4 steps   Comments just to recliner due to pt feeling weak   Exercises   Hip Flexion 20x   Hip Abduction 20x   Knee Long Arc Quad 20x   Ankle Pumps 20x   Short Term Goals   Time Frame for Short Term Goals 2 wks   Short Term Goal 1 SUP<>SIT, IND   Short Term Goal 2  FT WITH AD AS INDICATED, SBA   Conditions Requiring Skilled Therapeutic Intervention   Body Structures, Functions, Activity Limitations Requiring Skilled Therapeutic Intervention Decreased functional mobility ; Decreased safe awareness;Decreased cognition;Decreased endurance;Decreased balance;Decreased strength; Increased pain   Assessment pt WOULD BENEFIT FROM SKILLED PT IN THIS SETTING TO ADDRESS HIS MOBILITY AND ENDURANCE DEFICITS   Therapy Prognosis Good   Decision Making Medium Complexity   Activity Tolerance   Activity Tolerance Patient tolerated treatment well;Patient limited by endurance; Patient limited by pain   Physcial Therapy Plan   Additional Comments Pt was able to sit bedside, ex and get into recliner.  Elbow elevated on pillow   PT Plan of Care   Friday X   Safety Devices   Type of Devices Left in chair;Call light within reach
Physical Therapy  Facility/Department: [facility] ICU  Physical Therapy Initial Assessment    Name: Calli March  : 1955  MRN: [id number]  Date of Service: 2023    Discharge Recommendations:  Continue to assess pending progress, Patient would benefit from continued therapy after discharge (WILL CONTINUE TO ASSESS FOR A SAFE DC PLAN)          Patient Diagnosis(es): The primary encounter diagnosis was Sepsis, due to unspecified organism, unspecified whether acute organ dysfunction present ([facility] Diagnoses of Hypotension, unspecified hypotension type, Osteomyelitis of left elbow ([address] and Infected abrasion of left elbow, initial encounter were also pertinent to this visit. Past Medical History:  has a past medical history of Atrial fibrillation ([address] Cellulitis of left leg, Colon polyp, Elevated blood pressure reading without diagnosis of hypertension, Esophageal reflux, History of prostate cancer, Hx of blood clots, Hypertension, Mixed hyperlipidemia, Obesity, Pericardial effusion, and Prostate cancer ([facility] Past Surgical History:  has a past surgical history that includes Upper gastrointestinal endoscopy (3/22/2000); Colonoscopy (2009); Colonoscopy (); Vasectomy; bone marrow biopsy (N/A, 2022); Port Surgery (N/A, 2022); lymph node biopsy (Left, 2023); and Arm Surgery (Left, 2023). Assessment   Body Structures, Functions, Activity Limitations Requiring Skilled Therapeutic Intervention: Decreased functional mobility ; Decreased safe awareness;Decreased cognition;Decreased endurance;Decreased balance;Decreased strength; Increased pain  Assessment: pt WOULD BENEFIT FROM SKILLED PT IN THIS SETTING TO ADDRESS HIS MOBILITY AND ENDURANCE DEFICITS  Therapy Prognosis: Good  Decision Making: Medium Complexity  Requires PT Follow-Up: Yes  Activity Tolerance  Activity Tolerance: Patient tolerated treatment well     Plan   Physcial Therapy Plan  General Plan: 5-7 times per week  Therapy Duration:
Physician Progress Note      PATIENT:               Sadiq Hernandez  CSN #:                  [id number]  :                       1955  ADMIT DATE:       2023 3:48 PM  [address] [place] DATE:  RESPONDING  PROVIDER #:        Edna Perdue MD          QUERY TEXT:    Patient admitted with Hypotension and left septic olecranon Bursitis. Documentation reflects sepsis by the ER provider at the time of admission. If   possible, please document in the progress notes and discharge summary if   Sepsis was: The medical record reflects the following:  Risk Factors: Left Elbow Olecranon Bursitis, E.COLI UTI  Clinical Indicators: BP: 88/46 and HR: 105, CRP; 3.33, WBC: 2.1, CT elbow:   chronic osteomyelitis  Treatment: 1 liter of fluid in the ER, Levophed Drip, Cefepime and Vancomycin   IV    Thank you in advance. Itzel Khan RN-BSN-CRCR  Clinical [address] [place] [address]  [email or web address]  Options provided:  -- Sepsis  confirmed after study  -- Sepsis treated and resolved  -- Sepsis ruled out after study  -- Other - I will add my own diagnosis  -- Disagree - Not applicable / Not valid  -- Disagree - Clinically unable to determine / Unknown  -- Refer to Clinical Documentation Reviewer    PROVIDER RESPONSE TEXT:    Sepsis ruled out after study.     Query created by: Marlys Villatoro on 2023 9:33 AM      Electronically signed by:  Edna Perdue MD 2023 2:49 PM
[facility] MEDICINE  - PROGRESS NOTE    Admit Date: 4/21/2023 4/26/2023    Subjective: Pt feels better. Arm is still sore    Objective:   Vitals: /61   Pulse 97   Temp 98.6 °F (37 °C) (Temporal)   Resp 18   Ht 6' 1\" (1.854 m)   Wt 284 lb (128.8 kg)   SpO2 93%   BMI 37.47 kg/m²   General appearance: alert, appears stated age and cooperative  Skin: Skin color, texture, turgor normal.   HEENT: Head: Normocephalic, no lesions, without obvious abnormality. Neck: no adenopathy, no carotid bruit, no JVD, supple, symmetrical, trachea midline, and thyroid not enlarged, symmetric, no tenderness/mass/nodules  Lungs: clear to auscultation bilaterally  Heart: irregular rate and rhythm, S1, S2 normal, no murmur, click, rub or gallop  Abdomen: soft, non-tender; bowel sounds normal; no masses,  no organomegaly  Extremities: extremities normal, atraumatic, no cyanosis or edema  Lymphatic: No significant lymph node enlargement papable  Neurologic: Mental status: Alert, oriented, thought content appropriate      Data:   Scheduled Meds: Reviewed  Continuous Infusions:    Intake/Output Summary (Last 24 hours) at 4/26/2023 1848  Last data filed at 4/26/2023 1600  Gross per 24 hour   Intake 575.74 ml   Output 1120 ml   Net -544.26 ml     CBC:   Recent Labs     04/26/23 0217   WBC 2.9*   HGB 9.8*        BMP:  Recent Labs     04/26/23 0217   *   K 3.7   CL 96*   CO2 26   BUN 30*   CREATININE 1.7*   GLUCOSE 97     ABGs: No results found for: PHART, PO2ART, TNO6ZVO  INR: No results for input(s): INR in the last 72 hours.   -----------------------------------------------------------------            Assessment/Plan    Patient Active Problem List:     Adenomatous colon polyp     S/P colonoscopic polypectomy     History of colonic polyps     Obesity     Mixed hyperlipidemia     Essential hypertension     History of prostate cancer     Morbid obesity ([address]
Report called to Sandra at [facility].  Electronically signed by Chayo Castillo RN on 4/28/2023 at 5:10 PM
Reynaldo Givens transferred to [facility] from [facility] via wheelchair. Reason for transfer: Lower level of care. Explained reason for transfer to Patient and Family. Belongings: Glasses, cell phone,   with patient at bedside . Soft chart transferred with patient: Yes. Telemetry box number [id number] transferred with patient: yes. Report given to: [facility], via telephone.       Electronically signed by Linda Mcclure RN on 4/26/2023 at 6:01 PM
Subjective:     Post-Operative Day: 2 Status Post left olecranon bursectomy. Pt is awake and alert. Ox3. Doing well this am.  Reports some soreness to left elbow. No other complaints. Systemic or Specific Complaints: No Complaints  no nausea Pain 6    Objective:     Patient Vitals for the past 24 hrs:   BP Temp Temp src Pulse Resp SpO2   04/24/23 0600 106/66 -- -- (!) 107 19 94 %   04/24/23 0500 (!) 114/59 -- -- (!) 105 18 96 %   04/24/23 0400 118/60 98.2 °F (36.8 °C) Temporal (!) 101 18 95 %   04/24/23 0300 111/66 -- -- (!) 115 15 95 %   04/24/23 0200 120/63 -- -- (!) 122 14 94 %   04/24/23 0100 112/63 -- -- (!) 117 16 94 %   04/24/23 0000 113/73 99 °F (37.2 °C) Temporal (!) 118 16 95 %   04/23/23 2300 113/63 -- -- (!) 105 17 95 %   04/23/23 2200 113/71 -- -- (!) 115 13 96 %   04/23/23 2100 117/68 -- -- (!) 111 23 95 %   04/23/23 2000 120/79 98.5 °F (36.9 °C) Temporal (!) 118 15 96 %   04/23/23 1900 115/74 -- -- (!) 113 18 95 %   04/23/23 1800 111/79 -- -- (!) 128 25 97 %   04/23/23 1700 (!) 109/57 -- -- (!) 114 20 96 %   04/23/23 1600 (!) 117/59 97.3 °F (36.3 °C) Temporal (!) 114 19 95 %   04/23/23 1500 (!) 114/56 -- -- (!) 121 19 96 %   04/23/23 1400 (!) 104/59 -- -- (!) 116 18 96 %   04/23/23 1300 (!) 107/55 -- -- (!) 119 19 96 %   04/23/23 1200 126/70 97.4 °F (36.3 °C) Temporal (!) 124 18 95 %   04/23/23 1100 102/63 -- -- (!) 132 17 96 %   04/23/23 1000 120/60 -- -- (!) 133 (!) 9 95 %   04/23/23 0900 116/80 -- -- (!) 134 25 96 %       General: alert, appears stated age, and cooperative   Exam: Incision clean, dry, and intact, no evidence of infection. Moderate swelling.   Good motion to left hand, wrist.   Neurovascular: Exam normal       Data Review:  Recent Labs     04/23/23  0314 04/24/23  0252   HGB 8.9* 9.8*     Recent Labs     04/24/23  0252      K 3.9   CREATININE 1.3*     Recent Labs     04/24/23  0252   LABGLOM >60       Culture Surgical  Culture, Surgical  Collected: 04/22/23 3843   Result
Sensitive <=1 mcg/mL BACTERIAL SUSCEPTIBILITY PANEL BY DOC       trimethoprim-sulfamethoxazole Sensitive <=10 mcg/mL BACTERIAL SUSCEPTIBILITY PANEL BY DOC       vancomycin Sensitive <=0.5 mcg/mL BACTERIAL SUSCEPTIBILITY PANEL BY DOC        Radiology: None    Additional Studies Reviewed:  None    Impression:  1. Left olecranon bursitis/soft tissue mtunoyl-SBZT-[id number] improvement  2. Acute renal dysfunction-stable  3.   B-cell lymphoma    Recommendations:  Discontinue IV cefazolin  Begin Keflex 1000 mg orally every 12 hours for additional 7 days  Will sign off for now-would be happy to reassess if new problems or no ongoing improvement  Otherwise follow-up with infectious diseases as needed    Hubert Boxer, MD

## 2023-04-28 NOTE — PLAN OF CARE
Problem: Discharge Planning  Goal: Discharge to home or other facility with appropriate resources  Outcome: Progressing     Problem: Skin/Tissue Integrity  Goal: Absence of new skin breakdown  Outcome: Progressing     Problem: Safety - Adult  Goal: Free from fall injury  Outcome: Progressing     Problem: Pain  Goal: Verbalizes/displays adequate comfort level or baseline comfort level  Outcome: Progressing     Problem: Nutrition Deficit:  Goal: Optimize nutritional status  Outcome: Progressing     Problem: ABCDS Injury Assessment  Goal: Absence of physical injury  Outcome: Progressing

## 2023-04-28 NOTE — CARE COORDINATION
04/28/23 1533   IMM Letter   IMM Letter given to Patient/Family/Significant other/Guardian/POA/by: BRIAN Swan   IMM Letter date given: 04/28/23   IMM Letter time given: 12       Met with pt at bedside to provide IMM letter. Signed copy placed in soft chart. A copy was provided to the pt.   Electronically signed by Kingston Harris on 4/28/2023 at 3:34 PM
Received message from Dr. Jim Henry office and it is okay for chemo to be put on pause for pt to go to [facility] for rehab and then resume when discharged from [facility].  Electronically signed by Barrett Rollins RN on 4/28/2023 at 8:35 AM
Regina has started precert.   Electronically signed by Nichelle Mcneal on 4/28/2023 at 9:09 AM
Vy Cordon made referral  at 11:02 AM for [facility]   P   F  SW spoke with [facility] with Admissions, pending at this time.
assistance at DC: Yes  Would you like Case Management to discuss the discharge plan with any other family members/significant others, and if so, who? Yes (spoke iwth brother Bibi Piper via pts cell phone)  Plans to Return to Present Housing: Unknown at present  Other Identified Issues/Barriers to RETURNING to current housing: multiple barriers   Potential Assistance needed at discharge: Dimitry Cortesuel            Potential DME:    Patient expects to discharge to: [address] [place] [address] for transportation at discharge:      Financial    Payor: Elizabeth Velasquez / Plan: Rossy Gray / Product Type: *No Product type* /     Does insurance require precert for SNF: Yes    Potential assistance Purchasing Medications: No  Meds-to-Beds request:        [address] [place] [address] [facility] [place] [phone] - f [phone]  [address] [place] [address],[address] [facility] S[facility]  [address] [place] [address]  Phone: [phone] Fax: [phone]    [address] [facility],[address] [facility] [phone] -  [phone]  [address] [place] [address]  Phone: [phone] Fax: [phone] [place], [place] [address]miguel  [address] [place] [address]  Phone: [phone] Fax: [phone]      Notes:    Factors facilitating achievement of predicted outcomes: Family support    Barriers to discharge: Limited family support, Cognitive deficit, Impulsivity, Decreased motivation, Limited participation, Limited insight into deficits, Decreased endurance, Upper extremity weakness, Long standing deficits, and Wound Care    Additional Case Management Notes: spoke with pt at bedside. Pt lives at home with mother and brother Bibi Piper. Pt used his cell to call his mother, spoke with pts brother Bibi Piper via pts cell. Bibi Piper stated he was poa, but pt was able to understand and make some descions.  Bibi Piper stated that pt was very weak, would need rehab

## 2023-05-01 ENCOUNTER — TELEPHONE (OUTPATIENT)
Dept: INTERNAL MEDICINE | Age: 68
End: 2023-05-01

## 2023-05-01 NOTE — TELEPHONE ENCOUNTER
SKILLED NURSING FACILITY:   INITIAL CALL POST-HOSPITAL DISCHARGE    SNF: [facility]     PHONE Shaila Faria     / :    THERAPY:    ANTICIPATED LENGTH OF STAY: unknown

## 2023-05-02 ENCOUNTER — TELEPHONE (OUTPATIENT)
Dept: INTERNAL MEDICINE | Age: 68
End: 2023-05-02

## 2023-05-02 LAB
FUNGUS SPEC CULT: NORMAL
FUNGUS SPEC CULT: NORMAL
KOH PREP SPEC: NORMAL
KOH PREP SPEC: NORMAL

## 2023-05-02 NOTE — TELEPHONE ENCOUNTER
[place], [phone]    Per Mary, nursing staff at [facility], patient is doing well. Patient continues to participate in therapy. No known discharge plans at this time.

## 2023-05-08 ENCOUNTER — CLINICAL DOCUMENTATION (OUTPATIENT)
Dept: HEMATOLOGY | Age: 68
End: 2023-05-08

## 2023-05-08 NOTE — PROGRESS NOTES
Spoke with Maggie Allison mother, today (5/8/23) who reports that Jasmin Mena is still at [facility] for further rehab and strengthening. She is unsure of expected discharge. Spoke also with Shannan Rosado brother, who reports that Jasmin Mena has not been very successful/cooperative with physical therapy, therefore has not shown much improvement toward discharge. He states that he will speak with nurse/MD on anticipated plan and will call clinic with update. For now, appt has been r/s x 2 weeks, to 5/22/2023. Cycle #3 BR was delivered on 3/23/23 and 3/24/24. CT soft tissue Neck W at [address] on 4/18/2023, compared to 12/9/2022  9 mm borderline enlarged lymph node adjacent to the left submandibular gland   Otherwise, there has been resolution of the previously visualized cervical lymphadenopathy. CT Chest W at [address] on 4/18/2023, compared to 10/7/22  Resolution of previously described mediastinal lymphadenopathy. Increased amount of fat within the mediastinum is compatible with mediastinal lipomatosis    CT ABDOMEN & PELVIS on 4/17/2023, compared to 12/19/2022  Mesenteric and retroperitoneal lymphadenopathy has improved since 12/9/2022. Largest retroperitoneal lymph node now measures approximately 15 mm. No acute abnormality is identified within the abdomen or pelvis. Admitted to [facility] on 4/21/2023 with sepsis, an infected left olecranon bursa, and AFIB. Orthopedics consulted for olecranon bursectomy and washout of infected region (Dr Kei oR)  Cardiology consulted for management of AFIB. Transferred to [facility] for rehabilitation on 4/28/2023.

## 2023-05-09 ENCOUNTER — TELEPHONE (OUTPATIENT)
Dept: INTERNAL MEDICINE | Age: 68
End: 2023-05-09

## 2023-05-09 LAB
FUNGUS SPEC CULT: NORMAL
FUNGUS SPEC CULT: NORMAL
KOH PREP SPEC: NORMAL
KOH PREP SPEC: NORMAL

## 2023-05-09 NOTE — TELEPHONE ENCOUNTER
[place], [phone]    Per Mary, nursing staff at [facility], patient is doing well. Patient continues to participate in therapy. No known discharge plans at this time.

## 2023-05-10 PROBLEM — N18.32 STAGE 3B CHRONIC KIDNEY DISEASE (CKD) (HCC): Status: ACTIVE | Noted: 2023-01-01

## 2023-05-10 PROBLEM — Z87.39 HISTORY OF SEPTIC ARTHRITIS: Status: ACTIVE | Noted: 2023-01-01

## 2023-05-10 PROBLEM — J18.9 HCAP (HEALTHCARE-ASSOCIATED PNEUMONIA): Status: ACTIVE | Noted: 2023-01-01

## 2023-05-10 NOTE — H&P
[facility]      Hospitalist - History & Physical      PCP: JODY Collier    Date of Admission: 5/10/2023    Date of Service: 5/10/2023    Chief Complaint:  Shortness of breath    History Of Present Illness: The patient is a 78 y.o. male who presented to [facility] ED for evaluation of shortness of breath. Pt has history of chronic atrial fibrillation on xarelto, HTN, lymphoma followed by Dr. Loretta Doherty and CKD stage IIIb. Pt was discharged from this facility on 4/28/2023 having had evaluation and treatment of staph bacteremia, afib with rvr and septic bursitis left elbow. Pt was discharged to nursing home on keflex. He reportedly was sent from nursing home initially due to elevated troponin. He reports increasing shortness of breath and cough with worsening last night. He has had subjective fevers and vague chest discomfort. He does not require supplemental oxygen at baseline. He tells me that he has decubitus ulcer to sacral area. In ED, CTA pulmonary-no PE-duffuse bilateral multifocal pneumonia. Respiratory pcr panel negative, wbc 4.2k, hgb 10k, platelets 796V, sodium 135, potassium 3.3, creatinine 1.6/bun 36-creatinine of 1.7 on 4/26/2023, bnp 1,735, troponin less than 0.01, ABGs-pH 7.54, pCO2 38, pO2 64, o2 sat 93%, cxr- Vague bilateral lung opacities may represent edema or multifocal infiltrates. Pt is admitted to hospitalist service for further evaluation and treatment.    Past Medical History:        Diagnosis Date    Atrial fibrillation ([address])     Cellulitis of left leg     Colon polyp     Elevated blood pressure reading without diagnosis of hypertension     Esophageal reflux     History of prostate cancer     Hx of blood clots     Hypertension     Mixed hyperlipidemia     TG>>LDL    Obesity     Pericardial effusion     Prostate cancer [facility])     2010 Dr Stacy Witt;  implants, XRT       Past Surgical History:        Procedure Laterality Date    ARM SURGERY Left 4/22/2023    ELBOW INCISION AND

## 2023-05-10 NOTE — PROGRESS NOTES
Social Determinants of Health     Tobacco Use: Low Risk     Smoking Tobacco Use: Never    Smokeless Tobacco Use: Never    Passive Exposure: Not on file   Alcohol Use: Not At Risk    Frequency of Alcohol Consumption: Never    Average Number of Drinks: Patient does not drink    Frequency of Binge Drinking: Never   Financial Resource Strain: Low Risk     Difficulty of Paying Living Expenses: Not hard at all   Food Insecurity: No Food Insecurity    Worried About Running Out of Food in the Last Year: Never true    Ran Out of Food in the Last Year: Never true   Transportation Needs: No Transportation Needs    Lack of Transportation (Medical): No    Lack of Transportation (Non-Medical): No   Physical Activity: Insufficiently Active    Days of Exercise per Week: 2 days    Minutes of Exercise per Session: 20 min   Stress: No Stress Concern Present    Feeling of Stress : Only a little   Social Connections: Moderately Integrated    Frequency of Communication with Friends and Family: More than three times a week    Frequency of Social Gatherings with Friends and Family: Once a week    Attends [employer] Services: More than 4 times per year    Active Member of [email or web address] Group or Organizations: Yes    Attends Club or Organization Meetings: 1 to 4 times per year    Marital Status: Never    Intimate Partner Violence: Not At Risk    Fear of Current or Ex-Partner: No    Emotionally Abused: No    Physically Abused: No    Sexually Abused: No   Depression: Not at risk    PHQ-2 Score: 0   Housing Stability: [address] to Pay for Housing in the Last Year: No    Number of Places Lived in the Last Year: 1    Unstable Housing in the Last Year: No     Depression screening from [facility] wheel       PHQ-9 Score (if applicable) No data recorded     SDOH: Patient Able to answer Social Determinant of Health screening questions.      History    Tobacco Use      Smoking status: Never      Smokeless tobacco: Never    E-cigarette/Vaping

## 2023-05-10 NOTE — PROGRESS NOTES
4 Eyes Skin Assessment    Anamaria Ashton is being assessed upon: Admission    I agree that I, Alistair López, RN, along with Romana Alfredo RN (either 2 RN's or 1 LPN and 1 RN) have performed a thorough Head to Toe Skin Assessment on the patient. ALL assessment sites listed below have been assessed. Areas assessed by both nurses:     [x]   Head, Face, and Ears   [x]   Shoulders, Back, and Chest  [x]   Arms, Elbows, and Hands   [x]   Coccyx, Sacrum, and Ischium  [x]   Legs, Feet, and Heels    Does the Patient have Skin Breakdown? Yes, wound(s) noted upon assessment. It is the responsibility of the Primary Nurse to assure that the following documentation, preventions, orders, and consults are complete on the above noted wound(s): Wound LDA initiated. LDA Flowsheet Documentation includes the Rosalind-wound, Wound Assessment, Measurements, Dressing Treatment, Drainage, and Color.     Kali Prevention initiated: Yes  Wound Care Orders initiated: No    WOC nurse consulted for Pressure Injury (Stage 3,4, Unstageable, DTI, NWPT, and Complex wounds) and New or Established Ostomies: No        Primary Nurse eSignature: Alistair López RN on 5/10/2023 at 5:41 PM      Co-Signer eSignature: Electronically signed by Romana Alfredo RN on 5/10/23 at 6:01 PM CDT

## 2023-05-10 NOTE — PROGRESS NOTES
Pharmacy Adjustment per [facility] protocol    Gil Smith is a 78 y.o. male. Pharmacy has adjusted medications per [facility] protocol. Recent Labs     05/10/23  1049   BUN 36*       Recent Labs     05/10/23  1049   CREATININE 1.6*       Estimated Creatinine Clearance: 65 mL/min (A) (based on SCr of 1.6 mg/dL (H)).     Height:   Ht Readings from Last 1 Encounters:   04/26/23 6' 1\" (1.854 m)     Weight:  Wt Readings from Last 1 Encounters:   05/10/23 295 lb (133.8 kg)         Plan: Adjust the following medications based on [facility] protocol:           Cefepime to 2000 mg IV every 8 hours extended infusion over 240 minutes     Electronically signed by Harpreet Espinoza [facility] on 5/10/2023 at 5:11 PM

## 2023-05-10 NOTE — ED NOTES
Report given and care transferred to [facility], [facility], RN  05/10/23 3173

## 2023-05-10 NOTE — ED PROVIDER NOTES
[facility] EMERGENCY DEPT  eMERGENCY dEPARTMENT eNCOUnter      Pt Name: Mary Fairbanks  MRN: [id number]  [address] 1955  Date of evaluation: 5/10/2023  Provider: Dhiraj Montes MD    [address]       Chief Complaint   Patient presents with    Abnormal Lab     Per ems pt was sent for abnormal troponin levels and shortness of breath    Shortness of Breath         HISTORY OF PRESENT ILLNESS   (Location/Symptom, Timing/Onset,Context/Setting, Quality, Duration, Modifying Factors, Severity)  Note limiting factors. Mary Fairbanks is a 78 y.o. male who presents to the emergency department evaluation of shortness of breath. 79-year-old male in rehab after hospitalization for staph olecranon bursitis surgery and treatment. He sent in today with complaints of shortness of breath. There is report of abnormal troponin level I do not have that. Patient is not having chest pain. His mother is a patient in the hospital she is on his cell phone and wanted to talk to me. She told me that the patient is challenged. And she wanted to be sure his story was clear. I do not have any history of fever    The history is provided by the patient, medical records and the nursing home. NursingNotes were reviewed. REVIEW OF SYSTEMS    (2-9 systems for level 4, 10 or more for level 5)     Review of Systems   Constitutional:  Negative for chills and fever. HENT:  Negative for congestion, drooling, facial swelling, nosebleeds, sinus pressure, sore throat and voice change. Eyes:  Negative for discharge. Respiratory:  Positive for cough and shortness of breath. Negative for apnea and choking. Cardiovascular:  Negative for chest pain and leg swelling. Gastrointestinal:  Negative for abdominal pain, blood in stool, constipation, diarrhea and nausea. Genitourinary:  Negative for dysuria and enuresis. Musculoskeletal:  Negative for joint swelling.         Recent surgery on olecranon bursa   Skin:  Negative for rash and

## 2023-05-11 ENCOUNTER — TELEPHONE (OUTPATIENT)
Dept: HEMATOLOGY | Age: 68
End: 2023-05-11

## 2023-05-11 NOTE — PROGRESS NOTES
Patient moved to specialty air mattress. Electronically signed by Andrea Jasso RN on 5/11/23 at 4:56 PM CDT

## 2023-05-11 NOTE — CARE COORDINATION
Pt is from [facility]note 2[facility] and can dc back to the facility pending precert.      [facility]  [phone]  Electronically signed by Samuel Shearer on 5/11/2023 at 2:24 PM

## 2023-05-11 NOTE — PROGRESS NOTES
Physician Progress Note      PATIENT:               Maxim Powell  CSN #:                  [id number]  :                       1955  ADMIT DATE:       5/10/2023 9:58 AM  DISCH DATE:  RESPONDING  PROVIDER #:        Colin VERA          QUERY TEXT:    Patient admitted with Pneumonia. Per nursing notes pressure Stage II ulcers of   buttocks and right inner thigh, noted to also ha. If possible, please   document in progress notes and discharge summary the location, present on   admission status and stage of the pressure ulcer:[[Deep tissue injury pressure   ulcer of buttocks and right thigh  present on admission[de-identified] This patient has a   deep tissue injury pressure ulcer of the  buttocks and right inner thigh    which was present on admission. The medical record reflects the following:  Risk Factors: Recent hospitalization, Pneumonia, Lymphoma  Clinical Indicators: Nursing notes and pictures of Stage II pressure ulcers of   buttocks and right inner thigh. Treatment:  Soap and water, wound care consult,    Thank you  Shilo Rm RN, BSN, [facility]  [phone]    Stage 1:  Non-blanchable erythema of intact skin  Stage 2:  Abrasion, Blister, Partial-thickness skin loss, with exposed dermis  Stage 3:  Full-thickness skin loss with damage or necrosis of subcutaneous   tissue  Stage 4:  Full-thickness skin & soft tissue loss through to underlying muscle,   tendon or bone  Unstageable: Obscured full-thickness skin & tissue loss  Options provided:  -- Stage 2 Pressure Ulcers of buttocks and right thigh present on admission  -- Other - I will add my own diagnosis  -- Disagree - Not applicable / Not valid  -- Disagree - Clinically unable to determine / Unknown  -- Refer to Clinical Documentation Reviewer    PROVIDER RESPONSE TEXT:    This patient has a Stage 2 pressure ulcer of the buttocks and right inner   thigh which was present on admission.     Query created by: Shilo Rm on 2023 12:08 PM      Electronically

## 2023-05-11 NOTE — CARE COORDINATION
05/11/23 1357   Readmission Assessment   Number of Days since last admission? 8-30 days   Previous Disposition SNF   Who is being Interviewed Patient  (medical records)   What was the patient's/caregiver's perception as to why they think they needed to return back to the hospital?   (SOB)   Did you visit your Primary Care Physician after you left the hospital, before you returned this time? Yes   Did you see a specialist, such as Cardiac, Pulmonary, Orthopedic Physician, etc. after you left the hospital? Yes   Who advised the patient to return to the hospital? Self-referral;Skilled Unit   Does the patient report anything that got in the way of taking their medications?  No   In our efforts to provide the best possible care to you and others like you, can you think of anything that we could have done to help you after you left the hospital the first time, so that you might not have needed to return so soon?   (declined) Your common recent chest pain episode, abnormal EKG and elevated troponin test suggest a probable recent heart attack. Your findings were discussed with Dr. Godinez, the on-call cardiologist. He recommends evidence transport to [facility] for anticipated heart catheterization (cardiac catheter) this afternoon. Risks and benefits of this have been discussed with both you and your spouse. He will be transferred via ambulance to their facility directly from the walk-in clinic.

## 2023-05-11 NOTE — ACP (ADVANCE CARE PLANNING)
Advance Care Planning     Advance Care Planning Activator (Inpatient)  Conversation Note      Date of ACP Conversation: 5/11/2023     Conversation Conducted with: Ahmet Gonzalez    ACP Activator: Ayana Monteiro RN        Health Care Decision Maker:     Current Designated Health Care Decision Maker:     Primary Decision Maker: Lenin Grijalva - Parent - [phone]    Primary Decision Maker: Claudia Laurents - Brother/Sister - [phone]        Care Preferences    Ventilation: \"If you were in your present state of health and suddenly became very ill and were unable to breathe on your own, what would your preference be about the use of a ventilator (breathing machine) if it were available to you? \"      Would the patient desire the use of ventilator (breathing machine)?: YES  \"If your health worsens and it becomes clear that your chance of recovery is unlikely, what would your preference be about the use of a ventilator (breathing machine) if it were available to you? \"     Would the patient desire the use of ventilator (breathing machine)?: YES      Resuscitation  \"CPR works best to restart the heart when there is a sudden event, like a heart attack, in someone who is otherwise healthy. Unfortunately, CPR does not typically restart the heart for people who have serious health conditions or who are very sick. \"    \"In the event your heart stopped as a result of an underlying serious health condition, would you want attempts to be made to restart your heart (answer \"yes\" for attempt to resuscitate) or would you prefer a natural death (answer \"no\" for do not attempt to resuscitate)? \" YES                Follow-up plan:    [] Schedule follow-up conversation to continue planning  [] Referred individual to Provider for additional questions/concerns   [] Advised patient/agent/surrogate to review completed ACP document and update if needed with changes in condition, patient preferences or care setting    [] This note routed to

## 2023-05-11 NOTE — TELEPHONE ENCOUNTER
Patient: Jocelyn Morales    : 1955    Dx: Lymphoma possible atypical PNA atypical lymphocytes on differential    Provider: Dr. Moy: [phone]    Nurse: Katalina Arce [phone]

## 2023-05-11 NOTE — PROGRESS NOTES
mobility training, Positioning, Safety education & training, Patient/Caregiver education & training, Equipment evaluation, education, & procurement     Restrictions  Restrictions/Precautions  Restrictions/Precautions: Fall Risk  Position Activity Restriction  Other position/activity restrictions: pt on low air loss mattress for large sacral wound    Subjective   General  Chart Reviewed: Yes  Family / Caregiver Present: No  Referring Practitioner: Dr. Shannon Urena: Pt states, \" I have not walked a lot. \"  pt grimances in pain when completing bed mobility, pt says his back side hurts. Social/Functional History  Social/Functional History  Lives With: Alone  Type of Home: Facility  Additional Comments: Pt has been staying at SNF;  pt is unable to say how much pt has been getting up       Objective   Pulse: (!) 108  Heart Rate Source: Monitor  BP: 106/68  BP Location: Right upper arm  BP Method: Automatic  MAP (Calculated): 81  Respirations: 18  SpO2: 90 %  O2 Device: Nasal cannula          Observation/Palpation  Observation: pt sat EOB with Max A and Max A for bed mobility  Safety Devices  Type of Devices: Call light within reach; Left in bed;Patient at risk for falls;Nurse notified (A nursing with moving pt from old mattress to new low air loss mattress to encourage skin healing and promotion)  Bed Mobility Training  Bed Mobility Training: Yes  Rolling: Maximum assistance;Assist X2  Supine to Sit: Maximum assistance;Assist X2  Sit to Supine: Maximum assistance;Assist X2  Scooting: Maximum assistance;Assist X2  Toilet Transfers  Equipment Used: Standard toilet  Toilet Transfer: Maximum assistance     ADL  Feeding: Setup  Grooming: Minimal assistance  UE Bathing: Minimal assistance  LE Bathing: Dependent/Total  UE Dressing: Minimal assistance  LE Dressing: Dependent/Total  Toileting: Dependent/Total        Bed mobility  Supine to Sit: Maximum assistance  Sit to Supine: Maximum

## 2023-05-12 PROBLEM — Z51.5 PALLIATIVE CARE PATIENT: Status: ACTIVE | Noted: 2023-01-01

## 2023-05-12 NOTE — PLAN OF CARE
Problem: Nutrition Deficit:  Goal: Optimize nutritional status  Outcome: Progressing   Nutrition Problem #1: Increased nutrient needs  Intervention: Food and/or Nutrient Delivery: Modify Current Diet

## 2023-05-12 NOTE — PROGRESS NOTES
Comprehensive Nutrition Assessment    Type and Reason for Visit:  Initial, Positive Nutrition Screen, Wound    Nutrition Recommendations/Plan:   Start ONS BID to promote wound healing. Malnutrition Assessment:  Malnutrition Status: At risk for malnutrition (Comment) (05/12/23 0980)    Context:  Chronic Illness     Findings of the 6 clinical characteristics of malnutrition:  Energy Intake:  Mild decrease in energy intake (Comment)  Weight Loss:  Greater than 10% over 6 months     Fluid Accumulation:  Mild Extremities    Nutrition Assessment:    +NS for stg 2 pressure injury to buttocks. Pt presents nutritionally compromised AEB increased nutrient needs associated with wound healing as well as wt loss 11.1% over the past 6 months. PO intake variable so far. Pt placed on carb control diet with no documented hx DM and most recent HgbA1c 5.4% (5/2/23). Modified diet to include only cardiac restrictions. Pt taking Megace 40mg daily for appetite stimulant. Pt did report an episode of difficulty swallowing at SNF before admission here. Monitor. Will add ONS BID to supplement meal intake and promote wound healing. Will continue to follow. Nutrition Related Findings:    HgbA1c 5.4% (5/2/23), Megace 40mg daily, +2 BLE edema, BM 5/10 Wound Type: Pressure Injury, Stage II (buttocks)       Current Nutrition Intake & Therapies:    Average Meal Intake: 1-25%, 26-50%  Average Supplements Intake: None Ordered  ADULT DIET; Regular; Low Fat/Low Chol/High Fiber/2 gm Na  ADULT ORAL NUTRITION SUPPLEMENT; Breakfast, Dinner; Standard High Calorie/High Protein Oral Supplement    Anthropometric Measures:  Height: 6' 1\" (185.4 cm)  Ideal Body Weight (IBW): 184 lbs (84 kg)    Current Body Weight: 295 lb (133.8 kg), 160.3 % IBW.     Current BMI (kg/m2): 38.9  Usual Body Weight: 332 lb (150.6 kg) (12/1/2022)  % Weight Change (Calculated): -11.1   BMI Categories: Obese Class 2 (BMI 35.0 -39.9)    Estimated Daily Nutrient Needs:  Energy

## 2023-05-12 NOTE — PROGRESS NOTES
significantlyimproved  Largest retroperitoneal lymph node now measures approximately 15 mm        TREATMENT SUMMARY:  Cycle #1 of Bendamustine 90mg/m2 +  Rituximab 375 mg/m2 Q 28 days was initiated on 1/23/2023          Past Medical History:    Past Medical History:   Diagnosis Date    Atrial fibrillation ([address] [facility])     Cellulitis of left leg     Colon polyp     Elevated blood pressure reading without diagnosis of hypertension     Esophageal reflux     History of prostate cancer     Hx of blood clots     Hypertension     Mixed hyperlipidemia     TG>>LDL    Obesity     Palliative care patient 05/11/2023    Pericardial effusion     Prostate cancer [facility])     2010 Dr Anup Velasquez;  implants, XRT       Past Surgical History:    Past Surgical History:   Procedure Laterality Date    ARM SURGERY Left 4/22/2023    ELBOW INCISION AND DRAINAGE performed by Cordella Hatchet, MD at [address] N/A 12/9/2022    BONE MARROW ASPIRATION BIOPSY performed by Leafy Lefort, PA-C at [address]    COLONOSCOPY  6/2/2009        COLONOSCOPY  2012        LYMPH NODE BIOPSY Left 1/5/2023    EXCISION OF CERVICLE LYMPH NODY-BIOPSY performed by Jyothi Osorio MD at [address] [facility] [address] N/A 12/16/2022    single lumen port placement with ultrasound and fluoroscopy performed by Marion Mcfarlane MD at [address] [place] [address] 107  3/22/2000        VASECTOMY         Social History:    Smoking status: Never  ETOH status: No  Resides: [place], [place]    Family History:   Family History   Problem Relation Age of Onset    Colon Polyps Mother     Heart Disease Father     Cancer Brother         prostate    Colon Polyps Brother     High Blood Pressure Other        Current Hospital Medications:    Current Facility-Administered Medications   Medication Dose Route Frequency Provider Last Rate Last Admin    midodrine (PROAMATINE) tablet 5 mg  5 mg Oral TID PRN Bladimir Wells MD

## 2023-05-12 NOTE — PROGRESS NOTES
Physical Therapy  Facility/Department: [facility] PROGRESSIVE CARE  Physical Therapy Initial Assessment    Name: Tiana Poe  : 1955  MRN: [id number]  Date of Service: 2023    Discharge Recommendations:  Continue to assess pending progress, Patient would benefit from continued therapy after discharge          Patient Diagnosis(es): The primary encounter diagnosis was Pneumonia of both lungs due to infectious organism, unspecified part of lung. Diagnoses of Acute respiratory failure with hypoxia (HCC) and Chronic atrial fibrillation (HCC) were also pertinent to this visit. Past Medical History:  has a past medical history of Atrial fibrillation ([address]), Cellulitis of left leg, Colon polyp, Elevated blood pressure reading without diagnosis of hypertension, Esophageal reflux, History of prostate cancer, Hx of blood clots, Hypertension, Mixed hyperlipidemia, Obesity, Palliative care patient, Pericardial effusion, and Prostate cancer ([address]). Past Surgical History:  has a past surgical history that includes Upper gastrointestinal endoscopy (3/22/2000); Colonoscopy (2009); Colonoscopy (); Vasectomy; bone marrow biopsy (N/A, 2022); Port Surgery (N/A, 2022); lymph node biopsy (Left, 2023); and Arm Surgery (Left, 2023). Assessment   Body Structures, Functions, Activity Limitations Requiring Skilled Therapeutic Intervention: Decreased functional mobility ; Decreased ROM; Decreased strength;Decreased safe awareness;Decreased endurance; Increased pain;Decreased balance  Assessment: Pt WOULD BENEFIT FROM SKILLED PT IN THIS SETTING TO ADDRESS HIS MOBILITY DEFICITS  Therapy Prognosis: Good  Decision Making: Medium Complexity  Requires PT Follow-Up: Yes  Activity Tolerance  Activity Tolerance: Patient tolerated treatment well     Plan   Physcial Therapy Plan  General Plan: 5-7 times per week  Therapy Duration: 2 Weeks  Current Treatment Recommendations: Strengthening, Functional mobility training,

## 2023-05-12 NOTE — PROGRESS NOTES
[facility]  Nurse  Consult Note       NAME:  Carlos Hwang  MEDICAL RECORD NUMBER:  [id number]  AGE: 78 y.o.    GENDER: male  : 1955  TODAY'S DATE:  2023    Subjective   Reason for Wound Nurse Evaluation and Assessment: Coccyx & Buttocks wounds      Carlos Hwang is a 78 y.o. male referred by:   [] Physician  [x] Nursing  [] Other:     Wound Identification:  Wound Type: pressure and irritant contact dermatitis d/t dual incontinence  Contributing Factors: chronic pressure, obesity, incontinence of stool, and incontinence of urine    Wound History: POA  Current Wound Care Treatment:  Triad wound dressing    Patient Goal of Care:  [x] Wound Healing  [] Odor Control  [] Palliative Care  [x] Pain Control   [] Other:         PAST MEDICAL HISTORY        Diagnosis Date    Atrial fibrillation ([address] [facility])     Cellulitis of left leg     Colon polyp     Elevated blood pressure reading without diagnosis of hypertension     Esophageal reflux     History of prostate cancer     Hx of blood clots     Hypertension     Mixed hyperlipidemia     TG>>LDL    Obesity     Palliative care patient 2023    Pericardial effusion     Prostate cancer [facility])     2010 Dr Allie Adair;  implants, XRT       PAST SURGICAL HISTORY    Past Surgical History:   Procedure Laterality Date    ARM SURGERY Left 2023    ELBOW INCISION AND DRAINAGE performed by Osmar Cedillo MD at [address] N/A 2022    BONE MARROW ASPIRATION BIOPSY performed by Erick Banegas PA-C at [address] [facility]    COLONOSCOPY  2009        COLONOSCOPY          LYMPH NODE BIOPSY Left 2023    EXCISION OF CERVICLE LYMPH NODY-BIOPSY performed by Janina Blevins MD at [address] N/A 2022    single lumen port placement with ultrasound and fluoroscopy performed by Sidra Rodrigues MD at [address] [place] [address] 107  3/22/2000        VASECTOMY         FAMILY HISTORY    Family History

## 2023-05-12 NOTE — PROGRESS NOTES
Notified on call Cardiologist of NAVDEEP order, NAVDEEP not indicated at present. Order discontinued and RN notified.

## 2023-05-13 NOTE — PROGRESS NOTES
The patient has a 3.2 potassium result. Will notified the hospital doctor. The patient is asymptomatic at this time. Will continue to monitor the patient.

## 2023-05-13 NOTE — PROGRESS NOTES
The patient's oxygen demand is gradually increasing during our shift. The patient was on 2 liters NC on 5/12/23 on day shift. Gradually they increased to 8 Liters of high flow. During our shift this nurse increased to oxygen on 12 liters of NC . [facility] Dr. Cl Henry notified for the changes. Also , we will notify the Dr. Mandi Rodríguez in the morning. Patient is resting well. On continues pulse ox @ this time.

## 2023-05-13 NOTE — PROGRESS NOTES
JODY   rivaroxaban (XARELTO) 20 MG TABS tablet TAKE 1 TABLET EVERY DAY WITH BREAKFAST  Patient taking differently: 1 tablet daily (with breakfast) TAKE 1 TABLET EVERY DAY WITH BREAKFAST 12/14/22   JODY Mak        [Held by provider] dilTIAZem  180 mg Oral BID    sodium chloride flush  5-40 mL IntraVENous 2 times per day    ipratropium-albuterol  1 ampule Inhalation Q4H WA    guaiFENesin  600 mg Oral BID    allopurinol  300 mg Oral Daily    megestrol  200 mg Oral Daily    metoprolol succinate  100 mg Oral BID    [Held by provider] rivaroxaban  20 mg Oral Daily with breakfast    rosuvastatin  10 mg Oral Nightly    vitamin E  400 Units Oral Daily    cefepime  2,000 mg IntraVENous Q8H       TELEMETRY: Atrial fibrillation     Physical Exam:      Physical Exam      General:  Awake, alert, NAD  Skin:  Warm and dry  Neck:  no jvd , no carotid bruits  Chest:  Clear to auscultation, no wheezing or rales  Cardiovascular:  IRRR A9J2 no murmurs, clicks, gallups, or rubs  Abdomen:  Soft nontender, nondistended, bowel sounds present  Extremities:  Edema: Trace        Lab Data:  CBC:   Recent Labs     05/11/23 0528 05/12/23 0210 05/13/23 0227   WBC 4.2* 4.4* 5.7   HGB 9.9* 10.0* 10.5*   HCT 30.3* 31.6* 33.0*   MCV 90.2 92.9 90.7    224 254     BMP:   Recent Labs     05/11/23 0528 05/12/23 0210 05/13/23 0227 05/13/23  0748    139 141  --    K 3.4* 3.4* 3.2* 2.8    102 101  --    CO2 27 26 26  --    BUN 34* 30* 38*  --    CREATININE 1.8* 1.5* 1.5*  --      LIVER PROFILE: No results for input(s): AST, ALT, LIPASE, BILIDIR, BILITOT, ALKPHOS in the last 72 hours. Invalid input(s): AMYLASE,  ALB  PT/INR: No results for input(s): PROTIME, INR in the last 72 hours. APTT: No results for input(s): APTT in the last 72 hours. BNP:  No results for input(s): BNP in the last 72 hours.   CK, CKMB, Troponin: @LABRCNT (CKTOTAL:3, CKMB:3, TROPONINI:3)@    IMAGING:  XR ELBOW LEFT (MIN 3 VIEWS)    Result Date:

## 2023-05-13 NOTE — PROGRESS NOTES
MEDICAL ONCOLOGY PROGRESS NOTE     Pt Name: Mary Fairbanks  MRN: [id number]  YOB: 1955  Date of evaluation: 5/13/2023  ROOM: 705    Subjective-transferred to CCU due to progressive O2 desaturation. HISTORY OF PRESENT ILLNESS:  Mr. Jannie Villanueva is well-known to our clinic. He is a patient established with Dr. Gilford Fletcher for a diagnosis of follicular lymphoma. He is being treated with Bendamustine and rituximab and has received 3 cycles with excellent response. He was seen by Dr. Gilford Fletcher on 4/20/2023 and was found to be hypotensive. He was subsequently hospitalized for sepsis related to septic joint. He will underwent surgery by Dr. Higuera Comp left elbow olecranon bursectomy and I&D. He was subsequently discharged to [facility] due to deconditioning. The patient was sent back from [facility] due to new onset shortness of breath. CT chest performed emergent showed bilateral lung infiltrate concerning for acute infectious process, COVID. PCR respiratory panel was negative. Blood cultures collected. PCR blood cultures positive for Staphylococcus. Patient was started on antibiotics with cefepime and azithromycin. The patient endorsed severe deconditioning. Labs showed WBC 4.2, hemoglobin 9.9, platelet count 296,233. Chemistry remarkable for potassium 3.4, creatinine 1.8, calcium 8.0, troponin x4 negative. Elevated proBNP 1735. Albumin 2.8. Blood cultures growing Staphylococcus. Respiratory viral panel negative. PCR blood detected Staphylococcus species. 5/10/2023-CTA chest showed no evidence of pulmonary believes but diffuse bilateral multifocal pneumonia. Differential considerations to include bacterial multifocal pneumonia as well as COVID-pneumonia. Patient is currently receiving cefepime, vancomycin and azithromycin. Chemotherapy is presently on hold due to these recent hospitalizations and complications.       PRIOR ONCOLOGICAL HISTORY  Jannie Villanueva is a 78year old  male

## 2023-05-13 NOTE — PLAN OF CARE
Problem: Discharge Planning  Goal: Discharge to home or other facility with appropriate resources  Outcome: Progressing  Flowsheets (Taken 5/12/2023 1930)  Discharge to home or other facility with appropriate resources:   Identify barriers to discharge with patient and caregiver   Arrange for needed discharge resources and transportation as appropriate     Problem: Safety - Adult  Goal: Free from fall injury  Outcome: Progressing     Problem: Pain  Goal: Verbalizes/displays adequate comfort level or baseline comfort level  Recent Flowsheet Documentation  Taken 5/13/2023 0200 by Licha Rock RN  Verbalizes/displays adequate comfort level or baseline comfort level:   Encourage patient to monitor pain and request assistance   Assess pain using appropriate pain scale

## 2023-05-14 NOTE — PROGRESS NOTES
MEDICAL ONCOLOGY PROGRESS NOTE     Pt Name: Oly Ibarra  MRN: [id number]  YOB: 1955  Date of evaluation: 5/14/2023  ROOM: 56    Subjective-continues in CCU on high flow. Continues in atrial fibrillation with heart rate in the 120s to 130s    HISTORY OF PRESENT ILLNESS:  Mr. Magdalena Shepherd is well-known to our clinic. He is a patient established with Dr. Paulo Pretty for a diagnosis of follicular lymphoma. He is being treated with Bendamustine and rituximab and has received 3 cycles with excellent response. He was seen by Dr. Paulo Pretty on 4/20/2023 and was found to be hypotensive. He was subsequently hospitalized for sepsis related to septic joint. He will underwent surgery by Dr. Claudia Bonner left elbow olecranon bursectomy and I&D. He was subsequently discharged to [facility] due to deconditioning. The patient was sent back from [facility] due to new onset shortness of breath. CT chest performed emergent showed bilateral lung infiltrate concerning for acute infectious process, COVID. PCR respiratory panel was negative. Blood cultures collected. PCR blood cultures positive for Staphylococcus. Patient was started on antibiotics with cefepime and azithromycin. The patient endorsed severe deconditioning. Labs showed WBC 4.2, hemoglobin 9.9, platelet count 989,798. Chemistry remarkable for potassium 3.4, creatinine 1.8, calcium 8.0, troponin x4 negative. Elevated proBNP 1735. Albumin 2.8. Blood cultures growing Staphylococcus. Respiratory viral panel negative. PCR blood detected Staphylococcus species. 5/10/2023-CTA chest showed no evidence of pulmonary believes but diffuse bilateral multifocal pneumonia. Differential considerations to include bacterial multifocal pneumonia as well as COVID-pneumonia. Patient is currently receiving cefepime, vancomycin and azithromycin. Chemotherapy is presently on hold due to these recent hospitalizations and complications.       PRIOR ONCOLOGICAL

## 2023-05-14 NOTE — PROGRESS NOTES
Infectious Diseases Progress Note    Patient:  Lo Phoenix  YOB: 1955  MRN: [id number]   Admit date: 5/10/2023   Admitting Physician: Marc Cline MD  Primary Care Physician: JODY Quiroz    Chief Complaint/Interval History: Moved to CCU due to dyspnea. He is on high flow oxygen at 40 to 50 L and 100%. He is without fever. He is tachycardic between 100 and 120. Blood pressure appears to be stable at present. Does not seem to have significant productive cough or sputum production. Denies any chest pain. In/Out    Intake/Output Summary (Last 24 hours) at 5/14/2023 1211  Last data filed at 5/14/2023 9239  Gross per 24 hour   Intake 1079.44 ml   Output 1475 ml   Net -395.56 ml     Allergies:    Allergies   Allergen Reactions    Pcn [Penicillins] Rash     Current Meds: methylPREDNISolone sodium succ (SOLU-MEDROL) injection 40 mg, Q8H  levalbuterol (XOPENEX) nebulizer solution 1.25 mg, Q8H PRN  bumetanide (BUMEX) 12.5 mg in sodium chloride 0.9 % 125 mL infusion, Continuous  cefepime (MAXIPIME) 2,000 mg in sodium chloride 0.9 % 50 mL IVPB ([id number]), Q12H  enoxaparin (LOVENOX) injection 135 mg, BID  potassium chloride 20 mEq/50 mL IVPB (Central Line), PRN  dilTIAZem 125 mg in sodium chloride 0.9 % 125 mL infusion, Continuous  [Held by provider] dilTIAZem (CARDIZEM CD) extended release capsule 180 mg, BID  midodrine (PROAMATINE) tablet 5 mg, TID PRN  sodium chloride flush 0.9 % injection 5-40 mL, 2 times per day  sodium chloride flush 0.9 % injection 5-40 mL, PRN  0.9 % sodium chloride infusion, PRN  acetaminophen (TYLENOL) tablet 650 mg, Q6H PRN   Or  acetaminophen (TYLENOL) suppository 650 mg, Q6H PRN  guaiFENesin (MUCINEX) extended release tablet 600 mg, BID  allopurinol (ZYLOPRIM) tablet 300 mg, Daily  megestrol (MEGACE) 40 MG/ML suspension 200 mg, Daily  metoprolol succinate (TOPROL XL) extended release tablet 100 mg, BID  [Held by provider] rivaroxaban (XARELTO) tablet 20 mg, Daily

## 2023-05-14 NOTE — PROGRESS NOTES
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 ([address]) 01/05/2023    Acute kidney injury ([address]) 03/08/2023    Hypoalbuminemia due to protein-calorie malnutrition ([address]) 03/08/2023    History of gout 02/14/2023    Lymphoma ([address]) 12/16/2022    Old head injury 10/26/2022    Generalized enlarged lymph nodes 10/26/2022    Nasal septum ulceration 10/24/2022    Epistaxis 10/24/2022    Atrial fibrillation with RVR ([address]) 10/06/2022    Chronic a-fib ([address]) 07/01/2022    Irregular heart beat 06/29/2022    Palliative care patient 05/12/2023    HCAP (healthcare-associated pneumonia) 05/10/2023    History of septic arthritis 05/10/2023    Stage 3b chronic kidney disease (CKD) ([address]) 05/10/2023    Dehydration 04/10/2023    Diarrhea, unspecified 04/10/2023    Gastroesophageal reflux disease without esophagitis 03/29/2022    Cancer of prostate ([address]) 03/15/2021    Vitamin D deficiency 01/02/2020    Chronic deep vein thrombosis (DVT) of left peroneal vein ([address]) 11/25/2019    Morbid obesity ([address]) 09/25/2019    History of prostate cancer     Obesity     Mixed hyperlipidemia     Essential hypertension     Adenomatous colon polyp 06/20/2012    S/P colonoscopic polypectomy 06/20/2012    History of colonic polyps 06/20/2012       Admit Date:  5/10/2023    Admission Problem List: Present on Admission:   HCAP (healthcare-associated pneumonia)   Essential hypertension   Chronic a-fib ([address])   History of septic arthritis   Lymphoma ([address])   Atrial fibrillation with RVR ([address])   Stage 3b chronic kidney disease (CKD) ([address])   Palliative care patient      Cardiac Specific Data:  Specialty Problems          Cardiology Problems    Chronic a-fib ([address])        Atrial fibrillation with RVR ([address])        Essential hypertension        Mixed hyperlipidemia        Chronic deep vein thrombosis (DVT) of left peroneal

## 2023-05-15 NOTE — PROGRESS NOTES
Palliative care note. Report from CRYSTAL Cassidy  Pt had recent admission in April(4/21-28) and dc'd to [facility]. Pt brought back to hospital from  d/t abn lab and SOA. Concerns for possible intubation intervention. Creatinine elevated. Diuresed. Lung sound diminished, some labored breathing, anxious. Resting at present. Will not disturb. Palliative following for support.     Electronically signed by Ximena Turner RN on 5/15/2023 at 12:05 PM.

## 2023-05-15 NOTE — PROGRESS NOTES
olecranon sclerosis is suggestive of chronic osteomyelitis.         Assessment:  Admission 5/10/2023 for shortness of breath  Chronic atrial fibrillation  Chronic anticoagulation with Xarelto  Hypertension  History of lymphoma  Chronic kidney disease stage IIIb  Recent admission 4/20/2023 for staph bacteremia atrial fibrillation with RVR and septic bursitis also has a sacral wound discharge to nursing home on oral antibiotics  Morbid obesity  CT of the head 5/10/2023 no acute abnormalities  CTA pulmonary 5/10/2023 no evidence of pulmonary embolism diffuse bilateral multifocal pneumonia differential considerations include  Echocardiogram 5/11/2023 normal LV function EF 50 to 55% mild TR RVSP 35 possible echodense area on the aortic leaflet could represent aortic vegetation  Blood cultures reportedly early positive await final report    Plan:  Continue current medical management stable at this time    Lynn Fonseca MD, MD 5/15/2023 10:16 AM

## 2023-05-15 NOTE — PROGRESS NOTES
Pharmacy Renal Adjustment    Zeina Fuchs is a 78 y.o. male. Pharmacy has renally adjusted medications per protocol. Recent Labs     05/14/23  0928 05/15/23  0500   BUN 54* 69*       Recent Labs     05/14/23  0928 05/15/23  0500   CREATININE 1.9* 2.3*       Estimated Creatinine Clearance: 48 mL/min (A) (based on SCr of 2.3 mg/dL (H)). Height:   Ht Readings from Last 1 Encounters:   05/12/23 6' 1\" (1.854 m)     Weight:  Wt Readings from Last 1 Encounters:   05/15/23 (!) 330 lb 11.2 oz (150 kg)       Plan: Adjust the following medications based on renal function:           Change meropenem to 1000mg IV q 12 hours, extended infusion.     ARTURO THOMAS, PHARM D, 5/15/2023, 10:36 AM

## 2023-05-15 NOTE — PROGRESS NOTES
Hypoaeration, cardiomegaly and extensive mixed interstitial and alveolar opacities throughout both lungs. Findings can represent asymmetric edema and/or infiltrates. Possible trace left pleural effusion. CT correlation may be of benefit, if desired. Recommendation: Follow up as clinically indicated. Dictated and Electronically Signed by Doug Jesus MD at 13-May-2023 10:06:09 AM EST               CT HEAD WO CONTRAST   Final Result    1. No evidence of acute intracranial abnormality. CTA PULMONARY W CONTRAST   Final Result   1. No PE. 2.Diffuse bilateral multifocal pneumonia. Differential considerations include   bacterial multifocal pneumonia, as well as viral.COVID is a potential   consideration. XR CHEST PORTABLE   Final Result   Impression:   Vague bilateral lung opacities may represent edema or multifocal infiltrates. XR CHEST PORTABLE    (Results Pending)       PALLIATIVE CARE EVAL  IP CONSULT TO HEM/ONC  IP CONSULT TO CARDIOLOGY  IP CONSULT TO INFECTIOUS DISEASES  IP CONSULT TO [address]  IP CONSULT TO PULMONOLOGY    Assessment/Plan:    Active Hospital Problems    Diagnosis     Lymphoma ([address]) [C85.90]      Priority: Medium    Atrial fibrillation with RVR ([address]) [I48.91]      Priority: Medium    Chronic a-fib ([address]) [I48.20]      Priority: Medium    Palliative care patient [Z51.5]     HCAP (healthcare-associated pneumonia) [J18.9]     History of septic arthritis [Z87.39]     Stage 3b chronic kidney disease (CKD) ([address]) [N18.32]     Essential hypertension [I10]          Possible atypical PNA. Strongly suspicious for possible septic embolic PNA. Bilateral infiltrate on CT  Can not rule out developing ARDS  Recent admission with Staph bacteremia and L elbow bursitis. - HCAP coverage - switched to Merrem IV on 5/15/23.  - ECHO shows possible aortic valve vegetation.   - ID involved. - vanc stopped          Acute Hypoxic Resp Failure.    Progressing to become severe with

## 2023-05-15 NOTE — PROGRESS NOTES
Pharmacy Adjustment per [facility] protocol    Terry Liu is a 78 y.o. male. Pharmacy has adjusted medications per [facility] protocol. Recent Labs     05/14/23  0928 05/15/23  0500   BUN 54* 69*       Recent Labs     05/14/23  0928 05/15/23  0500   CREATININE 1.9* 2.3*       Estimated Creatinine Clearance: 50 mL/min (A) (based on SCr of 2.3 mg/dL (H)). Height:   Ht Readings from Last 1 Encounters:   05/12/23 6' 1\" (1.854 m)     Weight:  Wt Readings from Last 1 Encounters:   05/15/23 (!) 356 lb 3.2 oz (161.6 kg)         Plan: Adjust the following medications based on [facility] protocol:           Merrem to 1000mg IV over 30minutes X first dose then 1000mg IV q8hr over 3 hr extended infusion.     Electronically signed by Ifeanyi Sanchez, [address] [place] [address] on 5/15/2023 at 8:49 AM

## 2023-05-15 NOTE — PROGRESS NOTES
GI PCR  Recent Labs     05/15/23  0500 05/14/23  0928 05/13/23  0227   WBC 6.4 7.5 5.7   HGB 11.2* 11.0* 10.5*   HCT 35.5* 34.9* 33.0*   MCV 92.7 92.8 90.7    288 254   -Continue to monitor

## 2023-05-16 LAB
FUNGUS SPEC CULT: NORMAL
FUNGUS SPEC CULT: NORMAL
KOH PREP SPEC: NORMAL
KOH PREP SPEC: NORMAL

## 2023-05-16 NOTE — PLAN OF CARE
Problem: Nutrition Deficit:  Goal: Optimize nutritional status  5/16/2023 1328 by Serge Nava MS, RD, LD  Outcome: Progressing  Flowsheets (Taken 5/16/2023 1319)  Nutrient intake appropriate for improving, restoring, or maintaining nutritional needs:   Assess nutritional status and recommend course of action   Monitor oral intake, labs, and treatment plans   Recommend appropriate diets, oral nutritional supplements, and vitamin/mineral supplements  5/16/2023 1131 by Alfreda Dumont RN  Outcome: Progressing

## 2023-05-16 NOTE — PROGRESS NOTES
satiety, impaired respiratory function as evidenced by wounds, weight loss greater than or equal to 10% in 6 months    Nutrition Interventions:   Food and/or Nutrient Delivery: Continue Current Diet, Continue Oral Nutrition Supplement  Nutrition Education/Counseling: No recommendation at this time  Coordination of Nutrition Care: Continue to monitor while inpatient  Plan of Care discussed with: nursing    Goals:  Previous Goal Met: No Progress toward Goal(s)  Goals: PO intake 50% or greater       Nutrition Monitoring and Evaluation:   Behavioral-Environmental Outcomes: None Identified  Food/Nutrient Intake Outcomes: Food and Nutrient Intake, Supplement Intake  Physical Signs/Symptoms Outcomes: Biochemical Data, Fluid Status or Edema, Weight, Skin    Discharge Planning:     Too soon to determine     Gary Alfredo MS, RD, LD  Contact: [phone]

## 2023-05-16 NOTE — PROGRESS NOTES
Occupational Therapy     05/16/23 1200   Subjective   Subjective Pt in bed upon arrival for therapy. Nursing okay with bedlevel therapy at this time. Vitals   Pulse (!) 117   SpO2 91 %   O2 Device Heated high flow cannula   Cognition   Overall Cognitive Status WFL   Cognition Comment CONVERSING APPROPRIATELY. Orientation   Overall Orientation Status WFL   Bed Mobility Training   Bed Mobility Training No   Transfer Training   Transfer Training No   ADL   Feeding Setup;Supervision   Grooming Setup;Supervision;Minimal assistance   UE Bathing Moderate assistance   LE Bathing Dependent/Total   UE Dressing Moderate assistance   LE Dressing Dependent/Total   Toileting Dependent/Total   Additional Comments Has rectal tube and catheter 5/16/23   OT Exercises   A/AROM Exercises Pt instructed on AROM exercises while in bed to promote mobility.    Assessment   Assessment AAROM exercises at bedlevel this am.   Activity Tolerance Patient tolerated treatment well   Discharge Recommendations Patient would benefit from continued therapy after discharge   Occupational Therapy Plan   Times Per Week 3-5x/wk   Times Per Day Once a day   OT Plan of Care   Tuesday X     Electronically signed by FERNANDO Cooper on 5/16/2023 at 12:11 PM

## 2023-05-16 NOTE — PROGRESS NOTES
ARNP/PA. Subjective-patient remains in the CCU. He is on high flow O2. Resting quietly. Discussed bedside RN. Objective-acutely ill-appearing, O2 high flow nasal cannula, rectal tube  Assessment/plan:  Continue current supportive care  Patient has history of A-fib. He is not receiving anticoagulation. Xarelto has been on hold. LAURA-worse creatinine. Low EGFR. No oncologic differential this time.

## 2023-05-16 NOTE — PROGRESS NOTES
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 ([address]) 01/05/2023    Acute kidney injury ([address]) 03/08/2023    Hypoalbuminemia due to protein-calorie malnutrition ([address]) 03/08/2023    History of gout 02/14/2023    Lymphoma ([address]) 12/16/2022    Old head injury 10/26/2022    Generalized enlarged lymph nodes 10/26/2022    Nasal septum ulceration 10/24/2022    Epistaxis 10/24/2022    Atrial fibrillation with RVR ([address]) 10/06/2022    Chronic a-fib ([address]) 07/01/2022    Irregular heart beat 06/29/2022    Palliative care patient 05/12/2023    HCAP (healthcare-associated pneumonia) 05/10/2023    History of septic arthritis 05/10/2023    Stage 3b chronic kidney disease (CKD) ([address]) 05/10/2023    Dehydration 04/10/2023    Diarrhea, unspecified 04/10/2023    Gastroesophageal reflux disease without esophagitis 03/29/2022    Cancer of prostate ([address]) 03/15/2021    Vitamin D deficiency 01/02/2020    Chronic deep vein thrombosis (DVT) of left peroneal vein ([address]) 11/25/2019    Morbid obesity ([address]) 09/25/2019    History of prostate cancer     Obesity     Mixed hyperlipidemia     Essential hypertension     Adenomatous colon polyp 06/20/2012    S/P colonoscopic polypectomy 06/20/2012    History of colonic polyps 06/20/2012       Admit Date:  5/10/2023    Admission Problem List: Present on Admission:   HCAP (healthcare-associated pneumonia)   Essential hypertension   Chronic a-fib ([address])   History of septic arthritis   Lymphoma ([address])   Atrial fibrillation with RVR ([address])   Stage 3b chronic kidney disease (CKD) ([address])   Palliative care patient      Cardiac Specific Data:  Specialty Problems          Cardiology Problems    Chronic a-fib ([address])        Atrial fibrillation with RVR ([address])        Essential hypertension        Mixed hyperlipidemia        Chronic deep vein thrombosis (DVT) of left peroneal

## 2023-05-16 NOTE — PROGRESS NOTES
Physical Therapy  Name: Ailin Walter  MRN:  [id number]  Date of service:  5/16/2023 05/16/23 1056   Restrictions/Precautions   Restrictions/Precautions Fall Risk   Position Activity Restriction   Other position/activity restrictions pt on low air loss mattress for large sacral wound   Subjective   Subjective Pt agreed to therapy. Nurse okayed bed exercises but requested to hold off on trying to sit EOB due to SPO2 levels   Pain Assessment   Pain Assessment None - Denies Pain   Exercises   Straight Leg Raise x5 AAROM BLE   Heelslides x10 BLE   Ankle Pumps x10 BLE   Upper Extremity alternated between UE and LE exercises with rest periods in between   Other Activities   Comment able to initiate some pulling up on trapeze bar but not able to come up from bed   Short Term Goals   Time Frame for Short Term Goals 2 WKS   Short Term Goal 1 SUP<>SIT, MIN A   Short Term Goal 2 SIT<>STAND, MOD A 1+1   Short Term Goal 3 TF'S WITH RW, MIN /MOD A 1   Conditions Requiring Skilled Therapeutic Intervention   Body Structures, Functions, Activity Limitations Requiring Skilled Therapeutic Intervention Decreased functional mobility ; Decreased ROM; Decreased strength;Decreased safe awareness;Decreased endurance; Increased pain;Decreased balance   Assessment Pt low endurance but SPO2 within 89%-93% throughout. Able to initiate some A-AAROM but very weak overall and limited range. All needs in reach. Activity Tolerance   Activity Tolerance Patient tolerated treatment well   Safety Devices   Type of Devices Call light within reach; Left in bed           Electronically signed by Prashanth Lynch PTA on 5/16/2023 at 11:08 AM

## 2023-05-16 NOTE — PROGRESS NOTES
Infectious Diseases Progress Note    Patient:  Clay Kaur  YOB: 1955  MRN: [id number]   Admit date: 5/10/2023   Admitting Physician: Rik Esparza MD  Primary Care Physician: JODY Flor    Chief Complaint/Interval History: He remains without fever. He remains in CCU. He is on high flow nasal oxygen. He had creatinine yesterday. Diuresis has been held. Does not seem to have productive cough. No chest pain. In/Out    Intake/Output Summary (Last 24 hours) at 5/16/2023 0655  Last data filed at 5/16/2023 0600  Gross per 24 hour   Intake 883.32 ml   Output 2675 ml   Net -1791.68 ml     Allergies:    Allergies   Allergen Reactions    Pcn [Penicillins] Rash     Current Meds: enoxaparin (LOVENOX) injection 150 mg, BID  meropenem (MERREM) 1,000 mg in sodium chloride 0.9 % 100 mL IVPB ([id number]), Q12H  methylPREDNISolone sodium succ (SOLU-MEDROL) injection 40 mg, Q8H  levalbuterol (XOPENEX) nebulizer solution 1.25 mg, Q8H PRN  potassium chloride 20 mEq/50 mL IVPB (Central Line), PRN  dilTIAZem 125 mg in sodium chloride 0.9 % 125 mL infusion, Continuous  [Held by provider] dilTIAZem (CARDIZEM CD) extended release capsule 180 mg, BID  midodrine (PROAMATINE) tablet 5 mg, TID PRN  sodium chloride flush 0.9 % injection 5-40 mL, 2 times per day  sodium chloride flush 0.9 % injection 5-40 mL, PRN  0.9 % sodium chloride infusion, PRN  acetaminophen (TYLENOL) tablet 650 mg, Q6H PRN   Or  acetaminophen (TYLENOL) suppository 650 mg, Q6H PRN  guaiFENesin (MUCINEX) extended release tablet 600 mg, BID  allopurinol (ZYLOPRIM) tablet 300 mg, Daily  megestrol (MEGACE) 40 MG/ML suspension 200 mg, Daily  [Held by provider] metoprolol succinate (TOPROL XL) extended release tablet 100 mg, BID  [Held by provider] rivaroxaban (XARELTO) tablet 20 mg, Daily with breakfast  rosuvastatin (CRESTOR) tablet 10 mg, Nightly  vitamin E capsule 400 Units, Daily      Review of Systems see HPI    VitalSigns:  /68

## 2023-05-16 NOTE — PROGRESS NOTES
Hospitalist Progress Note        PCP: JODY Elias    Date of Admission: 5/10/2023    Length of Stay: 6    Chief Complaint: from ECF with SOB. Recent admission with staph bacteremia and left elbow bursitis. Discharged to ECF on PO keflex. Hx of Afib RVR on xarelto. Hx of lymphoma - follows with HemOnc. This has been stable. Subjective:     Pt on Heated humidified high flow: Flow 50l/min. FiO2 80% - improved from 100% yesterday. Still with diarrhea in rectal tube. HR low 100s on dilt gtt - still Afib. BP stable on the low side. Renal function worsening abruptly despite holding Bumex gtt yesterday - will give IVF today and asked for nephrology to eval.   Not much improvement in Hypoxia despite 6+ L diuresis.              Medications:  Reviewed    Infusion Medications    sodium chloride 75 mL/hr at 05/16/23 1049    heparin (PORCINE) Infusion 6 Units/kg/hr (05/16/23 0832)    dilTIAZem 15 mg/hr (05/16/23 0828)    sodium chloride       Scheduled Medications    bumetanide  1 mg IntraVENous Q12H    meropenem  1,000 mg IntraVENous Q12H    methylPREDNISolone  40 mg IntraVENous Q8H    [Held by provider] dilTIAZem  180 mg Oral BID    sodium chloride flush  5-40 mL IntraVENous 2 times per day    guaiFENesin  600 mg Oral BID    allopurinol  300 mg Oral Daily    megestrol  200 mg Oral Daily    [Held by provider] metoprolol succinate  100 mg Oral BID    [Held by provider] rivaroxaban  20 mg Oral Daily with breakfast    rosuvastatin  10 mg Oral Nightly    vitamin E  400 Units Oral Daily     PRN Meds: heparin (porcine) PF, heparin (porcine) PF, levalbuterol, potassium chloride, midodrine, sodium chloride flush, sodium chloride, acetaminophen **OR** acetaminophen      Intake/Output Summary (Last 24 hours) at 5/16/2023 1302  Last data filed at 5/16/2023 1200  Gross per 24 hour   Intake 683.32 ml   Output 2185 ml   Net -1501.68 ml         Physical Exam Performed:    /76

## 2023-05-17 NOTE — PROGRESS NOTES
BIOPSY performed by Jose Angel Newton PA-C at [address]    COLONOSCOPY  6/2/2009        COLONOSCOPY  2012        LYMPH NODE BIOPSY Left 1/5/2023    EXCISION OF CERVICLE LYMPH NODY-BIOPSY performed by Ruby Chiu MD at [address] N/A 12/16/2022    single lumen port placement with ultrasound and fluoroscopy performed by Doug Newell MD at [address] [facility]  3/22/2000        VASECTOMY         Family History  Family History   Problem Relation Age of Onset    Colon Polyps Mother     Heart Disease Father     Cancer Brother         prostate    Colon Polyps Brother     High Blood Pressure Other        Social History  Social History     Socioeconomic History    Marital status: Single     Spouse name: Not on file    Number of children: Not on file    Years of education: Not on file    Highest education level: Not on file   Occupational History    Not on file   Tobacco Use    Smoking status: Never    Smokeless tobacco: Never   Vaping Use    Vaping Use: Never used   Substance and Sexual Activity    Alcohol use: No    Drug use: No    Sexual activity: Not on file   Other Topics Concern    Not on file   Social History Narrative    Not on file     Social Determinants of Health     Financial Resource Strain: Low Risk     Difficulty of Paying Living Expenses: Not hard at all   Food Insecurity: No Food Insecurity    Worried About Running Out of Food in the Last Year: Never true    Ran Out of Food in the Last Year: Never true   Transportation Needs: No Transportation Needs    Lack of Transportation (Medical): No    Lack of Transportation (Non-Medical): No   Physical Activity: Insufficiently Active    Days of Exercise per Week: 2 days    Minutes of Exercise per Session: 20 min   Stress: No Stress Concern Present    Feeling of Stress : Only a little   Social Connections:  Moderately Integrated    Frequency of Communication with Friends and Family: More than

## 2023-05-17 NOTE — PROGRESS NOTES
Physical Therapy  Name: Sweta Talbert  MRN:  [id number]  Date of service:  5/17/2023    Holding therapy today due to destating when rolling for clean up. Physical Therapy will continue to follow and progress as able.     Electronically signed by David Carpio PTA on 5/17/2023 at 3:23 PM

## 2023-05-17 NOTE — CARE COORDINATION
SW called and spoke to pts brother/primary decision maker, Leslee Pérez. Discussed possible LTACH placement before returning to [facility]. He is agreeable for a referral. Leslee Pérez states their mother., Consuelo Chairez, is also in agreement.    The max LTACH can accept is 40L heated high flow   Electronically signed by Ignacia Lao on 5/17/2023 at 2:14 PM

## 2023-05-17 NOTE — PROGRESS NOTES
Occupational Therapy  Per nursing, patient was just rolled and needs to rest. Pt has been de-stat'ing. Will continue to follow.  Electronically signed by FERNANDO Savage on 5/17/2023 at 1:21 PM

## 2023-05-18 PROBLEM — Z99.2 ENCOUNTER REGARDING VASCULAR ACCESS FOR DIALYSIS FOR ESRD (HCC): Status: ACTIVE | Noted: 2023-01-01

## 2023-05-18 PROBLEM — N18.6 ENCOUNTER REGARDING VASCULAR ACCESS FOR DIALYSIS FOR ESRD (HCC): Status: ACTIVE | Noted: 2023-05-18

## 2023-05-18 PROBLEM — N18.5 STAGE 5 CHRONIC KIDNEY DISEASE NOT ON CHRONIC DIALYSIS (HCC): Status: ACTIVE | Noted: 2023-01-01

## 2023-05-18 NOTE — PROGRESS NOTES
Cardizem drip now weaned off. /69. Pt has dialysis temp catheter in place R groin and dialysis RN is aware and will be here to start dialysis soon. Dr Ananda Gonzáles notified and Midodrine ordered.  Electronically signed by Virginia Hastings RN on 5/18/2023 at 2:42 PM

## 2023-05-18 NOTE — PROCEDURES
Patient name: Lindy Ryan  MRN: [id number]  YOB: 1955    Date of procedure: 5/18/2023    Preprocedure diagnosis: Acute on chronic renal insufficiency requiring access for dialysis    Postprocedure diagnosis: Same    Procedure:  1. Ultrasound-guided access right common femoral vein  2. Placement of a 24 cm nontunneled dual-lumen femoral vein dialysis catheter    Surgeon: Carmen Hamilton MD    Anesthesia:  local anesthesia 4 cc lidocaine 1%    Indication for procedure: Mr. Cheryl Lambert is a very pleasant 29-year-old gentleman with lymphoma on chemotherapy, admitted with suspected septic emboli and concern for aortic valve vegetation, right IJ port, on IV and oral antibiotics with C. difficile, now with worsening renal insufficiency requiring access for dialysis. Risks benefits and alternatives to a right femoral line temporary dialysis catheter, nontunneled, were explained to the patient. He understands he is higher risk for infection because of C. difficile and recent history of septic emboli with suspected aortic valve vegetation. He understands he is higher risk for bleeding because of anticoagulation. We told him we would minimize these risks as much as possible. The patient is agreeable to proceed. Informed consent was obtained. Procedure: The patient's right groin was prepped and draped in a sterile fashion. A timeout was performed. Local anesthesia was administered to the skin and subcutaneous tissue over the right femoral vein. An ultrasound was used to gain access to the right femoral vein with a microneedle. A microwire was advanced through this access, small incision was made at the skin with a skin knife, and the needle was removed. A microsheath was placed over the wire using the Seldinger technique. The inner cannula and wire were removed. A J-wire was advanced through this access and the catheter was removed.   2 serial dilators were passed over the wire using the Seldinger

## 2023-05-18 NOTE — PROGRESS NOTES
PTT 54.7 heparin bolus 2000 units and drip increased per protocol to 7 units/kg/hr.  Electronically signed by Vinnie Caban RN on 5/18/2023 at 1:39 PM

## 2023-05-18 NOTE — PROGRESS NOTES
Infectious Diseases Progress Note    Patient:  Alexx Sweeney  YOB: 1955  MRN: [id number]   Admit date: 5/10/2023   Admitting Physician: Solo Perkins MD  Primary Care Physician: JODY Moffett    Chief Complaint/Interval History: He seems to be doing somewhat better. He started dialysis today. He has had 2 L of fluid off part way through dialysis. His FiO2 requirements are improving. No productive sputum. In/Out    Intake/Output Summary (Last 24 hours) at 5/18/2023 1443  Last data filed at 5/18/2023 1215  Gross per 24 hour   Intake 2695.46 ml   Output 1125 ml   Net 1570.46 ml     Allergies:    Allergies   Allergen Reactions    Pcn [Penicillins] Rash     Current Meds: metoprolol succinate (TOPROL XL) extended release tablet 100 mg, BID  bumetanide (BUMEX) injection 1 mg, BID  [START ON 5/19/2023] methylPREDNISolone sodium succ (SOLU-MEDROL) injection 40 mg, Daily  midodrine (PROAMATINE) tablet 5 mg, TID WC  ondansetron (ZOFRAN) injection 4 mg, Q6H PRN  vancomycin (VANCOCIN) capsule 125 mg, 4 times per day  0.9 % sodium chloride infusion, Continuous  heparin (porcine) PF injection 4,000 Units, PRN  heparin (porcine) PF injection 2,000 Units, PRN  heparin 25,000 units in dextrose 5% 250 mL (premix) infusion, Continuous  meropenem (MERREM) 1,000 mg in sodium chloride 0.9 % 100 mL IVPB ([id number]), Q12H  levalbuterol (XOPENEX) nebulizer solution 1.25 mg, Q8H PRN  potassium chloride 20 mEq/50 mL IVPB (Central Line), PRN  dilTIAZem 125 mg in sodium chloride 0.9 % 125 mL infusion, Continuous  dilTIAZem (CARDIZEM CD) extended release capsule 180 mg, BID  midodrine (PROAMATINE) tablet 5 mg, TID PRN  sodium chloride flush 0.9 % injection 5-40 mL, 2 times per day  sodium chloride flush 0.9 % injection 5-40 mL, PRN  0.9 % sodium chloride infusion, PRN  acetaminophen (TYLENOL) tablet 650 mg, Q6H PRN   Or  acetaminophen (TYLENOL) suppository 650 mg, Q6H PRN  guaiFENesin (MUCINEX) extended release tablet 600

## 2023-05-18 NOTE — PROGRESS NOTES
Occupational Therapy  Per nursing; patient is getting ready to receive dialysis. It would be best to hold off therapy today. Will continue to follow.  Electronically signed by FERNANDO Modi on 5/18/2023 at 2:52 PM

## 2023-05-18 NOTE — PROGRESS NOTES
Shortness of breath Comparison: 4/21/2023 Findings: Right chest wall Port-A-Cath terminates overlying the SVC. Vague bilateral lower lobe opacities may represent edema or multifocal infiltrates. No significant pleural effusion or pneumothorax is seen. Cardiac silhouette is magnified. No acute osseous lesion is seen    Impression: Vague bilateral lung opacities may represent edema or multifocal infiltrates. XR CHEST PORTABLE    Result Date: 4/21/2023  Exam: Portable chest radiograph. Technique: Single AP view of the chest was obtained. Clinical information: Cough Comparison: Contrast-enhanced chest CT performed on April 17, 2023 Findings: The lungs are clear without focal infiltrate or effusion. Patient is post right IJ Mediport placement with the tip at the level of the SVC. Prominent right paratracheal stripe is visualized correlating to increased amount of mediastinal fat. The heart is normal in size. There are no acute osseous abnormalities. No radiographic evidence of acute cardiopulmonary process. CTA PULMONARY W CONTRAST    Result Date: 5/10/2023  Exam: CTA Chest with contrast History: SOB, hypoxia Prior Exam: None available. Technique: Axial CT images were obtained through the thorax with angiography technique. Coronal and sagital reformatted images were created. Findings: Thyroid: Unremarkable. Esophagus: Unremarkable. Upper abdomen: Unremarkable. Heart and vasculature: Heart is normal in size. Thoracic aorta is normal in caliber. Main pulmonary artery is normal.No PE. Coronary artery calcifications. Lymph nodes: No evidence of adenopathy. Lungs: Diffuse bilateral ill-defined groundglass airspace opacities. . Pleura: No pleural effusion. No pneumothorax. Osseous structures: No acute or aggressive osseous abnormality. 1.No PE. 2.Diffuse bilateral multifocal pneumonia. Differential considerations include bacterial multifocal pneumonia, as well as viral.COVID is a potential consideration.     CT ELBOW LEFT W

## 2023-05-19 PROBLEM — E43 SEVERE MALNUTRITION (HCC): Status: ACTIVE | Noted: 2023-01-01

## 2023-05-19 NOTE — PROGRESS NOTES
Physical Therapy  Name: Jay Paulino  MRN:  [id number]  Date of service:  5/19/2023    Patient having decreased BP this pm.  He states he is swimmy headed laying flat in bed. Bed placed in chair mode and ankle pumps, SAQ, and shoulder flexion performed. BP increased slightly however still complained of dizziness. Encourage upright sitting in bed for at least an hour. Physical Therapy will continue to follow and progress as able.     Electronically signed by Calvin Altamirano PTA on 5/19/2023 at 2:41 PM

## 2023-05-19 NOTE — PROGRESS NOTES
Report to [facility]. Pt to transfer to room 715 on telemetry. Pt /64 HR afib  96. Pt Lungs remain diminished BLL. Edema BLE improved 1+ from 2+ of yesterday. Pt urine output has been less after dialysis, which 2.5L were removed today with dialysis. Pt is on 4L high flow down from 5 L and Sats remain in upper 90's. Pt denies pain.  Electronically signed by Amarjit Rodriguez RN on 5/19/2023 at 7:04 PM

## 2023-05-19 NOTE — PROGRESS NOTES
MEDICAL ONCOLOGY PROGRESS NOTE     Pt Name: Nallely Still  MRN: [id number]  YOB: 1955  Date of evaluation: 5/19/2023  ROOM: 705    Subjective- continues in CCU, O2 7L. Now requiring dialysis. Undergoing treatment for C. difficile    HISTORY OF PRESENT ILLNESS:  Mr. Ginny Bell is well-known to our clinic. He is a patient established with Dr. Heidy Young for a diagnosis of follicular lymphoma. He is being treated with Bendamustine and rituximab and has received 3 cycles with excellent response. He was seen by Dr. Heidy Young on 4/20/2023 and was found to be hypotensive. He was subsequently hospitalized for sepsis related to septic joint. He will underwent surgery by Dr. Kristian Wood left elbow olecranon bursectomy and I&D. He was subsequently discharged to [facility] due to deconditioning. The patient was sent back from [facility] due to new onset shortness of breath. CT chest performed emergent showed bilateral lung infiltrate concerning for acute infectious process, COVID. PCR respiratory panel was negative. Blood cultures collected. PCR blood cultures positive for Staphylococcus. Patient was started on antibiotics with cefepime and azithromycin. The patient endorsed severe deconditioning. Labs showed WBC 4.2, hemoglobin 9.9, platelet count 730,128. Chemistry remarkable for potassium 3.4, creatinine 1.8, calcium 8.0, troponin x4 negative. Elevated proBNP 1735. Albumin 2.8. Blood cultures growing Staphylococcus. Respiratory viral panel negative. PCR blood detected Staphylococcus species. 5/10/2023-CTA chest showed no evidence of pulmonary believes but diffuse bilateral multifocal pneumonia. Differential considerations to include bacterial multifocal pneumonia as well as COVID-pneumonia. Patient is currently receiving cefepime, vancomycin and azithromycin. Chemotherapy is presently on hold due to these recent hospitalizations and complications.       PRIOR ONCOLOGICAL HISTORY  Jax Olmedo

## 2023-05-19 NOTE — PROGRESS NOTES
Dialysis completed. 2L net fluid removed. Pt tolerated well. /68 at present. HR afib 112. Sats 99% on 7L high flow O2.  Electronically signed by Virginia Hastings RN on 5/18/2023 at 8:03 PM

## 2023-05-19 NOTE — PROGRESS NOTES
Comprehensive Nutrition Assessment    Type and Reason for Visit:  Reassess    Nutrition Recommendations/Plan:   Continue current POC. If po intake continues to be decreased, may benefit from alternate source of nutrition     Malnutrition Assessment:  Malnutrition Status:  Severe malnutrition (05/19/23 1033)    Context:  Chronic Illness     Findings of the 6 clinical characteristics of malnutrition:  Energy Intake:  Mild decrease in energy intake (Comment)  Weight Loss:  Greater than 10% over 6 months     Body Fat Loss:  No significant body fat loss     Muscle Mass Loss:  No significant muscle mass loss    Fluid Accumulation:  Severe Extremities, Generalized   Strength:  Not Performed    Nutrition Assessment:    Patient's po intake continues to be poor. Intake for meals is usuallu 0%. Is taking supplement fairly well most of the time. Megace and solumedrol continue    Nutrition Related Findings:    +1, +3 generalized edema Wound Type: Surgical Incision, Stage II       Current Nutrition Intake & Therapies:    Average Meal Intake: 0%, 1-25%  Average Supplements Intake: 51-75%, %  ADULT DIET; Regular; 4 carb choices (60 gm/meal)  ADULT ORAL NUTRITION SUPPLEMENT; Breakfast, Lunch, Dinner; Diabetic Oral Supplement    Anthropometric Measures:  Height: 6' 1\" (185.4 cm)  Ideal Body Weight (IBW): 184 lbs (84 kg)       Current Body Weight: 354 lb (160.6 kg), 192.4 % IBW.  Weight Source: Bed Scale  Current BMI (kg/m2): 46.7  Usual Body Weight: 332 lb (150.6 kg) (12/1/2022)  % Weight Change (Calculated): -11.1  Weight Adjustment For: No Adjustment                 BMI Categories: Obese Class 3 (BMI 40.0 or greater)    Estimated Daily Nutrient Needs:  Energy Requirements Based On: Kcal/kg  Weight Used for Energy Requirements: Current  Energy (kcal/day): 4361-1798 kcals (8-11 kcals/KG)  Weight Used for Protein Requirements: Ideal  Protein (g/day): 167g  Method Used for Fluid Requirements: 1 ml/kcal  Fluid (ml/day):

## 2023-05-19 NOTE — PROGRESS NOTES
PT worked with pt in bed , bed in chair position. Pt BP 79/57 at start of work with PT. BP after 90/63. HR afib 97. Pt Sats 96% on 5L high flow. Pt remains in chair sitting position.

## 2023-05-19 NOTE — PLAN OF CARE
Problem: Skin/Tissue Integrity  Goal: Absence of new skin breakdown  Description: 1. Monitor for areas of redness and/or skin breakdown  2. Assess vascular access sites hourly  3. Every 4-6 hours minimum:  Change oxygen saturation probe site  4. Every 4-6 hours:  If on nasal continuous positive airway pressure, respiratory therapy assess nares and determine need for appliance change or resting period.   5/19/2023 3072 by Gabriel Henderson RN  Outcome: Not Progressing     Problem: Nutrition Deficit:  Goal: Optimize nutritional status  Outcome: Not Progressing  Flowsheets (Taken 5/19/2023 1026)  Nutrient intake appropriate for improving, restoring, or maintaining nutritional needs:   Assess nutritional status and recommend course of action   Monitor oral intake, labs, and treatment plans   Recommend appropriate diets, oral nutritional supplements, and vitamin/mineral supplements

## 2023-05-19 NOTE — PROGRESS NOTES
Physical Therapy  Name: David Garrett  MRN:  [id number]  Date of service:  5/19/2023    On dialysis at this time. Physical Therapy will continue to follow and progress as able.     Electronically signed by Yumiko Sharma PTA on 5/19/2023 at 8:33 AM

## 2023-05-19 NOTE — PLAN OF CARE
PT continues to be hesitant to turn/move. Pt is scared r/t line in R fem and disturbance of line. Remains on specialty mattress, but area does not show signs of healing or epitheliazation. It has NOT gotten worse, but is slow to heal.   Pt needs reinforcement on learning. Conchita MSN RN   Problem: Skin/Tissue Integrity  Goal: Absence of new skin breakdown  Description: 1. Monitor for areas of redness and/or skin breakdown  2. Assess vascular access sites hourly  3. Every 4-6 hours minimum:  Change oxygen saturation probe site  4. Every 4-6 hours:  If on nasal continuous positive airway pressure, respiratory therapy assess nares and determine need for appliance change or resting period.   Outcome: Not Progressing

## 2023-05-19 NOTE — PROGRESS NOTES
Port dressing and dialysis catheter dressing changed.  Electronically signed by Fabian Noriega RN on 5/19/2023 at 2:44 PM

## 2023-05-19 NOTE — PROGRESS NOTES
Pharmacy Renal Adjustment    Maya Tavares is a 78 y.o. male. Pharmacy has renally adjusted medications per protocol. Recent Labs     05/18/23  0030 05/19/23  0345   * 80*       Recent Labs     05/18/23  0030 05/19/23  0345   CREATININE 4.3* 3.8*       Estimated Creatinine Clearance: 30 mL/min (A) (based on SCr of 3.8 mg/dL (H)). Height:   Ht Readings from Last 1 Encounters:   05/19/23 6' 1\" (1.854 m)     Weight:  Wt Readings from Last 1 Encounters:   05/19/23 (!) 354 lb (160.6 kg)       Hemodialysis was started on 5/18 and a second treatment again on 5/19. Plan: Adjust the following medications based on renal function:           Merrem to 500mg IV every 24 hours over 3 hr infusion for total of 14 days.     Electronically signed by Deedee Goodman [facility] on 5/19/2023 at 3:20 PM

## 2023-05-19 NOTE — PROGRESS NOTES
Nephrology ([facility]) Progress Note    Patient:  Marily Hollis  YOB: 1955  Date of Service: 5/19/2023  MRN: [id number]   Acct: [de-identified]   Primary Care Physician: JODY Villavicencio  Advance Directive: Full Code  Admit Date: 5/10/2023       Hospital Day: 9  Referring Provider: Virgene Bumpers, MD    Patient independently seen and examined, Chart, Consults, Notes, Operative notes, Labs, Cardiology, and Radiology studies reviewed as available. Chief complaint: Abnormal labs    Subjective:  Marily Hollis is a 78 y.o. male for whom we were consulted for evaluation and treatment of acute kidney injury/chronic kidney disease. Patient has history of stage IIIa chronic kidney disease with baseline GFR varies from 45 to 55 mL. He was recently hospitalized with septic arthritis of left elbow/MSSA bacteremia, underwent drainage followed by IV antibiotics. Patient has history of follicular lymphoma and has received at least 3 cycles of chemotherapy, under care of Dr. Nicole Vargas. Now presented with increasing shortness of breath, dyspnea on exertion and atrial fibrillation. His chest x-ray consistent with bilateral diffuse infiltrate with possible pulmonary edema versus bilateral pneumonia versus ARDS. Recent 2D echo consistent with left ventricular ejection fraction 50 to 55%, echodense area at aortic leaflet could represent aortic vegetation. He has now moved to CCU, requiring high flow oxygen for the treatment of hypoxemic respiratory failure. He has less urine output despite intravenous diuretics. His serum creatinine is risen to 3.0 mg with baseline serum creatinine is 1.5 mg. On May 18, patient had first hemodialysis treatment via right femoral dialysis catheter and tolerated very well. Today he is getting second hemodialysis treatment. He is seen in CCU with stable hemodynamics, requiring less oxygen but still markedly fluid overloaded.     Currently seen on

## 2023-05-20 NOTE — PROGRESS NOTES
Acute tubular necrosis. 3.  Stage IIIa chronic kidney disease baseline. 4.  Acute respiratory failure/bilateral diffuse infiltrate  5. Hyperphosphatemia. 6.  Recent history of MSSA bacteremia/septic arthritis of left elbow. 7.  Recent diagnosis of follicular lymphoma  8. Clinically volume overloaded. Plan:  1. Tolerating dialysis very well. 2.  Continue IV antibiotics. 3.  Vascular surgery consultation for permacath  4. Plan was discussed with dialysis nurse.     Caryle Carol, MD  05/20/23  11:11 AM

## 2023-05-20 NOTE — PROGRESS NOTES
MEDICAL ONCOLOGY PROGRESS NOTE     Pt Name: Carter Amaya  MRN: [id number]  YOB: 1955  Date of evaluation: 5/20/2023  ROOM: 705    Subjective- continues in CCU, O2 7L. Now requiring dialysis. Undergoing treatment for C. difficile    HISTORY OF PRESENT ILLNESS:  Mr. Joshua Daugherty is well-known to our clinic. He is a patient established with Dr. Dominic Ray for a diagnosis of follicular lymphoma. He is being treated with Bendamustine and rituximab and has received 3 cycles with excellent response. He was seen by Dr. Dominic Ray on 4/20/2023 and was found to be hypotensive. He was subsequently hospitalized for sepsis related to septic joint. He will underwent surgery by Dr. Ross Cast left elbow olecranon bursectomy and I&D. He was subsequently discharged to [facility] due to deconditioning. The patient was sent back from [facility] due to new onset shortness of breath. CT chest performed emergent showed bilateral lung infiltrate concerning for acute infectious process, COVID. PCR respiratory panel was negative. Blood cultures collected. PCR blood cultures positive for Staphylococcus. Patient was started on antibiotics with cefepime and azithromycin. The patient endorsed severe deconditioning. Labs showed WBC 4.2, hemoglobin 9.9, platelet count 899,091. Chemistry remarkable for potassium 3.4, creatinine 1.8, calcium 8.0, troponin x4 negative. Elevated proBNP 1735. Albumin 2.8. Blood cultures growing Staphylococcus. Respiratory viral panel negative. PCR blood detected Staphylococcus species. 5/10/2023-CTA chest showed no evidence of pulmonary believes but diffuse bilateral multifocal pneumonia. Differential considerations to include bacterial multifocal pneumonia as well as COVID-pneumonia. Patient is currently receiving cefepime, vancomycin and azithromycin. Chemotherapy is presently on hold due to these recent hospitalizations and complications.       PRIOR ONCOLOGICAL HISTORY  Navid Solares

## 2023-05-20 NOTE — PROGRESS NOTES
Pt now to room 715 with transport and RN on current bed with telemetry in place.  Electronically signed by Abi Sunshine RN on 5/19/2023 at 7:05 PM

## 2023-05-20 NOTE — PROGRESS NOTES
The patient is doing some better . Still is on NC 4 L. Tolerating well. Unlabored breathing. SPO2 is staying @ 96 % . Still hesitant to turn. The patient educated about importance of turning and moving . Will continue to monitor .

## 2023-05-21 NOTE — PROGRESS NOTES
The cardiomediastinal silhouette is magnified. Heart size is probably enlarged. The left hemidiaphragm is indistinct. No pneumothorax. Grossly intact osteopenic bony thorax. Hypoaeration, cardiomegaly and extensive mixed interstitial and alveolar opacities throughout both lungs. Findings can represent asymmetric edema and/or infiltrates. Possible trace left pleural effusion. CT correlation may be of benefit, if desired. Recommendation: Follow up as clinically indicated. Dictated and Electronically Signed by Douglas Patiño MD at 13-May-2023 10:06:09 AM EST             XR CHEST PORTABLE    Result Date: 5/10/2023  Examination: Chest x-ray, portable Clinical history: Shortness of breath Comparison: 4/21/2023 Findings: Right chest wall Port-A-Cath terminates overlying the SVC. Vague bilateral lower lobe opacities may represent edema or multifocal infiltrates. No significant pleural effusion or pneumothorax is seen. Cardiac silhouette is magnified. No acute osseous lesion is seen    Impression: Vague bilateral lung opacities may represent edema or multifocal infiltrates. XR CHEST PORTABLE    Result Date: 4/21/2023  Exam: Portable chest radiograph. Technique: Single AP view of the chest was obtained. Clinical information: Cough Comparison: Contrast-enhanced chest CT performed on April 17, 2023 Findings: The lungs are clear without focal infiltrate or effusion. Patient is post right IJ Mediport placement with the tip at the level of the SVC. Prominent right paratracheal stripe is visualized correlating to increased amount of mediastinal fat. The heart is normal in size. There are no acute osseous abnormalities. No radiographic evidence of acute cardiopulmonary process. CTA PULMONARY W CONTRAST    Result Date: 5/10/2023  Exam: CTA Chest with contrast History: SOB, hypoxia Prior Exam: None available. Technique: Axial CT images were obtained through the thorax with angiography technique. Coronal and sagital

## 2023-05-21 NOTE — PROGRESS NOTES
Client refuses to juve on sides. Spoke with client about sores on his bottom and how he needs to keep pressure off his bottom but still refuses.

## 2023-05-21 NOTE — PROGRESS NOTES
O2  Merrem 500 mg/ IV Q day  Followed by pulmonology and ID    #2  Follicular lymphoma-status post BR x3 cycles. CT scan after 3 cycles showed significant interval response to his disease. Fourth cycle chemotherapy has been delayed due to hospitalization/rehab        He has had excellent response to 3 cycles. Very treatable underlying diagnosis of follicular lymphoma should not prevent aggressive treatment for underlying acute problems. #3  Antineoplastic induced anemia    Recent Labs     05/21/23  0250 05/20/23  0500 05/19/23  0345   WBC 7.4 7.2 6.0   HGB 10.3* 9.9* 9.3*   HCT 32.3* 31.4* 29.8*   MCV 91.8 91.8 92.3    230 227   ANC 5.5    -Continue to monitor      #4  Stage IIIB CKD - worse    Labs Renal Latest Ref Rng & Units 5/21/2023 5/20/2023 5/19/2023 5/18/2023 5/17/2023   BUN 8 - 23 mg/dL 68(H) 70(H) 80(H) 102(H) 100(H)   Cr 0.5 - 1.2 mg/dL 4. 2(H) 3.8(H) 3.8(H) 4. 3(H) 4. 0(H)   K 3.5 - 5.0 mmol/L 4.6 4.1 4.0 3.6 3.5   Na 136 - 145 mmol/L 136 134(L) 138 138 138         Managed by nephrology, s/p dialysis 5/18/2023    #5  Decubitus ulcer-      per wound care      #6  A-fib with RVR-    Xarelto on hold;   currently on heparin drip    #7  Staph Haemolyticus Bacteremia - ? Aortic endocarditis        Blood cx 5/12/2023 - No growth x 5 days  Management as per ID  Receiving Merrem        2D Echo  5/11/2023  1. Normal LV size. Low normal function with LVEF of 50-55%. No regional   wall motion abnormalities. Unable to assess diastolic function- pt in Afib. 2. Normal RV size and function. 3. Mild TR with RVSP of 35mmHg is noted. 4. Possible echo dense area on aortic leaflet, could represent aortic   vegetation. No evidence of aortic stenosis or aortic regurgitation. 5. Normal sized aorta. No pericardial effusion. Normal IVC size and   collapsibility. Recommendation   1. Consider NAVDEEP to exclude endocarditis, depending on clinical scenario. Dr. Ej Espana following.   Dr. Ame Gaspar

## 2023-05-21 NOTE — PROGRESS NOTES
Infectious Diseases Progress Note    Patient:  Inocencia More  YOB: 1955  MRN: [id number]   Admit date: 5/10/2023   Admitting Physician: Johanna Elizondo MD  Primary Care Physician: JODY Collier    Chief Complaint/Interval History: He has been transferred from CCU to progressive care unit. He indicates he is having to partially formed bowel movements per day. He overall feels better. He denies dyspnea at present. Edema improving. In/Out    Intake/Output Summary (Last 24 hours) at 5/21/2023 0723  Last data filed at 5/21/2023 0335  Gross per 24 hour   Intake 890 ml   Output 50 ml   Net 840 ml     Allergies:    Allergies   Allergen Reactions    Pcn [Penicillins] Rash     Current Meds: hydrOXYzine HCl (ATARAX) tablet 10 mg, Q6H PRN  apixaban (ELIQUIS) tablet 5 mg, BID  midodrine (PROAMATINE) tablet 10 mg, TID WC  meropenem (MERREM) 500 mg in sodium chloride 0.9 % 100 mL IVPB ([id number]), Q24H  metoprolol succinate (TOPROL XL) extended release tablet 100 mg, BID  ondansetron (ZOFRAN) injection 4 mg, Q6H PRN  vancomycin (VANCOCIN) capsule 125 mg, 4 times per day  levalbuterol (XOPENEX) nebulizer solution 1.25 mg, Q8H PRN  potassium chloride 20 mEq/50 mL IVPB (Central Line), PRN  dilTIAZem 125 mg in sodium chloride 0.9 % 125 mL infusion, Continuous  dilTIAZem (CARDIZEM CD) extended release capsule 180 mg, BID  midodrine (PROAMATINE) tablet 5 mg, TID PRN  sodium chloride flush 0.9 % injection 5-40 mL, 2 times per day  sodium chloride flush 0.9 % injection 5-40 mL, PRN  0.9 % sodium chloride infusion, PRN  acetaminophen (TYLENOL) tablet 650 mg, Q6H PRN   Or  acetaminophen (TYLENOL) suppository 650 mg, Q6H PRN  guaiFENesin (MUCINEX) extended release tablet 600 mg, BID  allopurinol (ZYLOPRIM) tablet 300 mg, Daily  megestrol (MEGACE) 40 MG/ML suspension 200 mg, Daily  rosuvastatin (CRESTOR) tablet 10 mg, Nightly  vitamin E capsule 400 Units, Daily      Review of Systems see HPI    VitalSigns:  /69

## 2023-05-21 NOTE — PROGRESS NOTES
Patient's buttock and sacral wounds appear to have worsened since admission. According to a wound care note dated 05/12/23 by nurse Kiersten Good, a recommendation of [facility] Wound dressing was made but no order WAS placed. I called pharmacy to verify if the hospital carries this ointment, Pharmacist Yamilet Richardson stated she is not aware but would look into the note and call me back. Updated wound pictures have been uploaded into Epic.

## 2023-05-21 NOTE — PLAN OF CARE
Vascular service asked to place permcath tomorrow. I will be off service, but I will make patient NPO and discuss with Dr Tiffanie Frey.

## 2023-05-22 ENCOUNTER — HOSPITAL ENCOUNTER (OUTPATIENT)
Dept: INFUSION THERAPY | Age: 68
Discharge: HOME | End: 2023-05-22

## 2023-05-22 NOTE — CARE COORDINATION
OPHD referral uploaded into [employer].  Awaiting clinic and chair assignment.  Electronically signed by Elías Gilman RN on 5/22/2023 at 10:25 AM

## 2023-05-22 NOTE — CARE COORDINATION
OPHD chair set up is for MWF at 11:00 AM.  Can start on 5/24/23 if medically stable.  Electronically signed by Josefa Martino RN on 5/22/2023 at 3:45 PM

## 2023-05-22 NOTE — PROGRESS NOTES
SURGERY N/A 12/16/2022    single lumen port placement with ultrasound and fluoroscopy performed by Chika Ordoñez MD at [address] [place] [address] 107  3/22/2000        VASECTOMY         Family History  Family History   Problem Relation Age of Onset    Colon Polyps Mother     Heart Disease Father     Cancer Brother         prostate    Colon Polyps Brother     High Blood Pressure Other        Social History  Social History     Socioeconomic History    Marital status: Single     Spouse name: Not on file    Number of children: Not on file    Years of education: Not on file    Highest education level: Not on file   Occupational History    Not on file   Tobacco Use    Smoking status: Never    Smokeless tobacco: Never   Vaping Use    Vaping Use: Never used   Substance and Sexual Activity    Alcohol use: No    Drug use: No    Sexual activity: Not on file   Other Topics Concern    Not on file   Social History Narrative    Not on file     Social Determinants of Health     Financial Resource Strain: Low Risk     Difficulty of Paying Living Expenses: Not hard at all   Food Insecurity: No Food Insecurity    Worried About Running Out of Food in the Last Year: Never true    [address] in the Last Year: Never true   Transportation Needs: No Transportation Needs    Lack of Transportation (Medical): No    Lack of Transportation (Non-Medical): No   Physical Activity: Insufficiently Active    Days of Exercise per Week: 2 days    Minutes of Exercise per Session: 20 min   Stress: No Stress Concern Present    Feeling of Stress : Only a little   Social Connections:  Moderately Integrated    Frequency of Communication with Friends and Family: More than three times a week    Frequency of Social Gatherings with Friends and Family: Once a week    Attends [employer] Services: More than 4 times per year    Active Member of [email or web address] Group or Organizations: Yes    Attends Club or Organization Meetings: 1 to 4 times per year

## 2023-05-22 NOTE — PROGRESS NOTES
Physical Therapy  Name: Maya Tavares  MRN:  [id number]  Date of service:  5/22/2023 05/22/23 1312   Subjective   Subjective Attempt: Pt out for dialysis tx this afternoon.          Electronically signed by Marko Carmichael PTA on 5/22/2023 at 1:14 PM

## 2023-05-23 ENCOUNTER — HOSPITAL ENCOUNTER (OUTPATIENT)
Dept: INFUSION THERAPY | Age: 68
Discharge: HOME | End: 2023-05-23

## 2023-05-23 LAB
FUNGUS SPEC CULT: NORMAL
FUNGUS SPEC CULT: NORMAL
KOH PREP SPEC: NORMAL
KOH PREP SPEC: NORMAL

## 2023-05-23 NOTE — PROGRESS NOTES
Occupational Therapy  Pt out to a procedure this date. Will continue to follow.  Electronically signed by FERNANDO Jin on 5/23/2023 at 12:24 PM

## 2023-05-23 NOTE — PROGRESS NOTES
Nutrition Assessment     Type and Reason for Visit: Reassess    Nutrition Recommendations/Plan:   When appropriate, initiate cardiac diet and discontinue carbohydrate restriction. Continue Ensure High Protein. Malnutrition Assessment:  Malnutrition Status: Severe malnutrition    Nutrition Assessment:  PO intake slightly improved from previous assessment. Pt does remain nutritionally compromised AEB variable PO intake and current NPO status. Pt also noted to have lost 19lbs since previous assessment, likely r/t dialysis treatments and not true wt loss. Pt continues to receive Megace for appetite stimulant. Would recommend continuation of Cardiac diet with no carbohydrate restriction as no hx DM and recent nftW0j=2.4%. Will follow for diet advancement and reorder ONS as appropriate.     Estimated Daily Nutrient Needs:  Energy (kcal):  4975-2014 kcals (8-11 kcals/KG) Weight Used for Energy Requirements: Current     Protein (g):  167g Weight Used for Protein Requirements: Ideal        Fluid (ml/day):  9801-2442 ml Method Used for Fluid Requirements: 1 ml/kcal    Nutrition Related Findings:   +1, +3 generalized edema Wound Type: Surgical Incision, Stage II    Current Nutrition Therapies:    Diet NPO Exceptions are: Sips of Water with Meds    Anthropometric Measures:  Height: 6' 1\" (185.4 cm)  Current Body Wt: 335 lb 4 oz (152.1 kg)   BMI: 44.2    Nutrition Diagnosis:   Inadequate oral intake related to increase demand for energy/nutrients, impaired respiratory function as evidenced by intake 0-25%, wounds, weight loss greater than or equal to 10% in 6 months    Nutrition Interventions:   Food and/or Nutrient Delivery: Continue NPO  Nutrition Education/Counseling: No recommendation at this time  Coordination of Nutrition Care: Continue to monitor while inpatient  Plan of Care discussed with: nursing    Goals:  Previous Goal Met: Progressing toward Goal(s)  Goals: Meet at least 75% of estimated needs, PO intake 50% or

## 2023-05-23 NOTE — PLAN OF CARE
Patient seen in dialysis, treatment running via right temporary femoral line that was placed at bedside on 5/18/23. Flow rates currently good, HD nurse does state that when he gets restless and moves around rates drop. Dressing over site CDI. Patient scheduled for NAVDEEP tomorrow per Cardiology. Vascular will place tunneled HD catheter when cleared by ID Service, will place another temp line if necessary.

## 2023-05-23 NOTE — PROGRESS NOTES
Cardiac transesophageal echocardiography report preliminary findings  Tolerated well  Left ventricular function grossly satisfactory  Aortic valve sclerotic no apparent vegetation  Other valvular structures also age-related degenerative changes no vegetations appreciated  No evidence of intracardiac thrombus  Minimal atherosclerosis descending aorta. Full report to follow.

## 2023-05-23 NOTE — PROGRESS NOTES
Delay in procedure for NAVDEEP. Pt arrived to [facility] holding room 3 from cath lab to monitor prior to procedure. Pt resting quietly with eyes closed in room, pt on bedside monitor and VSS.  Electronically signed by Lizabeth Castillo RN on 5/23/2023 at 12:19 PM

## 2023-05-23 NOTE — PROGRESS NOTES
Pt to cath lab at this time via bed with cath lab RN.  Electronically signed by Evie Delarosa RN on 5/23/2023 at 12:48 PM

## 2023-05-23 NOTE — PROGRESS NOTES
Upon checking on the pt, a small amount of blood was noted on his gown. The R femoral dialysis catheter drsg was then noted to be bloody. Inpatient dialysis called and were asked if they were available to change the drsg. There is only one dialysis nurse here today and he stated it would be awhile before he could change the drsg.

## 2023-05-23 NOTE — PLAN OF CARE
Patient NAVDEEP with no vegetation noted. Orders entered for placement of tunneled HD catheter for tomorrow.  Will evaluate patient status and morning labs in am.

## 2023-05-23 NOTE — PROGRESS NOTES
Nephrology ([facility]) Progress Note    Patient:  Peter Barnett  YOB: 1955  Date of Service: 5/23/2023  MRN: [id number]   Acct: [de-identified]   Primary Care Physician: JODY Fry  Advance Directive: Full Code  Admit Date: 5/10/2023       Hospital Day: 15  Referring Provider: Wily Shelton MD    Patient independently seen and examined, Chart, Consults, Notes, Operative notes, Labs, Cardiology, and Radiology studies reviewed as available. Subjective:  Peter Barnett is a 78 y.o. male for whom we were consulted for evaluation and treatment of acute kidney injury/chronic kidney disease. Patient has history of stage IIIa chronic kidney disease with baseline GFR varied from 45 to 55. He was recently hospitalized with septic arthritis of left elbow/MSSA bacteremia, underwent drainage followed by IV antibiotics. Patient has a history of follicular lymphoma and has received at least 3 cycles of chemotherapy, under the care of Dr. Kofi Woods. Now presented with increasing shortness of breath, dyspnea on exertion and atrial fibrillation. His chest x-ray consistent with bilateral diffuse infiltrate with possible pulmonary edema versus bilateral pneumonia versus ARDS. Recent 2D echo consistent with left ventricular ejection fraction 50 to 55%, echodense area at aortic leaflet could represent aortic vegetation. He was initially admitted to CCU. Required high flow oxygen for the treatment of hypoxemic respiratory failure. He has less urine output despite intravenous diuretics. His serum creatinine had risen to 3.0 with baseline serum creatinine 1.5. On May 18, patient had his first hemodialysis treatment via right femoral dialysis catheter and tolerated very well. Today, no overnight events. Awaiting PermCath placement but was able to use temporary line well yesterday. Denied current chest pain, shortness of air at rest, nausea or vomiting.   Family present with many

## 2023-05-23 NOTE — PROGRESS NOTES
Pt was bathed with CHG and applied a thin layer of ordered ointment to wounds. Photos updated in [address] [facility] 15. Port de-accessed and re-accessed with new dressing change. Pt tolerated well.

## 2023-05-23 NOTE — PROGRESS NOTES
Progress Note      Date:5/23/2023       Room:0715/715-02  Patient Name:Figueroa Aguero     YOB: 1955     Age:67 y.o. Subjective    Subjective: 60-year-old male with a history of chronic atrial fibrillation, high blood pressure, lymphoma, CKD stage III, GERD, hypertension, hyperlipidemia, blood clots, presented to the hospital 5/10/2023 with concerns of shortness of breath. Was recently discharged from this facility 4/28 after being treated for staph bacteremia, A-fib with RVR and septic bursitis of left elbow. Was discharged to nursing home on Keflex. In the ED CTA chest negative for PE however showed diffuse bilateral multifocal pneumonia, respiratory PCR was negative. In-house was placed on broad-spectrum antibiotics due to concerns of septic emboli. Was seen by ID who recommended discontinuation of Vanco and continuation of meropenem. Blood cultures were obtained and has been negative since 5/12. Patient was also evaluated by cardiology given concerns of septic emboli and echo concerning for possible aortic valve vegetation. Cardiology and CT surgery recommends continuation of current management and possible NAVDEEP when respiratory status improves however will be low yield at this time as it would not change current management. Followed in-house by nephrology given underlining renal dysfunction and progressive decline, temp dialysis access was placed by vascular 5/18 and initiated on dialysis. Weaned off cardizem drip and reinitiate pt oral metoprolol and cardizem. GI panel positive for C-diff, on oral vanc. Patient tx out of ICU to PCU, was weaned off oxygen, reevaluated by cardiology and s/p NAVDEEP with no evidence of vegetations. Vascular plans perm cath placement tomorrow. Was seen in house this morning, no acute overnight events. Patient has been weaned of oxygen however he is fixated on having it on.         Review of Systems: 8 point system review negative except as stated

## 2023-05-24 NOTE — PROGRESS NOTES
Physical Therapy  Name: Carter Amaya  MRN:  [id number]  Date of service:  5/24/2023 05/24/23 1153   Subjective   Subjective Attempt: Pt out for dialysis at this time.          Electronically signed by Betsy Gonzalez PTA on 5/24/2023 at 11:54 AM

## 2023-05-24 NOTE — PROGRESS NOTES
Infectious Diseases Progress Note    Patient:  David Garrett  YOB: 1955  MRN: [id number]   Admit date: 5/10/2023   Admitting Physician: Carmelita Palmer MD  Primary Care Physician: JODY Rothman    Chief Complaint/Interval History: He is without any worsening dyspnea. No productive cough or sputum production. No nausea or vomiting. He had NAVDEEP. Per verbal report no vegetations identified on NAVDEEP. In/Out    Intake/Output Summary (Last 24 hours) at 5/24/2023 0649  Last data filed at 5/24/2023 0513  Gross per 24 hour   Intake --   Output 150 ml   Net -150 ml     Allergies:    Allergies   Allergen Reactions    Pcn [Penicillins] Rash     Current Meds: promethazine (PHENERGAN) injection 12.5 mg, Q6H PRN  prochlorperazine (COMPAZINE) suppository 25 mg, Q12H PRN  hydrOXYzine HCl (ATARAX) tablet 25 mg, Q6H PRN  vitamin D (ERGOCALCIFEROL) capsule 50,000 Units, Weekly  apixaban (ELIQUIS) tablet 5 mg, BID  midodrine (PROAMATINE) tablet 10 mg, TID WC  meropenem (MERREM) 500 mg in sodium chloride 0.9 % 100 mL IVPB (Nuto0Rda), Q24H  metoprolol succinate (TOPROL XL) extended release tablet 100 mg, BID  ondansetron (ZOFRAN) injection 4 mg, Q6H PRN  vancomycin (VANCOCIN) capsule 125 mg, 4 times per day  levalbuterol (XOPENEX) nebulizer solution 1.25 mg, Q8H PRN  potassium chloride 20 mEq/50 mL IVPB (Central Line), PRN  dilTIAZem 125 mg in sodium chloride 0.9 % 125 mL infusion, Continuous  dilTIAZem (CARDIZEM CD) extended release capsule 180 mg, BID  midodrine (PROAMATINE) tablet 5 mg, TID PRN  sodium chloride flush 0.9 % injection 5-40 mL, 2 times per day  sodium chloride flush 0.9 % injection 5-40 mL, PRN  0.9 % sodium chloride infusion, PRN  acetaminophen (TYLENOL) tablet 650 mg, Q6H PRN   Or  acetaminophen (TYLENOL) suppository 650 mg, Q6H PRN  guaiFENesin (MUCINEX) extended release tablet 600 mg, BID  allopurinol (ZYLOPRIM) tablet 300 mg, Daily  megestrol (MEGACE) 40 MG/ML suspension 200 mg,

## 2023-05-24 NOTE — PROGRESS NOTES
IntraVENous 2 times per day    guaiFENesin  600 mg Oral BID    allopurinol  300 mg Oral Daily    megestrol  200 mg Oral Daily    rosuvastatin  10 mg Oral Nightly    vitamin E  400 Units Oral Daily     Continuous Infusions:   dilTIAZem Stopped (05/18/23 1332)    sodium chloride       PRN Meds:promethazine, 12.5 mg, Q6H PRN  prochlorperazine, 25 mg, Q12H PRN  hydrOXYzine HCl, 25 mg, Q6H PRN  ondansetron, 4 mg, Q6H PRN  levalbuterol, 1.25 mg, Q8H PRN  potassium chloride, 20 mEq, PRN  midodrine, 5 mg, TID PRN  sodium chloride flush, 5-40 mL, PRN  sodium chloride, , PRN  acetaminophen, 650 mg, Q6H PRN   Or  acetaminophen, 650 mg, Q6H PRN      PHYSICAL EXAM:     CONSTITUTIONAL: Alert and oriented self and place, ill appearing, anxious   LUNGS: clear bilaterally anteriorly, diminished lower lobe breath sounds  CARDIOVASCULAR: Irregular rate, irregular rhythm, no murmur noted  ABDOMEN: soft, slightly tender, obese  NEUROLOGIC: Awake, oriented to self and place  WOUND/INCISION:  Right groin non-tunneled HD catheter site in deep skin fold, area moist.   EXTREMITY: Feet warm to touch    LABS:     CBC:   Recent Labs     05/22/23  0450   WBC 7.3   RBC 3.24*   HGB 9.6*   HCT 29.9*   MCV 92.3   MCH 29.6   MCHC 32.1*   RDW 17.3*      MPV 11.2      Last 3 CMP:   Recent Labs     05/22/23  0450 05/22/23  1713   * 140   K 5.8* 3.7   CL 94* 108   CO2 22 15*   BUN 96* 52*   CREATININE 5.8* 3.3*   GLUCOSE 81 88   CALCIUM 8.5* 6.1*   PROT 5.2*  --    LABALBU 2.9*  --    BILITOT 0.5  --    ALKPHOS 96  --    AST 56*  --    ALT 40  --         Calcium:   Lab Results   Component Value Date/Time    CALCIUM 6.1 05/22/2023 05:13 PM    CALCIUM 8.5 05/22/2023 04:50 AM    CALCIUM 8.4 05/21/2023 02:50 AM        Lactic Acid:   Lab Results   Component Value Date/Time    LACTA 0.8 04/21/2023 09:26 PM    LACTA 1.6 03/01/2023 09:40 PM      DVT prophylaxis: Eliquis           [x] Already on Anticoagulation    ASSESSMENT:     Procedure 5/18/23

## 2023-05-24 NOTE — PROGRESS NOTES
This nurse went to check on this patient. Upon entering the room, patient found to have vomited coffee ground emesis. This nurse contacted attending, Dr. Dejuan Mo. Attending requested for emesis to be tested for occult blood. This nurse sent down sample to the lab for testing. Occult was positive and this nurse relayed results to attending. GI consult placed.  Electronically signed by Celio Luna RN on 5/24/2023 at 6:51 PM

## 2023-05-24 NOTE — PROGRESS NOTES
Progress Note      Date:5/24/2023       Room:0715/715-02  Patient Name:Figueroa Aguero     YOB: 1955     Age:67 y.o. Subjective    Subjective: 79-year-old male with a history of chronic atrial fibrillation, high blood pressure, lymphoma, CKD stage III, GERD, hypertension, hyperlipidemia, blood clots, presented to the hospital 5/10/2023 with concerns of shortness of breath. Was recently discharged from this facility 4/28 after being treated for staph bacteremia, A-fib with RVR and septic bursitis of left elbow. Was discharged to nursing home on Keflex. In the ED CTA chest negative for PE however showed diffuse bilateral multifocal pneumonia, respiratory PCR was negative. In-house was placed on broad-spectrum antibiotics due to concerns of septic emboli. Was seen by ID who recommended discontinuation of Vanco and continuation of meropenem. Blood cultures were obtained and has been negative since 5/12. Patient was also evaluated by cardiology given concerns of septic emboli and echo concerning for possible aortic valve vegetation. Cardiology and CT surgery recommends continuation of current management and possible NAVDEEP when respiratory status improves however will be low yield at this time as it would not change current management. Followed in-house by nephrology given underlining renal dysfunction and progressive decline, temp dialysis access was placed by vascular 5/18 and initiated on dialysis. Weaned off cardizem drip and reinitiate pt oral metoprolol and cardizem. GI panel positive for C-diff, on oral vanc. Patient tx out of ICU to PCU, was weaned off oxygen, reevaluated by cardiology and s/p NAVDEEP with no evidence of vegetations. Vascular plans perm cath placement tomorrow. Was seen in house this morning, no acute overnight events. Patient has been weaned of oxygen however he is fixated on having it on. Vascular unable to place perm cath today, plans for tomorrow       Review of Systems: 8

## 2023-05-24 NOTE — PROGRESS NOTES
Vern Sanz currently in a leg bypass and will be in OR for some time, will have to feed this patient and cancel Permcath placement for today. Will reschedule this procedure.

## 2023-05-24 NOTE — PROGRESS NOTES
Palliative Care: Follow up visit. Pt is awake, lying in bed and asked Sandhya Phipps are you doing all this to me? \"  Asked what he means and he states, \"choking me and hooking me up to all this. \"  I relieved pt's oxygen tubing on his neck and reassured him nothing was choking him. He states, \"I think I may throw up. \" I gave pt emesis bag and he acted as if he could not hold it. He says his arms are weak and numb. Reported visit findings to his primary nurse. Review of notes and continued goal for perm cath placement and continue HD when discharged back to SNF. Pt dozed off when I returned to the room and did not respond when I spoke his name. Will follow up to support pt and review goals and plans.     Electronically signed by Cindy Moraes RN on 5/24/2023 at 4:20 PM

## 2023-05-24 NOTE — PROGRESS NOTES
history of septic emboli with suspected aortic valve vegetation. He understands he is higher risk for bleeding because of anticoagulation. We told him we would minimize these risks as much as possible. The patient is agreeable to proceed. Informed consent was obtained. Procedure: The patient's right groin was prepped and draped in a sterile fashion. A timeout was performed. Local anesthesia was administered to the skin and subcutaneous tissue over the right femoral vein. An ultrasound was used to gain access to the right femoral vein with a microneedle. A microwire was advanced through this access, small incision was made at the skin with a skin knife, and the needle was removed. A microsheath was placed over the wire using the Seldinger technique. The inner cannula and wire were removed. A J-wire was advanced through this access and the catheter was removed. 2 serial dilators were passed over the wire using the Seldinger technique. We then advanced the catheter over wire using the Seldinger technique. The inner cannula and wire were removed from the catheter. Both ports floyd back and flushed easily. Caps were placed. The catheter was secured to the skin with 2 sutures. The skin was cleaned and dried. Skin-Prep was used around the area. Sterile dressings were applied. The patient was allowed to sit up, and good hemostasis was noted. He tolerated the procedure well. There were no complications. Estimated blood loss was less than 5 cc. Plan: It is okay to use the catheter for dialysis. We would be happy to be consulted for more permanent dialysis access, i.e. a PermCath if needed, once the patient is more stable and has been evaluated for aortic vegetation. We appreciate the opportunity to participate in the care of this patient.         Assessment   Acute kidney injury/ATN  Chronic kidney disease stage IIIa  Acute respiratory failure  Hyperphosphatemia  Follicular lymphoma  Recent MSSA

## 2023-05-25 ENCOUNTER — ANESTHESIA EVENT (OUTPATIENT)
Dept: OPERATING ROOM | Age: 68
End: 2023-05-25
Payer: MEDICARE

## 2023-05-25 ENCOUNTER — ANESTHESIA (OUTPATIENT)
Dept: OPERATING ROOM | Age: 68
End: 2023-05-25
Payer: MEDICARE

## 2023-05-25 PROCEDURE — 2500000003 HC RX 250 WO HCPCS: Performed by: NURSE ANESTHETIST, CERTIFIED REGISTERED

## 2023-05-25 PROCEDURE — 6360000002 HC RX W HCPCS: Performed by: NURSE ANESTHETIST, CERTIFIED REGISTERED

## 2023-05-25 PROCEDURE — 2580000003 HC RX 258: Performed by: NURSE ANESTHETIST, CERTIFIED REGISTERED

## 2023-05-25 RX ORDER — ROCURONIUM BROMIDE 10 MG/ML
INJECTION, SOLUTION INTRAVENOUS PRN
Status: DISCONTINUED | OUTPATIENT
Start: 2023-05-25 | End: 2023-05-25 | Stop reason: SDUPTHER

## 2023-05-25 RX ORDER — FENTANYL CITRATE 50 UG/ML
INJECTION, SOLUTION INTRAMUSCULAR; INTRAVENOUS PRN
Status: DISCONTINUED | OUTPATIENT
Start: 2023-05-25 | End: 2023-05-25 | Stop reason: SDUPTHER

## 2023-05-25 RX ORDER — DEXAMETHASONE SODIUM PHOSPHATE 10 MG/ML
INJECTION, SOLUTION INTRAMUSCULAR; INTRAVENOUS PRN
Status: DISCONTINUED | OUTPATIENT
Start: 2023-05-25 | End: 2023-05-25 | Stop reason: SDUPTHER

## 2023-05-25 RX ORDER — SODIUM CHLORIDE 450 MG/100ML
INJECTION, SOLUTION INTRAVENOUS CONTINUOUS PRN
Status: DISCONTINUED | OUTPATIENT
Start: 2023-05-25 | End: 2023-05-25 | Stop reason: SDUPTHER

## 2023-05-25 RX ORDER — EPHEDRINE SULFATE 50 MG/ML
INJECTION, SOLUTION INTRAVENOUS PRN
Status: DISCONTINUED | OUTPATIENT
Start: 2023-05-25 | End: 2023-05-25 | Stop reason: SDUPTHER

## 2023-05-25 RX ORDER — PROPOFOL 10 MG/ML
INJECTION, EMULSION INTRAVENOUS PRN
Status: DISCONTINUED | OUTPATIENT
Start: 2023-05-25 | End: 2023-05-25 | Stop reason: SDUPTHER

## 2023-05-25 RX ORDER — ONDANSETRON 2 MG/ML
INJECTION INTRAMUSCULAR; INTRAVENOUS PRN
Status: DISCONTINUED | OUTPATIENT
Start: 2023-05-25 | End: 2023-05-25 | Stop reason: SDUPTHER

## 2023-05-25 RX ORDER — LIDOCAINE HYDROCHLORIDE 10 MG/ML
INJECTION, SOLUTION INFILTRATION; PERINEURAL PRN
Status: DISCONTINUED | OUTPATIENT
Start: 2023-05-25 | End: 2023-05-25 | Stop reason: SDUPTHER

## 2023-05-25 RX ADMIN — LIDOCAINE HYDROCHLORIDE 50 MG: 10 INJECTION, SOLUTION INFILTRATION; PERINEURAL at 10:01

## 2023-05-25 RX ADMIN — PHENYLEPHRINE HYDROCHLORIDE 150 MCG/MIN: 10 INJECTION INTRAVENOUS at 10:13

## 2023-05-25 RX ADMIN — PHENYLEPHRINE HYDROCHLORIDE 100 MCG: 10 INJECTION INTRAVENOUS at 10:12

## 2023-05-25 RX ADMIN — PHENYLEPHRINE HYDROCHLORIDE 100 MCG: 10 INJECTION INTRAVENOUS at 10:14

## 2023-05-25 RX ADMIN — ROCURONIUM BROMIDE 50 MG: 10 INJECTION, SOLUTION INTRAVENOUS at 10:03

## 2023-05-25 RX ADMIN — SUGAMMADEX 400 MG: 100 INJECTION, SOLUTION INTRAVENOUS at 10:53

## 2023-05-25 RX ADMIN — DEXAMETHASONE SODIUM PHOSPHATE 4 MG: 10 INJECTION, SOLUTION INTRAMUSCULAR; INTRAVENOUS at 10:18

## 2023-05-25 RX ADMIN — PROPOFOL 200 MG: 10 INJECTION, EMULSION INTRAVENOUS at 10:02

## 2023-05-25 RX ADMIN — PHENYLEPHRINE HYDROCHLORIDE 100 MCG: 10 INJECTION INTRAVENOUS at 10:08

## 2023-05-25 RX ADMIN — PHENYLEPHRINE HYDROCHLORIDE 100 MCG: 10 INJECTION INTRAVENOUS at 10:10

## 2023-05-25 RX ADMIN — ONDANSETRON 4 MG: 2 INJECTION INTRAMUSCULAR; INTRAVENOUS at 10:20

## 2023-05-25 RX ADMIN — FENTANYL CITRATE 50 MCG: 0.05 INJECTION, SOLUTION INTRAMUSCULAR; INTRAVENOUS at 10:01

## 2023-05-25 RX ADMIN — SODIUM CHLORIDE: 4.5 INJECTION, SOLUTION INTRAVENOUS at 09:51

## 2023-05-25 RX ADMIN — FENTANYL CITRATE 50 MCG: 0.05 INJECTION, SOLUTION INTRAMUSCULAR; INTRAVENOUS at 10:34

## 2023-05-25 RX ADMIN — EPHEDRINE SULFATE 10 MG: 50 INJECTION INTRAMUSCULAR; INTRAVENOUS; SUBCUTANEOUS at 10:15

## 2023-05-25 ASSESSMENT — LIFESTYLE VARIABLES: SMOKING_STATUS: 0

## 2023-05-25 NOTE — OP NOTE
Operative Note      Patient: Ethan Reese  YOB: 1955  MRN: [id number]    5/25/2023    Preoperative Diagnosis:    1. ESRD requiring hemodialysis     Postoperative Diagnosis:  Same    Operative Procedure:    1. Ultrasound guided cannulation of left internal jugular vein   2. Placement of left internal jugular vein tunneled dialysis catheter (Bard Glidepath 27 cm tip to cuff). Surgeon:  Sandra Galvan DO    Anesthesia:  general    EBL:  Less than 50 ml    Findings:  1. The left internal jugular vein is patent. 2.  The dialysis catheter tips are in the right atrium/superior vena cava junction. Procedure in Detail:    After the patient was consented, the patient was brought to operating room and placed on the table in the supine position. General anesthesia was administered and the patient's Left neck and chest were prepped and draped in the usual sterile fashion. The left internal jugular vein was cannulated under ultrasound guidance with a micropuncture needle. Seldinger technique was utilized to place an 0.035 wire into the inferior vena cava under fluoroscopic visualization. An incision was made in the left neck over the wire with knife. A separate incision was made in the left chest with knife. The catheter was tunneled from the chest to the neck incision. Dilators were passed over the wire under fluoroscopic visualization. A dilator/tear-away sheath was placed over the wire under fluoroscopic visualization and the dilator and wire were removed. The catheter was placed through the tear-away sheath and down to the right atrium under fluoroscopic visualization. The tear-away sheath was removed and the catheter was withdrawn until the tips were in the superior vena cava/right atrial junction. The left neck wound was closed in layers with 4-0 Monocryl sutures and dermabond skin adhesive. The exit site was secured around the catheter with 3-0 nylon  suture.   The Transition of care performed with sharing of clinical summary Prophylactic measure

## 2023-05-25 NOTE — PROGRESS NOTES
Physical Therapy  Name: Delonte Queen  MRN:  [id number]  Date of service:  5/25/2023 05/25/23 0955   Subjective   Subjective Attempt: Pt out to surgery at this time. Will continue to follow.            Electronically signed by Kendal Gu PTA on 5/25/2023 at 9:56 AM

## 2023-05-25 NOTE — ANESTHESIA POSTPROCEDURE EVALUATION
Department of Anesthesiology  Postprocedure Note    Patient: Xavi Unger  MRN: [id number]  YOB: 1955  Date of evaluation: 5/25/2023      Procedure Summary     Date: 05/25/23 Room / Location: [facility] OR 03 / [facility]    Anesthesia Start: 5786 Anesthesia Stop: 1117    Procedure: INSERTION OF TUNNELED [address] Diagnosis:       Peripheral vascular disease ([address]      (Peripheral vascular disease ([address] [I73.9])    Surgeons: Lindsey Schaefer DO Responsible Provider: JODY Tay CRNA    Anesthesia Type: MAC, TIVA ASA Status: 4          Anesthesia Type: No value filed.     Paul Phase I:      Paul Phase II:        Anesthesia Post Evaluation    Patient location during evaluation: PACU  Patient participation: complete - patient participated  Level of consciousness: sleepy but conscious  Pain score: 0  Airway patency: patent  Nausea & Vomiting: no nausea and no vomiting  Complications: no  Cardiovascular status: hemodynamically stable and blood pressure returned to baseline  Respiratory status: acceptable, spontaneous ventilation, nonlabored ventilation and nasal cannula  Hydration status: stable  Comments: BP 91/66   Pulse 82   Temp 97 °F (36.1 °C) (Skin)   Resp 18   Ht 6' 1\" (1.854 m)   Wt (!) 335 lb 4 oz (152.1 kg)   SpO2 93%   PF 34 L/min   BMI 44.23 kg/m²

## 2023-05-25 NOTE — PROGRESS NOTES
Nephrology ([facility]) Progress Note    Patient:  Jemma Carter  YOB: 1955  Date of Service: 5/25/2023  MRN: [id number]   Acct: [de-identified]   Primary Care Physician: JODY Quintana  Advance Directive: Full Code  Admit Date: 5/10/2023       Hospital Day: 15  Referring Provider: Daniel Isbell MD    Patient independently seen and examined, Chart, Consults, Notes, Operative notes, Labs, Cardiology, and Radiology studies reviewed as available. Subjective:  Jemma Carter is a 78 y.o. male for whom we were consulted for evaluation and treatment of acute kidney injury/chronic kidney disease. Patient has history of stage IIIa chronic kidney disease with baseline GFR varied from 45 to 55. He was recently hospitalized with septic arthritis of left elbow/MSSA bacteremia, underwent drainage followed by IV antibiotics. Patient has a history of follicular lymphoma and has received at least 3 cycles of chemotherapy, under the care of Dr. Winnie Crowe. Now presented with increasing shortness of breath, dyspnea on exertion and atrial fibrillation. His chest x-ray consistent with bilateral diffuse infiltrate with possible pulmonary edema versus bilateral pneumonia versus ARDS. Recent 2D echo consistent with left ventricular ejection fraction 50 to 55%, echodense area at aortic leaflet could represent aortic vegetation. He was initially admitted to CCU. Required high flow oxygen for the treatment of hypoxemic respiratory failure. He has less urine output despite intravenous diuretics. His serum creatinine had risen to 3.0 with baseline serum creatinine 1.5. On May 18, patient had his first hemodialysis treatment via right femoral dialysis catheter and tolerated very well. Today, no overnight events. PermCath placement planned today per discussions with vascular. Denied current chest pain, shortness of air at rest, nausea or vomiting.               Allergies:  Pcn

## 2023-05-25 NOTE — PROGRESS NOTES
Physical Therapy  Name: Haley Hunter  MRN:  [id number]  Date of service:  5/25/2023 05/25/23 1410   Subjective   Subjective Pt has to be on bedrest until 4:45 PM today per nursing. Will check back tomorrow.            Electronically signed by Evaristo Heimlich, PTA on 5/25/2023 at 2:10 PM

## 2023-05-25 NOTE — ANESTHESIA PRE PROCEDURE
Department of Anesthesiology  Preprocedure Note       Name:  Shabbir Canseco   Age:  78 y.o.  :  1955                                          MRN:  [id number]         Date:  2023      Surgeon: Glo Gunderson):  Assunta Claude, DO    Procedure: Procedure(s):  INSERTION OF TUNNELED [facility]    Medications prior to admission:   Prior to Admission medications    Medication Sig Start Date End Date Taking? Authorizing Provider   dilTIAZem (CARDIZEM) 30 MG tablet Take 1 tablet by mouth 3 times daily 23   Mckenna Alcantara DO   megestrol (MEGACE) 40 MG/ML suspension Take 5 mLs by mouth daily 23   Naomie Dobson MD   CARTIA  MG extended release capsule Take 1 capsule by mouth in the morning and at bedtime FOR HEART 23   JODY Sol   midodrine (PROAMATINE) 2.5 MG tablet Take 1 tablet by mouth 3 times daily 23   Mayur Mehta MD   furosemide (LASIX) 40 MG tablet Take 1 tablet by mouth daily 23   Mayur Mehta MD   allopurinol (ZYLOPRIM) 300 MG tablet Take 1 tablet by mouth daily 4/10/23   Naomie Dobson MD   nystatin (MYCOSTATIN) 445761 UNIT/GM ointment Apply topically 2 times daily.  3/27/23   JODY Sol   vitamin E 400 UNIT capsule Take 1 capsule by mouth daily    Historical Provider, MD   metoprolol succinate (TOPROL XL) 100 MG extended release tablet Take 1 tablet by mouth in the morning and at bedtime 3/8/23   JODY Sol   rosuvastatin (CRESTOR) 10 MG tablet TAKE 1 TABLET EVERY NIGHT 3/8/23   JODY Sol   rivaroxaban (XARELTO) 20 MG TABS tablet TAKE 1 TABLET EVERY DAY WITH BREAKFAST  Patient taking differently: 1 tablet daily (with breakfast) TAKE 1 TABLET EVERY DAY WITH BREAKFAST 22   JODY Sol       Current medications:    Current Facility-Administered Medications   Medication Dose Route Frequency Provider Last Rate Last Admin    [MAR Hold] pantoprazole (PROTONIX) 40 mg in sodium chloride (PF) 0.9 % 10 mL

## 2023-05-25 NOTE — PROGRESS NOTES
Skin    Discharge Planning:     Too soon to determine     Kieran Bonds MS, RD, LD, CDCES  Contact: [phone]

## 2023-05-25 NOTE — PROGRESS NOTES
Supportive visit:    Pt resting in specialty bed and waiting for procedure to place permcath. Noted pt had coffee ground emesis last evening and GI consulted. recommendations to follow. Discussion with SW and pt's primary nurse about establishing out pt HD and returning to [facility] to rehab as goal of care. Pt is not moving much and has wounds on his coccyx and buttocks. Call placed to pt's mother who is his decision maker along with pt's brother Cydney [place]. Reviewed pt's plans and confirmed pt will return to [facility]. They would be hopeful at some point to bring pt home. We talked about how pt will need to work hard with therapy to stay as mobile as possible for transfers. His mother says they will encourage pt to do so. She says she is not certain he understands all that he is up against.  Support and encouragement provided. Will continue to follow POC.     Electronically signed by Elena Hernandez RN on 5/25/2023 at 9:09 AM

## 2023-05-25 NOTE — PLAN OF CARE
Problem: Discharge Planning  Goal: Discharge to home or other facility with appropriate resources  Outcome: Progressing     Problem: Safety - Adult  Goal: Free from fall injury  Outcome: Progressing     Problem: ABCDS Injury Assessment  Goal: Absence of physical injury  Outcome: Progressing     Problem: Skin/Tissue Integrity  Goal: Absence of new skin breakdown  Description: 1. Monitor for areas of redness and/or skin breakdown  2. Assess vascular access sites hourly  3. Every 4-6 hours minimum:  Change oxygen saturation probe site  4. Every 4-6 hours:  If on nasal continuous positive airway pressure, respiratory therapy assess nares and determine need for appliance change or resting period.   Outcome: Progressing     Problem: Pain  Goal: Verbalizes/displays adequate comfort level or baseline comfort level  Outcome: Progressing     Problem: Nutrition Deficit:  Goal: Optimize nutritional status  Outcome: Progressing  Flowsheets (Taken 5/25/2023 0825 by Marcial Burkitt, MS, RD, LD)  Nutrient intake appropriate for improving, restoring, or maintaining nutritional needs:   Assess nutritional status and recommend course of action   Monitor oral intake, labs, and treatment plans

## 2023-05-25 NOTE — PROGRESS NOTES
Progress Note      Date:5/25/2023       Room:0715/715-02  Patient Name:Figueroa Aguero     YOB: 1955     Age:67 y.o. Subjective    Subjective: 79-year-old male with a history of chronic atrial fibrillation, high blood pressure, lymphoma, CKD stage III, GERD, hypertension, hyperlipidemia, blood clots, presented to the hospital 5/10/2023 with concerns of shortness of breath. Was recently discharged from this facility 4/28 after being treated for staph bacteremia, A-fib with RVR and septic bursitis of left elbow. Was discharged to nursing home on Keflex. In the ED CTA chest negative for PE however showed diffuse bilateral multifocal pneumonia, respiratory PCR was negative. In-house was placed on broad-spectrum antibiotics due to concerns of septic emboli. Was seen by ID who recommended discontinuation of Vanco and continuation of meropenem. Blood cultures were obtained and has been negative since 5/12. Patient was also evaluated by cardiology given concerns of septic emboli and echo concerning for possible aortic valve vegetation. Cardiology and CT surgery recommends continuation of current management and possible NAVDEEP when respiratory status improves however will be low yield at this time as it would not change current management. Followed in-house by nephrology given underlining renal dysfunction and progressive decline, temp dialysis access was placed by vascular 5/18 and initiated on dialysis. Weaned off cardizem drip and reinitiate pt oral metoprolol and cardizem. GI panel positive for C-diff, on oral vanc. Patient tx out of ICU to PCU, was weaned off oxygen, reevaluated by cardiology and s/p NAVDEEP with no evidence of vegetations. S/p perm cath placement 5/25. Patient with coffee ground emesis yesterday and GI consulted. Was initiated on PPI, no recurrent episode noted. Was seen in house this morning, no acute overnight events.      Review of Systems: 8 point system review negative except

## 2023-05-26 NOTE — PROGRESS NOTES
Occupational Therapy  Pt declined therapy this pm. Pt states \"all I can do is wiggle my toes\". Pt demo being able to move forearms. Pt states \"I just need to rest. I am so tired\".  Electronically signed by FERNANDO Sahni on 5/26/2023 at 2:44 PM

## 2023-05-26 NOTE — PROGRESS NOTES
Pt called to desk c/o feeling sick at his stomach. When I got to room pt was resting with eyes closed. No emesis. Pt was lightly snoring. Pt woke when I spoke to him. Stated still felt a little sick. Zofran 4mg IV given. Will monitor.  Electronically signed by Marbella Oconnor RN on 5/26/2023 at 4:01 PM

## 2023-05-26 NOTE — PROGRESS NOTES
Progress Note      Date:5/26/2023       Room:0715/715-02  Patient Name:Figueroa Aguero     YOB: 1955     Age:67 y.o. Subjective    Subjective: 71-year-old male with a history of chronic atrial fibrillation, high blood pressure, lymphoma, CKD stage III, GERD, hypertension, hyperlipidemia, blood clots, presented to the hospital 5/10/2023 with concerns of shortness of breath. Was recently discharged from this facility 4/28 after being treated for staph bacteremia, A-fib with RVR and septic bursitis of left elbow. Was discharged to nursing home on Keflex. In the ED CTA chest negative for PE however showed diffuse bilateral multifocal pneumonia, respiratory PCR was negative. In-house was placed on broad-spectrum antibiotics due to concerns of septic emboli. Was seen by ID who recommended discontinuation of Vanco and continuation of meropenem. Blood cultures were obtained and has been negative since 5/12. Patient was also evaluated by cardiology given concerns of septic emboli and echo concerning for possible aortic valve vegetation. Cardiology and CT surgery recommends continuation of current management and possible NAVDEEP when respiratory status improves however will be low yield at this time as it would not change current management. Followed in-house by nephrology given underlining renal dysfunction and progressive decline, temp dialysis access was placed by vascular 5/18 and initiated on dialysis. Weaned off cardizem drip and reinitiate pt oral metoprolol and cardizem. GI panel positive for C-diff, on oral vanc. Patient tx out of ICU to PCU, was weaned off oxygen, reevaluated by cardiology and s/p NAVDEEP with no evidence of vegetations. S/p perm cath placement 5/25. Patient with coffee ground emesis 5/24 and GI consulted. Was initiated on PPI, no recurrent episode noted. Plans to monitor today and if recurs, will need EGD.  Received dialysis through new perm cath and plans for another dialysis

## 2023-05-26 NOTE — PROGRESS NOTES
Patient seen in dialysis with treatment running via new LEFT IJ tunneled hemodialysis catheter. Dressing over site is CDI, minimal bruising noted. Good flow rates noted, vital signs are stable. Post op instructions in AVS, will have post op visit in 2 weeks in Vascular office.

## 2023-05-26 NOTE — PROGRESS NOTES
Patient in dialysis  No reports of further hematemesis   No significant change in hemoglobin/ hct  Continue GI medication and follow H/H  Dr. Annemarie Mosley

## 2023-05-26 NOTE — PROGRESS NOTES
up, and good hemostasis was noted. He tolerated the procedure well. There were no complications. Estimated blood loss was less than 5 cc. Plan: It is okay to use the catheter for dialysis. We would be happy to be consulted for more permanent dialysis access, i.e. a PermCath if needed, once the patient is more stable and has been evaluated for aortic vegetation. We appreciate the opportunity to participate in the care of this patient.         Assessment   Acute kidney injury/ATN  Chronic kidney disease stage IIIa  Acute respiratory failure  Hyperphosphatemia  Follicular lymphoma  Recent MSSA bacteremia with left elbow septic arthritis      Plan:  Discussed with patient, nursing, vascular  Monitor labs  Work-up reviewed to date  PermCath placement per vascular 5/25  Cardiology/vascular/ID evaluations reviewed  Reassess in the morning  Dialysis Monday Wednesday Friday as needed, will plan extra treatment on Saturday 5/27    Rene James MD  05/26/23  11:45 AM

## 2023-05-26 NOTE — PROGRESS NOTES
Nutrition Assessment     Type and Reason for Visit: Reassess    Nutrition Recommendations/Plan:   Modify diet to include Low Potassium restriction. Start Ensure Clear BID. Malnutrition Assessment:  Malnutrition Status: Severe malnutrition    Nutrition Assessment:  Pt continues to decline from a nutritional standpoint. Pt went for permcath placement yesterday and was NPO much of the day. He was placed on clear liquid diet per GI following episode of coffee ground emesis. No significant PO intake documented. Pt is ordered Megace for appetite stimulant. K+ 5.7 this AM. Will include potassium restriction in diet order and order ONS BID to supplement meal intake. Continuing to follow. Estimated Daily Nutrient Needs:  Energy (kcal):  4285-6394 kcals (8-11 kcals/KG) Weight Used for Energy Requirements: Current     Protein (g):  167g Weight Used for Protein Requirements: Ideal        Fluid (ml/day):  2204-6963 ml Method Used for Fluid Requirements: 1 ml/kcal    Nutrition Related Findings:   +1, +3 generalized edema Wound Type: Surgical Incision, Stage II    Current Nutrition Therapies:    ADULT DIET; Clear Liquid;  Low Potassium (Less than 3000 mg/day)  ADULT ORAL NUTRITION SUPPLEMENT; Breakfast, Dinner; Clear Liquid Oral Supplement    Anthropometric Measures:  Height: 6' 1\" (185.4 cm)  Current Body Wt: 335 lb 4 oz (152.1 kg)   BMI: 44.2    Nutrition Diagnosis:   Severe malnutrition, In context of chronic illness related to inadequate protein-energy intake as evidenced by Criteria as identified in malnutrition assessment    Nutrition Interventions:   Food and/or Nutrient Delivery: Modify Current Diet, Start Oral Nutrition Supplement  Nutrition Education/Counseling: No recommendation at this time  Coordination of Nutrition Care: Continue to monitor while inpatient  Plan of Care discussed with: nursing    Goals:  Previous Goal Met: Progressing toward Goal(s)  Goals: Meet at least 75% of estimated needs, PO intake 50%

## 2023-05-26 NOTE — DISCHARGE INSTRUCTIONS
After Dialysis Catheter (PermaCath) Placement    1. Expect to be sore for a few days. You may use acetaminophen (tylenol) for this temporary discomfort. Please refer to your nephrologist for further questions about medications or dosing. 2.  A small amount of blood or drainage oozing from the insertion site is normal.  If the dressing becomes saturated with blood, hold firm and direct pressure on the insertion site and sit up at a 90 degree angle for 20 minutes. If bleeding continues, call the office. If the office is closed, you may be directed to go to the nearest emergency department for evaluation. 3.  Prevent tugging or pulling the catheter. If it is dislodged it may not work properly and may need to be replaced. 4.  Do not use creams or ointments over the insertion area. The dialysis center nurses will clean and dress your catheter each dialysis visit. 5.  Keep the catheter dry. You may NOTshower. Sitting in a tub is permissible as long as the water level is below the catheter. No swimming! 6. Avoid lifting anything weighing more than 10 pounds for the next three days. Ten pounds is about the weight of two phone books or a gallon of milk. Lifting may put a strain on the incision before it has a chance to heal.  7.  Do NOT change the dressing yourself. Permacath care should be provided by your dialysis until and must be done with a sterile technique to prevent infection. PROBLEMS OR CONCERNS    Call the vascular surgery office at [phone] with any of the followin. Continued bleeding after 20 minutes of firm, direct pressure and sitting upright. If the office is closed, you may be directed to go to the nearest emergency department for evaluation. 2.  Fever or chills, redness, heat or sudden swelling around insertion site. 3.  Catheter position changes or catheter pulled out.

## 2023-05-26 NOTE — PROGRESS NOTES
Physical Therapy   Name: Jeanne Knutson  MRN:  [id number]  Date of service:  5/26/2023  Attempted PT reassessment for >14 day stay. Pt. had HD in AM and now is exhausted and requesting to rest. Will f/u at a later time.   Electronically signed by Pierce Montoya PT on 5/26/2023 at 1:34 PM

## 2023-05-27 NOTE — PROGRESS NOTES
in the last 72 hours. LIVER PROFILE:  No results for input(s): AST, ALT, BILIDIR, BILITOT, ALKPHOS in the last 72 hours. Imaging Last 24 Hours:  No results found. Assessment//Plan           Hospital Problems             Last Modified POA    * (Principal) Pneumonia of both lungs due to infectious organism 5/25/2023 Yes    Chronic a-fib ([address]) 5/10/2023 Yes    Atrial fibrillation with RVR ([address]) 5/10/2023 Yes    Lymphoma ([address]) 5/10/2023 Yes    Essential hypertension 5/10/2023 Yes    History of septic arthritis 5/10/2023 Yes    Stage 3b chronic kidney disease (CKD) ([address]) 5/10/2023 Yes    Palliative care patient 5/12/2023 Yes    Stage 5 chronic kidney disease not on chronic dialysis ([address]) 5/18/2023 Yes    Encounter regarding vascular access for dialysis for ESRD ([address]) 5/18/2023 Yes    Severe malnutrition ([address]) 5/19/2023 Yes   Assessment & Plan    Coffee ground Emesis  Acute hypoxic respiratory failure: resolved  Bilateral pneumonia  Recent Staphylococcus bacteremia  Recent left elbow bursitis with septic arthritis. Atrial fibrillation with RVR  LAURA / ATN on CKD stage IIIb. Acute metabolic encephalopathy  History of lymphoma  C-diff diarrhea  Severe Malnutrition per dietary      Plan:  Per GI continue PPI and sucrafate since no recurrence  Weaned off oxygen but patient insist on wearing it  Discontinued meropenem 5/23  Blood culture negative since 5/12/2023. Appreciate ID recs  Appreciate consultants recommendation including ID, nephro, cardiology, CT surgery, oncology and pulm. Temp dialysis access placed by vascular; and initiated on dialysis 5/18    - perm cath placement; 5/25  Echo reviewed with concerns of questionable aortic valve vegetation.     - NAVDEEP with no evidence of vegetation  Oral Vanc for C-diff till  5/29  Outpt dialysis chair setup completed  Therapy following. Dispo: SNF, will need precert.  Encourage pt to participate with htherapy      Electronically signed by   Russ Siddiqui MD

## 2023-05-28 NOTE — PROGRESS NOTES
Occupational Therapy  Facility/Department: [facility] PROGRESSIVE CARE  Occupational Therapy Initial Assessment    Name: Nicola Justin  : 1955  MRN: [id number]  Date of Service: 2023    Discharge Recommendations:  24 hour supervision or assist, Patient would benefit from continued therapy after discharge          Patient Diagnosis(es): The primary encounter diagnosis was Pneumonia of both lungs due to infectious organism, unspecified part of lung. Diagnoses of Acute respiratory failure with hypoxia (HCC) and Chronic atrial fibrillation (HCC) were also pertinent to this visit. Past Medical History:  has a past medical history of Atrial fibrillation ([address]), Cellulitis of left leg, Colon polyp, Elevated blood pressure reading without diagnosis of hypertension, Esophageal reflux, History of prostate cancer, Hx of blood clots, Hypertension, Mixed hyperlipidemia, Obesity, Palliative care patient, Pericardial effusion, and Prostate cancer ([address] Past Surgical History:  has a past surgical history that includes Upper gastrointestinal endoscopy (3/22/2000); Colonoscopy (2009); Colonoscopy (); Vasectomy; bone marrow biopsy (N/A, 2022); Port Surgery (N/A, 2022); lymph node biopsy (Left, 2023); Arm Surgery (Left, 2023); and vascular surgery (N/A, 2023). Assessment   Performance deficits / Impairments: Decreased functional mobility ; Decreased ADL status; Decreased balance;Decreased high-level IADLs;Decreased strength;Decreased cognition;Decreased endurance;Decreased ROM  Assessment: Pt requires max x2 with bed mobility and max A EOB sitting with posterior lean. Decreased UB strength/ROM, requiring assist to initiate R shoulder movement in supine but then able to complete ther ex with min support. Pt able to feed self with setup but reports no appetite. Pt may benefit from skilled OT to progress ADL/functional mobility as tolerated. Pt requires 24 hr care.   REQUIRES OT FOLLOW-UP:

## 2023-05-28 NOTE — PROGRESS NOTES
Progress Note      Date:5/28/2023       Room:0715/715-02  Patient Name:Figueroa Aguero     YOB: 1955     Age:67 y.o. Subjective    Subjective: 61-year-old male with a history of chronic atrial fibrillation, high blood pressure, lymphoma, CKD stage III, GERD, hypertension, hyperlipidemia, blood clots, presented to the hospital 5/10/2023 with concerns of shortness of breath. Was recently discharged from this facility 4/28 after being treated for staph bacteremia, A-fib with RVR and septic bursitis of left elbow. Was discharged to nursing home on Keflex. In the ED CTA chest negative for PE however showed diffuse bilateral multifocal pneumonia, respiratory PCR was negative. In-house was placed on broad-spectrum antibiotics due to concerns of septic emboli. Was seen by ID who recommended discontinuation of Vanco and continuation of meropenem. Blood cultures were obtained and has been negative since 5/12. Patient was also evaluated by cardiology given concerns of septic emboli and echo concerning for possible aortic valve vegetation. Cardiology and CT surgery recommends continuation of current management and possible NAVDEEP when respiratory status improves however will be low yield at this time as it would not change current management. Followed in-house by nephrology given underlining renal dysfunction and progressive decline, temp dialysis access was placed by vascular 5/18 and initiated on dialysis. Weaned off cardizem drip and reinitiate pt oral metoprolol and cardizem. GI panel positive for C-diff, on oral vanc. Patient tx out of ICU to PCU, was weaned off oxygen, reevaluated by cardiology and s/p NAVDEEP with no evidence of vegetations. S/p perm cath placement 5/25. Patient with coffee ground emesis 5/24 and GI consulted. Was initiated on PPI, no recurrent episode noted. GI recc continuation of PPI and sucrafate.     Patient not cooperating with therapy team, advised him we will need his participation

## 2023-05-28 NOTE — PROGRESS NOTES
Physical Therapy Reassessment    Mimi Field  [id number]       05/28/23 1330   Restrictions/Precautions   Restrictions/Precautions Fall Risk   Position Activity Restriction   Other position/activity restrictions specialty bed   General   Diagnosis PNA, EMESIS, cdiff   Subjective   Subjective Pt confused overall but willing to participate   Vitals   O2 Device Nasal cannula   Subjective   Pain grimaces and moans at generalized pain during bed mob, cannot quantify   Orientation   Orientation Level Oriented to person;Oriented to place   Cognition   Cognition Comment NOT MAKING SENSE AT TIMES, NURSE AWARE   Gross Assessment   AROM Grossly decreased, non-functional   Strength Grossly decreased, non-functional   Bed mobility   Supine to Sit Maximum assistance;2 Person assistance   Sit to Supine Maximum assistance;2 Person assistance   Bed Mobility Comments SAT EOB ~3-4 MIN WITH MAX ASSIST, Pt LEANS BACK AND CANNOT CORRECT WITHOUT HELP. FEARFUL OF FALLING. Balance   Sitting - Static Fair;-   Sitting - Dynamic Poor   Short Term Goals   Time Frame for Short Term Goals 2 WKS   Short Term Goal 1 SUP<>SIT, MIN A   Short Term Goal 2 SIT<>STAND, MOD A 1+1   Short Term Goal 3 TF'S WITH RW, MIN /MOD A 1   Activity Tolerance   Activity Tolerance Patient limited by fatigue;Patient limited by endurance   Assessment   Assessment REQUIRES ASSIST FOR ALL ASPECTS OF MOBILITY. WOULD NOT HAVE BEEN SAFE TO ATTEMPT STAND TODAY DUE TO POOR SITTING BALANCE. WILL BE LIFT TO CHAIR IF INDICATED IN NEAR FUTURE. Discharge Recommendations Continue to assess pending progress; Patient would benefit from continued therapy after discharge   [address] [place] [address] 5-7 times per week   Current Treatment Recommendations Strengthening; Functional mobility training;Transfer training;Patient/Caregiver education & training;Pain management;Gait training; Safety education & training; Therapeutic activities; Positioning; Endurance training   Safety

## 2023-05-28 NOTE — PROGRESS NOTES
4 Eyes Skin Assessment    Mary Fairbanks is being assessed upon: Transfer to [facility], room 317    I agree that Mitra Schofield, RN, along with Coral Unger, RN (either 2 RN's or 1 LPN and 1 RN) have performed a thorough Head to Toe Skin Assessment on the patient. ALL assessment sites listed below have been assessed. Areas assessed by both nurses:     [x]   Head, Face, and Ears   [x]   Shoulders, Back, and Chest  [x]   Arms, Elbows, and Hands   [x]   Coccyx, Sacrum, and Ischium  [x]   Legs, Feet, and Heels    Does the Patient have Skin Breakdown? Yes, wound(s) noted upon assessment. It is the responsibility of the Primary Nurse to assure that the following documentation, preventions, orders, and consults are complete on the above noted wound(s): Wound LDA initiated. LDA Flowsheet Documentation includes the Rosalind-wound, Wound Assessment, Measurements, Dressing Treatment, Drainage, and Color.     Kali Prevention initiated: Yes  Wound Care Orders initiated: Yes    [facility] nurse consulted for Pressure Injury (Stage 3,4, Unstageable, DTI, NWPT, and Complex wounds) and New or Established Ostomies: Yes        Primary Nurse eSignature: Madhavi Ryan RN on 5/28/2023 at 4:18 PM      Co-Signer eSignature: Electronically signed by Matt Norton RN on 5/28/23 at 4:20 PM CDT

## 2023-05-28 NOTE — PROGRESS NOTES
05/11/2023    Pericardial effusion     Prostate cancer [facility])     2010 Dr Lulu Scott;  implants, XRT       Past Surgical History:  Past Surgical History:   Procedure Laterality Date    ARM SURGERY Left 4/22/2023    ELBOW INCISION AND DRAINAGE performed by Hudson Mcpherson MD at [address] N/A 12/9/2022    BONE MARROW ASPIRATION BIOPSY performed by Alexis Benitez PA-C at [address]    COLONOSCOPY  6/2/2009        COLONOSCOPY  2012        LYMPH NODE BIOPSY Left 1/5/2023    EXCISION OF CERVICLE LYMPH NODY-BIOPSY performed by Guille Che MD at [address] N/A 12/16/2022    single lumen port placement with ultrasound and fluoroscopy performed by Walter Genao MD at [address] [place] [address] 107  3/22/2000        VASCULAR SURGERY N/A 5/25/2023    INSERTION OF TUNNELED [address] performed by Benedict Mishra DO at [facility]         Family History  Family History   Problem Relation Age of Onset    Colon Polyps Mother     Heart Disease Father     Cancer Brother         prostate    Colon Polyps Brother     High Blood Pressure Other        Social History  Social History     Socioeconomic History    Marital status: Single     Spouse name: Not on file    Number of children: Not on file    Years of education: Not on file    Highest education level: Not on file   Occupational History    Not on file   Tobacco Use    Smoking status: Never    Smokeless tobacco: Never   Vaping Use    Vaping Use: Never used   Substance and Sexual Activity    Alcohol use: No    Drug use: No    Sexual activity: Not on file   Other Topics Concern    Not on file   Social History Narrative    Not on file     Social Determinants of Health     Financial Resource Strain: Low Risk     Difficulty of Paying Living Expenses: Not hard at all   Food Insecurity: No Food Insecurity    Worried About Running Out of Food in the Last Year: Never true    Ran Out of Food in the

## 2023-05-28 NOTE — PROGRESS NOTES
SURGERY Left 4/22/2023    ELBOW INCISION AND DRAINAGE performed by Addis Ogden MD at [address] N/A 12/9/2022    BONE MARROW ASPIRATION BIOPSY performed by Carol Ann Estrada PA-C at [address] [facility] OR    COLONOSCOPY  6/2/2009        COLONOSCOPY  2012        LYMPH NODE BIOPSY Left 1/5/2023    EXCISION OF CERVICLE LYMPH NODY-BIOPSY performed by Phillip Castro MD at [address] N/A 12/16/2022    single lumen port placement with ultrasound and fluoroscopy performed by Hudson Espinal MD at [address] [facility] 107  3/22/2000        VASCULAR SURGERY N/A 5/25/2023    INSERTION OF TUNNELED [address] performed by Lola Guadalupe DO at [facility]         Family History  Family History   Problem Relation Age of Onset    Colon Polyps Mother     Heart Disease Father     Cancer Brother         prostate    Colon Polyps Brother     High Blood Pressure Other        Social History  Social History     Socioeconomic History    Marital status: Single     Spouse name: Not on file    Number of children: Not on file    Years of education: Not on file    Highest education level: Not on file   Occupational History    Not on file   Tobacco Use    Smoking status: Never    Smokeless tobacco: Never   Vaping Use    Vaping Use: Never used   Substance and Sexual Activity    Alcohol use: No    Drug use: No    Sexual activity: Not on file   Other Topics Concern    Not on file   Social History Narrative    Not on file     Social Determinants of Health     Financial Resource Strain: Low Risk     Difficulty of Paying Living Expenses: Not hard at all   Food Insecurity: No Food Insecurity    Worried About Running Out of Food in the Last Year: Never true    Ran Out of Food in the Last Year: Never true   Transportation Needs: No Transportation Needs    Lack of Transportation (Medical): No    Lack of Transportation (Non-Medical):  No   Physical Activity:

## 2023-05-29 NOTE — PLAN OF CARE
Problem: Discharge Planning  Goal: Discharge to home or other facility with appropriate resources  5/29/2023 0001 by Lila Stewart RN  Outcome: Progressing  Flowsheets (Taken 5/28/2023 2103)  Discharge to home or other facility with appropriate resources: Identify barriers to discharge with patient and caregiver  5/28/2023 1401 by Hua Lima RN  Outcome: Progressing     Problem: Safety - Adult  Goal: Free from fall injury  5/29/2023 0001 by Lila Stewart RN  Outcome: Progressing  5/28/2023 1401 by Hua Lima RN  Outcome: Progressing     Problem: ABCDS Injury Assessment  Goal: Absence of physical injury  5/29/2023 0001 by Lila Stewart RN  Outcome: Progressing  5/28/2023 1401 by Hua Lima RN  Outcome: Progressing     Problem: Skin/Tissue Integrity  Goal: Absence of new skin breakdown  Description: 1. Monitor for areas of redness and/or skin breakdown  2. Assess vascular access sites hourly  3. Every 4-6 hours minimum:  Change oxygen saturation probe site  4. Every 4-6 hours:  If on nasal continuous positive airway pressure, respiratory therapy assess nares and determine need for appliance change or resting period.   5/29/2023 0001 by Lila Stewart RN  Outcome: Progressing  5/28/2023 1401 by Hua Lima RN  Outcome: Progressing     Problem: Pain  Goal: Verbalizes/displays adequate comfort level or baseline comfort level  5/29/2023 0001 by Lila Stewart RN  Outcome: Progressing  5/28/2023 1401 by Hua Lima RN  Outcome: Progressing     Problem: Nutrition Deficit:  Goal: Optimize nutritional status  5/29/2023 0001 by Lila Stewart RN  Outcome: Progressing  5/28/2023 1401 by Hua Lima RN  Outcome: Progressing

## 2023-05-29 NOTE — PROGRESS NOTES
AST, ALT, BILIDIR, BILITOT, ALKPHOS in the last 72 hours. Imaging Last 24 Hours:  No results found. Assessment//Plan           Hospital Problems             Last Modified POA    * (Principal) Pneumonia of both lungs due to infectious organism 5/25/2023 Yes    Chronic a-fib ([address]) 5/10/2023 Yes    Atrial fibrillation with RVR ([address]) 5/10/2023 Yes    Lymphoma ([address]) 5/10/2023 Yes    Essential hypertension 5/10/2023 Yes    History of septic arthritis 5/10/2023 Yes    Stage 3b chronic kidney disease (CKD) ([address]) 5/10/2023 Yes    Palliative care patient 5/12/2023 Yes    Stage 5 chronic kidney disease not on chronic dialysis ([address]) 5/18/2023 Yes    Encounter regarding vascular access for dialysis for ESRD ([address]) 5/18/2023 Yes    Severe malnutrition ([address]) 5/19/2023 Yes       Assessment & Plan    Coffee ground Emesis  Acute hypoxic respiratory failure: resolved  Bilateral pneumonia  Recent Staphylococcus bacteremia  Recent left elbow bursitis with septic arthritis. Atrial fibrillation with RVR  LAURA / ATN on CKD stage IIIb. Acute metabolic encephalopathy  History of lymphoma  C-diff diarrhea  Severe Malnutrition per dietary      Plan:  Per GI continue PPI and sucrafate since no recurrence  Weaned off oxygen but patient insist on wearing it  Discontinued meropenem 5/23  Blood culture negative since 5/12/2023. Appreciate ID recs  Appreciate consultants recommendation including ID, nephro, cardiology, CT surgery, oncology and pulm. Temp dialysis access placed by vascular; and initiated on dialysis 5/18    - perm cath placement; 5/25  Echo reviewed with concerns of questionable aortic valve vegetation.     - NAVDEEP with no evidence of vegetation  Oral Vanc for C-diff, completed on 5/29  Outpt dialysis chair setup completed  Therapy following. Dispo: SNF, will need precert.  Encourage pt to participate with therapy        Electronically signed by   Maribell Last MD   Internal Medicine Hospitalist  On 5/29/2023  At 9:54 AM    EMR Dragon/Transcription disclaimer:   Much of this encounter note is an electronic transcription/translation of spoken language to printed text.  The electronic translation of spoken language may permit erroneous, or at times, nonsensical words or phrases to be inadvertently transcribed; although attempts have made to review the note for such errors, some may still exist.

## 2023-05-29 NOTE — PROGRESS NOTES
Patient coughing up blood with his sputum. Dr. Namita Randolph notified by [employer]. Attempting to collect sputum for culture. Container in room. Family wish to have conference with nephrology regarding if HD will be permeant and his quality of life.

## 2023-05-29 NOTE — PLAN OF CARE
Problem: Discharge Planning  Goal: Discharge to home or other facility with appropriate resources  5/29/2023 1004 by Thalia Almendarez RN  Outcome: Progressing  5/29/2023 0001 by Romayne Snowman, RN  Outcome: Progressing  Flowsheets (Taken 5/28/2023 2103)  Discharge to home or other facility with appropriate resources: Identify barriers to discharge with patient and caregiver     Problem: Safety - Adult  Goal: Free from fall injury  5/29/2023 1004 by Thalia Almendarez RN  Outcome: Progressing  5/29/2023 0001 by Romayne Snowman, RN  Outcome: Progressing     Problem: ABCDS Injury Assessment  Goal: Absence of physical injury  5/29/2023 1004 by Thalia Almendarez RN  Outcome: Progressing  5/29/2023 0001 by Romayne Snowman, RN  Outcome: Progressing     Problem: Pain  Goal: Verbalizes/displays adequate comfort level or baseline comfort level  5/29/2023 1004 by Thalia Almendarez RN  Outcome: Progressing  5/29/2023 0001 by Romayne Snowman, RN  Outcome: Progressing     Problem: Nutrition Deficit:  Goal: Optimize nutritional status  5/29/2023 1004 by Thalia Almendarez RN  Outcome: Progressing  5/29/2023 0001 by Romayne Snowman, RN  Outcome: Progressing

## 2023-05-30 NOTE — PLAN OF CARE
Problem: Discharge Planning  Goal: Discharge to home or other facility with appropriate resources  Outcome: Progressing  Flowsheets (Taken 5/29/2023 2146 by Sandi Mcdonnell RN)  Discharge to home or other facility with appropriate resources: Identify barriers to discharge with patient and caregiver     Problem: Safety - Adult  Goal: Free from fall injury  Outcome: Progressing     Problem: ABCDS Injury Assessment  Goal: Absence of physical injury  Outcome: Progressing     Problem: Skin/Tissue Integrity  Goal: Absence of new skin breakdown  Description: 1. Monitor for areas of redness and/or skin breakdown  2. Assess vascular access sites hourly  3. Every 4-6 hours minimum:  Change oxygen saturation probe site  4. Every 4-6 hours:  If on nasal continuous positive airway pressure, respiratory therapy assess nares and determine need for appliance change or resting period. Outcome: Progressing     Problem: Pain  Goal: Verbalizes/displays adequate comfort level or baseline comfort level  Outcome: Progressing     Problem: Nutrition Deficit:  Goal: Optimize nutritional status  Outcome: Progressing     Problem: Confusion  Goal: Confusion, delirium, dementia, or psychosis is improved or at baseline  Description: INTERVENTIONS:  1. Assess for possible contributors to thought disturbance, including medications, impaired vision or hearing, underlying metabolic abnormalities, dehydration, psychiatric diagnoses, and notify attending LIP  2. [place]ikert high risk fall precautions, as indicated  3. Provide frequent short contacts to provide reality reorientation, refocusing and direction  4. Decrease environmental stimuli, including noise as appropriate  5. Monitor and intervene to maintain adequate nutrition, hydration, elimination, sleep and activity  6. If unable to ensure safety without constant attention obtain sitter and review sitter guidelines with assigned personnel  7.  Initiate Psychosocial CNS and Spiritual Care

## 2023-05-30 NOTE — PROGRESS NOTES
Comprehensive Nutrition Assessment    Type and Reason for Visit:  Reassess    Nutrition Recommendations/Plan:   Asking for advanced diet if possible     Malnutrition Assessment:  Malnutrition Status:  Severe malnutrition (05/30/23 1323)    Context:  Chronic Illness     Findings of the 6 clinical characteristics of malnutrition:  Energy Intake:  Mild decrease in energy intake (Comment)  Weight Loss:  Greater than 10% over 6 months     Body Fat Loss:  No significant body fat loss     Muscle Mass Loss:  No significant muscle mass loss    Fluid Accumulation:  Severe Extremities, Generalized   Strength:  Not Performed    Nutrition Assessment:    Pt is now refusing Clear liquid trays and supplement. Starting day 5 of Clear liquid. Asking of diet could be advanced. Dialysis continues. has lost 20.2lbs or 6 % of UBW in 1 week with dialysis    Nutrition Related Findings:    +2 pitting edema Wound Type: Surgical Incision, Stage II, Open Wounds       Current Nutrition Intake & Therapies:    Average Meal Intake: 0%, 1-25%  Average Supplements Intake: 0%, %  ADULT DIET; Clear Liquid; Low Potassium (Less than 3000 mg/day)  ADULT ORAL NUTRITION SUPPLEMENT; Breakfast, Dinner; Clear Liquid Oral Supplement    Anthropometric Measures:  Height: 6' 1\" (185.4 cm)  Ideal Body Weight (IBW): 184 lbs (84 kg)       Current Body Weight: 315 lb 2 oz (142.9 kg), 171.3 % IBW.  Weight Source: Bed Scale  Current BMI (kg/m2): 41.6  Usual Body Weight: 332 lb (150.6 kg) (12/1/2022)  % Weight Change (Calculated): -11.1  Weight Adjustment For: No Adjustment  BMI Categories: Obese Class 3 (BMI 40.0 or greater)    Estimated Daily Nutrient Needs:  Energy Requirements Based On: Kcal/kg  Weight Used for Energy Requirements: Current  Energy (kcal/day): 6497-1500 kcals (8-11 kcals/KG)  Weight Used for Protein Requirements: Ideal  Protein (g/day): 167g  Method Used for Fluid Requirements: 1 ml/kcal  Fluid (ml/day): 4423-0285 ml    Nutrition

## 2023-05-30 NOTE — PROGRESS NOTES
11:40 AM    IMAN Dragon/Transcription disclaimer:   Much of this encounter note is an electronic transcription/translation of spoken language to printed text.  The electronic translation of spoken language may permit erroneous, or at times, nonsensical words or phrases to be inadvertently transcribed; although attempts have made to review the note for such errors, some may still exist.

## 2023-05-30 NOTE — CARE COORDINATION
Pt is from [place]; has Medicaid; but primary is [facility]; need to attempt precert first;  [id number] F  Electronically signed by MOSHE Zapata on 5/30/2023 at 3:41 PM

## 2023-05-31 NOTE — PROGRESS NOTES
Follow up:    Called pt's brother and mother to support and review care, as well as goals. Conveyed information from staff that pt is more lethargic, weak, not swallowing well, with speech to see for swallow eval.  Pt is not moving and concern he will not rehab at facility. Staff continue care to pressure wounds on coccyx and buttocks. We talked about quality of life vs quantity of life. Gave family options of hospice care in the [place] if pt cannot continue aggressive treatments. They are considering and we will visit again tomorrow to evaluate his condition. Pt's brother Aishwarya Rosario [place] elects DNR status. Emotional support provided, family thankful for the call.     Electronically signed by Nata Mancuso RN on 5/31/2023 at 3:06 PM

## 2023-05-31 NOTE — PROGRESS NOTES
Nutrition Note    Following for Clear liquid diet since 5/25--starting day 6. Does have ONS. However  Pt is refusing trays now. Asking charge nurse if reason why still liquid. Unsure.   Will contact MD.      Electronically signed by Jena Thomas MS, RD, LD on 5/31/23 at 1:27 PM CDT    Contact: [phone]

## 2023-05-31 NOTE — PROGRESS NOTES
05/31/23 1300   Subjective   Subjective Attempt Pt. in HD   PT Plan of Care   Wednesday D       Electronically signed by Oscar Martinez PTA on 5/31/2023 at 1:16 PM

## 2023-05-31 NOTE — PROGRESS NOTES
Nephrology ([facility]) Dialysis Progress Note    Patient:  Frederic Christopher  YOB: 1955  Date of Service: 5/31/2023  MRN: [id number]   Acct: [de-identified]   Primary Care Physician: JODY Harper  Advance Directive: Full Code  Admit Date: 5/10/2023       Hospital Day: 21  Referring Provider: Jose Antonio MD    Patient independently seen and examined, Chart, Consults, Notes, Operative notes, Labs, Cardiology, and Radiology studies reviewed as available. Subjective:  Frederic Christopher is a 78 y.o. male for whom we were consulted for evaluation and treatment of acute kidney injury/chronic kidney disease. Patient has history of stage IIIa chronic kidney disease with baseline GFR varied from 45 to 55. He was recently hospitalized with septic arthritis of left elbow/MSSA bacteremia, underwent drainage followed by IV antibiotics. Patient has a history of follicular lymphoma and has received at least 3 cycles of chemotherapy, under the care of Dr. Dank Gross. Now presented with increasing dyspnea on exertion and atrial fibrillation. His chest x-ray consistent with bilateral diffuse infiltrate with possible pulmonary edema versus bilateral pneumonia versus ARDS. Recent 2D echo consistent with left ventricular ejection fraction 50 to 55%. He was initially admitted to CCU. Required high flow oxygen for the treatment of hypoxemic respiratory failure. His serum creatinine had risen to 3.0 with baseline serum creatinine 1.5. On May 18, patient had his first hemodialysis treatment via right femoral dialysis catheter and tolerated very well. He has a decubitus sacral ulcer when managed and assessed by dialysis nurse. He was also hypotensive and midodrine was provided.      Dialysis   Pt was seen and examined on renal replacement therapy  Modality: Hemodialysis  Access: Catheter  Location: Left upper  QB: 450  QD: 700  UF: 1.8 L      Allergies:  Pcn seen.Cardiac silhouette is magnified. No acute osseous lesion is seen    Impression: Vague bilateral lung opacities may represent edema or multifocal infiltrates. CTA PULMONARY W CONTRAST    Result Date: 5/10/2023  Exam: CTA Chest with contrast History: SOB, hypoxia Prior Exam: None available. Technique: Axial CT images were obtained through the thorax with angiography technique. Coronal and sagital reformatted images were created. Findings: Thyroid: Unremarkable. Esophagus: Unremarkable. Upper abdomen: Unremarkable. Heart and vasculature: Heart is normal in size. Thoracic aorta is normal in caliber. Main pulmonary artery is normal.No PE. Coronary artery calcifications. Lymph nodes: No evidence of adenopathy. Lungs: Diffuse bilateral ill-defined groundglass airspace opacities. . Pleura: No pleural effusion. No pneumothorax. Osseous structures: No acute or aggressive osseous abnormality. 1.No PE. 2.Diffuse bilateral multifocal pneumonia. Differential considerations include bacterial multifocal pneumonia, as well as viral.COVID is a potential consideration. IR ULTRASOUND GUIDANCE VASCULAR ACCESS    Result Date: 5/18/2023  Cheli Valdovinos MD     5/18/2023  1:02 PM Patient name: Brenda Echavarria MRN: [id number] YOB: 1955 Date of procedure: 5/18/2023 Preprocedure diagnosis: Acute on chronic renal insufficiency requiring access for dialysis Postprocedure diagnosis: Same Procedure: 1. Ultrasound-guided access right common femoral vein 2. Placement of a 24 cm nontunneled dual-lumen femoral vein dialysis catheter Surgeon: Susie Mayo MD Anesthesia:  local anesthesia 4 cc lidocaine 1% Indication for procedure: Mr. Nuno Nicole is a very pleasant 79-year-old gentleman with lymphoma on chemotherapy, admitted with suspected septic emboli and concern for aortic valve vegetation, right IJ port, on IV and oral antibiotics with C. difficile, now with worsening renal insufficiency requiring access for dialysis.   Risks

## 2023-05-31 NOTE — PROGRESS NOTES
Hospitalist Progress Note        PCP: Karyle Robinson, APRN    Date of Admission: 5/10/2023    Length of Stay: 21    Chief Complaint: from ECF with SOB. Recent admission with staph bacteremia and left elbow bursitis. Discharged to ECF on PO keflex. Hx of Afib RVR on xarelto. Hx of lymphoma - follows with HemOnc. This has been stable. In the ED CTA chest negative for PE however showed diffuse bilateral multifocal pneumonia, respiratory PCR was negative. In-house was placed on broad-spectrum antibiotics due to concerns of septic emboli. Blood cultures were obtained 5/10 - staph hemolyticus 1 of 2 - possibly contaminant. Repeat blood cultures 5/12 - negative. Patient was also evaluated by cardiology given concerns of septic emboli and ECHO concerning for possible aortic valve vegetation. Cardiology and CT surgery recommends continuation of current management and possible NAVDEEP when respiratory status improves however will be low yield at this time as it would not change current management. Transferred to ICU with worsening rep failure on max non invasive resp support. Attempted diuresis with great output but worsening renal function. Followed in-house by nephrology given underlining renal dysfunction and progressive decline, temp dialysis access was placed by vascular 5/18 and initiated on dialysis. Developed diarrhea - Cdiff testing positive on 5/17      Patient tx out of ICU to PCU and then to med surg with continued improvement in heart rate and oxygen demand    Was weaned off oxygen, reevaluated by cardiology and s/p NAVDEEP with no evidence of vegetations. S/p perm cath placement 5/25. Patient with coffee ground emesis 5/24 and GI consulted. Was initiated on PPI, no recurrent episode noted. GI rec continuation of PPI and sucrafate. Resp culture with H. Haemolyticus on 5/29          Subjective:     Pt awake. Oral mucosa dry. Struggling with swallowing.    Very weak infiltrate on CT  Can not rule out developing ARDS  Recent admission with Staph bacteremia and L elbow bursitis. - HCAP coverage - switched to Merrem IV on 5/15/23.  - ECHO shows possible aortic valve vegetation.   - NAVDEEP did not suggest vegetation and blood Cx negative  - ID involved. - completed empiric course of Abx therapy. C diff - diarrhea - in setting of Abx use as above. Completed Vanc PO as per ID recs on May 29th. Acute Hypoxic Resp Failure - suspect due to septic embolic PNA. Progressing to become severe with high flow O2 maxed. CXR reviewed - ongoing bilateral infiltrate, no effusions. ABG reviewed - hypoxia pO2 56 despite high flow oxygen. Infection treated. Failed diuresis attempt and initiated on HD therapy this admission. O2 weaned to RA. Possible Aortic Valve Endocarditis - this appears to have been ruled out at this point. Afib RVR -   On eliquis. Metoprolol. Dilt   Rate controlled. Hx of Lymphoma -   Atypical lymphs on differential - unclear if reactive to active infection or recurrent disease. HemOnc involved        Stage II large pressure decubitus ulcer POA. Wound care. Ulcer base and margins do not appear infected. LAURA on CKDIIIB. Initiated on HD therapy this admission. Critical illness Myopathy -   Start PTOT and encourage participation as tolerated. Dysphagia. Oral mucosa dry. Encourage oral hydration. SLP eval.         DVT Prophylaxis: eliquis. Diet: ADULT DIET; Clear Liquid; Low Potassium (Less than 3000 mg/day)  ADULT ORAL NUTRITION SUPPLEMENT; Breakfast, Dinner; Clear Liquid Oral Supplement  Code Status: Full Code      Dispo - inpatient. Pending progress with PT will most likely need ECF placement.            Sergey Barraza MD

## 2023-05-31 NOTE — PROGRESS NOTES
Attempted initial Clinical Bedside Swallowing Evaluation at 1327. Nursing reports patient is at dialysis. Will attempt at a later time.     Electronically signed by RHIANNON Dotson on 5/31/2023 at 1:35 PM

## 2023-06-01 PROBLEM — I12.0 HYPERTENSIVE KIDNEY DISEASE WITH CHRONIC KIDNEY DISEASE STAGE V (HCC): Status: ACTIVE | Noted: 2023-01-01

## 2023-06-01 PROBLEM — N18.5 HYPERTENSIVE KIDNEY DISEASE WITH CHRONIC KIDNEY DISEASE STAGE V (HCC): Status: ACTIVE | Noted: 2023-01-01

## 2023-06-01 NOTE — PROGRESS NOTES
Pharmacy Renal Adjustment    Elana Chavez is a 78 y.o. male. Pharmacy has renally adjusted medications per protocol. Recent Labs     05/31/23  0320 06/01/23  0115   BUN 47* 29*       Recent Labs     05/31/23  0320 06/01/23  0115   CREATININE 5.4* 3.6*       Estimated Creatinine Clearance: 30 mL/min (A) (based on SCr of 3.6 mg/dL (H)). Height:   Ht Readings from Last 1 Encounters:   05/30/23 6' 1\" (1.854 m)     Weight:  Wt Readings from Last 1 Encounters:   05/31/23 (!) 310 lb 3 oz (140.7 kg)       CKD stage: HD    Plan: Adjust the following medications based on renal function:           Change cefepime to 1000mg IV q 24 hours for a total of 7 days.       ARTURO THOMAS, PHARM D, 6/1/2023, 2:22 PM

## 2023-06-01 NOTE — PROGRESS NOTES
Speech seen. Patient has no gag reflex, tongue flat no movement. Will aspirate if given any PO. Continue NPO status. Family and palliative phone conference this AM.    Plans for Hospice, possible in-patient. Awaiting on Palliative to confirm.

## 2023-06-01 NOTE — PROGRESS NOTES
Follow up:  Saw pt this morning in follow up. Report from nursing r/t his hypotension, fever and change in condition since yesterday. Speech evaluated swallow and recommended NPO for now. I had a follow up call to pt's brother and his mother to review goals of care and continue conversation started yesterday about hospice services. Family is electing to stop hemodialysis treatments and move toward comfort measures. Hospice consulted and pt was approved to transfer to the [place] [id number].. Support to family, spoke also with another brother in [facility] and his wife to answer questions and review transfer process. Hospice chaplain will follow up for admission details. Emotional support provided and will follow through discharge.     Electronically signed by Lisa Scruggs RN on 6/1/2023 at 1:23 PM

## 2023-06-01 NOTE — DISCHARGE SUMMARY
Hospital Medicine Discharge Summary    Patient ID: Rachell Raul      Patient's PCP: JODY Juares    Admit Date: 5/10/2023     Discharge Date: 6/1/2023      Admitting Provider: Emanuel Alfaro MD     Discharge Provider: Sergey Barraza MD     Discharge Diagnoses: Active Hospital Problems    Diagnosis     Lymphoma ([address]) [C85.90]      Priority: Medium    Atrial fibrillation with RVR (HCC) [I48.91]      Priority: Medium    Chronic a-fib (HCC) [I48.20]      Priority: Medium    Severe malnutrition (HCC) [E43]     Stage 5 chronic kidney disease not on chronic dialysis ([address]) [N18.5]     Encounter regarding vascular access for dialysis for ESRD ([address]) [N18.6, Z99.2]     Palliative care patient [Z51.5]     Pneumonia of both lungs due to infectious organism [J18.9]     History of septic arthritis [Z87.39]     Stage 3b chronic kidney disease (CKD) ([address]) [N18.32]     Essential hypertension [I10]        The patient was seen and examined on day of discharge and this discharge summary is in conjunction with any daily progress note from day of discharge. Hospital Course:       74yo man presented from [facility] with worsening SOB. He had recent admission to this facility with staph bacteremia and left elbow bursitis. Treated with IV Abx and surgical intervention, improved and eventually discharged to F on PO keflex. Hx of Afib RVR on xarelto. Hx of lymphoma - follows with HemOnc. This has been stable. In the ED CTA chest negative for PE however showed diffuse bilateral multifocal pneumonia, respiratory PCR was negative. In-house was placed on broad-spectrum antibiotics due to concerns of septic emboli. Blood cultures were obtained 5/10 - staph hemolyticus 1 of 2 - possibly contaminant. Repeat blood cultures 5/12 - negative. Patient was also evaluated by cardiology given concerns of septic emboli and ECHO concerning for possible aortic valve vegetation.  Cardiology and CT details per progress note from same date. Labs: For convenience and continuity at follow-up the following most recent labs are provided:      CBC:    Lab Results   Component Value Date/Time    WBC 2.0 06/01/2023 01:15 AM    HGB 7.5 06/01/2023 01:15 AM    HCT 25.0 06/01/2023 01:15 AM     06/01/2023 01:15 AM       Renal:    Lab Results   Component Value Date/Time     06/01/2023 01:15 AM    K 3.7 06/01/2023 01:15 AM    CL 99 06/01/2023 01:15 AM    CO2 25 06/01/2023 01:15 AM    BUN 29 06/01/2023 01:15 AM    CREATININE 3.6 06/01/2023 01:15 AM    CALCIUM 8.2 06/01/2023 01:15 AM    PHOS 5.7 05/19/2023 03:45 AM         Significant Diagnostic Studies    Radiology:   IR ULTRASOUND GUIDANCE VASCULAR ACCESS   Final Result      US RENAL COMPLETE   Final Result   Unremarkable examination of the kidneys. XR CHEST PORTABLE   Final Result   1. Bilateral pulmonary edema and/or pneumonitis, improved bilaterally   when compared to the prior radiographic exam of the chest and 13 May   23.   2. Right internal jugular central venous access port catheter in   unchanged position from the prior study. 3. The exam is otherwise within normal limits for age and body habitus   with no pleural fluid identified. Signed by Dr Sherin Humphries   Final Result   Hypoaeration, cardiomegaly and extensive mixed interstitial and alveolar opacities throughout both lungs. Findings can represent asymmetric edema and/or infiltrates. Possible trace left pleural effusion. CT correlation may be of benefit, if desired. Recommendation: Follow up as clinically indicated. Dictated and Electronically Signed by Joaquim Ko MD at 13-May-2023 10:06:09 AM EST               CT HEAD WO CONTRAST   Final Result    1. No evidence of acute intracranial abnormality. CTA PULMONARY W CONTRAST   Final Result   1. No PE. 2.Diffuse bilateral multifocal pneumonia. Differential considerations include   bacterial multifocal

## 2023-06-01 NOTE — PROGRESS NOTES
Nutrition Assessment     Type and Reason for Visit: Reassess    Nutrition Recommendations/Plan:   Follow for goals of care. Malnutrition Assessment:  Malnutrition Status: Severe malnutrition    Nutrition Assessment:  Pt remains severely malnourished and continues to decline from a nutritional standpoint. Aware he has had a respiratory status decline. He is NPO until SLP eval. Pt's family is discussing goals of care. Will follow. Estimated Daily Nutrient Needs:  Energy (kcal):  3767-0669 kcals (8-11 kcals/KG) Weight Used for Energy Requirements: Current     Protein (g):  167g Weight Used for Protein Requirements: Ideal        Fluid (ml/day):  4193-9115 ml Method Used for Fluid Requirements: 1 ml/kcal    Nutrition Related Findings:   +2 pitting edema Wound Type: Surgical Incision, Stage II, Open Wounds    Current Nutrition Therapies:    Diet NPO Exceptions are: Sips of Water with Meds    Anthropometric Measures:  Height: 6' 1\" (185.4 cm)  Current Body Wt: 310 lb 3 oz (140.7 kg)   BMI: 40.9    Nutrition Diagnosis:   Severe malnutrition, In context of chronic illness related to inadequate protein-energy intake as evidenced by Criteria as identified in malnutrition assessment    Nutrition Interventions:   Food and/or Nutrient Delivery: Continue NPO  Nutrition Education/Counseling: No recommendation at this time  Coordination of Nutrition Care: Swallow Evaluation, Continue to monitor while inpatient  Plan of Care discussed with: nursing    Goals:  Previous Goal Met: Progress towards Goal(s) Declining  Goals: Meet at least 75% of estimated needs, PO intake 50% or greater       Nutrition Monitoring and Evaluation:   Behavioral-Environmental Outcomes: None Identified  Food/Nutrient Intake Outcomes: Diet Advancement/Tolerance  Physical Signs/Symptoms Outcomes: Chewing or Swallowing, Weight, Biochemical Data    Discharge Planning:     Too soon to determine     Alyx Kilgore MS, RD, LD, CDCES  Contact:

## 2023-06-01 NOTE — PROGRESS NOTES
Patient high risk for aspiration. Diet changed to NPO until speech can evaluate. Respirations labored, shallow with expiratory wheezes noted. Suctioned this AM.  Cleaned mouth with swab.   Currently 94% on 2 L O2

## 2023-06-01 NOTE — PROGRESS NOTES
effects  Faces, Legs, Activity, Cry, and Consolability (FLACC)  Face (F): no particular expression or smile  Legs (L): normal position or relaxed  Activity (A): lying quietly, normal position, moves easily  Cry (C): no cry (awake or asleep)  Consolability (C): content, relaxed  FLACC Score : 0    Reason for Referral  Allison Evans was referred for a bedside swallow evaluation to assess the efficiency of his swallow function, identify signs and symptoms of aspiration and make recommendations regarding safe dietary consistencies, effective compensatory strategies, and safe eating environment. Impression  Assessed patient's swallowing function. Patient exhibited absent swallows with no laryngeal elevation noted and just laryngeal rocking observed. Patient exhibited decreased degree of airway detection with increased wheezes noted but no outward coughs/throat clears observed following probable penetration/aspiration of PO trials (small, controlled trials thin H2O). At this time, would recommend continuation NPO status. Daily oral care, via staff, to decrease bacteria from the oral cavity. If patient receives mouth care prior to intake, okay for swabs regular H2O for comfort. Thank you for this consult. Recommended Diet and Intervention  Diet Solids Recommendation: NPO  Liquid Consistency Recommendation: NPO  Therapeutic Interventions: Patient/Family education;Oral Care; Therapeutic PO trials with SLP     Treatment/Goals  Timeframe for Short-term Goals: 1-2x/day for 3 days   Goal 1: Patient NPO. Goal 2: Patient staff will follow swallow safety recommendations. Goal 3: Patient will receive daily oral care, via staff, to decrease bacteria from the oral cavity. Goal 4: Re-assessment of swallow function for potential PO intake. Goal 5: Re-assessment of speech production.     General  Chart Reviewed: Yes  Behavior/Cognition: Lethargic  O2 Device: Nasal Cannula  Communication Observation: (Assessed patient's speech production. Patient appeared primarily aphonic with slow, decreased labial and lingual movements noted. SLP ranked functional intelligibility of speech for unfamiliar listeners at 10-20% in utterances with background noise present.)  Follows Directions: Simple   Patient Positioning: Upright in bed  Consistencies Administered: Thin      Oral Motor Examination   Labial ROM: (Decreased, bilaterally, during labial retraction trials and labial protrusion trials.)  Labial Strength: (Decreased during labial compression trials.)  Labial Coordination: (Slowed movements were noted.)  Lingual ROM: (Decreased during lingual extension trials with no full point achieved; decreased during lingual elevation trials without use of accessory jaw movement; decreased movements noted bilaterally.)  Lingual Strength: (Decreased.)  Lingual Coordination: (Slowed movements were noted.)     Assessed patient's swallowing function with the following observations noted:     Oral Phase  Suspected Premature Bolus Loss: Thin (Patient exhibited slow oral transit of small, controlled trials thin H2O squeezed in the cheek via toothette by SLP.)     Pharyngeal Phase  Laryngeal Elevation: (Patient exhibited absent swallows with no laryngeal elevation noted and just laryngeal rocking observed.)  Pharyngeal Phase - Comment: Patient exhibited decreased degree of airway detection with increased wheezes noted but no outward coughs/throat clears observed following probable penetration/aspiration of PO trials (small, controlled trials thin H2O). At this time, would recommend continuation NPO status. Daily oral care, via staff, to decrease bacteria from the oral cavity. If patient receives mouth care prior to intake, okay for swabs regular H2O for comfort.      Electronically signed by RHIANNON Dimas on 6/1/2023 at 12:41 PM

## 2023-06-01 NOTE — PROGRESS NOTES
Facility-Administered Medications   Medication Dose Route Frequency Provider Last Rate Last Admin    vancomycin (VANCOCIN) capsule 250 mg  250 mg Oral 4 times per day Roseann Rolon MD        cefepime (MAXIPIME) 2,000 mg in sodium chloride 0.9 % 50 mL IVPB ([id number])  2,000 mg IntraVENous Q12H Roseann Rolon MD        metronidazole (FLAGYL) 500 mg in 0.9% NaCl 100 mL IVPB premix  500 mg IntraVENous Q8H Roseann Rolon  mL/hr at 06/01/23 0900 500 mg at 06/01/23 0900    epoetin allen-epbx (RETACRIT) injection 3,000 Units  3,000 Units SubCUTAneous Once per day on Mon Wed Fri Alethia Rubinstein, MD   3,000 Units at 05/31/23 5119    sodium chloride flush 0.9 % injection 5-40 mL  5-40 mL IntraVENous 2 times per day Wing Cheers, DO   10 mL at 05/31/23 2107    sodium chloride flush 0.9 % injection 5-40 mL  5-40 mL IntraVENous PRN Wing Cheers, DO        0.9 % sodium chloride infusion   IntraVENous PRN Wing Cheers, DO        sucralfate (CARAFATE) tablet 1 g  1 g Oral 2 times per day George Pop, DO   1 g at 05/31/23 2107    pantoprazole (PROTONIX) 40 mg in sodium chloride (PF) 0.9 % 10 mL injection  40 mg IntraVENous Q12H Wing Cheers, DO   40 mg at 06/01/23 0502    promethazine (PHENERGAN) injection 12.5 mg  12.5 mg IntraMUSCular Q6H PRN Wing Cheers, DO   12.5 mg at 05/25/23 1312    prochlorperazine (COMPAZINE) suppository 25 mg  25 mg Rectal Q12H PRN Wing Cheers, DO        hydrOXYzine HCl (ATARAX) tablet 25 mg  25 mg Oral Q6H PRN Wing Cheers, DO   25 mg at 05/31/23 2107    vitamin D (ERGOCALCIFEROL) capsule 50,000 Units  50,000 Units Oral Weekly Wing Cheers, DO   50,000 Units at 05/30/23 0834    apixaban (ELIQUIS) tablet 5 mg  5 mg Oral BID Carmelita Palmer MD   5 mg at 05/31/23 2107    midodrine (PROAMATINE) tablet 10 mg  10 mg Oral TID  Wing Walter DO   10 mg at 05/31/23 1749    metoprolol succinate (TOPROL XL) extended release up, and good hemostasis was noted. He tolerated the procedure well. There were no complications. Estimated blood loss was less than 5 cc. Plan: It is okay to use the catheter for dialysis. We would be happy to be consulted for more permanent dialysis access, i.e. a PermCath if needed, once the patient is more stable and has been evaluated for aortic vegetation. We appreciate the opportunity to participate in the care of this patient.         Assessment   Acute kidney injury/ATN  Chronic kidney disease stage IIIa  Acute respiratory failure  Hyperphosphatemia  Follicular lymphoma  Recent MSSA bacteremia with left elbow septic arthritis  Hypotension  Pneumonia/ pneumonitis      Plan:  Discussed with patient, nursing   Monitor labs  Work-up reviewed to date  Cardiology/vascular/ID evaluations reviewed  Dialysis Monday Wednesday Friday   Midodrine for hypotension  Patient is getting sicker from pneumonia and family is leaning toward hospice  Would hold dialysis until we hear more about his family's wishes    Keith Graves MD  06/01/23  10:07 AM

## 2023-06-01 NOTE — PLAN OF CARE
Problem: Discharge Planning  Goal: Discharge to home or other facility with appropriate resources  Outcome: Not Progressing     Problem: Safety - Adult  Goal: Free from fall injury  Outcome: Not Progressing     Problem: ABCDS Injury Assessment  Goal: Absence of physical injury  Outcome: Not Progressing     Problem: Skin/Tissue Integrity  Goal: Absence of new skin breakdown  Description: 1. Monitor for areas of redness and/or skin breakdown  2. Assess vascular access sites hourly  3. Every 4-6 hours minimum:  Change oxygen saturation probe site  4. Every 4-6 hours:  If on nasal continuous positive airway pressure, respiratory therapy assess nares and determine need for appliance change or resting period. Outcome: Not Progressing     Problem: Pain  Goal: Verbalizes/displays adequate comfort level or baseline comfort level  Outcome: Not Progressing     Problem: Nutrition Deficit:  Goal: Optimize nutritional status  Outcome: Not Progressing     Problem: Confusion  Goal: Confusion, delirium, dementia, or psychosis is improved or at baseline  Description: INTERVENTIONS:  1. Assess for possible contributors to thought disturbance, including medications, impaired vision or hearing, underlying metabolic abnormalities, dehydration, psychiatric diagnoses, and notify attending LIP  2. [place] high risk fall precautions, as indicated  3. Provide frequent short contacts to provide reality reorientation, refocusing and direction  4. Decrease environmental stimuli, including noise as appropriate  5. Monitor and intervene to maintain adequate nutrition, hydration, elimination, sleep and activity  6. If unable to ensure safety without constant attention obtain sitter and review sitter guidelines with assigned personnel  7.  Initiate Psychosocial CNS and Spiritual Care consult, as indicated  Outcome: Not Progressing     Problem: Neurosensory - Adult  Goal: Achieves stable or improved neurological status  Outcome: Not

## 2023-06-01 NOTE — PROGRESS NOTES
[I48.91]      Priority: Medium    Chronic a-fib ([facility] [I48.20]      Priority: Medium    Severe malnutrition (HCC) [E43]     Stage 5 chronic kidney disease not on chronic dialysis ([facility] [N18.5]     Encounter regarding vascular access for dialysis for ESRD ([facility] [N18.6, Z99.2]     Palliative care patient [Z51.5]     Pneumonia of both lungs due to infectious organism [J18.9]     History of septic arthritis [Z87.39]     Stage 3b chronic kidney disease (CKD) ([facility] [N18.32]     Essential hypertension [I10]          Possible atypical PNA. Strongly suspicious for possible septic embolic PNA. Bilateral infiltrate on CT  Can not rule out developing ARDS  Recent admission with Staph bacteremia and L elbow bursitis. - HCAP coverage - switched to Merrem IV on 5/15/23.  - ECHO shows possible aortic valve vegetation.   - NAVDEEP did not suggest vegetation and blood Cx negative  - ID involved. - completed empiric course of Abx therapy. Pt spiking fever and tachycardia 6/1 - concerning for recurrent sepsis. - resp culture with H haemolyticus.    - also concern for ongoing aspiration. - started back on Cefepime and Flagyl IV.    - resumed PO Vanc given very high risk of recurrent Cdiff.    - sent for repeat blood Cx.    - ID reconsult. NOTE: family strongly considering comfort measures at this point. Code status DNR. Palliative care team involved. He will likely need IP hospice care should they elect to proceed that way. C diff - diarrhea - in setting of Abx use as above. Completed Vanc PO as per ID recs on May 29th. Resumed Vanc PO 6/1 as had to go back on IV Abx and very high risk of recurrent Cdiff. Acute Hypoxic Resp Failure - suspect due to septic embolic PNA. Progressing to become severe with high flow O2 maxed. CXR reviewed - ongoing bilateral infiltrate, no effusions. ABG reviewed - hypoxia pO2 56 despite high flow oxygen. Infection treated.    Failed diuresis attempt and initiated on HD therapy this admission. O2 has since weaned to RA. - back on oxygen 6/1          Possible Aortic Valve Endocarditis - this appears to have been ruled out at this point. Afib RVR -   On eliquis. Metoprolol. Dilt   Rate controlled. Hx of Lymphoma -   Atypical lymphs on differential - unclear if reactive to active infection or recurrent disease. HemOnc involved        Stage II large pressure decubitus ulcer POA. Wound care. Ulcer base and margins do not appear infected. LAURA on CKDIIIB. Initiated on HD therapy this admission. Critical illness Myopathy -   Start PTOT and encourage participation as tolerated. Dysphagia. Oral mucosa dry. Encourage oral hydration. SLP eval.         DVT Prophylaxis: eliquis. Diet: Diet NPO Exceptions are: Sips of Water with Meds  Code Status: DNR      Dispo - inpatient. Pending progress with PT will most likely need ECF placement. 74yo man with recurrent sepsis in setting of underlying lymphoma, with ongiong clinical decline over past 2 months and shows no clinical progress and unfortunately with continued deterioration. At this time unable to tolerate any PO - nutrition or meds. Family thinking about comfort measures.            Ashlie Garza MD

## 2023-06-02 NOTE — TELEPHONE ENCOUNTER
[facility]. reporting that pt passed away today at 8:20 am

## 2023-07-26 NOTE — PROGRESS NOTES
Physician Progress Note      PATIENT:               Ahmet Gonzalez  CSN #:                  [id number]  :                       1955  ADMIT DATE:       5/10/2023 9:58 AM  DISCH DATE:        2023 2:58 PM  RESPONDING  PROVIDER #:        Ailza Martinez MD          QUERY TEXT:    Pt admitted with Pneumonia. Pt noted to have tachycardia and elevated   Procalcitonin 0.32. If possible, please document in the progress notes and   discharge summary if you are evaluating and /or treating any of the following: The medical record reflects the following:  Risk Factors: Immunocompromised underling treatment for Lymphoma. Clinical Indicators: Pulse 98 - 125, T 35.9, Procalcitonin 0.32. Temp 95.9,   101.4  Treatment: Vancomycin, Cefepime, Meropenem. Labs, CXR,    Thank you  Jose E Castillo RN, BSN, [facility]  [phone]  Options provided:  -- Sepsis, present on admission  -- Sepsis, present on admission, now resolved  -- Sepsis, developed following admission  -- Pneumonia without Sepsis  -- Sepsis was ruled out  -- Other - I will add my own diagnosis  -- Disagree - Not applicable / Not valid  -- Disagree - Clinically unable to determine / Unknown  -- Refer to Clinical Documentation Reviewer    PROVIDER RESPONSE TEXT:    This patient was treated for sepsis this admission, which was present on   admission and is currently resolved. Query created by: Jose E Castillo on 2023 9:30 AM      QUERY TEXT:    Pt admitted with Pneumonia . Pt noted to have possible septic emboli. If   possible, please document in the progress notes and discharge summary if you   are evaluating and/or treating any of the following:    Note: CAP and HCAP indicate where the pneumonia was acquired, not a specific   type. The medical record reflects the following:  Risk Factors: ***  Clinical Indicators: Respiratory culture H. Haemolyticus on . Spiking   fever on  concern for aspiration, coffee ground emesis on admission.

## 2024-04-20 NOTE — INTERVAL H&P NOTE
Update History & Physical    The patient's History and Physical of December 8, 2022 was reviewed with the patient and I examined the patient. There was no change. The surgical site was confirmed by the patient and me. Plan: The risks, benefits, expected outcome, and alternative to the recommended procedure have been discussed with the patient. Patient understands and wants to proceed with the procedure.      Electronically signed by Rey Giles MD on 12/16/2022 at 2:29 PM No

## (undated) DEVICE — SPONGE: SPECIALTY CHERRY DISS XR 100/CS: Brand: MEDICAL ACTION INDUSTRIES

## (undated) DEVICE — TOWEL,OR,DSP,ST,BLUE,DLX,4/PK,20PK/CS: Brand: MEDLINE

## (undated) DEVICE — CURAVIEW LED LARYNSCP BLDE

## (undated) DEVICE — AGENT HEMSTAT 3GM PURIFIED PLNT STARCH PWD ABSRB ARISTA AH

## (undated) DEVICE — E-Z CLEAN, NON-STICK, PTFE COATED, ELECTROSURGICAL NEEDLE ELECTRODE, MODIFIED EXTENDED INSULATION, 2.75 INCH (7 CM): Brand: MEGADYNE

## (undated) DEVICE — APPLIER CLP L9.375IN APER 2.1MM CLS L3.8MM 20 SM TI CLP

## (undated) DEVICE — MASTISOL ADHESIVE LIQ 2/3ML

## (undated) DEVICE — GLOVE SURG SZ 7 CRM LTX FREE POLYISOPRENE POLYMER BEAD ANTI

## (undated) DEVICE — SOLUTION IV 250ML 0.9% SOD CHL PH 5 INJ USP VIAFLX PLAS

## (undated) DEVICE — SOLUTION IV IRRIG POUR BRL 0.9% SODIUM CHL 2F7124

## (undated) DEVICE — Device

## (undated) DEVICE — MINOR CDS: Brand: MEDLINE INDUSTRIES, INC.

## (undated) DEVICE — SUTURE PDS II SZ 3-0 L27IN ABSRB CLR SH L26MM 1/2 CIR TAPR Z416H

## (undated) DEVICE — ROYAL SILK SURGICAL GOWN, XXL: Brand: CONVERTORS

## (undated) DEVICE — SHEET,T,THYROID,STERILE: Brand: MEDLINE

## (undated) DEVICE — CANNULA NSL AD L7FT DIV O2 CO2 W/ M LUERLOCK TRMPT CONN

## (undated) DEVICE — C-ARM: Brand: UNBRANDED

## (undated) DEVICE — AGENT HEMSTAT W4XL8IN OXIDIZED REGENERATED CELOS ABSRB

## (undated) DEVICE — E-Z CLEAN, NON-STICK, PTFE COATED, ELECTROSURGICAL BLADE ELECTRODE, MODIFIED EXTENDED INSULATION, 2.5 INCH (6.35 CM): Brand: MEGADYNE

## (undated) DEVICE — STANDARD HYPODERMIC NEEDLE,POLYPROPYLENE HUB: Brand: MONOJECT

## (undated) DEVICE — PROVE COVER: Brand: UNBRANDED

## (undated) DEVICE — ADHESIVE SKIN CLSR 0.7ML TOP DERMBND ADV

## (undated) DEVICE — SUTURE ETHLN SZ 4-0 L18IN NONABSORBABLE BLK L19MM PS-2 3/8 1667H

## (undated) DEVICE — GAUZE,SPONGE,4"X4",8PLY,STRL,LF,10/TRAY: Brand: MEDLINE

## (undated) DEVICE — PACK SURG HD AND NK CDS

## (undated) DEVICE — SUTURE VCRL SZ 2-0 L36IN ABSRB UD L36MM CT-1 1/2 CIR J945H

## (undated) DEVICE — RADIFOCUS GLIDEWIRE ADVANTAGE GUIDEWIRE: Brand: GLIDEWIRE ADVANTAGE

## (undated) DEVICE — GLIDESCOPE REPROC SPECTRUM VIDEO LARYNGOSCOPE LOPRO S4

## (undated) DEVICE — LIQUIBAND RAPID ADHESIVE 36/CS 0.8ML: Brand: MEDLINE

## (undated) DEVICE — DRESSING HEMSTAT W1X2IN ABSRB SURGCEL SNOW

## (undated) DEVICE — GLIDEPATH HEMODIALYSIS CATH, ST, DL, 14.5 FR. 27CM: Brand: GLIDEPATH LONG-TERM HEMODIALYSIS CATHETER WITH PRELOADED STYLET

## (undated) DEVICE — DRAPE,UTILITY,XL,4/PK,STERILE: Brand: MEDLINE

## (undated) DEVICE — ELECTROSURGICAL PENCIL BUTTON SWITCH NON COATED BLADE ELECTRODE 10 FT (3 M) CORD HOLSTER: Brand: MEGADYNE

## (undated) DEVICE — BANDAGE,GAUZE,4.5"X4.1YD,STERILE,LF: Brand: MEDLINE

## (undated) DEVICE — BANDAGE COMPR W4INXL15FT BGE E SGL LAYERED CLP CLSR

## (undated) DEVICE — TUBE ET 7.5MM NSL ORAL BASIC CUF INTMED MURPHY EYE RADPQ

## (undated) DEVICE — DISPOSABLE BIPOLAR FORCEPS 7 3/4" (19.7CM) SCOVILLE BAYONET, INSULATED, 1.5MM TIP AND 12FT. (3.6M) CABLE: Brand: ADVANCED MED-SURG CONCEPTS

## (undated) DEVICE — SUTURE MCRYL SZ 4-0 L18IN ABSRB UD L19MM PS-2 3/8 CIR PRIM Y496G

## (undated) DEVICE — SYRINGE, LUER LOCK, 10ML: Brand: MEDLINE

## (undated) DEVICE — PENCIL SMK EVAC 10 FT BLADE ELECTRD ROCKER FOR TELSCP

## (undated) DEVICE — DECANTER VI VENT W/ VLV FOR ASEP TRNSF OF FLD

## (undated) DEVICE — LOWER EXTREMITY: Brand: MEDLINE INDUSTRIES, INC.

## (undated) DEVICE — PACK,UNIVERSAL,NO GOWNS: Brand: MEDLINE

## (undated) DEVICE — SUTURE ETHLN SZ 3-0 L30IN NONABSORBABLE BLK L36MM FSLX 3/8 1673BH

## (undated) DEVICE — SUTURE VCRL SZ 3-0 L27IN ABSRB UD L26MM SH 1/2 CIR J416H